# Patient Record
Sex: FEMALE | Race: WHITE
[De-identification: names, ages, dates, MRNs, and addresses within clinical notes are randomized per-mention and may not be internally consistent; named-entity substitution may affect disease eponyms.]

---

## 2017-03-28 ENCOUNTER — HOSPITAL ENCOUNTER (OUTPATIENT)
Dept: HOSPITAL 75 - RAD | Age: 47
End: 2017-03-28
Attending: PAIN MEDICINE
Payer: COMMERCIAL

## 2017-03-28 DIAGNOSIS — M54.5: Primary | ICD-10-CM

## 2017-03-28 PROCEDURE — 72148 MRI LUMBAR SPINE W/O DYE: CPT

## 2018-04-03 ENCOUNTER — HOSPITAL ENCOUNTER (OUTPATIENT)
Dept: HOSPITAL 75 - RAD | Age: 48
End: 2018-04-03
Attending: NURSE PRACTITIONER
Payer: COMMERCIAL

## 2018-04-03 DIAGNOSIS — M94.262: ICD-10-CM

## 2018-04-03 DIAGNOSIS — S89.92XA: Primary | ICD-10-CM

## 2018-04-03 DIAGNOSIS — Z98.890: ICD-10-CM

## 2018-04-03 DIAGNOSIS — M71.22: ICD-10-CM

## 2018-04-03 PROCEDURE — 73721 MRI JNT OF LWR EXTRE W/O DYE: CPT

## 2018-04-03 NOTE — DIAGNOSTIC IMAGING REPORT
PROCEDURE: MRI left joint lower extremity without contrast.



TECHNIQUE: Multiplanar, multisequence non contrast-enhanced MRI

of the left lower extremity was accomplished.



INDICATION: History of left knee surgery. Left knee injury one

week ago. 



COMPARISON: MRI of left lower extremity without contrast

10/3/2013.



FINDINGS: The anterior and posterior cruciate ligaments are

intact. The lateral and medial collateral ligamentous complexes

are intact. There is abnormal signal within the inferior aspect

of the posterior horn of the medial meniscus, with no focal

linear tear. The menisci are otherwise intact. The extensor

mechanism and patellar retinaculum are intact. Advanced

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

delamination and subchondral edema. Mild chondromalacia in the

patellofemoral and lateral compartments with no focal

full-thickness defects or subchondral edema. The posterolateral

corner is intact. Popliteal cyst measuring 2.6 x 1.1 x 4.2 cm.

Normal bone marrow signal. The visualized neurovascular

structures are unremarkable. Normal signal within the visualized

muscle bellies.



IMPRESSION: 

1. Abnormal T2 signal in the undersurface of the posterior horn

of the medial meniscus has mildly progressed since the prior

exam. This may represent postoperative change given the reported

history of a knee surgery. The menisci are otherwise normal.

2. The major ligamentous and tendinous structures of the left

knee are intact.

3. 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

is delamination and subchondral edema. There is more mild

chondromalacia in the patellofemoral and lateral compartments.

4. Popliteal cyst measuring up to 2.6 cm is new since the prior

exam.



Dictated by: 



  Dictated on workstation # [id number]

## 2018-07-02 ENCOUNTER — HOSPITAL ENCOUNTER (OUTPATIENT)
Dept: HOSPITAL 75 - ICU | Age: 48
LOS: 1 days | Discharge: HOME | End: 2018-07-03
Attending: INTERNAL MEDICINE
Payer: COMMERCIAL

## 2018-07-02 VITALS — DIASTOLIC BLOOD PRESSURE: 80 MMHG | SYSTOLIC BLOOD PRESSURE: 125 MMHG

## 2018-07-02 VITALS — DIASTOLIC BLOOD PRESSURE: 70 MMHG | SYSTOLIC BLOOD PRESSURE: 111 MMHG

## 2018-07-02 VITALS — DIASTOLIC BLOOD PRESSURE: 110 MMHG | SYSTOLIC BLOOD PRESSURE: 143 MMHG

## 2018-07-02 VITALS — DIASTOLIC BLOOD PRESSURE: 87 MMHG | SYSTOLIC BLOOD PRESSURE: 136 MMHG

## 2018-07-02 VITALS — DIASTOLIC BLOOD PRESSURE: 77 MMHG | SYSTOLIC BLOOD PRESSURE: 121 MMHG

## 2018-07-02 VITALS — DIASTOLIC BLOOD PRESSURE: 68 MMHG | SYSTOLIC BLOOD PRESSURE: 117 MMHG

## 2018-07-02 VITALS — DIASTOLIC BLOOD PRESSURE: 76 MMHG | SYSTOLIC BLOOD PRESSURE: 118 MMHG

## 2018-07-02 VITALS — DIASTOLIC BLOOD PRESSURE: 96 MMHG | SYSTOLIC BLOOD PRESSURE: 146 MMHG

## 2018-07-02 VITALS — SYSTOLIC BLOOD PRESSURE: 123 MMHG | DIASTOLIC BLOOD PRESSURE: 73 MMHG

## 2018-07-02 VITALS — SYSTOLIC BLOOD PRESSURE: 130 MMHG | DIASTOLIC BLOOD PRESSURE: 99 MMHG

## 2018-07-02 VITALS — DIASTOLIC BLOOD PRESSURE: 79 MMHG | SYSTOLIC BLOOD PRESSURE: 123 MMHG

## 2018-07-02 VITALS — SYSTOLIC BLOOD PRESSURE: 146 MMHG | DIASTOLIC BLOOD PRESSURE: 96 MMHG

## 2018-07-02 VITALS — SYSTOLIC BLOOD PRESSURE: 140 MMHG | DIASTOLIC BLOOD PRESSURE: 93 MMHG

## 2018-07-02 VITALS — SYSTOLIC BLOOD PRESSURE: 138 MMHG | DIASTOLIC BLOOD PRESSURE: 92 MMHG

## 2018-07-02 DIAGNOSIS — F41.9: ICD-10-CM

## 2018-07-02 DIAGNOSIS — M47.816: ICD-10-CM

## 2018-07-02 DIAGNOSIS — K21.9: ICD-10-CM

## 2018-07-02 DIAGNOSIS — E78.5: ICD-10-CM

## 2018-07-02 DIAGNOSIS — J30.2: ICD-10-CM

## 2018-07-02 DIAGNOSIS — I10: ICD-10-CM

## 2018-07-02 DIAGNOSIS — Z95.1: ICD-10-CM

## 2018-07-02 DIAGNOSIS — I25.10: ICD-10-CM

## 2018-07-02 DIAGNOSIS — F17.210: ICD-10-CM

## 2018-07-02 DIAGNOSIS — F32.9: ICD-10-CM

## 2018-07-02 DIAGNOSIS — Z79.899: ICD-10-CM

## 2018-07-02 DIAGNOSIS — R07.89: Primary | ICD-10-CM

## 2018-07-02 DIAGNOSIS — Z79.82: ICD-10-CM

## 2018-07-02 DIAGNOSIS — I49.3: ICD-10-CM

## 2018-07-02 DIAGNOSIS — M53.3: ICD-10-CM

## 2018-07-02 DIAGNOSIS — M54.16: ICD-10-CM

## 2018-07-02 LAB
ALBUMIN SERPL-MCNC: 4 GM/DL (ref 3.2–4.5)
ALP SERPL-CCNC: 73 U/L (ref 40–136)
ALT SERPL-CCNC: 7 U/L (ref 0–55)
APTT BLD: 57 SEC (ref 24–35)
BASOPHILS # BLD AUTO: 0 10^3/UL (ref 0–0.1)
BASOPHILS NFR BLD AUTO: 1 % (ref 0–10)
BILIRUB SERPL-MCNC: 0.4 MG/DL (ref 0.1–1)
BUN/CREAT SERPL: 10
CALCIUM SERPL-MCNC: 9.1 MG/DL (ref 8.5–10.1)
CHLORIDE SERPL-SCNC: 106 MMOL/L (ref 98–107)
CHOLEST SERPL-MCNC: 136 MG/DL (ref ?–200)
CO2 SERPL-SCNC: 24 MMOL/L (ref 21–32)
CREAT SERPL-MCNC: 0.71 MG/DL (ref 0.6–1.3)
EOSINOPHIL # BLD AUTO: 0.2 10^3/UL (ref 0–0.3)
EOSINOPHIL NFR BLD AUTO: 3 % (ref 0–10)
ERYTHROCYTE [DISTWIDTH] IN BLOOD BY AUTOMATED COUNT: 13.4 % (ref 10–14.5)
GFR SERPLBLD BASED ON 1.73 SQ M-ARVRAT: > 60 ML/MIN
GLUCOSE SERPL-MCNC: 82 MG/DL (ref 70–105)
HCT VFR BLD CALC: 38 % (ref 35–52)
HDLC SERPL-MCNC: 40 MG/DL (ref 40–60)
HGB BLD-MCNC: 13.2 G/DL (ref 11.5–16)
INR PPP: 1.1 (ref 0.8–1.4)
LYMPHOCYTES # BLD AUTO: 2.4 X 10^3 (ref 1–4)
LYMPHOCYTES NFR BLD AUTO: 29 % (ref 12–44)
MAGNESIUM SERPL-MCNC: 2.2 MG/DL (ref 1.8–2.4)
MANUAL DIFFERENTIAL PERFORMED BLD QL: NO
MCH RBC QN AUTO: 31 PG (ref 25–34)
MCHC RBC AUTO-ENTMCNC: 35 G/DL (ref 32–36)
MCV RBC AUTO: 90 FL (ref 80–99)
MONOCYTES # BLD AUTO: 0.6 X 10^3 (ref 0–1)
MONOCYTES NFR BLD AUTO: 8 % (ref 0–12)
NEUTROPHILS # BLD AUTO: 4.9 X 10^3 (ref 1.8–7.8)
NEUTROPHILS NFR BLD AUTO: 60 % (ref 42–75)
PLATELET # BLD: 251 10^3/UL (ref 130–400)
PMV BLD AUTO: 9.8 FL (ref 7.4–10.4)
POTASSIUM SERPL-SCNC: 3.6 MMOL/L (ref 3.6–5)
PROT SERPL-MCNC: 6.9 GM/DL (ref 6.4–8.2)
PROTHROMBIN TIME: 13.9 SEC (ref 12.2–14.7)
RBC # BLD AUTO: 4.23 10^6/UL (ref 4.35–5.85)
SODIUM SERPL-SCNC: 140 MMOL/L (ref 135–145)
TRIGL SERPL-MCNC: 67 MG/DL (ref ?–150)
VLDLC SERPL CALC-MCNC: 13 MG/DL (ref 5–40)
WBC # BLD AUTO: 8.2 10^3/UL (ref 4.3–11)

## 2018-07-02 PROCEDURE — 87081 CULTURE SCREEN ONLY: CPT

## 2018-07-02 PROCEDURE — 71046 X-RAY EXAM CHEST 2 VIEWS: CPT

## 2018-07-02 PROCEDURE — 80053 COMPREHEN METABOLIC PANEL: CPT

## 2018-07-02 PROCEDURE — 80048 BASIC METABOLIC PNL TOTAL CA: CPT

## 2018-07-02 PROCEDURE — 36415 COLL VENOUS BLD VENIPUNCTURE: CPT

## 2018-07-02 PROCEDURE — 85025 COMPLETE CBC W/AUTO DIFF WBC: CPT

## 2018-07-02 PROCEDURE — 85730 THROMBOPLASTIN TIME PARTIAL: CPT

## 2018-07-02 PROCEDURE — 84443 ASSAY THYROID STIM HORMONE: CPT

## 2018-07-02 PROCEDURE — 85610 PROTHROMBIN TIME: CPT

## 2018-07-02 PROCEDURE — 80061 LIPID PANEL: CPT

## 2018-07-02 PROCEDURE — 93459 L HRT ART/GRFT ANGIO: CPT

## 2018-07-02 PROCEDURE — 93005 ELECTROCARDIOGRAM TRACING: CPT

## 2018-07-02 PROCEDURE — 83735 ASSAY OF MAGNESIUM: CPT

## 2018-07-02 RX ADMIN — ONDANSETRON PRN MG: 2 INJECTION, SOLUTION INTRAMUSCULAR; INTRAVENOUS at 17:35

## 2018-07-02 RX ADMIN — SODIUM CHLORIDE SCH MLS/HR: 900 INJECTION, SOLUTION INTRAVENOUS at 16:26

## 2018-07-02 RX ADMIN — ONDANSETRON PRN MG: 2 INJECTION, SOLUTION INTRAMUSCULAR; INTRAVENOUS at 22:28

## 2018-07-02 NOTE — DIAGNOSTIC IMAGING REPORT
INDICATION: Chest tightness x2 weeks and pain.



PA and lateral chest obtained at 04:42 p.m. and compared with

08/16/2016.



Patient has had previous sternotomy. The heart is normal in size.

Port-A-Cath is seen over the left chest wall with catheter tip

overlying the SVC. The lungs appear clear. There is no

pneumothorax or pleural fluid.



IMPRESSION: No acute process in the chest and no overall change

from 08/16/2016.



Dictated by: 



  Dictated on workstation # [id number]

## 2018-07-03 VITALS — DIASTOLIC BLOOD PRESSURE: 87 MMHG | SYSTOLIC BLOOD PRESSURE: 130 MMHG

## 2018-07-03 VITALS — DIASTOLIC BLOOD PRESSURE: 61 MMHG | SYSTOLIC BLOOD PRESSURE: 104 MMHG

## 2018-07-03 VITALS — DIASTOLIC BLOOD PRESSURE: 57 MMHG | SYSTOLIC BLOOD PRESSURE: 94 MMHG

## 2018-07-03 VITALS — DIASTOLIC BLOOD PRESSURE: 78 MMHG | SYSTOLIC BLOOD PRESSURE: 115 MMHG

## 2018-07-03 VITALS — DIASTOLIC BLOOD PRESSURE: 74 MMHG | SYSTOLIC BLOOD PRESSURE: 112 MMHG

## 2018-07-03 VITALS — DIASTOLIC BLOOD PRESSURE: 54 MMHG | SYSTOLIC BLOOD PRESSURE: 95 MMHG

## 2018-07-03 VITALS — SYSTOLIC BLOOD PRESSURE: 122 MMHG | DIASTOLIC BLOOD PRESSURE: 80 MMHG

## 2018-07-03 VITALS — SYSTOLIC BLOOD PRESSURE: 122 MMHG | DIASTOLIC BLOOD PRESSURE: 77 MMHG

## 2018-07-03 VITALS — DIASTOLIC BLOOD PRESSURE: 82 MMHG | SYSTOLIC BLOOD PRESSURE: 128 MMHG

## 2018-07-03 VITALS — DIASTOLIC BLOOD PRESSURE: 97 MMHG | SYSTOLIC BLOOD PRESSURE: 152 MMHG

## 2018-07-03 VITALS — DIASTOLIC BLOOD PRESSURE: 74 MMHG | SYSTOLIC BLOOD PRESSURE: 97 MMHG

## 2018-07-03 LAB
BASOPHILS # BLD AUTO: 0 10^3/UL (ref 0–0.1)
BASOPHILS NFR BLD AUTO: 1 % (ref 0–10)
BUN/CREAT SERPL: 14
CALCIUM SERPL-MCNC: 8.4 MG/DL (ref 8.5–10.1)
CHLORIDE SERPL-SCNC: 108 MMOL/L (ref 98–107)
CO2 SERPL-SCNC: 23 MMOL/L (ref 21–32)
CREAT SERPL-MCNC: 0.76 MG/DL (ref 0.6–1.3)
EOSINOPHIL # BLD AUTO: 0.3 10^3/UL (ref 0–0.3)
EOSINOPHIL NFR BLD AUTO: 5 % (ref 0–10)
ERYTHROCYTE [DISTWIDTH] IN BLOOD BY AUTOMATED COUNT: 13.2 % (ref 10–14.5)
GFR SERPLBLD BASED ON 1.73 SQ M-ARVRAT: > 60 ML/MIN
GLUCOSE SERPL-MCNC: 86 MG/DL (ref 70–105)
HCT VFR BLD CALC: 35 % (ref 35–52)
HGB BLD-MCNC: 12 G/DL (ref 11.5–16)
LYMPHOCYTES # BLD AUTO: 2.5 X 10^3 (ref 1–4)
LYMPHOCYTES NFR BLD AUTO: 45 % (ref 12–44)
MANUAL DIFFERENTIAL PERFORMED BLD QL: NO
MCH RBC QN AUTO: 31 PG (ref 25–34)
MCHC RBC AUTO-ENTMCNC: 34 G/DL (ref 32–36)
MCV RBC AUTO: 91 FL (ref 80–99)
MONOCYTES # BLD AUTO: 0.4 X 10^3 (ref 0–1)
MONOCYTES NFR BLD AUTO: 7 % (ref 0–12)
NEUTROPHILS # BLD AUTO: 2.4 X 10^3 (ref 1.8–7.8)
NEUTROPHILS NFR BLD AUTO: 42 % (ref 42–75)
PLATELET # BLD: 234 10^3/UL (ref 130–400)
PMV BLD AUTO: 9.7 FL (ref 7.4–10.4)
POTASSIUM SERPL-SCNC: 3.9 MMOL/L (ref 3.6–5)
RBC # BLD AUTO: 3.87 10^6/UL (ref 4.35–5.85)
SODIUM SERPL-SCNC: 141 MMOL/L (ref 135–145)
WBC # BLD AUTO: 5.6 10^3/UL (ref 4.3–11)

## 2018-07-03 RX ADMIN — SODIUM CHLORIDE SCH MLS/HR: 900 INJECTION, SOLUTION INTRAVENOUS at 15:26

## 2018-07-03 RX ADMIN — SODIUM CHLORIDE SCH MLS/HR: 900 INJECTION, SOLUTION INTRAVENOUS at 04:16

## 2018-07-03 NOTE — DISCHARGE INST-POST CATH
Discharge Inst-CATH


Post Cardiac Cath D/C Inst


Follow Up/Plan


F/u with Dr Logan in 1-2 weeks


CARDIAC CATH DISCHARGE INSTRUCTIONS





*Hold Metformin for 48 hours post heart cath.





ACTIVITY





* Go Home directly and rest.


* Limit activity of the leg (or wrist if it was used) for 7 days including 

aerobics, swimming,


   jogging, bicycling, etc.


* Restrict stair-climbing for 7 days if possible, if not, climb up with your non

-cath leg, then


   bring together on the same step.


* Avoid lifting, pushing, pulling or excessive movement of the affected 

extremity for 7 days.


* Customary sexual activity may be resumed after 2 days-use caution not to use 

a position  


   that strains or causes pain to the affected extremity.


* No driving for 24 hours.


* NO SMOKING. 


* Avoid straining for bowel movements for 7 days.


* Gentle walking on level ground is allowed.


* Returning to work will depend on the type of procedure and the results. Your 

doctor will discuss


   this with you.





CALL YOUR DOCTOR FOR ANY OF THE FOLLOWING:





*If bleeding from the puncture site occurs- Apply gentle pressure to site with 

clean cloth and call


   your doctor or EMS.


* If a knot or lump forms under the skin, increases in size, or causes pain.


* If bruising appears to be worsening or moving further down your leg instead 

of disappearing.


* Temperature above 101 F.





CARE OF YOUR GROIN INCISION;





* Bruising or purple discoloration of the skin near the puncture site is common.


* You may shower only, no bathtub bathing for 5 days.  Be careful to avoid 

slipping as your


   leg may feel stiff.


* If a closure device was used on your femoral artery, please see the attached 

guide regarding


   care of the device and your leg.


* REMOVE the dressing from your groin the next day after your procedure in the 

shower.





CARE OF YOUR WRIST INCISION;





* Bruising or purple discoloration of the skin near the puncture site is common.


* You may shower.


* DO NOT submerge wrist.


* Remove dressing in 24 hours.











DARREL LOGAN MD [facility] CCDS Jul 3, 2018 16:47

## 2018-07-03 NOTE — DISCHARGE INST-CARDIOLOGY
Discharge Inst-Cardiac


Discharge Medications


New Medications:  


Clopidogrel Bisulfate (Clopidogrel) 75 Mg Tablet


75 MG PO DAILY, #30 TAB 3 Refills





Omeprazole (Omeprazole) 20 Mg Capsule.dr


20 MG PO DAILY, #30 CAP 5 Refills





Atorvastatin Calcium (Lipitor) 40 Mg Tablet


40 MG PO HS for 30 Days, #30 TAB 5 Refills





Metoprolol Succinate (Metoprolol Succinate) 25 Mg Tab.er.24h


25 MG PO DAILY for 30 Days, #30 TAB 5 Refills





 


Continued Medications:  


Alprazolam (Alprazolam) 0.5 Mg Tablet


0.5 MG PO BID PRN for ANXIETY





Amitriptyline HCl (Amitriptyline HCl) 25 Mg Tablet


25 MG PO DAILY





Aspirin (Aspirin EC) 81 Mg Tablet.dr


81 MG PO DAILY





Chlorzoxazone (Chlorzoxazone) 500 Mg Tablet


500 MG PO TID PRN for MUSCLE SPASMS





Gabapentin (Gabapentin) 300 Mg Capsule


300 MG PO DAILY





Ipratropium/Albuterol Sulfate (Iprat-Albut 0.5-3(2.5) mg/3 ml) 3 Ml Ampul.neb


3 ML IH Q4H PRN for SHORTNESS OF BREATH





Promethazine HCl (Promethazine Tablet) 25 Mg Tablet


25 MG PO Q4H PRN for NAUSEA/VOMITING





Quetiapine Fumarate (Quetiapine Fumarate) 200 Mg Tablet


400 MG PO HS


TAKES 2 (200MG) TABLETS


Sumatriptan Succinate (Imitrex) 6 Mg/0.5 Ml Cartridge


UD PRN for MIGRAINE





Tramadol HCl (Tramadol HCl) 50 Mg Tablet


50 MG PO TID PRN for PAIN

















DARREL CASTRO MD FACP FAC CCDS Jul 3, 2018 16:46

## 2018-07-03 NOTE — PROGRESS NOTE-CARDIOLOGY
Cardiology SOAP Progress Note


Subjective:


No recurrent cp or palp or syncope. Mild intermittent nausea. No shortness of 

breath at rest





Objective:


I&O/Vital Signs











 7/3/18 7/3/18 7/3/18 7/3/18





 04:07 04:10 07:00 08:00


 


Temp 96.8   96.9


 


Pulse 68  75 70


 


Resp 20   18


 


B/P (MAP) 112/74 (87)   95/54 (68)


 


Pulse Ox 95   96


 


O2 Delivery Room Air Room Air  Room Air


 


    





 7/3/18 7/3/18 7/3/18 7/3/18





 08:00 09:11 12:00 12:50


 


Temp    98.0


 


Pulse    65


 


Resp    16


 


B/P (MAP)    97/74 (82)


 


Pulse Ox    98


 


O2 Delivery Room Air Room Air Room Air Room Air


 


    





 7/3/18   





 13:00   


 


Pulse 67   














 7/3/18





 00:00


 


Intake Total 540 ml


 


Balance 540 ml








Weight (Pounds):  130


Weight (Ounces):  0.0


Weight (Calculated Kilograms):  58.714010


Constitutional:  AAO x 3


Respiratory:  No accessory muscle use, No respiratory distress; chest expansion 

is symmetric, chest is bilaterally symmetric, lungs clear to auscultation


Cardiovascular:  regular rate-rhythm; No JVD; S1 and S2


Gastrointestional:  No tender; soft, audible bowel sounds


Extremities:  no lower extremity edema bilateral


Neurologic/Psychiatric:  grossly intact


Skin:  No rash, No ulcerations





Results/Procedures:


Labs


Laboratory Tests


7/2/18 16:00: 


White Blood Count 8.2, Red Blood Count 4.23L, Hemoglobin 13.2, Hematocrit 38, 

Mean Corpuscular Volume 90, Mean Corpuscular Hemoglobin 31, Mean Corpuscular 

Hemoglobin Concent 35, Red Cell Distribution Width 13.4, Platelet Count 251, 

Mean Platelet Volume 9.8, Neutrophils (%) (Auto) 60, Lymphocytes (%) (Auto) 29, 

Monocytes (%) (Auto) 8, Eosinophils (%) (Auto) 3, Basophils (%) (Auto) 1, 

Neutrophils # (Auto) 4.9, Lymphocytes # (Auto) 2.4, Monocytes # (Auto) 0.6, 

Eosinophils # (Auto) 0.2, Basophils # (Auto) 0.0, Prothrombin Time 13.9, INR 

Comment 1.1, Activated Partial Thromboplast Time 57H, Sodium Level 140, 

Potassium Level 3.6, Chloride Level 106, Carbon Dioxide Level 24, Anion Gap 10, 

Blood Urea Nitrogen 7, Creatinine 0.71, Estimat Glomerular Filtration Rate > 60

, BUN/Creatinine Ratio 10, Glucose Level 82, Calcium Level 9.1, Magnesium Level 

2.2, Total Bilirubin 0.4, Aspartate Amino Transf (AST/SGOT) 11, Alanine 

Aminotransferase (ALT/SGPT) 7, Alkaline Phosphatase 73, Total Protein 6.9, 

Albumin 4.0, Triglycerides Level 67, Cholesterol Level 136, LDL Cholesterol 

Direct 81, VLDL Cholesterol 13, HDL Cholesterol 40


7/3/18 04:10: 


White Blood Count 5.6, Red Blood Count 3.87L, Hemoglobin 12.0, Hematocrit 35, 

Mean Corpuscular Volume 91, Mean Corpuscular Hemoglobin 31, Mean Corpuscular 

Hemoglobin Concent 34, Red Cell Distribution Width 13.2, Platelet Count 234, 

Mean Platelet Volume 9.7, Neutrophils (%) (Auto) 42, Lymphocytes (%) (Auto) 45H

, Monocytes (%) (Auto) 7, Eosinophils (%) (Auto) 5, Basophils (%) (Auto) 1, 

Neutrophils # (Auto) 2.4, Lymphocytes # (Auto) 2.5, Monocytes # (Auto) 0.4, 

Eosinophils # (Auto) 0.3, Basophils # (Auto) 0.0, Sodium Level 141, Potassium 

Level 3.9, Chloride Level 108H, Carbon Dioxide Level 23, Anion Gap 10, Blood 

Urea Nitrogen 11, Creatinine 0.76, Estimat Glomerular Filtration Rate > 60, BUN/

Creatinine Ratio 14, Glucose Level 86, Calcium Level 8.4L, Thyroid Stimulating 

Hormone (TSH) 3.35





Laboratory Tests


7/2/18 16:00








7/3/18 04:10











A/P:


Assessment:


Chest discomfort without evidence of ACS. Card cath today (7/3/18) is 

essentially stable compared to previous card cath (see below)





Coronary artery disease with a history of cardiac bypass surgery in November 2011 consisting of left internal mammary artery graft to left anterior 

descending artery.  Cardiac catheterization was at [facility] in January 2013: mild disease of the left anterior descending artery proximal to the 

graft.  A left internal mammary artery graft to the left anterior descending 

artery was patent but had considerable narrowing proximal to the anastomosis.  

No intervention was undertaken, apparently because the left anterior descending 

artery itself did not have significant disease.  The right coronary artery was 

reported to have mild proximal disease.  Left ventricular ejection fraction was 

60 percent.  Cardiac cath July 2014 per Dr. Crowder showed no significant 

epicardial coronary artery disease.  The LIMA has stenosis at the distal 

anastomotic site but there is good antegrade flow in the native LAD. Last card 

cath on 7/3/18: 50-60% ostial, prox and mid-vessel stenoses of LAD with FFR 

across the combination of all these vessels being 0.85 (hemodynamically non-

significant), LIMA intact with considerable stenosis just prior to insertion (

but not intervened on because LAD itself did not show any significant 

obstructive disease), LVEDP 14, LVEF 65-70%





Hypertension, controlled





Hyperlipidemia being treated with statin therapy and being followed by Dr. KHAN





History of anxiety, controlled





History of gastroesophageal reflux being treated with proton pump inhibitors





Chronic tobacco use





Abnormal ECG. ECG of 7/12/18 shows frequent PVCs


Plan:





Based on today's cath results, conservative therapy appears to be the best 

approach. We have added stating and bb and clopidogrel to the regimen


We have also empirically added a PPI to the regimen while she awaits w/u for GI 

causes of cp and nausea with her pcp on an outpatient basis


We have advised outpt cardiac f/u


Advised immediate and complete smoking cessation











DARREL CASTRO MD FACP FAC CCDS Jul 3, 2018 16:04

## 2018-07-03 NOTE — CARDIAC CATHETERIZATION
DATE OF SERVICE:  07/02/2018



CARDIAC CATHETERIZATION REPORT



The patient is a 47-year-old lady, who is known to have coronary artery disease

and has had coronary artery bypass surgery consisting of left internal mammary

artery graft to the left anterior descending artery several years ago.  On the

last cardiac catheterization, she is reported to have had stenosis of the left

internal mammary artery graft to left anterior descending artery close to the

site of the insertion, but there was no significant disease within the left

anterior descending artery itself and this was not intervened on.  This last

cardiac catheterization was in 2014.  She now presents with symptoms suggestive

of unstable angina.  She does have multiple coronary artery disease risk

factors, including continued tobacco use.  Cardiac catheterization was carried

out today after having obtained an informed consent.



PROCEDURE:

She was brought to the cardiac catheterization laboratory in a fasting state. 

The right groin was prepared and draped in usual sterile fashion.  A 1%

lidocaine for local anesthesia.  Modified Seldinger technique was used to

advance a 5-[place] sheath into the right femoral artery.  A 5-[place] JL3.5

catheter was used for left coronary angiography and 5-[place] Erick right

catheter was used for right coronary angiography.  We used a 5-[place] ISAIAH

catheter to engage the left internal mammary artery graft to left anterior

descending artery and to perform angiography.



FRACTIONAL FLOW RESERVE MEASUREMENT IN THE LEFT ANTERIOR DESCENDING ARTERY:

Following completion of the diagnostic coronary procedure, we carried out

fractional flow reserve measurement in the proximal and mid left anterior

descending artery where the patient had up to 50 to 60% stenosis with haziness. 

We exchanged the sheath over a wire for a 6-[place] sheath.  We gave 5000 units

of intravenous heparin.  We used 5-[place] JL3 guide catheter.  We advanced a

pressure wire across the lesions and the tip was placed in the distal part of

the left anterior descending artery.  We gave an infusion of adenosine at 140

mcg per kilogram per minute for 2-1/2 minutes.  Fractional flow reserve was

measured at 0.85, indicating hemodynamic nonsignificance of the lesions in the

proximal and mid left anterior descending artery.  We removed the wire. 

Angiography of the left anterior descending artery was repeated and we confirmed

that there had been no status change following the fractional flow reserve

measurement.  The catheter was then removed.  We carried out angiography of the

right femoral artery through the sheath.  We then used a 5-[place] pigtail

catheter to carry out left heart catheterization, left ventricular angiography. 

The catheter was then pulled back and removed.  Mynx was used to achieve

hemostasis.  She tolerated the procedure well.



HEMODYNAMICS:

Left ventricular end-diastolic pressure following coronary angiography was 14

mmHg.  There was no significant pressure gradient on pullback across the aortic

valve.  Ascending aortic pressure was 118/66 with a mean of 89 mmHg.



CORONARY ANGIOGRAPHY:

Left main coronary artery is free of significant disease.  Left anterior

descending artery has 50 to 60% ostial, proximal, and mid vessel stenoses. 

Fractional flow reserve across the combination of all these lesions is 0.85

indicating hemodynamic nonsignificance.  The distal left anterior descending

artery is supplied by a patent left internal mammary artery graft that has 70 to

80% narrowing just at the site of its insertion of the left anterior descending

artery.  This is chronic and unchanged from the past.  The left circumflex

artery has mild to moderate diffuse disease.  The right coronary artery is

dominant and has a mild to moderate diffuse disease.  Coronary calcification is

present in all vessels.



CONCLUSIONS:

1.  Coronary artery disease primarily consisting of 50 to 60% stenosis in the

ostial, proximal and mid left anterior descending artery with a fractional flow

reserve across all of these lesions at 0.85 (indicating hemodynamic

nonsignificance).

2.  Patent left internal mammary artery graft to mid to distal left anterior

descending artery.  This graft has 70 to 80% stenosis just prior to its

insertion into the left anterior descending artery, but this is a chronic lesion

and there does not appear to be significant obstructive disease in the left

anterior descending artery, therefore, this lesion was not intervened on.

3.  Normal global left ventricular systolic function with ejection fraction of

65 to 70%.

4.  Mild elevation of left ventricular end-diastolic pressure.

3.  No significant mitral regurgitation.



DISCUSSION AND RECOMMENDATIONS:

Based on results of the study, it appears appropriate to continue a conservative

approach.  Risk factor modification has again been reviewed with her in detail. 

Once again, she has been advised to quit smoking immediately and completely and

to follow up on an outpatient basis.





Job ID: [id number]

DocumentID: [id number]

Dictated Date:  07/03/2018 15:50:42

Transcription Date: 07/03/2018 17:37:04

Dictated By: DARREL CASTRO MD, MA, FACP, FACC,

## 2018-07-03 NOTE — PROGRESS NOTE-CARDIOLOGY
Cardiology SOAP Progress Note


Subjective:


Continues to c/o chest discomfort.  States "it feels like an elephant sitting 

on my chest".  No c/o dyspnea, palpitations.





Objective:


I&O/Vital Signs











 7/3/18 7/3/18 7/3/18 7/3/18





 07:00 08:00 08:00 09:11


 


Temp  96.9  


 


Pulse 75 70  


 


Resp  18  


 


B/P (MAP)  95/54 (68)  


 


Pulse Ox  96  


 


O2 Delivery  Room Air Room Air Room Air


 


    





 7/3/18 7/3/18 7/3/18 





 12:00 12:50 13:00 


 


Temp  98.0  


 


Pulse  65 67 


 


Resp  16  


 


B/P (MAP)  97/74 (82)  


 


Pulse Ox  98  


 


O2 Delivery Room Air Room Air  














 18





 23:59


 


Intake Total 540 ml


 


Balance 540 ml








Weight (Pounds):  130


Weight (Ounces):  0.0


Weight (Calculated Kilograms):  58.629554


Constitutional:  AAO x 3


Respiratory:  No accessory muscle use, No respiratory distress; chest expansion 

is symmetric, chest is bilaterally symmetric, lungs clear to auscultation


Cardiovascular:  regular rate-rhythm; No JVD; S1 and S2


Gastrointestional:  No tender; soft, audible bowel sounds


Extremities:  no lower extremity edema bilateral


Neurologic/Psychiatric:  grossly intact


Skin:  No rash, No ulcerations





Results/Procedures:


Labs


Laboratory Tests


7/3/18 04:10: 


White Blood Count 5.6, Red Blood Count 3.87L, Hemoglobin 12.0, Hematocrit 35, 

Mean Corpuscular Volume 91, Mean Corpuscular Hemoglobin 31, Mean Corpuscular 

Hemoglobin Concent 34, Red Cell Distribution Width 13.2, Platelet Count 234, 

Mean Platelet Volume 9.7, Neutrophils (%) (Auto) 42, Lymphocytes (%) (Auto) 45H

, Monocytes (%) (Auto) 7, Eosinophils (%) (Auto) 5, Basophils (%) (Auto) 1, 

Neutrophils # (Auto) 2.4, Lymphocytes # (Auto) 2.5, Monocytes # (Auto) 0.4, 

Eosinophils # (Auto) 0.3, Basophils # (Auto) 0.0, Sodium Level 141, Potassium 

Level 3.9, Chloride Level 108H, Carbon Dioxide Level 23, Anion Gap 10, Blood 

Urea Nitrogen 11, Creatinine 0.76, Estimat Glomerular Filtration Rate > 60, BUN/

Creatinine Ratio 14, Glucose Level 86, Calcium Level 8.4L, Thyroid Stimulating 

Hormone (TSH) 3.35





Procedures





NAME:      MARILIA JONES


MED REC#:   [id number]


ACCOUNT#:   [id number]


PHYSICIAN:    DARREL CASTRO MD, MA, FACP, FACC, FSCAI, CCDS


 








CC:   DARREL CASTRO MDP FACC CCDS; RAUL MARTINEZ MD


 Page 1 of 1


 RADIOLOGY REPORT





 [facility] [place], [place]





CC:   DARREL CASTRO MDP FACC CCDS; RAUL MARTINEZ MD


 Page 1 of 1


 RADIOLOGY REPORT





NAME:      MARILIA JONES


MED REC#:   [id number]


ACCOUNT#:   [id number]


PT STATUS:   ADM IN


:      1970


PHYSICIAN:    DARREL CASTRO MD, MA, FACP, FACC, FSCAI, CCDS


ADMIT DATE:   18/ICU


 ***Signed***


Date of Exam:   18





CHEST PA/LAT (2 VIEW)


 





INDICATION: Chest tightness x2 weeks and pain.





PA and lateral chest obtained at 04:42 p.m. and compared with


2016.





Patient has had previous sternotomy. The heart is normal in size.


Port-A-Cath is seen over the left chest wall with catheter tip


overlying the SVC. The lungs appear clear. There is no


pneumothorax or pleural fluid.





IMPRESSION: No acute process in the chest and no overall change


from 2016.





Dictated by: 





  Dictated on workstation # [id number]





[id number]





Dict:      [id number]


Trans:      18 1702





Interpreted by:         RAUL MARTINEZ MD


Electronically signed by:   RAUL MARTINEZ MD 18 1704





A/P:


Assessment:


Chest discomfort suggestive of unstable angina





Coronary artery disease with a history of cardiac bypass surgery in 2011 consisting of left internal mammary artery graft to left anterior 

descending artery.  Last cardiac catheterization was at [facility] in 

2013.  She was reported to have shown mild disease of the left anterior 

descending artery proximal to the graft.  A left internal mammary artery graft 

to the left anterio descending artery was patent but had considerable narrowing 

proximal to the anastomosis.  No intervention was undertaken, apparently 

because the left anterior descending artery itself did not have significant 

disease.  The right coronary artery was reported to have mild proximal disease.

  Left ventricular ejection fraction was 60 percent.  Cardiac cath 2014 

per Dr. Crowder showed no significant epicardial coronary artery diseas.  The 

LIMA has stenosis at the distal anastomotic site but there is good antegrade 

flow in the native LAD.





Hypertension, controlled





Hyperlipidemia being treated with statin therapy and being followed by Dr. KHAN





History of anxiety, controlled





History of gastroesophageal reflux being treated with proton pump inhibitors





Chronic tobacco use





Abnormal ECG. ECG of 18 shows frequent PVCs


Plan:


Symptoms of unstable angina


Continue current medication regimen


Cardiac cath planned for today


Advised immediate and complete smoking cessation


Further recs based on her hospital course











OVI LI Jul 3, 2018 09:10

## 2018-07-03 NOTE — CARDIOLOGY DISCHARGE SUMMARY
Diagnosis/Chief Complaint


Date of Admission


2018 at 15:15


Date of Discharge


7/3/18


Final/Discharge Diagnosis


Chest discomfort without evidence of ACS. Card cath today (7/3/18) is 

essentially stable compared to previous card cath (see below)





Coronary artery disease with a history of cardiac bypass surgery in 2011 consisting of left internal mammary artery graft to left anterior 

descending artery.  Cardiac catheterization was at [facility] in 2013: mild disease of the left anterior descending artery proximal to the 

graft.  A left internal mammary artery graft to the left anterior descending 

artery was patent but had considerable narrowing proximal to the anastomosis.  

No intervention was undertaken, apparently because the left anterior descending 

artery itself did not have significant disease.  The right coronary artery was 

reported to have mild proximal disease.  Left ventricular ejection fraction was 

60 percent.  Cardiac cath 2014 per Dr. Crowder showed no significant 

epicardial coronary artery disease.  The LIMA has stenosis at the distal 

anastomotic site but there is good antegrade flow in the native LAD. Last card 

cath on 7/3/18: 50-60% ostial, prox and mid-vessel stenoses of LAD with FFR 

across the combination of all these vessels being 0.85 (hemodynamically non-

significant), LIMA intact with considerable stenosis just prior to insertion (

but not intervened on because LAD itself did not show any significant 

obstructive disease), LVEDP 14, LVEF 65-70%





Hypertension, controlled





Hyperlipidemia being treated with statin therapy and being followed by Dr. KHAN





History of anxiety, controlled





History of gastroesophageal reflux being treated with proton pump inhibitors





Chronic tobacco use





Abnormal ECG. ECG of 18 shows frequent PVCs





Chief Complaint/HPI


Chief Complaint/HPI








For details of admission, please refer to H&P of 18





For condition at d/c, please refer to the progress note of today's date





Discharge Summary


Discussion & Recommendations


Home Medications


Reviewed patient Home Medication Reconciliation performed by pharmacy 

medication reconciliations technician and/or nursing.


Patients Allergies have been reviewed.





Discharge


Home Medications:


Reviewed and agree with Discharge Medication list on patient's Discharge 

Instruction sheet


Instructions to patient/family


F/u with Dr Logan in 1-2 weeks





Clinical Quality Measures


DVT/VTE Risk/Contraindication:


Risk Factor Score Per Nursin


RFS Level Per Nursing on Admit:  2=Moderate











DARREL LOGAN MD FACP FAC CCDS Jul 3, 2018 16:51

## 2018-07-03 NOTE — DISCHARGE INST-CARDIOLOGY
Discharge Inst-Cardiac


Discharge Medications


New Medications:  


Clopidogrel Bisulfate (Clopidogrel) 75 Mg Tablet


75 MG PO DAILY, #30 TAB 3 Refills





Omeprazole (Omeprazole) 20 Mg Capsule.dr


20 MG PO DAILY, #30 CAP 5 Refills





Atorvastatin Calcium (Lipitor) 40 Mg Tablet


40 MG PO HS for 30 Days, #30 TAB 5 Refills





Metoprolol Succinate (Metoprolol Succinate) 25 Mg Tab.er.24h


25 MG PO DAILY for 30 Days, #30 TAB 5 Refills





 


Continued Medications:  


Alprazolam (Alprazolam) 0.5 Mg Tablet


0.5 MG PO BID PRN for ANXIETY





Amitriptyline HCl (Amitriptyline HCl) 25 Mg Tablet


25 MG PO DAILY





Aspirin (Aspirin EC) 81 Mg Tablet.dr


81 MG PO DAILY





Chlorzoxazone (Chlorzoxazone) 500 Mg Tablet


500 MG PO TID PRN for MUSCLE SPASMS





Gabapentin (Gabapentin) 300 Mg Capsule


300 MG PO DAILY





Ipratropium/Albuterol Sulfate (Iprat-Albut 0.5-3(2.5) mg/3 ml) 3 Ml Ampul.neb


3 ML IH Q4H PRN for SHORTNESS OF BREATH





Promethazine HCl (Promethazine Tablet) 25 Mg Tablet


25 MG PO Q4H PRN for NAUSEA/VOMITING





Quetiapine Fumarate (Quetiapine Fumarate) 200 Mg Tablet


400 MG PO HS


TAKES 2 (200MG) TABLETS


Sumatriptan Succinate (Imitrex) 6 Mg/0.5 Ml Cartridge


UD PRN for MIGRAINE





Tramadol HCl (Tramadol HCl) 50 Mg Tablet


50 MG PO TID PRN for PAIN

















DARREL CASTRO MD FACP FAC CCDS Jul 3, 2018 16:45

## 2018-07-03 NOTE — CARDIAC PROCEDURE NOTE-CS/ASA
Pre-Procedure Note


Pre-Op Procedure Note


H&P Reviewed


The H&P was reviewed, patient examined and no changes noted.


Date H&P Reviewed:  Jul 3, 2018


Time H&P Reviewed:  14:13





Conscious Sedation Pre-Proced


Time Reviewed:  14:13


ASA Class:  3











Airway Mallampati Classification: ([place] appropriate class) I.  II.  III,  IV


 


Lungs 


 


Heart 


 


 ASA score


 


 ASA 1: a normal healthy patient


 


 ASA 2:  a patient with a mild systemic disease (mid diabetes, controlled 

hypertension, obesity 


 


 ASA 3:  a patient with a severe systemic disease that limits activity  (angina

, COPD, prior Myocardial infarction)


 


 ASA 4:  a patient with an incapacitating disease that is a constant threat to 

life (CHF, renal failure)


 


 ASA 5:  a moribund patient not expected to survive 24 hrs.  (ruptured aneurysm)


 


 ASA 6:  a declared brain dead patient whose organs are being harvested.


 


 For emergent operations, add the letter E after the classification








Grade 2


Sedation Plan:  Analgesia, Amnesia, Plan communicated to team members, 

Discussed options with patient/fam, Discussed risks with patient/fam


Note


The patient is an appropriate candidate to undergo the planned procedure, 

sedation, and anesthesia.





The patient immediately re-assessed prior to indication.











DARREL CASTRO MD FACP FAC CCDS Jul 3, 2018 14:13

## 2018-07-06 NOTE — XMS REPORT
[facility]

 Created on: 2018



Dayami Rae

External Reference #: [id number]

: 1970

Sex: Female



Demographics







 Address  [address] [place]  [address]

 

 Preferred Language  Unknown

 

 Marital Status  Unknown

 

 Mor[employer] Affiliation  Unknown

 

 Race  Unknown

 

 Ethnic Group  Unknown





Author







 Author  SURENDRA FRASER

 

 [facility]

 

 Address  [address] [place]  [address]



 

 Phone  [phone]







Care Team Providers







 Care Team Member Name  Role  Phone

 

 SURENDRA FRASER  Unavailable  [phone]







PROBLEMS







 Type  Condition  ICD9-CM Code  PVP53-DS Code  Onset Dates  Condition Status  
SNOMED Code

 

 Problem  Fibromyalgia     M79.7     Active  976701377

 

 Problem  Neuropathy     G62.9     Active  440861907

 

 Problem  Coronary artery disease involving native coronary artery of native 
heart without angina pectoris     I25.10     Active  9837569161601

 

 Problem  Chronic pain syndrome     G89.4     Active  898419769

 

 Problem  COPD suggested by initial evaluation     J44.9     Active  94819046

 

 Problem  Acute midline low back pain with left-sided sciatica     M54.42     
Active  417229449

 

 Problem  Lumbago with sciatica, left side     M54.42     Active  066462588

 

 Problem  Anxiety disorder, unspecified     F41.9     Active  971790517

 

 Problem  Other chronic pain     G89.29     Active  55199277

 

 Problem  Lumbago with sciatica, right side     M54.41     Active  
[id number]







ALLERGIES







 Substance  Reaction  Event Type  Date  Status

 

 Morphine Sulfate  Rash  Drug Allergy  26 Dec, 2017  Active







ENCOUNTERS







 Encounter  Location  Date  Diagnosis

 

 [facility]  [address] [address]     

 

 [facility]  [id number] [place] [address]-
[phone]    Acute bronchitis, unspecified organism J20.9

 

 [facility]  [id number] [place] [phone]-
[phone]    Chronic pain syndrome G89.4

 

 [facility]  [id number] [place] [address]-
[phone]  25 May, 2018   

 

 [facility]  [id number] [place] [phone]-
[phone]  24 May, 2018  Acute midline low back pain with left-sided sciatica M54.42

 

 [facility]  [id number] [place] [address]-
2908  22 May, 2018   

 

 [facility]  [id number] [place] [phone]
[phone]  21 May, 2018  Bronchitis J40

 

 [facility]  [address] [phone]-
[phone]  07 May, 2018  Chronic pain syndrome G89.4 and Neuropathy G62.9

 

 [facility]  [id number] [place] [address]487-
[phone]    Acute midline low back pain with left-sided sciatica M54.42

 

 [facility]  [address] [address]-
[phone]     

 

 [facility]  [address] [address]-
[phone]    Anxiety disorder, unspecified F41.9

 

 [facility]  [id number] [place] [address]370-
3610     

 

 [facility]  [address] [phone]-
0697    Chronic pain syndrome G89.4 and Acute midline low back pain 
with left-sided sciatica M54.42

 

 [facility]  [id number] [place] [address]73-
8229    Chronic pain syndrome G89.4

 

 [facility]  [id number] [place] [address]59-
2630     

 

 [facility]  [address] [address]-
4679     

 

 [facility]  [id number] [place] [address]-
8011  29 Mar, 2018  Injury of left knee, initial encounter S89.92XA and COPD 
suggested by initial evaluation J44.9

 

 [facility]  [id number] [place] [address]461-
[phone]  28 Mar, 2018  Acute bronchitis, unspecified organism J20.9

 

 [facility]  [id number] [place] [address] 8[address]14-
[phone]  27 Mar, 2018  Acute bronchitis, unspecified organism J20.9

 

 [facility]  [id number] [place] [address] [phone]-
[phone]  21 Mar, 2018  Anxiety disorder, unspecified F41.9 and Acute bronchitis, 
unspecified organism J20.9

 

 [facility]  [id number] [place] [address]-
[phone]  08 Mar, 2018  Chronic pain syndrome G89.4

 

 [facility]  [id number] [place] [address] 16715-
8378  05 Mar, 2018   

 

 [facility]  [id number] [place] [address] 78261-
8004  05 Mar, 2018  Acute midline low back pain with left-sided sciatica M54.42

 

 [facility]  [id number] [place] [address] 24189-
[phone]     

 

 [facility]  [id number] [place] [address]11-
4327    Chronic pain syndrome G89.4

 

 [facility]  [id number] [place] [address] 01015-
[phone]    Chronic pain syndrome G89.4

 

 [facility]  [id number] [place] [address] 90080-
5911     

 

 [facility]  [id number] [place] [address] 17251-
[phone]    Gastroenteritis K52.9

 

 [facility]  [id number] [place] [address] [phone]-
[phone]     

 

 [facility]  [id number] [place] [address]80-
5938  15 Jerardo, 2018  Acute bronchitis, unspecified organism J20.9

 

 [place] [id number] [place] [address]830-
4620    Anxiety disorder, unspecified F41.9 and Neuropathy G62.9

 

 [place] [id number] [place] [address] 19870-
8600    Chronic pain syndrome G89.4

 

 [address] [phone]-
[phone]    Bronchitis J40 and Laryngitis J04.0

 

 [address] [address]-
5206  29 Dec, 2017   

 

 [address] [phone]-
[phone]  26 Dec, 2017  Bronchitis J40

 

 [address] [address]-
9903  18 Dec, 2017   

 

 [address] [place] [address]-
3275  13 Dec, 2017  Fibromyalgia M79.7 and Chronic pain syndrome G89.4

 

 [address] [address]-
6239  04 Dec, 2017  Chronic pain syndrome G89.4 and Acute bronchitis, 
unspecified organism J20.9

 

 [place] [id number] [place] [address]-
6296    Neuropathy G62.9

 

 [address] [place] [address] [address]-
9965    Chronic pain syndrome G89.4 ; Lumbago with sciatica, left 
side M54.42 ; Lumbago with sciatica, right side M54.41 ; Screening cholesterol 
level Z13.220 ; Screening for diabetes mellitus Z13.1 ; Screening for thyroid 
disorder Z13.29 ; Fibromyalgia M79.7 ; Anxiety disorder, unspecified F41.9 and 
Neuropathy G62.9

 

 [address] [place] [address] [address]-
4029     

 

 [facility]  [address] [address]-
3528  25 Oct, 2017   

 

 [facility]  [address] [place] [address]344-
8827  10 Oct, 2017  Fibromyalgia M79.7 ; Chronic pain syndrome G89.4 ; Anxiety 
disorder, unspecified F41.9 ; Neuropathy G62.9 and Acute bronchitis, 
unspecified organism J20.9

 

 [facility]  [id number] [address]-
9725  09 Oct, 2017   

 

 [facility]  [address] [place] [address] 04339-
4056  25 Sep, 2017   

 

 [facility]  [address] [place] [address] 09589-
[phone]  19 Sep, 2017   

 

 [facility]  [id number] [place] [address]302-
4612  08 Sep, 2017   

 

 [facility]  [address] [address]-
6006  23 Aug, 2017   

 

 [facility]  [address] [place] [address]993-
0800  22 Aug, 2017  Acute seasonal allergic rhinitis due to pollen J30.1

 

 [facility]  [address] [address]-
3445  21 Aug, 2017   

 

 [facility]  [address] [address]-
3483  09 Aug, 2017   

 

 [facility]  [address] [address]-
4614     

 

 [facility]  [id number] [address]-
6097     

 

 [facility]  [address] [address]-
1509  10 Jul, 2017   

 

 [facility]  [address] [place] [address] 25577-
5700     

 

 [facility]  [address] [place] [address] 91150-
4037     

 

 [facility]  [address] [place] [address] 0[address]20-
7401     

 

 [facility]  [address] [place] [address] 89533-
9634    Chronic pain syndrome G89.4 ; Fibromyalgia M79.7 ; Anxiety 
disorder, unspecified F41.9 ; Neuropathy G62.9 and Low back pain M54.5

 

 [facility]  [address] [place] [address]332-
2690  25 May, 2017  Low back pain M54.5

 

 [facility]  [address] [place] [address] 7[address]75-
[phone]  24 May, 2017   

 

 [facility]  [address] [place] [address] 17934-
0276  22 May, 2017   

 

 [facility]  [address] [place] [address] 43714-
1561  16 May, 2017   

 

 [facility]  [address] [place] [address] 47038-
1920  16 May, 2017   

 

 [facility]  [address] [place] [address] 58745-
2287  12 May, 2017  Routine adult health maintenance Z00.00 ; Fibromyalgia 
M79.7 ; Chronic pain syndrome G89.4 ; Abnormal lung sounds R09.89 ; Coronary 
artery disease involving native coronary artery of native heart without angina 
pectoris I25.10 and S/P CABG (coronary artery bypass graft) Z95.1

 

 [facility]  [id number] [place] [address] [phone]-
[phone]  09 May, 2017   







IMMUNIZATIONS

No Known Immunizations



SOCIAL HISTORY

Never Assessed



REASON FOR VISIT

Cold symptoms, PT feels as if she has phnemonia and her symptoms began two 
weeks ago-Porter JESSICA



PLAN OF CARE







 Activity  Details









  









 Follow Up  prn Reason:







VITAL SIGNS







 Height  5'2" in  2017

 

 Weight  123.4 lbs  2017

 

 Temperature  98.4 degrees Fahrenheit  2017

 

 Heart Rate  86 bpm  2017

 

 Respiratory Rate  18   2017

 

 Oximetry  98 %  2017

 

 BMI  22.57 kg/m2  2017

 

 Blood pressure systolic  130 mmHg  2017

 

 Blood pressure diastolic  78 mmHg  2017







MEDICATIONS







 Medication  Instructions  Dosage  Frequency  Start Date  End Date  Duration  
Status

 

 ProAir  (90 Base) MCG/ACT  Inhalation every 4 hrs  2 puffs as needed  
4h  10 Oct, 2017     12 months  Active

 

 Doxycycline Hyclate 100 mg  Orally Twice a day  1 capsule  12h  26 Dec, 2017  
2 2018  07 days  Active

 

 Seroquel 200 mg  Orally Once a day  2.5 tablet at bedtime  24h        30 days  
Active

 

 Sumatriptan Succinate 6 MG/0.5ML  Subcutaneous Once a day prn migraine  0.5 ml 
as needed              Active

 

 Chlorzoxazone 500 mg  Orally Three times a day  1 tablet  8h     12 2018  
30 days  Active

 

 Aspir-81 81 MG  Orally Once a day  1 tablet  24h           Active

 

 Gabapentin 300 MG  Orally Once a day  1 capsule  24h  09 May, 2017     90 days
  Active

 

 Promethazine HCl 25 MG  Orally 4 times a day prn  1 tablet as needed           
30 days  Active

 

 Tramadol HCl 50 mg  Orally every 6 hrs  1 tablet as needed  6h    
   28 days  Active

 

 Xanax 0.25 MG  Orally Twice a day  1 tablet  12h        28 days  Active

 

 Amitriptyline HCl 25 MG  Orally Once a day  1 tablet  24h  10 Oct, 2017     30 
days  Active

 

 Promethazine-Codeine 6.25-10 MG/5ML  Orally every 4 hrs  1-2 tsp as needed  4h
  26 Dec, 2017        Active







RESULTS

No Results



PROCEDURES







 Procedure  Date Ordered  Result  Body Site

 

 MEASURE BLOOD OXYGEN LEVEL  Dec 26, 2017      







INSTRUCTIONS





MEDICATIONS ADMINISTERED

No Known Medications



MEDICAL (GENERAL) HISTORY







 Type  Description  Date

 

 Medical History  fibromyalgia   

 

 Medical History  MRI 2016- small central protrusion/herniation w/minimal 
impression on thecal anteriorly at C5-6, At C3-4 small bilat. uncinate spurs w/ 
mild right neural foraminal narrowing   

 

 Medical History  MRI 2016- mild degenerative changes. No spinal canal 
stenosis or foraminal narrowing of thoracic spine   

 

 Medical History  hypertension   

 

 Medical History  Anxiety disorder   

 

 Medical History  depression   

 

 Medical History  migraine headaches   

 

 Medical History  Hx of C-Diff   

 

 Medical History  Hx of Pneumonia   

 

 Surgical History  Open Heart Surgery - [facility] -Dr. Lima  (
Cardiologist- Dr Logan)  

 

 Surgical History  hysterectomy - Dr Luis   

 

 Surgical History  Left Breast Lumpectomy (Bx - Benign)- Dr Luis   

 

 Surgical History  Port - Dr Luis   

 

 Surgical History  Left Knee Surgeries x3- Dr Carolina did 2 and Dr Gan did 1   

 

 Hospitalization History  C-Diff - [facility]   

 

 Hospitalization History  Pneumonia - [facility]   

 

 Hospitalization History  Open Heart Surgery - [facility]

## 2018-07-06 NOTE — XMS REPORT
[facility]

 Created on: 11/10/2017



Dayami Rae

External Reference #: [id number]

: 1970

Sex: Female



Demographics







 Address  [address] [place]  [address]

 

 Preferred Language  Unknown

 

 Marital Status  Unknown

 

 Anabaptism Affiliation  Unknown

 

 Race  Unknown

 

 Ethnic Group  Unknown





Author







 Author  SURENDRA FRASER

 

 [facility]

 

 Address  [address] [place]  [address]



 

 Phone  [phone]







Care Team Providers







 Care Team Member Name  Role  Phone

 

 SURENDRA FRASER  Unavailable  [phone]







PROBLEMS







 Type  Condition  ICD9-CM Code  TNO44-JM Code  Onset Dates  Condition Status  
SNOMED Code

 

 Problem  Fibromyalgia     M79.7     Active  340892808

 

 Problem  Chronic pain syndrome     G89.4     Active  492181121

 

 Problem  Other chronic pain     G89.29     Active  94514061

 

 Problem  Lumbago with sciatica, right side     M54.41     Active  
[id number]

 

 Problem  Anxiety disorder, unspecified     F41.9     Active  313649812

 

 Problem  Coronary artery disease involving native coronary artery of native 
heart without angina pectoris     I25.10     Active  3449558469870

 

 Problem  Lumbago with sciatica, left side     M54.42     Active  614212826

 

 Problem  Neuropathy     G62.9     Active  610981430







ALLERGIES







 Substance  Reaction  Event Type  Date  Status

 

 Morphine Sulfate  Rash  Drug Allergy  25 May, 2017  Active







SOCIAL HISTORY

Never Assessed



PLAN OF CARE







 Activity  Details









  









 Follow Up  Regular appt Reason:







VITAL SIGNS







 Height  5'2" in  2017

 

 Weight  123.6 lbs  2017

 

 Temperature  98.4 degrees Fahrenheit  2017

 

 Heart Rate  88 bpm  2017

 

 Respiratory Rate  20   2017

 

 BMI  22.60 kg/m2  2017

 

 Blood pressure systolic  140 mmHg  2017

 

 Blood pressure diastolic  88 mmHg  2017







MEDICATIONS







 Medication  Instructions  Dosage  Frequency  Start Date  End Date  Duration  
Status

 

 Tramadol HCl 50 mg  Orally every 4 hrs prn  1 tablet as needed              
Active

 

 Seroquel 200 mg  Orally Once a day  2.5 tablet at bedtime  24h           Active

 

 Gabapentin 300 MG  Orally Once a day  1 capsule  24h  09 May, 2017        
Active

 

 Aspir-81 81 MG  Orally Once a day  1 tablet  24h           Active

 

 Hydrocodone-Acetaminophen 5-325 MG  Orally every 6 hrs  1 tablet as needed  6h
  25 May, 2017  25 May, 2017  0 days  Active

 

 Xanax 0.25 MG  Orally Twice a day  1 tablet  12h        28 days  Active

 

 Chlorzoxazone 500 mg  Orally Three times a day  1 tablet  8h        30 days  
Active

 

 Promethazine HCl 25 MG  Orally 4 times a day prn  1 tablet as needed           
30 days  Active

 

 Sumatriptan Succinate 6 MG/0.5ML  Subcutaneous Once a day prn migraine  0.5 ml 
as needed              Active







RESULTS

No Results



PROCEDURES

No Known procedures



IMMUNIZATIONS

No Known Immunizations



MEDICAL (GENERAL) HISTORY







 Type  Description  Date

 

 Medical History  fibromyalgia   

 

 Medical History  MRI 2016- small central protrusion/herniation w/minimal 
impression on thecal anteriorly at C5-6, At C3-4 small bilat. uncinate spurs w/ 
mild right neural foraminal narrowing   

 

 Medical History  MRI 2016- mild degenerative changes. No spinal canal 
stenosis or foraminal narrowing of thoracic spine   

 

 Medical History  hypertension   

 

 Medical History  Anxiety disorder   

 

 Medical History  depression   

 

 Medical History  migraine headaches   

 

 Medical History  Hx of C-Diff   

 

 Medical History  Hx of Pneumonia   

 

 Surgical History  Open Heart Surgery - [facility] -Dr. Lima  (
Cardiologist- Dr Logan)  

 

 Surgical History  hysterectomy - Dr Luis   

 

 Surgical History  Left Breast Lumpectomy (Bx - Benign)- Dr Luis   

 

 Surgical History  Port - Dr Luis   

 

 Surgical History  Left Knee Surgeries x3- Dr Carolina did 2 and Dr Gan did 1   

 

 Hospitalization History  C-Diff - [facility]   

 

 Hospitalization History  Pneumonia - [facility]   

 

 Hospitalization History  Open Heart Surgery - [facility]

## 2018-07-06 NOTE — XMS REPORT
[facility]

 Created on: 2018



Dayami Rae

External Reference #: [id number]

: 1970

Sex: Female



Demographics







 Address  [address] [place]  [address]

 

 Preferred Language  Unknown

 

 Marital Status  Unknown

 

 Nondenominational Affiliation  Unknown

 

 Race  Unknown

 

 Ethnic Group  Unknown





Author







 Author  KIM FUCHS

 

 Organization  [facility]

 

 Address  [address] [place]  [address]



 

 Phone  [phone]







Care Team Providers







 Care Team Member Name  Role  Phone

 

 FUCHSJAMA JACKSONELE  Unavailable  [phone]







PROBLEMS







 Type  Condition  ICD9-CM Code  MLI97-IC Code  Onset Dates  Condition Status  
SNOMED Code

 

 Problem  Fibromyalgia     M79.7     Active  862153283

 

 Problem  Neuropathy     G62.9     Active  818223556

 

 Problem  Coronary artery disease involving native coronary artery of native 
heart without angina pectoris     I25.10     Active  2139470178096

 

 Problem  Chronic pain syndrome     G89.4     Active  641538018

 

 Problem  COPD suggested by initial evaluation     J44.9     Active  04145243

 

 Problem  Acute midline low back pain with left-sided sciatica     M54.42     
Active  226430814

 

 Problem  Lumbago with sciatica, left side     M54.42     Active  568433165

 

 Problem  Anxiety disorder, unspecified     F41.9     Active  631409329

 

 Problem  Other chronic pain     G89.29     Active  06203939

 

 Problem  Lumbago with sciatica, right side     M54.41     Active  
[id number]







ALLERGIES

No Information



ENCOUNTERS







 Encounter  Location  Date  Diagnosis

 

 [facility]  [address] [place] [address] 83127-
6760     

 

 [facility]  [address] [place] [address] 09529-
7353  05 2018   

 

 [facility]  [address] [place] [address] 70981-
8911    Chronic pain syndrome G89.4

 

 [facility]  [id number] [place] [address] 56850-
8017  26 2018   

 

 [facility]  [id number] [place] [address] 52708-
4677  15 2018   

 

 [facility]  [id number] [place] [address] 56917-
1639    Acute bronchitis, unspecified organism J20.9

 

 [facility]  [id number] [place] [address] 97100-
1698    Chronic pain syndrome G89.4

 

 [facility]  [id number] [place] [address] 60029-
2894  25 May, 2018   

 

 [facility]  [id number] [place] [address] 05729-
4911  24 May, 2018  Acute midline low back pain with left-sided sciatica M54.42

 

 [facility]  [id number] [place] [address] 72049-
7028  22 May, 2018   

 

 [facility]  [id number] [place] [address] 05018
-[phone]  21 May, 2018  Bronchitis J40

 

 [facility]  [id number] [place] [address] 65410-
4350  07 May, 2018  Chronic pain syndrome G89.4 and Neuropathy G62.9

 

 [facility]  [id number] [place] [address] 34873-
8608    Acute midline low back pain with left-sided sciatica M54.42

 

 [facility]  [address] [place] [address] 33619-
6464     

 

 [facility]  [address] [place] [address] 95327-
7849    Anxiety disorder, unspecified F41.9

 

 [facility]  [address] [place] [address] 33848-
2073     

 

 [facility]  [address] [place] [address] 70186-
9279    Chronic pain syndrome G89.4 and Acute midline low back pain 
with left-sided sciatica M54.42

 

 [facility]  [address] [place] [address] 94220-
6869    Chronic pain syndrome G89.4

 

 [facility]  [address] [address]-
3961     

 

 [facility]  [address] [place] [address] 94033-
9718     

 

 [facility]  [address] [place] [address] 17247-
3538  29 Mar, 2018  Injury of left knee, initial encounter S89.92XA and COPD 
suggested by initial evaluation J44.9

 

 [facility]  [address] [place] [address] 25941-
5238  28 Mar, 2018  Acute bronchitis, unspecified organism J20.9

 

 [place] [address] [place] [address] 34881-
0988  27 Mar, 2018  Acute bronchitis, unspecified organism J20.9

 

 [place] [address] [place] [address] 81292-
6519  21 Mar, 2018  Anxiety disorder, unspecified F41.9 and Acute bronchitis, 
unspecified organism J20.9

 

 [facility]  [address] [place] [address] 82801-
0573  08 Mar, 2018  Chronic pain syndrome G89.4

 

 [place] [address] [place] [address] 73016-
5022  05 Mar, 2018   

 

 [facility]  [address] [place] [address] 03327-
5315  05 Mar, 2018  Acute midline low back pain with left-sided sciatica M54.42

 

 [facility]  [address] [place] [address] 79936-
3223     

 

 [facility]  [address] [place] [address] 70158-
5647    Chronic pain syndrome G89.4

 

 [facility]  [address] [place] [address] 54192-
5288    Chronic pain syndrome G89.4

 

 [place] [address] [place] [address] 48064-
7377     

 

 [facility]  [address] [place] [address] 78962-
8804    Gastroenteritis K52.9

 

 [facility]  [address] 31412-
8519     

 

 [facility]  [address] 84272-
5210  15 Jerardo, 2018  Acute bronchitis, unspecified organism J20.9

 

 [facility]  [address] 06308-
3855    Anxiety disorder, unspecified F41.9 and Neuropathy G62.9

 

 [facility]  [address] [place] [address] 77776-
0399    Chronic pain syndrome G89.4

 

 [facility]  [address] 64978-
3688    Bronchitis J40 and Laryngitis J04.0

 

 [facility]  [address] [place] [address] 08387-
1936  29 Dec, 2017   

 

 [facility]  [address] 38884-
4020  26 Dec, 2017  Bronchitis J40

 

 [facility]  [address] 15740-
6216  18 Dec, 2017   

 

 [facility]  [address] 52326-
6765  13 Dec, 2017  Fibromyalgia M79.7 and Chronic pain syndrome G89.4

 

 [facility]  [address] [place] [address] 58543-
1823  04 Dec, 2017  Chronic pain syndrome G89.4 and Acute bronchitis, 
unspecified organism J20.9

 

 [facility]  [address] [place] [address] 24712-
6383    Neuropathy G62.9

 

 [facility]  [address] 53678-
8359    Chronic pain syndrome G89.4 ; Lumbago with sciatica, left 
side M54.42 ; Lumbago with sciatica, right side M54.41 ; Screening cholesterol 
level Z13.220 ; Screening for diabetes mellitus Z13.1 ; Screening for thyroid 
disorder Z13.29 ; Fibromyalgia M79.7 ; Anxiety disorder, unspecified F41.9 and 
Neuropathy G62.9

 

 [facility]  [address] [place] [address] 65557-
1170     

 

 [facility]  [address] [place] [address] 41843-
7225  25 Oct, 2017   

 

 [place] [address] [place] [address] 75610-
0531  10 Oct, 2017  Fibromyalgia M79.7 ; Chronic pain syndrome G89.4 ; Anxiety 
disorder, unspecified F41.9 ; Neuropathy G62.9 and Acute bronchitis, 
unspecified organism J20.9

 

 [facility]  [address] [place] [address] 45559-
9534  09 Oct, 2017   

 

 [facility]  [address] [place] [address] 52810-
5299  25 Sep, 2017   

 

 [facility]  [address] [place] [address] 51268-
0963  19 Sep, 2017   

 

 [facility]  [address] [place] [address] 49209-
6715  08 Sep, 2017   

 

 [facility]  [address] [place] [address] 34898-
0401  23 Aug, 2017   

 

 [facility]  [address] [place] [address] 93310-
9298  22 Aug, 2017  Acute seasonal allergic rhinitis due to pollen J30.1

 

 [facility]  [address] [place] [address] 37157-
0494  21 Aug, 2017   

 

 [facility]  [address] [place] [address] 59455-
9620  09 Aug, 2017   

 

 [facility]  [address] [address]-
6419     

 

 [facility]  [address] [address]-
0320     

 

 [facility]  [address] [address]-
3818  10 Jul, 2017   

 

 [facility]  [address] [address]-
9767     

 

 [facility]  [address] [address]-
4175     

 

 [facility]  [address] [address]-
4887     

 

 [facility]  [address] [address]-
5828    Chronic pain syndrome G89.4 ; Fibromyalgia M79.7 ; Anxiety 
disorder, unspecified F41.9 ; Neuropathy G62.9 and Low back pain M54.5

 

 [facility]  [address] [address]-
3468  25 May, 2017  Low back pain M54.5

 

 [facility]  [address] [address]-
6913  24 May, 2017   

 

 [facility]  [address] [address]-
5976  22 May, 2017   

 

 [facility]  [address] [address]-
8434  16 May, 2017   

 

 [facility]  [address] [address]-
7973  16 May, 2017   

 

 [facility]  [address] [address]-
0158  12 May, 2017  Routine adult health maintenance Z00.00 ; Fibromyalgia 
M79.7 ; Chronic pain syndrome G89.4 ; Abnormal lung sounds R09.89 ; Coronary 
artery disease involving native coronary artery of native heart without angina 
pectoris I25.10 and S/P CABG (coronary artery bypass graft) Z95.1

 

 [facility]  [address] [facility] [id number] [address]-
7042  09 May, 2017   







IMMUNIZATIONS

No Known Immunizations



SOCIAL HISTORY

Never Assessed



REASON FOR VISIT

med refill 



PLAN OF CARE





VITAL SIGNS





MEDICATIONS







 Medication  Instructions  Dosage  Frequency  Start Date  End Date  Duration  
Status

 

 Chlorzoxazone 500 mg  Orally Three times a day  1 tablet  8h           Active







RESULTS

No Results



PROCEDURES

No Known procedures



INSTRUCTIONS





MEDICATIONS ADMINISTERED

No Known Medications



MEDICAL (GENERAL) HISTORY







 Type  Description  Date

 

 Medical History  fibromyalgia   

 

 Medical History  MRI 2016- small central protrusion/herniation w/minimal 
impression on thecal anteriorly at C5-6, At C3-4 small bilat. uncinate spurs w/ 
mild right neural foraminal narrowing   

 

 Medical History  MRI 2016- mild degenerative changes. No spinal canal 
stenosis or foraminal narrowing of thoracic spine   

 

 Medical History  hypertension   

 

 Medical History  Anxiety disorder   

 

 Medical History  depression   

 

 Medical History  migraine headaches   

 

 Medical History  Hx of C-Diff   

 

 Medical History  Hx of Pneumonia   

 

 Surgical History  Open Heart Surgery - [facility] -Dr. Lima  (
Cardiologist- Dr Logan)  

 

 Surgical History  hysterectomy - Dr Luis   

 

 Surgical History  Left Breast Lumpectomy (Bx - Benign)- Dr Luis   

 

 Surgical History  Port - Dr Luis   

 

 Surgical History  Left Knee Surgeries x3- Dr Carolina did 2 and Dr Gan did 1   

 

 Hospitalization History  C-Diff - [facility]   

 

 Hospitalization History  Pneumonia - [facility]   

 

 Hospitalization History  Open Heart Surgery - [facility]

## 2018-07-06 NOTE — XMS REPORT
[facility]

 Created on: 2018



Dayami Rae

External Reference #: [id number]

: 1970

Sex: Female



Demographics







 Address  [address] [place]  [address]

 

 Preferred Language  Unknown

 

 Marital Status  Unknown

 

 Rastafarian Affiliation  Unknown

 

 Race  Unknown

 

 Ethnic Group  Unknown





Author







 Author  KIM FUCHS

 

 Organization  [facility]

 

 Address  [address] [place]  [address]



 

 Phone  [phone]







Care Team Providers







 Care Team Member Name  Role  Phone

 

 FUCHSJAMA AJCKSONELE  Unavailable  [phone]







PROBLEMS







 Type  Condition  ICD9-CM Code  EHH15-ED Code  Onset Dates  Condition Status  
SNOMED Code

 

 Problem  Fibromyalgia     M79.7     Active  019075023

 

 Problem  Neuropathy     G62.9     Active  909530101

 

 Problem  Coronary artery disease involving native coronary artery of native 
heart without angina pectoris     I25.10     Active  3635700440269

 

 Problem  Chronic pain syndrome     G89.4     Active  354666838

 

 Problem  COPD suggested by initial evaluation     J44.9     Active  44299699

 

 Problem  Acute midline low back pain with left-sided sciatica     M54.42     
Active  709678309

 

 Problem  Lumbago with sciatica, left side     M54.42     Active  630517230

 

 Problem  Anxiety disorder, unspecified     F41.9     Active  784805124

 

 Problem  Other chronic pain     G89.29     Active  21804275

 

 Problem  Lumbago with sciatica, right side     M54.41     Active  
[id number]







ALLERGIES

No Information



ENCOUNTERS







 Encounter  Location  Date  Diagnosis

 

 [place] [address] [place] [address] 32192-
1402  01 May, 2018   

 

 [facility]  [address] [place] [address] 76390-
5061     

 

 [facility]  [address] [place] [address] 96174-
3213    Chronic pain syndrome G89.4 and Acute midline low back pain 
with left-sided sciatica M54.42

 

 [facility]  [address] [place] [address] 53685-
9671    Chronic pain syndrome G89.4

 

 [facility]  [address] [place] [address] 98161-
2240     

 

 [facility]  [address] [place] [address] 60536-
5375     

 

 [facility]  [address] [address]-
6420  29 Mar, 2018  Injury of left knee, initial encounter S89.92XA and COPD 
suggested by initial evaluation J44.9

 

 [facility]  [address] 25046-
1200  28 Mar, 2018  Acute bronchitis, unspecified organism J20.9

 

 [facility]  [address] 12408-
2621  27 Mar, 2018  Acute bronchitis, unspecified organism J20.9

 

 [place] [address] [place] [address] 07639-
6608  21 Mar, 2018  Anxiety disorder, unspecified F41.9 and Acute bronchitis, 
unspecified organism J20.9

 

 [place] [address] [place] [address] 03566-
6037  08 Mar, 2018  Chronic pain syndrome G89.4

 

 [facility]  [address] [place] [address] 31275-
8627  05 Mar, 2018   

 

 [place] [address] [place] [address] 48690-
4031  05 Mar, 2018  Acute midline low back pain with left-sided sciatica M54.42

 

 [place] [address] [place] [address] 26649-
1324     

 

 [facility]  [address] [place] [address] 99524-
0503    Chronic pain syndrome G89.4

 

 [facility]  [address] [place] [address] 60700-
2245    Chronic pain syndrome G89.4

 

 [facility]  [address] [place] [address] 71004-
1494     

 

 [facility]  [address] [place] [address]-
2791    Gastroenteritis K52.9

 

 [facility]  [address] [place] [address] 25546-
5455     

 

 [facility]  [address] [place] [address] 40095-
1948  15 Jerardo, 2018  Acute bronchitis, unspecified organism J20.9

 

 [facility]  [address] 50470-
4684    Anxiety disorder, unspecified F41.9 and Neuropathy G62.9

 

 [facility]  [address] 70119-
7387    Chronic pain syndrome G89.4

 

 [place] [address] [place] [address] 95872-
3930    Bronchitis J40 and Laryngitis J04.0

 

 [place] [address] 65957-
4712  29 Dec, 2017   

 

 [facility]  [address] [place] [address] 94923-
9032  26 Dec, 2017  Bronchitis J40

 

 [facility]  [address] [place] [address] 22728-
0480  18 Dec, 2017   

 

 [facility]  [address] [place] [address] 76240-
5042  13 Dec, 2017  Fibromyalgia M79.7 and Chronic pain syndrome G89.4

 

 [facility]  [address] [place] [address] 85518-
8277  04 Dec, 2017  Chronic pain syndrome G89.4 and Acute bronchitis, 
unspecified organism J20.9

 

 [facility]  [address] [place] [address] 38855-
8414    Neuropathy G62.9

 

 [facility]  [address] [place] [address] 66877-
5728    Chronic pain syndrome G89.4 ; Lumbago with sciatica, left 
side M54.42 ; Lumbago with sciatica, right side M54.41 ; Screening cholesterol 
level Z13.220 ; Screening for diabetes mellitus Z13.1 ; Screening for thyroid 
disorder Z13.29 ; Fibromyalgia M79.7 ; Anxiety disorder, unspecified F41.9 and 
Neuropathy G62.9

 

 [facility]  [address] [place] [address] 65144-
8486     

 

 [facility]  [address] 33285-
1586  25 Oct, 2017   

 

 [facility]  [address] [place] [address] 83572-
8546  10 Oct, 2017  Fibromyalgia M79.7 ; Chronic pain syndrome G89.4 ; Anxiety 
disorder, unspecified F41.9 ; Neuropathy G62.9 and Acute bronchitis, 
unspecified organism J20.9

 

 [facility]  [address] [place] [address] 88216-
5903  09 Oct, 2017   

 

 [facility]  [address] [place] [address] 17326-
3947  25 Sep, 2017   

 

 [facility]  [address] [place] [address] 65017-
1714  19 Sep, 2017   

 

 [facility]  [address] [place] [address] 54748-
9610  08 Sep, 2017   

 

 [facility]  [address] [place] [address] 13890-
6035  23 Aug, 2017   

 

 [facility]  [address] [place] [address] 35074-
7743  22 Aug, 2017  Acute seasonal allergic rhinitis due to pollen J30.1

 

 [facility]  [address] [place] [address] 48360-
3787  21 Aug, 2017   

 

 [facility]  [address] [place] [address] 26297-
2555  09 Aug, 2017   

 

 [facility]  [address] [place] [address] 36084-
0373     

 

 [facility]  [address] [address]-
1502     

 

 [facility]  [address] [address]-
9324  10 Jul, 2017   

 

 [facility]  [address] [address]-
7468     

 

 [facility]  [address] [place] [address] 20189-
0607     

 

 [facility]  [address] [place] [address] 15671-
1112     

 

 [facility]  [address] [place] [address] 25137-
2223    Chronic pain syndrome G89.4 ; Fibromyalgia M79.7 ; Anxiety 
disorder, unspecified F41.9 ; Neuropathy G62.9 and Low back pain M54.5

 

 [facility]  [address] [place] [address]-
2078  25 May, 2017  Low back pain M54.5

 

 [facility]  [address] [address]-
2150  24 May, 2017   

 

 [facility]  [address] [place] [address]-
6461  22 May, 2017   

 

 [facility]  [address] [place] [address]-
1348  16 May, 2017   

 

 [facility]  [address] [address]-
1425  16 May, 2017   

 

 [facility]  [address], [place] [address]-
5867  12 May, 2017  Routine adult health maintenance Z00.00 ; Fibromyalgia 
M79.7 ; Chronic pain syndrome G89.4 ; Abnormal lung sounds R09.89 ; Coronary 
artery disease involving native coronary artery of native heart without angina 
pectoris I25.10 and S/P CABG (coronary artery bypass graft) Z95.1

 

 [facility]  [address] [facility] [id number] [place] [address]-
1266  09 May, 2017   







IMMUNIZATIONS

No Known Immunizations



SOCIAL HISTORY

Never Assessed



REASON FOR VISIT

Controlled Med Refill



PLAN OF CARE





VITAL SIGNS





MEDICATIONS







 Medication  Instructions  Dosage  Frequency  Start Date  End Date  Duration  
Status

 

 Tramadol HCl 50 mg  Orally every 8 hrs prn  1 tablet as needed           28 
days  Active







RESULTS

No Results



PROCEDURES

No Known procedures



INSTRUCTIONS





MEDICATIONS ADMINISTERED

No Known Medications



MEDICAL (GENERAL) HISTORY







 Type  Description  Date

 

 Medical History  fibromyalgia   

 

 Medical History  MRI 2016- small central protrusion/herniation w/minimal 
impression on thecal anteriorly at C5-6, At C3-4 small bilat. uncinate spurs w/ 
mild right neural foraminal narrowing   

 

 Medical History  MRI 2016- mild degenerative changes. No spinal canal 
stenosis or foraminal narrowing of thoracic spine   

 

 Medical History  hypertension   

 

 Medical History  Anxiety disorder   

 

 Medical History  depression   

 

 Medical History  migraine headaches   

 

 Medical History  Hx of C-Diff   

 

 Medical History  Hx of Pneumonia   

 

 Surgical History  Open Heart Surgery - [facility] -Dr. Lima  (
Cardiologist- Dr Logan)  

 

 Surgical History  hysterectomy - Dr Luis   

 

 Surgical History  Left Breast Lumpectomy (Bx - Benign)- Dr Luis   

 

 Surgical History  Port - Dr Luis   

 

 Surgical History  Left Knee Surgeries x3- Dr Carolina did 2 and Dr Gan did 1   

 

 Hospitalization History  C-Diff - [facility]   

 

 Hospitalization History  Pneumonia - [facility]   

 

 Hospitalization History  Open Heart Surgery - [facility]

## 2018-07-06 NOTE — XMS REPORT
[facility]

 Created on: 2018



Dayami Rae

External Reference #: [id number]

: 1970

Sex: Female



Demographics







 Address  [address] [place]  [address]

 

 Preferred Language  Unknown

 

 Marital Status  Unknown

 

 Muslim Affiliation  Unknown

 

 Race  Unknown

 

 Ethnic Group  Unknown





Author







 Author  KIM FUCHS

 

 Organization  [facility]

 

 Address  [address] [place]  [address]



 

 Phone  [phone]







Care Team Providers







 Care Team Member Name  Role  Phone

 

 FUCHSKIM JACKSON  Unavailable  [phone]







PROBLEMS







 Type  Condition  ICD9-CM Code  HUP42-ZO Code  Onset Dates  Condition Status  
SNOMED Code

 

 Problem  Chronic pain syndrome     G89.4     Active  164588224

 

 Problem  Coronary artery disease involving native coronary artery of native 
heart without angina pectoris     I25.10     Active  8669801714332

 

 Problem  Fibromyalgia     M79.7     Active  738743833

 

 Problem  Acute midline low back pain with left-sided sciatica     M54.42     
Active  643859695

 

 Problem  Other chronic pain     G89.29     Active  89763217

 

 Problem  Anxiety disorder, unspecified     F41.9     Active  911855398

 

 Problem  Neuropathy     G62.9     Active  673276399

 

 Problem  Lumbago with sciatica, right side     M54.41     Active  
[id number]

 

 Problem  Lumbago with sciatica, left side     M54.42     Active  899776834







ALLERGIES

No Information



ENCOUNTERS







 Encounter  Location  Date  Diagnosis

 

 [place] [address] [address]-
[address]     

 

 [place] [address] [address]-
2762  28 Mar, 2018   

 

 [place] [address] [address]-
7597  27 Mar, 2018  Acute bronchitis, unspecified organism J20.9

 

 [place] [address] [address]-
8233  21 Mar, 2018  Anxiety disorder, unspecified F41.9 and Acute bronchitis, 
unspecified organism J20.9

 

 [place] [address] [place] [id number] [place] [address] 89850-
4788  08 Mar, 2018  Chronic pain syndrome G89.4

 

 [facility]  [address] [place] [id number] [place] [address] 02425-
2771  05 Mar, 2018   

 

 [facility]  [address] [place] [id number] [place] [address] 13904-
7990  05 Mar, 2018  Acute midline low back pain with left-sided sciatica M54.42

 

 [facility]  [address] [place] [id number] [place] [address] 38592-
1805     

 

 [facility]  [address] [place] [id number] [place] [address] 87950-
0796    Chronic pain syndrome G89.4

 

 [facility]  [address] [place] [id number] [place] [address] 62281-
0648    Chronic pain syndrome G89.4

 

 [facility]  [address] [place] [id number] [place] [address] 85947-
4824     

 

 [facility]  [address] [address]-
9632    Gastroenteritis K52.9

 

 [facility]  [address] [place] [id number] [place] [address] 27038-
7385     

 

 [facility]  [address] [place] [id number] [place] [address] 93523-
7924  15 Jerardo, 2018  Acute bronchitis, unspecified organism J20.9

 

 [facility]  [address] [place] [id number] [place] [address] 32849-
5860    Anxiety disorder, unspecified F41.9 and Neuropathy G62.9

 

 [facility]  [address] [place] [id number] [place] [address] 27593-
3466    Chronic pain syndrome G89.4

 

 [facility]  [address] [place] [id number] [place] [address] 10312-
4504    Bronchitis J40 and Laryngitis J04.0

 

 [facility]  [address] [place] [id number] [place] [address] 78961-
3798  29 Dec, 2017   

 

 [facility]  [address] [place] [address] [place] [address]-
0083  26 Dec, 2017  Bronchitis J40

 

 [place] [address] [address]-
9024  18 Dec, 2017   

 

 [facility]  [address] [place] [id number] [place] [address] 64592-
6943  13 Dec, 2017  Fibromyalgia M79.7 and Chronic pain syndrome G89.4

 

 [place] [address] [address]-
8032  04 Dec, 2017  Chronic pain syndrome G89.4 and Acute bronchitis, 
unspecified organism J20.9

 

 [place] [address] [address]-
3942    Neuropathy G62.9

 

 [place] [address] [place] [id number] [place] [address] 28689-
5935    Chronic pain syndrome G89.4 ; Lumbago with sciatica, left 
side M54.42 ; Lumbago with sciatica, right side M54.41 ; Screening cholesterol 
level Z13.220 ; Screening for diabetes mellitus Z13.1 ; Screening for thyroid 
disorder Z13.29 ; Fibromyalgia M79.7 ; Anxiety disorder, unspecified F41.9 and 
Neuropathy G62.9

 

 [place] [address] [place] [id number] [place] [address] 93450-
2811     

 

 [place] [address] [place] [id number] [place] [address] 51323-
9073  25 Oct, 2017   

 

 [place] [address] [place] [id number] [place] [address] 41124-
1575  10 Oct, 2017  Fibromyalgia M79.7 ; Chronic pain syndrome G89.4 ; Anxiety 
disorder, unspecified F41.9 ; Neuropathy G62.9 and Acute bronchitis, 
unspecified organism J20.9

 

 [place] [address] [place] [id number] [place] [address] 14259-
3012  09 Oct, 2017   

 

 [place] [address] [place] [id number] [place] [address] 43884-
9849  25 Sep, 2017   

 

 [facility]  [address], [place] [address]-
8269  19 Sep, 2017   

 

 [facility]  [address] [address]-
2854  08 Sep, 2017   

 

 [facility]  [address] [address]-
0141  23 Aug, 2017   

 

 [facility]  [address] [place] [id number] [place] [address] 20901-
9890  22 Aug, 2017  Acute seasonal allergic rhinitis due to pollen J30.1

 

 [facility]  [address] [place] [address] 57110-
4759  21 Aug, 2017   

 

 [facility]  [address], [place] [address]-
0631  09 Aug, 2017   

 

 [facility]  [address] [address]-
5276     

 

 [facility]  [address] [address]-
7029     

 

 [facility]  [address] [address]-
6621  10 Jul, 2017   

 

 [facility]  [address] [address]-
0026     

 

 [facility]  [address] [address]-
5891     

 

 [facility]  [address] [address]-
8176     

 

 [facility]  [address] [address]-
5621    Chronic pain syndrome G89.4 ; Fibromyalgia M79.7 ; Anxiety 
disorder, unspecified F41.9 ; Neuropathy G62.9 and Low back pain M54.5

 

 [facility]  [address], [place] [address]-
6311  25 May, 2017  Low back pain M54.5

 

 [facility]  [address] [place] [id number] [address]-
9480  24 May, 2017   

 

 [facility]  [address], [place] [address]-
0584  22 May, 2017   

 

 [facility]  [address], [place] [address]-
8068  16 May, 2017   

 

 [facility]  [address] [place] [address] 12418-
0347  16 May, 2017   

 

 [place] [address] [place] [address] 03447-
3039  12 May, 2017  Routine adult health maintenance Z00.00 ; Fibromyalgia 
M79.7 ; Chronic pain syndrome G89.4 ; Abnormal lung sounds R09.89 ; Coronary 
artery disease involving native coronary artery of native heart without angina 
pectoris I25.10 and S/P CABG (coronary artery bypass graft) Z95.1

 

 [place] [address], [place] [address]-
9995  09 May, 2017   







IMMUNIZATIONS

No Known Immunizations



SOCIAL HISTORY

Never Assessed



REASON FOR VISIT

Refill Request



PLAN OF CARE





VITAL SIGNS





MEDICATIONS







 Medication  Instructions  Dosage  Frequency  Start Date  End Date  Duration  
Status

 

 Promethazine HCl 25 MG  Orally 4 times a day prn  1 tablet as needed           
30 days  Active







RESULTS

No Results



PROCEDURES

No Known procedures



INSTRUCTIONS





MEDICATIONS ADMINISTERED

No Known Medications



MEDICAL (GENERAL) HISTORY







 Type  Description  Date

 

 Medical History  fibromyalgia   

 

 Medical History  MRI 2016- small central protrusion/herniation w/minimal 
impression on thecal anteriorly at C5-6, At C3-4 small bilat. uncinate spurs w/ 
mild right neural foraminal narrowing   

 

 Medical History  MRI 2016- mild degenerative changes. No spinal canal 
stenosis or foraminal narrowing of thoracic spine   

 

 Medical History  hypertension   

 

 Medical History  Anxiety disorder   

 

 Medical History  depression   

 

 Medical History  migraine headaches   

 

 Medical History  Hx of C-Diff   

 

 Medical History  Hx of Pneumonia   

 

 Surgical History  Open Heart Surgery - [facility] -Dr. Lima  (
Cardiologist- Dr Logan)  

 

 Surgical History  hysterectomy - Dr Luis   

 

 Surgical History  Left Breast Lumpectomy (Bx - Benign)- Dr Luis   

 

 Surgical History  Port - Dr Luis   

 

 Surgical History  Left Knee Surgeries x3- Dr Carolina did 2 and Dr Gan did 1   

 

 Hospitalization History  C-[facility] - [facility]   

 

 Hospitalization History  Pneumonia - [facility]   

 

 Hospitalization History  Open Heart Surgery - [facility]

## 2018-07-06 NOTE — XMS REPORT
[facility]

 Created on: 2018



Dayami Rae

External Reference #: [id number]

: 1970

Sex: Female



Demographics







 Address  [address] [place]  [address]

 

 Preferred Language  Unknown

 

 Marital Status  Unknown

 

 Adventist Affiliation  Unknown

 

 Race  Unknown

 

 Ethnic Group  Unknown





Author







 Author  KIM FUCHS

 

 Organization  [facility]

 

 Address  [address] [place]  [address]



 

 Phone  [phone]







Care Team Providers







 Care Team Member Name  Role  Phone

 

 JEF  KIM  Unavailable  [phone]







PROBLEMS







 Type  Condition  ICD9-CM Code  UAD99-AW Code  Onset Dates  Condition Status  
SNOMED Code

 

 Problem  Fibromyalgia     M79.7     Active  980357398

 

 Problem  Neuropathy     G62.9     Active  003348915

 

 Problem  Coronary artery disease involving native coronary artery of native 
heart without angina pectoris     I25.10     Active  4081515950306

 

 Problem  Chronic pain syndrome     G89.4     Active  577448144

 

 Problem  COPD suggested by initial evaluation     J44.9     Active  44872053

 

 Problem  Acute midline low back pain with left-sided sciatica     M54.42     
Active  254301748

 

 Problem  Lumbago with sciatica, left side     M54.42     Active  634253755

 

 Problem  Anxiety disorder, unspecified     F41.9     Active  633573888

 

 Problem  Other chronic pain     G89.29     Active  27872501

 

 Problem  Lumbago with sciatica, right side     M54.41     Active  
[id number]







ALLERGIES

No Information



ENCOUNTERS







 Encounter  Location  Date  Diagnosis

 

 [facility]  [address], [place] [address]-
2461     

 

 [facility]  [address] [place] [id number] [place] [address] 30554-
3404  25 May, 2018   

 

 [facility]  [address] [place] [id number] [place] [address] 78006-
9645  24 May, 2018  Acute midline low back pain with left-sided sciatica M54.42

 

 [facility]  [address] [place] [id number] [place] [address] 04435-
7954  22 May, 2018   

 

 [facility]  [address], [place] [address]  21 May, 2018  Bronchitis J40

 

 [facility]  [address] [place] [id number] [place] [address] 13705-
7377  07 May, 2018  Chronic pain syndrome G89.4 and Neuropathy G62.9

 

 [facility]  [address] [address]-
3337    Acute midline low back pain with left-sided sciatica M54.42

 

 [facility]  [address] [address]-
8446     

 

 [facility]  [address] [place] [id number] [place] [address] 49292-
4060    Anxiety disorder, unspecified F41.9

 

 [place] [address] [address]-
2366     

 

 [place] [address] [address]-
7019    Chronic pain syndrome G89.4 and Acute midline low back pain 
with left-sided sciatica M54.42

 

 [facility]  [address] [place] [id number] [place] [address] 81447-
2774    Chronic pain syndrome G89.4

 

 [facility]  [address] [address]-
3458     

 

 [facility]  [address] [place] [address] [place] [address] 03603-
2736     

 

 [facility]  [address] [address]-
7117  29 Mar, 2018  Injury of left knee, initial encounter S89.92XA and COPD 
suggested by initial evaluation J44.9

 

 [place] [address] [address]-
7132  28 Mar, 2018  Acute bronchitis, unspecified organism J20.9

 

 [facility]  [address] [place] [id number] [place] [address] 33588-
7976  27 Mar, 2018  Acute bronchitis, unspecified organism J20.9

 

 [facility]  [address] [address]-
7535  21 Mar, 2018  Anxiety disorder, unspecified F41.9 and Acute bronchitis, 
unspecified organism J20.9

 

 [facility]  [address] [place] [id number] [place] [address] 74763-
9677  08 Mar, 2018  Chronic pain syndrome G89.4

 

 [facility]  [address] [place] [id number] [place] [address] 25899-
6037  05 Mar, 2018   

 

 [facility]  [address] [place] [id number] [place] [address] 56825-
8085  05 Mar, 2018  Acute midline low back pain with left-sided sciatica M54.42

 

 [facility]  [address] [place] [id number] [place] [address] 70846-
9835     

 

 [facility]  [address] [place] [id number] [place] [address] 23768-
2656    Chronic pain syndrome G89.4

 

 [facility]  [address] [place] [id number] [place] [address] 42283-
1287    Chronic pain syndrome G89.4

 

 [facility]  [address] [place] [id number] [place] [address] 51908-
2351     

 

 [facility]  [address] [place] [id number] [place] [address] 11208-
1110    Gastroenteritis K52.9

 

 [facility]  [address] [place] [id number] [place] [address] 25944-
4755     

 

 [facility]  [address] [place] [id number] [place] [address] 31808-
8488  15 Jerardo, 2018  Acute bronchitis, unspecified organism J20.9

 

 [facility]  [address] [place] [id number] [place] [address] 64827-
4599    Anxiety disorder, unspecified F41.9 and Neuropathy G62.9

 

 [facility]  [address] [place] [id number] [place] [address] 93466-
4850    Chronic pain syndrome G89.4

 

 [place] [address] [place] [id number] [place] [address] 68395-
1543    Bronchitis J40 and Laryngitis J04.0

 

 [place] [address] [place] [id number] [place] [address] 90890-
9941  29 Dec, 2017   

 

 [place] [address] [place] [id number] [place] [address] 70739-
4014  26 Dec, 2017  Bronchitis J40

 

 [place] [address] [address]-
2125  18 Dec, 2017   

 

 [place] [address] [place] [id number] [place] [address] 24444-
7525  13 Dec, 2017  Fibromyalgia M79.7 and Chronic pain syndrome G89.4

 

 [place] [address] [place] [id number] [place] [address] 85199-
7077  04 Dec, 2017  Chronic pain syndrome G89.4 and Acute bronchitis, 
unspecified organism J20.9

 

 [place] [address] [place] [id number] [place] [address] 71189-
0146    Neuropathy G62.9

 

 [place] [address] [place] [id number] [place] [address] 03947-
7756    Chronic pain syndrome G89.4 ; Lumbago with sciatica, left 
side M54.42 ; Lumbago with sciatica, right side M54.41 ; Screening cholesterol 
level Z13.220 ; Screening for diabetes mellitus Z13.1 ; Screening for thyroid 
disorder Z13.29 ; Fibromyalgia M79.7 ; Anxiety disorder, unspecified F41.9 and 
Neuropathy G62.9

 

 [place] [address] [place] [id number] [place] [address] 90138-
1429     

 

 [place] [address] [place] [id number] [place] [address] 40614-
9988  25 Oct, 2017   

 

 [place] [address] [place] [id number] [place] [address] 98339-
2011  10 Oct, 2017  Fibromyalgia M79.7 ; Chronic pain syndrome G89.4 ; Anxiety 
disorder, unspecified F41.9 ; Neuropathy G62.9 and Acute bronchitis, 
unspecified organism J20.9

 

 [facility]  [address], [place] [address]-
6516  09 Oct, 2017   

 

 [facility]  [address] [place] [address] [address]-
2412  25 Sep, 2017   

 

 [facility]  [address] [place] [id number] [place] [address] 60537-
3176  19 Sep, 2017   

 

 [facility]  [address] [place] [id number] [place] [address] 78170-
8325  08 Sep, 2017   

 

 [facility]  [address] [place] [id number] [place] [address] 41410-
7406  23 Aug, 2017   

 

 [facility]  [address] [place] [id number] [place] [address] 03313-
8905  22 Aug, 2017  Acute seasonal allergic rhinitis due to pollen J30.1

 

 [facility]  [address] [place] [id number] [place] [address] 73223-
2515  21 Aug, 2017   

 

 [facility]  [address], [place] [address]-
6583  09 Aug, 2017   

 

 [facility]  [address] [place] [address] 88511-
4225     

 

 [facility]  [address], [place] [address]-
0293     

 

 [facility]  [address], [place] [address]-
9399  10 Jul, 2017   

 

 [facility]  [address], [place] [address]-
9213     

 

 [facility]  [address] [place] [address] [address]-
5935     

 

 [facility]  [address], [place] [address]-
4186     

 

 [facility]  [address] [place] [address] 95925-
9571    Chronic pain syndrome G89.4 ; Fibromyalgia M79.7 ; Anxiety 
disorder, unspecified F41.9 ; Neuropathy G62.9 and Low back pain M54.5

 

 [facility]  [address] [place] [address] 48004-
9894  25 May, 2017  Low back pain M54.5

 

 [facility]  [address] [place] [id number] [place] [address] 94548-
3926  24 May, 2017   

 

 [facility]  [address] [place] [id number] [place] [address] 92027-
3307  22 May, 2017   

 

 [place] [address] [place] [id number] [place] [address] 40752-
2196  16 May, 2017   

 

 [facility]  [address] [place] [id number] [place] [address] 25531-
2069  16 May, 2017   

 

 [place] [address] [place] [address] [place] [address] 46724-
8082  12 May, 2017  Routine adult health maintenance Z00.00 ; Fibromyalgia 
M79.7 ; Chronic pain syndrome G89.4 ; Abnormal lung sounds R09.89 ; Coronary 
artery disease involving native coronary artery of native heart without angina 
pectoris I25.10 and S/P CABG (coronary artery bypass graft) Z95.1

 

 [place] [address] [place] [address] 39743-
7969  09 May, 2017   







IMMUNIZATIONS

No Known Immunizations



SOCIAL HISTORY

Never Assessed



REASON FOR VISIT

Controlled Med Refill



PLAN OF CARE





VITAL SIGNS





MEDICATIONS







 Medication  Instructions  Dosage  Frequency  Start Date  End Date  Duration  
Status

 

 Xanax 0.25 MG  Orally Twice a day  1 tablet  12h        28 days  Active

 

 Gabapentin 300 MG  Orally Once a day  1 capsule  24h  09 May, 2017     90 days
  Active







RESULTS

No Results



PROCEDURES

No Known procedures



INSTRUCTIONS





MEDICATIONS ADMINISTERED

No Known Medications



MEDICAL (GENERAL) HISTORY







 Type  Description  Date

 

 Medical History  fibromyalgia   

 

 Medical History  MRI 2016- small central protrusion/herniation w/minimal 
impression on thecal anteriorly at C5-6, At C3-4 small bilat. uncinate spurs w/ 
mild right neural foraminal narrowing   

 

 Medical History  MRI 2016- mild degenerative changes. No spinal canal 
stenosis or foraminal narrowing of thoracic spine   

 

 Medical History  hypertension   

 

 Medical History  Anxiety disorder   

 

 Medical History  depression   

 

 Medical History  migraine headaches   

 

 Medical History  Hx of C-Diff   

 

 Medical History  Hx of Pneumonia   

 

 Surgical History  Open Heart Surgery - [facility] -Dr. Lima  (
Cardiologist- Dr Logan)  

 

 Surgical History  hysterectomy - Dr Luis   

 

 Surgical History  Left Breast Lumpectomy (Bx - Benign)- Dr Luis   

 

 Surgical History  Port - Dr Luis   

 

 Surgical History  Left Knee Surgeries x3- Dr Carolina did 2 and Dr Gan did 1   

 

 Hospitalization History  C-Diff - [facility]   

 

 Hospitalization History  Pneumonia - [facility]   

 

 Hospitalization History  Open Heart Surgery - [facility]

## 2018-07-06 NOTE — XMS REPORT
Clinical Summary

 Created on: 2018



Dayami Rae

External Reference #: [id number]

: 1970

Sex: Female



Demographics







 Address  [address]

[facility]  [address]

 

 Home Phone  [phone]

 

 Preferred Language  English

 

 Marital Status  Unknown

 

 Judaism Affiliation  BAP

 

 Race  White

 

 Ethnic Group  Not  or 





Author







 Author  [facility]

 

 Organization  [facility]

 

 Address  Unknown

 

 Phone  Unavailable







Support







 Name  Relationship  Address  Phone

 

 Savanah Colunga  ECON  Unknown  [phone]

 

 SAVANAH COLUNGA  ECON  Unknown  [phone]







Care Team Providers







 Care Team Member Name  Role  Phone

 

  PCP  Unavailable







Source Comments

Some departments are not documenting in the electronic medical record.  If you 
do not see the information that you expected, contact Release of Information in 
the Health Information Management department at [phone] for further 
assistance in locating additional records.[facility]



Allergies

Not on File



Current Medications

Not on file



Active Problems





Not on file



Social History







    



  Tobacco Use   Types   Packs/Day   Years Used   Date

 

    



  Never Assessed    









 



  Sex Assigned at Birth   Date Recorded

 

 



  Not on file 







Last Filed Vital Signs

Not on file



Plan of Treatment







   



  Health Maintenance   Due Date   Last Done   Comments

 

   



  PHYSICAL (COMPREHENSIVE)   1975  



  EXAM   

 

   



  PERTUSSIS VACCINE   1979  

 

   



  HIV SCREENING   1983  

 

   



  TETANUS VACCINE   1985  

 

   



  CERVICAL CANCER SCREENING   1998  

 

   



  BREAST CANCER SCREENING   2008  

 

   



  INFLUENZA VACCINE   10/01/2018  







Results

Not on filefrom Last 3 Months

## 2018-07-06 NOTE — XMS REPORT
[facility]

 Created on: 2018



Dayami Rae

External Reference #: [id number]

: 1970

Sex: Female



Demographics







 Address  [address] [place]  [address]

 

 Preferred Language  Unknown

 

 Marital Status  Unknown

 

 [employer]ar[employer] Affiliation  Unknown

 

 Race  Unknown

 

 Ethnic Group  Unknown





Author







 Author  KIM FUCHS

 

 Organization  [facility]

 

 Address  [address] [place]  [address]



 

 Phone  [phone]







Care Team Providers







 Care Team Member Name  Role  Phone

 

 FUCHSJAMA JACKSONELE  Unavailable  [phone]







PROBLEMS







 Type  Condition  ICD9-CM Code  YSV79-YU Code  Onset Dates  Condition Status  
SNOMED Code

 

 Problem  Fibromyalgia     M79.7     Active  906138684

 

 Problem  Neuropathy     G62.9     Active  326157334

 

 Problem  Coronary artery disease involving native coronary artery of native 
heart without angina pectoris     I25.10     Active  [id number]

 

 Problem  Chronic pain syndrome     G89.4     Active  256498120

 

 Problem  COPD suggested by initial evaluation     J44.9     Active  10692512

 

 Problem  Acute midline low back pain with left-sided sciatica     M54.42     
Active  841726050

 

 Problem  Lumbago with sciatica, left side     M54.42     Active  992305552

 

 Problem  Anxiety disorder, unspecified     F41.9     Active  666381679

 

 Problem  Other chronic pain     G89.29     Active  79960354

 

 Problem  Lumbago with sciatica, right side     M54.41     Active  
[id number]







ALLERGIES

No Information



ENCOUNTERS







 Encounter  Location  Date  Diagnosis

 

 [facility]  [address] [phone]-
[address]     

 

 [facility]  [address] [phone]
[phone]  21 May, 2018  Bronchitis J40

 

 [facility]  [id number] [place] [address]-
7622  07 May, 2018  Chronic pain syndrome G89.4 and Neuropathy G62.9

 

 [facility]  [address] [address]-
6790    Acute midline low back pain with left-sided sciatica M54.42

 

 [facility]  [address] [address]-
1448     

 

 [facility]  [id number] [place] [address]-
2007    Anxiety disorder, unspecified F41.9

 

 [place] [id number] [place] [address]-
2104     

 

 [facility]  [id number] [place] [address]-
8960    Chronic pain syndrome G89.4 and Acute midline low back pain 
with left-sided sciatica M54.42

 

 [place] [id number] [place] [address]-
2906    Chronic pain syndrome G89.4

 

 [place] [address] [address]-
1677     

 

 [place] [address] [address]-
9750     

 

 [place] [address] [address]-
9600  29 Mar, 2018  Injury of left knee, initial encounter S89.92XA and COPD 
suggested by initial evaluation J44.9

 

 [place] [id number] [place] [address]-
5169  28 Mar, 2018  Acute bronchitis, unspecified organism J20.9

 

 [place] [id number] [place] [address]-
1185  27 Mar, 2018  Acute bronchitis, unspecified organism J20.9

 

 [place] [id number] [place] [address]-
4016  21 Mar, 2018  Anxiety disorder, unspecified F41.9 and Acute bronchitis, 
unspecified organism J20.9

 

 [place] [id number] [place] [address]-
1254  08 Mar, 2018  Chronic pain syndrome G89.4

 

 [facility]  [id number] [place] [address]-
9597  05 Mar, 2018   

 

 [place] [id number] [place] [address]-
5839  05 Mar, 2018  Acute midline low back pain with left-sided sciatica M54.42

 

 [facility]  [id number] [place] [address]158-
3153     

 

 [facility]  [id number] [place] [address]85-
7668    Chronic pain syndrome G89.4

 

 [facility]  [id number] [place] [address] 15277-
3290    Chronic pain syndrome G89.4

 

 [facility]  [address] [address]-
3412     

 

 [facility]  [id number] [place] [address]-
7046    Gastroenteritis K52.9

 

 [facility]  [address] [address]-
0201     

 

 [facility]  [id number] [place] [address]-
7099  15 Jerardo, 2018  Acute bronchitis, unspecified organism J20.9

 

 [facility]  [id number] [place] [address]-
2436    Anxiety disorder, unspecified F41.9 and Neuropathy G62.9

 

 [facility]  [id number] [place] [address] 22625-
3453    Chronic pain syndrome G89.4

 

 [facility]  [id number] [place] [address] 96826-
6028    Bronchitis J40 and Laryngitis J04.0

 

 [facility]  [id number] [place] [address] 29152-
7113  29 Dec, 2017   

 

 [facility]  [id number] [place] [address] 77904-
4071  26 Dec, 2017  Bronchitis J40

 

 [facility]  [id number] [place] [address] 34759-
8379  18 Dec, 2017   

 

 [place] [id number] [place] [address]1628-
1922  13 Dec, 2017  Fibromyalgia M79.7 and Chronic pain syndrome G89.4

 

 [place] [id number] [place] [address]-
0025  04 Dec, 2017  Chronic pain syndrome G89.4 and Acute bronchitis, 
unspecified organism J20.9

 

 [place] [id number] [place] [address]66-
8707    Neuropathy G62.9

 

 [place] [address] [address]-
9422    Chronic pain syndrome G89.4 ; Lumbago with sciatica, left 
side M54.42 ; Lumbago with sciatica, right side M54.41 ; Screening cholesterol 
level Z13.220 ; Screening for diabetes mellitus Z13.1 ; Screening for thyroid 
disorder Z13.29 ; Fibromyalgia M79.7 ; Anxiety disorder, unspecified F41.9 and 
Neuropathy G62.9

 

 [place] [id number] [place] [address] 61571-
6399     

 

 [place] [id number] [place] [address] 56178-
7564  25 Oct, 2017   

 

 [place] [id number] [place] [address] 04498-
1375  10 Oct, 2017  Fibromyalgia M79.7 ; Chronic pain syndrome G89.4 ; Anxiety 
disorder, unspecified F41.9 ; Neuropathy G62.9 and Acute bronchitis, 
unspecified organism J20.9

 

 [place] [id number] [place] [address]-
0315  09 Oct, 2017   

 

 [place] [id number] [place] [address]01-
4410  25 Sep, 2017   

 

 [place] [id number] [place] [address]809-
1573  19 Sep, 2017   

 

 [facility]  [address] [address]-
8656  08 Sep, 2017   

 

 [facility]  [address] [address]-
7305  23 Aug, 2017   

 

 [facility]  [address] [address]-
8298  22 Aug, 2017  Acute seasonal allergic rhinitis due to pollen J30.1

 

 [facility]  [id number] [place] [address] 35189-
7273  21 Aug, 2017   

 

 [facility]  [id number] [place] [address] 88844-
8867  09 Aug, 2017   

 

 [facility]  [id number] [place] [address]79-
8841     

 

 [facility]  [id number] [place] [address] 62482-
1359     

 

 [facility]  [id number] [address]-
3139  10 Jul, 2017   

 

 [facility]  [address] [place] [address] 93526-
3935     

 

 [facility]  [address] [address]-
8[address]     

 

 [facility]  [address] [address]-
2850     

 

 [facility]  [address] [address]-
7638    Chronic pain syndrome G89.4 ; Fibromyalgia M79.7 ; Anxiety 
disorder, unspecified F41.9 ; Neuropathy G62.9 and Low back pain M54.5

 

 [facility]  [address] [address]-
1043  25 May, 2017  Low back pain M54.5

 

 [facility]  [address] [place] [address]31-
1405  24 May, 2017   

 

 [facility]  [address] [facility] [id number] [address]-
6728  22 May, 2017   

 

 [facility]  [id number] [address]-
2096  16 May, 2017   

 

 [facility]  [address] [facility] [id number] [address]-
2292  16 May, 2017   

 

 [facility]  [id number] [address]-
3785  12 May, 2017  Routine adult health maintenance Z00.00 ; Fibromyalgia 
M79.7 ; Chronic pain syndrome G89.4 ; Abnormal lung sounds R09.89 ; Coronary 
artery disease involving native coronary artery of native heart without angina 
pectoris I25.10 and S/P CABG (coronary artery bypass graft) Z95.1

 

 [facility]  [id number] [address]-
1551  09 May, 2017   







IMMUNIZATIONS

No Known Immunizations



SOCIAL HISTORY

Never Assessed



REASON FOR VISIT

Controlled Med Refill 



PLAN OF CARE





VITAL SIGNS





MEDICATIONS

Unknown Medications



RESULTS

No Results



PROCEDURES

No Known procedures



INSTRUCTIONS





MEDICATIONS ADMINISTERED

No Known Medications



MEDICAL (GENERAL) HISTORY







 Type  Description  Date

 

 Medical History  fibromyalgia   

 

 Medical History  MRI 2016- small central protrusion/herniation w/minimal 
impression on thecal anteriorly at C5-6, At C3-4 small bilat. uncinate spurs w/ 
mild right neural foraminal narrowing   

 

 Medical History  MRI 2016- mild degenerative changes. No spinal canal 
stenosis or foraminal narrowing of thoracic spine   

 

 Medical History  hypertension   

 

 Medical History  Anxiety disorder   

 

 Medical History  depression   

 

 Medical History  migraine headaches   

 

 Medical History  Hx of C-Diff   

 

 Medical History  Hx of Pneumonia   

 

 Surgical History  Open Heart Surgery - [facility] -Dr. Lima  (
Cardiologist- Dr Logan)  

 

 Surgical History  hysterectomy - Dr Luis   

 

 Surgical History  Left Breast Lumpectomy (Bx - Benign)- Dr Luis   

 

 Surgical History  Port - Dr Luis   

 

 Surgical History  Left Knee Surgeries x3- Dr Carolina did 2 and Dr Gan did 1   

 

 Hospitalization History  C-Diff - [facility]   

 

 Hospitalization History  Pneumonia - [facility]   

 

 Hospitalization History  Open Heart Surgery - [facility]

## 2019-01-07 ENCOUNTER — HOSPITAL ENCOUNTER (OUTPATIENT)
Dept: HOSPITAL 75 - ER | Age: 49
Setting detail: OBSERVATION
LOS: 1 days | Discharge: HOME | End: 2019-01-08
Attending: FAMILY MEDICINE | Admitting: FAMILY MEDICINE
Payer: SELF-PAY

## 2019-01-07 VITALS — HEIGHT: 62 IN | WEIGHT: 124.44 LBS | BODY MASS INDEX: 22.9 KG/M2

## 2019-01-07 VITALS — DIASTOLIC BLOOD PRESSURE: 80 MMHG | SYSTOLIC BLOOD PRESSURE: 133 MMHG

## 2019-01-07 VITALS — SYSTOLIC BLOOD PRESSURE: 120 MMHG | DIASTOLIC BLOOD PRESSURE: 66 MMHG

## 2019-01-07 VITALS — DIASTOLIC BLOOD PRESSURE: 64 MMHG | SYSTOLIC BLOOD PRESSURE: 101 MMHG

## 2019-01-07 VITALS — DIASTOLIC BLOOD PRESSURE: 72 MMHG | SYSTOLIC BLOOD PRESSURE: 128 MMHG

## 2019-01-07 VITALS — DIASTOLIC BLOOD PRESSURE: 81 MMHG | SYSTOLIC BLOOD PRESSURE: 135 MMHG

## 2019-01-07 VITALS — SYSTOLIC BLOOD PRESSURE: 142 MMHG | DIASTOLIC BLOOD PRESSURE: 80 MMHG

## 2019-01-07 VITALS — DIASTOLIC BLOOD PRESSURE: 80 MMHG | SYSTOLIC BLOOD PRESSURE: 138 MMHG

## 2019-01-07 DIAGNOSIS — F41.9: ICD-10-CM

## 2019-01-07 DIAGNOSIS — E78.5: ICD-10-CM

## 2019-01-07 DIAGNOSIS — K52.9: ICD-10-CM

## 2019-01-07 DIAGNOSIS — Z95.1: ICD-10-CM

## 2019-01-07 DIAGNOSIS — J44.9: ICD-10-CM

## 2019-01-07 DIAGNOSIS — Z96.652: ICD-10-CM

## 2019-01-07 DIAGNOSIS — I25.10: ICD-10-CM

## 2019-01-07 DIAGNOSIS — F17.210: ICD-10-CM

## 2019-01-07 DIAGNOSIS — Z95.5: ICD-10-CM

## 2019-01-07 DIAGNOSIS — Z79.899: ICD-10-CM

## 2019-01-07 DIAGNOSIS — E78.00: ICD-10-CM

## 2019-01-07 DIAGNOSIS — K21.0: Primary | ICD-10-CM

## 2019-01-07 DIAGNOSIS — E87.6: ICD-10-CM

## 2019-01-07 DIAGNOSIS — F32.9: ICD-10-CM

## 2019-01-07 DIAGNOSIS — I10: ICD-10-CM

## 2019-01-07 LAB
ALBUMIN SERPL-MCNC: 4.1 GM/DL (ref 3.2–4.5)
ALP SERPL-CCNC: 89 U/L (ref 40–136)
ALT SERPL-CCNC: < 6 U/L (ref 0–55)
APTT BLD: 36 SEC (ref 24–35)
BASOPHILS # BLD AUTO: 0 10^3/UL (ref 0–0.1)
BASOPHILS NFR BLD AUTO: 1 % (ref 0–10)
BILIRUB SERPL-MCNC: 0.3 MG/DL (ref 0.1–1)
BUN/CREAT SERPL: 8
CALCIUM SERPL-MCNC: 9 MG/DL (ref 8.5–10.1)
CHLORIDE SERPL-SCNC: 104 MMOL/L (ref 98–107)
CO2 SERPL-SCNC: 25 MMOL/L (ref 21–32)
CREAT SERPL-MCNC: 0.78 MG/DL (ref 0.6–1.3)
EOSINOPHIL # BLD AUTO: 0.2 10^3/UL (ref 0–0.3)
EOSINOPHIL NFR BLD AUTO: 4 % (ref 0–10)
ERYTHROCYTE [DISTWIDTH] IN BLOOD BY AUTOMATED COUNT: 13 % (ref 10–14.5)
GFR SERPLBLD BASED ON 1.73 SQ M-ARVRAT: > 60 ML/MIN
GLUCOSE SERPL-MCNC: 91 MG/DL (ref 70–105)
HCT VFR BLD CALC: 36 % (ref 35–52)
HGB BLD-MCNC: 12.4 G/DL (ref 11.5–16)
INR PPP: 1.1 (ref 0.8–1.4)
LYMPHOCYTES # BLD AUTO: 2.3 X 10^3 (ref 1–4)
LYMPHOCYTES NFR BLD AUTO: 40 % (ref 12–44)
MAGNESIUM SERPL-MCNC: 1.9 MG/DL (ref 1.8–2.4)
MANUAL DIFFERENTIAL PERFORMED BLD QL: NO
MCH RBC QN AUTO: 31 PG (ref 25–34)
MCHC RBC AUTO-ENTMCNC: 34 G/DL (ref 32–36)
MCV RBC AUTO: 89 FL (ref 80–99)
MONOCYTES # BLD AUTO: 0.5 X 10^3 (ref 0–1)
MONOCYTES NFR BLD AUTO: 9 % (ref 0–12)
MYOGLOBIN SERPL-MCNC: 22.7 NG/ML (ref 10–92)
NEUTROPHILS # BLD AUTO: 2.6 X 10^3 (ref 1.8–7.8)
NEUTROPHILS NFR BLD AUTO: 46 % (ref 42–75)
PLATELET # BLD: 206 10^3/UL (ref 130–400)
PMV BLD AUTO: 9.7 FL (ref 7.4–10.4)
POTASSIUM SERPL-SCNC: 2.9 MMOL/L (ref 3.6–5)
PROT SERPL-MCNC: 7.5 GM/DL (ref 6.4–8.2)
PROTHROMBIN TIME: 14 SEC (ref 12.2–14.7)
RBC # BLD AUTO: 4.07 10^6/UL (ref 4.35–5.85)
SODIUM SERPL-SCNC: 139 MMOL/L (ref 135–145)
WBC # BLD AUTO: 5.6 10^3/UL (ref 4.3–11)

## 2019-01-07 PROCEDURE — 93005 ELECTROCARDIOGRAM TRACING: CPT

## 2019-01-07 PROCEDURE — 83735 ASSAY OF MAGNESIUM: CPT

## 2019-01-07 PROCEDURE — 80048 BASIC METABOLIC PNL TOTAL CA: CPT

## 2019-01-07 PROCEDURE — 85730 THROMBOPLASTIN TIME PARTIAL: CPT

## 2019-01-07 PROCEDURE — 80053 COMPREHEN METABOLIC PANEL: CPT

## 2019-01-07 PROCEDURE — 85610 PROTHROMBIN TIME: CPT

## 2019-01-07 PROCEDURE — 80061 LIPID PANEL: CPT

## 2019-01-07 PROCEDURE — 84484 ASSAY OF TROPONIN QUANT: CPT

## 2019-01-07 PROCEDURE — 96365 THER/PROPH/DIAG IV INF INIT: CPT

## 2019-01-07 PROCEDURE — 85025 COMPLETE CBC W/AUTO DIFF WBC: CPT

## 2019-01-07 PROCEDURE — 96375 TX/PRO/DX INJ NEW DRUG ADDON: CPT

## 2019-01-07 PROCEDURE — 93041 RHYTHM ECG TRACING: CPT

## 2019-01-07 PROCEDURE — 71045 X-RAY EXAM CHEST 1 VIEW: CPT

## 2019-01-07 PROCEDURE — 36415 COLL VENOUS BLD VENIPUNCTURE: CPT

## 2019-01-07 PROCEDURE — 83874 ASSAY OF MYOGLOBIN: CPT

## 2019-01-07 RX ADMIN — ONDANSETRON PRN MG: 2 INJECTION, SOLUTION INTRAMUSCULAR; INTRAVENOUS at 21:29

## 2019-01-07 RX ADMIN — Medication SCH ML: at 21:30

## 2019-01-07 NOTE — XMS REPORT
[facility]

 Created on: 2018



Dayami Rae

External Reference #: [id number]

: 1970

Sex: Female



Demographics







 Address  [address] [place]  [address]

 

 Preferred Language  Unknown

 

 Marital Status  Unknown

 

 Scientologist Affiliation  Unknown

 

 Race  Unknown

 

 Ethnic Group  Unknown





Author







 Author  KIM FUCHS

 

 Organization  [facility]

 

 Address  [address] [place]  [address]



 

 Phone  [phone]







Care Team Providers







 Care Team Member Name  Role  Phone

 

 FUCHSKIM JACKSON  Unavailable  [phone]







PROBLEMS







 Type  Condition  ICD9-CM Code  NJA03-MQ Code  Onset Dates  Condition Status  
SNOMED Code

 

 Problem  Fibromyalgia     M79.7     Active  051944170

 

 Problem  Neuropathy     G62.9     Active  264334567

 

 Problem  Coronary artery disease involving native coronary artery of native 
heart without angina pectoris     I25.10     Active  2211612335745

 

 Problem  Chronic pain syndrome     G89.4     Active  466992996

 

 Problem  COPD suggested by initial evaluation     J44.9     Active  90133947

 

 Problem  Acute midline low back pain with left-sided sciatica     M54.42     
Active  676570824

 

 Problem  Lumbago with sciatica, left side     M54.42     Active  853480140

 

 Problem  Anxiety disorder, unspecified     F41.9     Active  085038666

 

 Problem  Other chronic pain     G89.29     Active  39696388

 

 Problem  Lumbago with sciatica, right side     M54.41     Active  
[id number]







ALLERGIES

No Information



ENCOUNTERS







 Encounter  Location  Date  Diagnosis

 

 [place] [id number] [place] [address]757-
7338  01 May, 2018   

 

 [place] [id number] [place] [address]73-
3561     

 

 [facility]  [id number] [place] [address]75-
6116    Anxiety disorder, unspecified F41.9

 

 [facility]  [address] [address]-
6046     

 

 [place] [id number] [place] [address] 98186-
9835    Chronic pain syndrome G89.4 and Acute midline low back pain 
with left-sided sciatica M54.42

 

 [facility]  [id number] [place] [address] 56838-
7880    Chronic pain syndrome G89.4

 

 [facility]  [address] [address]-
3194     

 

 [facility]  [address] [address]-
1132     

 

 [facility]  [address] [address]-
6859  29 Mar, 2018  Injury of left knee, initial encounter S89.92XA and COPD 
suggested by initial evaluation J44.9

 

 [place] [address] [address]-
4992  28 Mar, 2018  Acute bronchitis, unspecified organism J20.9

 

 [place] [address] [address]-
1183  27 Mar, 2018  Acute bronchitis, unspecified organism J20.9

 

 [facility]  [id number] [place] [address]605-
4330  21 Mar, 2018  Anxiety disorder, unspecified F41.9 and Acute bronchitis, 
unspecified organism J20.9

 

 [place] [id number] [place] [address] 98567-
0975  08 Mar, 2018  Chronic pain syndrome G89.4

 

 [place] [id number] [place] [address] 32370-
6160  05 Mar, 2018   

 

 [facility]  [id number] [place] [address] 36195-
2525  05 Mar, 2018  Acute midline low back pain with left-sided sciatica M54.42

 

 [facility]  [address] [address]-
6581     

 

 [facility]  [id number] [place] [address] 62477-
2920    Chronic pain syndrome G89.4

 

 [facility]  [address] [place] [address]-
[address]    Chronic pain syndrome G89.4

 

 [facility]  [id number] [place] [address]93-
0653     

 

 [facility]  [id number] [place] [address] 30962-
4312    Gastroenteritis K52.9

 

 [facility]  [address] [address]-
3210     

 

 [facility]  [address] [address]-
8639  15 Jerardo, 2018  Acute bronchitis, unspecified organism J20.9

 

 [facility]  [id number] [place] [address] 76244-
4880    Anxiety disorder, unspecified F41.9 and Neuropathy G62.9

 

 [facility]  [address] [address]-
2916    Chronic pain syndrome G89.4

 

 [facility]  [id number] [place] [address]1010-
1245    Bronchitis J40 and Laryngitis J04.0

 

 [facility]  [id number] [place] [address] 33565-
7620  29 Dec, 2017   

 

 [facility]  [id number] [place] [address] 97110-
8347  26 Dec, 2017  Bronchitis J40

 

 [facility]  [id number] [place] [address] 00233-
5297  18 Dec, 2017   

 

 [facility]  [id number] [place] [address] 62464-
1396  13 Dec, 2017  Fibromyalgia M79.7 and Chronic pain syndrome G89.4

 

 [facility]  [id number] [place] [address] 97218-
7773  04 Dec, 2017  Chronic pain syndrome G89.4 and Acute bronchitis, 
unspecified organism J20.9

 

 [facility]  [id number] [place] [address] 16048-
5124    Neuropathy G62.9

 

 [facility]  [address] [address]-
7572    Chronic pain syndrome G89.4 ; Lumbago with sciatica, left 
side M54.42 ; Lumbago with sciatica, right side M54.41 ; Screening cholesterol 
level Z13.220 ; Screening for diabetes mellitus Z13.1 ; Screening for thyroid 
disorder Z13.29 ; Fibromyalgia M79.7 ; Anxiety disorder, unspecified F41.9 and 
Neuropathy G62.9

 

 [place] [address] [address]-
5243     

 

 [place] [address] [address]-
1264  25 Oct, 2017   

 

 [place] [address] [address]-
3700  10 Oct, 2017  Fibromyalgia M79.7 ; Chronic pain syndrome G89.4 ; Anxiety 
disorder, unspecified F41.9 ; Neuropathy G62.9 and Acute bronchitis, 
unspecified organism J20.9

 

 [place] [address] [address]-
8319  09 Oct, 2017   

 

 [place] [id number] [place] [address] 23044-
0582  25 Sep, 2017   

 

 [place] [id number] [place] [address] 15736-
8898  19 Sep, 2017   

 

 [facility]  [id number] [place] [address] 90681-
8063  08 Sep, 2017   

 

 [place] [address] [address]-
0220  23 Aug, 2017   

 

 [place] [id number] [place] [address] 25612-
2359  22 Aug, 2017  Acute seasonal allergic rhinitis due to pollen J30.1

 

 [place] [id number] [place] [address]60-
1046  21 Aug, 2017   

 

 [facility]  [id number] [place] [address]-
2946  09 Aug, 2017   

 

 [facility]  [id number] [place] [address]-
2926     

 

 [facility]  [id number] [place] [address]-
4426     

 

 [facility]  [id number] [place] [address]-
7288  10 Jul, 2017   

 

 [facility]  [id number] [place] [address]-
0746     

 

 [facility]  [id number] [place] [address]-
4146     

 

 [facility]  [id number] [place] [address]-
8486     

 

 [facility]  [id number] [place] [address]-
4746    Chronic pain syndrome G89.4 ; Fibromyalgia M79.7 ; Anxiety 
disorder, unspecified F41.9 ; Neuropathy G62.9 and Low back pain M54.5

 

 [facility]  [id number] [address]-
8346  25 May, 2017  Low back pain M54.5

 

 [facility]  [id number] [place] [address]-
1406  24 May, 2017   

 

 [facility]  [id number] [address]-
2209  22 May, 2017   

 

 [facility]  [id number] [place] [address]-
4946  16 May, 2017   

 

 [facility]  [id number] [place] [address]-
2436  16 May, 2017   

 

 [facility]  [id number] [address]-
6106  12 May, 2017  Routine adult health maintenance Z00.00 ; Fibromyalgia 
M79.7 ; Chronic pain syndrome G89.4 ; Abnormal lung sounds R09.89 ; Coronary 
artery disease involving native coronary artery of native heart without angina 
pectoris I25.10 and S/P CABG (coronary artery bypass graft) Z95.1

 

 [facility]  [address] [facility] [id number] [place] [address]-
6888  09 May, 2017   







IMMUNIZATIONS

No Known Immunizations



SOCIAL HISTORY

Never Assessed



REASON FOR VISIT

Xanax refill



PLAN OF CARE





VITAL SIGNS





MEDICATIONS







 Medication  Instructions  Dosage  Frequency  Start Date  End Date  Duration  
Status

 

 Xanax 0.25 MG  Orally Twice a day  1 tablet  12h        28 days  Active







RESULTS

No Results



PROCEDURES

No Known procedures



INSTRUCTIONS





MEDICATIONS ADMINISTERED

No Known Medications



MEDICAL (GENERAL) HISTORY







 Type  Description  Date

 

 Medical History  fibromyalgia   

 

 Medical History  MRI 2016- small central protrusion/herniation w/minimal 
impression on thecal anteriorly at C5-6, At C3-4 small bilat. uncinate spurs w/ 
mild right neural foraminal narrowing   

 

 Medical History  MRI 2016- mild degenerative changes. No spinal canal 
stenosis or foraminal narrowing of thoracic spine   

 

 Medical History  hypertension   

 

 Medical History  Anxiety disorder   

 

 Medical History  depression   

 

 Medical History  migraine headaches   

 

 Medical History  Hx of C-Diff   

 

 Medical History  Hx of Pneumonia   

 

 Surgical History  Open Heart Surgery - [facility] -Dr. Lima  (
Cardiologist- Dr Logan)  

 

 Surgical History  hysterectomy - Dr Luis   

 

 Surgical History  Left Breast Lumpectomy (Bx - Benign)- Dr Luis   

 

 Surgical History  Port - Dr Luis   

 

 Surgical History  Left Knee Surgeries x3- Dr Carolina did 2 and Dr Gan did 1   

 

 Hospitalization History  C-Diff - [facility]   

 

 Hospitalization History  Pneumonia - [facility]   

 

 Hospitalization History  Open Heart Surgery - [facility]

## 2019-01-07 NOTE — XMS REPORT
[facility]

 Created on: 2018



Dayami Rae

External Reference #: [id number]

: 1970

Sex: Female



Demographics







 Address  [address] [place]  [address]

 

 Preferred Language  Unknown

 

 Marital Status  Unknown

 

 Taoism Affiliation  Unknown

 

 Race  Unknown

 

 Ethnic Group  Unknown





Author







 Author  KIM FUCHS

 

 Organization  [facility]

 

 Address  [address] [place]  [address]



 

 Phone  [phone]







Care Team Providers







 Care Team Member Name  Role  Phone

 

 FUCHSKIM JACKSON  Unavailable  [phone]







PROBLEMS







 Type  Condition  ICD9-CM Code  QXH21-EY Code  Onset Dates  Condition Status  
SNOMED Code

 

 Problem  Chronic pain syndrome     G89.4     Active  864197989

 

 Problem  Neuropathy     G62.9     Active  958324075

 

 Problem  Coronary artery disease involving native coronary artery of native 
heart without angina pectoris     I25.10     Active  9208307099709

 

 Problem  Fibromyalgia     M79.7     Active  509140026

 

 Problem  Chronic obstructive pulmonary disease, unspecified COPD type     
J44.9     Active  21383313

 

 Problem  Essential hypertension     I10     Active  81015450

 

 Problem  Lumbago with sciatica, left side     M54.42     Active  172788023

 

 Problem  Anxiety disorder, unspecified     F41.9     Active  234363207

 

 Problem  GERD with esophagitis     K21.0     Active  602546355

 

 Problem  Lumbago with sciatica, right side     M54.41     Active  
[id number]







ALLERGIES

No Information



ENCOUNTERS







 Encounter  Location  Date  Diagnosis

 

 [place] [id number] [place] [address] 20012-
7206  20 Sep, 2018   

 

 [place] [address] [place] [id number] [place] [address] 96649-
4185  19 Sep, 2018   

 

 [facility]  [id number] [place] [address] 93527-
8520  17 Sep, 2018  Pneumonia of right lower lobe due to infectious organism 
J18.1 and Chronic obstructive pulmonary disease, unspecified COPD type J44.9

 

 [facility]  [id number] [place] [address] 48804-
3138  14 Sep, 2018  Pneumonia of right lower lobe due to infectious organism 
J18.1 ; Chronic obstructive pulmonary disease, unspecified COPD type J44.9 ; 
Chronic nausea R11.0 and Cough R05

 

 [place] [address] [place] [id number] [place] [address] 80560-
4878  07 Sep, 2018  Acute bronchitis, unspecified organism J20.9

 

 [facility]  [id number] [place] [address] 08025-
3629  28 Aug, 2018  Fibromyalgia M79.7

 

 [facility]  [id number] [place] [address] 79776-
3340  20 Aug, 2018   

 

 [facility]  [address] [address]-
1105  16 Aug, 2018  Neuropathy G62.9

 

 [place] [address] [address]-
4273    Essential hypertension I10 ; Coronary artery disease 
involving native coronary artery of native heart without angina pectoris I25.10 
; Fibromyalgia M79.7 ; GERD with esophagitis K21.0 and Tobacco abuse counseling 
Z71.6

 

 [place] [address] [place] [id number] [place] [address] 84055-
8082    Long term (current) use of opiate analgesic Z79.891

 

 [place] [address] [place] [id number] [place] [address] 96383-
1491     

 

 [facility]  [id number] [place] [address] 71628-
2438    Acute bronchitis, unspecified organism J20.9

 

 [facility]  [id number] [place] [address] 87857-
4353     

 

 [facility]  [id number] [place] [address] 40151-
3534     

 

 [facility]  [address] [address]71-
1061    Chronic pain syndrome G89.4

 

 [facility]  [id number] [place] [address] 63478-
8176     

 

 [facility]  [address] [address]-
5725  15 2018   

 

 [facility]  [id number] [place] [address] 74909-
1780    Acute bronchitis, unspecified organism J20.9

 

 [facility]  [id number] [place] [address] 46578-
3026    Chronic pain syndrome G89.4

 

 [facility]  [id number] [place] [address] 59536-
5486  25 May, 2018   

 

 [facility]  [id number] [place] [address] 17851-
7997  24 May, 2018  Acute midline low back pain with left-sided sciatica M54.42

 

 [facility]  [id number] [place] [address] 79788-
2732  22 May, 2018   

 

 [facility]  [id number] [place] [address] [phone]
-[phone]  21 May, 2018  Bronchitis J40

 

 [facility]  [id number] [place] [address] 51704-
0470  07 May, 2018  Chronic pain syndrome G89.4 and Neuropathy G62.9

 

 [facility]  [id number] [place] [address] 93200-
6078    Acute midline low back pain with left-sided sciatica M54.42

 

 [facility]  [id number] [place] [address] 32250-
9346     

 

 [facility]  [id number] [place] [address] 78631-
0018    Anxiety disorder, unspecified F41.9

 

 [facility]  [id number] [place] [address] 41798-
4495     

 

 [facility]  [id number] [place] [address] 59552-
9472    Chronic pain syndrome G89.4 and Acute midline low back pain 
with left-sided sciatica M54.42

 

 [facility]  [address] [place] [id number] [place] [address] 25006-
6961    Chronic pain syndrome G89.4

 

 [facility]  [id number] [place] [address] 31874-
6446     

 

 [facility]  [id number] [place] [address] 40542-
7870     

 

 [facility]  [address] 86843-
7701  29 Mar, 2018  Injury of left knee, initial encounter S89.92XA and COPD 
suggested by initial evaluation J44.9

 

 [place] [address] [address]-
8682  28 Mar, 2018  Acute bronchitis, unspecified organism J20.9

 

 [place] [address] [place] [id number] [place] [address] 05663-
4721  27 Mar, 2018  Acute bronchitis, unspecified organism J20.9

 

 [facility]  [id number] [place] [address] 69909-
5534  21 Mar, 2018  Anxiety disorder, unspecified F41.9 and Acute bronchitis, 
unspecified organism J20.9

 

 [facility]  [id number] [place] [address] 31158-
6628  08 Mar, 2018  Chronic pain syndrome G89.4

 

 [facility]  [id number] [place] [address] 20040-
0391  05 Mar, 2018   

 

 [facility]  [id number] [place] [address] 79547-
2955  05 Mar, 2018  Acute midline low back pain with left-sided sciatica M54.42

 

 [facility]  [id number] [place] [address] 48207-
0337     

 

 [facility]  [address] [id number] [place] [address] 96683-
6974    Chronic pain syndrome G89.4

 

 [facility]  [id number] [place] [address] 79695-
8073    Chronic pain syndrome G89.4

 

 [facility]  [id number] [place] [address] 12490-
1004     

 

 [facility]  [address] [address]156-
8924    Gastroenteritis K52.9

 

 [facility]  [address] 18520-
2107     

 

 [facility]  [address] 18050-
0453  15 Jerardo, 2018  Acute bronchitis, unspecified organism J20.9

 

 [facility]  [address] 47017-
1382    Anxiety disorder, unspecified F41.9 and Neuropathy G62.9

 

 [place] [address] 7[address]31-
7514    Chronic pain syndrome G89.4

 

 [facility]  [address] [id number] [place] [address] 87590-
5990    Bronchitis J40 and Laryngitis J04.0

 

 [place] [address] [place] [id number] [place] [address] 11631-
2869  29 Dec, 2017   

 

 [facility]  [id number] [place] [address] 87698-
3159  26 Dec, 2017  Bronchitis J40

 

 [facility]  [id number] [place] [address] 36087-
2171  18 Dec, 2017   

 

 [facility]  [id number] [place] [address] 89089-
4228  13 Dec, 2017  Fibromyalgia M79.7 and Chronic pain syndrome G89.4

 

 [facility]  [id number] [place] [address] 76416-
0742  04 Dec, 2017  Chronic pain syndrome G89.4 and Acute bronchitis, 
unspecified organism J20.9

 

 [facility]  [address] 59454-
9481    Neuropathy G62.9

 

 [facility]  [address] [place] [address] 16786-
5686    Chronic pain syndrome G89.4 ; Lumbago with sciatica, left 
side M54.42 ; Lumbago with sciatica, right side M54.41 ; Screening cholesterol 
level Z13.220 ; Screening for diabetes mellitus Z13.1 ; Screening for thyroid 
disorder Z13.29 ; Fibromyalgia M79.7 ; Anxiety disorder, unspecified F41.9 and 
Neuropathy G62.9

 

 [facility]  [id number] [place] [address] 73299-
1135     

 

 [facility]  [id number] [place] [address] 48017-
9298  25 Oct, 2017   

 

 [facility]  [id number] [place] [address] 72590-
4488  10 Oct, 2017  Fibromyalgia M79.7 ; Chronic pain syndrome G89.4 ; Anxiety 
disorder, unspecified F41.9 ; Neuropathy G62.9 and Acute bronchitis, 
unspecified organism J20.9

 

 [facility]  [id number] [place] [address] 91051-
3716  09 Oct, 2017   

 

 [facility]  [id number] [place] [address] 27707-
6836  25 Sep, 2017   

 

 [facility]  [id number] [place] [address] 26782-
9673  19 Sep, 2017   

 

 [facility]  [id number] [place] [address] 49877-
0746  08 Sep, 2017   

 

 [facility]  [id number] [place] [address] 33803-
8632  23 Aug, 2017   

 

 [facility]  [id number] [place] [address] 05551-
1421  22 Aug, 2017  Acute seasonal allergic rhinitis due to pollen J30.1

 

 [facility]  [id number] [place] [address] 37566-
8686  21 Aug, 2017   

 

 [facility]  [address] [place] [address]-
7148  09 Aug, 2017   

 

 [facility]  [address] [place] [address]-
7969     

 

 [facility]  [address] [place] [address]-
0986     

 

 [facility]  [id number] [place] [address]-
5747  10 Jul, 2017   

 

 [facility]  [address] [address]-
2283     

 

 [facility]  [address] [address]-
9150     

 

 [facility]  [address] [address]-
7854     

 

 [facility]  [address] [address]-
5643    Chronic pain syndrome G89.4 ; Fibromyalgia M79.7 ; Anxiety 
disorder, unspecified F41.9 ; Neuropathy G62.9 and Low back pain M54.5

 

 [facility]  [address] [place] [address]-
7012  25 May, 2017  Low back pain M54.5

 

 [facility]  [address] [place] [address]-
4841  24 May, 2017   

 

 [facility]  [address] [place] [address]-
1539  22 May, 2017   

 

 [facility]  [address] [address]-
5135  16 May, 2017   

 

 [facility]  [address] [place] [address]-
0213  16 May, 2017   

 

 [facility]  [id number] [place] [address]-
7541  12 May, 2017  Routine adult health maintenance Z00.00 ; Fibromyalgia 
M79.7 ; Chronic pain syndrome G89.4 ; Abnormal lung sounds R09.89 ; Coronary 
artery disease involving native coronary artery of native heart without angina 
pectoris I25.10 and S/P CABG (coronary artery bypass graft) Z95.1

 

 [facility] [place]  [address] [facility] [id number] [place] [address]-
8758  09 May, 2017   







IMMUNIZATIONS

No Known Immunizations



SOCIAL HISTORY

Never Assessed



REASON FOR VISIT

Controlled Med Refill 



PLAN OF CARE





VITAL SIGNS





MEDICATIONS







 Medication  Instructions  Dosage  Frequency  Start Date  End Date  Duration  
Status

 

 Tramadol HCl 50 mg  Orally every 6 hrs  1 tablet as needed  6h    
   28 days  Active

 

 Xanax 0.25 MG  Orally Twice a day  1 tablet  12h        28 days  Active







RESULTS

No Results



PROCEDURES

No Known procedures



INSTRUCTIONS





MEDICATIONS ADMINISTERED

No Known Medications



MEDICAL (GENERAL) HISTORY







 Type  Description  Date

 

 Medical History  fibromyalgia   

 

 Medical History  MRI 2016- small central protrusion/herniation w/minimal 
impression on thecal anteriorly at C5-6, At C3-4 small bilat. uncinate spurs w/ 
mild right neural foraminal narrowing   

 

 Medical History  MRI 2016- mild degenerative changes. No spinal canal 
stenosis or foraminal narrowing of thoracic spine   

 

 Medical History  hypertension   

 

 Medical History  Anxiety disorder   

 

 Medical History  depression   

 

 Medical History  migraine headaches   

 

 Medical History  Hx of C-Diff   

 

 Medical History  Hx of Pneumonia   

 

 Medical History  heart cath w/ stents placed 7/3/18   

 

 Surgical History  Open Heart Surgery - [facility] -Dr. Lima  (
Cardiologist- Dr Logan)  

 

 Surgical History  hysterectomy - Dr Luis   

 

 Surgical History  Left Breast Lumpectomy (Bx - Benign)- Dr Luis   

 

 Surgical History  Port - Dr Luis   

 

 Surgical History  Left Knee Surgeries x3- Dr Carolina did 2 and Dr Gan did 1   

 

 Surgical History  cath/stent  

 

 Hospitalization History  C-Diff - [facility]   

 

 Hospitalization History  Pneumonia - [facility]   

 

 Hospitalization History  Open Heart Surgery - [facility]

## 2019-01-07 NOTE — XMS REPORT
[facility]

 Created on: 2018



Dayami Rae

External Reference #: [id number]

: 1970

Sex: Female



Demographics







 Address  [address] [place]  [address]

 

 Preferred Language  Unknown

 

 Marital Status  Unknown

 

 Caodaism Affiliation  Unknown

 

 Race  Unknown

 

 Ethnic Group  Unknown





Author







 Author  SURENDRA FRASER

 

 [facility]

 

 Address  [address] [place]  [address]



 

 Phone  [phone]







Care Team Providers







 Care Team Member Name  Role  Phone

 

 SURENDRA FRASER  Unavailable  [phone]







PROBLEMS







 Type  Condition  ICD9-CM Code  ZOY56-XX Code  Onset Dates  Condition Status  
SNOMED Code

 

 Problem  Coronary artery disease involving native coronary artery of native 
heart without angina pectoris     I25.10     Active  6870105304277

 

 Problem  Anxiety disorder, unspecified     F41.9     Active  104159081

 

 Problem  Neuropathy     G62.9     Active  577184974

 

 Problem  Fibromyalgia     M79.7     Active  800405645

 

 Problem  Chronic pain syndrome     G89.4     Active  778670739

 

 Problem  Cervical radiculopathy     M54.12     Active  28674508

 

 Problem  Chronic obstructive pulmonary disease, unspecified COPD type     
J44.9     Active  12965446

 

 Problem  Lumbago with sciatica, right side     M54.41     Active  
627554606815478

 

 Problem  Lumbago with sciatica, left side     M54.42     Active  341859229

 

 Problem  Essential hypertension     I10     Active  88325892

 

 Problem  GERD with esophagitis     K21.0     Active  156705702







ALLERGIES

No Information



ENCOUNTERS







 Encounter  Location  Date  Diagnosis

 

 [facility]  [address] [place] [address] 84568-
6131  20 Dec, 2018  Anxiety disorder, unspecified F41.9 and Chronic pain 
syndrome G89.4

 

 [facility]  [address] [place] [address] 44065-
8114  19 Dec, 2018   

 

 [facility]  [id number] [place] [address] 19682-
2605  06 Dec, 2018  Neuropathy G62.9

 

 [facility]  [address] [place] [address] 60859-
4929    Anxiety disorder, unspecified F41.9

 

 [facility]  [id number] [place] [address] 62631-
1852    Chronic pain syndrome G89.4 and Anxiety disorder, 
unspecified F41.9

 

 [facility]  [id number] [place] [address] 59658-
3903  15 Nov, 2018   

 

 [facility]  [id number] [place] [address] 48579-
8835     

 

 [facility]  [id number] [place] [address] 83870-
1098  29 Oct, 2018  Cervical radiculopathy M54.12 ; Chronic pain syndrome G89.4 
and Anxiety disorder, unspecified F41.9

 

 [place] [address] 9[address]60-
2263  24 Oct, 2018   

 

 [facility]  [id number] [place] [address] 47605-
7771  27 Sep, 2018  Fibromyalgia M79.7

 

 [facility]  [address] [address]376-
0938  20 Sep, 2018   

 

 [facility]  [id number] [place] [address] 20775-
6465  19 Sep, 2018   

 

 [facility]  [id number] [place] [address] 42843-
9163  17 Sep, 2018  Pneumonia of right lower lobe due to infectious organism 
J18.1 and Chronic obstructive pulmonary disease, unspecified COPD type J44.9

 

 [facility]  [id number] [place] [address] 89797-
3226  14 Sep, 2018  Pneumonia of right lower lobe due to infectious organism 
J18.1 ; Chronic obstructive pulmonary disease, unspecified COPD type J44.9 ; 
Chronic nausea R11.0 and Cough R05

 

 [place] [id number] [place] [address] 85226-
2734  07 Sep, 2018  Acute bronchitis, unspecified organism J20.9

 

 [facility]  [id number] [place] [address] 63477-
9220  28 Aug, 2018  Fibromyalgia M79.7

 

 [facility]  [address] [address]-
6068  20 Aug, 2018   

 

 [facility]  [id number] [place] [address] 55491-
2685  16 Aug, 2018  Neuropathy G62.9

 

 [facility]  [id number] [place] [address] 20288-
3030    Essential hypertension I10 ; Coronary artery disease 
involving native coronary artery of native heart without angina pectoris I25.10 
; Fibromyalgia M79.7 ; GERD with esophagitis K21.0 and Tobacco abuse counseling 
Z71.6

 

 [facility]  [id number] [place] [address] 72028-
7910    Long term (current) use of opiate analgesic Z79.891

 

 [facility]  [id number] [place] [address] 50488-
8438     

 

 [facility]  [id number] [place] [address] 16006-
8934    Acute bronchitis, unspecified organism J20.9

 

 [facility]  [address] [place] [address] 67801-
0026     

 

 [facility]  [id number] [place] [address] 53581-
0160     

 

 [facility]  [address] [place] [address] 41433-
8991    Chronic pain syndrome G89.4

 

 [facility]  [id number] [place] [address] 94535-
4209     

 

 [facility]  [address] [place] [address] 45532-
0058  15 Marko, 2018   

 

 [facility]  [id number] [place] [address] 20179-
5193    Acute bronchitis, unspecified organism J20.9

 

 [facility]  [address] [place] [address] 05678-
6826    Chronic pain syndrome G89.4

 

 [facility]  [id number] [place] [address] 78006-
3062  25 May, 2018   

 

 [facility]  [id number] [place] [address] 33133-
8466  24 May, 2018  Acute midline low back pain with left-sided sciatica M54.42

 

 [facility]  [id number] [place] [address] 41322-
1984  22 May, 2018   

 

 [facility]  [id number] [place] [address] 16421
-[phone]  21 May, 2018  Bronchitis J40

 

 [facility]  [address] [address]18-
4053  07 May, 2018  Chronic pain syndrome G89.4 and Neuropathy G62.9

 

 [facility]  [id number] [place] [address] 41841-
8278    Acute midline low back pain with left-sided sciatica M54.42

 

 [facility]  [id number] [place] [address] 24234-
1705     

 

 [facility]  [id number] [place] [address] 41485-
0478    Anxiety disorder, unspecified F41.9

 

 [facility]  [id number] [place] [address] 57464-
1029     

 

 [facility]  [id number] [place] [address] 52661-
0776    Chronic pain syndrome G89.4 and Acute midline low back pain 
with left-sided sciatica M54.42

 

 [facility]  [id number] [place] [address] 65600-
6943    Chronic pain syndrome G89.4

 

 [facility]  [id number] [place] [address] 16698-
6521     

 

 [facility]  [id number] [place] [address] 86635-
6280     

 

 [facility]  [id number] [place] [address] 32833-
2054  29 Mar, 2018  Injury of left knee, initial encounter S89.92XA and COPD 
suggested by initial evaluation J44.9

 

 [facility]  [id number] [place] [address] 33232-
8901  28 Mar, 2018  Acute bronchitis, unspecified organism J20.9

 

 [facility]  [address] 10268-
4108  27 Mar, 2018  Acute bronchitis, unspecified organism J20.9

 

 [facility]  [address] 99014-
9250  21 Mar, 2018  Anxiety disorder, unspecified F41.9 and Acute bronchitis, 
unspecified organism J20.9

 

 [facility]  [id number] [place] [address] 13003-
4081  08 Mar, 2018  Chronic pain syndrome G89.4

 

 [facility]  [address] 25897-
3518  05 Mar, 2018   

 

 [facility]  [address] 72701-
4665  05 Mar, 2018  Acute midline low back pain with left-sided sciatica M54.42

 

 [facility]  [id number] [place] [address] 04942-
2344     

 

 [facility]  [id number] [place] [address] 83709-
5590    Chronic pain syndrome G89.4

 

 [facility]  [id number] [place] [address] 60994-
1078    Chronic pain syndrome G89.4

 

 [facility]  [id number] [place] [address] 87707-
5780     

 

 [facility]  [id number] [place] [address] 26923-
7660    Gastroenteritis K52.9

 

 [place] [id number] [place] [address] 80524-
7326     

 

 [place] [address] 28027-
5515  15 Jerardo, 2018  Acute bronchitis, unspecified organism J20.9

 

 [place] [address] 06434-
1458    Anxiety disorder, unspecified F41.9 and Neuropathy G62.9

 

 [place] [id number] [place] [address] 21616-
5620    Chronic pain syndrome G89.4

 

 [place] [address] 53114-
3063    Bronchitis J40 and Laryngitis J04.0

 

 [place] [id number] [place] [address] 76056-
9936  29 Dec, 2017   

 

 [place] [id number] [place] [address] 12516-
1460  26 Dec, 2017  Bronchitis J40

 

 [place] [id number] [place] [address] 61311-
1996  18 Dec, 2017   

 

 [place] [id number] [place] [address] 80268-
0993  13 Dec, 2017  Fibromyalgia M79.7 and Chronic pain syndrome G89.4

 

 [place] [id number] [place] [address] 05156-
9201  04 Dec, 2017  Chronic pain syndrome G89.4 and Acute bronchitis, 
unspecified organism J20.9

 

 [place] [id number] [place] [address] 19866-
6987    Neuropathy G62.9

 

 [place] [id number] [place] [address] 52676-
0486    Chronic pain syndrome G89.4 ; Lumbago with sciatica, left 
side M54.42 ; Lumbago with sciatica, right side M54.41 ; Screening cholesterol 
level Z13.220 ; Screening for diabetes mellitus Z13.1 ; Screening for thyroid 
disorder Z13.29 ; Fibromyalgia M79.7 ; Anxiety disorder, unspecified F41.9 and 
Neuropathy G62.9

 

 [facility]  [id number] [place] [address] 39328-
4386     

 

 [facility]  [id number] [place] [address] 71482-
6019  25 Oct, 2017   

 

 [facility]  [address] 92501-
7593  10 Oct, 2017  Fibromyalgia M79.7 ; Chronic pain syndrome G89.4 ; Anxiety 
disorder, unspecified F41.9 ; Neuropathy G62.9 and Acute bronchitis, 
unspecified organism J20.9

 

 [facility]  [id number] [place] [address] 44828-
7576  09 Oct, 2017   

 

 [facility]  [id number] [place] [address] 32866-
2647  25 Sep, 2017   

 

 [facility]  [id number] [place] [address] 19202-
5927  19 Sep, 2017   

 

 [facility]  [id number] [place] [address] 06585-
7693  08 Sep, 2017   

 

 [facility]  [id number] [place] [address] 86260-
3270  23 Aug, 2017   

 

 [facility]  [id number] [place] [address] 23729-
7166  22 Aug, 2017  Acute seasonal allergic rhinitis due to pollen J30.1

 

 [facility]  [id number] [place] [address] 75723-
2486  21 Aug, 2017   

 

 [facility]  [id number] [place] [address] 09546-
5347  09 Aug, 2017   

 

 [facility]  [id number] [place] [address] 09588-
5154     

 

 [facility]  [id number] [place] [address] 17096-
6424     

 

 [facility]  [address] [address]-
2826  10 Jul, 2017   

 

 [facility]  [address] [address]-
9183     

 

 [facility]  [address] [address]-
4927     

 

 [facility]  [id number] [place] [address] 73312-
7268     

 

 [facility]  [address] [place] [address] 78549-
4005    Chronic pain syndrome G89.4 ; Fibromyalgia M79.7 ; Anxiety 
disorder, unspecified F41.9 ; Neuropathy G62.9 and Low back pain M54.5

 

 [facility]  [address] [address]-
7634  25 May, 2017  Low back pain M54.5

 

 [facility]  [address] [address]-
5976  24 May, 2017   

 

 [facility]  [id number] [address]-
0743  22 May, 2017   

 

 [facility]  [address] [address]-
0434  16 May, 2017   

 

 [facility]  [address] [address]-
2143  16 May, 2017   

 

 [facility]  [id number] [address]-
7895  12 May, 2017  Routine adult health maintenance Z00.00 ; Fibromyalgia 
M79.7 ; Chronic pain syndrome G89.4 ; Abnormal lung sounds R09.89 ; Coronary 
artery disease involving native coronary artery of native heart without angina 
pectoris I25.10 and S/P CABG (coronary artery bypass graft) Z95.1

 

 [facility]  [address] [address]-
2191  09 May, 2017   







IMMUNIZATIONS

No Known Immunizations



SOCIAL HISTORY

Never Assessed



REASON FOR VISIT

Controlled Med Refill 



PLAN OF CARE





VITAL SIGNS





MEDICATIONS







 Medication  Instructions  Dosage  Frequency  Start Date  End Date  Duration  
Status

 

 Xanax 0.25 MG  Orally Twice a day  1 tablet AM, 1/2 PM  12h        28 days  
Active

 

 Tramadol HCl 50 mg  Orally every 6 hrs  1 tablet as needed  6h    
   28 days  Active







RESULTS

No Results



PROCEDURES

No Known procedures



INSTRUCTIONS





MEDICATIONS ADMINISTERED

No Known Medications



MEDICAL (GENERAL) HISTORY







 Type  Description  Date

 

 Medical History  fibromyalgia   

 

 Medical History  MRI 2016- small central protrusion/herniation w/minimal 
impression on thecal anteriorly at C5-6, At C3-4 small bilat. uncinate spurs w/ 
mild right neural foraminal narrowing   

 

 Medical History  MRI 2016- mild degenerative changes. No spinal canal 
stenosis or foraminal narrowing of thoracic spine   

 

 Medical History  hypertension   

 

 Medical History  Anxiety disorder   

 

 Medical History  depression   

 

 Medical History  migraine headaches   

 

 Medical History  Hx of C-Diff   

 

 Medical History  Hx of Pneumonia   

 

 Medical History  heart cath w/ stents placed 7/3/18   

 

 Surgical History  Open Heart Surgery - [facility] -Dr. Lima  (
Cardiologist- Dr Logan)  

 

 Surgical History  hysterectomy - Dr Luis   

 

 Surgical History  Left Breast Lumpectomy (Bx - Benign)- Dr Luis   

 

 Surgical History  Port - Dr Luis   

 

 Surgical History  Left Knee Surgeries x3- Dr Carolina did 2 and Dr Gan did 1   

 

 Surgical History  cath/stent  

 

 Hospitalization History  C-Diff - [facility]   

 

 Hospitalization History  Pneumonia - [facility]   

 

 Hospitalization History  Open Heart Surgery - [facility]

## 2019-01-07 NOTE — XMS REPORT
[facility]

 Created on: 2018



Dayami Rae

External Reference #: [id number]

: 1970

Sex: Female



Demographics







 Address  [address] [place]  [address]

 

 Preferred Language  Unknown

 

 Marital Status  Unknown

 

 Hindu Affiliation  Unknown

 

 Race  Unknown

 

 Ethnic Group  Unknown





Author







 Author  KIM FUCHS

 

 Organization  [facility] [place]

 

 Address  [address] [place]  [address]



 

 Phone  [phone]







Care Team Providers







 Care Team Member Name  Role  Phone

 

 JEF  KIM  Unavailable  [phone]







PROBLEMS







 Type  Condition  ICD9-CM Code  [id number] Code  Onset Dates  Condition Status  
SNOMED Code

 

 Problem  Fibromyalgia     M79.7     Active  355532743

 

 Problem  Neuropathy     G62.9     Active  386784993

 

 Problem  Coronary artery disease involving native coronary artery of native 
heart without angina pectoris     I25.10     Active  [id number]

 

 Problem  Chronic pain syndrome     G89.4     Active  706371774

 

 Problem  COPD suggested by initial evaluation     J44.9     Active  79791440

 

 Problem  Acute midline low back pain with left-sided sciatica     M54.42     
Active  965203501

 

 Problem  Lumbago with sciatica, left side     M54.42     Active  953465058

 

 Problem  Anxiety disorder, unspecified     F41.9     Active  577884340

 

 Problem  Other chronic pain     G89.29     Active  13399932

 

 Problem  Lumbago with sciatica, right side     M54.41     Active  
[id number]







ALLERGIES

No Information



ENCOUNTERS







 Encounter  Location  Date  Diagnosis

 

 [facility] [place]  [id number], [place] [address]-
[phone]     

 

 [facility] [place]  [id number] [place] [phone]-
[phone]  25 May, 2018   

 

 [facility] [place]  [id number] [place] [phone]
[phone]  24 May, 2018  Acute midline low back pain with left-sided sciatica M54.42

 

 [facility] [place]  [id number] [place] [address]32-
7546  22 May, 2018   

 

 [facility]  [id number], [place] [phone]  21 May, 2018  Bronchitis J40

 

 [facility] [place]  [id number] [place] [address] [phone]-
[phone]  07 May, 2018  Chronic pain syndrome G89.4 and Neuropathy G62.9

 

 [facility] [place]  [address] [phone]-
8124    Acute midline low back pain with left-sided sciatica M54.42

 

 [facility] [place]  [address] [phone]-
[phone]     

 

 [facility] [place]  [id number] [place] [address] 44432-
5642    Anxiety disorder, unspecified F41.9

 

 [place] [address] [phone]-
[phone]     

 

 [place] [address] [phone]-
5405    Chronic pain syndrome G89.4 and Acute midline low back pain 
with left-sided sciatica M54.42

 

 [facility] [place]  [id number] [place] [address] 30299-
1320    Chronic pain syndrome G89.4

 

 [facility] [place]  [address] [phone]
[phone]     

 

 [facility] [place]  [id number] [place] [address] 22867-
5172     

 

 [facility] [place]  [address] [phone]-
[phone]  29 Mar, 2018  Injury of left knee, initial encounter S89.92XA and COPD 
suggested by initial evaluation J44.9

 

 [place] [address] [phone]-
[phone]  28 Mar, 2018  Acute bronchitis, unspecified organism J20.9

 

 [facility] [place]  [id number] [place] [address] [phone]-
6323  27 Mar, 2018  Acute bronchitis, unspecified organism J20.9

 

 [facility] [place]  [address] [phone]-
4210  21 Mar, 2018  Anxiety disorder, unspecified F41.9 and Acute bronchitis, 
unspecified organism J20.9

 

 [facility] [place]  [id number] [place] [address] 05125-
7517  08 Mar, 2018  Chronic pain syndrome G89.4

 

 [facility] [place]  [id number] [place] [address] [phone]-
4674  05 Mar, 2018   

 

 [facility] [place]  [id number] [place] [address] 81445-
1109  05 Mar, 2018  Acute midline low back pain with left-sided sciatica M54.42

 

 [facility] [place]  [id number] [place] [address] 60855-
3803     

 

 [facility] [place]  [id number] [place] [address] 25582-
3930    Chronic pain syndrome G89.4

 

 [facility] [place]  [id number] [place] [address] 75223-
3193    Chronic pain syndrome G89.4

 

 [facility] [place]  [id number] [place] [address] [phone]
[phone]     

 

 [facility] [place]  [id number] [place] [address] 52838-
7391    Gastroenteritis K52.9

 

 [facility] [place]  [id number] [place] [address] 22731-
1386     

 

 [facility] [place]  [id number] [place] [address] [phone]-
[phone]  15 Jerardo, 2018  Acute bronchitis, unspecified organism J20.9

 

 [facility] [place]  [id number] [place] [address] 20086-
7135    Anxiety disorder, unspecified F41.9 and Neuropathy G62.9

 

 [facility] [place]  [id number] [place] [address] 45812-
5942    Chronic pain syndrome G89.4

 

 [facility] [id number] [place] [address]7085-
6393    Bronchitis J40 and Laryngitis J04.0

 

 [place] [id number] [place] [address] 19231-
0429  29 Dec, 2017   

 

 [place] [id number] [place] [address] [phone]-
1791  26 Dec, 2017  Bronchitis J40

 

 [place] [address] [phone]-
8109  18 Dec, 2017   

 

 [place] [id number] [place] [address] 17715-
8318  13 Dec, 2017  Fibromyalgia M79.7 and Chronic pain syndrome G89.4

 

 [place] [id number] [place] [address] 13649-
5227  04 Dec, 2017  Chronic pain syndrome G89.4 and Acute bronchitis, 
unspecified organism J20.9

 

 [place] [id number] [place] [address] 38547-
4725    Neuropathy G62.9

 

 [place] [id number] [place] [address] 90886-
4747    Chronic pain syndrome G89.4 ; Lumbago with sciatica, left 
side M54.42 ; Lumbago with sciatica, right side M54.41 ; Screening cholesterol 
level Z13.220 ; Screening for diabetes mellitus Z13.1 ; Screening for thyroid 
disorder Z13.29 ; Fibromyalgia M79.7 ; Anxiety disorder, unspecified F41.9 and 
Neuropathy G62.9

 

 [place] [id number] [place] [address] 74954-
1377     

 

 [place] [address] [place] [address] 53270-
6586  25 Oct, 2017   

 

 [place] [address] [place] [address] 98991-
2256  10 Oct, 2017  Fibromyalgia M79.7 ; Chronic pain syndrome G89.4 ; Anxiety 
disorder, unspecified F41.9 ; Neuropathy G62.9 and Acute bronchitis, 
unspecified organism J20.9

 

 [facility] [place]  [address], [place] [address]-
[address]  09 Oct, 2017   

 

 [facility] [place]  [id number] [address]-
1291  25 Sep, 2017   

 

 [facility] [place]  [id number] [place] [address] [phone]-
7083  19 Sep, 2017   

 

 [facility] [place]  [id number] [place] [address] 59094-
5344  08 Sep, 2017   

 

 [facility] [place]  [id number] [place] [address] 14858-
7522  23 Aug, 2017   

 

 [facility] [place]  [address] [place] [address] 26498-
5705  22 Aug, 2017  Acute seasonal allergic rhinitis due to pollen J30.1

 

 [facility] [place]  [id number] [place] [address] 03946-
0303  21 Aug, 2017   

 

 [facility] [place]  [address], [place] [address]-
6663  09 Aug, 2017   

 

 [facility] [place]  [address] [place] [address] 43866-
[phone]     

 

 [facility] [place]  [address], [place] [address]-
3588     

 

 [facility] [place]  [address], [place] [address]-
9112  10 Jul, 2017   

 

 [facility] [place]  [address], [place] [address]-
6930     

 

 [facility] [place]  [id number] [address]-
3334     

 

 [facility] [place]  [address], [place] [address]-
0849     

 

 [facility] [place]  [address] [place] [address] 21462-
6543    Chronic pain syndrome G89.4 ; Fibromyalgia M79.7 ; Anxiety 
disorder, unspecified F41.9 ; Neuropathy G62.9 and Low back pain M54.5

 

 [facility] [place]  [address] [place] [address] 89600-
3576  25 May, 2017  Low back pain M54.5

 

 [facility] [place]  [address] [place] [address] 69863-
5217  24 May, 2017   

 

 [facility] [place]  [address] [place] [address] 11036-
5572  22 May, 2017   

 

 [place] [address] [place] [address] 70277-
7922  16 May, 2017   

 

 [facility] [place]  [address] [place] [address] 87672-
7326  16 May, 2017   

 

 [place] [address] [place] [address] 14882-
0693  12 May, 2017  Routine adult health maintenance Z00.00 ; Fibromyalgia 
M79.7 ; Chronic pain syndrome G89.4 ; Abnormal lung sounds R09.89 ; Coronary 
artery disease involving native coronary artery of native heart without angina 
pectoris I25.10 and S/P CABG (coronary artery bypass graft) Z95.1

 

 [place] [address] [place] [address] 54121-
2438  09 May, 2017   







IMMUNIZATIONS

No Known Immunizations



SOCIAL HISTORY

Never Assessed



REASON FOR VISIT

Medication refill request 



PLAN OF CARE





VITAL SIGNS





MEDICATIONS







 Medication  Instructions  Dosage  Frequency  Start Date  End Date  Duration  
Status

 

 Tramadol HCl 50 mg  Orally every 6 hrs  1 tablet as needed  6h    
   28 days  Active

 

 Promethazine HCl 25 MG  Orally 4 times a day prn  1 tablet as needed           
30 days  Active







RESULTS

No Results



PROCEDURES

No Known procedures



INSTRUCTIONS





MEDICATIONS ADMINISTERED

No Known Medications



MEDICAL (GENERAL) HISTORY







 Type  Description  Date

 

 Medical History  fibromyalgia   

 

 Medical History  MRI 2016- small central protrusion/herniation w/minimal 
impression on thecal anteriorly at C5-6, At C3-4 small bilat. uncinate spurs w/ 
mild right neural foraminal narrowing   

 

 Medical History  MRI 2016- mild degenerative changes. No spinal canal 
stenosis or foraminal narrowing of thoracic spine   

 

 Medical History  hypertension   

 

 Medical History  Anxiety disorder   

 

 Medical History  depression   

 

 Medical History  migraine headaches   

 

 Medical History  Hx of C-Diff   

 

 Medical History  Hx of Pneumonia   

 

 Surgical History  Open Heart Surgery - [facility] -Dr. Lima  (
Cardiologist- Dr Logan)  

 

 Surgical History  hysterectomy - Dr Luis   

 

 Surgical History  Left Breast Lumpectomy (Bx - Benign)- Dr Luis   

 

 Surgical History  Port - Dr Luis   

 

 Surgical History  Left Knee Surgeries x3- Dr Carolina did 2 and Dr Gan did 1   

 

 Hospitalization History  C-Diff - [facility]   

 

 Hospitalization History  Pneumonia - [facility]   

 

 Hospitalization History  Open Heart Surgery - [facility]

## 2019-01-07 NOTE — XMS REPORT
[facility]

 Created on: 2018



Dayami Rae

External Reference #: [id number]

: 1970

Sex: Female



Demographics







 Address  [address] [place]  [address]

 

 Preferred Language  Unknown

 

 Marital Status  Unknown

 

 Evangelical Affiliation  Unknown

 

 Race  Unknown

 

 Ethnic Group  Unknown





Author







 Author  JOSE LOPEZ

 

 Organization  [facility]

 

 Address  [address] [place]  [address]



 

 Phone  [phone]







Care Team Providers







 Care Team Member Name  Role  Phone

 

 JOSE LOPEZ  Unavailable  [phone]







PROBLEMS







 Type  Condition  ICD9-CM Code  JDE25-EW Code  Onset Dates  Condition Status  
SNOMED Code

 

 Problem  Coronary artery disease involving native coronary artery of native 
heart without angina pectoris     I25.10     Active  8478589160237

 

 Problem  Anxiety disorder, unspecified     F41.9     Active  639848281

 

 Problem  Neuropathy     G62.9     Active  334062482

 

 Problem  Fibromyalgia     M79.7     Active  561995153

 

 Problem  Chronic pain syndrome     G89.4     Active  852453757

 

 Problem  Cervical radiculopathy     M54.12     Active  08889792

 

 Problem  Chronic obstructive pulmonary disease, unspecified COPD type     
J44.9     Active  75646841

 

 Problem  Lumbago with sciatica, right side     M54.41     Active  
[id number]

 

 Problem  Lumbago with sciatica, left side     M54.42     Active  716855870

 

 Problem  Essential hypertension     I10     Active  21837898

 

 Problem  GERD with esophagitis     K21.0     Active  536064429







ALLERGIES

No Information



ENCOUNTERS







 Encounter  Location  Date  Diagnosis

 

 [place] [address] [place] [address] 72663-
8792  26 2018  Chronic pain syndrome G89.4 and Anxiety disorder, 
unspecified F41.9

 

 [facility]  [id number] [place] [address] [phone]-
[phone]  15 Nov, 2018   

 

 [facility]  [id number] [place] [address] 51111-
7080  05 2018   

 

 [facility]  [id number] [place] [address] 44757-
4689  29 Oct, 2018  Cervical radiculopathy M54.12 ; Chronic pain syndrome G89.4 
and Anxiety disorder, unspecified F41.9

 

 [facility]  [id number] [place] [address] 21050-
4678  24 Oct, 2018   

 

 [facility]  [id number] [place] [address] 17478-
3963  27 Sep, 2018  Fibromyalgia M79.7

 

 [facility]  [id number] [place] [address] 33995-
8330  20 Sep, 2018   

 

 [place] [id number] [place] [address] 59423-
8097  19 Sep, 2018   

 

 [place] [id number] [place] [address] 57501-
9796  17 Sep, 2018  Pneumonia of right lower lobe due to infectious organism 
J18.1 and Chronic obstructive pulmonary disease, unspecified COPD type J44.9

 

 [place] [id number] [place] [address] 24166-
6685  14 Sep, 2018  Pneumonia of right lower lobe due to infectious organism 
J18.1 ; Chronic obstructive pulmonary disease, unspecified COPD type J44.9 ; 
Chronic nausea R11.0 and Cough R05

 

 [place] [id number] [place] [address] 92974-
2221  07 Sep, 2018  Acute bronchitis, unspecified organism J20.9

 

 [place] [id number] [place] [address] 73737-
7152  28 Aug, 2018  Fibromyalgia M79.7

 

 [place] [id number] [place] [address] 88755-
5391  20 Aug, 2018   

 

 [place] [id number] [place] [address] 80361-
3181  16 Aug, 2018  Neuropathy G62.9

 

 [place] [id number] [place] [address] 02906-
6827    Essential hypertension I10 ; Coronary artery disease 
involving native coronary artery of native heart without angina pectoris I25.10 
; Fibromyalgia M79.7 ; GERD with esophagitis K21.0 and Tobacco abuse counseling 
Z71.6

 

 [place] [id number] [place] [address] 62107-
1687    Long term (current) use of opiate analgesic Z79.891

 

 [facility]  [id number] [place] [address] 73113-
7874     

 

 [facility]  [id number] [place] [address] 68242-
2718    Acute bronchitis, unspecified organism J20.9

 

 [facility]  [id number] [place] [address] 07844-
1490     

 

 [facility]  [id number] [place] [address] 58990-
7045     

 

 [facility]  [id number] [place] [address] 31233-
2299    Chronic pain syndrome G89.4

 

 [facility]  [id number] [place] [address] 20920-
5087     

 

 [facility]  [id number] [place] [address] 64984-
6215  15 Marko, 2018   

 

 [facility]  [id number] [place] [address] 08804-
9032    Acute bronchitis, unspecified organism J20.9

 

 [facility]  [address] [place] [address] 41548-
6023    Chronic pain syndrome G89.4

 

 [facility]  [id number] [place] [address] 78348-
8781  25 May, 2018   

 

 [facility]  [id number] [place] [address] 48213-
4843  24 May, 2018  Acute midline low back pain with left-sided sciatica M54.42

 

 [facility]  [id number] [place] [address] 98842-
8020  22 May, 2018   

 

 [facility]  [address] [place] [address] [phone]
-[phone]  21 May, 2018  Bronchitis J40

 

 [facility]  [id number] [place] [address] 84523-
0137  07 May, 2018  Chronic pain syndrome G89.4 and Neuropathy G62.9

 

 [facility]  [id number] [place] [address] 04114-
4906    Acute midline low back pain with left-sided sciatica M54.42

 

 [facility]  [address] 26726-
0197     

 

 [facility]  [id number] [place] [address] 41938-
6168    Anxiety disorder, unspecified F41.9

 

 [place] [address] 11607-
1842     

 

 [place] [address] 90946-
8338    Chronic pain syndrome G89.4 and Acute midline low back pain 
with left-sided sciatica M54.42

 

 [facility]  [id number] [place] [address] 00824-
6783    Chronic pain syndrome G89.4

 

 [facility]  [address] 50851-
3816     

 

 [facility]  [id number] [place] [address] 12389-
7042     

 

 [facility]  [id number] [place] [address] 95902-
9813  29 Mar, 2018  Injury of left knee, initial encounter S89.92XA and COPD 
suggested by initial evaluation J44.9

 

 [place] [id number] [place] [address] 64770-
2076  28 Mar, 2018  Acute bronchitis, unspecified organism J20.9

 

 [facility]  [id number] [place] [address] 53098-
1542  27 Mar, 2018  Acute bronchitis, unspecified organism J20.9

 

 [facility]  [address] [place] [address]-
6596  21 Mar, 2018  Anxiety disorder, unspecified F41.9 and Acute bronchitis, 
unspecified organism J20.9

 

 [facility]  [id number] [place], [place] [address]-
9539  08 Mar, 2018  Chronic pain syndrome G89.4

 

 [facility]  [id number] [place] [address] 98020-
4896  05 Mar, 2018   

 

 [facility]  [address] 02398-
4517  05 Mar, 2018  Acute midline low back pain with left-sided sciatica M54.42

 

 [facility]  [address] 76529-
0334     

 

 [facility]  [id number] [place] [address] 96764-
9082    Chronic pain syndrome G89.4

 

 [facility]  [address] 09993-
3371    Chronic pain syndrome G89.4

 

 [facility]  [address] 78089-
0580     

 

 [facility]  [id number] [place] [address] 36917-
7731    Gastroenteritis K52.9

 

 [facility]  [address] 18007-
1322     

 

 [facility]  [id number] [place] [address] 62941-
0554  15 Jerardo, 2018  Acute bronchitis, unspecified organism J20.9

 

 [facility]  [address] 69884-
1408    Anxiety disorder, unspecified F41.9 and Neuropathy G62.9

 

 [facility]  [id number] [place] [address] 63520-
2612    Chronic pain syndrome G89.4

 

 [place] [address] [place] [address] 70946-
6962    Bronchitis J40 and Laryngitis J04.0

 

 [place] [id number] [place] [address] 98699-
8899  29 Dec, 2017   

 

 [place] [id number] [place] [address] 66865-
3451  26 Dec, 2017  Bronchitis J40

 

 [place] [id number] [place] [address] 50965-
4888  18 Dec, 2017   

 

 [place] [address] [place] [address] 97926-
4778  13 Dec, 2017  Fibromyalgia M79.7 and Chronic pain syndrome G89.4

 

 [place] [address] [place] [address] 63760-
8042  04 Dec, 2017  Chronic pain syndrome G89.4 and Acute bronchitis, 
unspecified organism J20.9

 

 [place] [id number] [place] [address] 73652-
6931    Neuropathy G62.9

 

 [place] [address] [place] [address] 66092-
1260    Chronic pain syndrome G89.4 ; Lumbago with sciatica, left 
side M54.42 ; Lumbago with sciatica, right side M54.41 ; Screening cholesterol 
level Z13.220 ; Screening for diabetes mellitus Z13.1 ; Screening for thyroid 
disorder Z13.29 ; Fibromyalgia M79.7 ; Anxiety disorder, unspecified F41.9 and 
Neuropathy G62.9

 

 [place] [address] [place] [address] 88070-
8780     

 

 [place] [id number] [place] [address] 38828-
2933  25 Oct, 2017   

 

 [place] [address] [place] [address] 60256-
8670  10 Oct, 2017  Fibromyalgia M79.7 ; Chronic pain syndrome G89.4 ; Anxiety 
disorder, unspecified F41.9 ; Neuropathy G62.9 and Acute bronchitis, 
unspecified organism J20.9

 

 [facility]  [id number] [place] [address] 74267-
8740  09 Oct, 2017   

 

 [facility]  [id number] [address]-
8828  25 Sep, 2017   

 

 [facility]  [address] [place] [address] 61485-
3570  19 Sep, 2017   

 

 [facility]  [address] [place] [address] 36460-
5620  08 Sep, 2017   

 

 [facility]  [address] [place] [address] 88024-
4702  23 Aug, 2017   

 

 [facility]  [address] [place] [address] 30095-
6517  22 Aug, 2017  Acute seasonal allergic rhinitis due to pollen J30.1

 

 [facility]  [address] [place] [address] 38912-
5967  21 Aug, 2017   

 

 [facility]  [address] [place] [address] 30351-
9432  09 Aug, 2017   

 

 [facility]  [address] [place] [address] 42831-
5008     

 

 [facility]  [address] [address]-
5828     

 

 [facility]  [id number] [address]-
3337  10 Jul, 2017   

 

 [facility]  [address] [address]-
4104     

 

 [facility]  [address] [place] [address] 95985-
4206     

 

 [facility]  [address] [place] [address]-
1279     

 

 [facility]  [address] [place] [address] 41202-
6962    Chronic pain syndrome G89.4 ; Fibromyalgia M79.7 ; Anxiety 
disorder, unspecified F41.9 ; Neuropathy G62.9 and Low back pain M54.5

 

 [facility]  [address] [address]-
4101  25 May, 2017  Low back pain M54.5

 

 [facility]  [address] [place] [address]-
2600  24 May, 2017   

 

 [facility]  [address] [place] [address] 07254-
6429  22 May, 2017   

 

 [facility]  [address] [place] [address] 30131-
0681  16 May, 2017   

 

 [facility]  [address] [place] [address] 07484-
6331  16 May, 2017   

 

 [facility]  [address] [place] [address] 85279-
9756  12 May, 2017  Routine adult health maintenance Z00.00 ; Fibromyalgia 
M79.7 ; Chronic pain syndrome G89.4 ; Abnormal lung sounds R09.89 ; Coronary 
artery disease involving native coronary artery of native heart without angina 
pectoris I25.10 and S/P CABG (coronary artery bypass graft) Z95.1

 

 [facility]  [address] [address]-
5859  09 May, 2017   







IMMUNIZATIONS

No Known Immunizations



SOCIAL HISTORY

Never Assessed



REASON FOR VISIT

refill request



PLAN OF CARE





VITAL SIGNS





MEDICATIONS







 Medication  Instructions  Dosage  Frequency  Start Date  End Date  Duration  
Status

 

 Tramadol HCl 50 mg  Orally every 6 hrs  1 tablet as needed  6h    
   28 days  Active







RESULTS

No Results



PROCEDURES

No Known procedures



INSTRUCTIONS





MEDICATIONS ADMINISTERED

No Known Medications



MEDICAL (GENERAL) HISTORY







 Type  Description  Date

 

 Medical History  fibromyalgia   

 

 Medical History  MRI 2016- small central protrusion/herniation w/minimal 
impression on thecal anteriorly at C5-6, At C3-4 small bilat. uncinate spurs w/ 
mild right neural foraminal narrowing   

 

 Medical History  MRI 2016- mild degenerative changes. No spinal canal 
stenosis or foraminal narrowing of thoracic spine   

 

 Medical History  hypertension   

 

 Medical History  Anxiety disorder   

 

 Medical History  depression   

 

 Medical History  migraine headaches   

 

 Medical History  Hx of C-Diff   

 

 Medical History  Hx of Pneumonia   

 

 Medical History  heart cath w/ stents placed 7/3/18   

 

 Surgical History  Open Heart Surgery - [facility] -Dr. Lima  (
Cardiologist- Dr Logan)  

 

 Surgical History  hysterectomy - Dr Luis   

 

 Surgical History  Left Breast Lumpectomy (Bx - Benign)- Dr Luis   

 

 Surgical History  Port - Dr Luis   

 

 Surgical History  Left Knee Surgeries x3- Dr Carolina did 2 and Dr Gan did 1   

 

 Surgical History  cath/stent  

 

 Hospitalization History  C-Diff - [facility]   

 

 Hospitalization History  Pneumonia - [facility]   

 

 Hospitalization History  Open Heart Surgery - [facility]

## 2019-01-07 NOTE — XMS REPORT
[facility]

 Created on: 2018



Dayami Rae

External Reference #: [id number]

: 1970

Sex: Female



Demographics







 Address  [address] [place]  [address]

 

 Preferred Language  Unknown

 

 Marital Status  Unknown

 

 Christian Affiliation  Unknown

 

 Race  Unknown

 

 Ethnic Group  Unknown





Author







 Author  KIM FUCHS

 

 Organization  [facility]

 

 Address  [address] [place]  [address]



 

 Phone  [phone]







Care Team Providers







 Care Team Member Name  Role  Phone

 

 FUCHSJAMA JACKSONELE  Unavailable  [phone]







PROBLEMS







 Type  Condition  ICD9-CM Code  FMM29-UC Code  Onset Dates  Condition Status  
SNOMED Code

 

 Problem  Fibromyalgia     M79.7     Active  275896734

 

 Problem  Neuropathy     G62.9     Active  752718740

 

 Problem  Coronary artery disease involving native coronary artery of native 
heart without angina pectoris     I25.10     Active  0335929659406

 

 Problem  Chronic pain syndrome     G89.4     Active  585913000

 

 Problem  COPD suggested by initial evaluation     J44.9     Active  59064549

 

 Problem  Acute midline low back pain with left-sided sciatica     M54.42     
Active  059510545

 

 Problem  Lumbago with sciatica, left side     M54.42     Active  112491202

 

 Problem  Anxiety disorder, unspecified     F41.9     Active  859091432

 

 Problem  Other chronic pain     G89.29     Active  30777785

 

 Problem  Lumbago with sciatica, right side     M54.41     Active  
[id number]







ALLERGIES

No Information



ENCOUNTERS







 Encounter  Location  Date  Diagnosis

 

 [place] [id number] [place] [address] [phone]
[phone]     

 

 [facility]  [id number] [place] [address] [phone]-
7241    Acute bronchitis, unspecified organism J20.9

 

 [facility]  [address] [place] [address] 43126-
7872    Chronic pain syndrome G89.4

 

 [facility]  [id number] [place] [address] 64327-
7743  25 May, 2018   

 

 [facility]  [id number] [place] [address] 40313-
8741  24 May, 2018  Acute midline low back pain with left-sided sciatica M54.42

 

 [facility]  [id number] [place] [address] 93207-
6822  22 May, 2018   

 

 [facility]  [address] [phone]
[phone]  21 May, 2018  Bronchitis J40

 

 [facility]  [id number] [place] [address] 49715-
5258  07 May, 2018  Chronic pain syndrome G89.4 and Neuropathy G62.9

 

 [facility]  [id number] [place] [address] 03458-
3792    Acute midline low back pain with left-sided sciatica M54.42

 

 [facility]  [address] [address]-
[phone]     

 

 [facility]  [address] [phone]-
[phone]    Anxiety disorder, unspecified F41.9

 

 [facility]  [id number] [place] [address] 20527-
2921     

 

 [facility]  [address] [phone]-
8792    Chronic pain syndrome G89.4 and Acute midline low back pain 
with left-sided sciatica M54.42

 

 [facility]  [id number] [place] [address] [phone]-
2155    Chronic pain syndrome G89.4

 

 [facility]  [id number] [place] [address] 84317-
7414     

 

 [facility]  [id number] [place] [address] 35743-
4877     

 

 [facility]  [id number] [place] [address] 10816-
8216  29 Mar, 2018  Injury of left knee, initial encounter S89.92XA and COPD 
suggested by initial evaluation J44.9

 

 [facility]  [address] [phone]-
[phone]  28 Mar, 2018  Acute bronchitis, unspecified organism J20.9

 

 [facility]  [id number] [place] [address] [phone]-
[phone]  27 Mar, 2018  Acute bronchitis, unspecified organism J20.9

 

 [facility]  [id number] [place] [address] 15596-
[phone]  21 Mar, 2018  Anxiety disorder, unspecified F41.9 and Acute bronchitis, 
unspecified organism J20.9

 

 [facility]  [id number] [place] [address] [phone]-
7662  08 Mar, 2018  Chronic pain syndrome G89.4

 

 [facility]  [id number] [place] [address] 73719-
3821  05 Mar, 2018   

 

 [facility]  [id number] [place] [address] 66718-
9972  05 Mar, 2018  Acute midline low back pain with left-sided sciatica M54.42

 

 [facility]  [id number] [place] [address] 03162-
8492     

 

 [facility]  [id number] [place] [address] 41462-
9548    Chronic pain syndrome G89.4

 

 [facility]  [id number] [place] [address] 91805-
5278    Chronic pain syndrome G89.4

 

 [facility]  [id number] [place] [address] 91189-
5410     

 

 [facility]  [id number] [place] [address] 16592-
1465    Gastroenteritis K52.9

 

 [facility]  [id number] [place] [address] 26035-
0484     

 

 [facility]  [id number] [place] [address] 67406-
1039  15 Jerardo, 2018  Acute bronchitis, unspecified organism J20.9

 

 [facility]  [id number] [place] [address] 78620-
8489    Anxiety disorder, unspecified F41.9 and Neuropathy G62.9

 

 [place] [address] 19653-
3597    Chronic pain syndrome G89.4

 

 [place] [address] [place] [id number] [place] [address] 34826-
6864    Bronchitis J40 and Laryngitis J04.0

 

 [place] [address] [phone]-
9625  29 Dec, 2017   

 

 [place] [address] [address]79-
6928  26 Dec, 2017  Bronchitis J40

 

 [place] [address] 98188-
6797  18 Dec, 2017   

 

 [place] [address] [address]-
0601  13 Dec, 2017  Fibromyalgia M79.7 and Chronic pain syndrome G89.4

 

 [place] [address] [phone]-
1130  04 Dec, 2017  Chronic pain syndrome G89.4 and Acute bronchitis, 
unspecified organism J20.9

 

 [place] [address] [place] [id number] [place] [address] 82480-
3017    Neuropathy G62.9

 

 [place] [address] [place] [id number] [place] [address] 15035-
1691    Chronic pain syndrome G89.4 ; Lumbago with sciatica, left 
side M54.42 ; Lumbago with sciatica, right side M54.41 ; Screening cholesterol 
level Z13.220 ; Screening for diabetes mellitus Z13.1 ; Screening for thyroid 
disorder Z13.29 ; Fibromyalgia M79.7 ; Anxiety disorder, unspecified F41.9 and 
Neuropathy G62.9

 

 [place] [address] [place] [id number] [place] [address] 88550-
9498     

 

 [place] [address] [address]-
8916  25 Oct, 2017   

 

 [facility]  [id number] [place] [address] 43046-
1110  10 Oct, 2017  Fibromyalgia M79.7 ; Chronic pain syndrome G89.4 ; Anxiety 
disorder, unspecified F41.9 ; Neuropathy G62.9 and Acute bronchitis, 
unspecified organism J20.9

 

 [facility]  [id number] [place] [address] 48987-
7465  09 Oct, 2017   

 

 [facility]  [id number] [place] [address] 19569-
3835  25 Sep, 2017   

 

 [facility]  [id number] [place] [address] 21219-
7636  19 Sep, 2017   

 

 [facility]  [id number] [place] [address] 73740-
7311  08 Sep, 2017   

 

 [facility]  [id number] [place] [address] 17669-
7614  23 Aug, 2017   

 

 [facility]  [id number] [place] [address] 65382-
7918  22 Aug, 2017  Acute seasonal allergic rhinitis due to pollen J30.1

 

 [facility]  [address] [address]-
4488  21 Aug, 2017   

 

 [facility]  [id number] [address]-
1967  09 Aug, 2017   

 

 [facility]  [address] [address]-
9341     

 

 [facility]  [id number] [address]-
8893     

 

 [facility]  [address] [address]-
0705  10 Jul, 2017   

 

 [facility]  [address] [address]-
1184     

 

 [facility]  [address] [address]-
4723     

 

 [facility]  [id number] [place] [address] 11929-
9626     

 

 [facility]  [id number] [place] [address] 15437-
7453    Chronic pain syndrome G89.4 ; Fibromyalgia M79.7 ; Anxiety 
disorder, unspecified F41.9 ; Neuropathy G62.9 and Low back pain M54.5

 

 [facility]  [id number] [place] [address] 74925-
8222  25 May, 2017  Low back pain M54.5

 

 [facility]  [id number] [place] [address] 44489-
9587  24 May, 2017   

 

 [facility]  [id number] [place] [address] 34683-
9078  22 May, 2017   

 

 [facility]  [id number] [place] [address] 47356-
3338  16 May, 2017   

 

 [facility]  [id number] [place] [address] 04400-
0135  16 May, 2017   

 

 [facility]  [id number] [place] [address] 83820-
2292  12 May, 2017  Routine adult health maintenance Z00.00 ; Fibromyalgia 
M79.7 ; Chronic pain syndrome G89.4 ; Abnormal lung sounds R09.89 ; Coronary 
artery disease involving native coronary artery of native heart without angina 
pectoris I25.10 and S/P CABG (coronary artery bypass graft) Z95.1

 

 [facility]  [address] [address]-
5678  09 May, 2017   







IMMUNIZATIONS

No Known Immunizations



SOCIAL HISTORY

Never Assessed



REASON FOR VISIT

Controlled Med Refill



PLAN OF CARE





VITAL SIGNS





MEDICATIONS







 Medication  Instructions  Dosage  Frequency  Start Date  End Date  Duration  
Status

 

 Xanax 0.25 MG  Orally Twice a day  1 tablet  12h        28 days  Active







RESULTS

No Results



PROCEDURES

No Known procedures



INSTRUCTIONS





MEDICATIONS ADMINISTERED

No Known Medications



MEDICAL (GENERAL) HISTORY







 Type  Description  Date

 

 Medical History  fibromyalgia   

 

 Medical History  MRI 2016- small central protrusion/herniation w/minimal 
impression on thecal anteriorly at C5-6, At C3-4 small bilat. uncinate spurs w/ 
mild right neural foraminal narrowing   

 

 Medical History  MRI 2016- mild degenerative changes. No spinal canal 
stenosis or foraminal narrowing of thoracic spine   

 

 Medical History  hypertension   

 

 Medical History  Anxiety disorder   

 

 Medical History  depression   

 

 Medical History  migraine headaches   

 

 Medical History  Hx of C-Diff   

 

 Medical History  Hx of Pneumonia   

 

 Surgical History  Open Heart Surgery - [facility] -Dr. Lima  (
Cardiologist- Dr Logan)  

 

 Surgical History  hysterectomy - Dr Luis   

 

 Surgical History  Left Breast Lumpectomy (Bx - Benign)- Dr Luis   

 

 Surgical History  Port - Dr Luis   

 

 Surgical History  Left Knee Surgeries x3- Dr Carolina did 2 and Dr Gan did 1   

 

 Hospitalization History  C-Diff - [facility]   

 

 Hospitalization History  Pneumonia - [facility]   

 

 Hospitalization History  Open Heart Surgery - [facility]

## 2019-01-07 NOTE — XMS REPORT
Continuity of Care Document

 Created on: 2019



MARILIA JONES

External Reference #: [id number]

: 1970

Sex: Female



Demographics







 Address  [address] [place]  [address]

 

 Home Phone  [phone] x

 

 Preferred Language  Unknown

 

 Marital Status  Unknown

 

 Caodaism Affiliation  Unknown

 

 Race  Unknown

 

 Ethnic Group  Unknown





Author







 Author  [facility]

 

 Organization  [facility]

 

 Address  Unknown

 

 Phone  Unavailable



              



Allergies

      





 Active            Description            Code            Type            
Severity            Reaction            Onset            Reported/Identified   
         Relationship to Patient            Clinical Status        

 

 Yes            morphine            [id number]            Drug Allergy         
   Unknown            N/A                         2010                   
               

 

 Yes            morphine            [id number]            Drug Allergy         
   Unknown            RASH - TAKES LO                         2016       
                           



                    



Medications

      



There is no data.                  



Problems

      





 Date Dx Coded            Attending            Type            Code            
Diagnosis            Diagnosed By        

 

 2013            CAMI CANALES DO S            Ot            008.45
            INTESTINAL INFECTION DUE TO CLOSTRIDIUM                      

 

 2013            GRAY CANALES DOLINE S            Ot            272.4 
           HYPERLIPIDEMIA NEC/NOS                     

 

 2013            GRAY CANALES DOLINE S            Ot            276.8 
           HYPOPOTASSEMIA                     

 

 2013            GRAY CANALES DOLINE S            Ot            300.00
            ANXIETY STATE NOS                     

 

 2013            ELIAS CANALES DOQUELINE S            Ot            305.1 
           TOBACCO USE DISORDER                     

 

 2013            GRAY CANALES DOLINE S            Ot            311   
         DEPRESSIVE DISORDER NEC                     

 

 2013            GRAY CANALES DOLINE S            Ot            338.29
            OTHER CHRONIC PAIN                     

 

 2013            GRAY CANALES DOLINE S            Ot            401.9 
           HYPERTENSION NOS                     

 

 2013            GRAY CANALES DOLINE S            Ot            414.01
            CORONARY ATHEROSCLEROSIS OF NATIVE CORON                     

 

 2013            ELIAS CANALES DOQUELINE S            Ot            466.0 
           ACUTE BRONCHITIS                     

 

 2013            ELIAS CANALES DOQUELINE S            Ot            491.9 
           CHRONIC BRONCHITIS NOS                     

 

 2013            GRAY CANALES DOLINE S            Ot            493.90
            ASTHMA, UNSPECIFIED                     

 

 2013            GRAY CANALES DOLINE S            Ot            530.81
            ESOPHAGEAL REFLUX                     

 

 2013            ELIAS CANALES DOQUELINE S            Ot            536.2 
           PERSISTENT VOMITING                     

 

 2013            ELIAS CANALES DOQUELINE S            Ot            564.1 
           IRRITABLE BOWEL SYNDROME                     

 

 2013            GRAY CANALES DOLINE S            Ot            722.52
            LUMB/LUMBOSAC DISC DEGEN                     

 

 2013            CAMI CANALES DO S            Ot            780.79
            OTH MALAISE FATIGUE                     

 

 2013            CAMI CANALES DO S            Ot            784.0 
           HEADACHE                     

 

 2013            CAMI CANALES DO S            Ot            V45.81
            AORTOCORONARY BYPASS                     

 

 10/23/2013            JOSE SOTO MD            Ot            272.0     
       PURE HYPERCHOLESTEROLEM                     

 

 10/23/2013            JOSE SOTO MD            Ot            305.1     
       TOBACCO USE DISORDER                     

 

 10/23/2013            JOSE SOTO MD            Ot            401.9     
       HYPERTENSION NOS                     

 

 10/23/2013            JOSE SOTO MD            Ot            717.3     
       DERANG MED MENISCUS NEC                     

 

 10/23/2013            JOSE SOTO MD            Ot            717.7     
       CHONDROMALACIA PATELLAE                     

 

 10/23/2013            JOSE SOTO MD            Ot            V57.1     
       PHYSICAL THERAPY NEC                     

 

 2014            JOSE MANCINI MD            Ot            300.4      
      DYSTHYMIC DISORDER                     

 

 2014            JOSE MANCINI MD            Ot            305.1      
      TOBACCO USE DISORDER                     

 

 2014            JOSE MANCINI MD            Ot            414.01     
       CORONARY ATHEROSCLEROSIS OF NATIVE CORON                     

 

 2014            JOSE MANCINI MD            Ot            530.81     
       ESOPHAGEAL REFLUX                     

 

 2014            JOSE MANCINI MD            Ot            786.50     
       CHEST PAIN NOS                     

 

 2014            JOSE MANCINI MD            Ot            V17.3      
      FAM HX-ISCHEM HEART DIS                     

 

 2014            JOSE MANCINI MD, Ot            V45.81     
       AORTOCORONARY BYPASS                     

 

 2014            JOSE MANCINI MD, Ot            V58.69     
       OT MED,LT,CURRENT USE                     

 

 2014            DEMARCUS KRAUSE MD            Ot            346.90
            MIGRAINE UNSPECIFIED W/O INTRACT MGRN W/                     

 

 2014            DEMARCUS KRAUSE MD            Ot            784.0 
           HEADACHE                     

 

 2014            CAMI CANALES DO S            Ot            272.4 
           HYPERLIPIDEMIA NEC/NOS                     

 

 2014            CAMI CANALES DO S            Ot            300.00
            ANXIETY STATE NOS                     

 

 2014            CAMI CANALES DO S            Ot            305.1 
           TOBACCO USE DISORDER                     

 

 2014            CAMI CANALES DO S            Ot            401.9 
           HYPERTENSION NOS                     

 

 2014            ORENDER DO, CAMI S            Ot            412   
         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                     

 

 2014            SEMAJNDER DO, CAMI S            Ot            414.01
            CORONARY ATHEROSCLEROSIS OF NATIVE CORON                     

 

 2014            SEMAJNDER DO, CAMI S            Ot            530.81
            ESOPHAGEAL REFLUX                     

 

 2014            SEMAJNDER DO, CAMI S            Ot            786.51
            PRECORDIAL PAIN                     

 

 2014            SEMAJNDER DO, CAMI S            Ot            V45.81
            AORTOCORONARY BYPASS                     

 

 [facility]            [facility]            272.4 
                                 

 

 2014            ORENDER DO, CAMI S            Ot            300.00
                                  

 

 [facility]            [facility]            Ot            305.1 
                                 

 

 [facility]            [facility]            Ot            401.9 
                                 

 

 2014            [facility]            Ot            412   
                               

 

 2014            [facility]            Ot            414.01
                                  

 

 [facility]            [facility]            Ot            530.81
                                  

 

 [facility]            [facility]            Ot            786.51
                                  

 

 2014            [facility]            Ot            V45.81
                                  

 

 2014            SEMAJNDER DO, CAMI S            Ot            717.2 
                                 

 

 [facility]            [facility]            Ot            719.06
                                  

 

 2014            CHARLES PALACIOS, JOSE BRIDGES            Ot            717.3     
                             

 

 2014            CHARLES PALACIOS, JOSE BRIDGES            Ot            V72.83    
                              

 

 2014            CHARLES PALACIOS, JOSE BRIDGES            Ot            V74.8     
                             

 

 2014            [facility] DO, CAMI S            Ot            536.8 
                                 

 

 2014            [facility]            Ot            780.79
                                  

 

 2014            [facility]            Ot            789.00
                                  

 

 2015                         Ot            413.9                        
          

 

 2015                         Ot            V45.81                       
           

 

 2015                         Ot            V57.89                       
           

 

 2015            CHARLES PALACIOS, JOSE BRIDGES            Ot            272.0     
       PURE HYPERCHOLESTEROLEM                     

 

 2015            CHARLES PALACIOS, JOSE BRIDGES            Ot            305.1     
       TOBACCO USE DISORDER                     

 

 2015            CHARLES PALACIOS, JOSE BRIDGES            Ot            414.01    
        CORONARY ATHEROSCLEROSIS OF NATIVE CORON                     

 

 2015            CHARLES PALACIOS, JOSE BRIDGES            Ot            717.3     
       DERANG MED MENISCUS NEC                     

 

 2015            CHARLES PALACIOS, JOSE BRIDGES            Ot            717.7     
       CHONDROMALACIA PATELLAE                     

 

 2015            CHARLES PALACIOS, JOSE BRIDGES            Ot            V57.1     
       PHYSICAL THERAPY NEC                     

 

 2015            CHARLES PALACIOS, JOSE BRIDGES            Ot            V58.69    
        OT MED,LT,CURRENT USE                     

 

 2015            CHARLES PALACIOS, JOSE BRIDGES            Ot            V74.8     
       SCREEN-BACTERIAL DIS NEC                     

 

 2015            ERIN CORONA, CAMI S            Ot            717.2 
                                 

 

 2015            GRAY CANALES DOLINE S            Ot            719.06
                                  

 

 2015            CHARLES PALACIOS, JOSE BRIDGES            Ot            717.3     
                             

 

 2015            CHARLES PALACIOS, JOSE BRIDGES            Ot            V72.83    
                              

 

 2015            CHARLES PALACIOS, JOSE BRIDGES            Ot            V74.8     
                             

 

 2015            ERIN CORONA, CAMI S            Ot            536.8 
                                 

 

 2015            ERIN CORONA, CAMI S            Ot            780.79
                                  

 

 2015            HAZEL , CAMI S            Ot            789.00
                                  

 

 2015                         Ot            717.7                        
          

 

 2015                         Ot            V72.84                       
           

 

 2015            ERIN CORONA, CAMI S            Ot            717.2 
                                 

 

 2015            ERIN CORONA, CAMI S            Ot            719.06
                                  

 

 2015            CHARLES PALACIOS, JOSE BRIDGES            Ot            717.3     
                             

 

 2015            CHARLES PALACIOS, JOSE BRIDGES            Ot            V72.83    
                              

 

 2015            CHARLES PALACIOS, JOSE BRIDGES            Ot            V74.8     
                             

 

 2015            HAZEL , CAMI S            Ot            536.8 
                                 

 

 [facility]            [facility]            [facility]            780.79
                                  

 

 2015            HAZEL , CAMI S            Ot            789.00
                                  

 

 2015                         Ot            717.7                        
          

 

 2015                         Ot            V72.84                       
           

 

 2015            STARR LARA ARNP            Ot            
786.07                                  

 

 2015            STARR LARA ARNP            Ot            
786.2                                  

 

 2015            STARR LARA ARNP            Ot            
793.19                                  

 

 2015            STARR LARA M ARNP            Ot            
486                                  

 

 2015            [facility]            Ot            789.00
                                  

 

 05/15/2015            [facility]            Ot            276.51
            DEHYDRATION                     

 

 05/15/2015            ORENDER DO, CAMI S            Ot            787.91
            DIARRHEA                     

 

 05/15/2015            ORENDER DO, CAMI S            Ot            789.00
            ABDOMINAL PAIN, UNSPECIFIED SITE                     

 

 05/15/2015            SEMAJNDER DO, CAMI S            Ot            V12.61
            PERSONAL HISTORY, PNEUMONIA (RECURRENT)                     

 

 2015            [facility]            Ot            717.2 
                                 

 

 2015            [facility] DO, CAMI S            Ot            719.06
                                  

 

 2015            CHARLES PALACIOS, JOSE BRIDGES            Ot            717.3     
                             

 

 2015            CHARLES PALACIOS, JOSE BRIDGES            Ot            V72.83    
                              

 

 2015            CHARLES PALACIOS, JOSE BRIDGES            Ot            V74.8     
                             

 

 2015            [facility] DO, CAMI S            Ot            536.8 
                                 

 

 2015            [facility] DO, CAMI S            Ot            780.79
                                  

 

 2015            [facility]            Ot            789.00
                                  

 

 2015                         Ot            717.7                        
          

 

 2015                         Ot            V72.84                       
           

 

 2015            JANE, STARR M ARNP            Ot            
786.07                                  

 

 2015            TEOELAERE, STARR M ARNP            Ot            
786.2                                  

 

 2015            JANE, STARR M ARNP            Ot            
793.19                                  

 

 2015            JANE STARR M ARNP            Ot            
486                                  

 

 2015            [facility] DO, CAMI S            Ot            789.00
                                  

 

 2015            [facility]            Ot            717.2 
                                 

 

 2015            [facility]            Ot            719.06
                                  

 

 2015            CHARLES PALACIOS, JOSE BRIDGES            Ot            717.3     
                             

 

 2015            CHARLES PALACIOS, JOSE BRIDGES            Ot            V72.83    
                              

 

 2015            CHARLES PALACIOS, JOSE BRIDGES            Ot            V74.8     
                             

 

 2015            [facility] DO, CAMI S            Ot            536.8 
                                 

 

 2015            SEMAJNDER DO, CAMI S            Ot            780.79
                                  

 

 2015            CAMI CANALES DO S            Ot            789.00
                                  

 

 2015                         Ot            717.7                        
          

 

 2015                         Ot            V72.84                       
           

 

 2015            STARR LARA ARNP            Ot            
786.07                                  

 

 2015            STARR LARA ARNP            Ot            
786.2                                  

 

 2015            STARR LARA ARNP            Ot            
793.19                                  

 

 2015            STARR LARA ARNP            Ot            
486                                  

 

 2015            CAIM CANALES DO S            Ot            789.00
                                  

 

 2015            GERRY SALINAS MD            Ot            272.0         
   PURE HYPERCHOLESTEROLEM                     

 

 2015            GERRY SALINAS MD            Ot            276.51        
    DEHYDRATION                     

 

 2015            GERRY SALINAS MD            Ot            305.1         
   TOBACCO USE DISORDER                     

 

 2015            GERRY SALINAS MD            Ot            401.9         
   HYPERTENSION NOS                     

 

 2015            GERRY SALINAS MD            Ot            414.00        
    CORON ATHEROSCLER NOS TYPE VESSEL, NATIV                     

 

 2015            GERRY SALINAS MD            Ot            530.11        
    REFLUX ESOPHAGITIS                     

 

 2015            GERRY SALINAS MD            Ot            535.40        
    OTH SPECIFIED GASTRITIS,W/O MENTION OF H                     

 

 2015            GERRY SALINAS MD            Ot            553.3         
   DIAPHRAGMATIC HERNIA                     

 

 2015            GERRY SALINAS MD            Ot            784.0         
   HEADACHE                     

 

 2015            GERRY SALINAS MD            Ot            V45.81        
    AORTOCORONARY BYPASS                     

 

 2015            GERRY SALINAS MD            Ot            V58.69        
    OTH MED,LT,CURRENT USE                     

 

 2015            VERITO MARTINEZ            Ot            346.90   
         MIGRAINE UNSPECIFIED W/O INTRACT MGRN W/                     

 

 2015            VERITO MARTINEZ            Ot            473.3    
        CHR SPHENOIDAL SINUSITIS                     

 

 2015            VERITO MARTINEZ            Ot            780.4    
        DIZZINESS AND GIDDINESS                     

 

 2016            CAMI CANALES DO S            Ot            717.2 
                                 

 

 2016            GRAY CANALES DOLINE S            Ot            719.06
                                  

 

 2016            JOSE SOTO MD            Ot            717.3     
                             

 

 2016            ZAFUTA MD, JOSE P            Ot            V72.83    
                              

 

 2016            CHARLES PALACIOS, JOSE P            Ot            V74.8     
                             

 

 2016            ORENDER DO, CAMI S            Ot            536.8 
                                 

 

 2016            [facility]            Ot            780.79
                                  

 

 2016            ORENDER DO, CAMI S            Ot            789.00
                                  

 

 2016                         Ot            717.7                        
          

 

 2016                         Ot            V72.84                       
           

 

 2016            VANBECELAERE, STARR M ARNP            Ot            
786.07                                  

 

 2016            VANBECELAERE, STARR M ARNP            Ot            
786.2                                  

 

 2016            VANBECELAERE, STARR M ARNP            Ot            
793.19                                  

 

 2016            VANBECELAERE, STARR M ARNP            Ot            
486                                  

 

 2016            [facility]            Ot            789.00
                                  

 

 2016            OREND DO, CAMI S            Ot            717.2 
                                 

 

 2016            [facility], CAMI S            Ot            719.06
                                  

 

 2016            CHARLES PALACIOS, JOSE P            Ot            717.3     
                             

 

 2016            CHARLES PALACIOS, JOSE P            Ot            V72.83    
                              

 

 2016            CHARLES PALACIOS, JOSE P            Ot            V74.8     
                             

 

 2016            [facility]            Ot            536.8 
                                 

 

 2016            OREND DO, CAMI S            Ot            780.79
                                  

 

 2016            OREND DO, CAMI S            Ot            789.00
                                  

 

 2016                         Ot            717.7                        
          

 

 2016                         Ot            V72.84                       
           

 

 2016            VANBECELAERE, STARR M ARNP            Ot            
786.07                                  

 

 2016            VANBECELAERE, STARR M ARNP            Ot            
786.2                                  

 

 2016            VANBECELAERE, STARR M ARNP            Ot            
793.19                                  

 

 2016            VANBECELAERE, STARR M ARNP            Ot            
486                                  

 

 2016            [facility]            Ot            789.00
                                  

 

 2016            ARIANNA BRADFORD            Ot            M54.5   
                               

 

 2016            ARIANNA BRADFORD            Ot            M54.6   
                               

 

 2016            JIMI PALACIOS, DEMARCUS KRISHNAN            Ot            
F17.210            NICOTINE DEPENDENCE, CIGARETTES, UNCOMPL                     

 

 2016            JIMI PALACIOS, DEMARCUS KRISHNAN            Ot            I10   
         ESSENTIAL (PRIMARY) HYPERTENSION                     

 

 2016            JIMI PALACIOS, DEMARCUS KRISHNAN            Ot            
J45.909            UNSPECIFIED ASTHMA, UNCOMPLICATED                     

 

 2016            DEMARCUS KRAUSE MD            Ot            M54.2 
           CERVICALGIA                     

 

 2016            DEMARCUS KRAUSE MD            Ot            M54.9 
           DORSALGIA, UNSPECIFIED                     

 

 2016            JIMI PALACIOS, DEMARCUS KRISHNAN            Ot            R20.2 
           PARESTHESIA OF SKIN                     

 

 2016            DEMARCUS KRAUSE MD            Ot            R93.8 
           ABNORMAL FINDINGS ON DIAGNOSTIC IMAGING                      

 

 2016            JIMI PALACIOS, DEMARCUS KRISHNAN            Ot            Z95.1 
           PRESENCE OF AORTOCORONARY BYPASS GRAFT                     

 

 2016            CAMI CANALES DO            Ot            717.2 
                                 

 

 2016            CAMI CANALES DO            Ot            719.06
                                  

 

 2016            CHARLES PALACIOS, JOSE BRIDGES            Ot            717.3     
                             

 

 2016            CHARLES PALACIOS, JOSE BRIDGES            Ot            V72.83    
                              

 

 2016            CHARLES PALACIOS, JOSE BRIDGES            Ot            V74.8     
                             

 

 2016            CAMI CANALES DO            Ot            536.8 
                                 

 

 2016            CAMI CANALES DO S            Ot            780.79
                                  

 

 2016            CAMI CANALES DO S            Ot            789.00
                                  

 

 2016                         Ot            717.7                        
          

 

 2016                         Ot            V72.84                       
           

 

 2016            STARR LARA ARNP            Ot            
786.07                                  

 

 2016            STARR LARA ARNP            Ot            
786.2                                  

 

 2016            STARR LARA ARNP            Ot            
793.19                                  

 

 2016            STARR LARA ARNP            Ot            
486                                  

 

 2016            CAMI CANALES DO S            Ot            789.00
                                  

 

 2016            ARIANNA BRADFORD APRN            Ot            M54.5   
                               

 

 2016            ARIANNA BRADFORD APRLANNY            Ot            M54.6   
                               

 

 2016            SANCHEZ, ARIANNA N APRN            Ot            M54.2   
                               

 

 2016            ARIANNA BRADFORD APRN            Ot            M54.6   
                               

 

 2016            ARIANNA BRADFORD APRN            Ot            R20.9   
                               

 

 2016            CAMI CANALES DO            Ot            A04.7 
           ENTEROCOLITIS DUE TO CLOSTRIDIUM DIFFICI                     

 

 2016            CAMI CANALES DO S            Ot            E86.0 
           DEHYDRATION                     

 

 2016            CAMI CANALES DO S            Ot            
F17.210            NICOTINE DEPENDENCE, CIGARETTES, UNCOMPL                     

 

 2016            ELIAS CANALES DOQUELINE S            Ot            I10   
         ESSENTIAL (PRIMARY) HYPERTENSION                     

 

 2016            GRAY CANALES DOLINE S            Ot            I25.10
            ATHSCL HEART DISEASE OF NATIVE CORONARY                      

 

 2016            CAMI CANALES DO            Ot            J44.9 
           CHRONIC OBSTRUCTIVE PULMONARY DISEASE, U                     

 

 2016            CAMI CANALES DO S            Ot            
J45.909            UNSPECIFIED ASTHMA, UNCOMPLICATED                     

 

 2016            CAMI CANALES DO S            Ot            K21.9 
           GASTRO-ESOPHAGEAL REFLUX DISEASE WITHOUT                     

 

 2016            ELIAS CANALES DOQUELINE S            Ot            Z95.1 
           PRESENCE OF AORTOCORONARY BYPASS GRAFT                     

 

 2016                         Ot            413.9            [place] 
PECTORIS NEC/NOS                     

 

 2016                         Ot            V45.81            
AORTOCORONARY BYPASS                     

 

 2016                         Ot            V57.89            
REHABILITATION PROC NEC                     

 

 2016            CAMI CANALES DO            Ot            A04.7 
           ENTEROCOLITIS DUE TO CLOSTRIDIUM DIFFICI                     

 

 2016            CAMI CANALES DO S            Ot            E86.0 
           DEHYDRATION                     

 

 2016            CAMI CANALES DO S            Ot            
F17.210            NICOTINE DEPENDENCE, CIGARETTES, UNCOMPL                     

 

 2016            GRAY CANALES DOLINE S            Ot            F31.9 
           BIPOLAR DISORDER, UNSPECIFIED                     

 

 2016            GRAY CANALES DOLINE S            Ot            
G43.909            MIGRAINE, UNSP, NOT INTRACTABLE, WITHOUT                     

 

 2016            GRAY CANALES DOLINE S            Ot            I10   
         ESSENTIAL (PRIMARY) HYPERTENSION                     

 

 2016            GRAY CANALES DOLINE S            Ot            I25.10
            ATHSCL HEART DISEASE OF NATIVE CORONARY                      

 

 2016            GRAY CANALES DOLINE S            Ot            J18.9 
           PNEUMONIA, UNSPECIFIED ORGANISM                     

 

 2016            ERIN CORONA CAMI S            Ot            J44.1 
           CHRONIC OBSTRUCTIVE PULMONARY DISEASE W                      

 

 2016            CAMI CANALES DO S            Ot            K21.9 
           GASTRO-ESOPHAGEAL REFLUX DISEASE WITHOUT                     

 

 2016            ELIAS CANALES DOQUELINE S            Ot            R10.13
            EPIGASTRIC PAIN                     

 

 2016            CAMI CANALES DO S            Ot            Z23   
         ENCOUNTER FOR IMMUNIZATION                     

 

 2016            GRAY CANALES DOLINE S            Ot            Z95.1 
           PRESENCE OF AORTOCORONARY BYPASS GRAFT                     

 

 2016            ARIANNA BRADFORD APRN            Ot            M54.2   
         CERVICALGIA                     

 

 2016            ARIANNA BRADFORD APRN            Ot            M54.6   
         PAIN IN THORACIC SPINE                     

 

 2016            ARIANNA BRADFORD APRN            Ot            R20.9   
         UNSPECIFIED DISTURBANCES OF SKIN SENSATI                     

 

 2016            CAMI CANALES DO S            Ot            A04.7 
           ENTEROCOLITIS DUE TO CLOSTRIDIUM DIFFICI                     

 

 2016            GRAY CANALES DOLINE S            Ot            E78.5 
           HYPERLIPIDEMIA, UNSPECIFIED                     

 

 2016            ELIAS CANALES DOQUELINE S            Ot            E86.1 
           HYPOVOLEMIA                     

 

 2016            ELIAS CANALES DOQUELINE S            Ot            E87.6 
           HYPOKALEMIA                     

 

 2016            ELIAS CANALES DOQUELINE S            Ot            
F17.210            NICOTINE DEPENDENCE, CIGARETTES, UNCOMPL                     

 

 2016            ERIN CORONA CAMI S            Ot            
G43.909            MIGRAINE, UNSP, NOT INTRACTABLE, WITHOUT                     

 

 2016            GRAY CANALES DOLINE S            Ot            I10   
         ESSENTIAL (PRIMARY) HYPERTENSION                     

 

 2016            ELIAS CANALES DOQUELINE S            Ot            I25.10
            ATHSCL HEART DISEASE OF NATIVE CORONARY                      

 

 2016            GRAY CANALES DOLINE S            Ot            J18.9 
           PNEUMONIA, UNSPECIFIED ORGANISM                     

 

 2016            CAMI CANALES DO S            Ot            K21.9 
           GASTRO-ESOPHAGEAL REFLUX DISEASE WITHOUT                     

 

 2016            GRAY CANALES DOLINE S            Ot            R07.89
            OTHER CHEST PAIN                     

 

 2016            GRAY CANALES DOLINE S            Ot            Z95.1 
           PRESENCE OF AORTOCORONARY BYPASS GRAFT                     

 

 2016            SANCHEZ, ARIANNA N APRN            Ot            R05     
       COUGH                     

 

 2016            ARIANNA BRADFORD APRN            Ot            R10.12  
          LEFT UPPER QUADRANT PAIN                     

 

 2016            ARIANNA BRADFORD APRN            Ot            R10.32  
          LEFT LOWER QUADRANT PAIN                     

 

 2016            ARIANNA BRADFORD APRN            Ot            R19.7   
         DIARRHEA, UNSPECIFIED                     

 

 2016            ARIANNA BRADFORD APRN            Ot            R50.9   
         FEVER, UNSPECIFIED                     

 

 2016            ARIANNA BRADFORD LANNY APRN            Ot            R05     
       COUGH                     

 

 2016            ARIANNA BRADFORD LANNY APRN            Ot            R10.12  
          LEFT UPPER QUADRANT PAIN                     

 

 2016            ARIANNA BRADFORD LANNY APRN            Ot            R10.32  
          LEFT LOWER QUADRANT PAIN                     

 

 2016            ARIANNA BRADFORD LANNY APRN            Ot            R19.7   
         DIARRHEA, UNSPECIFIED                     

 

 2016            ARIANNA BRADFORD LANNY APRN            Ot            R50.9   
         FEVER, UNSPECIFIED                     

 

 2016            ARIANNA BRADFORD LANNY APRN            Ot            R05     
       COUGH                     

 

 2016            ARIANNA BRADFORD LANNY APRN            Ot            R09.89  
          OTH SYMPTOMS AND SIGNS INVOLVING THE CIR                     

 

 2016            ARIANNA BRADFORD LANNY APRN            Ot            R10.12  
          LEFT UPPER QUADRANT PAIN                     

 

 2016            ARIANNA BRADFORD LANNY APRN            Ot            R10.32  
          LEFT LOWER QUADRANT PAIN                     

 

 2016            ARIANNA BRADFORD LANNY APRN            Ot            R19.7   
         DIARRHEA, UNSPECIFIED                     

 

 2016            ARIANNA BRADFORD LANNY APRN            Ot            R50.9   
         FEVER, UNSPECIFIED                     

 

 2016            CAMI CANALES DO            Ot            717.2 
           DERANG POST MED MENISCUS                     

 

 2016            CAMI CANALES DO            Ot            719.06
            JOINT EFFUSION-L/LEG                     

 

 2016            CHARLES PALACIOS, JOSE BRIDGES            Ot            717.3     
       DERANG MED MENISCUS NEC                     

 

 2016            CHARLES PALACIOS, JOSE BRIDGES            Ot            V72.83    
        EXAM PRE-OPERATIVE NEC                     

 

 2016            CHARLES PALACIOS, JOSE BRIDGES            Ot            V74.8     
       SCREEN-BACTERIAL DIS NEC                     

 

 2016            CAMI CANALES DO            Ot            536.8 
           STOMACH FUNCTION DIS NEC                     

 

 2016            CAMI CANALES DO            Ot            780.79
            OTH MALAISE FATIGUE                     

 

 2016            ORENDER DO, CAMI S            Ot            789.00
            ABDOMINAL PAIN, UNSPECIFIED SITE                     

 

 2016                         Ot            717.7            
CHONDROMALACIA PATELLAE                     

 

 2016                         Ot            V72.84            EXAM PRE-
OPERATIVE NOS                     

 

 2016            STARR LARA ARNP            Ot            
786.07            WHEEZING                     

 

 2016            STARR LARA ARNP            Ot            
786.2            COUGH                     

 

 2016            STARR LARA ARNP            Ot            
793.19            OTHER NONSPECIFIC ABNORMAL FINDING OF ETHAN                     

 

 2016            STARR LARA ARNP            Ot            
486            PNEUMONIA, ORGANISM NOS                     

 

 2016            GRAY CANALES DOLINE S            Ot            789.00
            ABDOMINAL PAIN, UNSPECIFIED SITE                     

 

 2016            ARIANNA BRADFORD APRN            Ot            M54.5   
         LOW BACK PAIN                     

 

 2016            ARIANNA BRADFORD APRN            Ot            M54.6   
         PAIN IN THORACIC SPINE                     

 

 2016            ARIANNA BRADFORD APRN            Ot            M54.2   
         CERVICALGIA                     

 

 2016            ARIANNA BRADFORD APRN            Ot            M54.6   
         PAIN IN THORACIC SPINE                     

 

 2016            ARIANNA BRADFORD APRN            Ot            R20.9   
         UNSPECIFIED DISTURBANCES OF SKIN SENSATI                     

 

 2016            ARIANNA BRADFORD APRN            Ot            R05     
       COUGH                     

 

 2016            ARIANNA BRADFORD APRN            Ot            R09.89  
          OTH SYMPTOMS AND SIGNS INVOLVING THE CIR                     

 

 2016            ARIANNA BRADFORD APRN            Ot            R10.12  
          LEFT UPPER QUADRANT PAIN                     

 

 2016            ARIANNA BRADFORD APRN            Ot            R10.32  
          LEFT LOWER QUADRANT PAIN                     

 

 2016            ARIANNA BRADFORD APRN            Ot            R19.7   
         DIARRHEA, UNSPECIFIED                     

 

 2016            ARIANNA BRADFORD APRN            Ot            R50.9   
         FEVER, UNSPECIFIED                     

 

 09/15/2016            CAMI CANALES DO S            Ot            717.2 
           DERANG POST MED MENISCUS                     

 

 09/15/2016            CAMI CANALES DO S            Ot            719.06
            JOINT EFFUSION-L/LEG                     

 

 09/15/2016            CHARLES PALACIOS, JOSE BRIDGES            Ot            717.3     
       DERANG MED MENISCUS NEC                     

 

 09/15/2016            CHARLES PALACIOS, JOSE BRIDGES            Ot            V72.83    
        EXAM PRE-OPERATIVE NEC                     

 

 09/15/2016            CHARLES PALACIOS, JOSE BRIDGES            Ot            V74.8     
       SCREEN-BACTERIAL DIS NEC                     

 

 09/15/2016            CAMI CANALES DO            Ot            536.8 
           STOMACH FUNCTION DIS NEC                     

 

 09/15/2016            CAMI CANALES DO            Ot            780.79
            OTH MALAISE FATIGUE                     

 

 09/15/2016            CAMI CANALES DO            Ot            789.00
            ABDOMINAL PAIN, UNSPECIFIED SITE                     

 

 09/15/2016                         Ot            717.7            
CHONDROMALACIA PATELLAE                     

 

 09/15/2016                         Ot            V72.84            EXAM PRE-
OPERATIVE NOS                     

 

 09/15/2016            VANEDWARD HUTSONSA M ARNP            Ot            
786.07            WHEEZING                     

 

 09/15/2016            VANBECELAEGEN STARR M ARNP            Ot            
786.2            COUGH                     

 

 09/15/2016            VANCARYL, STARR M ARNP            Ot            
793.19            OTHER NONSPECIFIC ABNORMAL FINDING OF ETHAN                     

 

 09/15/2016            STARR LARA M ARNP            Ot            
486            PNEUMONIA, ORGANISM NOS                     

 

 09/15/2016            CAMI CANALES DO            Ot            789.00
            ABDOMINAL PAIN, UNSPECIFIED SITE                     

 

 09/15/2016            ARIANNA BRADFORD APRN            Ot            M54.5   
         LOW BACK PAIN                     

 

 09/15/2016            ARIANNA BRADFORD APRN            Ot            M54.6   
         PAIN IN THORACIC SPINE                     

 

 09/15/2016            ARIANNA BRADFORD APRN            Ot            M54.2   
         CERVICALGIA                     

 

 09/15/2016            ARIANNA BRADFORD APRN            Ot            M54.6   
         PAIN IN THORACIC SPINE                     

 

 09/15/2016            ARIANNA BRADFORD APRN            Ot            R20.9   
         UNSPECIFIED DISTURBANCES OF SKIN SENSATI                     

 

 09/15/2016            ARIANNA BRADFORD APRN            Ot            R05     
       COUGH                     

 

 09/15/2016            ARIANNA BRADFORD APRN            Ot            R09.89  
          OT SYMPTOMS AND SIGNS INVOLVING THE CIR                     

 

 09/15/2016            ARIANNA BRADFORD APRN            Ot            R10.12  
          LEFT UPPER QUADRANT PAIN                     

 

 09/15/2016            ARIANNA BRADFORD APRN            Ot            R10.32  
          LEFT LOWER QUADRANT PAIN                     

 

 09/15/2016            ARIANNA BRADFORD APRN            Ot            R19.7   
         DIARRHEA, UNSPECIFIED                     

 

 09/15/2016            ARIANNA BRADFORD APRN            Ot            R50.9   
         FEVER, UNSPECIFIED                     

 

 2016            CAMI CANALES DO            Ot            717.2 
           DERANG POST MED MENISCUS                     

 

 2016            CAMI CANALES DO S            Ot            719.06
            JOINT EFFUSION-L/LEG                     

 

 2016            CHARLES PALACIOS, JOSE BRIDGES            Ot            717.3     
       DERANG MED MENISCUS NEC                     

 

 2016            JOSE SOTO MD            Ot            V72.83    
        EXAM PRE-OPERATIVE NEC                     

 

 2016            JOSE SOTO MD            Ot            V74.8     
       SCREEN-BACTERIAL DIS NEC                     

 

 2016            CAMI CANALES DO S            Ot            536.8 
           STOMACH FUNCTION DIS NEC                     

 

 2016            CAMI CANALES DO S            Ot            780.79
            OTH MALAISE FATIGUE                     

 

 2016            CAMI CANALES DO S            Ot            789.00
            ABDOMINAL PAIN, UNSPECIFIED SITE                     

 

 2016                         Ot            717.7            
CHONDROMALACIA PATELLAE                     

 

 2016                         Ot            V72.84            EXAM PRE-
OPERATIVE NOS                     

 

 2016            VANCARYL STARR M ARNP            Ot            
786.07            WHEEZING                     

 

 2016            VANGISSELELAEGEN STARR M ARNP            Ot            
786.2            COUGH                     

 

 2016            VANNOHELIARE STARR M ARNP            Ot            
793.19            OTHER NONSPECIFIC ABNORMAL FINDING OF ETHAN                     

 

 2016            VANGISSELELAERE STARR M ARNP            Ot            
486            PNEUMONIA, ORGANISM NOS                     

 

 2016            CAMI CANALES DO S            Ot            789.00
            ABDOMINAL PAIN, UNSPECIFIED SITE                     

 

 2016            ARIANNA BRADFORD APRN            Ot            M54.5   
         LOW BACK PAIN                     

 

 2016            ARIANNA BRADFORD APRN            Ot            M54.6   
         PAIN IN THORACIC SPINE                     

 

 2016            ARIANNA BRADFORD APRN            Ot            M54.2   
         CERVICALGIA                     

 

 2016            ARIANNA BRADFORD APRN            Ot            M54.6   
         PAIN IN THORACIC SPINE                     

 

 2016            ARIANNA BRADFORD APRN            Ot            R20.9   
         UNSPECIFIED DISTURBANCES OF SKIN SENSATI                     

 

 2016            ARIANNA BRADFORD APRN            Ot            R05     
       COUGH                     

 

 2016            ARIANNA BRADFORD APRN            Ot            R09.89  
          OTH SYMPTOMS AND SIGNS INVOLVING THE CIR                     

 

 2016            ARIANNA BRADFORD APRN            Ot            R10.12  
          LEFT UPPER QUADRANT PAIN                     

 

 2016            ARIANNA BRADFORD APRN            Ot            R10.32  
          LEFT LOWER QUADRANT PAIN                     

 

 2016            ARIANNA BRADFORD APRN            Ot            R19.7   
         DIARRHEA, UNSPECIFIED                     

 

 2016            ARIANNA BRADFORD APRN            Ot            R50.9   
         FEVER, UNSPECIFIED                     

 

 2017            SEMAJNDCAMI JAMES DO            Ot            717.2 
           DERANG POST MED MENISCUS                     

 

 2017            CAMI CANALES DO            Ot            719.06
            JOINT EFFUSION-L/LEG                     

 

 2017            CHARLES PALACIOS, JOSE BRIDGES            Ot            717.3     
       DERANG MED MENISCUS NEC                     

 

 2017            CHARLES PALACIOS, JOSE BRIDGES            Ot            V72.83    
        EXAM PRE-OPERATIVE NEC                     

 

 2017            CHARLES PALACIOS, JOSE BRIDGES            Ot            V74.8     
       SCREEN-BACTERIAL DIS NEC                     

 

 2017            CAMI CANALES DO S            Ot            536.8 
           STOMACH FUNCTION DIS NEC                     

 

 2017            CAMI CANALES DO S            Ot            780.79
            OTH MALAISE FATIGUE                     

 

 2017            CAMI CANALES DO S            Ot            789.00
            ABDOMINAL PAIN, UNSPECIFIED SITE                     

 

 2017                         Ot            717.7            
CHONDROMALACIA PATELLAE                     

 

 2017                         Ot            V72.84            EXAM PRE-
OPERATIVE NOS                     

 

 2017            VANBECELAERE, STARR M ARNP            Ot            
786.07            WHEEZING                     

 

 2017            VANBECELAERE, STARR M ARNP            Ot            
786.2            COUGH                     

 

 2017            VANBECELAERE, STARR M ARNP            Ot            
793.19            OTHER NONSPECIFIC ABNORMAL FINDING OF ETHAN                     

 

 2017            VANGISSELELAERE STARR M ARNP            Ot            
486            PNEUMONIA, ORGANISM NOS                     

 

 2017            CAMI CANALES DO S            Ot            789.00
            ABDOMINAL PAIN, UNSPECIFIED SITE                     

 

 2017            ARIANNA BRADFORD APRN            Ot            M54.5   
         LOW BACK PAIN                     

 

 2017            ARIANNA BRADFORD APRN            Ot            M54.6   
         PAIN IN THORACIC SPINE                     

 

 2017            ARIANNA BRADFORD APRN            Ot            M54.2   
         CERVICALGIA                     

 

 2017            ARIANNA BRADFORD APRN            Ot            M54.6   
         PAIN IN THORACIC SPINE                     

 

 2017            ARIANNA BRADFORD APRN            Ot            R20.9   
         UNSPECIFIED DISTURBANCES OF SKIN SENSATI                     

 

 2017            ARIANNA BRADFORD APRN            Ot            R05     
       COUGH                     

 

 2017            ARIANNA BRADFORD APRN            Ot            R09.89  
          OTH SYMPTOMS AND SIGNS INVOLVING THE CIR                     

 

 2017            ARIANNA BRADFORD APRN            Ot            R10.12  
          LEFT UPPER QUADRANT PAIN                     

 

 2017            ARIANNA BRADFORD APRN            Ot            R10.32  
          LEFT LOWER QUADRANT PAIN                     

 

 2017            ARIANNA BRADFORD APRN            Ot            R19.7   
         DIARRHEA, UNSPECIFIED                     

 

 2017            ARIANNA BRADFORD APRN            Ot            R50.9   
         FEVER, UNSPECIFIED                     

 

 2017            CHARISSA PALACIOS, SUBHA REDDY            Ot            M54.5       
     LOW BACK PAIN                     

 

 2017            CHARISSA PALACIOS, SUBHA REDDY            Ot            M54.5       
     LOW BACK PAIN                     

 

 05/15/2017            CAMI CANALES DO            Ot            717.2 
           DERANG POST MED MENISCUS                     

 

 05/15/2017            CAMI CANALES DO            Ot            719.06
            JOINT EFFUSION-L/LEG                     

 

 05/15/2017            JOSE SOTO MD            Ot            717.3     
       DERANG MED MENISCUS NEC                     

 

 05/15/2017            JOSE SOTO MD            Ot            V72.83    
        EXAM PRE-OPERATIVE NEC                     

 

 05/15/2017            CHARLES PALACIOS, JOSE BRIDGES            Ot            V74.8     
       SCREEN-BACTERIAL DIS NEC                     

 

 05/15/2017            CAMI CANALES DO            Ot            536.8 
           STOMACH FUNCTION DIS NEC                     

 

 05/15/2017            CAMI CANALES DO S            Ot            780.79
            OTH MALAISE FATIGUE                     

 

 05/15/2017            CAMI CANALES DO S            Ot            789.00
            ABDOMINAL PAIN, UNSPECIFIED SITE                     

 

 05/15/2017                         Ot            717.7            
CHONDROMALACIA PATELLAE                     

 

 05/15/2017                         Ot            V72.84            EXAM PRE-
OPERATIVE NOS                     

 

 05/15/2017            VANGISSELELAERESTARR M ARNP            Ot            
786.07            WHEEZING                     

 

 05/15/2017            VANGISSELELAESTARR BLEVINS ARNP            Ot            
786.2            COUGH                     

 

 05/15/2017            VANGISSELELAERE, STARR M ARNP            Ot            
793.19            OTHER NONSPECIFIC ABNORMAL FINDING OF ETHAN                     

 

 05/15/2017            VANSTARR HUTSON M ARNP            Ot            
486            PNEUMONIA, ORGANISM NOS                     

 

 05/15/2017            CAMI CANALES DO S            Ot            789.00
            ABDOMINAL PAIN, UNSPECIFIED SITE                     

 

 05/15/2017            ARIANNA BRADFORD APRN            Ot            M54.5   
         LOW BACK PAIN                     

 

 05/15/2017            ARIANNA BRADFORD LANNY APRN            Ot            M54.6   
         PAIN IN THORACIC SPINE                     

 

 05/15/2017            ARIANNA BRADFORD LANNY APRN            Ot            M54.2   
         CERVICALGIA                     

 

 05/15/2017            ARIANNA BRADFORD LANNY APRN            Ot            M54.6   
         PAIN IN THORACIC SPINE                     

 

 05/15/2017            ARIANNA BRADFORD LANNY APRN            Ot            R20.9   
         UNSPECIFIED DISTURBANCES OF SKIN SENSATI                     

 

 05/15/2017            ARIANNA BRADFORD LANNY APRN            Ot            R05     
       COUGH                     

 

 05/15/2017            ARIANNA BRADFORD LANNY APRN            Ot            R09.89  
          OTH SYMPTOMS AND SIGNS INVOLVING THE CIR                     

 

 05/15/2017            ARIANNA BRADFORD LANNY APRN            Ot            R10.12  
          LEFT UPPER QUADRANT PAIN                     

 

 05/15/2017            ARIANNA BRADFORD LANNY APRN            Ot            R10.32  
          LEFT LOWER QUADRANT PAIN                     

 

 05/15/2017            ARIANNA BRADFORD LANNY APRN            Ot            R19.7   
         DIARRHEA, UNSPECIFIED                     

 

 05/15/2017            ARIANNA BRADFORD LANNY APRN            Ot            R50.9   
         FEVER, UNSPECIFIED                     

 

 05/15/2017            CHARISSA PALACIOS, SUBHA REDDY            Ot            M54.5       
     LOW BACK PAIN                     

 

 2017            SUBHA CARRINGTON MD            Ot            M54.5       
     LOW BACK PAIN                     

 

 2017            SUBHA CARRINGTON MD            Ot            M54.5       
     LOW BACK PAIN                     

 

 2017            CAMI CANALES DO            Ot            717.2 
           DERANG POST MED MENISCUS                     

 

 2017            CAMI CANALES DO            Ot            719.06
            JOINT EFFUSION-L/LEG                     

 

 2017            JOSE SOTO MD            Ot            717.3     
       DERANG MED MENISCUS NEC                     

 

 2017            JOSE SOTO MD            Ot            V72.83    
        EXAM PRE-OPERATIVE NEC                     

 

 2017            JOSE SOTO MD            Ot            V74.8     
       SCREEN-BACTERIAL DIS NEC                     

 

 2017            CAMI CANALES DO            Ot            536.8 
           STOMACH FUNCTION DIS NEC                     

 

 2017            CAMI CANALES DO            Ot            780.79
            OTH MALAISE FATIGUE                     

 

 2017            CAMI CANALES DO            Ot            789.00
            ABDOMINAL PAIN, UNSPECIFIED SITE                     

 

 2017                         Ot            717.7            
CHONDROMALACIA PATELLAE                     

 

 2017                         Ot            V72.84            EXAM PRE-
OPERATIVE NOS                     

 

 2017            VANBECELAERE, STARR M ARNP            Ot            
786.07            WHEEZING                     

 

 2017            STARR LARA ARNP            Ot            
786.2            COUGH                     

 

 2017            STARR LARA ARNP            Ot            
793.19            OTHER NONSPECIFIC ABNORMAL FINDING OF ETHAN                     

 

 2017            STARR LARA ARNP            Ot            
486            PNEUMONIA, ORGANISM NOS                     

 

 2017            CAMI CANALES DO S            Ot            789.00
            ABDOMINAL PAIN, UNSPECIFIED SITE                     

 

 2017            ARIANNA BRADFORD APRN            Ot            M54.5   
         LOW BACK PAIN                     

 

 2017            ARIANNA BRADFORD APRN            Ot            M54.6   
         PAIN IN THORACIC SPINE                     

 

 2017            ARIANNA BRADFORD APRN            Ot            M54.2   
         CERVICALGIA                     

 

 2017            ARIANNA BRADFORD APRN            Ot            M54.6   
         PAIN IN THORACIC SPINE                     

 

 2017            ARIANNA BRADFORD APRN            Ot            R20.9   
         UNSPECIFIED DISTURBANCES OF SKIN SENSATI                     

 

 2017            ARIANNA BRADFORD APRN            Ot            R05     
       COUGH                     

 

 2017            ARIANNA BRADFORD APRN            Ot            R09.89  
          OTH SYMPTOMS AND SIGNS INVOLVING THE CIR                     

 

 2017            ARIANNA BRADFORD APRN            Ot            R10.12  
          LEFT UPPER QUADRANT PAIN                     

 

 2017            ARIANNA BRADFORD APRN            Ot            R10.32  
          LEFT LOWER QUADRANT PAIN                     

 

 2017            ARIANNA BRADFORD APRN            Ot            R19.7   
         DIARRHEA, UNSPECIFIED                     

 

 2017            ARIANNA BRADFORD APRN            Ot            R50.9   
         FEVER, UNSPECIFIED                     

 

 2017            CHARISSA PALACIOS, SUBHA REDDY            Ot            M54.5       
     LOW BACK PAIN                     

 

 2017            SUBHA CARRINGTON MD            Ot            M54.5       
     LOW BACK PAIN                     

 

 2017            SUBHA CARRINGTON MD            Ot            M54.5       
     LOW BACK PAIN                     

 

 2017            CAMI CANALES DO S            Ot            717.2 
           DERANG POST MED MENISCUS                     

 

 2017            CAMI CANALES DO S            Ot            719.06
            JOINT EFFUSION-L/LEG                     

 

 2017            CHARLES PALACIOS, JOSE BRIDGES            Ot            717.3     
       DERANG MED MENISCUS NEC                     

 

 2017            CHARLES PALACIOS, JOSE BRIDGES            Ot            V72.83    
        EXAM PRE-OPERATIVE NEC                     

 

 2017            CHARLES PALACIOS, JOSE BRIDGES            Ot            V74.8     
       SCREEN-BACTERIAL DIS NEC                     

 

 2017            CAMI CANALES DO            Ot            536.8 
           STOMACH FUNCTION DIS NEC                     

 

 2017            CAMI CANALES DO            Ot            780.79
            OTH MALAISE FATIGUE                     

 

 2017            CAMI CANALES DO            Ot            789.00
            ABDOMINAL PAIN, UNSPECIFIED SITE                     

 

 2017                         Ot            717.7            
CHONDROMALACIA PATELLAE                     

 

 2017                         Ot            V72.84            EXAM PRE-
OPERATIVE NOS                     

 

 2017            VANCARYL STARR M ARNP            Ot            
786.07            WHEEZING                     

 

 2017            VANGISSELELAEGEN STARR M ARNP            Ot            
786.2            COUGH                     

 

 2017            VANCARYL STARR M ARNP            Ot            
793.19            OTHER NONSPECIFIC ABNORMAL FINDING OF ETHAN                     

 

 2017            STARR LARA M ARNP            Ot            
486            PNEUMONIA, ORGANISM NOS                     

 

 2017            CAMI CANALES DO            Ot            789.00
            ABDOMINAL PAIN, UNSPECIFIED SITE                     

 

 2017            ARIANNA BRADFORD APRN            Ot            M54.5   
         LOW BACK PAIN                     

 

 2017            ARIANNA BRADFORD APRN            Ot            M54.6   
         PAIN IN THORACIC SPINE                     

 

 2017            ARIANNA BRADFORD APRN            Ot            M54.2   
         CERVICALGIA                     

 

 2017            ARIANNA BRADFORD APRN            Ot            M54.6   
         PAIN IN THORACIC SPINE                     

 

 2017            ARIANNA BRADFORD APRN            Ot            R20.9   
         UNSPECIFIED DISTURBANCES OF SKIN SENSATI                     

 

 2017            ARIANNA BRADFORD APRN            Ot            R05     
       COUGH                     

 

 2017            ARIANNA BRADFORD APRN            Ot            R09.89  
          OT SYMPTOMS AND SIGNS INVOLVING THE CIR                     

 

 2017            ARIANNA BRADFORD APRN            Ot            R10.12  
          LEFT UPPER QUADRANT PAIN                     

 

 2017            ARIANNA BRADFORD APRN            Ot            R10.32  
          LEFT LOWER QUADRANT PAIN                     

 

 2017            ARIANNA BRADFORD APRN            Ot            R19.7   
         DIARRHEA, UNSPECIFIED                     

 

 2017            ARIANNA BRADFORD APRN            Ot            R50.9   
         FEVER, UNSPECIFIED                     

 

 10/11/2017            CAMI CANALES DO            Ot            717.2 
           DERANG POST MED MENISCUS                     

 

 10/11/2017            CAMI CANALES DO S            Ot            719.06
            JOINT EFFUSION-L/LEG                     

 

 10/11/2017            CHARLES PALACIOS, JOSE BRIDGES            Ot            717.3     
       DERANG MED MENISCUS NEC                     

 

 10/11/2017            JOSE SOTO MD            Ot            V72.83    
        EXAM PRE-OPERATIVE NEC                     

 

 10/11/2017            JOSE SOTO MD            Ot            V74.8     
       SCREEN-BACTERIAL DIS NEC                     

 

 10/11/2017            GRAY CANALES DOLINE S            Ot            536.8 
           STOMACH FUNCTION DIS NEC                     

 

 10/11/2017            GRAY CANALES DOLINE S            Ot            780.79
            OTH MALAISE FATIGUE                     

 

 10/11/2017            GRAY CANALES DOLINE S            Ot            789.00
            ABDOMINAL PAIN, UNSPECIFIED SITE                     

 

 10/11/2017                         Ot            717.7            
CHONDROMALACIA PATELLAE                     

 

 10/11/2017                         Ot            V72.84            EXAM PRE-
OPERATIVE NOS                     

 

 10/11/2017            VANGISSELELAERE, STARR M ARNP            Ot            
786.07            WHEEZING                     

 

 10/11/2017            VANBECELAERE, STARR M ARNP            Ot            
786.2            COUGH                     

 

 10/11/2017            VANBECELAERE, STARR M ARNP            Ot            
793.19            OTHER NONSPECIFIC ABNORMAL FINDING OF ETHAN                     

 

 10/11/2017            VANBECELAERE, STARR M ARNP            Ot            
486            PNEUMONIA, ORGANISM NOS                     

 

 10/11/2017            CAMI CANALES DO S            Ot            789.00
            ABDOMINAL PAIN, UNSPECIFIED SITE                     

 

 10/11/2017            ARIANNA BRADFORD APRN            Ot            M54.5   
         LOW BACK PAIN                     

 

 10/11/2017            ARIANNA BRADFORD APRN            Ot            M54.6   
         PAIN IN THORACIC SPINE                     

 

 10/11/2017            ARIANNA BRADFORD APRN            Ot            M54.2   
         CERVICALGIA                     

 

 10/11/2017            ARIANNA BRADFORD APRN            Ot            M54.6   
         PAIN IN THORACIC SPINE                     

 

 10/11/2017            ARIANNA BRADFORD APRN            Ot            R20.9   
         UNSPECIFIED DISTURBANCES OF SKIN SENSATI                     

 

 10/11/2017            ARIANNA BRADFORD APRN            Ot            R05     
       COUGH                     

 

 10/11/2017            ARIANNA BRADFORD APRN            Ot            R09.89  
          OTH SYMPTOMS AND SIGNS INVOLVING THE CIR                     

 

 10/11/2017            ARIANNA BRADFORD APRN            Ot            R10.12  
          LEFT UPPER QUADRANT PAIN                     

 

 10/11/2017            ARIANNA BRADFORD APRN            Ot            R10.32  
          LEFT LOWER QUADRANT PAIN                     

 

 10/11/2017            ARIANNA BRADFORD APRN            Ot            R19.7   
         DIARRHEA, UNSPECIFIED                     

 

 10/11/2017            ARIANNA BRADFORD APRN            Ot            R50.9   
         FEVER, UNSPECIFIED                     

 

 2017            ERIN DO CAMI S            Ot            717.2 
           DERANG POST MED MENISCUS                     

 

 2017            ERIN CAMI CORONA            Ot            719.06
            JOINT EFFUSION-L/LEG                     

 

 2017            CHARLES PALACIOS, JOSE BRIDGES            Ot            717.3     
       DERANG MED MENISCUS NEC                     

 

 2017            CHARLES PALACIOS, JOSE BRIDGES            Ot            V72.83    
        EXAM PRE-OPERATIVE NEC                     

 

 2017            CHARLES PALACIOS, JOSE BRIDGES            Ot            V74.8     
       SCREEN-BACTERIAL DIS NEC                     

 

 2017            ERIN CAMI CORONA S            Ot            536.8 
           STOMACH FUNCTION DIS NEC                     

 

 2017            ERIN CORONACAMI S            Ot            780.79
            OTH MALAISE FATIGUE                     

 

 2017            HAZELERIKA CAMI CORONA S            Ot            789.00
            ABDOMINAL PAIN, UNSPECIFIED SITE                     

 

 2017                         Ot            717.7            
CHONDROMALACIA PATELLAE                     

 

 2017                         Ot            V72.84            EXAM PRE-
OPERATIVE NOS                     

 

 2017            VANBECELAERE, STARR M ARNP            Ot            
786.07            WHEEZING                     

 

 2017            VANBECELAERE, STARR M ARNP            Ot            
786.2            COUGH                     

 

 2017            VANBECELAERE, STARR M ARNP            Ot            
793.19            OTHER NONSPECIFIC ABNORMAL FINDING OF ETHAN                     

 

 2017            VANGISSELELAERE, STARR M ARNP            Ot            
486            PNEUMONIA, ORGANISM NOS                     

 

 2017            ERIN CORONA CAMI S            Ot            789.00
            ABDOMINAL PAIN, UNSPECIFIED SITE                     

 

 2017            ARIANNA BRADFORD APRN            Ot            M54.5   
         LOW BACK PAIN                     

 

 2017            ARIANNA BRADFORD APRN            Ot            M54.6   
         PAIN IN THORACIC SPINE                     

 

 2017            ARIANNA BRADFORD APRN            Ot            M54.2   
         CERVICALGIA                     

 

 2017            ARIANNA BRADFORD APRN            Ot            M54.6   
         PAIN IN THORACIC SPINE                     

 

 2017            ARIANNA BRADFORD APRN            Ot            R20.9   
         UNSPECIFIED DISTURBANCES OF SKIN SENSATI                     

 

 2017            ARIANNA BRADFORD APRN            Ot            R05     
       COUGH                     

 

 2017            ARIANNA BRADFORD APRN            Ot            R09.89  
          OTH SYMPTOMS AND SIGNS INVOLVING THE CIR                     

 

 2017            ARIANNA BRADFORD APRN            Ot            R10.12  
          LEFT UPPER QUADRANT PAIN                     

 

 2017            ARIANNA BRADFORD APRN            Ot            R10.32  
          LEFT LOWER QUADRANT PAIN                     

 

 2017            ARIANNA BRADFORD APRN            Ot            R19.7   
         DIARRHEA, UNSPECIFIED                     

 

 2017            ARIANNA BRADFORD APRN            Ot            R50.9   
         FEVER, UNSPECIFIED                     

 

 2017            CAMI CANALES DO S            Ot            717.2 
           DERANG POST MED MENISCUS                     

 

 2017            CAMI CANALES DO S            Ot            719.06
            JOINT EFFUSION-L/LEG                     

 

 2017            CHARLES PALACIOS, JOSE BRIDGES            Ot            717.3     
       DERANG MED MENISCUS NEC                     

 

 2017            CHARLES PALACIOS, JOSE BRIDGES            Ot            V72.83    
        EXAM PRE-OPERATIVE NEC                     

 

 2017            CHARLES PALACIOS, JOSE BRIDGES            Ot            V74.8     
       SCREEN-BACTERIAL DIS NEC                     

 

 2017            CAMI CANALES DO S            Ot            536.8 
           STOMACH FUNCTION DIS NEC                     

 

 2017            GRAY CANALES DOLINE S            Ot            780.79
            OTH MALAISE FATIGUE                     

 

 2017            GRAY CANALES DOLINE S            Ot            789.00
            ABDOMINAL PAIN, UNSPECIFIED SITE                     

 

 2017                         Ot            717.7            
CHONDROMALACIA PATELLAE                     

 

 2017                         Ot            V72.84            EXAM PRE-
OPERATIVE NOS                     

 

 2017            VANGISSELELAEREEDWARDSA M ARNP            Ot            
786.07            WHEEZING                     

 

 2017            VANGISSELELAERE STARR M ARNP            Ot            
786.2            COUGH                     

 

 2017            VANGISSELELAERE, TSARR M ARNP            Ot            
793.19            OTHER NONSPECIFIC ABNORMAL FINDING OF ETHAN                     

 

 2017            VANSTARR HUTSON M ARNP            Ot            
486            PNEUMONIA, ORGANISM NOS                     

 

 2017            CAMI CANALES DO S            Ot            789.00
            ABDOMINAL PAIN, UNSPECIFIED SITE                     

 

 2017            ARIANNA BRADFORD APRN            Ot            M54.5   
         LOW BACK PAIN                     

 

 2017            ARIANNA BRADFORD APRN            Ot            M54.6   
         PAIN IN THORACIC SPINE                     

 

 2017            ARIANNA BRADFORD APRN            Ot            M54.2   
         CERVICALGIA                     

 

 2017            ARIANNA BRADFORD APRN            Ot            M54.6   
         PAIN IN THORACIC SPINE                     

 

 2017            ARIANNA BRADFORD APRN            Ot            R20.9   
         UNSPECIFIED DISTURBANCES OF SKIN SENSATI                     

 

 2017            ARIANNA BRADFORD LANNY APRN            Ot            R05     
       COUGH                     

 

 2017            ARIANNA BRADFORD LANNY APRN            Ot            R09.89  
          OTH SYMPTOMS AND SIGNS INVOLVING THE CIR                     

 

 2017            ARIANNA BRADFORD LANNY APRN            Ot            R10.12  
          LEFT UPPER QUADRANT PAIN                     

 

 2017            ARIANNA BRADFORD LANNY APRN            Ot            R10.32  
          LEFT LOWER QUADRANT PAIN                     

 

 2017            ARIANNA BRADFORD LANNY APRN            Ot            R19.7   
         DIARRHEA, UNSPECIFIED                     

 

 2017            ARIANNA BRADFORD LANNY APRN            Ot            R50.9   
         FEVER, UNSPECIFIED                     

 

 2018            CAMI CANALES DO            Ot            717.2 
           DERANG POST MED MENISCUS                     

 

 2018            CAMI CANALES DO            Ot            719.06
            JOINT EFFUSION-L/LEG                     

 

 2018            CHARLES PALACIOS, JOSE BRIDGES            Ot            717.3     
       DERANG MED MENISCUS NEC                     

 

 2018            CHARLES PALACIOS, JOSE BRIDGES            Ot            V72.83    
        EXAM PRE-OPERATIVE NEC                     

 

 2018            CHARLES PALACIOS, JOSE BRIDGES            Ot            V74.8     
       SCREEN-BACTERIAL DIS NEC                     

 

 2018            CAMI CANALES DO            Ot            536.8 
           STOMACH FUNCTION DIS NEC                     

 

 2018            CAMI CANALES DO            Ot            780.79
            OTH MALAISE FATIGUE                     

 

 2018            CAMI CANALES DO            Ot            789.00
            ABDOMINAL PAIN, UNSPECIFIED SITE                     

 

 2018                         Ot            717.7            
CHONDROMALACIA PATELLAE                     

 

 2018                         Ot            V72.84            EXAM PRE-
OPERATIVE NOS                     

 

 2018            VANBECELAERE, STARR M ARNP            Ot            
786.07            WHEEZING                     

 

 2018            VANBECELAERE, STARR M ARNP            Ot            
786.2            COUGH                     

 

 2018            VANBECELAERE, STARR M ARNP            Ot            
793.19            OTHER NONSPECIFIC ABNORMAL FINDING OF ETHAN                     

 

 2018            VANGISSELELAEREEDWARDSA M ARNP            Ot            
486            PNEUMONIA, ORGANISM NOS                     

 

 2018            ORENDER DO, CAMI S            Ot            789.00
            ABDOMINAL PAIN, UNSPECIFIED SITE                     

 

 2018            ARIANNA BRADFORD LANNY APRN            Ot            M54.5   
         LOW BACK PAIN                     

 

 2018            ARIANNA BRADFORD LANNY APRN            Ot            M54.6   
         PAIN IN THORACIC SPINE                     

 

 2018            ARIANNA BRADFORD LANNY APRN            Ot            M54.2   
         CERVICALGIA                     

 

 2018            ARIANNA BRADFORD LANNY APRN            Ot            M54.6   
         PAIN IN THORACIC SPINE                     

 

 2018            ARIANNA BRADFORD LANNY APRN            Ot            R20.9   
         UNSPECIFIED DISTURBANCES OF SKIN SENSATI                     

 

 2018            ARIANNA BRADFORD LANNY APRN            Ot            R05     
       COUGH                     

 

 2018            ARIANNA BRADFORD LANNY APRN            Ot            R09.89  
          OTH SYMPTOMS AND SIGNS INVOLVING THE CIR                     

 

 2018            ARIANNA BRADFORD LANNY APRN            Ot            R10.12  
          LEFT UPPER QUADRANT PAIN                     

 

 2018            SANCHEZ ARIANNA LANNY APRN            Ot            R10.32  
          LEFT LOWER QUADRANT PAIN                     

 

 2018            ARIANNA BRADFORD LANNY APRN            Ot            R19.7   
         DIARRHEA, UNSPECIFIED                     

 

 2018            ARIANNA BRADFORD LANNY APRN            Ot            R50.9   
         FEVER, UNSPECIFIED                     

 

 2018            CAMI CANALES DO S            Ot            717.2 
           DERANG POST MED MENISCUS                     

 

 2018            GRAY CANALES DOLINE S            Ot            719.06
            JOINT EFFUSION-L/LEG                     

 

 2018            CHARLES PALACIOS, JOSE BRIDGES            Ot            717.3     
       DERANG MED MENISCUS NEC                     

 

 2018            CHARLES PALACIOS, JOSE BRIDGES            Ot            V72.83    
        EXAM PRE-OPERATIVE NEC                     

 

 2018            CHARLES PALACIOS, JOSE BRIDGES            Ot            V74.8     
       SCREEN-BACTERIAL DIS NEC                     

 

 2018            GRAY CANALES DOLINE S            Ot            536.8 
           STOMACH FUNCTION DIS NEC                     

 

 2018            ELIAS CANALES DOQUELINE S            Ot            780.79
            OTH MALAISE FATIGUE                     

 

 2018            ELIAS CANALES DOQUELINE S            Ot            789.00
            ABDOMINAL PAIN, UNSPECIFIED SITE                     

 

 2018                         Ot            717.7            
CHONDROMALACIA PATELLAE                     

 

 2018                         Ot            V72.84            EXAM PRE-
OPERATIVE NOS                     

 

 2018            STARR LARA            Ot            
786.07            WHEEZING                     

 

 2018            VANBECELAERE, STARR M ARNP            Ot            
786.2            COUGH                     

 

 2018            STARR LARA ARNP            Ot            
793.19            OTHER NONSPECIFIC ABNORMAL FINDING OF TEHAN                     

 

 2018            STARR LARA ARNP            Ot            
486            PNEUMONIA, ORGANISM NOS                     

 

 2018            CAMI CANALES DO            Ot            789.00
            ABDOMINAL PAIN, UNSPECIFIED SITE                     

 

 2018            ARIANNA BRADFORD APRN            Ot            M54.5   
         LOW BACK PAIN                     

 

 2018            ARIANNA BRADFORD APRN            Ot            M54.6   
         PAIN IN THORACIC SPINE                     

 

 2018            ARIANNA BRADFORD APRN            Ot            M54.2   
         CERVICALGIA                     

 

 2018            ARIANNA BRADFORD APRN            Ot            M54.6   
         PAIN IN THORACIC SPINE                     

 

 2018            ARIANNA BRADFORD APRN            Ot            R20.9   
         UNSPECIFIED DISTURBANCES OF SKIN SENSATI                     

 

 2018            ARIANNA BRADFORD APRN            Ot            R05     
       COUGH                     

 

 2018            ARIANNA BRADFORD APRN            Ot            R09.89  
          OTH SYMPTOMS AND SIGNS INVOLVING THE CIR                     

 

 2018            ARIANNA BRADFORD APRN            Ot            R10.12  
          LEFT UPPER QUADRANT PAIN                     

 

 2018            ARIANNA BRADFORD APRN            Ot            R10.32  
          LEFT LOWER QUADRANT PAIN                     

 

 2018            ARIANNA BRADFORD APRN            Ot            R19.7   
         DIARRHEA, UNSPECIFIED                     

 

 2018            ARIANNA BRADFORD APRN            Ot            R50.9   
         FEVER, UNSPECIFIED                     

 

 2018            CAMI CANALES DO            Ot            717.2 
           DERANG POST MED MENISCUS                     

 

 2018            CAMI CANALES DO            Ot            719.06
            JOINT EFFUSION-L/LEG                     

 

 2018            JOSE SOTO MD            Ot            717.3     
       DERANG MED MENISCUS NEC                     

 

 2018            CHARLES PALACIOS, JOSE BRIDGES            Ot            V72.83    
        EXAM PRE-OPERATIVE NEC                     

 

 2018            CHARLES PALACIOS, JOSE BRIDGES            Ot            V74.8     
       SCREEN-BACTERIAL DIS NEC                     

 

 2018            CAMI CANALES DO            Ot            536.8 
           STOMACH FUNCTION DIS NEC                     

 

 2018            CAMI CANALES DO S            Ot            780.79
            OTH MALAISE FATIGUE                     

 

 2018            CAMI CANALES DO S            Ot            789.00
            ABDOMINAL PAIN, UNSPECIFIED SITE                     

 

 2018                         Ot            717.7            
CHONDROMALACIA PATELLAE                     

 

 2018                         Ot            V72.84            EXAM PRE-
OPERATIVE NOS                     

 

 2018            STARR LARA ARNP            Ot            
786.07            WHEEZING                     

 

 2018            STARR LARA ARNP            Ot            
786.2            COUGH                     

 

 2018            STARR LARA ARNP            Ot            
793.19            OTHER NONSPECIFIC ABNORMAL FINDING OF ETHAN                     

 

 2018            STARR LARA ARNP            Ot            
486            PNEUMONIA, ORGANISM NOS                     

 

 2018            CAMI CANALES DO S            Ot            789.00
            ABDOMINAL PAIN, UNSPECIFIED SITE                     

 

 2018            ARIANNA BRADFORD APRN            Ot            M54.5   
         LOW BACK PAIN                     

 

 2018            ARIANNA BRADFORD APRN            Ot            M54.6   
         PAIN IN THORACIC SPINE                     

 

 2018            ARIANNA BRADFORD APRN            Ot            M54.2   
         CERVICALGIA                     

 

 2018            ARIANNA BRADFORD APRN            Ot            M54.6   
         PAIN IN THORACIC SPINE                     

 

 2018            ARIANNA BRADFORD APRN            Ot            R20.9   
         UNSPECIFIED DISTURBANCES OF SKIN SENSATI                     

 

 2018            ARIANNA BRADFORD APRN            Ot            R05     
       COUGH                     

 

 2018            ARIANNA BRADFORD APRN            Ot            R09.89  
          OTH SYMPTOMS AND SIGNS INVOLVING THE CIR                     

 

 2018            ARIANNA BRADFORD APRN            Ot            R10.12  
          LEFT UPPER QUADRANT PAIN                     

 

 2018            ARIANNA BRADFORD APRN            Ot            R10.32  
          LEFT LOWER QUADRANT PAIN                     

 

 2018            ARIANNA BRADFORD APRN            Ot            R19.7   
         DIARRHEA, UNSPECIFIED                     

 

 2018            ARIANNA BRADFORD APRN            Ot            R50.9   
         FEVER, UNSPECIFIED                     

 

 2018            CAMI CANALES DO S            Ot            717.2 
           DERANG POST MED MENISCUS                     

 

 2018            CAMI CANALES DO S            Ot            719.06
            JOINT EFFUSION-L/LEG                     

 

 2018            CHARLES PALACIOS, JOSE BRIDGES            Ot            717.3     
       DERANG MED MENISCUS NEC                     

 

 2018            CHARLES PALACOIS, JOSE BRIDGES            Ot            V72.83    
        EXAM PRE-OPERATIVE NEC                     

 

 2018            CHARLES PALACIOS, JOSE BRIDGES            Ot            V74.8     
       SCREEN-BACTERIAL DIS NEC                     

 

 2018            ELIAS CANALES DOQUELINE S            Ot            536.8 
           STOMACH FUNCTION DIS NEC                     

 

 2018            ELIAS CANALES DOQUELINE S            Ot            780.79
            OTH MALAISE FATIGUE                     

 

 2018            ERIN DO CAMI S            Ot            789.00
            ABDOMINAL PAIN, UNSPECIFIED SITE                     

 

 2018                         Ot            717.7            
CHONDROMALACIA PATELLAE                     

 

 2018                         Ot            V72.84            EXAM PRE-
OPERATIVE NOS                     

 

 2018            VANGISSELELAERESTARR M ARNP            Ot            
786.07            WHEEZING                     

 

 2018            VANBECELAEREEDWARDSA M ARNP            Ot            
786.2            COUGH                     

 

 2018            VANBECELAERE, STARR M ARNP            Ot            
793.19            OTHER NONSPECIFIC ABNORMAL FINDING OF ETHAN                     

 

 2018            VANGISSELELAERE STARR M ARNP            Ot            
486            PNEUMONIA, ORGANISM NOS                     

 

 2018            SEMAJVITALIY CORONA CAMI S            Ot            789.00
            ABDOMINAL PAIN, UNSPECIFIED SITE                     

 

 2018            ARIANNA BRADFORD APRN            Ot            M54.5   
         LOW BACK PAIN                     

 

 2018            ARIANNA BRADFORD APRN            Ot            M54.6   
         PAIN IN THORACIC SPINE                     

 

 2018            ARIANNA BRADFORD APRN            Ot            M54.2   
         CERVICALGIA                     

 

 2018            ARIANNA BRADFORD APRN            Ot            M54.6   
         PAIN IN THORACIC SPINE                     

 

 2018            ARIANNA BRADFORD APRN            Ot            R20.9   
         UNSPECIFIED DISTURBANCES OF SKIN SENSATI                     

 

 2018            ARIANNA BRADFORD APRN            Ot            R05     
       COUGH                     

 

 2018            ARIANNA BRADFORD APRN            Ot            R09.89  
          OT SYMPTOMS AND SIGNS INVOLVING THE CIR                     

 

 2018            ARIANNA BRADFORD APRN            Ot            R10.12  
          LEFT UPPER QUADRANT PAIN                     

 

 2018            ARIANNA BRADFORD APRN            Ot            R10.32  
          LEFT LOWER QUADRANT PAIN                     

 

 2018            ARIANNA BRADFORD APRN            Ot            R19.7   
         DIARRHEA, UNSPECIFIED                     

 

 2018            ARIANNA BRADFORD APRN            Ot            R50.9   
         FEVER, UNSPECIFIED                     

 

 2018            CAMI CANALES DO            Ot            717.2 
           DERANG POST MED MENISCUS                     

 

 2018            ORENDER DO, CAMI S            Ot            719.06
            JOINT EFFUSION-L/LEG                     

 

 2018            CHARLES PALACIOS, JOSE BRIDGES            Ot            717.3     
       DERANG MED MENISCUS NEC                     

 

 2018            CHARLES PALACIOS, JOSE BRIDGES            Ot            V72.83    
        EXAM PRE-OPERATIVE NEC                     

 

 2018            CHARLES PALACIOS, JOSE BRIDGES            Ot            V74.8     
       SCREEN-BACTERIAL DIS NEC                     

 

 2018            CAMI CANALES DO S            Ot            536.8 
           STOMACH FUNCTION DIS NEC                     

 

 2018            ERIN CORONA CAMI S            Ot            780.79
            OTH MALAISE FATIGUE                     

 

 2018            CAMI CANALES DO S            Ot            789.00
            ABDOMINAL PAIN, UNSPECIFIED SITE                     

 

 2018                         Ot            717.7            
CHONDROMALACIA PATELLAE                     

 

 2018                         Ot            V72.84            EXAM PRE-
OPERATIVE NOS                     

 

 2018            STARR LARA M ARNP            Ot            
786.07            WHEEZING                     

 

 2018            JANE STARR M ARNP            Ot            
786.2            COUGH                     

 

 2018            JANE STARR M ARNP            Ot            
793.19            OTHER NONSPECIFIC ABNORMAL FINDING OF ETHAN                     

 

 2018            STARR LARA M ARNP            Ot            
486            PNEUMONIA, ORGANISM NOS                     

 

 2018            CAMI CANALES DO S            Ot            789.00
            ABDOMINAL PAIN, UNSPECIFIED SITE                     

 

 2018            ARIANNA BRADFORD APRN            Ot            M54.5   
         LOW BACK PAIN                     

 

 2018            ARIANNA BRADFORD APRN            Ot            M54.6   
         PAIN IN THORACIC SPINE                     

 

 2018            ARIANNA BRADFORD APRN            Ot            M54.2   
         CERVICALGIA                     

 

 2018            ARIANNA BRADFORD APRN            Ot            M54.6   
         PAIN IN THORACIC SPINE                     

 

 2018            ARIANNA BRADFORD APRN            Ot            R20.9   
         UNSPECIFIED DISTURBANCES OF SKIN SENSATI                     

 

 2018            ARIANNA BRADFORD APRN            Ot            R05     
       COUGH                     

 

 2018            ARIANNA BRADFORD APRN            Ot            R09.89  
          OTH SYMPTOMS AND SIGNS INVOLVING THE CIR                     

 

 2018            ARIANNA BRADFORD APRN            Ot            R10.12  
          LEFT UPPER QUADRANT PAIN                     

 

 2018            ARIANNA BRADFORD APRN            Ot            R10.32  
          LEFT LOWER QUADRANT PAIN                     

 

 2018            ARIANNA BRADFORD APRN            Ot            R19.7   
         DIARRHEA, UNSPECIFIED                     

 

 2018            ARIANNA BRADFORD APRN            Ot            R50.9   
         FEVER, UNSPECIFIED                     

 

 2018            ERIN CORONA CAMI S            Ot            717.2 
           DERANG POST MED MENISCUS                     

 

 2018            ERNI CORONA CAMI S            Ot            719.06
            JOINT EFFUSION-L/LEG                     

 

 2018            CHARLES PALACIOS, JOSE BRIDGES            Ot            717.3     
       DERANG MED MENISCUS NEC                     

 

 2018            CHARLES PALACIOS, JOSE BRIDGES            Ot            V72.83    
        EXAM PRE-OPERATIVE NEC                     

 

 2018            CHARLES PALACIOS, JOSE BRIDGES            Ot            V74.8     
       SCREEN-BACTERIAL DIS NEC                     

 

 2018            ERIN CORONA CAMI S            Ot            536.8 
           STOMACH FUNCTION DIS NEC                     

 

 2018            ERIN CORONA CAMI S            Ot            780.79
            OTH MALAISE FATIGUE                     

 

 2018            ERIN CORONA CAMI S            Ot            789.00
            ABDOMINAL PAIN, UNSPECIFIED SITE                     

 

 2018                         Ot            717.7            
CHONDROMALACIA PATELLAE                     

 

 2018                         Ot            V72.84            EXAM PRE-
OPERATIVE NOS                     

 

 2018            VANBECELAERE, STARR M ARNP            Ot            
786.07            WHEEZING                     

 

 2018            VANBECELAERE, STARR M ARNP            Ot            
786.2            COUGH                     

 

 2018            VANBECELAERE, STARR M ARNP            Ot            
793.19            OTHER NONSPECIFIC ABNORMAL FINDING OF ETHAN                     

 

 2018            VANBECELAERE, STARR M ARNP            Ot            
486            PNEUMONIA, ORGANISM NOS                     

 

 2018            CAMI CANALES DO S            Ot            789.00
            ABDOMINAL PAIN, UNSPECIFIED SITE                     

 

 2018            ARIANNA BRADFORD APRN            Ot            M54.5   
         LOW BACK PAIN                     

 

 2018            ARIANNA BRADFORD APRN            Ot            M54.6   
         PAIN IN THORACIC SPINE                     

 

 2018            ARIANNA BRADFORD APRN            Ot            M54.2   
         CERVICALGIA                     

 

 2018            ARIANNA BRADFORD APRN            Ot            M54.6   
         PAIN IN THORACIC SPINE                     

 

 2018            ARIANNA BRADFORD APRN            Ot            R20.9   
         UNSPECIFIED DISTURBANCES OF SKIN SENSATI                     

 

 2018            ARIANNA BRADFORD APRN            Ot            R05     
       COUGH                     

 

 2018            ARIANNA BRADFORD APRN            Ot            R09.89  
          OT SYMPTOMS AND SIGNS INVOLVING THE CIR                     

 

 2018            ARIANNA BRADFORD APRN            Ot            R10.12  
          LEFT UPPER QUADRANT PAIN                     

 

 2018            ARIANNA BRADFORD APRN            Ot            R10.32  
          LEFT LOWER QUADRANT PAIN                     

 

 2018            ARIANNA BRADFORD APRN            Ot            R19.7   
         DIARRHEA, UNSPECIFIED                     

 

 2018            ARIANNA BRADFORD APRN            Ot            R50.9   
         FEVER, UNSPECIFIED                     

 

 2018            CAMI CANALES DO S            Ot            717.2 
           DERANG POST MED MENISCUS                     

 

 2018            GRAY CANALES DOLINE S            Ot            719.06
            JOINT EFFUSION-L/LEG                     

 

 2018            CHARLES PALACIOS, JOSE BRIDGES            Ot            717.3     
       DERANG MED MENISCUS NEC                     

 

 2018            CHARLES PALACIOS, JOSE BRIDGES            Ot            V72.83    
        EXAM PRE-OPERATIVE NEC                     

 

 2018            CHARLES PALACIOS, JOSE BRIDGES            Ot            V74.8     
       SCREEN-BACTERIAL DIS NEC                     

 

 2018            ELIAS CANALES DOQUELINE S            Ot            536.8 
           STOMACH FUNCTION DIS NEC                     

 

 2018            ELIAS CANALES DOQUELINE S            Ot            780.79
            OTH MALAISE FATIGUE                     

 

 2018            ELIAS CANALES DOQUELINE S            Ot            789.00
            ABDOMINAL PAIN, UNSPECIFIED SITE                     

 

 2018                         Ot            717.7            
CHONDROMALACIA PATELLAE                     

 

 2018                         Ot            V72.84            EXAM PRE-
OPERATIVE NOS                     

 

 2018            VANBECELAERE, STARR M ARNP            Ot            
786.07            WHEEZING                     

 

 2018            VANBECELAERE, STARR M ARNP            Ot            
786.2            COUGH                     

 

 2018            VANBECELAERE, STARR M ARNP            Ot            
793.19            OTHER NONSPECIFIC ABNORMAL FINDING OF ETHAN                     

 

 2018            VANBECELAERE, STARR M ARNP            Ot            
486            PNEUMONIA, ORGANISM NOS                     

 

 2018            GRAY CANALES DOLINE S            Ot            789.00
            ABDOMINAL PAIN, UNSPECIFIED SITE                     

 

 2018            ARIANNA BRADFORD APRN            Ot            M54.5   
         LOW BACK PAIN                     

 

 2018            ARIANNA BRADFORD APRN            Ot            M54.6   
         PAIN IN THORACIC SPINE                     

 

 2018            ARIANNA BRADFORD APRN            Ot            M54.2   
         CERVICALGIA                     

 

 2018            ARIANNA BRADFORD APRN            Ot            M54.6   
         PAIN IN THORACIC SPINE                     

 

 2018            ARIANNA BRADFORD LANNY APRN            Ot            R20.9   
         UNSPECIFIED DISTURBANCES OF SKIN SENSATI                     

 

 2018            ARIANNA BRADFORD LANNY APRN            Ot            R05     
       COUGH                     

 

 2018            ARIANNA BRADFORD LANNY APRN            Ot            R09.89  
          OTH SYMPTOMS AND SIGNS INVOLVING THE CIR                     

 

 2018            ARIANNA BRADFORD LANNY APRN            Ot            R10.12  
          LEFT UPPER QUADRANT PAIN                     

 

 2018            ARIANNA BRADFORD LANNY APRN            Ot            R10.32  
          LEFT LOWER QUADRANT PAIN                     

 

 2018            ARIANNA BRADFORD LANNY APRN            Ot            R19.7   
         DIARRHEA, UNSPECIFIED                     

 

 2018            ARIANNA BRADFORD LANNY APRN            Ot            R50.9   
         FEVER, UNSPECIFIED                     

 

 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                     

 

 2018            KIM FUCHS APRN            Ot            M94.262  
          CHONDROMALACIA, LEFT KNEE                     

 

 2018            KIM FUCHS APRN            Ot            S89.92XA 
           UNSPECIFIED INJURY OF LEFT LOWER LEG, IN                     

 

 2018            KIM FUCHS APRN            Ot            Z98.890  
          OTHER SPECIFIED POSTPROCEDURAL STATES                     

 

 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                     

 

 2018            KIM FUCHS APRN            Ot            M94.262  
          CHONDROMALACIA, LEFT KNEE                     

 

 2018            KIM FUCHS APRN            Ot            S89.92XA 
           UNSPECIFIED INJURY OF LEFT LOWER LEG, IN                     

 

 2018            KIM FUCHS APRN            Ot            Z98.890  
          OTHER SPECIFIED POSTPROCEDURAL STATES                     

 

 2018            CHARISSA PALACIOS, SUBHA REDDY            Ot            M54.5       
     LOW BACK PAIN                     

 

 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                     

 

 2018            KIM FUCHS APRN            Ot            M94.262  
          CHONDROMALACIA, LEFT KNEE                     

 

 2018            KIM FUCHS R APRN            Ot            S89.92XA 
           UNSPECIFIED INJURY OF LEFT LOWER LEG, IN                     

 

 2018            KIM FUCHS APRN            Ot            Z98.890  
          OTHER SPECIFIED POSTPROCEDURAL STATES                     

 

 2018            MATTHEW PALACIOS FACC, DARREL FACP CCDS            Ot            
E78.5            HYPERLIPIDEMIA, UNSPECIFIED                     

 

 2018            MATTHEW PALACIOS FACC, ALI FACP CCDS            Ot            
F17.210            NICOTINE DEPENDENCE, CIGARETTES, UNCOMPL                     

 

 2018            MATTHEW PALACIOS FACC, ALI FACP CCDS            Ot            
F32.9            MAJOR DEPRESSIVE DISORDER, SINGLE EPISOD                     

 

 2018            MATTHEW PALACIOS FACC, ALI FACP CCDS            Ot            
F41.9            ANXIETY DISORDER, UNSPECIFIED                     

 

 2018            MATTHEW PALACIOS FACC, ALI FACP CCDS            Ot            
I10            ESSENTIAL (PRIMARY) HYPERTENSION                     

 

 2018            MATTHEW PALACIOS FACC, ALI FACP CCDS            Ot            
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                      

 

 2018            MATTHEW PALACIOS FACC, DARREL FACP CCDS            Ot            
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                     

 

 2018            MATTHEW PALACIOS FACC, ALI FACP CCDS            Ot            
J30.2            OTHER SEASONAL ALLERGIC RHINITIS                     

 

 2018            MATTHEW PALACIOS FACC, ALI FACP CCDS            Ot            
K21.9            GASTRO-ESOPHAGEAL REFLUX DISEASE WITHOUT                     

 

 2018            MATTHEW PALACIOS FACC, ALI FACP CCDS            Ot            
M47.816            SPONDYLOSIS W/O MYELOPATHY OR RADICULOPA                     

 

 2018            MATTHEW PALACIOS FACC, ALI FACP CCDS            Ot            
M53.3            SACROCOCCYGEAL DISORDERS, NOT ELSEWHERE                      

 

 2018            MATTHEW PALACIOS FACC, ALI FACP CCDS            Ot            
M54.16            RADICULOPATHY, LUMBAR REGION                     

 

 2018            MATTHEW PALACIOS FACC, ALI FACP CCDS            Ot            
R07.89            OTHER CHEST PAIN                     

 

 2018            MATTHEW PALACIOS FACC, ALI FACP CCDS            Ot            
Z79.82            LONG TERM (CURRENT) USE OF ASPIRIN                     

 

 2018            MATTHEW PALACIOS FACC, ALI FACP CCDS            Ot            
Z79.899            OTHER LONG TERM (CURRENT) DRUG THERAPY                     

 

 2018            MATTHEW PALACIOS FACC, ALI FACP CCDS            Ot            
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                     

 

 2018            MATTHEW PALACIOS FACC, ALI FACP CCDS            Ot            
E78.5            HYPERLIPIDEMIA, UNSPECIFIED                     

 

 2018            MATTHEW PALACIOS FACC, ALI FACP CCDS            Ot            
F17.210            NICOTINE DEPENDENCE, CIGARETTES, UNCOMPL                     

 

 2018            MATTHEW PALACIOS FACC, ALI FACP CCDS            Ot            
F32.9            MAJOR DEPRESSIVE DISORDER, SINGLE EPISOD                     

 

 2018            MATTHEW PALACIOS FACC, ALI FACP CCDS            Ot            
F41.9            ANXIETY DISORDER, UNSPECIFIED                     

 

 2018            MATTHEW PALACIOS FACC, ALI FACP CCDS            Ot            
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                     

 

 2018            MATTHEW FULTONC, DARREL FACP CCDS            Ot            
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                     

 

 2018            MATTHEW PALACIOS FACC, ALI FACP CCDS            Ot            
J30.2            OTHER SEASONAL ALLERGIC RHINITIS                     

 

 2018            MATTHEW PALACIOS FACC, ALI FACP CCDS            Ot            
K21.9            GASTRO-ESOPHAGEAL REFLUX DISEASE WITHOUT                     

 

 2018            MATTHEW PALACIOS FACC, ALI FACP CCDS            Ot            
M47.816            SPONDYLOSIS W/O MYELOPATHY OR RADICULOPA                     

 

 2018            MATTHEW PALACIOS FACC, ALI FACP CCDS            Ot            
M53.3            SACROCOCCYGEAL DISORDERS, NOT ELSEWHERE                      

 

 2018            MATTHEW PALACIOS FACC, ALI FACP CCDS            Ot            
M54.16            RADICULOPATHY, LUMBAR REGION                     

 

 2018            MATTHEW PALACIOS FACC, ALI FACP CCDS            Ot            
R94.31            ABNORMAL ELECTROCARDIOGRAM [ECG] [EKG]                     

 

 2018            MATTHEW PALACIOS FACC, ALI FACP CCDS            Ot            
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                     

 

 2018            MATTHEW PALACIOS FACC, ALI FACP CCDS            Ot            
E78.5            HYPERLIPIDEMIA, UNSPECIFIED                     

 

 2018            MATTHEW PALACIOS FACC, ALI FACP CCDS            Ot            
F17.210            NICOTINE DEPENDENCE, CIGARETTES, UNCOMPL                     

 

 2018            MATTHEW PALACIOS FACC, ALI FACP CCDS            Ot            
F32.9            MAJOR DEPRESSIVE DISORDER, SINGLE EPISOD                     

 

 2018            MATTHEW FULTONC, ALI FACP CCDS            Ot            
F41.9            ANXIETY DISORDER, UNSPECIFIED                     

 

 2018            MATTHEW PALACIOS FACC, ALI FACP CCDS            Ot            
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                     

 

 2018            MATTHEW PALACIOS FACC, ALI FACP CCDS            Ot            
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                     

 

 2018            MATTHEW PALACIOS FACC, ALI FACP CCDS            Ot            
J30.2            OTHER SEASONAL ALLERGIC RHINITIS                     

 

 2018            MATTHEW PALACIOS FACC, DARREL FACP CCDS            Ot            
K21.9            GASTRO-ESOPHAGEAL REFLUX DISEASE WITHOUT                     

 

 2018            MATTHEW PALACIOS FACC, ALI FACP CCDS            Ot            
M47.816            SPONDYLOSIS W/O MYELOPATHY OR RADICULOPA                     

 

 2018            MATTHEW PALACIOS FACC, ALI FACP CCDS            Ot            
M53.3            SACROCOCCYGEAL DISORDERS, NOT ELSEWHERE                      

 

 2018            MATTHEW PALACIOS FACC, ALI FACP CCDS            Ot            
M54.16            RADICULOPATHY, LUMBAR REGION                     

 

 2018            MATTHEW PALACIOS FACC, ALI FACP CCDS            Ot            
R94.31            ABNORMAL ELECTROCARDIOGRAM [ECG] [EKG]                     

 

 2018            DARREL CASTRO MD, FACC FACP CCDS            Ot            
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                     

 

 07/10/2018            MATTHEW PALACIOS FACC, ALI FACP CCDS            Ot            
E78.5            HYPERLIPIDEMIA, UNSPECIFIED                     

 

 07/10/2018            MATTHEW PALACIOS FACC, ALI FACP CCDS            Ot            
F17.210            NICOTINE DEPENDENCE, CIGARETTES, UNCOMPL                     

 

 07/10/2018            MATTHEW PALACIOS FACC, ALI FACP CCDS            Ot            
F32.9            MAJOR DEPRESSIVE DISORDER, SINGLE EPISOD                     

 

 07/10/2018            MATTHEW PALACIOS FACC, ALI FACP CCDS            Ot            
F41.9            ANXIETY DISORDER, UNSPECIFIED                     

 

 07/10/2018            MATTHEW PALACIOS FACC, ALI FACP CCDS            Ot            
I10            ESSENTIAL (PRIMARY) HYPERTENSION                     

 

 07/10/2018            MATTHEW PALACIOS FACC, ALI FACP CCDS            Ot            
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                      

 

 07/10/2018            MATTHEW PALACIOS FACC, DARREL FACP CCDS            Ot            
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                     

 

 07/10/2018            MATTHEW PALACIOS FACC, ALI FACP CCDS            Ot            
J30.2            OTHER SEASONAL ALLERGIC RHINITIS                     

 

 07/10/2018            MATTHEW PALACIOS FACC, ALI FACP CCDS            Ot            
K21.9            GASTRO-ESOPHAGEAL REFLUX DISEASE WITHOUT                     

 

 07/10/2018            MATTHEW PALACIOS FACC, ALI FACP CCDS            Ot            
M47.816            SPONDYLOSIS W/O MYELOPATHY OR RADICULOPA                     

 

 07/10/2018            MATTHEW PALACIOS FACC, DARREL FACP CCDS            Ot            
M53.3            SACROCOCCYGEAL DISORDERS, NOT ELSEWHERE                      

 

 07/10/2018            MATTHEW PALACIOS FACC, ALI FACP CCDS            Ot            
M54.16            RADICULOPATHY, LUMBAR REGION                     

 

 07/10/2018            MATTHEW PALACIOS FACC, DARREL FACP CCDS            Ot            
R07.89            OTHER CHEST PAIN                     

 

 07/10/2018            MATTHEW PALACIOS FACC, ALI FACP CCDS            Ot            
Z79.82            LONG TERM (CURRENT) USE OF ASPIRIN                     

 

 07/10/2018            MATTHEW PALAICOS FACC, ALI FACP CCDS            Ot            
Z79.899            OTHER LONG TERM (CURRENT) DRUG THERAPY                     

 

 07/10/2018            MATTHEW PALACIOS FACC, ALI FACP CCDS            Ot            
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                     

 

 2018            MATTHEW PALACIOS FACC, ALI FACP CCDS            Ot            
E78.5            HYPERLIPIDEMIA, UNSPECIFIED                     

 

 2018            MATTHEW PALACIOS FACC, ALI FACP CCDS            Ot            
F17.210            NICOTINE DEPENDENCE, CIGARETTES, UNCOMPL                     

 

 2018            MATTHEW PALACIOS FACC, ALI FACP CCDS            Ot            
F32.9            MAJOR DEPRESSIVE DISORDER, SINGLE EPISOD                     

 

 2018            MATTHEW PALACIOS FACC, ALI FACP CCDS            Ot            
F41.9            ANXIETY DISORDER, UNSPECIFIED                     

 

 2018            MATTHEW PALACIOS FACC, ALI FACP CCDS            Ot            
I10            ESSENTIAL (PRIMARY) HYPERTENSION                     

 

 2018            MATTHEW PALACIOS FACC, ALI FACP CCDS            Ot            
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                      

 

 2018            MATTHEW PALACIOS FACC, DARREL FACP CCDS            Ot            
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                     

 

 2018            MATTHEW PALACIOS FACC, ALI FACP CCDS            Ot            
J30.2            OTHER SEASONAL ALLERGIC RHINITIS                     

 

 2018            MATTHEW PALACIOS FACC, ALI FACP CCDS            Ot            
K21.9            GASTRO-ESOPHAGEAL REFLUX DISEASE WITHOUT                     

 

 2018            MATTHEW PALACIOS FACC, ALI FACP CCDS            Ot            
M47.816            SPONDYLOSIS W/O MYELOPATHY OR RADICULOPA                     

 

 2018            MATTHEW PALACIOS FACC, ALI FACP CCDS            Ot            
M53.3            SACROCOCCYGEAL DISORDERS, NOT ELSEWHERE                      

 

 2018            MATTHEW PALACIOS FACC, ALI FACP CCDS            Ot            
M54.16            RADICULOPATHY, LUMBAR REGION                     

 

 2018            MATTHEW PALACIOS FACC, ALI FACP CCDS            Ot            
R07.89            OTHER CHEST PAIN                     

 

 2018            MATTHEW PALACIOS FACC, DARREL FACP CCDS            Ot            
Z79.82            LONG TERM (CURRENT) USE OF ASPIRIN                     

 

 2018            MATTHEW PALACIOS FACC, DARREL FACP CCDS            Ot            
Z79.899            OTHER LONG TERM (CURRENT) DRUG THERAPY                     

 

 2018            MATTHEW PALACIOS FACC, ALI FACP CCDS            Ot            
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                     

 

 2018            MATTHEW PALACIOS FACC, ALI FACP CCDS            Ot            
E78.5            HYPERLIPIDEMIA, UNSPECIFIED                     

 

 2018            MATTHEW PALACIOS FACC, ALI FACP CCDS            Ot            
F17.210            NICOTINE DEPENDENCE, CIGARETTES, UNCOMPL                     

 

 2018            MATTHEW PALACIOS FACC, ALI FACP CCDS            Ot            
F32.9            MAJOR DEPRESSIVE DISORDER, SINGLE EPISOD                     

 

 2018            MATTHEW PALACIOS FACC, ALI FACP CCDS            Ot            
F41.9            ANXIETY DISORDER, UNSPECIFIED                     

 

 2018            MATTHEW PALACIOS FACC, ALI FACP CCDS            Ot            
I10            ESSENTIAL (PRIMARY) HYPERTENSION                     

 

 2018            MATTHEW PALACIOS FACC, ALI FACP CCDS            Ot            
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                      

 

 2018            MATTHEW PALACIOS FACC, DARREL FACP CCDS            Ot            
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                     

 

 2018            MATTHEW PALACIOS FACC, ALI FACP CCDS            Ot            
J30.2            OTHER SEASONAL ALLERGIC RHINITIS                     

 

 2018            MATTHEW PALACIOS FACC, ALI FACP CCDS            Ot            
K21.9            GASTRO-ESOPHAGEAL REFLUX DISEASE WITHOUT                     

 

 2018            MATTHEW PALACIOS FACC, ALI FACP CCDS            Ot            
M47.816            SPONDYLOSIS W/O MYELOPATHY OR RADICULOPA                     

 

 2018            MATTHEW PALACIOS FACC, ALI FACP CCDS            Ot            
M53.3            SACROCOCCYGEAL DISORDERS, NOT ELSEWHERE                      

 

 2018            MATTHEW PALACIOS FACC, ALI FACP CCDS            Ot            
M54.16            RADICULOPATHY, LUMBAR REGION                     

 

 2018            MATTHEW PALACIOS FACC, ALI FACP CCDS            Ot            
R07.89            OTHER CHEST PAIN                     

 

 2018            MATTHEW PALACIOS FACC, ALI FACP CCDS            Ot            
Z79.82            LONG TERM (CURRENT) USE OF ASPIRIN                     

 

 2018            MATTHEW PALACIOS FACC, ALI FACP CCDS            Ot            
Z79.899            OTHER LONG TERM (CURRENT) DRUG THERAPY                     

 

 2018            MATTHEW PALACIOS FACC, ALI FACP CCDS            Ot            
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                     

 

 2018            MATTHEW PALACIOS FACC, DARREL FACP CCDS            Ot            
E78.5            HYPERLIPIDEMIA, UNSPECIFIED                     

 

 2018            MATTHEW PALACIOS FACC, ALI FACP CCDS            Ot            
F17.210            NICOTINE DEPENDENCE, CIGARETTES, UNCOMPL                     

 

 2018            MATTHEW PALACIOS FACC, ALI FACP CCDS            Ot            
F32.9            MAJOR DEPRESSIVE DISORDER, SINGLE EPISOD                     

 

 2018            MATTHWE FULTONC, ALI FACP CCDS            Ot            
F41.9            ANXIETY DISORDER, UNSPECIFIED                     

 

 2018            MATTHEW PALACIOS FACC, ALI FACP CCDS            Ot            
I10            ESSENTIAL (PRIMARY) HYPERTENSION                     

 

 2018            MATTHEW PALACIOS FACC, ALI FACP CCDS            Ot            
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                      

 

 2018            MATTHEW PALACIOS FACC, DARREL FACP CCDS            Ot            
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                     

 

 2018            MATTHEW PALACIOS FACC, ALI FACP CCDS            Ot            
J30.2            OTHER SEASONAL ALLERGIC RHINITIS                     

 

 2018            MATTHEW PALACIOS FACC, ALI FACP CCDS            Ot            
K21.9            GASTRO-ESOPHAGEAL REFLUX DISEASE WITHOUT                     

 

 2018            AMTTHEW PALACIOS FACC, ALI FACP CCDS            Ot            
M47.816            SPONDYLOSIS W/O MYELOPATHY OR RADICULOPA                     

 

 2018            MATTHEW PALACIOS FACC, ALI FACP CCDS            Ot            
M53.3            SACROCOCCYGEAL DISORDERS, NOT ELSEWHERE                      

 

 2018            MATTHEW PALACIOS FACC, ALI FACP CCDS            Ot            
M54.16            RADICULOPATHY, LUMBAR REGION                     

 

 2018            MATTHEW PALACIOS FACC, ALI FACP CCDS            Ot            
R07.89            OTHER CHEST PAIN                     

 

 2018            MATTHEW PALACIOS FACC, ALI FACP CCDS            Ot            
Z79.82            LONG TERM (CURRENT) USE OF ASPIRIN                     

 

 2018            MATTHEW PALACIOS FACC, ALI FACP CCDS            Ot            
Z79.899            OTHER LONG TERM (CURRENT) DRUG THERAPY                     

 

 2018            MATTHEW PALACIOS FACC, ALI FACP CCDS            Ot            
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                     

 

 2018            MATTHEW PALACIOS FACC, ALI FACP CCDS            Ot            
E78.5            HYPERLIPIDEMIA, UNSPECIFIED                     

 

 2018            MATTHEW PALACIOS FACC, ALI FACP CCDS            Ot            
F17.210            NICOTINE DEPENDENCE, CIGARETTES, UNCOMPL                     

 

 2018            MATTHEW MD FACC, ALI FACP CCDS            Ot            
F32.9            MAJOR DEPRESSIVE DISORDER, SINGLE EPISOD                     

 

 2018            MATTHEW PALACIOS FACC, ALI FACP CCDS            Ot            
F41.9            ANXIETY DISORDER, UNSPECIFIED                     

 

 2018            MATTHEW PALACIOS FACC, ALI FACP CCDS            Ot            
I10            ESSENTIAL (PRIMARY) HYPERTENSION                     

 

 2018            MATTHEW PALACIOS FACC, ALI FACP CCDS            Ot            
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                      

 

 2018            MATTHEW PALACIOS FACC, ALI FACP CCDS            Ot            
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                     

 

 2018            MATTHEW PALACIOS FACC, ALI FACP CCDS            Ot            
J30.2            OTHER SEASONAL ALLERGIC RHINITIS                     

 

 2018            MATTHEW PALACIOS FACC, ALI FACP CCDS            Ot            
K21.9            GASTRO-ESOPHAGEAL REFLUX DISEASE WITHOUT                     

 

 2018            MATTHEW PALACIOS FACC, ALI FACP CCDS            Ot            
M47.816            SPONDYLOSIS W/O MYELOPATHY OR RADICULOPA                     

 

 2018            MATTHEW PALACIOS FACC, ALI FACP CCDS            Ot            
M53.3            SACROCOCCYGEAL DISORDERS, NOT ELSEWHERE                      

 

 2018            MATTHEW PALACIOS FACC, ALI FACP CCDS            Ot            
M54.16            RADICULOPATHY, LUMBAR REGION                     

 

 2018            MATTHEW PALACIOS FACC, ALI FACP CCDS            Ot            
R07.89            OTHER CHEST PAIN                     

 

 2018            MATHTEW PALACIOS FACC, DARREL FACP CCDS            Ot            
Z79.82            LONG TERM (CURRENT) USE OF ASPIRIN                     

 

 2018            MATTHEW PALACIOS FACC, ALI FACP CCDS            Ot            
Z79.899            OTHER LONG TERM (CURRENT) DRUG THERAPY                     

 

 2018            MATTHEW PALACIOS FACC, ALI FACP CCDS            Ot            
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                     

 

 2018            MATTHEW PALACIOS FACC, ALI FACP CCDS            Ot            
E78.5            HYPERLIPIDEMIA, UNSPECIFIED                     

 

 2018            MATTHEW PALACIOS FACC, ALI FACP CCDS            Ot            
F17.210            NICOTINE DEPENDENCE, CIGARETTES, UNCOMPL                     

 

 2018            MATTHEW PALACIOS FACC, ALI FACP CCDS            Ot            
F32.9            MAJOR DEPRESSIVE DISORDER, SINGLE EPISOD                     

 

 2018            MATTHEW PALACIOS FACC, ALI FACP CCDS            Ot            
F41.9            ANXIETY DISORDER, UNSPECIFIED                     

 

 2018            MATTHEW PALACIOS FACC, ALI FACP CCDS            Ot            
I10            ESSENTIAL (PRIMARY) HYPERTENSION                     

 

 2018            MATTHEW PALACIOS FACC, ALI FACP CCDS            Ot            
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                      

 

 2018            MATTHEW PALACIOS FACC, ALI FACP CCDS            Ot            
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                     

 

 2018            MATTHEW PALACIOS FACC, ALI FACP CCDS            Ot            
J30.2            OTHER SEASONAL ALLERGIC RHINITIS                     

 

 2018            MATTHEW PALACIOS FACC, ALI FACP CCDS            Ot            
K21.9            GASTRO-ESOPHAGEAL REFLUX DISEASE WITHOUT                     

 

 2018            MATTHEW PALACIOS FACC, ALI FACP CCDS            Ot            
M47.816            SPONDYLOSIS W/O MYELOPATHY OR RADICULOPA                     

 

 2018            MATTHEW PALACIOS FACC, DARREL FACP CCDS            Ot            
M53.3            SACROCOCCYGEAL DISORDERS, NOT ELSEWHERE                      

 

 2018            MATTHEW PALACIOS FACC, ALI FACP CCDS            Ot            
M54.16            RADICULOPATHY, LUMBAR REGION                     

 

 2018            MATTHEW PALACIOS FACC, DARREL FACP CCDS            Ot            
R07.89            OTHER CHEST PAIN                     

 

 2018            MATTHEW PALACIOS FACC, DARREL FACP CCDS            Ot            
Z79.82            LONG TERM (CURRENT) USE OF ASPIRIN                     

 

 2018            MATTHEW PALACIOS FACC, ALI FACP CCDS            Ot            
Z79.899            OTHER LONG TERM (CURRENT) DRUG THERAPY                     

 

 2018            MATTHEW PALACIOS FACC, ALI FACP CCDS            Ot            
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                     



                                                                               
                                                                               
                                                                               
                                                                               
                                                                               
                                                                               
                                                                               
                                                                               
                                                                               
                                                                               
                                                                               
                                                                               
                                                                               
                                                                               
                                                                               
                                                                               
                                                                               
                                                                               
                                                                               
                                                                               
                                                                               
                                                                               
  



Procedures

      



There is no data.                  



Results

      





 Test            Result            Range        









 Complete blood count (CBC) with automated white blood cell (WBC) differential 
- 16 17:30         









 Blood leukocytes automated count (number/volume)            11.4 10*3/uL      
      4.3-11.0        

 

 Blood erythrocytes automated count (number/volume)            4.65 10*6/uL    
        4.35-5.85        

 

 Venous blood hemoglobin measurement (mass/volume)            14.5 g/dL        
    11.5-16.0        

 

 Blood hematocrit (volume fraction)            41 %            35-52        

 

 Automated erythrocyte mean corpuscular volume            88 [foz_us]          
  80-99        

 

 Automated erythrocyte mean corpuscular hemoglobin (mass per erythrocyte)      
      31 pg            25-34        

 

 Automated erythrocyte mean corpuscular hemoglobin concentration measurement (
mass/volume)            35 g/dL            32-36        

 

 Automated erythrocyte distribution width ratio            12.8 %            
10.0-14.5        

 

 Automated blood platelet count (count/volume)            244 10*3/uL          
  130-400        

 

 Automated blood platelet mean volume measurement            9.7 [foz_us]      
      7.4-10.4        

 

 Automated blood neutrophils/100 leukocytes            69 %            42-75   
     

 

 Automated blood lymphocytes/100 leukocytes            21 %            12-44   
     

 

 Blood monocytes/100 leukocytes            10 %            0-12        

 

 Automated blood eosinophils/100 leukocytes            0 %            0-10     
   

 

 Automated blood basophils/100 leukocytes            0 %            0-10        

 

 Blood neutrophils automated count (number/volume)            7.9 10*3         
   1.8-7.8        

 

 Blood lymphocytes automated count (number/volume)            2.4 10*3         
   1.0-4.0        

 

 Blood monocytes automated count (number/volume)            1.1 10*3            
0.0-1.0        

 

 Automated eosinophil count            0.0 10*3/uL            0.0-0.3        

 

 Automated blood basophil count (count/volume)            0.0 10*3/uL          
  0.0-0.1        

 

 Blood blood smear finding identification by light microscopy            YES   
          [facility] [facility]        









 Comprehensive metabolic panel - 16 17:30         









 Serum or plasma sodium measurement (moles/volume)            137 mmol/L       
     135-145        

 

 Serum or plasma potassium measurement (moles/volume)            3.2 mmol/L    
        3.6-5.0        

 

 Serum or plasma chloride measurement (moles/volume)            101 mmol/L     
               

 

 Carbon dioxide            21 mmol/L            21-32        

 

 Serum or plasma anion gap determination (moles/volume)            15 mmol/L   
         5-14        

 

 Serum or plasma urea nitrogen measurement (mass/volume)            6 mg/dL    
        7-18        

 

 Serum or plasma creatinine measurement (mass/volume)            0.71 mg/dL    
        0.60-1.30        

 

 Serum or plasma urea nitrogen/creatinine mass ratio            8             
NRG        

 

 Serum or plasma creatinine measurement with calculation of estimated 
glomerular filtration rate            >             NRG        

 

 Serum or plasma glucose measurement (mass/volume)            101 mg/dL        
            

 

 Serum or plasma calcium measurement (mass/volume)            9.7 mg/dL        
    8.5-10.1        

 

 Serum or plasma total bilirubin measurement (mass/volume)            0.3 mg/dL
            0.1-1.0        

 

 Serum or plasma alkaline phosphatase measurement (enzymatic activity/volume)  
          83 U/L                    

 

 Serum or plasma aspartate aminotransferase measurement (enzymatic activity/
volume)            9 U/L            5-34        

 

 Serum or plasma alanine aminotransferase measurement (enzymatic activity/volume
)            6 U/L            0-55        

 

 Serum or plasma protein measurement (mass/volume)            7.7 g/dL         
   6.4-8.2        

 

 Serum or plasma albumin measurement (mass/volume)            4.3 g/dL         
   3.2-4.5        









 A1C - 17 10:41         









 HEMOGLOBIN A1c            5.0 % of total Hgb            <5.7        









 Complete blood count (CBC) with automated white blood cell (WBC) differential 
- 18 16:00         









 Blood leukocytes automated count (number/volume)            8.2 10*3/uL       
     4.3-11.0        

 

 Blood erythrocytes automated count (number/volume)            4.23 10*6/uL    
        4.35-5.85        

 

 Venous blood hemoglobin measurement (mass/volume)            13.2 g/dL        
    11.5-16.0        

 

 Blood hematocrit (volume fraction)            38 %            35-52        

 

 Automated erythrocyte mean corpuscular volume            90 [foz_us]          
  80-99        

 

 Automated erythrocyte mean corpuscular hemoglobin (mass per erythrocyte)      
      31 pg            25-34        

 

 Automated erythrocyte mean corpuscular hemoglobin concentration measurement (
mass/volume)            35 g/dL            32-36        

 

 Automated erythrocyte distribution width ratio            13.4 %            
10.0-14.5        

 

 Automated blood platelet count (count/volume)            251 10*3/uL          
  130-400        

 

 Automated blood platelet mean volume measurement            9.8 [foz_us]      
      7.4-10.4        

 

 Automated blood neutrophils/100 leukocytes            60 %            42-75   
     

 

 Automated blood lymphocytes/100 leukocytes            29 %            12-44   
     

 

 Blood monocytes/100 leukocytes            8 %            0-12        

 

 Automated blood eosinophils/100 leukocytes            3 %            0-10     
   

 

 Automated blood basophils/100 leukocytes            1 %            0-10        

 

 Blood neutrophils automated count (number/volume)            4.9 10*3         
   1.8-7.8        

 

 Blood lymphocytes automated count (number/volume)            2.4 10*3         
   1.0-4.0        

 

 Blood monocytes automated count (number/volume)            0.6 10*3            
0.0-1.0        

 

 Automated eosinophil count            0.2 10*3/uL            0.0-0.3        

 

 Automated blood basophil count (count/volume)            0.0 10*3/uL          
  0.0-0.1        









 PT panel in platelet poor plasma by coagulation assay - 18 16:00         









 Prothrombin time (PT) in platelet poor plasma by coagulation assay            
13.9 s            12.2-14.7        

 

 INR in platelet poor plasma or blood by coagulation assay            1.1      
       0.8-1.4        









 Activated partial thromboplastin time (aPTT) in platelet poor plasma 
bycoagulation assay - 18 16:00         









 Activated partial thromboplastin time (aPTT) in platelet poor plasma 
bycoagulation assay            57 s            24-35        









 Comprehensive metabolic panel - 18 16:00         









 Serum or plasma sodium measurement (moles/volume)            140 mmol/L       
     135-145        

 

 Serum or plasma potassium measurement (moles/volume)            3.6 mmol/L    
        3.6-5.0        

 

 Serum or plasma chloride measurement (moles/volume)            106 mmol/L     
               

 

 Carbon dioxide            24 mmol/L            21-32        

 

 Serum or plasma anion gap determination (moles/volume)            10 mmol/L   
         5-14        

 

 Serum or plasma urea nitrogen measurement (mass/volume)            7 mg/dL    
        7-18        

 

 Serum or plasma creatinine measurement (mass/volume)            0.71 mg/dL    
        0.60-1.30        

 

 Serum or plasma urea nitrogen/creatinine mass ratio            10             
NRG        

 

 Serum or plasma creatinine measurement with calculation of estimated 
glomerular filtration rate            >             NRG        

 

 Serum or plasma glucose measurement (mass/volume)            82 mg/dL         
           

 

 Serum or plasma calcium measurement (mass/volume)            9.1 mg/dL        
    8.5-10.1        

 

 Serum or plasma total bilirubin measurement (mass/volume)            0.4 mg/dL
            0.1-1.0        

 

 Serum or plasma alkaline phosphatase measurement (enzymatic activity/volume)  
          73 U/L                    

 

 Serum or plasma aspartate aminotransferase measurement (enzymatic activity/
volume)            11 U/L            5-34        

 

 Serum or plasma alanine aminotransferase measurement (enzymatic activity/volume
)            7 U/L            0-55        

 

 Serum or plasma protein measurement (mass/volume)            6.9 g/dL         
   6.4-8.2        

 

 Serum or plasma albumin measurement (mass/volume)            4.0 g/dL         
   3.2-4.5        









 Magnesium - 18 16:00         









 Magnesium            2.2 mg/dL            1.8-2.4        









 Lipid 1996 panel - 18 16:00         









 Serum or plasma triglyceride measurement (mass/volume)            67 mg/dL    
        <150        

 

 Serum or plasma cholesterol measurement (mass/volume)            136 mg/dL    
        < 200        

 

 Serum or plasma cholesterol in HDL measurement (mass/volume)            40 mg/
dL            40-60        

 

 Cholesterol in LDL [mass/volume] in serum or plasma by direct assay            
81 mg/dL            1-129        

 

 Serum or plasma cholesterol in VLDL measurement (mass/volume)            13 mg/
dL            5-40        









 Complete blood count (CBC) with automated white blood cell (WBC) differential 
- 18 04:10         









 Blood leukocytes automated count (number/volume)            5.6 10*3/uL       
     4.3-11.0        

 

 Blood erythrocytes automated count (number/volume)            3.87 10*6/uL    
        4.35-5.85        

 

 Venous blood hemoglobin measurement (mass/volume)            12.0 g/dL        
    11.5-16.0        

 

 Blood hematocrit (volume fraction)            35 %            35-52        

 

 Automated erythrocyte mean corpuscular volume            91 [foz_us]          
  80-99        

 

 Automated erythrocyte mean corpuscular hemoglobin (mass per erythrocyte)      
      31 pg            25-34        

 

 Automated erythrocyte mean corpuscular hemoglobin concentration measurement (
mass/volume)            34 g/dL            32-36        

 

 Automated erythrocyte distribution width ratio            13.2 %            
10.0-14.5        

 

 Automated blood platelet count (count/volume)            234 10*3/uL          
  130-400        

 

 Automated blood platelet mean volume measurement            9.7 [foz_us]      
      7.4-10.4        

 

 Automated blood neutrophils/100 leukocytes            42 %            42-75   
     

 

 Automated blood lymphocytes/100 leukocytes            45 %            12-44   
     

 

 Blood monocytes/100 leukocytes            7 %            0-12        

 

 Automated blood eosinophils/100 leukocytes            5 %            0-10     
   

 

 Automated blood basophils/100 leukocytes            1 %            0-10        

 

 Blood neutrophils automated count (number/volume)            2.4 10*3         
   1.8-7.8        

 

 Blood lymphocytes automated count (number/volume)            2.5 10*3         
   1.0-4.0        

 

 Blood monocytes automated count (number/volume)            0.4 10*3            
0.0-1.0        

 

 Automated eosinophil count            0.3 10*3/uL            0.0-0.3        

 

 Automated blood basophil count (count/volume)            0.0 10*3/uL          
  0.0-0.1        









 Whole blood basic metabolic panel - 18 04:10         









 Serum or plasma sodium measurement (moles/volume)            141 mmol/L       
     135-145        

 

 Serum or plasma potassium measurement (moles/volume)            3.9 mmol/L    
        3.6-5.0        

 

 Serum or plasma chloride measurement (moles/volume)            108 mmol/L     
               

 

 Carbon dioxide            23 mmol/L            21-32        

 

 Serum or plasma anion gap determination (moles/volume)            10 mmol/L   
         5-14        

 

 Serum or plasma urea nitrogen measurement (mass/volume)            11 mg/dL   
         7-18        

 

 Serum or plasma creatinine measurement (mass/volume)            0.76 mg/dL    
        0.60-1.30        

 

 Serum or plasma urea nitrogen/creatinine mass ratio            14             
NRG        

 

 Serum or plasma creatinine measurement with calculation of estimated 
glomerular filtration rate            >             NRG        

 

 Serum or plasma glucose measurement (mass/volume)            86 mg/dL         
           

 

 Serum or plasma calcium measurement (mass/volume)            8.4 mg/dL        
    8.5-10.1        









 THYROID STIMULATING HORMONE - 18 04:10         









 THYROID STIMULATING HORMONE            3.35 u[iU]/mL            0.35-4.94     
   









 Methicillin resistant Staphylococcus aureus (MRSA) screening culture -  04:10         









 Methicillin resistant Staphylococcus aureus (MRSA) screening culture          
  NEG             NRG        









 PDM - 09 PANEL (PROFILE 1) - 18 15:24         









 Prescribed Drug 1            Tramadol             NRG        

 

 Creatinine            21.1 mg/dL            > or=20.0        

 

 pH            6.44             4.5 - 9.0        

 

 Oxidant            NEGATIVE mcg/mL            <200        

 

 Amphetamines            NEGATIVE ng/mL            <500        

 

 medMATCH Amphetamines            CONSISTENT             NRG        

 

 Benzodiazepines            POSITIVE ng/mL            <100        

 

 Marijuana Metabolite            NEGATIVE ng/mL            <20        

 

 medMATCH Marijuana Metab            CONSISTENT             NRG        

 

 Cocaine Metabolite            NEGATIVE ng/mL            <150        

 

 medMATCH Cocaine Metab            CONSISTENT             NRG        

 

 Opiates            NEGATIVE ng/mL            <100        

 

 medMATCH Opiates            CONSISTENT             NRG        

 

 Oxycodone            NEGATIVE ng/mL            <100        

 

 medMATCH Oxycodone            CONSISTENT             NRG        

 

 COMMENT                         NRG        

 

   Alphahydroxyalprazolam            195 ng/mL            <25        

 

 medMATCH aOH alprazolam            CONSISTENT             NRG        

 

   Alphahydroxymidazolam            NEGATIVE ng/mL            <50        

 

 medMATCH aOH midazolam            CONSISTENT             NRG        

 

   Alphahydroxytriazolam            NEGATIVE ng/mL            <50        

 

 medMATCH aOH triazolam            CONSISTENT             NRG        

 

   Aminoclonazepam            NEGATIVE ng/mL            <25        

 

 medMATCH Aminoclonazepam            CONSISTENT             NRG        

 

   Hydroxyethylflurazepam            NEGATIVE ng/mL            <50        

 

 medMATCH OH,Et flurazepam            CONSISTENT             NRG        

 

   Lorazepam            NEGATIVE ng/mL            <50        

 

 medMATCH Lorazepam            CONSISTENT             NRG        

 

   Nordiazepam            NEGATIVE ng/mL            <50        

 

 medMATCH Nordiazepam            CONSISTENT             NRG        

 

   Oxazepam            NEGATIVE ng/mL            <50        

 

 medMATCH Oxazepam            CONSISTENT             NRG        

 

   Temazepam            NEGATIVE ng/mL            <50        

 

 medMATCH Temazepam            CONSISTENT             NRG        

 

 Prescribed Drug 2            Xanax(TM)             NRG        

 

 Barbiturates            NEGATIVE ng/mL            <300        

 

 medMATCH Barbiturates            CONSISTENT             NRG        

 

 Methadone Metabolite            NEGATIVE ng/mL            <100        

 

 medMATCH Methadone Metab            CONSISTENT             NRG        

 

 Phencyclidine            NEGATIVE ng/mL            <25        

 

 medMATCH Phencyclidine            CONSISTENT             NRG        



                                          



Encounters

      





 ACCT No.            Visit Date/Time            Discharge            Status    
        Pt. Type            Provider            Facility            Loc./Unit  
          Complaint        

 

 [id number]            2018 14:21:00            2018 16:07:00    
        DIS            Outpatient            MATTHEW PALACIOS FACC, DARREL RODNEY CCDS     
       [facility]            CATH            UNSTABLE 
ANGINA        

 

 [id number]            2018 15:26:00            2018 23:59:59    
        CLS            Outpatient            KIM FUCHS APRN            
[facility]            RAD            S89.92XA INJURY OF 
LT KNEE        

 

 [id number]            2017 13:08:00            2017 23:59:59    
        CLS            Outpatient            CHARISSA PALACIOS, SUBHA REDDY            [facility] 
[facility]            RAD            LOW BACK PAIN        

 

 [id number]            2016 16:31:00            2016 23:59:59    
        CLS            Outpatient            ARIANNA BRADFORD            
[facility]            COUGH,CHEST 
CONGESITON,DIARRHEA,LLQ/LUQ ABD PAIN        

 

 [id number]            2016 20:45:00            2016 17:50:00    
        DIS            Inpatient            GRAY CANALES DOLINE S            
[facility]            ICU            CP,HYPOKALEMIA,
EPIGASTRIC PAIN,PNEUMONIA        

 

 [id number]            2016 17:39:00            2016 14:56:00    
        DIS            Inpatient            SEMAJNDERIKA CORONA CAMI S            
[facility]            4TH            ABD PAIN,VOMITTING  
      

 

 [id number]            2016 11:54:00            2016 16:58:00    
        DIS            Inpatient            SEMAJNDERIKA CORONA CAMI S            
[facility]            4TH             COLLITIS,
DEHYDRATION        

 

 [id number]            2016 12:29:00            2016 23:59:59    
        CLS            Outpatient            ARIANNA BRADFORD APRN            
[facility]            RAD            CERVICAL AND THORAIC 
PAIN        

 

 [id number]            2016 06:54:00            2016 10:15:00    
        DIS            Emergency            DEMARCUS KRAUSE MD            
[facility]            ER            SEVERE BACK PAIN     
   

 

 [id number]            2016 07:38:00            2016 23:59:59    
        CLS            Outpatient            ARIANNA BRADFORD APRN            
[facility]            RAD            THORACIC   LUMBAR 
PAIN        

 

 [id number]            2015 13:43:00            2015 17:39:00    
        DIS            Emergency            VERITO MARTINEZ            
[facility]            ER            DIZZINESS ELEV BP    
    

 

 [id number]            2015 11:40:00            2015 15:30:00    
        DIS            Outpatient            GERRY SALINAS MD            [facility]ia 
[facility]            DEHYDRATION,CHOLITIS    
    

 

 [id number]            2015 10:06:00            05/15/2015 11:00:00    
        DIS            Inpatient            CAMI CANALES DO            
[facility]            SURGICAL            DIARRHEA/
SUSPECT RECURRENT C.DIFF        

 

 [id number]            2015 10:00:00            2015 23:59:59    
        CLS            Preadmit            CAMI CANALES DO            
[facility]            CARD            ABD PAIN        

 

 [id number]            2015 06:32:00            2015 23:59:59    
        CLS            Outpatient            CAMI CANALES DO          
  [facility]            RAD            ABDOMINAL PAIN    
    

 

 [id number]            2015 16:07:00            2015 23:59:59    
        CLS            Outpatient            STARR LARA ARNP      
      [facility]            RAD            PNUEMONIA     
   

 

 [id number]            2015 11:20:00            2015 23:59:59    
        CLS            Outpatient            STARR LARA ARNP      
      [facility]            RAD            COUGH,WHEEZING
        

 

 [id number]            2015 07:04:00            2015 12:20:00    
        DIS            Outpatient            CHARLES PALACIOS, JOSE BRIDGES            
[facility]            LEFT KNEE 
CHONDROMALACIA        

 

 [id number]            12/15/2014 19:30:00            2014 16:50:00    
        DIS            Inpatient            CAMI CANALES DO            
[facility]            CSD            CHEST PAIN        

 

 [id number]            2014 20:22:00            2014 23:00:00    
        DIS            Emergency            DEMARCUS KRAUSE MD            
[facility]            ER            HEADACHE        

 

 [id number]            2014 22:10:00            2014 10:10:00    
        DIS            Outpatient            JSOE MANCINI MD            [facility]ia 
[facility]            CATH            CHEST PAIN        

 

 [id number]            2014 15:53:00            2014 23:59:59    
        CLS            Outpatient            CAMI CANALES DO          
  [facility]            LAB            MALISE   FATIGUE,
ABD PAIN,DYSPEPSIA        

 

 [id number]            10/23/2013 07:50:00            10/23/2013 12:44:00    
        DIS            Outpatient            JOSE SOTO MD            
[facility]            SDC            LEFT KNEE TORN 
MENISCUS        

 

 [id number]            10/22/2013 14:49:00            10/22/2013 23:59:59    
        CLS            Outpatient            JOSE SOTO MD            
[facility]            PREOP            LEFT KNEE TORN 
MEDIAL MENSICUS        

 

 [id number]            10/03/2013 08:32:00            10/03/2013 23:59:59    
        CLS            Outpatient            CAMI CANALES DO S          
  [facility]            RAD            LT KNEE PAIN      
  

 

 [id number]            2013 09:31:00            2013 14:40:00    
        DIS            Inpatient            CAMI CANALES DO            
[facility]            4TH            ABD PAIN NAUSEA/
VOMITING/DIARRHEA HEADACHE        

 

 [id number]            2015 10:52:00                                   
   Document Registration                                                       
     

 

 [id number]            2012 11:00:00                                   
   Document Registration                                                       
     

 

 18 20:09:38            2018 23:59:59        
    CLS            Outpatient            Cami Canales.                
                               

 

 [id number]            10/29/2018 15:40:00            10/29/2018 23:59:59          
  CLS            Outpatient            [facility] FUCHS                         
[facility]                     

 

 [id number]            2018 14:20:00                                      
Document Registration                                                          
  

 

 [id number]            2017 10:40:00                                      
Document Registration

## 2019-01-07 NOTE — XMS REPORT
[facility]

 Created on: 2018



Dayami Rae

External Reference #: [id number]

: 1970

Sex: Female



Demographics







 Address  [address] [place]  [address]

 

 Preferred Language  Unknown

 

 Marital Status  Unknown

 

 Christianity Affiliation  Unknown

 

 Race  Unknown

 

 Ethnic Group  Unknown





Author







 Author  KIM FUCHS

 

 Organization  [facility]

 

 Address  [address] [place]  [address]



 

 Phone  [phone]







Care Team Providers







 Care Team Member Name  Role  Phone

 

 FUCHSKIM JACKSON  Unavailable  [phone]







PROBLEMS







 Type  Condition  ICD9-CM Code  JGF72-DO Code  Onset Dates  Condition Status  
SNOMED Code

 

 Problem  Fibromyalgia     M79.7     Active  973611061

 

 Problem  Neuropathy     G62.9     Active  389478629

 

 Problem  Coronary artery disease involving native coronary artery of native 
heart without angina pectoris     I25.10     Active  [id number]

 

 Problem  Chronic pain syndrome     G89.4     Active  274654178

 

 Problem  COPD suggested by initial evaluation     J44.9     Active  92709976

 

 Problem  Acute midline low back pain with left-sided sciatica     M54.42     
Active  800767547

 

 Problem  Lumbago with sciatica, left side     M54.42     Active  848683036

 

 Problem  Anxiety disorder, unspecified     F41.9     Active  828641941

 

 Problem  Other chronic pain     G89.29     Active  19453031

 

 Problem  Lumbago with sciatica, right side     M54.41     Active  
[id number]







ALLERGIES







 Substance  Reaction  Event Type  Date  Status

 

 Morphine Sulfate  Rash  Drug Allergy  22 Aug, 2017  Active







ENCOUNTERS







 Encounter  Location  Date  Diagnosis

 

 [facility]  [address] [address]
1214  01 May, 2018   

 

 [facility]  [address] [place] [address]-
8676    Chronic pain syndrome G89.4 and Acute midline low back pain 
with left-sided sciatica M54.42

 

 [facility]  [address] [place] [address]857-
2571    Chronic pain syndrome G89.4

 

 [facility]  [address] [address]-
8489     

 

 [facility]  [id number] [place] [address]362-
7442     

 

 [facility]  [id number] [place] [address] 38873-
1358  29 Mar, 2018  Injury of left knee, initial encounter S89.92XA and COPD 
suggested by initial evaluation J44.9

 

 [place] [id number] [place] [address] 89131-
6212  28 Mar, 2018  Acute bronchitis, unspecified organism J20.9

 

 [facility]  [address] [address]-
[phone]  27 Mar, 2018  Acute bronchitis, unspecified organism J20.9

 

 [place] [address] [phone]-
5139  21 Mar, 2018  Anxiety disorder, unspecified F41.9 and Acute bronchitis, 
unspecified organism J20.9

 

 [place] [id number] [place] [address] 7[address]46-
3581  08 Mar, 2018  Chronic pain syndrome G89.4

 

 [place] [id number] [place] [address] 12891-
9706  05 Mar, 2018   

 

 [place] [id number] [place] [address] 72021-
4663  05 Mar, 2018  Acute midline low back pain with left-sided sciatica M54.42

 

 [place] [id number] [place] [address] 61848-
3886     

 

 [facility]  [id number] [place] [address] 87887-
4748    Chronic pain syndrome G89.4

 

 [facility]  [id number] [place] [address] 99299-
4002    Chronic pain syndrome G89.4

 

 [facility]  [id number] [place] [address] 09947-
4021     

 

 [facility]  [id number] [place] [address] 20134-
1825    Gastroenteritis K52.9

 

 [facility]  [address] [place] [address] 88392-
9826     

 

 [facility]  [id number] [place] [address] 60265-
1752  15 Jerardo, 2018  Acute bronchitis, unspecified organism J20.9

 

 [facility]  [id number] [place] [address] 87416-
9842    Anxiety disorder, unspecified F41.9 and Neuropathy G62.9

 

 [facility]  [id number] [place] [address] 19395-
7967    Chronic pain syndrome G89.4

 

 [place] [id number] [place] [address] 06253-
9976    Bronchitis J40 and Laryngitis J04.0

 

 [place] [id number] [place] [address] 68812-
0262  29 Dec, 2017   

 

 [facility]  [id number] [place] [address] 96355-
0893  26 Dec, 2017  Bronchitis J40

 

 [facility]  [id number] [place] [address] 11095-
9054  18 Dec, 2017   

 

 [facility]  [id number] [place] [address] 54397-
8720  13 Dec, 2017  Fibromyalgia M79.7 and Chronic pain syndrome G89.4

 

 [place] [id number] [place] [address] 65821-
3983  04 Dec, 2017  Chronic pain syndrome G89.4 and Acute bronchitis, 
unspecified organism J20.9

 

 [facility]  [id number] [place] [address] 48030-
2627    Neuropathy G62.9

 

 [facility]  [id number] [place] [address] 12014-
4973    Chronic pain syndrome G89.4 ; Lumbago with sciatica, left 
side M54.42 ; Lumbago with sciatica, right side M54.41 ; Screening cholesterol 
level Z13.220 ; Screening for diabetes mellitus Z13.1 ; Screening for thyroid 
disorder Z13.29 ; Fibromyalgia M79.7 ; Anxiety disorder, unspecified F41.9 and 
Neuropathy G62.9

 

 [facility]  [id number] [place] [address] 18992-
8855     

 

 [facility]  [id number] [place] [address] [phone]-
2682  25 Oct, 2017   

 

 [facility]  [id number] [place] [address] 00045-
4297  10 Oct, 2017  Fibromyalgia M79.7 ; Chronic pain syndrome G89.4 ; Anxiety 
disorder, unspecified F41.9 ; Neuropathy G62.9 and Acute bronchitis, 
unspecified organism J20.9

 

 [facility]  [id number] [place] [address] 19769-
4066  09 Oct, 2017   

 

 [facility]  [id number] [place] [address] 60720-
6838  25 Sep, 2017   

 

 [facility]  [id number] [place] [address] [phone]-
6472  19 Sep, 2017   

 

 [facility]  [id number] [place] [address] 91672-
8854  08 Sep, 2017   

 

 [facility]  [id number] [place] [address] 81227-
3741  23 Aug, 2017   

 

 [facility]  [id number] [place] [address] 68059-
2623  22 Aug, 2017  Acute seasonal allergic rhinitis due to pollen J30.1

 

 [facility]  [id number] [place] [address] 70544-
3782  21 Aug, 2017   

 

 [facility]  [id number] [place] [address] 56146-
6816  09 Aug, 2017   

 

 [facility]  [id number] [place] [address] 60703-
3811     

 

 [facility]  [address] [place] [address]-
1003  17 2017   

 

 [facility]  [address] [address]-
6221  10 Jul, 2017   

 

 [facility]  [address] [address]-
2216     

 

 [facility]  [address] [address]-
8969     

 

 [facility]  [id number] [address]-
6223     

 

 [facility]  [address] [place] [address] 16680-
0887    Chronic pain syndrome G89.4 ; Fibromyalgia M79.7 ; Anxiety 
disorder, unspecified F41.9 ; Neuropathy G62.9 and Low back pain M54.5

 

 [facility]  [address] [address]-
6105  25 May, 2017  Low back pain M54.5

 

 [facility]  [address] [address]-
2617  24 May, 2017   

 

 [facility]  [address] [address]-
2516  22 May, 2017   

 

 [facility]  [address] [address]-
4693  16 May, 2017   

 

 [facility]  [address] [address]-
[address]  16 May, 2017   

 

 [facility]  [id number] [address]-
9932  12 May, 2017  Routine adult health maintenance Z00.00 ; Fibromyalgia 
M79.7 ; Chronic pain syndrome G89.4 ; Abnormal lung sounds R09.89 ; Coronary 
artery disease involving native coronary artery of native heart without angina 
pectoris I25.10 and S/P CABG (coronary artery bypass graft) Z95.1

 

 [facility]  [address] [address]-
2546  09 May, 2017   







IMMUNIZATIONS

No Known Immunizations



SOCIAL HISTORY

Never Assessed



REASON FOR VISIT

Earache, started in left ears, pain bilaterally now, c/o sore throat, cough, 
fever up to 102---[id number]



PLAN OF CARE







 Activity  Details









  









 Follow Up  prn Reason:







VITAL SIGNS







 Height  5'2" in  2017

 

 Weight  121 lbs  2017

 

 Temperature  98.3 degrees Fahrenheit  2017

 

 Heart Rate  100 bpm  2017

 

 Respiratory Rate  20   2017

 

 BMI  22.13 kg/m2  2017

 

 Blood pressure systolic  120 mmHg  2017

 

 Blood pressure diastolic  82 mmHg  2017







MEDICATIONS







 Medication  Instructions  Dosage  Frequency  Start Date  End Date  Duration  
Status

 

 Gabapentin 300 MG  Orally Once a day  1 capsule  24h  09 May, 2017     90 days
  Active

 

 Sumatriptan Succinate 6 MG/0.5ML  Subcutaneous Once a day prn migraine  0.5 ml 
as needed              Active

 

 Aspir-81 81 MG  Orally Once a day  1 tablet  24h           Active

 

 Tramadol HCl 50 mg  Orally every 8 hrs prn  1 tablet as needed           28 
days  Active

 

 Xanax 0.25 MG  Orally Twice a day  1 tablet  12h        28 days  Active

 

 Cetirizine HCl 10 mg  Orally Once a day  1 tablet  24h  22 Aug, 2017  20 Nov, 
2017  90 days  Active

 

 Seroquel 200 mg  Orally Once a day  2.5 tablet at bedtime  24h        90 days  
Active

 

 Mucinex 600 MG  Orally every 12 hrs  1 tablet as needed  12h  22 Aug, 2017  21 
Sep, 2017  30 days  Active

 

 Promethazine HCl 25 MG  Orally 4 times a day prn  1 tablet as needed           
30 days  Active

 

 Chlorzoxazone 500 mg  Orally Three times a day  1 tablet  8h        30 days  
Active







RESULTS

No Results



PROCEDURES

No Known procedures



INSTRUCTIONS





MEDICATIONS ADMINISTERED

No Known Medications



MEDICAL (GENERAL) HISTORY







 Type  Description  Date

 

 Medical History  fibromyalgia   

 

 Medical History  MRI 2016- small central protrusion/herniation w/minimal 
impression on thecal anteriorly at C5-6, At C3-4 small bilat. uncinate spurs w/ 
mild right neural foraminal narrowing   

 

 Medical History  MRI 2016- mild degenerative changes. No spinal canal 
stenosis or foraminal narrowing of thoracic spine   

 

 Medical History  hypertension   

 

 Medical History  Anxiety disorder   

 

 Medical History  depression   

 

 Medical History  migraine headaches   

 

 Medical History  Hx of C-Diff   

 

 Medical History  Hx of Pneumonia   

 

 Surgical History  Open Heart Surgery - [facility] -Dr. Lima  (
Cardiologist- Dr Logan)  

 

 Surgical History  hysterectomy - Dr Luis   

 

 Surgical History  Left Breast Lumpectomy (Bx - Benign)- Dr Luis   

 

 Surgical History  Port - Dr Luis   

 

 Surgical History  Left Knee Surgeries x3- Dr Carolina did 2 and Dr Gan did 1   

 

 Hospitalization History  C-Diff - [facility]   

 

 Hospitalization History  Pneumonia - [facility]   

 

 Hospitalization History  Open Heart Surgery - [facility]

## 2019-01-07 NOTE — XMS REPORT
[facility]

 Created on: 2018



Dayami Rae

External Reference #: [id number]

: 1970

Sex: Female



Demographics







 Address  [address] [place]  [address]

 

 Preferred Language  Unknown

 

 Marital Status  Unknown

 

 Adventism Affiliation  Unknown

 

 Race  Unknown

 

 Ethnic Group  Unknown





Author







 Author  KIM FUCHS

 

 Organization  [facility]

 

 Address  [address] [place]  [address]



 

 Phone  [phone]







Care Team Providers







 Care Team Member Name  Role  Phone

 

 FUCHSKIM JACKSON  Unavailable  [phone]







PROBLEMS







 Type  Condition  ICD9-CM Code  GLV05-RU Code  Onset Dates  Condition Status  
SNOMED Code

 

 Problem  Neuropathy     G62.9     Active  289389295

 

 Problem  Lumbago with sciatica, left side     M54.42     Active  656534863

 

 Problem  Anxiety disorder, unspecified     F41.9     Active  066221716

 

 Problem  Chronic pain syndrome     G89.4     Active  933945748

 

 Problem  Fibromyalgia     M79.7     Active  906722477

 

 Problem  Coronary artery disease involving native coronary artery of native 
heart without angina pectoris     I25.10     Active  [id number]

 

 Problem  Essential hypertension     I10     Active  28501011

 

 Problem  GERD with esophagitis     K21.0     Active  526680637

 

 Problem  Other chronic pain     G89.29     Active  62914339

 

 Problem  Lumbago with sciatica, right side     M54.41     Active  
[id number]

 

 Problem  COPD suggested by initial evaluation     J44.9     Active  98974156

 

 Problem  Acute midline low back pain with left-sided sciatica     M54.42     
Active  [id number]







ALLERGIES

No Information



ENCOUNTERS







 Encounter  Location  Date  Diagnosis

 

 [facility]  [address], [place] [address]397  07 Sep, 2018  Acute bronchitis, unspecified organism J20.9

 

 [facility]  [id number] [address]
9040  28 Aug, 2018  Fibromyalgia M79.7

 

 [facility]  [id number] [place] [address]-
2044  20 Aug, 2018   

 

 [facility]  [id number] [place] [address]-
7327  16 Aug, 2018  Neuropathy G62.9

 

 [facility]  [address] [place] [address]-
2395    Essential hypertension I10 ; Coronary artery disease 
involving native coronary artery of native heart without angina pectoris I25.10 
; Fibromyalgia M79.7 ; GERD with esophagitis K21.0 and Tobacco abuse counseling 
Z71.6

 

 [facility]  [id number] [place] [address]80-
3206    Long term (current) use of opiate analgesic Z79.891

 

 [facility]  [id number] [place] [address] 59848-
4302     

 

 [facility]  [id number] [place] [address] 78760-
8441    Acute bronchitis, unspecified organism J20.9

 

 [facility]  [id number] [place] [address]21-
3873     

 

 [facility]  [id number] [place] [address] 59571-
7739     

 

 [facility]  [id number] [place] [address] 78319-
3569    Chronic pain syndrome G89.4

 

 [facility]  [id number] [place] [address] 55105-
3414     

 

 [facility]  [id number] [place] [address]20-
6925  15 Marko, 2018   

 

 [facility]  [id number] [place] [address] 96037-
0614    Acute bronchitis, unspecified organism J20.9

 

 [facility]  [id number] [place] [address] 68773-
6055    Chronic pain syndrome G89.4

 

 [facility]  [id number] [place] [address] 03537-
3276  25 May, 2018   

 

 [facility]  [id number] [place] [address] 44021-
2351  24 May, 2018  Acute midline low back pain with left-sided sciatica M54.42

 

 [facility]  [id number] [place] [address] 78867-
9994  22 May, 2018   

 

 [facility]  [address] [address]
-[phone]  21 May, 2018  Bronchitis J40

 

 [facility]  [id number] [place] [address]-
5541  07 May, 2018  Chronic pain syndrome G89.4 and Neuropathy G62.9

 

 [facility]  [address] [address]-
9529    Acute midline low back pain with left-sided sciatica M54.42

 

 [facility]  [address] [address]-
3997     

 

 [facility]  [address] [address]-
5212    Anxiety disorder, unspecified F41.9

 

 [facility]  [id number] [place] [address] 50632-
1434     

 

 [facility]  [address] [address]-
2011    Chronic pain syndrome G89.4 and Acute midline low back pain 
with left-sided sciatica M54.42

 

 [facility]  [id number] [place] [address] 02281-
0588    Chronic pain syndrome G89.4

 

 [facility]  [id number] [place] [address] 85602-
7250     

 

 [facility]  [id number] [place] [address] 15988-
9235     

 

 [facility]  [id number] [place] [address] 69291-
3061  29 Mar, 2018  Injury of left knee, initial encounter S89.92XA and COPD 
suggested by initial evaluation J44.9

 

 [facility]  [id number] [place] [address] 02655-
1989  28 Mar, 2018  Acute bronchitis, unspecified organism J20.9

 

 [facility]  [id number] [place] [address] 28446-
1401  27 Mar, 2018  Acute bronchitis, unspecified organism J20.9

 

 [facility]  [id number] [place] [address] 07404-
5828  21 Mar, 2018  Anxiety disorder, unspecified F41.9 and Acute bronchitis, 
unspecified organism J20.9

 

 [facility]  [id number] [place] [address] 37043-
3733  08 Mar, 2018  Chronic pain syndrome G89.4

 

 [facility]  [id number] [place] [address] 01231-
4732  05 Mar, 2018   

 

 [facility]  [id number] [place] [address] 14088-
4757  05 Mar, 2018  Acute midline low back pain with left-sided sciatica M54.42

 

 [facility]  [id number] [place] [address] 57975-
6464     

 

 [facility]  [id number] [place] [address] 41126-
9223    Chronic pain syndrome G89.4

 

 [facility]  [id number] [place] [address] 06302-
8767    Chronic pain syndrome G89.4

 

 [facility]  [id number] [place] [address] 10747-
0420     

 

 [facility]  [id number] [place] [address] 97360-
1433    Gastroenteritis K52.9

 

 [facility]  [id number] [place] [address] 69175-
0658     

 

 [facility]  [id number] [place] [address] 90707-
1660  15 Jerardo, 2018  Acute bronchitis, unspecified organism J20.9

 

 [facility] [id number] [place] [address]-
8644    Anxiety disorder, unspecified F41.9 and Neuropathy G62.9

 

 [address] [address]-
0342    Chronic pain syndrome G89.4

 

 [address] [address]-
8492    Bronchitis J40 and Laryngitis J04.0

 

 [address] [address]-
7740  29 Dec, 2017   

 

 [address] [address]-
7681  26 Dec, 2017  Bronchitis J40

 

 [address] [address]-
5929  18 Dec, 2017   

 

 [address] [address]-
1560  13 Dec, 2017  Fibromyalgia M79.7 and Chronic pain syndrome G89.4

 

 [address] [address]-
9711  04 Dec, 2017  Chronic pain syndrome G89.4 and Acute bronchitis, 
unspecified organism J20.9

 

 [id number] [place] [address] 32091-
7430    Neuropathy G62.9

 

 [address] [address]-
0380    Chronic pain syndrome G89.4 ; Lumbago with sciatica, left 
side M54.42 ; Lumbago with sciatica, right side M54.41 ; Screening cholesterol 
level Z13.220 ; Screening for diabetes mellitus Z13.1 ; Screening for thyroid 
disorder Z13.29 ; Fibromyalgia M79.7 ; Anxiety disorder, unspecified F41.9 and 
Neuropathy G62.9

 

 [address] [id number] [place] [address] 21051-
4072     

 

 [facility]  [address], [place] [address]-
2285  25 Oct, 2017   

 

 [facility]  [id number] [place] [address] 09824-
8015  10 Oct, 2017  Fibromyalgia M79.7 ; Chronic pain syndrome G89.4 ; Anxiety 
disorder, unspecified F41.9 ; Neuropathy G62.9 and Acute bronchitis, 
unspecified organism J20.9

 

 [facility]  [id number] [place] [address] 84927-
9387  09 Oct, 2017   

 

 [facility]  [id number] [place] [address] 92408-
9376  25 Sep, 2017   

 

 [facility]  [id number] [place] [address] 89723-
1265  19 Sep, 2017   

 

 [facility]  [id number] [place] [address] 08103-
2580  08 Sep, 2017   

 

 [facility]  [id number] [place] [address] 84491-
1844  23 Aug, 2017   

 

 [facility]  [id number] [address]-
3614  22 Aug, 2017  Acute seasonal allergic rhinitis due to pollen J30.1

 

 [facility]  [address], [place] [address]-
8856  21 Aug, 2017   

 

 [facility]  [address] [address]-
2934  09 Aug, 2017   

 

 [facility]  [address] [address]-
8132     

 

 [facility]  [address], [place] [address]-
4388     

 

 [facility]  [address] [address]-
4195  10 Jul, 2017   

 

 [facility]  [address], [place] [address]-
6498     

 

 [facility]  [address] [address]-
2339     

 

 [facility]  [id number] [place] [address] 56930-
6850     

 

 [facility]  [id number] [place] [address] 70313-
4555    Chronic pain syndrome G89.4 ; Fibromyalgia M79.7 ; Anxiety 
disorder, unspecified F41.9 ; Neuropathy G62.9 and Low back pain M54.5

 

 [facility]  [address], [place] [address]-
4495  25 May, 2017  Low back pain M54.5

 

 [facility]  [id number] [place] [address] 70982-
5786  24 May, 2017   

 

 [facility]  [id number] [place] [address] 45680-
3660  22 May, 2017   

 

 [facility]  [id number] [place] [address] 43553-
3110  16 May, 2017   

 

 [facility]  [id number] [place] [address] 15628-
7487  16 May, 2017   

 

 [facility]  [id number] [place] [address] 13348-
1358  12 May, 2017  Routine adult health maintenance Z00.00 ; Fibromyalgia 
M79.7 ; Chronic pain syndrome G89.4 ; Abnormal lung sounds R09.89 ; Coronary 
artery disease involving native coronary artery of native heart without angina 
pectoris I25.10 and S/P CABG (coronary artery bypass graft) Z95.1

 

 [facility]  [address], [place] [address]-
9164  09 May, 2017   







IMMUNIZATIONS

No Known Immunizations



SOCIAL HISTORY

Never Assessed



REASON FOR VISIT

Controlled Med Refill



PLAN OF CARE





VITAL SIGNS





MEDICATIONS







 Medication  Instructions  Dosage  Frequency  Start Date  End Date  Duration  
Status

 

 Promethazine HCl 25 MG  Orally 4 times a day  1 tablet as needed  6h     6 Sep
, 2018  14 days  Active

 

 Xanax 0.25 MG  Orally Twice a day  1 tablet  12h        28 days  Active







RESULTS

No Results



PROCEDURES

No Known procedures



INSTRUCTIONS





MEDICATIONS ADMINISTERED

No Known Medications



MEDICAL (GENERAL) HISTORY







 Type  Description  Date

 

 Medical History  fibromyalgia   

 

 Medical History  MRI 2016- small central protrusion/herniation w/minimal 
impression on thecal anteriorly at C5-6, At C3-4 small bilat. uncinate spurs w/ 
mild right neural foraminal narrowing   

 

 Medical History  MRI 2016- mild degenerative changes. No spinal canal 
stenosis or foraminal narrowing of thoracic spine   

 

 Medical History  hypertension   

 

 Medical History  Anxiety disorder   

 

 Medical History  depression   

 

 Medical History  migraine headaches   

 

 Medical History  Hx of C-Diff   

 

 Medical History  Hx of Pneumonia   

 

 Medical History  heart cath w/ stents placed 7/3/18   

 

 Surgical History  Open Heart Surgery - [facility] -Dr. Lima  (
Cardiologist- Dr Logan)  

 

 Surgical History  hysterectomy - Dr Luis   

 

 Surgical History  Left Breast Lumpectomy (Bx - Benign)- Dr Luis   

 

 Surgical History  Port - Dr Luis   

 

 Surgical History  Left Knee Surgeries x3- Dr Carolina did 2 and Dr Gan did 1   

 

 Surgical History  cath/stent  

 

 Hospitalization History  C-Diff - [facility]   

 

 Hospitalization History  Pneumonia - [facility]   

 

 Hospitalization History  Open Heart Surgery - [facility]

## 2019-01-07 NOTE — XMS REPORT
[facility]

 Created on: 2018



Dayami Rae

External Reference #: [id number]

: 1970

Sex: Female



Demographics







 Address  [address] [place]  [address]

 

 Preferred Language  Unknown

 

 Marital Status  Unknown

 

 Faith Affiliation  Unknown

 

 Race  Unknown

 

 Ethnic Group  Unknown





Author







 Author  KIM FUCHS

 

 Organization  [facility]

 

 Address  [address] [place]  [address]



 

 Phone  [phone]







Care Team Providers







 Care Team Member Name  Role  Phone

 

 FUCHSKIM JACKSON  Unavailable  [phone]







PROBLEMS







 Type  Condition  ICD9-CM Code  ONB81-VP Code  Onset Dates  Condition Status  
SNOMED Code

 

 Problem  Fibromyalgia     M79.7     Active  904462700

 

 Problem  Neuropathy     G62.9     Active  339503043

 

 Problem  Coronary artery disease involving native coronary artery of native 
heart without angina pectoris     I25.10     Active  [id number]

 

 Problem  Chronic pain syndrome     G89.4     Active  009816732

 

 Problem  COPD suggested by initial evaluation     J44.9     Active  18185389

 

 Problem  Acute midline low back pain with left-sided sciatica     M54.42     
Active  715322969

 

 Problem  Lumbago with sciatica, left side     M54.42     Active  855528038

 

 Problem  Anxiety disorder, unspecified     F41.9     Active  974824237

 

 Problem  Other chronic pain     G89.29     Active  47248668

 

 Problem  Lumbago with sciatica, right side     M54.41     Active  
[id number]







ALLERGIES







 Substance  Reaction  Event Type  Date  Status

 

 Morphine Sulfate  Rash  Drug Allergy    Active







ENCOUNTERS







 Encounter  Location  Date  Diagnosis

 

 [facility]  [address], [place] [address]-
7473     

 

 [facility]  [id number] [place] [address] 78687-
1666  15 Marko, 2018   

 

 [facility]  [id number] [place] [address] 30166-
8017    Acute bronchitis, unspecified organism J20.9

 

 [facility]  [id number] [place] [address] 51708-
1895    Chronic pain syndrome G89.4

 

 [facility]  [id number] [place] [address] 10538-
7389  25 May, 2018   

 

 [facility]  [id number] [place] [address] 82204-
5790  24 May, 2018  Acute midline low back pain with left-sided sciatica M54.42

 

 [facility]  [id number] [place] [address] 33267-
5165  22 May, 2018   

 

 [facility]  [id number] [place] [address] [phone]
-[phone]  21 May, 2018  Bronchitis J40

 

 [facility]  [id number] [place] [address] 43240-
4940  07 May, 2018  Chronic pain syndrome G89.4 and Neuropathy G62.9

 

 [facility]  [id number] [place] [address] 20499-
2541    Acute midline low back pain with left-sided sciatica M54.42

 

 [facility]  [id number] [place] [address] 40305-
2743     

 

 [facility]  [id number] [place] [address] 28288-
5119    Anxiety disorder, unspecified F41.9

 

 [facility]  [id number] [place] [address] 45260-
7730     

 

 [facility]  [id number] [place] [address] 64685-
0363    Chronic pain syndrome G89.4 and Acute midline low back pain 
with left-sided sciatica M54.42

 

 [facility]  [id number] [place] [address] 24278-
4355    Chronic pain syndrome G89.4

 

 [facility]  [id number] [place] [address] 46553-
9149     

 

 [facility]  [id number] [place] [address] 95289-
3173     

 

 [facility]  [id number] [place] [address] 40642-
6174  29 Mar, 2018  Injury of left knee, initial encounter S89.92XA and COPD 
suggested by initial evaluation J44.9

 

 [facility]  [address] [address]-
3612  28 Mar, 2018  Acute bronchitis, unspecified organism J20.9

 

 [facility]  [address] [address]-
8621  27 Mar, 2018  Acute bronchitis, unspecified organism J20.9

 

 [facility]  [address] [address]-
0564  21 Mar, 2018  Anxiety disorder, unspecified F41.9 and Acute bronchitis, 
unspecified organism J20.9

 

 [place] [address] [address]-
6620  08 Mar, 2018  Chronic pain syndrome G89.4

 

 [place] [address] [address]-
3374  05 Mar, 2018   

 

 [facility]  [address] [address]-
4387  05 Mar, 2018  Acute midline low back pain with left-sided sciatica M54.42

 

 [place] [address] [address]-
7563     

 

 [facility]  [address] [address]-
8087    Chronic pain syndrome G89.4

 

 [facility]  [address] 89175-
6842    Chronic pain syndrome G89.4

 

 [facility]  [id number] [place] [address] 84329-
2275     

 

 [facility]  [address] [address]-
6493    Gastroenteritis K52.9

 

 [facility]  [address] [address]-
1490     

 

 [facility] [id number] [place] [address] 20816-
0839  15 Jerardo, 2018  Acute bronchitis, unspecified organism J20.9

 

 [place] [id number] [place] [address] 50605-
2190    Anxiety disorder, unspecified F41.9 and Neuropathy G62.9

 

 [place] [id number] [place] [address] 04003-
3608    Chronic pain syndrome G89.4

 

 [place] [id number] [place] [address] 33629-
4351    Bronchitis J40 and Laryngitis J04.0

 

 [place] [id number] [place] [address] 33893-
7041  29 Dec, 2017   

 

 [place] [id number] [place] [address] 18877-
3115  26 Dec, 2017  Bronchitis J40

 

 [place] [id number] [place] [address] 38424-
1071  18 Dec, 2017   

 

 [place] [id number] [place] [address] 52531-
0633  13 Dec, 2017  Fibromyalgia M79.7 and Chronic pain syndrome G89.4

 

 [place] [id number] [place] [address] 46011-
9757  04 Dec, 2017  Chronic pain syndrome G89.4 and Acute bronchitis, 
unspecified organism J20.9

 

 [place] [id number] [place] [address] 53048-
9974    Neuropathy G62.9

 

 [place] [address] [place] [address] 50132-
9817    Chronic pain syndrome G89.4 ; Lumbago with sciatica, left 
side M54.42 ; Lumbago with sciatica, right side M54.41 ; Screening cholesterol 
level Z13.220 ; Screening for diabetes mellitus Z13.1 ; Screening for thyroid 
disorder Z13.29 ; Fibromyalgia M79.7 ; Anxiety disorder, unspecified F41.9 and 
Neuropathy G62.9

 

 [facility]  [address] [address]-
3326     

 

 [facility]  [id number] [place] [address] 36936-
5306  25 Oct, 2017   

 

 [facility]  [id number] [place] [address] 18504-
5349  10 Oct, 2017  Fibromyalgia M79.7 ; Chronic pain syndrome G89.4 ; Anxiety 
disorder, unspecified F41.9 ; Neuropathy G62.9 and Acute bronchitis, 
unspecified organism J20.9

 

 [facility]  [id number] [place] [address] 42481-
4746  09 Oct, 2017   

 

 [facility]  [id number] [place] [address] 14151-
2343  25 Sep, 2017   

 

 [facility]  [id number] [place] [address] 44440-
1776  19 Sep, 2017   

 

 [facility]  [id number] [place] [address] 13924-
9513  08 Sep, 2017   

 

 [facility]  [id number] [place] [address] 11157-
0745  23 Aug, 2017   

 

 [facility]  [id number] [place] [address] 28048-
1046  22 Aug, 2017  Acute seasonal allergic rhinitis due to pollen J30.1

 

 [facility]  [id number] [address]-
6293  21 Aug, 2017   

 

 [facility]  [id number] [place] [address] 31945-
3250  09 Aug, 2017   

 

 [facility]  [id number] [place] [address] 75183-
0357     

 

 [facility]  [address] [place] [address] 02907-
4107     

 

 [facility]  [id number] [place] [address] 26516-
0026  10 Jul, 2017   

 

 [facility]  [address] [address]-
2660     

 

 [facility]  [id number] [place] [address] 22187-
7020     

 

 [facility]  [id number] [place] [address] 31522-
0778     

 

 [facility]  [id number] [place] [address] 09378-
9717    Chronic pain syndrome G89.4 ; Fibromyalgia M79.7 ; Anxiety 
disorder, unspecified F41.9 ; Neuropathy G62.9 and Low back pain M54.5

 

 [facility]  [id number] [place] [address] 28954-
8397  25 May, 2017  Low back pain M54.5

 

 [facility]  [id number] [place] [address] 11740-
4680  24 May, 2017   

 

 [facility]  [id number] [place] [address] 00583-
4930  22 May, 2017   

 

 [facility]  [id number] [place] [address] 78069-
3417  16 May, 2017   

 

 [facility]  [address] [place] [address] 20455-
9998  16 May, 2017   

 

 [facility]  [id number] [place] [address] 99725-
2792  12 May, 2017  Routine adult health maintenance Z00.00 ; Fibromyalgia 
M79.7 ; Chronic pain syndrome G89.4 ; Abnormal lung sounds R09.89 ; Coronary 
artery disease involving native coronary artery of native heart without angina 
pectoris I25.10 and S/P CABG (coronary artery bypass graft) Z95.1

 

 [facility]  [address], [place] [address]-
5705  09 May, 2017   







IMMUNIZATIONS

No Known Immunizations



SOCIAL HISTORY

Never Assessed



REASON FOR VISIT

Worsening Cough, congestion and loss of voice: saw Dr. Huerter last week, 
symptoms continue. Lost voice two days ago. States has been running fever on 
and off, as high as 102, taking tylenol for fever.  desiree alfaro



PLAN OF CARE







 Activity  Details









  









 Follow Up  prn Reason:







VITAL SIGNS







 Height  5'2" in  2018

 

 Weight  127.6 lbs  2018

 

 Temperature  97.8 degrees Fahrenheit  2018

 

 Heart Rate  100 bpm  2018

 

 Respiratory Rate  22   2018

 

 Oximetry  98 %  2018

 

 BMI  23.34 kg/m2  2018

 

 Blood pressure systolic  124 mmHg  2018

 

 Blood pressure diastolic  74 mmHg  2018







MEDICATIONS







 Medication  Instructions  Dosage  Frequency  Start Date  End Date  Duration  
Status

 

 PredniSONE 20 mg  Orally Once a day  1 tablet  24h    
07 days  Active

 

 Amitriptyline HCl 25 MG  Orally Once a day  1 tablet  24h  10 Oct, 2017     30 
days  Active

 

 Aspir-81 81 MG  Orally Once a day  1 tablet  24h           Active

 

 ProAir  (90 Base) MCG/ACT  Inhalation every 4 hrs  2 puffs as needed  
4h  10 Oct, 2017     12 months  Active

 

 Gabapentin 300 MG  Orally Once a day  1 capsule  24h  09 May, 2017     90 days
  Active

 

 Chlorzoxazone 500 mg  Orally Three times a day  1 tablet  8h       
30 days  Active

 

 Xanax 0.25 MG  Orally Twice a day  1 tablet  12h        28 days  Active

 

 Seroquel 200 mg  Orally Once a day  2.5 tablet at bedtime  24h        30 days  
Active

 

 Promethazine HCl 25 MG  Orally 4 times a day prn  1 tablet as needed           
30 days  Active

 

 Tramadol HCl 50 mg  Orally every 6 hrs  1 tablet as needed  6h    
   28 days  Active

 

 Promethazine-Codeine 6.25-10 MG/5ML  Orally every 4 hrs  1-2 tsp as needed  4h
  26 Dec, 2017        Active

 

 Sumatriptan Succinate 6 MG/0.5ML  Subcutaneous Once a day prn migraine  0.5 ml 
as needed              Active

 

 Tylenol     1 tab              Active

 

 Doxycycline Hyclate 100 mg  Orally Twice a day  1 capsule  12h  26 Dec, 2017  
2 2018  07 days  Active







RESULTS

No Results



PROCEDURES







 Procedure  Date Ordered  Result  Body Site

 

 MEASURE BLOOD OXYGEN LEVEL  2018      







INSTRUCTIONS





MEDICATIONS ADMINISTERED

No Known Medications



MEDICAL (GENERAL) HISTORY







 Type  Description  Date

 

 Medical History  fibromyalgia   

 

 Medical History  MRI 2016- small central protrusion/herniation w/minimal 
impression on thecal anteriorly at C5-6, At C3-4 small bilat. uncinate spurs w/ 
mild right neural foraminal narrowing   

 

 Medical History  MRI 2016- mild degenerative changes. No spinal canal 
stenosis or foraminal narrowing of thoracic spine   

 

 Medical History  hypertension   

 

 Medical History  Anxiety disorder   

 

 Medical History  depression   

 

 Medical History  migraine headaches   

 

 Medical History  Hx of C-Diff   

 

 Medical History  Hx of Pneumonia   

 

 Surgical History  Open Heart Surgery - [facility] -Dr. Lima  (
Cardiologist- Dr Logan)  

 

 Surgical History  hysterectomy - Dr Luis   

 

 Surgical History  Left Breast Lumpectomy (Bx - Benign)- Dr Luis   

 

 Surgical History  Port - Dr Luis   

 

 Surgical History  Left Knee Surgeries x3- Dr Carolina did 2 and Dr Gan did 1   

 

 Hospitalization History  C-Diff - [facility]   

 

 Hospitalization History  Pneumonia - [facility]   

 

 Hospitalization History  Open Heart Surgery - [facility]

## 2019-01-07 NOTE — XMS REPORT
[facility]

 Created on: 2018



Dayami Rae

External Reference #: [id number]

: 1970

Sex: Female



Demographics







 Address  [address] [place]  [address]

 

 Preferred Language  Unknown

 

 Marital Status  Unknown

 

 Jehovah's witness Affiliation  Unknown

 

 Race  Unknown

 

 Ethnic Group  Unknown





Author







 Author  KIM FUCHS

 

 Organization  [facility]

 

 Address  [address] [place]  [address]



 

 Phone  [phone]







Care Team Providers







 Care Team Member Name  Role  Phone

 

 FUCHSJAMA JACKSONELE  Unavailable  [phone]







PROBLEMS







 Type  Condition  ICD9-CM Code  JVL32-ZQ Code  Onset Dates  Condition Status  
SNOMED Code

 

 Problem  Fibromyalgia     M79.7     Active  378871320

 

 Problem  Neuropathy     G62.9     Active  903048104

 

 Problem  Coronary artery disease involving native coronary artery of native 
heart without angina pectoris     I25.10     Active  [id number]

 

 Problem  Chronic pain syndrome     G89.4     Active  170413944

 

 Problem  COPD suggested by initial evaluation     J44.9     Active  58651046

 

 Problem  Acute midline low back pain with left-sided sciatica     M54.42     
Active  589981696

 

 Problem  Lumbago with sciatica, left side     M54.42     Active  605199813

 

 Problem  Anxiety disorder, unspecified     F41.9     Active  797964638

 

 Problem  Other chronic pain     G89.29     Active  64888041

 

 Problem  Lumbago with sciatica, right side     M54.41     Active  
[id number]







ALLERGIES

No Information



ENCOUNTERS







 Encounter  Location  Date  Diagnosis

 

 [place] [id number] [place] [address] 11787-
[phone]     

 

 [facility]  [id number] [place] [address] 22523-
4269  15 Marko, 2018   

 

 [facility]  [id number] [place] [address] 94714-
7912    Acute bronchitis, unspecified organism J20.9

 

 [facility]  [id number] [place] [address] 44428-
4192    Chronic pain syndrome G89.4

 

 [facility]  [id number] [place] [address] 95768-
7213  25 May, 2018   

 

 [facility]  [address] [place] [address]-
6173  24 May, 2018  Acute midline low back pain with left-sided sciatica M54.42

 

 [facility]  [id number] [place] [address] 62862-
3574  22 May, 2018   

 

 [facility]  [id number] [place] [address] [phone]
-[phone]  21 May, 2018  Bronchitis J40

 

 [facility]  [address] [address]-
7416  07 May, 2018  Chronic pain syndrome G89.4 and Neuropathy G62.9

 

 [facility]  [address] [address]-
3401    Acute midline low back pain with left-sided sciatica M54.42

 

 [facility]  [address] [address]-
4692     

 

 [facility]  [address] [address]-
5832    Anxiety disorder, unspecified F41.9

 

 [facility]  [id number] [place] [address] 58668-
0927     

 

 [facility]  [id number] [place] [address] 05349-
8961    Chronic pain syndrome G89.4 and Acute midline low back pain 
with left-sided sciatica M54.42

 

 [facility]  [id number] [place] [address] 16476-
7625    Chronic pain syndrome G89.4

 

 [facility]  [id number] [place] [address] 75222-
5897     

 

 [facility]  [id number] [place] [address] 73543-
4880     

 

 [facility]  [id number] [place] [address] 53752-
8481  29 Mar, 2018  Injury of left knee, initial encounter S89.92XA and COPD 
suggested by initial evaluation J44.9

 

 [facility]  [id number] [place] [address] 42857-
4938  28 Mar, 2018  Acute bronchitis, unspecified organism J20.9

 

 [facility]  [id number] [place] [address] 46043-
0730  27 Mar, 2018  Acute bronchitis, unspecified organism J20.9

 

 [facility]  [address] [address]-
3010  21 Mar, 2018  Anxiety disorder, unspecified F41.9 and Acute bronchitis, 
unspecified organism J20.9

 

 [place] [address] [address]-
4061  08 Mar, 2018  Chronic pain syndrome G89.4

 

 [place] [address] [address]-
2312  05 Mar, 2018   

 

 [facility]  [address] [address]-
8051  05 Mar, 2018  Acute midline low back pain with left-sided sciatica M54.42

 

 [place] [address] [address]-
1462     

 

 [facility]  [id number] [place] [address] 96093-
8116    Chronic pain syndrome G89.4

 

 [facility]  [id number] [place] [address] 65769-
4221    Chronic pain syndrome G89.4

 

 [facility]  [id number] [place] [address] 99842-
9487     

 

 [facility]  [address] [address]-
8523    Gastroenteritis K52.9

 

 [facility]  [id number] [place] [address] 17595-
7764     

 

 [facility]  [address] [address]-
9817  15 Jerardo, 2018  Acute bronchitis, unspecified organism J20.9

 

 [place] [id number] [place] [address] 56757-
6454    Anxiety disorder, unspecified F41.9 and Neuropathy G62.9

 

 [place] [id number] [place] [address] 49435-
9446    Chronic pain syndrome G89.4

 

 [place] [address] 70863-
1422    Bronchitis J40 and Laryngitis J04.0

 

 [place] [address] [address]449-
4389  29 Dec, 2017   

 

 [place] [address] [address]-
2236  26 Dec, 2017  Bronchitis J40

 

 [place] [address] 9[address]00-
7206  18 Dec, 2017   

 

 [place] [address] [address]-
5997  13 Dec, 2017  Fibromyalgia M79.7 and Chronic pain syndrome G89.4

 

 [place] [id number] [place] [address] 29476-
5642  04 Dec, 2017  Chronic pain syndrome G89.4 and Acute bronchitis, 
unspecified organism J20.9

 

 [place] [id number] [place] [address] 17629-
2936    Neuropathy G62.9

 

 [place] [id number] [place] [address] 51730-
8463    Chronic pain syndrome G89.4 ; Lumbago with sciatica, left 
side M54.42 ; Lumbago with sciatica, right side M54.41 ; Screening cholesterol 
level Z13.220 ; Screening for diabetes mellitus Z13.1 ; Screening for thyroid 
disorder Z13.29 ; Fibromyalgia M79.7 ; Anxiety disorder, unspecified F41.9 and 
Neuropathy G62.9

 

 [place] [address], [place] [address]-
3012     

 

 [facility]  [id number] [place] [address] 55311-
9916  25 Oct, 2017   

 

 [facility]  [id number] [place] [address]-
7625  10 Oct, 2017  Fibromyalgia M79.7 ; Chronic pain syndrome G89.4 ; Anxiety 
disorder, unspecified F41.9 ; Neuropathy G62.9 and Acute bronchitis, 
unspecified organism J20.9

 

 [facility]  [address] [address]-
5925  09 Oct, 2017   

 

 [facility]  [id number] [place] [address] 15920-
7808  25 Sep, 2017   

 

 [facility]  [id number] [place] [address] 23573-
6831  19 Sep, 2017   

 

 [facility]  [id number] [place] [address] 44368-
7630  08 Sep, 2017   

 

 [facility]  [address] [address]-
3768  23 Aug, 2017   

 

 [facility]  [address] [place] [address] 32080-
4814  22 Aug, 2017  Acute seasonal allergic rhinitis due to pollen J30.1

 

 [facility]  [address] [address]-
5870  21 Aug, 2017   

 

 [facility]  [address] [address]-
3406  09 Aug, 2017   

 

 [facility]  [address] [address]-
5292     

 

 [facility]  [address] [address]-
1066     

 

 [facility]  [address] [address]-
9724  10 Jul, 2017   

 

 [facility]  [address] [place] [address]-
1183     

 

 [facility]  [id number] [place] [address] 36049-
4045     

 

 [facility]  [id number] [place] [address] 21687-
0129     

 

 [facility]  [id number] [place] [address] 38076-
1091    Chronic pain syndrome G89.4 ; Fibromyalgia M79.7 ; Anxiety 
disorder, unspecified F41.9 ; Neuropathy G62.9 and Low back pain M54.5

 

 [facility]  [id number] [place] [address] 29149-
7032  25 May, 2017  Low back pain M54.5

 

 [facility]  [id number] [place] [address] 25914-
3917  24 May, 2017   

 

 [facility]  [id number] [place] [address] 49482-
5976  22 May, 2017   

 

 [facility]  [id number] [place] [address] 78788-
0597  16 May, 2017   

 

 [facility]  [id number] [place] [address] 38448-
1520  16 May, 2017   

 

 [facility]  [id number] [place] [address] 54492-
8281  12 May, 2017  Routine adult health maintenance Z00.00 ; Fibromyalgia 
M79.7 ; Chronic pain syndrome G89.4 ; Abnormal lung sounds R09.89 ; Coronary 
artery disease involving native coronary artery of native heart without angina 
pectoris I25.10 and S/P CABG (coronary artery bypass graft) Z95.1

 

 [facility]  [address] [place] [address] 97050-
8611  09 May, 2017   







IMMUNIZATIONS

No Known Immunizations



SOCIAL HISTORY

Never Assessed



REASON FOR VISIT

Controlled Refill Request 



PLAN OF CARE





VITAL SIGNS





MEDICATIONS

Unknown Medications



RESULTS

No Results



PROCEDURES

No Known procedures



INSTRUCTIONS





MEDICATIONS ADMINISTERED

No Known Medications



MEDICAL (GENERAL) HISTORY







 Type  Description  Date

 

 Medical History  fibromyalgia   

 

 Medical History  MRI 2016- small central protrusion/herniation w/minimal 
impression on thecal anteriorly at C5-6, At C3-4 small bilat. uncinate spurs w/ 
mild right neural foraminal narrowing   

 

 Medical History  MRI 2016- mild degenerative changes. No spinal canal 
stenosis or foraminal narrowing of thoracic spine   

 

 Medical History  hypertension   

 

 Medical History  Anxiety disorder   

 

 Medical History  depression   

 

 Medical History  migraine headaches   

 

 Medical History  Hx of C-Diff   

 

 Medical History  Hx of Pneumonia   

 

 Surgical History  Open Heart Surgery - [facility] -Dr. Lima  (
Cardiologist- Dr Logan)  

 

 Surgical History  hysterectomy - Dr Luis   

 

 Surgical History  Left Breast Lumpectomy (Bx - Benign)- Dr Luis   

 

 Surgical History  Port - Dr Luis   

 

 Surgical History  Left Knee Surgeries x3- Dr Carolina did 2 and Dr Gan did 1   

 

 Hospitalization History  C-Diff - [facility]   

 

 Hospitalization History  Pneumonia - [facility]   

 

 Hospitalization History  Open Heart Surgery - [facility]

## 2019-01-07 NOTE — XMS REPORT
[facility]

 Created on: 10/04/2018



Dayami Rae

External Reference #: [id number]

: 1970

Sex: Female



Demographics







 Address  [address] [place]  [address]

 

 Preferred Language  Unknown

 

 Marital Status  Unknown

 

 Christian Affiliation  Unknown

 

 Race  Unknown

 

 Ethnic Group  Unknown





Author







 Author  JOSE LOPEZ

 

 Organization  [facility]

 

 Address  [address] [place]  [address]



 

 Phone  [phone]







Care Team Providers







 Care Team Member Name  Role  Phone

 

 JOSE LOPEZ  Unavailable  [phone]







PROBLEMS







 Type  Condition  ICD9-CM Code  ATT59-TZ Code  Onset Dates  Condition Status  
SNOMED Code

 

 Problem  Chronic pain syndrome     G89.4     Active  478840708

 

 Problem  Neuropathy     G62.9     Active  970981629

 

 Problem  Coronary artery disease involving native coronary artery of native 
heart without angina pectoris     I25.10     Active  [id number]

 

 Problem  Fibromyalgia     M79.7     Active  323254044

 

 Problem  Chronic obstructive pulmonary disease, unspecified COPD type     
J44.9     Active  00987568

 

 Problem  Essential hypertension     I10     Active  05987285

 

 Problem  Lumbago with sciatica, left side     M54.42     Active  706828425

 

 Problem  Anxiety disorder, unspecified     F41.9     Active  784387892

 

 Problem  GERD with esophagitis     K21.0     Active  649082227

 

 Problem  Lumbago with sciatica, right side     M54.41     Active  
[id number]







ALLERGIES







 Substance  Reaction  Event Type  Date  Status

 

 Morphine Sulfate  Rash  Drug Allergy  07 Sep, 2018  Active







ENCOUNTERS







 Encounter  Location  Date  Diagnosis

 

 [facility]  [address] [place] [phone]-
[phone]  27 Sep, 2018  Fibromyalgia M79.7

 

 [facility]  [id number] [place] [address] 59457-
1035  20 Sep, 2018   

 

 [facility]  [address] [address]-
5673  19 Sep, 2018   

 

 [facility]  [id number] [place] [address] 82072-
5949  17 Sep, 2018  Pneumonia of right lower lobe due to infectious organism 
J18.1 and Chronic obstructive pulmonary disease, unspecified COPD type J44.9

 

 [facility]  [id number] [place] [address] 27556-
8278  14 Sep, 2018  Pneumonia of right lower lobe due to infectious organism 
J18.1 ; Chronic obstructive pulmonary disease, unspecified COPD type J44.9 ; 
Chronic nausea R11.0 and Cough R05

 

 [place] [address] [place] [address] 79541-
8979  07 Sep, 2018  Acute bronchitis, unspecified organism J20.9

 

 [place] [address] [address]-
7085  28 Aug, 2018  Fibromyalgia M79.7

 

 [place] [address] [address]-
0586  20 Aug, 2018   

 

 [place] [address] [address]-
9440  16 Aug, 2018  Neuropathy G62.9

 

 [place] [address] [address]-
1945    Essential hypertension I10 ; Coronary artery disease 
involving native coronary artery of native heart without angina pectoris I25.10 
; Fibromyalgia M79.7 ; GERD with esophagitis K21.0 and Tobacco abuse counseling 
Z71.6

 

 [place] [address] [address]-
5814    Long term (current) use of opiate analgesic Z79.891

 

 [place] [address] [address]-
2473     

 

 [place] [address] [address]-
4184    Acute bronchitis, unspecified organism J20.9

 

 [place] [address] [place] [address] 33036-
2968     

 

 [place] [address] [address]-
6001     

 

 [place] [address] [address]-
3196    Chronic pain syndrome G89.4

 

 [place] [address] [place] [address] 55050-
9017     

 

 [facility]  [address] [place] [address] 36002-
6569  15 Marko, 2018   

 

 [facility]  [address] [place] [address] 56824-
6300    Acute bronchitis, unspecified organism J20.9

 

 [facility]  [address] 69651-
6178    Chronic pain syndrome G89.4

 

 [facility]  [address] [place] [address] 60534-
7295  25 May, 2018   

 

 [facility]  [address] [place] [address] 11445-
3645  24 May, 2018  Acute midline low back pain with left-sided sciatica M54.42

 

 [facility]  [address] [place] [address] 49085-
6160  22 May, 2018   

 

 [facility]  [address] [place] [address] [phone]
-[phone]  21 May, 2018  Bronchitis J40

 

 [facility]  [address] [place] [address] 35230-
7526  07 May, 2018  Chronic pain syndrome G89.4 and Neuropathy G62.9

 

 [facility]  [address] [place] [address] 62859-
9082    Acute midline low back pain with left-sided sciatica M54.42

 

 [facility]  [address] [place] [address] 03406-
3088     

 

 [facility]  [address] [place] [address] 99246-
0233    Anxiety disorder, unspecified F41.9

 

 [facility]  [address] [place] [address] 91310-
9372     

 

 [facility]  [address] [place] [address] 49489-
3442    Chronic pain syndrome G89.4 and Acute midline low back pain 
with left-sided sciatica M54.42

 

 [place] [address] 15258-
1267    Chronic pain syndrome G89.4

 

 [facility]  [address] [address]687-
3302     

 

 [facility]  [address] 81796-
1604     

 

 [place] [address] [address]66-
1711  29 Mar, 2018  Injury of left knee, initial encounter S89.92XA and COPD 
suggested by initial evaluation J44.9

 

 [place] [address] 71664-
7375  28 Mar, 2018  Acute bronchitis, unspecified organism J20.9

 

 [place] [address] 17319-
2260  27 Mar, 2018  Acute bronchitis, unspecified organism J20.9

 

 [place] [address] 91049-
8531  21 Mar, 2018  Anxiety disorder, unspecified F41.9 and Acute bronchitis, 
unspecified organism J20.9

 

 [place] [address] [place] [address] 91526-
3266  08 Mar, 2018  Chronic pain syndrome G89.4

 

 [facility]  [address] [place] [address] 67258-
0944  05 Mar, 2018   

 

 [place] [address] 56869-
3294  05 Mar, 2018  Acute midline low back pain with left-sided sciatica M54.42

 

 [place] [address] [place] [address] 65950-
1342     

 

 [place] [address] 78195-
1054    Chronic pain syndrome G89.4

 

 [facility]  [address] [place] [address] 43285-
5856    Chronic pain syndrome G89.4

 

 [facility]  [address] [place] [address] 96865-
8028     

 

 [facility]  [address] 98913-
5217    Gastroenteritis K52.9

 

 [facility]  [address] 74059-
4708     

 

 [facility]  [address] 89641-
5078  15 Jerardo, 2018  Acute bronchitis, unspecified organism J20.9

 

 [place] [address] 65838-
2153    Anxiety disorder, unspecified F41.9 and Neuropathy G62.9

 

 [facility]  [address] [place] [address] 16045-
4507    Chronic pain syndrome G89.4

 

 [facility]  [address] [place] [address] 58319-
1194    Bronchitis J40 and Laryngitis J04.0

 

 [facility]  [address] [place] [address] 78402-
7540  29 Dec, 2017   

 

 [facility]  [address] [place] [address] 02602-
0473  26 Dec, 2017  Bronchitis J40

 

 [facility]  [address] [place] [address] 33153-
1453  18 Dec, 2017   

 

 [facility]  [address] [place] [address] 30844-
8099  13 Dec, 2017  Fibromyalgia M79.7 and Chronic pain syndrome G89.4

 

 [facility]  [address] 82525-
4908  04 Dec, 2017  Chronic pain syndrome G89.4 and Acute bronchitis, 
unspecified organism J20.9

 

 [facility]  [address] [place] [address] 16848-
2404    Neuropathy G62.9

 

 [facility]  [address] [place] [address] 19627-
9178    Chronic pain syndrome G89.4 ; Lumbago with sciatica, left 
side M54.42 ; Lumbago with sciatica, right side M54.41 ; Screening cholesterol 
level Z13.220 ; Screening for diabetes mellitus Z13.1 ; Screening for thyroid 
disorder Z13.29 ; Fibromyalgia M79.7 ; Anxiety disorder, unspecified F41.9 and 
Neuropathy G62.9

 

 [facility]  [address] [place] [address] 79663-
0509     

 

 [facility]  [address] [place] [address] 68748-
8975  25 Oct, 2017   

 

 [facility]  [address] [place] [address] 71374-
7292  10 Oct, 2017  Fibromyalgia M79.7 ; Chronic pain syndrome G89.4 ; Anxiety 
disorder, unspecified F41.9 ; Neuropathy G62.9 and Acute bronchitis, 
unspecified organism J20.9

 

 [facility]  [address] [place] [address] 44981-
7106  09 Oct, 2017   

 

 [facility]  [address] [place] [address] 76118-
9360  25 Sep, 2017   

 

 [facility]  [address] [place] [address] 01698-
7630  19 Sep, 2017   

 

 [facility]  [address] [place] [address] 36568-
3294  08 Sep, 2017   

 

 [facility]  [address] [place] [address] 45119-
7091  23 Aug, 2017   

 

 [facility]  [address] [place] [address] 15111-
3502  22 Aug, 2017  Acute seasonal allergic rhinitis due to pollen J30.1

 

 [facility]  [address] [address]-
2558  21 Aug, 2017   

 

 [facility]  [address] [address]-
7299  09 Aug, 2017   

 

 [facility]  [address] [place] [address] 72153-
5536     

 

 [facility]  [address] [place] [address] 91622-
0997     

 

 [facility]  [address] [place] [address] 34096-
9593  10 Jul, 2017   

 

 [facility]  [address] [place] [address] 37020-
3568     

 

 [facility]  [address] [place] [address] 67503-
7374     

 

 [facility]  [address] [place] [address] 95699-
9599     

 

 [facility]  [address] [place] [address] 58379-
6377    Chronic pain syndrome G89.4 ; Fibromyalgia M79.7 ; Anxiety 
disorder, unspecified F41.9 ; Neuropathy G62.9 and Low back pain M54.5

 

 [facility]  [address] [address]-
2204  25 May, 2017  Low back pain M54.5

 

 [facility]  [address] [address]-
7744  24 May, 2017   

 

 [facility]  [address] [address]-
8614  22 May, 2017   

 

 [facility]  [address] [address]-
1741  16 May, 2017   

 

 [facility]  [address] [address]-
2546  16 May, 2017   

 

 [facility]  [address] [facility] [id number] [address]-
2546  12 May, 2017  Routine adult health maintenance Z00.00 ; Fibromyalgia 
M79.7 ; Chronic pain syndrome G89.4 ; Abnormal lung sounds R09.89 ; Coronary 
artery disease involving native coronary artery of native heart without angina 
pectoris I25.10 and S/P CABG (coronary artery bypass graft) Z95.1

 

 [facility]  [address] [facility] [id number] [address]-
2546  09 May, 2017   







IMMUNIZATIONS

No Known Immunizations



SOCIAL HISTORY

Never Assessed



REASON FOR VISIT

Possible Pneumonia-HEATH delong, patient stated she'e been having a cough and 
chest congestion for about a week. coughing up yellow phlegm. she also states 
she a fever about 102. she took tylenol and it went away. she having chills



PLAN OF CARE







 Activity  Details









  









 Follow Up  prn Reason:







VITAL SIGNS







 Height  5'2" in  2018

 

 Weight  124.8 lbs  2018

 

 Temperature  96.8 degrees Fahrenheit  2018

 

 Heart Rate  83 bpm  2018

 

 Respiratory Rate  18   2018

 

 Oximetry  on room air:95 %  2018

 

 BMI  22.82 kg/m2  2018

 

 Blood pressure systolic  120 mmHg  2018

 

 Blood pressure diastolic  84 mmHg  2018







MEDICATIONS







 Medication  Instructions  Dosage  Frequency  Start Date  End Date  Duration  
Status

 

 Metoprolol Succinate ER 25 MG  Orally Once a day  1 tablet  24h        90 days
  Active

 

 Gabapentin 300 MG  Orally Once a day  1 capsule  24h  09 May, 2017     30 days
  Active

 

 Tylenol     1 tab              Active

 

 Sumatriptan Succinate 6 MG/0.5ML  Subcutaneous Once a day prn migraine  0.5 ml 
as needed           14 days  Active

 

 Chlorzoxazone 500 mg  Orally Three times a day if needed  1 tablet           
30  Active

 

 Aspir-81 81 MG  Orally Once a day  1 tablet  24h           Active

 

 Symbicort 80-4.5 MCG/ACT  Inhalation Twice a day  2 puffs  12h  29 Mar, 2018  
29 Mar, 2023  12 months  Not-Taking

 

 Seroquel 200 mg  Orally Once a day  2.5 tablet at bedtime  24h        30  
Active

 

 Clopidogrel Bisulfate 75 MG  Orally Once a day  1 tablet  24h        90 days  
Active

 

 Omeprazole 20 mg  Orally Once a day  1 capsule  24h        90 days  Active

 

 Chantix 0.5 MG  Orally Once a day  1/2 tab daily x 3 days, 1/2 tab bid x 3 days
, then 1 tab bid  24h  31 Jul, 2018  29 Oct, 2018  30 day(s)  Active

 

 Doxycycline Hyclate 100 mg  Orally Once a day  1 tablet  24h  07 Sep, 2018  17 
Sep, 2018  10 day(s)  Active

 

 Chantix 1 MG     1/2 tablet daily x3 days, 1/2 tablet twice a day x3 days, 
then 1 tablet twice daily thereafter             Not-Taking

 

 Lipitor 40 MG  Orally HS  1 tablet              Active

 

 Enalapril Maleate 5 mg  Orally Once a day  1 tablet  24h       90 
days  Active

 

 Xanax 0.25 MG  Orally Twice a day  1 tablet  12h        28 days  Active

 

 ProAir  (90 Base) MCG/ACT  Inhalation every 4 hrs  2 puffs as needed  
4h  10 Oct, 2017     12 months  Active

 

 Tramadol HCl 50 mg  Orally every 6 hrs  1 tablet as needed  6h    
   28 days  Active







RESULTS

No Results



PROCEDURES

No Known procedures



INSTRUCTIONS





MEDICATIONS ADMINISTERED

No Known Medications



MEDICAL (GENERAL) HISTORY







 Type  Description  Date

 

 Medical History  fibromyalgia   

 

 Medical History  MRI 2016- small central protrusion/herniation w/minimal 
impression on thecal anteriorly at C5-6, At C3-4 small bilat. uncinate spurs w/ 
mild right neural foraminal narrowing   

 

 Medical History  MRI 2016- mild degenerative changes. No spinal canal 
stenosis or foraminal narrowing of thoracic spine   

 

 Medical History  hypertension   

 

 Medical History  Anxiety disorder   

 

 Medical History  depression   

 

 Medical History  migraine headaches   

 

 Medical History  Hx of C-Diff   

 

 Medical History  Hx of Pneumonia   

 

 Medical History  heart cath w/ stents placed 7/3/18   

 

 Surgical History  Open Heart Surgery - [facility] -Dr. Lima  (
Cardiologist- Dr Logan)  

 

 Surgical History  hysterectomy - Dr Luis   

 

 Surgical History  Left Breast Lumpectomy (Bx - Benign)- Dr Luis   

 

 Surgical History  Port - Dr Luis   

 

 Surgical History  Left Knee Surgeries x3- Dr Carolina did 2 and Dr Gan did 1   

 

 Surgical History  cath/stent  

 

 Hospitalization History  C-Diff - [facility]   

 

 Hospitalization History  Pneumonia - [facility]   

 

 Hospitalization History  Open Heart Surgery - [facility]

## 2019-01-07 NOTE — XMS REPORT
[facility]

 Created on: 2018



Dayami Rae

External Reference #: [id number]

: 1970

Sex: Female



Demographics







 Address  [address] [place]  [address]

 

 Preferred Language  Unknown

 

 Marital Status  Unknown

 

 Sabianist Affiliation  Unknown

 

 Race  Unknown

 

 Ethnic Group  Unknown





Author







 Author  KIM FUCHS

 

 Organization  [facility]

 

 Address  [address] [place]  [address]



 

 Phone  [phone]







Care Team Providers







 Care Team Member Name  Role  Phone

 

 FUCHSJAMA JACKSONELE  Unavailable  [phone]







PROBLEMS







 Type  Condition  ICD9-CM Code  XHA13-NI Code  Onset Dates  Condition Status  
SNOMED Code

 

 Problem  Fibromyalgia     M79.7     Active  060341495

 

 Problem  Neuropathy     G62.9     Active  159852998

 

 Problem  Coronary artery disease involving native coronary artery of native 
heart without angina pectoris     I25.10     Active  1150216977416

 

 Problem  Chronic pain syndrome     G89.4     Active  104094644

 

 Problem  COPD suggested by initial evaluation     J44.9     Active  78217852

 

 Problem  Acute midline low back pain with left-sided sciatica     M54.42     
Active  210519118

 

 Problem  Lumbago with sciatica, left side     M54.42     Active  510387418

 

 Problem  Anxiety disorder, unspecified     F41.9     Active  708370534

 

 Problem  Other chronic pain     G89.29     Active  99166973

 

 Problem  Lumbago with sciatica, right side     M54.41     Active  
[id number]







ALLERGIES

No Information



ENCOUNTERS







 Encounter  Location  Date  Diagnosis

 

 [place] [id number] [place] [address] [phone]-
[phone]     

 

 [facility]  [id number] [place] [address] 42215-
8979  15 Marko, 2018   

 

 [facility]  [id number] [place] [address] 96998-
0628    Acute bronchitis, unspecified organism J20.9

 

 [facility]  [id number] [place] [address] 41287-
7548    Chronic pain syndrome G89.4

 

 [facility]  [id number] [place] [address] 00140-
5682  25 May, 2018   

 

 [facility]  [address] [place] [address]-
0792  24 May, 2018  Acute midline low back pain with left-sided sciatica M54.42

 

 [facility]  [id number] [place] [address] 41868-
9432  22 May, 2018   

 

 [facility]  [id number] [place] [address] [phone]
[phone]  21 May, 2018  Bronchitis J40

 

 [facility]  [address] [address]-
5785  07 May, 2018  Chronic pain syndrome G89.4 and Neuropathy G62.9

 

 [facility]  [address] [address]-
4525    Acute midline low back pain with left-sided sciatica M54.42

 

 [facility]  [address] [address]-
1415     

 

 [facility]  [address] [address]-
8057    Anxiety disorder, unspecified F41.9

 

 [facility]  [id number] [place] [address] 33252-
7975     

 

 [facility]  [id number] [place] [address] 35538-
7109    Chronic pain syndrome G89.4 and Acute midline low back pain 
with left-sided sciatica M54.42

 

 [facility]  [id number] [place] [address] 51866-
9010    Chronic pain syndrome G89.4

 

 [facility]  [id number] [place] [address] 60146-
9613     

 

 [facility]  [id number] [place] [address] 60177-
9122     

 

 [facility]  [id number] [place] [address] 46644-
6825  29 Mar, 2018  Injury of left knee, initial encounter S89.92XA and COPD 
suggested by initial evaluation J44.9

 

 [facility]  [id number] [place] [address] 28733-
7022  28 Mar, 2018  Acute bronchitis, unspecified organism J20.9

 

 [facility]  [id number] [place] [address] 84372-
4326  27 Mar, 2018  Acute bronchitis, unspecified organism J20.9

 

 [facility]  [address] [address]-
7026  21 Mar, 2018  Anxiety disorder, unspecified F41.9 and Acute bronchitis, 
unspecified organism J20.9

 

 [place] [address] [address]-
3196  08 Mar, 2018  Chronic pain syndrome G89.4

 

 [place] [address] [address]757-
4156  05 Mar, 2018   

 

 [facility]  [address] [address]-
8216  05 Mar, 2018  Acute midline low back pain with left-sided sciatica M54.42

 

 [place] [address] [address]-
1423     

 

 [facility]  [id number] [place] [address] 57194-
0334    Chronic pain syndrome G89.4

 

 [facility]  [id number] [place] [address] 74372-
0447    Chronic pain syndrome G89.4

 

 [facility]  [id number] [place] [address] 83698-
3036     

 

 [facility]  [address] [address]-
3760    Gastroenteritis K52.9

 

 [facility]  [id number] [place] [address] 13688-
1279     

 

 [facility]  [address] [address]-
0671  15 Jerardo, 2018  Acute bronchitis, unspecified organism J20.9

 

 [place] [id number] [place] [address] 23255-
4274    Anxiety disorder, unspecified F41.9 and Neuropathy G62.9

 

 [place] [id number] [place] [address] 64471-
4324    Chronic pain syndrome G89.4

 

 [place] [address] [address]-
8064    Bronchitis J40 and Laryngitis J04.0

 

 [place] [address] 8[address]34-
9885  29 Dec, 2017   

 

 [place] [address] 8[address]84-
1147  26 Dec, 2017  Bronchitis J40

 

 [place] [address] 98464-
3475  18 Dec, 2017   

 

 [place] [address] [address]-
7742  13 Dec, 2017  Fibromyalgia M79.7 and Chronic pain syndrome G89.4

 

 [place] [id number] [place] [address] 98332-
3554  04 Dec, 2017  Chronic pain syndrome G89.4 and Acute bronchitis, 
unspecified organism J20.9

 

 [place] [id number] [place] [address] 51582-
2951    Neuropathy G62.9

 

 [place] [id number] [place] [address] 27743-
3755    Chronic pain syndrome G89.4 ; Lumbago with sciatica, left 
side M54.42 ; Lumbago with sciatica, right side M54.41 ; Screening cholesterol 
level Z13.220 ; Screening for diabetes mellitus Z13.1 ; Screening for thyroid 
disorder Z13.29 ; Fibromyalgia M79.7 ; Anxiety disorder, unspecified F41.9 and 
Neuropathy G62.9

 

 [place] [address], [place] [address]-
2131     

 

 [facility]  [id number] [place] [address] 13745-
3114  25 Oct, 2017   

 

 [facility]  [id number] [address]-
7363  10 Oct, 2017  Fibromyalgia M79.7 ; Chronic pain syndrome G89.4 ; Anxiety 
disorder, unspecified F41.9 ; Neuropathy G62.9 and Acute bronchitis, 
unspecified organism J20.9

 

 [facility]  [id number] [address]-
1507  09 Oct, 2017   

 

 [facility]  [id number] [place] [address] 12455-
5098  25 Sep, 2017   

 

 [facility]  [id number] [place] [address] 10589-
5641  19 Sep, 2017   

 

 [facility]  [id number] [place] [address] 37272-
0755  08 Sep, 2017   

 

 [facility]  [id number] [address]-
8065  23 Aug, 2017   

 

 [facility]  [address] [place] [address] 44739-
4759  22 Aug, 2017  Acute seasonal allergic rhinitis due to pollen J30.1

 

 [facility]  [id number] [address]-
1419  21 Aug, 2017   

 

 [facility]  [id number] [address]-
2292  09 Aug, 2017   

 

 [facility]  [id number] [address]-
8953     

 

 [facility]  [id number] [address]-
1913     

 

 [facility]  [id number] [address]-
4576  10 Jul, 2017   

 

 [facility]  [id number] [address]-
7649     

 

 [facility]  [id number] [place] [address] 35921-
8809     

 

 [facility]  [id number] [place] [address] 93528-
3335     

 

 [facility]  [id number] [place] [address] 34556-
6653    Chronic pain syndrome G89.4 ; Fibromyalgia M79.7 ; Anxiety 
disorder, unspecified F41.9 ; Neuropathy G62.9 and Low back pain M54.5

 

 [facility]  [id number] [place] [address] 47631-
2197  25 May, 2017  Low back pain M54.5

 

 [facility]  [id number] [place] [address] 92746-
9298  24 May, 2017   

 

 [facility]  [id number] [place] [address] 36702-
1261  22 May, 2017   

 

 [facility]  [id number] [place] [address] 94453-
9871  16 May, 2017   

 

 [facility]  [id number] [place] [address] 39342-
8592  16 May, 2017   

 

 [facility]  [id number] [place] [address] 85229-
6319  12 May, 2017  Routine adult health maintenance Z00.00 ; Fibromyalgia 
M79.7 ; Chronic pain syndrome G89.4 ; Abnormal lung sounds R09.89 ; Coronary 
artery disease involving native coronary artery of native heart without angina 
pectoris I25.10 and S/P CABG (coronary artery bypass graft) Z95.1

 

 [facility]  [id number] [place] [address] 16566-
1863  09 May, 2017   







IMMUNIZATIONS

No Known Immunizations



SOCIAL HISTORY

Never Assessed



REASON FOR VISIT

Medication refill request



PLAN OF CARE





VITAL SIGNS





MEDICATIONS







 Medication  Instructions  Dosage  Frequency  Start Date  End Date  Duration  
Status

 

 Promethazine HCl 25 MG  Orally 4 times a day prn  1 tablet as needed           
30 days  Active







RESULTS

No Results



PROCEDURES

No Known procedures



INSTRUCTIONS





MEDICATIONS ADMINISTERED

No Known Medications



MEDICAL (GENERAL) HISTORY







 Type  Description  Date

 

 Medical History  fibromyalgia   

 

 Medical History  MRI 2016- small central protrusion/herniation w/minimal 
impression on thecal anteriorly at C5-6, At C3-4 small bilat. uncinate spurs w/ 
mild right neural foraminal narrowing   

 

 Medical History  MRI 2016- mild degenerative changes. No spinal canal 
stenosis or foraminal narrowing of thoracic spine   

 

 Medical History  hypertension   

 

 Medical History  Anxiety disorder   

 

 Medical History  depression   

 

 Medical History  migraine headaches   

 

 Medical History  Hx of C-Diff   

 

 Medical History  Hx of Pneumonia   

 

 Surgical History  Open Heart Surgery - [facility] -Dr. Lima  (
Cardiologist- Dr Logan)  

 

 Surgical History  hysterectomy - Dr Luis   

 

 Surgical History  Left Breast Lumpectomy (Bx - Benign)- Dr Luis   

 

 Surgical History  Port - Dr Luis   

 

 Surgical History  Left Knee Surgeries x3- Dr Carolina did 2 and Dr Gan did 1   

 

 Hospitalization History  C-Diff - [facility]   

 

 Hospitalization History  Pneumonia - [facility]   

 

 Hospitalization History  Open Heart Surgery - [facility]

## 2019-01-07 NOTE — XMS REPORT
[facility]

 Created on: 2018



Dayami Rae

External Reference #: [id number]

: 1970

Sex: Female



Demographics







 Address  [address] [place]  [address]

 

 Preferred Language  Unknown

 

 Marital Status  Unknown

 

 Zoroastrianism Affiliation  Unknown

 

 Race  Unknown

 

 Ethnic Group  Unknown





Author







 Author  KIM FUCHS

 

 Organization  [facility]

 

 Address  [address] [place]  [address]



 

 Phone  [phone]







Care Team Providers







 Care Team Member Name  Role  Phone

 

 FUCHSJAMA JACKSONELE  Unavailable  [phone]







PROBLEMS







 Type  Condition  ICD9-CM Code  TVG58-QM Code  Onset Dates  Condition Status  
SNOMED Code

 

 Problem  Neuropathy     G62.9     Active  835148435

 

 Problem  Lumbago with sciatica, left side     M54.42     Active  280179480

 

 Problem  Anxiety disorder, unspecified     F41.9     Active  584954159

 

 Problem  Chronic pain syndrome     G89.4     Active  253153807

 

 Problem  Fibromyalgia     M79.7     Active  441466285

 

 Problem  Coronary artery disease involving native coronary artery of native 
heart without angina pectoris     I25.10     Active  9676898932281

 

 Problem  Essential hypertension     I10     Active  03939344

 

 Problem  GERD with esophagitis     K21.0     Active  415330316

 

 Problem  Other chronic pain     G89.29     Active  09914574

 

 Problem  Lumbago with sciatica, right side     M54.41     Active  
[id number]

 

 Problem  COPD suggested by initial evaluation     J44.9     Active  60982118

 

 Problem  Acute midline low back pain with left-sided sciatica     M54.42     
Active  414050748







ALLERGIES

No Information



ENCOUNTERS







 Encounter  Location  Date  Diagnosis

 

 [facility]  [id number] [place] [address] 70148-
5005  20 Aug, 2018   

 

 [facility]  [id number] [place] [address] 96247-
4574  16 Aug, 2018  Neuropathy G62.9

 

 [facility]  [address] [place] [address] 81154-
4992    Essential hypertension I10 ; Coronary artery disease 
involving native coronary artery of native heart without angina pectoris I25.10 
; Fibromyalgia M79.7 ; GERD with esophagitis K21.0 and Tobacco abuse counseling 
Z71.6

 

 [facility]  [id number] [place] [address] 46226-
4432    Long term (current) use of opiate analgesic Z79.891

 

 [facility]  [id number] [place] [address] 15874-
5429     

 

 [facility]  [id number] [place] [address] 65616-
2899    Acute bronchitis, unspecified organism J20.9

 

 [facility]  [id number] [place] [address] 43819-
2045     

 

 [facility]  [id number] [place] [address] 00073-
5974     

 

 [facility]  [id number] [place] [address] 65459-
7601    Chronic pain syndrome G89.4

 

 [facility]  [id number] [place] [address] 88741-
7470     

 

 [facility]  [id number] [place] [address] 85165-
6215  15 Marko, 2018   

 

 [facility]  [id number] [place] [address] 02897-
6065    Acute bronchitis, unspecified organism J20.9

 

 [facility]  [id number] [place] [address] 57063-
9853    Chronic pain syndrome G89.4

 

 [facility]  [id number] [place] [address] 57593-
0572  25 May, 2018   

 

 [facility]  [id number] [place] [address] 72520-
1194  24 May, 2018  Acute midline low back pain with left-sided sciatica M54.42

 

 [facility]  [id number] [place] [address] 51816-
6121  22 May, 2018   

 

 [facility]  [address] [place] [address] 29907
[phone]  21 May, 2018  Bronchitis J40

 

 [facility]  [id number] [place] [address] 62340-
8861  07 May, 2018  Chronic pain syndrome G89.4 and Neuropathy G62.9

 

 [facility]  [id number] [place] [address] 62903-
6550    Acute midline low back pain with left-sided sciatica M54.42

 

 [facility]  [address] [address]75-
2012     

 

 [facility]  [address] 42987-
1731    Anxiety disorder, unspecified F41.9

 

 [facility]  [address] 66232-
2913     

 

 [place] [address] 26191-
2293    Chronic pain syndrome G89.4 and Acute midline low back pain 
with left-sided sciatica M54.42

 

 [facility]  [id number] [place] [address] 45847-
6176    Chronic pain syndrome G89.4

 

 [facility]  [id number] [place] [address] 54470-
9585     

 

 [facility]  [id number] [place] [address] 99367-
3117     

 

 [facility]  [id number] [place] [address] 17384-
6029  29 Mar, 2018  Injury of left knee, initial encounter S89.92XA and COPD 
suggested by initial evaluation J44.9

 

 [place] [address] 17098-
6365  28 Mar, 2018  Acute bronchitis, unspecified organism J20.9

 

 [facility]  [id number] [place] [address] 59806-
1390  27 Mar, 2018  Acute bronchitis, unspecified organism J20.9

 

 [place] [address] [place] [address]-
3447  21 Mar, 2018  Anxiety disorder, unspecified F41.9 and Acute bronchitis, 
unspecified organism J20.9

 

 [facility]  [id number] [place] [address] 95726-
1322  08 Mar, 2018  Chronic pain syndrome G89.4

 

 [facility]  [address] 62377-
6792  05 Mar, 2018   

 

 [facility]  [address] 35887-
0705  05 Mar, 2018  Acute midline low back pain with left-sided sciatica M54.42

 

 [facility]  [address] 07871-
0597     

 

 [facility]  [address] 19198-
5081    Chronic pain syndrome G89.4

 

 [facility]  [address] 74042-
9387    Chronic pain syndrome G89.4

 

 [facility]  [address] 04603-
2956     

 

 [facility]  [id number] [place] [address] 01911-
5750    Gastroenteritis K52.9

 

 [facility]  [address] 97900-
0308     

 

 [facility]  [id number] [place] [address] 09523-
7769  15 Jerardo, 2018  Acute bronchitis, unspecified organism J20.9

 

 [facility]  [address] 14766-
7310    Anxiety disorder, unspecified F41.9 and Neuropathy G62.9

 

 [facility]  [id number] [place] [address] 22514-
0609    Chronic pain syndrome G89.4

 

 [place] [address] [place] [address] 68782-
4403    Bronchitis J40 and Laryngitis J04.0

 

 [place] [id number] [place] [address] 83327-
9746  29 Dec, 2017   

 

 [place] [id number] [place] [address] 18725-
6844  26 Dec, 2017  Bronchitis J40

 

 [place] [id number] [place] [address] 59643-
1507  18 Dec, 2017   

 

 [place] [id number] [place] [address] 72201-
1964  13 Dec, 2017  Fibromyalgia M79.7 and Chronic pain syndrome G89.4

 

 [place] [id number] [place] [address] 53098-
9497  04 Dec, 2017  Chronic pain syndrome G89.4 and Acute bronchitis, 
unspecified organism J20.9

 

 [place] [id number] [place] [address] 08308-
6779    Neuropathy G62.9

 

 [place] [id number] [place] [address] 91534-
3633    Chronic pain syndrome G89.4 ; Lumbago with sciatica, left 
side M54.42 ; Lumbago with sciatica, right side M54.41 ; Screening cholesterol 
level Z13.220 ; Screening for diabetes mellitus Z13.1 ; Screening for thyroid 
disorder Z13.29 ; Fibromyalgia M79.7 ; Anxiety disorder, unspecified F41.9 and 
Neuropathy G62.9

 

 [place] [address] [address]-
8869     

 

 [place] [id number] [place] [address] 75404-
0408  25 Oct, 2017   

 

 [place] [id number] [place] [address] 00891-
3482  10 Oct, 2017  Fibromyalgia M79.7 ; Chronic pain syndrome G89.4 ; Anxiety 
disorder, unspecified F41.9 ; Neuropathy G62.9 and Acute bronchitis, 
unspecified organism J20.9

 

 [facility]  [id number] [place] [address] 61366-
8016  09 Oct, 2017   

 

 [facility]  [id number] [place] [address] 31958-
6624  25 Sep, 2017   

 

 [facility]  [id number] [place] [address] 98936-
7785  19 Sep, 2017   

 

 [facility]  [id number] [place] [address] 15871-
4643  08 Sep, 2017   

 

 [facility]  [id number] [place] [address] 80654-
8488  23 Aug, 2017   

 

 [facility]  [id number] [place] [address] 38660-
1798  22 Aug, 2017  Acute seasonal allergic rhinitis due to pollen J30.1

 

 [facility]  [id number] [place] [address] 21625-
6522  21 Aug, 2017   

 

 [facility]  [id number] [place] [address] 88521-
3525  09 Aug, 2017   

 

 [facility]  [id number] [place] [address] 26019-
9220     

 

 [facility]  [address] [address]-
3049     

 

 [facility]  [id number] [place] [address] 16942-
9269  10 Jul, 2017   

 

 [facility]  [address] [place] [address] 18156-
2369     

 

 [facility]  [id number] [place] [address] 08534-
3964     

 

 [facility]  [address] [address]-
4365     

 

 [facility]  [id number] [place] [address] 78697-
0468    Chronic pain syndrome G89.4 ; Fibromyalgia M79.7 ; Anxiety 
disorder, unspecified F41.9 ; Neuropathy G62.9 and Low back pain M54.5

 

 [facility]  [address] [address]-
4016  25 May, 2017  Low back pain M54.5

 

 [facility]  [address] [address]-
5338  24 May, 2017   

 

 [facility]  [id number] [place] [address] 74556-
1148  22 May, 2017   

 

 [facility]  [id number] [place] [address] 55931-
0666  16 May, 2017   

 

 [facility]  [id number] [place] [address] 94528-
4256  16 May, 2017   

 

 [facility]  [id number] [place] [address] 48986-
1471  12 May, 2017  Routine adult health maintenance Z00.00 ; Fibromyalgia 
M79.7 ; Chronic pain syndrome G89.4 ; Abnormal lung sounds R09.89 ; Coronary 
artery disease involving native coronary artery of native heart without angina 
pectoris I25.10 and S/P CABG (coronary artery bypass graft) Z95.1

 

 [facility]  [address] [address]-
7667  09 May, 2017   







IMMUNIZATIONS

No Known Immunizations



SOCIAL HISTORY

Never Assessed



REASON FOR VISIT

refill request



PLAN OF CARE





VITAL SIGNS





MEDICATIONS







 Medication  Instructions  Dosage  Frequency  Start Date  End Date  Duration  
Status

 

 Promethazine HCl 25 MG  Orally 4 times a day  1 tablet as needed  6h           
Active







RESULTS

No Results



PROCEDURES

No Known procedures



INSTRUCTIONS





MEDICATIONS ADMINISTERED

No Known Medications



MEDICAL (GENERAL) HISTORY







 Type  Description  Date

 

 Medical History  fibromyalgia   

 

 Medical History  MRI 2016- small central protrusion/herniation w/minimal 
impression on thecal anteriorly at C5-6, At C3-4 small bilat. uncinate spurs w/ 
mild right neural foraminal narrowing   

 

 Medical History  MRI 2016- mild degenerative changes. No spinal canal 
stenosis or foraminal narrowing of thoracic spine   

 

 Medical History  hypertension   

 

 Medical History  Anxiety disorder   

 

 Medical History  depression   

 

 Medical History  migraine headaches   

 

 Medical History  Hx of C-Diff   

 

 Medical History  Hx of Pneumonia   

 

 Medical History  heart cath w/ stents placed 7/3/18   

 

 Surgical History  Open Heart Surgery - [facility] -Dr. Lima  (
Cardiologist- Dr Logan)  

 

 Surgical History  hysterectomy - Dr Luis   

 

 Surgical History  Left Breast Lumpectomy (Bx - Benign)- Dr Luis   

 

 Surgical History  Port - Dr Luis   

 

 Surgical History  Left Knee Surgeries x3- Dr Carolina did 2 and Dr Gan did 1   

 

 Surgical History  cath/stent  

 

 Hospitalization History  C-Diff - [facility] [place]   

 

 Hospitalization History  Pneumonia - [facility] [place]   

 

 Hospitalization History  Open Heart Surgery - [facility]

## 2019-01-07 NOTE — XMS REPORT
MU2 Ambulatory Summary

 Created on: 2018



Dayami Rae

External Reference #: [id number]

: 1970

Sex: Female



Demographics







 Address  [address]

[place]  [address]

 

 Home Phone  [phone]

 

 Preferred Language  English

 

 Marital Status  

 

 Mormon Affiliation  Unknown

 

 Race  White

 

 Ethnic Group  Not  or 





Author







 JOSE Jay

 

 [facility]

 

 Address  [address]

[place], [place]  [address]



 

 Phone  [phone]







Care Team Providers







 Care Team Member Name  Role  Phone

 

 JOSE ZARCO  PCP  [phone]

 

 JOSE ZARCO  PreferredProvider  [phone]







Allergies and Adverse Reactions







 Name  Reaction  Notes

 

 morphine      







Plan of Treatment

Not available.



Medications







 Active 

 

 Name  Start Date  Estimated Completion Date  SIG  Comments

 

 clopidogrel oral            

 

 metoprolol succinate oral            

 

 atorvastatin oral            

 

 quetiapine oral            

 

 tramadol oral            







Problem List

Not available.



Vital Signs







 Date  Time  BP-Sys(mm[Hg]  BP-Мария(mm[Hg])  HR(bpm)  RR(rpm)  Temp  WT  HT  HC  
BMI  BSA  BMI Percentile  O2 Sat(%)

 

 2018  9:09:00 AM  128 mmHg  80 mmHg  98 bpm  18 rpm  98 F  130 lbs  62 in
     23.7771 kg/m  1.6061 m     98 %







Social History







 Name  Description  Comments

 

 Uses seatbelts      

 

 Tobacco  Current every day smoker   

 

 Alcohol  Never   







History of Procedures

Not available.



Results Summary

Not available.



History Of Immunizations

Not available.



History of Past Illness







 Name  Date of Onset  Comments

 

 Encounter for physical examination related to employment  Aug 17 2018  9:09AM 
  







Payers







 Insurance Name  Company Name  Plan Name  Plan Number  Policy Number  Policy 
Group Number  Start Date

 

    [employer]     [id number]     N/A







History of Encounters







 Visit Date  Visit Type  Provider

 

 2018  Office visit  JOSE LAU

## 2019-01-07 NOTE — XMS REPORT
[facility]

 Created on: 07/10/2018



Dayami Rae

External Reference #: [id number]

: 1970

Sex: Female



Demographics







 Address  [address] [place]  [address]

 

 Preferred Language  Unknown

 

 Marital Status  Unknown

 

 Anabaptist Affiliation  Unknown

 

 Race  Unknown

 

 Ethnic Group  Unknown





Author







 Author  KIM FUCHS

 

 Organization  [facility]

 

 Address  [address] [place]  [address]



 

 Phone  [phone]







Care Team Providers







 Care Team Member Name  Role  Phone

 

 JEFJAMAELE  Unavailable  [phone]







PROBLEMS







 Type  Condition  ICD9-CM Code  FSP01-EZ Code  Onset Dates  Condition Status  
SNOMED Code

 

 Problem  Fibromyalgia     M79.7     Active  355857867

 

 Problem  Neuropathy     G62.9     Active  638585098

 

 Problem  Coronary artery disease involving native coronary artery of native 
heart without angina pectoris     I25.10     Active  4020684292801

 

 Problem  Chronic pain syndrome     G89.4     Active  424729606

 

 Problem  COPD suggested by initial evaluation     J44.9     Active  35537813

 

 Problem  Acute midline low back pain with left-sided sciatica     M54.42     
Active  346986618

 

 Problem  Lumbago with sciatica, left side     M54.42     Active  708767519

 

 Problem  Anxiety disorder, unspecified     F41.9     Active  566232116

 

 Problem  Other chronic pain     G89.29     Active  59654765

 

 Problem  Lumbago with sciatica, right side     M54.41     Active  
[id number]







ALLERGIES

No Information



ENCOUNTERS







 Encounter  Location  Date  Diagnosis

 

 [facility]  [id number] [place] [address] 76403-
[phone]     

 

 [facility]  [id number] [place] [address] 95522-
2044     

 

 [facility]  [id number] [place] [address] 78407-
0799     

 

 [facility]  [id number] [place] [address] 41372-
1506    Chronic pain syndrome G89.4

 

 [facility]  [id number] [place] [address] 98938-
5889     

 

 [facility]  [id number] [place] [address] 18691-
4424  15 2018   

 

 [facility]  [id number] [place] [address] 96986-
2275    Acute bronchitis, unspecified organism J20.9

 

 [facility]  [id number] [place] [address] 82065-
6946    Chronic pain syndrome G89.4

 

 [facility]  [id number] [place] [address] 51123-
6007  25 May, 2018   

 

 [facility]  [id number] [place] [address] 85851-
2568  24 May, 2018  Acute midline low back pain with left-sided sciatica M54.42

 

 [facility]  [id number] [place] [address] 46077-
6704  22 May, 2018   

 

 [facility]  [id number] [place] [address] [phone]
[phone]  21 May, 2018  Bronchitis J40

 

 [facility]  [id number] [place] [address] 18205-
3312  07 May, 2018  Chronic pain syndrome G89.4 and Neuropathy G62.9

 

 [facility]  [id number] [place] [address] 26321-
2877    Acute midline low back pain with left-sided sciatica M54.42

 

 [facility]  [id number] [place] [address] 13401-
2388     

 

 [facility]  [id number] [place] [address] 96015-
7390    Anxiety disorder, unspecified F41.9

 

 [facility]  [id number] [place] [address] 67021-
1079     

 

 [facility]  [id number] [place] [address] 78181-
8693    Chronic pain syndrome G89.4 and Acute midline low back pain 
with left-sided sciatica M54.42

 

 [facility]  [id number] [place] [address] 38861-
4486    Chronic pain syndrome G89.4

 

 [facility]  [id number] [place] [address] 27131-
0180     

 

 [facility]  [id number] [place] [address] 24216-
9177     

 

 [facility]  [address] [address]-
8950  29 Mar, 2018  Injury of left knee, initial encounter S89.92XA and COPD 
suggested by initial evaluation J44.9

 

 [place] [address] [address]-
1089  28 Mar, 2018  Acute bronchitis, unspecified organism J20.9

 

 [place] [id number] [place] [address] 66664-
0415  27 Mar, 2018  Acute bronchitis, unspecified organism J20.9

 

 [facility]  [id number] [place] [address] 28902-
6260  21 Mar, 2018  Anxiety disorder, unspecified F41.9 and Acute bronchitis, 
unspecified organism J20.9

 

 [facility]  [id number] [place] [address] 02540-
3818  08 Mar, 2018  Chronic pain syndrome G89.4

 

 [facility]  [id number] [place] [address] 99614-
5009  05 Mar, 2018   

 

 [facility]  [id number] [place] [address] 34542-
9330  05 Mar, 2018  Acute midline low back pain with left-sided sciatica M54.42

 

 [facility]  [id number] [place] [address] 75140-
2038     

 

 [facility]  [id number] [place] [address] 22870-
4701    Chronic pain syndrome G89.4

 

 [facility]  [id number] [place] [address] 44156-
7036    Chronic pain syndrome G89.4

 

 [facility]  [id number] [place] [address] 30746-
7108     

 

 [facility]  [address] 44796-
2033    Gastroenteritis K52.9

 

 [facility]  [address] 52093-
6516     

 

 [facility]  [address] [address]-
0726  15 Jerardo, 2018  Acute bronchitis, unspecified organism J20.9

 

 [facility]  [address] 31904-
5883    Anxiety disorder, unspecified F41.9 and Neuropathy G62.9

 

 [place] [address] 31086-
2438    Chronic pain syndrome G89.4

 

 [facility]  [id number] [place] [address] 34809-
1347    Bronchitis J40 and Laryngitis J04.0

 

 [place] [id number] [place] [address] 96770-
4990  29 Dec, 2017   

 

 [facility]  [id number] [place] [address] 75704-
1735  26 Dec, 2017  Bronchitis J40

 

 [facility]  [id number] [place] [address] 12116-
0895  18 Dec, 2017   

 

 [facility]  [id number] [place] [address] 01093-
5444  13 Dec, 2017  Fibromyalgia M79.7 and Chronic pain syndrome G89.4

 

 [facility]  [id number] [place] [address] 97594-
3811  04 Dec, 2017  Chronic pain syndrome G89.4 and Acute bronchitis, 
unspecified organism J20.9

 

 [facility]  [address] [address]994-
0240    Neuropathy G62.9

 

 [facility]  [address] [place] [address] 09200-
2743    Chronic pain syndrome G89.4 ; Lumbago with sciatica, left 
side M54.42 ; Lumbago with sciatica, right side M54.41 ; Screening cholesterol 
level Z13.220 ; Screening for diabetes mellitus Z13.1 ; Screening for thyroid 
disorder Z13.29 ; Fibromyalgia M79.7 ; Anxiety disorder, unspecified F41.9 and 
Neuropathy G62.9

 

 [facility]  [id number] [place] [address] 74331-
2732     

 

 [facility]  [address] [place] [address] 88985-
4779  25 Oct, 2017   

 

 [facility]  [address] [place] [address] 58573-
2489  10 Oct, 2017  Fibromyalgia M79.7 ; Chronic pain syndrome G89.4 ; Anxiety 
disorder, unspecified F41.9 ; Neuropathy G62.9 and Acute bronchitis, 
unspecified organism J20.9

 

 [facility]  [id number] [place] [address] 06246-
5338  09 Oct, 2017   

 

 [facility]  [id number] [place] [address] 53423-
0911  25 Sep, 2017   

 

 [facility]  [id number] [place] [address] 63034-
4243  19 Sep, 2017   

 

 [facility]  [id number] [place] [address] 22818-
7470  08 Sep, 2017   

 

 [facility]  [id number] [place] [address] 55922-
3764  23 Aug, 2017   

 

 [facility]  [id number] [place] [address] 70024-
4910  22 Aug, 2017  Acute seasonal allergic rhinitis due to pollen J30.1

 

 [facility]  [address] [place] [address] 08572-
9064  21 Aug, 2017   

 

 [facility]  [address] [address]-
4338  09 Aug, 2017   

 

 [facility]  [address] [address]-
0557     

 

 [facility]  [address] [address]-
4910     

 

 [facility]  [id number] [address]-
0264  10 Jul, 2017   

 

 [facility]  [address] [address]-
6618     

 

 [facility]  [address] [address]-
1451     

 

 [facility]  [address] [address]-
3245     

 

 [facility]  [address] [address]-
6577    Chronic pain syndrome G89.4 ; Fibromyalgia M79.7 ; Anxiety 
disorder, unspecified F41.9 ; Neuropathy G62.9 and Low back pain M54.5

 

 [facility]  [address] [address]-
7035  25 May, 2017  Low back pain M54.5

 

 [facility]  [address] [address]-
3220  24 May, 2017   

 

 [facility]  [address] [address]-
5739  22 May, 2017   

 

 [facility]  [address] [address]-
5316  16 May, 2017   

 

 [facility]  [address] [address]-
3022  16 May, 2017   

 

 [facility]  [id number] [address]-
5548  12 May, 2017  Routine adult health maintenance Z00.00 ; Fibromyalgia 
M79.7 ; Chronic pain syndrome G89.4 ; Abnormal lung sounds R09.89 ; Coronary 
artery disease involving native coronary artery of native heart without angina 
pectoris I25.10 and S/P CABG (coronary artery bypass graft) Z95.1

 

 [facility]  [address] [facility] [id number] [place] [address]-
2859  09 May, 2017   







IMMUNIZATIONS

No Known Immunizations



SOCIAL HISTORY

Never Assessed



REASON FOR VISIT

Controlled Med Refill



PLAN OF CARE





VITAL SIGNS





MEDICATIONS







 Medication  Instructions  Dosage  Frequency  Start Date  End Date  Duration  
Status

 

 Xanax 0.25 MG  Orally Twice a day  1 tablet  12h        28 days  Active







RESULTS

No Results



PROCEDURES

No Known procedures



INSTRUCTIONS





MEDICATIONS ADMINISTERED

No Known Medications



MEDICAL (GENERAL) HISTORY







 Type  Description  Date

 

 Medical History  fibromyalgia   

 

 Medical History  MRI 2016- small central protrusion/herniation w/minimal 
impression on thecal anteriorly at C5-6, At C3-4 small bilat. uncinate spurs w/ 
mild right neural foraminal narrowing   

 

 Medical History  MRI 2016- mild degenerative changes. No spinal canal 
stenosis or foraminal narrowing of thoracic spine   

 

 Medical History  hypertension   

 

 Medical History  Anxiety disorder   

 

 Medical History  depression   

 

 Medical History  migraine headaches   

 

 Medical History  Hx of C-Diff   

 

 Medical History  Hx of Pneumonia   

 

 Surgical History  Open Heart Surgery - [facility] -Dr. Lima  (
Cardiologist- Dr Logan)  

 

 Surgical History  hysterectomy - Dr Luis   

 

 Surgical History  Left Breast Lumpectomy (Bx - Benign)- Dr Luis   

 

 Surgical History  Port - Dr Luis   

 

 Surgical History  Left Knee Surgeries x3- Dr Carolina did 2 and Dr Gan did 1   

 

 Hospitalization History  C-Diff - [facility]   

 

 Hospitalization History  Pneumonia - [facility]   

 

 Hospitalization History  Open Heart Surgery - [facility]

## 2019-01-07 NOTE — NUR
MARILIA JONES admitted to room 415-1, with an admitting diagnosis of chest pain, GERD,  on 
01/07/19 from ED via , accompanied by staff.MARILIA JONES introduced to surroundings, call 
light, bed controls, phone, TV, temperature control, lights, meal times, smoking policy, 
visitor policy, side rail policy, bathrooms and showers.  Patient Rights given to patient in 
the handbook. MARILIA JONES verbalizes understanding that [facility] is not responsible for 
the loss or damage to any personal effects or valuables that are kept in the patients 
posession during their hospitalization.

## 2019-01-07 NOTE — XMS REPORT
[facility]

 Created on: 2017



[place] Dayami

External Reference #: [id number]

: 1970

Sex: Female



Demographics







 Address  [address] [place]  [address]

 

 Preferred Language  Unknown

 

 Marital Status  Unknown

 

 Oriental orthodox Affiliation  Unknown

 

 Race  Unknown

 

 Ethnic Group  Unknown





Author







 Author  KIM FUCHS

 

 Organization  [facility]

 

 Address  [address] [place]  [address]



 

 Phone  [phone]







Care Team Providers







 Care Team Member Name  Role  Phone

 

 JEF  KIM  Unavailable  [phone]







PROBLEMS







 Type  Condition  ICD9-CM Code  VSO04-VG Code  Onset Dates  Condition Status  
SNOMED Code

 

 Problem  Anxiety disorder, unspecified     F41.9     Active  162656087

 

 Problem  Neuropathy     G62.9     Active  976903824

 

 Problem  Fibromyalgia     M79.7     Active  516606291

 

 Problem  Coronary artery disease involving native coronary artery of native 
heart without angina pectoris     I25.10     Active  [id number]

 

 Problem  Chronic pain syndrome     G89.4     Active  471913576







ALLERGIES







 Substance  Reaction  Event Type  Date  Status

 

 Morphine Sulfate  Rash  Drug Allergy  12 May, 2017  Active







SOCIAL HISTORY

Never Assessed



PLAN OF CARE







 Activity  Details









  









 Follow Up  4 Weeks Reason:f/u labs







VITAL SIGNS







 Height  5'2" in  2017

 

 Weight  123.5 lbs  2017

 

 Temperature  97.3 degrees Fahrenheit  2017

 

 Heart Rate  76 bpm  2017

 

 Respiratory Rate  20   2017

 

 BMI  22.59 kg/m2  2017

 

 Blood pressure systolic  122 mmHg  2017

 

 Blood pressure diastolic  68 mmHg  2017







MEDICATIONS







 Medication  Instructions  Dosage  Frequency  Start Date  End Date  Duration  
Status

 

 Xanax 0.25 MG  Orally Twice a day  1 tablet  12h           Active

 

 Sumatriptan Succinate 6 MG/0.5ML  Subcutaneous Once a day prn migraine  0.5 ml 
as needed              Active

 

 Aspir-81 81 MG  Orally Once a day  1 tablet  24h           Active

 

 Promethazine HCl 25 MG  Orally 4 times a day prn  1 tablet as needed          
    Active

 

 Seroquel 200 mg  Orally Once a day  2.5 tablet at bedtime  24h           Active

 

 Tramadol HCl 50 mg  Orally every 4 hrs prn  1 tablet as needed              
Active

 

 Gabapentin 300 MG  Orally Once a day  1 capsule  24h  09 May, 2017        
Active

 

 Chlorzoxazone 500 MG  Orally Three times a day  1 tablet  8h           Active







RESULTS







 Name  Result  Date  Reference Range

 

 UA LONG DIP (IN HOUSE)     2017   

 

 Lot #  [id number]      

 

 Exp date  18      

 

 Clarity  clear      

 

 Color  yellow      

 

 Odor  none      

 

 GLU  negative      

 

 KELLE  negative      

 

 KET  negative      

 

 SG  1.020      

 

 BLO  negative      

 

 pH  6.5      

 

 Protein  negative      

 

 URO  0.2      

 

 NIT  negative      

 

 ALLIE  negative      

 

 Lot #         

 

 Exp date         

 

 [employer]     2017   

 

 Xray : Chest (IN HOUSE)     2017   







PROCEDURES







 Procedure  Date Ordered  Result  Body Site

 

 No Charge  May 12, 2017      

 

 URINALYSIS, AUTO, W/O SCOPE  May 12, 2017      

 

 CHEST X-RAY  May 12, 2017      







IMMUNIZATIONS

No Known Immunizations



MEDICAL (GENERAL) HISTORY







 Type  Description  Date

 

 Medical History  fibromyalgia   

 

 Medical History  MRI 2016- small central protrusion/herniation w/minimal 
impression on thecal anteriorly at C5-6, At C3-4 small bilat. uncinate spurs w/ 
mild right neural foraminal narrowing   

 

 Medical History  MRI 2016- mild degenerative changes. No spinal canal 
stenosis or foraminal narrowing of thoracic spine   

 

 Medical History  hypertension   

 

 Medical History  Anxiety disorder   

 

 Medical History  depression   

 

 Medical History  migraine headaches   

 

 Medical History  Hx of C-Diff   

 

 Medical History  Hx of Pneumonia   

 

 Surgical History  Open Heart Surgery - [facility] -Dr. Lima  (
Cardiologist- Dr Logan)  

 

 Surgical History  hysterectomy - Dr Luis   

 

 Surgical History  Left Breast Lumpectomy (Bx - Benign)- Dr Luis   

 

 Surgical History  Port - Dr Luis   

 

 Surgical History  Left Knee Surgeries x3- Dr Carolina did 2 and Dr Gan did 1   

 

 Hospitalization History  C-Diff - [facility]   

 

 Hospitalization History  Pneumonia - [facility]   

 

 Hospitalization History  Open Heart Surgery - [facility]

## 2019-01-07 NOTE — XMS REPORT
[facility]

 Created on: 2018



Dayami Rae

External Reference #: [id number]

: 1970

Sex: Female



Demographics







 Address  [address] [place]  [address]

 

 Preferred Language  Unknown

 

 Marital Status  Unknown

 

 Caodaism Affiliation  Unknown

 

 Race  Unknown

 

 Ethnic Group  Unknown





Author







 Author  KIM FUCHS

 

 Organization  [facility]

 

 Address  [address] [place]  [address]



 

 Phone  [phone]







Care Team Providers







 Care Team Member Name  Role  Phone

 

 JEFJAMAELE  Unavailable  [phone]







PROBLEMS







 Type  Condition  ICD9-CM Code  OWQ60-CR Code  Onset Dates  Condition Status  
SNOMED Code

 

 Problem  Fibromyalgia     M79.7     Active  715827635

 

 Problem  Neuropathy     G62.9     Active  645913572

 

 Problem  Coronary artery disease involving native coronary artery of native 
heart without angina pectoris     I25.10     Active  [id number]

 

 Problem  Chronic pain syndrome     G89.4     Active  869434920

 

 Problem  COPD suggested by initial evaluation     J44.9     Active  02237473

 

 Problem  Acute midline low back pain with left-sided sciatica     M54.42     
Active  490166606

 

 Problem  Lumbago with sciatica, left side     M54.42     Active  096515405

 

 Problem  Anxiety disorder, unspecified     F41.9     Active  082460502

 

 Problem  Other chronic pain     G89.29     Active  74372990

 

 Problem  Lumbago with sciatica, right side     M54.41     Active  
[id number]







ALLERGIES

No Information



ENCOUNTERS







 Encounter  Location  Date  Diagnosis

 

 [facility]  [address] [place] [address] 05529-
[phone]     

 

 [facility]  [id number] [place] [address] 30147-
6713     

 

 [facility]  [id number] [place] [address] 02835-
8112     

 

 [facility]  [id number] [place] [address] 87584-
2275    Chronic pain syndrome G89.4

 

 [facility]  [id number] [place] [address] 28323-
9698     

 

 [facility]  [id number] [place] [address] 94292-
1774  15 2018   

 

 [facility]  [id number] [place] [address] 45472-
6821    Acute bronchitis, unspecified organism J20.9

 

 [facility]  [id number] [place] [address] 21732-
4048    Chronic pain syndrome G89.4

 

 [facility]  [id number] [place] [address] 68482-
0982  25 May, 2018   

 

 [facility]  [id number] [place] [address] 21397-
4780  24 May, 2018  Acute midline low back pain with left-sided sciatica M54.42

 

 [facility]  [id number] [place] [address] 04471-
2310  22 May, 2018   

 

 [facility]  [id number] [place] [address] [phone]
-[phone]  21 May, 2018  Bronchitis J40

 

 [facility]  [id number] [place] [address] 74315-
9629  07 May, 2018  Chronic pain syndrome G89.4 and Neuropathy G62.9

 

 [facility]  [id number] [place] [address] 60461-
3318    Acute midline low back pain with left-sided sciatica M54.42

 

 [facility]  [id number] [place] [address] 88691-
3392     

 

 [facility]  [id number] [place] [address] 38261-
0116    Anxiety disorder, unspecified F41.9

 

 [facility]  [id number] [place] [address] 23332-
5096     

 

 [facility]  [id number] [place] [address] 49286-
6540    Chronic pain syndrome G89.4 and Acute midline low back pain 
with left-sided sciatica M54.42

 

 [facility]  [id number] [place] [address] 76548-
8093    Chronic pain syndrome G89.4

 

 [facility]  [id number] [place] [address] 54515-
9409     

 

 [facility]  [id number] [place] [address] 36130-
4447     

 

 [facility]  [address] [address]-
7878  29 Mar, 2018  Injury of left knee, initial encounter S89.92XA and COPD 
suggested by initial evaluation J44.9

 

 [place] [address] 16807-
3201  28 Mar, 2018  Acute bronchitis, unspecified organism J20.9

 

 [place] [id number] [place] [address] 78368-
2603  27 Mar, 2018  Acute bronchitis, unspecified organism J20.9

 

 [facility]  [id number] [place] [address] 44627-
4747  21 Mar, 2018  Anxiety disorder, unspecified F41.9 and Acute bronchitis, 
unspecified organism J20.9

 

 [facility]  [id number] [place] [address] 75320-
5358  08 Mar, 2018  Chronic pain syndrome G89.4

 

 [facility]  [id number] [place] [address] 45219-
3126  05 Mar, 2018   

 

 [facility]  [id number] [place] [address] 92578-
3042  05 Mar, 2018  Acute midline low back pain with left-sided sciatica M54.42

 

 [facility]  [id number] [place] [address] 74869-
1231     

 

 [facility]  [id number] [place] [address] 32624-
8278    Chronic pain syndrome G89.4

 

 [facility]  [id number] [place] [address] 66663-
2936    Chronic pain syndrome G89.4

 

 [facility]  [id number] [place] [address] 00556-
5562     

 

 [facility]  [address] 18371-
5900    Gastroenteritis K52.9

 

 [facility]  [address] [address]26-
3999     

 

 [facility]  [address] [address]-
0501  15 Jerardo, 2018  Acute bronchitis, unspecified organism J20.9

 

 [facility]  [address] 53646-
3367    Anxiety disorder, unspecified F41.9 and Neuropathy G62.9

 

 [place] [address] 99333-
8270    Chronic pain syndrome G89.4

 

 [facility]  [id number] [place] [address] 10626-
1504    Bronchitis J40 and Laryngitis J04.0

 

 [place] [id number] [place] [address] 43414-
0778  29 Dec, 2017   

 

 [facility]  [id number] [place] [address] 23111-
9752  26 Dec, 2017  Bronchitis J40

 

 [facility]  [id number] [place] [address] 70824-
2190  18 Dec, 2017   

 

 [facility]  [id number] [place] [address] 25669-
7485  13 Dec, 2017  Fibromyalgia M79.7 and Chronic pain syndrome G89.4

 

 [facility]  [id number] [place] [address] 33724-
0437  04 Dec, 2017  Chronic pain syndrome G89.4 and Acute bronchitis, 
unspecified organism J20.9

 

 [facility]  [address] 71334-
9994    Neuropathy G62.9

 

 [facility]  [address] [place] [address] 68119-
4044    Chronic pain syndrome G89.4 ; Lumbago with sciatica, left 
side M54.42 ; Lumbago with sciatica, right side M54.41 ; Screening cholesterol 
level Z13.220 ; Screening for diabetes mellitus Z13.1 ; Screening for thyroid 
disorder Z13.29 ; Fibromyalgia M79.7 ; Anxiety disorder, unspecified F41.9 and 
Neuropathy G62.9

 

 [facility]  [id number] [place] [address] 97294-
4442     

 

 [facility]  [id number] [place] [address] 98585-
0906  25 Oct, 2017   

 

 [facility]  [id number] [place] [address] 29149-
9656  10 Oct, 2017  Fibromyalgia M79.7 ; Chronic pain syndrome G89.4 ; Anxiety 
disorder, unspecified F41.9 ; Neuropathy G62.9 and Acute bronchitis, 
unspecified organism J20.9

 

 [facility]  [id number] [place] [address] 13362-
0363  09 Oct, 2017   

 

 [facility]  [id number] [place] [address] 30831-
6882  25 Sep, 2017   

 

 [facility]  [id number] [place] [address] 48767-
7888  19 Sep, 2017   

 

 [facility]  [id number] [place] [address] 01941-
8453  08 Sep, 2017   

 

 [facility]  [id number] [place] [address] 26363-
6868  23 Aug, 2017   

 

 [facility]  [id number] [place] [address] 27524-
0996  22 Aug, 2017  Acute seasonal allergic rhinitis due to pollen J30.1

 

 [facility]  [id number] [place] [address] 30704-
3928  21 Aug, 2017   

 

 [facility]  [address] [address]-
9138  09 Aug, 2017   

 

 [facility]  [address] [address]-
9597     

 

 [facility]  [address] [address]-
6447     

 

 [facility]  [id number] [address]-
7682  10 Jul, 2017   

 

 [facility]  [address] [address]-
8587     

 

 [facility]  [address] [address]-
5388     

 

 [facility]  [address] [address]-
4385     

 

 [facility]  [address] [address]-
6615    Chronic pain syndrome G89.4 ; Fibromyalgia M79.7 ; Anxiety 
disorder, unspecified F41.9 ; Neuropathy G62.9 and Low back pain M54.5

 

 [facility]  [address] [address]-
5994  25 May, 2017  Low back pain M54.5

 

 [facility]  [address] [address]-
4980  24 May, 2017   

 

 [facility]  [address] [address]-
0260  22 May, 2017   

 

 [facility]  [address] [address]-
9386  16 May, 2017   

 

 [facility]  [address] [address]-
7979  16 May, 2017   

 

 [facility]  [id number] [address]-
3962  12 May, 2017  Routine adult health maintenance Z00.00 ; Fibromyalgia 
M79.7 ; Chronic pain syndrome G89.4 ; Abnormal lung sounds R09.89 ; Coronary 
artery disease involving native coronary artery of native heart without angina 
pectoris I25.10 and S/P CABG (coronary artery bypass graft) Z95.1

 

 [facility]  [address] [facility] [id number] [place] [address]-
1237  09 May, 2017   







IMMUNIZATIONS

No Known Immunizations



SOCIAL HISTORY

Never Assessed



REASON FOR VISIT

Requests return call



PLAN OF CARE





VITAL SIGNS





MEDICATIONS

Unknown Medications



RESULTS

No Results



PROCEDURES

No Known procedures



INSTRUCTIONS





MEDICATIONS ADMINISTERED

No Known Medications



MEDICAL (GENERAL) HISTORY







 Type  Description  Date

 

 Medical History  fibromyalgia   

 

 Medical History  MRI 2016- small central protrusion/herniation w/minimal 
impression on thecal anteriorly at C5-6, At C3-4 small bilat. uncinate spurs w/ 
mild right neural foraminal narrowing   

 

 Medical History  MRI 2016- mild degenerative changes. No spinal canal 
stenosis or foraminal narrowing of thoracic spine   

 

 Medical History  hypertension   

 

 Medical History  Anxiety disorder   

 

 Medical History  depression   

 

 Medical History  migraine headaches   

 

 Medical History  Hx of C-Diff   

 

 Medical History  Hx of Pneumonia   

 

 Surgical History  Open Heart Surgery - [facility] -Dr. Lima  (
Cardiologist- Dr Logan)  

 

 Surgical History  hysterectomy - Dr Luis   

 

 Surgical History  Left Breast Lumpectomy (Bx - Benign)- Dr Luis   

 

 Surgical History  Port - Dr Luis   

 

 Surgical History  Left Knee Surgeries x3- Dr Carolina did 2 and Dr Gan did 1   

 

 Hospitalization History  C-Diff - [facility]   

 

 Hospitalization History  Pneumonia - [facility]   

 

 Hospitalization History  Open Heart Surgery - [facility]

## 2019-01-07 NOTE — XMS REPORT
[facility]

 Created on: 2018



Dayami Rae

External Reference #: [id number]

: 1970

Sex: Female



Demographics







 Address  [address] [place]  [address]

 

 Preferred Language  Unknown

 

 Marital Status  Unknown

 

 Confucianist Affiliation  Unknown

 

 Race  Unknown

 

 Ethnic Group  Unknown





Author







 Author  KIM FUCHS

 

 Organization  [facility]

 

 Address  [address] [place]  [address]



 

 Phone  [phone]







Care Team Providers







 Care Team Member Name  Role  Phone

 

 FUCHSKIM JACKSON  Unavailable  [phone]







PROBLEMS







 Type  Condition  ICD9-CM Code  NQY59-AE Code  Onset Dates  Condition Status  
SNOMED Code

 

 Problem  Neuropathy     G62.9     Active  988155831

 

 Problem  Lumbago with sciatica, left side     M54.42     Active  827078017

 

 Problem  Anxiety disorder, unspecified     F41.9     Active  002552427

 

 Problem  Chronic pain syndrome     G89.4     Active  372059400

 

 Problem  Fibromyalgia     M79.7     Active  573956971

 

 Problem  Coronary artery disease involving native coronary artery of native 
heart without angina pectoris     I25.10     Active  [id number]

 

 Problem  Essential hypertension     I10     Active  13810199

 

 Problem  GERD with esophagitis     K21.0     Active  600490709

 

 Problem  Other chronic pain     G89.29     Active  35762316

 

 Problem  Lumbago with sciatica, right side     M54.41     Active  
[id number]

 

 Problem  COPD suggested by initial evaluation     J44.9     Active  36012904

 

 Problem  Acute midline low back pain with left-sided sciatica     M54.42     
Active  261072767







ALLERGIES

No Information



ENCOUNTERS







 Encounter  Location  Date  Diagnosis

 

 [facility]  [address] [place] [address] [phone]-
[phone]  28 Aug, 2018  Fibromyalgia M79.7

 

 [facility]  [address] [place] [address] 06383-
5245  20 Aug, 2018   

 

 [facility]  [id number] [place] [address] 72741-
1983  16 Aug, 2018  Neuropathy G62.9

 

 [facility]  [address] [place] [address] 13302-
9864    Essential hypertension I10 ; Coronary artery disease 
involving native coronary artery of native heart without angina pectoris I25.10 
; Fibromyalgia M79.7 ; GERD with esophagitis K21.0 and Tobacco abuse counseling 
Z71.6

 

 [facility]  [id number] [place] [address] 71210-
3992    Long term (current) use of opiate analgesic Z79.891

 

 [facility]  [id number] [place] [address] 31220-
2355     

 

 [facility]  [id number] [place] [address] 43248-
8367    Acute bronchitis, unspecified organism J20.9

 

 [facility]  [id number] [place] [address] 30348-
5089     

 

 [facility]  [id number] [place] [address] 33293-
1182     

 

 [facility]  [id number] [place] [address] 31485-
0874    Chronic pain syndrome G89.4

 

 [facility]  [id number] [place] [address] 31034-
3809     

 

 [facility]  [id number] [place] [address] 02715-
7314  15 Marko, 2018   

 

 [facility]  [id number] [place] [address] 51486-
7955    Acute bronchitis, unspecified organism J20.9

 

 [facility]  [id number] [place] [address] 80815-
9851    Chronic pain syndrome G89.4

 

 [facility]  [address] [place] [address] 54096-
4618  25 May, 2018   

 

 [facility]  [id number] [place] [address] 00873-
3464  24 May, 2018  Acute midline low back pain with left-sided sciatica M54.42

 

 [facility]  [id number] [place] [address] 58336-
4306  22 May, 2018   

 

 [facility]  [id number] [place] [address] [phone]
[phone]  21 May, 2018  Bronchitis J40

 

 [facility]  [address] 24099-
2551  07 May, 2018  Chronic pain syndrome G89.4 and Neuropathy G62.9

 

 [facility]  [id number] [place] [address] 19820-
1194    Acute midline low back pain with left-sided sciatica M54.42

 

 [facility]  [id number] [place] [address] 17675-
0930     

 

 [facility]  [address] 23276-
4483    Anxiety disorder, unspecified F41.9

 

 [facility]  [address] 08104-
9907     

 

 [place] [address] 05327-
6221    Chronic pain syndrome G89.4 and Acute midline low back pain 
with left-sided sciatica M54.42

 

 [facility]  [address] 41198-
4779    Chronic pain syndrome G89.4

 

 [facility]  [id number] [place] [address] 39405-
7233     

 

 [facility]  [id number] [place] [address] 30532-
6006     

 

 [facility]  [id number] [place] [address] 77658-
8673  29 Mar, 2018  Injury of left knee, initial encounter S89.92XA and COPD 
suggested by initial evaluation J44.9

 

 [facility]  [id number] [place] [address] 92375-
8161  28 Mar, 2018  Acute bronchitis, unspecified organism J20.9

 

 [facility]  [address] 72093-
0564  27 Mar, 2018  Acute bronchitis, unspecified organism J20.9

 

 [facility]  [id number] [place] [address] 52927-
6435  21 Mar, 2018  Anxiety disorder, unspecified F41.9 and Acute bronchitis, 
unspecified organism J20.9

 

 [facility]  [id number] [place] [address] 39968-
5980  08 Mar, 2018  Chronic pain syndrome G89.4

 

 [facility]  [id number] [place] [address] 33944-
3139  05 Mar, 2018   

 

 [facility]  [id number] [place] [address] 07860-
4692  05 Mar, 2018  Acute midline low back pain with left-sided sciatica M54.42

 

 [facility]  [id number] [place] [address] 78303-
6425     

 

 [facility]  [id number] [place] [address] 67206-
6643    Chronic pain syndrome G89.4

 

 [facility]  [id number] [place] [address] 46508-
0940    Chronic pain syndrome G89.4

 

 [facility]  [id number] [place] [address] 27400-
0153     

 

 [facility]  [id number] [place] [address] 81742-
5775    Gastroenteritis K52.9

 

 [facility]  [id number] [place] [address] 18437-
0412     

 

 [facility]  [id number] [place] [address] 08093-
6720  15 Jerardo, 2018  Acute bronchitis, unspecified organism J20.9

 

 [facility]  [id number] [place] [address] 82902-
1576    Anxiety disorder, unspecified F41.9 and Neuropathy G62.9

 

 [place] [address] [place] [address] 33971-
7747    Chronic pain syndrome G89.4

 

 [place] [id number] [place] [address] 96304-
3295    Bronchitis J40 and Laryngitis J04.0

 

 [place] [id number] [place] [address] 49560-
9511  29 Dec, 2017   

 

 [place] [address] 42780-
0208  26 Dec, 2017  Bronchitis J40

 

 [place] [address] 92245-
7038  18 Dec, 2017   

 

 [place] [id number] [place] [address] 62095-
4887  13 Dec, 2017  Fibromyalgia M79.7 and Chronic pain syndrome G89.4

 

 [place] [id number] [place] [address] 00481-
9590  04 Dec, 2017  Chronic pain syndrome G89.4 and Acute bronchitis, 
unspecified organism J20.9

 

 [place] [id number] [place] [address] 56128-
9458    Neuropathy G62.9

 

 [place] [id number] [place] [address] 37217-
3041    Chronic pain syndrome G89.4 ; Lumbago with sciatica, left 
side M54.42 ; Lumbago with sciatica, right side M54.41 ; Screening cholesterol 
level Z13.220 ; Screening for diabetes mellitus Z13.1 ; Screening for thyroid 
disorder Z13.29 ; Fibromyalgia M79.7 ; Anxiety disorder, unspecified F41.9 and 
Neuropathy G62.9

 

 [place] [id number] [place] [address] 58866-
9714     

 

 [place] [id number] [place] [address] 58578-
6179  25 Oct, 2017   

 

 [place] [id number] [address]-
5879  10 Oct, 2017  Fibromyalgia M79.7 ; Chronic pain syndrome G89.4 ; Anxiety 
disorder, unspecified F41.9 ; Neuropathy G62.9 and Acute bronchitis, 
unspecified organism J20.9

 

 [facility]  [id number] [address]-
1295  09 Oct, 2017   

 

 [facility]  [id number] [place] [address] 01289-
5238  25 Sep, 2017   

 

 [facility]  [id number] [place] [address] 70574-
2754  19 Sep, 2017   

 

 [facility]  [id number] [place] [address] 98056-
9366  08 Sep, 2017   

 

 [facility]  [id number] [place] [address] 26997-
2385  23 Aug, 2017   

 

 [facility]  [id number] [place] [address] 41380-
0921  22 Aug, 2017  Acute seasonal allergic rhinitis due to pollen J30.1

 

 [facility]  [id number] [place] [address] 38929-
9265  21 Aug, 2017   

 

 [facility]  [id number] [address]-
9642  09 Aug, 2017   

 

 [facility]  [address] [address]-
6327     

 

 [facility]  [address] [place] [address] 19503-
9793     

 

 [facility]  [address] [address]-
2548  10 Jul, 2017   

 

 [facility]  [id number] [place] [address] 666404-
0195     

 

 [facility]  [address] [address]-
7660     

 

 [facility]  [address] [address] [address]-
3709     

 

 [facility]  [id number] [place] [address] 53539-
8725    Chronic pain syndrome G89.4 ; Fibromyalgia M79.7 ; Anxiety 
disorder, unspecified F41.9 ; Neuropathy G62.9 and Low back pain M54.5

 

 [facility]  [id number] [place] [address] 95877-
1043  25 May, 2017  Low back pain M54.5

 

 [facility]  [id number] [place] [address] 01951-
1771  24 May, 2017   

 

 [facility]  [id number] [place] [address] 02137-
8123  22 May, 2017   

 

 [facility]  [id number] [place] [address] 32125-
0852  16 May, 2017   

 

 [facility]  [id number] [place] [address] 71971-
2770  16 May, 2017   

 

 [facility]  [id number] [place] [address] 30696-
3245  12 May, 2017  Routine adult health maintenance Z00.00 ; Fibromyalgia 
M79.7 ; Chronic pain syndrome G89.4 ; Abnormal lung sounds R09.89 ; Coronary 
artery disease involving native coronary artery of native heart without angina 
pectoris I25.10 and S/P CABG (coronary artery bypass graft) Z95.1

 

 [facility]  [id number] [place] [address] 68154-
8274  09 May, 2017   







IMMUNIZATIONS

No Known Immunizations



SOCIAL HISTORY

Never Assessed



REASON FOR VISIT

Controlled Med Refill 



PLAN OF CARE





VITAL SIGNS





MEDICATIONS







 Medication  Instructions  Dosage  Frequency  Start Date  End Date  Duration  
Status

 

 Tramadol HCl 50 mg  Orally every 6 hrs  1 tablet as needed  6h    
   28 days  Active







RESULTS

No Results



PROCEDURES

No Known procedures



INSTRUCTIONS





MEDICATIONS ADMINISTERED

No Known Medications



MEDICAL (GENERAL) HISTORY







 Type  Description  Date

 

 Medical History  fibromyalgia   

 

 Medical History  MRI 2016- small central protrusion/herniation w/minimal 
impression on thecal anteriorly at C5-6, At C3-4 small bilat. uncinate spurs w/ 
mild right neural foraminal narrowing   

 

 Medical History  MRI 2016- mild degenerative changes. No spinal canal 
stenosis or foraminal narrowing of thoracic spine   

 

 Medical History  hypertension   

 

 Medical History  Anxiety disorder   

 

 Medical History  depression   

 

 Medical History  migraine headaches   

 

 Medical History  Hx of C-Diff   

 

 Medical History  Hx of Pneumonia   

 

 Medical History  heart cath w/ stents placed 7/3/18   

 

 Surgical History  Open Heart Surgery - [facility] -Dr. Lima  (
Cardiologist- Dr Logan)  

 

 Surgical History  hysterectomy - Dr Luis   

 

 Surgical History  Left Breast Lumpectomy (Bx - Benign)- Dr Luis   

 

 Surgical History  Port - Dr Luis   

 

 Surgical History  Left Knee Surgeries x3- Dr Carolina did 2 and Dr Gan did 1   

 

 Surgical History  cath/stent  

 

 Hospitalization History  C-Diff - [facility]   

 

 Hospitalization History  Pneumonia - [facility]   

 

 Hospitalization History  Open Heart Surgery - [facility]

## 2019-01-07 NOTE — XMS REPORT
[facility]

 Created on: 2018



Dayami Rae

External Reference #: [id number]

: 1970

Sex: Female



Demographics







 Address  [address] [place]  [address]

 

 Preferred Language  Unknown

 

 Marital Status  Unknown

 

 Confucianist Affiliation  Unknown

 

 Race  Unknown

 

 Ethnic Group  Unknown





Author







 Author  KIM FUCHS

 

 Organization  [facility]

 

 Address  [address] [place]  [address]



 

 Phone  [phone]







Care Team Providers







 Care Team Member Name  Role  Phone

 

 FUCHSJAMA JACKSONELE  Unavailable  [phone]







PROBLEMS







 Type  Condition  ICD9-CM Code  RFR88-NR Code  Onset Dates  Condition Status  
SNOMED Code

 

 Problem  Neuropathy     G62.9     Active  363260860

 

 Problem  Lumbago with sciatica, left side     M54.42     Active  106970131

 

 Problem  Anxiety disorder, unspecified     F41.9     Active  186941663

 

 Problem  Chronic pain syndrome     G89.4     Active  582929822

 

 Problem  Fibromyalgia     M79.7     Active  631344036

 

 Problem  Coronary artery disease involving native coronary artery of native 
heart without angina pectoris     I25.10     Active  [id number]

 

 Problem  Essential hypertension     I10     Active  39325733

 

 Problem  GERD with esophagitis     K21.0     Active  535379578

 

 Problem  Other chronic pain     G89.29     Active  04067770

 

 Problem  Lumbago with sciatica, right side     M54.41     Active  
[id number]

 

 Problem  COPD suggested by initial evaluation     J44.9     Active  73555480

 

 Problem  Acute midline low back pain with left-sided sciatica     M54.42     
Active  613571317







ALLERGIES

No Information



ENCOUNTERS







 Encounter  Location  Date  Diagnosis

 

 [facility]  [address] [place] [address] 93082-
[phone]  20 Aug, 2018   

 

 [facility]  [address] [place] [address] 56305-
6054  16 Aug, 2018  Neuropathy G62.9

 

 [facility]  [address] [place] [address] 48289-
0274    Essential hypertension I10 ; Coronary artery disease 
involving native coronary artery of native heart without angina pectoris I25.10 
; Fibromyalgia M79.7 ; GERD with esophagitis K21.0 and Tobacco abuse counseling 
Z71.6

 

 [facility]  [id number] [place] [address] 97088-
4210    Long term (current) use of opiate analgesic Z79.891

 

 [facility]  [id number] [place] [address] 95733-
9267     

 

 [facility]  [id number] [place] [address] 64308-
7783    Acute bronchitis, unspecified organism J20.9

 

 [facility]  [id number] [place] [address] 17649-
3118     

 

 [facility]  [id number] [place] [address] 25689-
9571     

 

 [facility]  [id number] [place] [address] 16885-
9418    Chronic pain syndrome G89.4

 

 [facility]  [id number] [place] [address] 09395-
8210     

 

 [facility]  [id number] [place] [address] 28818-
4715  15 Marko, 2018   

 

 [facility]  [id number] [place] [address] 10263-
1369    Acute bronchitis, unspecified organism J20.9

 

 [facility]  [id number] [place] [address] 20245-
1758    Chronic pain syndrome G89.4

 

 [facility]  [id number] [place] [address] 83759-
7891  25 May, 2018   

 

 [facility]  [id number] [place] [address] 00427-
0143  24 May, 2018  Acute midline low back pain with left-sided sciatica M54.42

 

 [facility]  [id number] [place] [address] 98691-
5958  22 May, 2018   

 

 [facility]  [address] [place] [address] 45016
-[phone]  21 May, 2018  Bronchitis J40

 

 [facility]  [id number] [place] [address] 97867-
6752  07 May, 2018  Chronic pain syndrome G89.4 and Neuropathy G62.9

 

 [facility]  [id number] [place] [address] 91137-
9054    Acute midline low back pain with left-sided sciatica M54.42

 

 [facility]  [address] 54789-
8157     

 

 [facility]  [address] 69618-
4468    Anxiety disorder, unspecified F41.9

 

 [facility]  [address] 55760-
2741     

 

 [place] [address] [address]-
5501    Chronic pain syndrome G89.4 and Acute midline low back pain 
with left-sided sciatica M54.42

 

 [facility]  [id number] [place] [address] 50918-
2927    Chronic pain syndrome G89.4

 

 [facility]  [id number] [place] [address] 84680-
5544     

 

 [facility]  [id number] [place] [address] 80594-
4139     

 

 [facility]  [id number] [place] [address] 75681-
5280  29 Mar, 2018  Injury of left knee, initial encounter S89.92XA and COPD 
suggested by initial evaluation J44.9

 

 [place] [address] 94399-
3244  28 Mar, 2018  Acute bronchitis, unspecified organism J20.9

 

 [facility]  [id number] [place] [address] 58253-
3341  27 Mar, 2018  Acute bronchitis, unspecified organism J20.9

 

 [place] [address] [place] [address]-
5505  21 Mar, 2018  Anxiety disorder, unspecified F41.9 and Acute bronchitis, 
unspecified organism J20.9

 

 [facility]  [id number] [place] [address] 13195-
7892  08 Mar, 2018  Chronic pain syndrome G89.4

 

 [facility]  [address] 82522-
7107  05 Mar, 2018   

 

 [facility]  [address] 86762-
6881  05 Mar, 2018  Acute midline low back pain with left-sided sciatica M54.42

 

 [facility]  [address] 73528-
5130     

 

 [facility]  [address] 58999-
3541    Chronic pain syndrome G89.4

 

 [facility]  [address] 75082-
1353    Chronic pain syndrome G89.4

 

 [facility]  [address] 11437-
8637     

 

 [facility]  [id number] [place] [address] 25109-
1613    Gastroenteritis K52.9

 

 [facility]  [address] 98744-
8908     

 

 [facility]  [id number] [place] [address] 08580-
9344  15 Jerardo, 2018  Acute bronchitis, unspecified organism J20.9

 

 [facility]  [address] 49658-
7262    Anxiety disorder, unspecified F41.9 and Neuropathy G62.9

 

 [facility]  [id number] [place] [address] 63958-
4492    Chronic pain syndrome G89.4

 

 [address] [place] [address] [place] [address] 98398-
4871    Bronchitis J40 and Laryngitis J04.0

 

 [place] [id number] [place] [address] 75285-
3903  29 Dec, 2017   

 

 [place] [id number] [place] [address] 75866-
0325  26 Dec, 2017  Bronchitis J40

 

 [address] [place] [id number] [place] [address] 33653-
5077  18 Dec, 2017   

 

 [address] [place] [id number] [place] [address] 87468-
0679  13 Dec, 2017  Fibromyalgia M79.7 and Chronic pain syndrome G89.4

 

 [place] [id number] [place] [address] 29714-
1095  04 Dec, 2017  Chronic pain syndrome G89.4 and Acute bronchitis, 
unspecified organism J20.9

 

 [address] [place] [id number] [place] [address] 53254-
6251    Neuropathy G62.9

 

 [address] [place] [id number] [place] [address] 71712-
3405    Chronic pain syndrome G89.4 ; Lumbago with sciatica, left 
side M54.42 ; Lumbago with sciatica, right side M54.41 ; Screening cholesterol 
level Z13.220 ; Screening for diabetes mellitus Z13.1 ; Screening for thyroid 
disorder Z13.29 ; Fibromyalgia M79.7 ; Anxiety disorder, unspecified F41.9 and 
Neuropathy G62.9

 

 [address] [address], [place] [address]-
4796     

 

 [address] [place] [id number] [place] [address] 61611-
1043  25 Oct, 2017   

 

 [id number] [place] [address] 51015-
2534  10 Oct, 2017  Fibromyalgia M79.7 ; Chronic pain syndrome G89.4 ; Anxiety 
disorder, unspecified F41.9 ; Neuropathy G62.9 and Acute bronchitis, 
unspecified organism J20.9

 

 [facility]  [id number] [place] [address] 11255-
3851  09 Oct, 2017   

 

 [facility]  [id number] [place] [address] 12984-
6502  25 Sep, 2017   

 

 [facility]  [id number] [place] [address] 53770-
1950  19 Sep, 2017   

 

 [facility]  [id number] [place] [address] 76351-
0171  08 Sep, 2017   

 

 [facility]  [id number] [place] [address] 39161-
8565  23 Aug, 2017   

 

 [facility]  [id number] [place] [address] 88598-
2006  22 Aug, 2017  Acute seasonal allergic rhinitis due to pollen J30.1

 

 [facility]  [id number] [place] [address] 27431-
9429  21 Aug, 2017   

 

 [facility]  [id number] [place] [address] 38936-
2301  09 Aug, 2017   

 

 [facility]  [id number] [place] [address] 92805-
5305     

 

 [facility]  [address] [address]-
1024     

 

 [facility]  [id number] [place] [address] 16981-
2115  10 Jul, 2017   

 

 [facility]  [address] [place] [address] 45295-
0177     

 

 [facility]  [id number] [place] [address] 43583-
6971     

 

 [facility]  [address] [address]-
2889     

 

 [facility]  [id number] [place] [address] 31495-
3860    Chronic pain syndrome G89.4 ; Fibromyalgia M79.7 ; Anxiety 
disorder, unspecified F41.9 ; Neuropathy G62.9 and Low back pain M54.5

 

 [facility]  [address] [address]-
8887  25 May, 2017  Low back pain M54.5

 

 [facility]  [address] [place] [address] 93538-
0998  24 May, 2017   

 

 [facility]  [id number] [place] [address] 15686-
1401  22 May, 2017   

 

 [facility]  [id number] [place] [address] 90425-
4356  16 May, 2017   

 

 [facility]  [id number] [place] [address] 78255-
7828  16 May, 2017   

 

 [facility]  [id number] [place] [address] 63131-
8266  12 May, 2017  Routine adult health maintenance Z00.00 ; Fibromyalgia 
M79.7 ; Chronic pain syndrome G89.4 ; Abnormal lung sounds R09.89 ; Coronary 
artery disease involving native coronary artery of native heart without angina 
pectoris I25.10 and S/P CABG (coronary artery bypass graft) Z95.1

 

 [facility]  [address] [address]-
6820  09 May, 2017   







IMMUNIZATIONS

No Known Immunizations



SOCIAL HISTORY

Never Assessed



REASON FOR VISIT

Refill request



PLAN OF CARE





VITAL SIGNS





MEDICATIONS

Unknown Medications



RESULTS

No Results



PROCEDURES

No Known procedures



INSTRUCTIONS





MEDICATIONS ADMINISTERED

No Known Medications



MEDICAL (GENERAL) HISTORY







 Type  Description  Date

 

 Medical History  fibromyalgia   

 

 Medical History  MRI 2016- small central protrusion/herniation w/minimal 
impression on thecal anteriorly at C5-6, At C3-4 small bilat. uncinate spurs w/ 
mild right neural foraminal narrowing   

 

 Medical History  MRI 2016- mild degenerative changes. No spinal canal 
stenosis or foraminal narrowing of thoracic spine   

 

 Medical History  hypertension   

 

 Medical History  Anxiety disorder   

 

 Medical History  depression   

 

 Medical History  migraine headaches   

 

 Medical History  Hx of C-Diff   

 

 Medical History  Hx of Pneumonia   

 

 Medical History  heart cath w/ stents placed 7/3/18   

 

 Surgical History  Open Heart Surgery - [facility] -Dr. Lima  (
Cardiologist- Dr Logan)  

 

 Surgical History  hysterectomy - Dr Luis   

 

 Surgical History  Left Breast Lumpectomy (Bx - Benign)- Dr Luis   

 

 Surgical History  Port - Dr Luis   

 

 Surgical History  Left Knee Surgeries x3- Dr Carolina did 2 and Dr Gan did 1   

 

 Surgical History  cath/stent  

 

 Hospitalization History  C-Diff - [facility]   

 

 Hospitalization History  Pneumonia - [facility]   

 

 Hospitalization History  Open Heart Surgery - [facility]

## 2019-01-07 NOTE — XMS REPORT
[facility]

 Created on: 2018



Dayami Rae

External Reference #: [id number]

: 1970

Sex: Female



Demographics







 Address  [address] [place]  [address]

 

 Preferred Language  Unknown

 

 Marital Status  Unknown

 

 Protestant Affiliation  Unknown

 

 Race  Unknown

 

 Ethnic Group  Unknown





Author







 Author  KIM FUCHS

 

 Organization  [facility]

 

 Address  [address] [place]  [address]



 

 Phone  [phone]







Care Team Providers







 Care Team Member Name  Role  Phone

 

 FUCHSKIM JACKSON  Unavailable  [phone]







PROBLEMS







 Type  Condition  ICD9-CM Code  ONU40-IU Code  Onset Dates  Condition Status  
SNOMED Code

 

 Problem  Neuropathy     G62.9     Active  168057524

 

 Problem  Lumbago with sciatica, left side     M54.42     Active  291245175

 

 Problem  Anxiety disorder, unspecified     F41.9     Active  292731249

 

 Problem  Chronic pain syndrome     G89.4     Active  317356009

 

 Problem  Fibromyalgia     M79.7     Active  933424977

 

 Problem  Coronary artery disease involving native coronary artery of native 
heart without angina pectoris     I25.10     Active  9168944631396

 

 Problem  Essential hypertension     I10     Active  93827686

 

 Problem  GERD with esophagitis     K21.0     Active  833281834

 

 Problem  Other chronic pain     G89.29     Active  89243123

 

 Problem  Lumbago with sciatica, right side     M54.41     Active  
271693073204637

 

 Problem  COPD suggested by initial evaluation     J44.9     Active  28591935

 

 Problem  Acute midline low back pain with left-sided sciatica     M54.42     
Active  498751151







ALLERGIES

No Information



ENCOUNTERS







 Encounter  Location  Date  Diagnosis

 

 [place] [address] [place] [address] 31696-
9509  16 Aug, 2018  Neuropathy G62.9

 

 [facility]  [address] [place] [address] 06357-
1283    Essential hypertension I10 ; Coronary artery disease 
involving native coronary artery of native heart without angina pectoris I25.10 
; Fibromyalgia M79.7 ; GERD with esophagitis K21.0 and Tobacco abuse counseling 
Z71.6

 

 [place] [address] [place] [address] 21014-
8798    Long term (current) use of opiate analgesic Z79.891

 

 [place] [address] [address]-
2963     

 

 [facility]  [address] [id number] [place] [address] 54739-
2016    Acute bronchitis, unspecified organism J20.9

 

 [facility]  [address] [place] [address] 43258-
3143     

 

 [facility]  [address] [id number] [place] [address] 05700-
6250     

 

 [facility]  [address] [place] [address] 79387-
7497    Chronic pain syndrome G89.4

 

 [facility]  [id number] [place] [address] 39416-
8959     

 

 [facility]  [id number] [place] [address] 24285-
0173  15 Marko, 2018   

 

 [facility]  [id number] [place] [address] 41265-
2896    Acute bronchitis, unspecified organism J20.9

 

 [facility]  [id number] [place] [address] 57327-
3650    Chronic pain syndrome G89.4

 

 [facility]  [address] [place] [address] 68766-
0758  25 May, 2018   

 

 [facility]  [address] [place] [address] 31190-
4673  24 May, 2018  Acute midline low back pain with left-sided sciatica M54.42

 

 [facility]  [address] [place] [address] 94568-
1073  22 May, 2018   

 

 [facility]  [address] [place] [address] [phone]
[phone]  21 May, 2018  Bronchitis J40

 

 [facility]  [address] [place] [address] 66083-
3454  07 May, 2018  Chronic pain syndrome G89.4 and Neuropathy G62.9

 

 [facility]  [id number] [place] [address] 17894-
8169    Acute midline low back pain with left-sided sciatica M54.42

 

 [facility]  [id number] [place] [address] 40557-
9316     

 

 [facility]  [address] [address]-
9292    Anxiety disorder, unspecified F41.9

 

 [facility]  [id number] [place] [address] 56546-
9892     

 

 [place] [address] [address]-
3161    Chronic pain syndrome G89.4 and Acute midline low back pain 
with left-sided sciatica M54.42

 

 [place] [address] [address]30-
3077    Chronic pain syndrome G89.4

 

 [facility]  [id number] [place] [address] 63025-
8425     

 

 [place] [address] [address] [id number] [place] [address] 90671-
3925     

 

 [place] [address] [place] [id number] [place] [address] 60808-
4284  29 Mar, 2018  Injury of left knee, initial encounter S89.92XA and COPD 
suggested by initial evaluation J44.9

 

 [place] [address] [place] [id number] [place] [address] 65883-
8909  28 Mar, 2018  Acute bronchitis, unspecified organism J20.9

 

 [place] [address] [place] [id number] [place] [address] 29689-
8129  27 Mar, 2018  Acute bronchitis, unspecified organism J20.9

 

 [facility]  [address] [id number] [place] [address] 95054-
3626  21 Mar, 2018  Anxiety disorder, unspecified F41.9 and Acute bronchitis, 
unspecified organism J20.9

 

 [facility]  [id number] [place] [address] 78237-
8998  08 Mar, 2018  Chronic pain syndrome G89.4

 

 [facility]  [id number] [place] [address] 66055-
3883  05 Mar, 2018   

 

 [facility]  [address] 6[address]-
7494  05 Mar, 2018  Acute midline low back pain with left-sided sciatica M54.42

 

 [facility]  [address] [address]-
2629     

 

 [facility]  [address] [address]35-
0134    Chronic pain syndrome G89.4

 

 [facility]  [address] [address]959-
1945    Chronic pain syndrome G89.4

 

 [facility]  [id number] [place] [address] 59582-
7200     

 

 [facility]  [address] 8[address]27-
2200    Gastroenteritis K52.9

 

 [facility]  [id number] [place] [address] 42298-
0742     

 

 [facility]  [id number] [place] [address] 65483-
4292  15 Jerardo, 2018  Acute bronchitis, unspecified organism J20.9

 

 [facility]  [address] [id number] [place] [address] 45360-
5405    Anxiety disorder, unspecified F41.9 and Neuropathy G62.9

 

 [facility]  [address] [id number] [place] [address] 84586-
0233    Chronic pain syndrome G89.4

 

 [facility]  [address] [address]-
7547    Bronchitis J40 and Laryngitis J04.0

 

 [place] [address] [place] [id number] [place] [address] 40650-
8676  29 Dec, 2017   

 

 [place] [address] [place] [id number] [place] [address] 16456-
9777  26 Dec, 2017  Bronchitis J40

 

 [place] [address] [place] [id number] [place] [address] 73426-
0120  18 Dec, 2017   

 

 [place] [address] 6[address]-
9389  13 Dec, 2017  Fibromyalgia M79.7 and Chronic pain syndrome G89.4

 

 [place] [address] [address]-
5190  04 Dec, 2017  Chronic pain syndrome G89.4 and Acute bronchitis, 
unspecified organism J20.9

 

 [place] [id number] [place] [address] 16047-
1808    Neuropathy G62.9

 

 [place] [address] [place] [id number] [place] [address] 40926-
3617    Chronic pain syndrome G89.4 ; Lumbago with sciatica, left 
side M54.42 ; Lumbago with sciatica, right side M54.41 ; Screening cholesterol 
level Z13.220 ; Screening for diabetes mellitus Z13.1 ; Screening for thyroid 
disorder Z13.29 ; Fibromyalgia M79.7 ; Anxiety disorder, unspecified F41.9 and 
Neuropathy G62.9

 

 [place] [address] [place] [id number] [place] [address] 36517-
0472     

 

 [place] [address] [place] [id number] [place] [address] 42504-
9273  25 Oct, 2017   

 

 [place] [id number] [place] [address] 01646-
6616  10 Oct, 2017  Fibromyalgia M79.7 ; Chronic pain syndrome G89.4 ; Anxiety 
disorder, unspecified F41.9 ; Neuropathy G62.9 and Acute bronchitis, 
unspecified organism J20.9

 

 [place] [id number] [address]-
0857  09 Oct, 2017   

 

 [facility]  [address] [address]-
9009  25 Sep, 2017   

 

 [facility]  [address] [address]-
0743  19 Sep, 2017   

 

 [facility]  [address] [address]-
1195  08 Sep, 2017   

 

 [facility]  [address] [address]-
4056  23 Aug, 2017   

 

 [facility]  [address] [id number] [place] [address] 54042-
1671  22 Aug, 2017  Acute seasonal allergic rhinitis due to pollen J30.1

 

 [facility]  [address] [address]-
9822  21 Aug, 2017   

 

 [facility]  [address] [address]-
2369  09 Aug, 2017   

 

 [facility]  [address] [address]-
0389     

 

 [facility]  [address] [address]-
3419     

 

 [facility]  [address] [address]-
6255  10 Jul, 2017   

 

 [facility]  [address] [address]-
9683     

 

 [facility]  [address] [address]-
6329     

 

 [facility]  [address] [address]-
0553     

 

 [facility]  [address] [address]-
2596    Chronic pain syndrome G89.4 ; Fibromyalgia M79.7 ; Anxiety 
disorder, unspecified F41.9 ; Neuropathy G62.9 and Low back pain M54.5

 

 [facility]  [id number] [address]-
3296  25 May, 2017  Low back pain M54.5

 

 [facility]  [address] [address]-
2217  24 May, 2017   

 

 [facility]  [address] [id number] [place] [address] 52445-
3276  22 May, 2017   

 

 [facility]  [address] [address]-
3189  16 May, 2017   

 

 [facility]  [address] [id number] [place] [address] 32181-
3239  16 May, 2017   

 

 [facility]  [id number] [place] [address] 77109-
1456  12 May, 2017  Routine adult health maintenance Z00.00 ; Fibromyalgia 
M79.7 ; Chronic pain syndrome G89.4 ; Abnormal lung sounds R09.89 ; Coronary 
artery disease involving native coronary artery of native heart without angina 
pectoris I25.10 and S/P CABG (coronary artery bypass graft) Z95.1

 

 [facility]  [address] [address]-
8899  09 May, 2017   







IMMUNIZATIONS

No Known Immunizations



SOCIAL HISTORY

Never Assessed



REASON FOR VISIT

Refill request



PLAN OF CARE





VITAL SIGNS





MEDICATIONS







 Medication  Instructions  Dosage  Frequency  Start Date  End Date  Duration  
Status

 

 Chlorzoxazone 500 mg  Orally Three times a day if needed  1 tablet           
30 days  Active







RESULTS

No Results



PROCEDURES

No Known procedures



INSTRUCTIONS





MEDICATIONS ADMINISTERED

No Known Medications



MEDICAL (GENERAL) HISTORY







 Type  Description  Date

 

 Medical History  fibromyalgia   

 

 Medical History  MRI 2016- small central protrusion/herniation w/minimal 
impression on thecal anteriorly at C5-6, At C3-4 small bilat. uncinate spurs w/ 
mild right neural foraminal narrowing   

 

 Medical History  MRI 2016- mild degenerative changes. No spinal canal 
stenosis or foraminal narrowing of thoracic spine   

 

 Medical History  hypertension   

 

 Medical History  Anxiety disorder   

 

 Medical History  depression   

 

 Medical History  migraine headaches   

 

 Medical History  Hx of C-Diff   

 

 Medical History  Hx of Pneumonia   

 

 Medical History  heart cath w/ stents placed 7/3/18   

 

 Surgical History  Open Heart Surgery - [facility] -Dr. Lima  (
Cardiologist- Dr Logan)  

 

 Surgical History  hysterectomy - Dr Luis   

 

 Surgical History  Left Breast Lumpectomy (Bx - Benign)- Dr Luis   

 

 Surgical History  Port - Dr Luis   

 

 Surgical History  Left Knee Surgeries x3- Dr Carolina did 2 and Dr Gan did 1   

 

 Surgical History  cath/stent  

 

 Hospitalization History  C-Diff - [facility]   

 

 Hospitalization History  Pneumonia - [facility]   

 

 Hospitalization History  Open Heart Surgery - [facility]

## 2019-01-07 NOTE — XMS REPORT
[facility]

 Created on: 2018



Dayami Rae

External Reference #: [id number]

: 1970

Sex: Female



Demographics







 Address  [address] [place]  [address]

 

 Preferred Language  Unknown

 

 Marital Status  Unknown

 

 Pentec[employer]alism Affiliation  Unknown

 

 Race  Unknown

 

 Ethnic Group  Unknown





Author







 Author  KIM FUCHS

 

 Organization  [facility]

 

 Address  [address] [place]  [address]



 

 Phone  [phone]







Care Team Providers







 Care Team Member Name  Role  Phone

 

 JEFJAMAELE  Unavailable  [phone]







PROBLEMS







 Type  Condition  ICD9-CM Code  BVR56-CY Code  Onset Dates  Condition Status  
SNOMED Code

 

 Problem  Fibromyalgia     M79.7     Active  542147726

 

 Problem  Neuropathy     G62.9     Active  315815975

 

 Problem  Coronary artery disease involving native coronary artery of native 
heart without angina pectoris     I25.10     Active  0133570634680

 

 Problem  Chronic pain syndrome     G89.4     Active  839618793

 

 Problem  COPD suggested by initial evaluation     J44.9     Active  27495541

 

 Problem  Acute midline low back pain with left-sided sciatica     M54.42     
Active  741223275

 

 Problem  Lumbago with sciatica, left side     M54.42     Active  087093433

 

 Problem  Anxiety disorder, unspecified     F41.9     Active  722818407

 

 Problem  Other chronic pain     G89.29     Active  49897765

 

 Problem  Lumbago with sciatica, right side     M54.41     Active  
[id number]







ALLERGIES

No Information



ENCOUNTERS







 Encounter  Location  Date  Diagnosis

 

 [facility]  [address] [place] [address] 04954-
2995     

 

 [facility]  [id number] [place] [address] 56333-
2998     

 

 [facility]  [id number] [place] [address] 55882-
7294     

 

 [facility]  [id number] [place] [address] 30743-
2996    Chronic pain syndrome G89.4

 

 [facility]  [id number] [place] [address] 76827-
6676     

 

 [facility]  [id number] [place] [address] 36381-
5152  15 2018   

 

 [facility]  [id number] [place] [address] 17523-
0212    Acute bronchitis, unspecified organism J20.9

 

 [facility]  [id number] [place] [address] 44335-
1381    Chronic pain syndrome G89.4

 

 [facility]  [id number] [place] [address] 47143-
1056  25 May, 2018   

 

 [facility]  [id number] [place] [address] 53455-
3489  24 May, 2018  Acute midline low back pain with left-sided sciatica M54.42

 

 [facility]  [id number] [place] [address] 36501-
6091  22 May, 2018   

 

 [facility]  [id number] [place] [address] [phone]
[phone]  21 May, 2018  Bronchitis J40

 

 [facility]  [id number] [place] [address] 47880-
9993  07 May, 2018  Chronic pain syndrome G89.4 and Neuropathy G62.9

 

 [facility]  [id number] [place] [address] 81846-
2188    Acute midline low back pain with left-sided sciatica M54.42

 

 [facility]  [id number] [place] [address] 21612-
5363     

 

 [facility]  [id number] [place] [address] 98377-
8056    Anxiety disorder, unspecified F41.9

 

 [facility]  [id number] [place] [address] 59363-
8676     

 

 [facility]  [id number] [place] [address] 51191-
1253    Chronic pain syndrome G89.4 and Acute midline low back pain 
with left-sided sciatica M54.42

 

 [facility]  [id number] [place] [address] 95591-
1870    Chronic pain syndrome G89.4

 

 [facility]  [id number] [place] [address] 69469-
0375     

 

 [facility]  [id number] [place] [address] 24174-
1929     

 

 [facility]  [address] [address]-
7401  29 Mar, 2018  Injury of left knee, initial encounter S89.92XA and COPD 
suggested by initial evaluation J44.9

 

 [place] [address] [phone]-
0200  28 Mar, 2018  Acute bronchitis, unspecified organism J20.9

 

 [place] [id number] [place] [address] 54771-
7706  27 Mar, 2018  Acute bronchitis, unspecified organism J20.9

 

 [facility]  [id number] [place] [address] 11631-
5839  21 Mar, 2018  Anxiety disorder, unspecified F41.9 and Acute bronchitis, 
unspecified organism J20.9

 

 [facility]  [id number] [place] [address] 72587-
9567  08 Mar, 2018  Chronic pain syndrome G89.4

 

 [facility]  [id number] [place] [address] 14486-
1243  05 Mar, 2018   

 

 [facility]  [id number] [place] [address] 95684-
6414  05 Mar, 2018  Acute midline low back pain with left-sided sciatica M54.42

 

 [facility]  [id number] [place] [address] 91599-
6685     

 

 [facility]  [id number] [place] [address] 61080-
6073    Chronic pain syndrome G89.4

 

 [facility]  [id number] [place] [address] 27769-
2551    Chronic pain syndrome G89.4

 

 [facility]  [id number] [place] [address] 03901-
1133     

 

 [facility]  [address] [address]-
8364    Gastroenteritis K52.9

 

 [facility]  [address] 97708-
2404     

 

 [facility]  [address] 8[address]-
9903  15 Jerardo, 2018  Acute bronchitis, unspecified organism J20.9

 

 [facility]  [address] [address]-
0404    Anxiety disorder, unspecified F41.9 and Neuropathy G62.9

 

 [place] [address] 18604-
4779    Chronic pain syndrome G89.4

 

 [facility]  [id number] [place] [address] 90530-
8306    Bronchitis J40 and Laryngitis J04.0

 

 [place] [id number] [place] [address] [phone]-
0100  29 Dec, 2017   

 

 [facility]  [id number] [place] [address] 33507-
3056  26 Dec, 2017  Bronchitis J40

 

 [facility]  [id number] [place] [address] 46837-
8771  18 Dec, 2017   

 

 [facility]  [id number] [place] [address] 42587-
9588  13 Dec, 2017  Fibromyalgia M79.7 and Chronic pain syndrome G89.4

 

 [facility]  [id number] [place] [address] 00983-
7877  04 Dec, 2017  Chronic pain syndrome G89.4 and Acute bronchitis, 
unspecified organism J20.9

 

 [facility]  [address] [address]-
6593    Neuropathy G62.9

 

 [facility]  [address] [place] [address] 76451-
9676    Chronic pain syndrome G89.4 ; Lumbago with sciatica, left 
side M54.42 ; Lumbago with sciatica, right side M54.41 ; Screening cholesterol 
level Z13.220 ; Screening for diabetes mellitus Z13.1 ; Screening for thyroid 
disorder Z13.29 ; Fibromyalgia M79.7 ; Anxiety disorder, unspecified F41.9 and 
Neuropathy G62.9

 

 [facility]  [id number] [place] [address] 98889-
6564     

 

 [facility]  [id number] [place] [address] 81886-
9198  25 Oct, 2017   

 

 [facility]  [address] [place] [address] 64097-
2920  10 Oct, 2017  Fibromyalgia M79.7 ; Chronic pain syndrome G89.4 ; Anxiety 
disorder, unspecified F41.9 ; Neuropathy G62.9 and Acute bronchitis, 
unspecified organism J20.9

 

 [facility]  [id number] [place] [address] 05458-
8565  09 Oct, 2017   

 

 [facility]  [id number] [place] [address] 16174-
5620  25 Sep, 2017   

 

 [facility]  [id number] [place] [address] 35343-
1252  19 Sep, 2017   

 

 [facility]  [id number] [place] [address] 15064-
9496  08 Sep, 2017   

 

 [facility]  [id number] [place] [address] 56118-
6468  23 Aug, 2017   

 

 [facility]  [id number] [place] [address] 35685-
8078  22 Aug, 2017  Acute seasonal allergic rhinitis due to pollen J30.1

 

 [facility]  [id number] [place] [address] 61706-
5156  21 Aug, 2017   

 

 [facility]  [address] [address]-
0503  09 Aug, 2017   

 

 [facility]  [address] [address]-
9659     

 

 [facility]  [address] [place] [address]-
6064     

 

 [facility]  [id number] [address]-
0538  10 Jul, 2017   

 

 [facility]  [address] [address]-
0974     

 

 [facility]  [address] [address]-
3494     

 

 [facility]  [address] [address]-
2981     

 

 [facility]  [address] [address]-
3432    Chronic pain syndrome G89.4 ; Fibromyalgia M79.7 ; Anxiety 
disorder, unspecified F41.9 ; Neuropathy G62.9 and Low back pain M54.5

 

 [facility]  [address] [address]-
3577  25 May, 2017  Low back pain M54.5

 

 [facility]  [address] [address]-
7223  24 May, 2017   

 

 [facility]  [address] [place] [address]-
8299  22 May, 2017   

 

 [facility]  [address] [address]-
1898  16 May, 2017   

 

 [facility]  [address] [place] [address]-
4044  16 May, 2017   

 

 [facility]  [id number] [address]-
1215  12 May, 2017  Routine adult health maintenance Z00.00 ; Fibromyalgia 
M79.7 ; Chronic pain syndrome G89.4 ; Abnormal lung sounds R09.89 ; Coronary 
artery disease involving native coronary artery of native heart without angina 
pectoris I25.10 and S/P CABG (coronary artery bypass graft) Z95.1

 

 [facility]  [address] [facility] [id number] [place] [address]-
7515  09 May, 2017   







IMMUNIZATIONS

No Known Immunizations



SOCIAL HISTORY

Never Assessed



REASON FOR VISIT

Requesting results



PLAN OF CARE





VITAL SIGNS





MEDICATIONS

Unknown Medications



RESULTS

No Results



PROCEDURES

No Known procedures



INSTRUCTIONS





MEDICATIONS ADMINISTERED

No Known Medications



MEDICAL (GENERAL) HISTORY







 Type  Description  Date

 

 Medical History  fibromyalgia   

 

 Medical History  MRI 2016- small central protrusion/herniation w/minimal 
impression on thecal anteriorly at C5-6, At C3-4 small bilat. uncinate spurs w/ 
mild right neural foraminal narrowing   

 

 Medical History  MRI 2016- mild degenerative changes. No spinal canal 
stenosis or foraminal narrowing of thoracic spine   

 

 Medical History  hypertension   

 

 Medical History  Anxiety disorder   

 

 Medical History  depression   

 

 Medical History  migraine headaches   

 

 Medical History  Hx of C-Diff   

 

 Medical History  Hx of Pneumonia   

 

 Surgical History  Open Heart Surgery - [facility] -Dr. Lima  (
Cardiologist- Dr Logan)  

 

 Surgical History  hysterectomy - Dr Luis   

 

 Surgical History  Left Breast Lumpectomy (Bx - Benign)- Dr Luis   

 

 Surgical History  Port - Dr Luis   

 

 Surgical History  Left Knee Surgeries x3- Dr Carolina did 2 and Dr Gan did 1   

 

 Hospitalization History  C-Diff - [facility]   

 

 Hospitalization History  Pneumonia - [facility]   

 

 Hospitalization History  Open Heart Surgery - [facility]

## 2019-01-07 NOTE — XMS REPORT
[facility]

 Created on: 2018



Dayami Rae

External Reference #: [id number]

: 1970

Sex: Female



Demographics







 Address  [address] [place]  [address]

 

 Preferred Language  Unknown

 

 Marital Status  Unknown

 

 [employer] Affiliation  Unknown

 

 Race  Unknown

 

 Ethnic Group  Unknown





Author







 Author  SURENDRA FRASER

 

 [facility]

 

 Address  [address] [place]  [address]



 

 Phone  [phone]







Care Team Providers







 Care Team Member Name  Role  Phone

 

 SURENDRA FRASER  Unavailable  [phone]







PROBLEMS







 Type  Condition  ICD9-CM Code  OMO60-JE Code  Onset Dates  Condition Status  
SNOMED Code

 

 Problem  Coronary artery disease involving native coronary artery of native 
heart without angina pectoris     I25.10     Active  8174372715204

 

 Problem  Anxiety disorder, unspecified     F41.9     Active  534084760

 

 Problem  Neuropathy     G62.9     Active  728424062

 

 Problem  Fibromyalgia     M79.7     Active  530992135

 

 Problem  Chronic pain syndrome     G89.4     Active  378949270

 

 Problem  Cervical radiculopathy     M54.12     Active  41124704

 

 Problem  Chronic obstructive pulmonary disease, unspecified COPD type     
J44.9     Active  48657566

 

 Problem  Lumbago with sciatica, right side     M54.41     Active  
[id number]

 

 Problem  Lumbago with sciatica, left side     M54.42     Active  645272642

 

 Problem  Essential hypertension     I10     Active  20985541

 

 Problem  GERD with esophagitis     K21.0     Active  282562505







ALLERGIES

No Information



ENCOUNTERS







 Encounter  Location  Date  Diagnosis

 

 [facility]  [id number] [place] [address] 34032-
9741  06 Dec, 2018  Neuropathy G62.9

 

 [facility]  [id number] [place] [address] 34328-
4517    Anxiety disorder, unspecified F41.9

 

 [facility]  [id number] [place] [address] 32206-
3104    Chronic pain syndrome G89.4 and Anxiety disorder, 
unspecified F41.9

 

 [facility]  [id number] [place] [address] 76537-
9790  15 Nov, 2018   

 

 [facility]  [address] [address]-
[phone]     

 

 [facility]  [id number] [place] [address] 92098-
1282  29 Oct, 2018  Cervical radiculopathy M54.12 ; Chronic pain syndrome G89.4 
and Anxiety disorder, unspecified F41.9

 

 [place] [id number] [place] [address] 04965-
4153  24 Oct, 2018   

 

 [facility]  [id number] [place] [address] 60809-
2740  27 Sep, 2018  Fibromyalgia M79.7

 

 [place] [id number] [place] [address] 57029-
7776  20 Sep, 2018   

 

 [place] [id number] [place] [address] 47844-
2016  19 Sep, 2018   

 

 [place] [id number] [place] [address] 23010-
0754  17 Sep, 2018  Pneumonia of right lower lobe due to infectious organism 
J18.1 and Chronic obstructive pulmonary disease, unspecified COPD type J44.9

 

 [place] [id number] [place] [address] 10051-
8294  14 Sep, 2018  Pneumonia of right lower lobe due to infectious organism 
J18.1 ; Chronic obstructive pulmonary disease, unspecified COPD type J44.9 ; 
Chronic nausea R11.0 and Cough R05

 

 [place] [id number] [place] [address] 98475-
0442  07 Sep, 2018  Acute bronchitis, unspecified organism J20.9

 

 [facility]  [id number] [place] [address] 57807-
2652  28 Aug, 2018  Fibromyalgia M79.7

 

 [facility]  [id number] [place] [address] 69608-
1066  20 Aug, 2018   

 

 [facility]  [id number] [place] [address] 39045-
7515  16 Aug, 2018  Neuropathy G62.9

 

 [facility]  [id number] [place] [address] 24518-
2953    Essential hypertension I10 ; Coronary artery disease 
involving native coronary artery of native heart without angina pectoris I25.10 
; Fibromyalgia M79.7 ; GERD with esophagitis K21.0 and Tobacco abuse counseling 
Z71.6

 

 [facility]  [id number] [place] [address] 94603-
0706    Long term (current) use of opiate analgesic Z79.891

 

 [facility]  [id number] [place] [address] 39621-
2410     

 

 [facility]  [id number] [place] [address] 89057-
4813    Acute bronchitis, unspecified organism J20.9

 

 [facility]  [id number] [place] [address] 28338-
3934     

 

 [facility]  [id number] [place] [address] 73817-
4291     

 

 [facility]  [id number] [place] [address] 41997-
4624    Chronic pain syndrome G89.4

 

 [facility]  [id number] [place] [address] 42959-
4261     

 

 [facility]  [id number] [place] [address] 37098-
3668  15 Marko, 2018   

 

 [facility]  [id number] [place] [address] 10122-
3416    Acute bronchitis, unspecified organism J20.9

 

 [facility]  [id number] [place] [address] 34991-
7406    Chronic pain syndrome G89.4

 

 [facility]  [id number] [place] [address] 83644-
1405  25 May, 2018   

 

 [facility]  [id number] [place] [address] 01402-
7608  24 May, 2018  Acute midline low back pain with left-sided sciatica M54.42

 

 [facility]  [id number] [place] [address] 98474-
5723  22 May, 2018   

 

 [facility]  [id number] [place] [address] 45669
[phone]  21 May, 2018  Bronchitis J40

 

 [facility]  [address] 75669-
6117  07 May, 2018  Chronic pain syndrome G89.4 and Neuropathy G62.9

 

 [facility]  [id number] [place] [address] 29364-
0835    Acute midline low back pain with left-sided sciatica M54.42

 

 [facility]  [address] 71254-
4384     

 

 [facility]  [address] 46562-
1003    Anxiety disorder, unspecified F41.9

 

 [facility]  [address] 58607-
3802     

 

 [facility]  [address] 71365-
1342    Chronic pain syndrome G89.4 and Acute midline low back pain 
with left-sided sciatica M54.42

 

 [facility]  [id number] [place] [address] 88823-
2186    Chronic pain syndrome G89.4

 

 [facility]  [id number] [place] [address] 70499-
1206     

 

 [facility]  [id number] [place] [address] 59480-
2507     

 

 [facility]  [id number] [place] [address] 91869-
4012  29 Mar, 2018  Injury of left knee, initial encounter S89.92XA and COPD 
suggested by initial evaluation J44.9

 

 [facility]  [address] 36902-
2247  28 Mar, 2018  Acute bronchitis, unspecified organism J20.9

 

 [facility]  [id number] [place] [address] 07495-
1798  27 Mar, 2018  Acute bronchitis, unspecified organism J20.9

 

 [facility]  [id number] [place] [address] 53030-
0519  21 Mar, 2018  Anxiety disorder, unspecified F41.9 and Acute bronchitis, 
unspecified organism J20.9

 

 [facility]  [id number] [place] [address] 14008-
6441  08 Mar, 2018  Chronic pain syndrome G89.4

 

 [facility]  [id number] [place] [address] 26666-
8495  05 Mar, 2018   

 

 [facility]  [id number] [place] [address] 71152-
0676  05 Mar, 2018  Acute midline low back pain with left-sided sciatica M54.42

 

 [facility]  [id number] [place] [address] 47521-
1629     

 

 [facility]  [id number] [place] [address] 21069-
0227    Chronic pain syndrome G89.4

 

 [facility]  [id number] [place] [address] 08219-
4720    Chronic pain syndrome G89.4

 

 [facility]  [id number] [place] [address] 57887-
5468     

 

 [facility]  [id number] [place] [address] 48793-
1859    Gastroenteritis K52.9

 

 [facility]  [id number] [place] [address] 55472-
3215     

 

 [facility]  [address] [place] [address] 83798-
9517  15 Jerardo, 2018  Acute bronchitis, unspecified organism J20.9

 

 [facility]  [id number] [place] [address] 81824-
6265    Anxiety disorder, unspecified F41.9 and Neuropathy G62.9

 

 [place] [id number] [place] [address] 09578-
3168    Chronic pain syndrome G89.4

 

 [place] [id number] [place] [address] 77460-
0704    Bronchitis J40 and Laryngitis J04.0

 

 [address] [place] [address] 61672-
1024  29 Dec, 2017   

 

 [place] [address] 66400-
9190  26 Dec, 2017  Bronchitis J40

 

 [address] [place] [address] 42383-
6351  18 Dec, 2017   

 

 [address] [place] [address] 69318-
4275  13 Dec, 2017  Fibromyalgia M79.7 and Chronic pain syndrome G89.4

 

 [place] [id number] [place] [address] 82888-
0963  04 Dec, 2017  Chronic pain syndrome G89.4 and Acute bronchitis, 
unspecified organism J20.9

 

 [place] [id number] [place] [address] 86684-
7548    Neuropathy G62.9

 

 [place] [id number] [place] [address] 19566-
7892    Chronic pain syndrome G89.4 ; Lumbago with sciatica, left 
side M54.42 ; Lumbago with sciatica, right side M54.41 ; Screening cholesterol 
level Z13.220 ; Screening for diabetes mellitus Z13.1 ; Screening for thyroid 
disorder Z13.29 ; Fibromyalgia M79.7 ; Anxiety disorder, unspecified F41.9 and 
Neuropathy G62.9

 

 [address] [place] [id number] [place] [address] 06950-
0386     

 

 [place] [id number] [address]-
8896  25 Oct, 2017   

 

 [facility]  [id number] [place] [address] 47217-
7338  10 Oct, 2017  Fibromyalgia M79.7 ; Chronic pain syndrome G89.4 ; Anxiety 
disorder, unspecified F41.9 ; Neuropathy G62.9 and Acute bronchitis, 
unspecified organism J20.9

 

 [facility]  [id number] [place] [address] 89978-
9586  09 Oct, 2017   

 

 [facility]  [id number] [place] [address] 13176-
3406  25 Sep, 2017   

 

 [facility]  [id number] [place] [address] 02271-
3805  19 Sep, 2017   

 

 [facility]  [id number] [place] [address] 61015-
8026  08 Sep, 2017   

 

 [facility]  [id number] [place] [address] 87462-
7945  23 Aug, 2017   

 

 [facility]  [id number] [place] [address] 92862-
6136  22 Aug, 2017  Acute seasonal allergic rhinitis due to pollen J30.1

 

 [facility]  [id number] [address]-
4234  21 Aug, 2017   

 

 [facility]  [address] [address]-
4467  09 Aug, 2017   

 

 [facility]  [id number] [address]-
1646     

 

 [facility]  [id number] [address]-
7111     

 

 [facility]  [id number] [address]-
1570  10 Jul, 2017   

 

 [facility]  [id number] [address]-
3168     

 

 [facility]  [id number] [address]-
6431     

 

 [facility]  [address] [address]-
5796     

 

 [facility]  [id number] [address]-
5480    Chronic pain syndrome G89.4 ; Fibromyalgia M79.7 ; Anxiety 
disorder, unspecified F41.9 ; Neuropathy G62.9 and Low back pain M54.5

 

 [facility]  [id number] [address]-
4390  25 May, 2017  Low back pain M54.5

 

 [facility]  [id number] [place] [address] 07560-
1636  24 May, 2017   

 

 [facility]  [id number] [place] [address] 71517-
1425  22 May, 2017   

 

 [facility]  [id number] [place] [address] 21526-
9493  16 May, 2017   

 

 [facility]  [id number] [place] [address] 15168-
8250  16 May, 2017   

 

 [facility]  [id number] [place] [address] 03525-
8425  12 May, 2017  Routine adult health maintenance Z00.00 ; Fibromyalgia 
M79.7 ; Chronic pain syndrome G89.4 ; Abnormal lung sounds R09.89 ; Coronary 
artery disease involving native coronary artery of native heart without angina 
pectoris I25.10 and S/P CABG (coronary artery bypass graft) Z95.1

 

 [facility]  [address] [address]-
0677  09 May, 2017   







IMMUNIZATIONS

No Known Immunizations



SOCIAL HISTORY

Never Assessed



REASON FOR VISIT

Refill request



PLAN OF CARE





VITAL SIGNS





MEDICATIONS







 Medication  Instructions  Dosage  Frequency  Start Date  End Date  Duration  
Status

 

 Gabapentin 300 MG  Orally Once a day  1 capsule  24h  09 May, 2017     30 days
  Active







RESULTS

No Results



PROCEDURES

No Known procedures



INSTRUCTIONS





MEDICATIONS ADMINISTERED

No Known Medications



MEDICAL (GENERAL) HISTORY







 Type  Description  Date

 

 Medical History  fibromyalgia   

 

 Medical History  MRI 2016- small central protrusion/herniation w/minimal 
impression on thecal anteriorly at C5-6, At C3-4 small bilat. uncinate spurs w/ 
mild right neural foraminal narrowing   

 

 Medical History  MRI 2016- mild degenerative changes. No spinal canal 
stenosis or foraminal narrowing of thoracic spine   

 

 Medical History  hypertension   

 

 Medical History  Anxiety disorder   

 

 Medical History  depression   

 

 Medical History  migraine headaches   

 

 Medical History  Hx of C-Diff   

 

 Medical History  Hx of Pneumonia   

 

 Medical History  heart cath w/ stents placed 7/3/18   

 

 Surgical History  Open Heart Surgery - [facility] -Dr. Lima  (
Cardiologist- Dr Logan)  2013

 

 Surgical History  hysterectomy - Dr Luis   

 

 Surgical History  Left Breast Lumpectomy (Bx - Benign)- Dr Luis   

 

 Surgical History  Port - Dr Luis   

 

 Surgical History  Left Knee Surgeries x3- Dr Carolina did 2 and Dr Gan did 1   

 

 Surgical History  cath/stent  

 

 Hospitalization History  C-Diff - [facility]   

 

 Hospitalization History  Pneumonia - [facility]   

 

 Hospitalization History  Open Heart Surgery - [facility]

## 2019-01-07 NOTE — XMS REPORT
[facility]

 Created on: 2018



Dayami Rae

External Reference #: [id number]

: 1970

Sex: Female



Demographics







 Address  [address] [place]  [address]

 

 Preferred Language  Unknown

 

 Marital Status  Unknown

 

 Buddhism Affiliation  Unknown

 

 Race  Unknown

 

 Ethnic Group  Unknown





Author







 Author  KIM FUCHS

 

 Organization  [facility]

 

 Address  [address] [place]  [address]



 

 Phone  [phone]







Care Team Providers







 Care Team Member Name  Role  Phone

 

 FUCHSKIM JACKSON  Unavailable  [phone]







PROBLEMS







 Type  Condition  ICD9-CM Code  ZCW59-TB Code  Onset Dates  Condition Status  
SNOMED Code

 

 Problem  Neuropathy     G62.9     Active  825431232

 

 Problem  Lumbago with sciatica, left side     M54.42     Active  745137644

 

 Problem  Anxiety disorder, unspecified     F41.9     Active  597403904

 

 Problem  Chronic pain syndrome     G89.4     Active  056167455

 

 Problem  Fibromyalgia     M79.7     Active  350502435

 

 Problem  Coronary artery disease involving native coronary artery of native 
heart without angina pectoris     I25.10     Active  6259094454329

 

 Problem  Essential hypertension     I10     Active  81596137

 

 Problem  GERD with esophagitis     K21.0     Active  122546190

 

 Problem  Other chronic pain     G89.29     Active  89046366

 

 Problem  Lumbago with sciatica, right side     M54.41     Active  
[id number]

 

 Problem  COPD suggested by initial evaluation     J44.9     Active  78120333

 

 Problem  Acute midline low back pain with left-sided sciatica     M54.42     
Active  849115225







ALLERGIES

No Information



ENCOUNTERS







 Encounter  Location  Date  Diagnosis

 

 [facility]  [address] [place] [address] 03970-
0832  28 Aug, 2018  Fibromyalgia M79.7

 

 [facility]  [address] [place] [address] 53318-
9381  20 Aug, 2018   

 

 [facility]  [id number] [place] [address] 09022-
6147  16 Aug, 2018  Neuropathy G62.9

 

 [facility]  [address] [place] [address] 03546-
9167    Essential hypertension I10 ; Coronary artery disease 
involving native coronary artery of native heart without angina pectoris I25.10 
; Fibromyalgia M79.7 ; GERD with esophagitis K21.0 and Tobacco abuse counseling 
Z71.6

 

 [facility]  [id number] [place] [address] 89554-
5658    Long term (current) use of opiate analgesic Z79.891

 

 [facility]  [id number] [place] [address] 52703-
3962     

 

 [facility]  [id number] [place] [address] 73271-
9740    Acute bronchitis, unspecified organism J20.9

 

 [facility]  [id number] [place] [address] 81000-
9671     

 

 [facility]  [id number] [place] [address] 15434-
1800     

 

 [facility]  [id number] [place] [address] 68127-
3605    Chronic pain syndrome G89.4

 

 [facility]  [id number] [place] [address] 85486-
1260     

 

 [facility]  [id number] [place] [address] 75368-
3219  15 Marko, 2018   

 

 [facility]  [id number] [place] [address] 19690-
6932    Acute bronchitis, unspecified organism J20.9

 

 [facility]  [id number] [place] [address] 03432-
2971    Chronic pain syndrome G89.4

 

 [facility]  [address] [place] [address] 45747-
1106  25 May, 2018   

 

 [facility]  [id number] [place] [address] 67588-
2945  24 May, 2018  Acute midline low back pain with left-sided sciatica M54.42

 

 [facility]  [id number] [place] [address] 14070-
8554  22 May, 2018   

 

 [facility] [place] [facility]  [id number] [place] [address] 86233
-[phone]  21 May, 2018  Bronchitis J40

 

 [facility]  [address] [address]012-
0130  07 May, 2018  Chronic pain syndrome G89.4 and Neuropathy G62.9

 

 [facility]  [id number] [place] [address] 92887-
8650    Acute midline low back pain with left-sided sciatica M54.42

 

 [facility]  [id number] [place] [address] 98368-
6821     

 

 [facility]  [address] 75525-
6664    Anxiety disorder, unspecified F41.9

 

 [facility]  [address] 09116-
1533     

 

 [place] [address] 49049-
5835    Chronic pain syndrome G89.4 and Acute midline low back pain 
with left-sided sciatica M54.42

 

 [facility]  [address] 17358-
2371    Chronic pain syndrome G89.4

 

 [facility]  [id number] [place] [address] 18468-
5689     

 

 [facility]  [id number] [place] [address] 22836-
6216     

 

 [facility]  [id number] [place] [address] 47505-
9963  29 Mar, 2018  Injury of left knee, initial encounter S89.92XA and COPD 
suggested by initial evaluation J44.9

 

 [facility]  [id number] [place] [address] 15694-
2806  28 Mar, 2018  Acute bronchitis, unspecified organism J20.9

 

 [facility]  [address] 83522-
4435  27 Mar, 2018  Acute bronchitis, unspecified organism J20.9

 

 [facility]  [id number] [place] [address] 43002-
1314  21 Mar, 2018  Anxiety disorder, unspecified F41.9 and Acute bronchitis, 
unspecified organism J20.9

 

 [facility]  [id number] [place] [address] 87650-
9979  08 Mar, 2018  Chronic pain syndrome G89.4

 

 [facility]  [address] [place] [address] 31973-
3421  05 Mar, 2018   

 

 [facility]  [id number] [place] [address] 99016-
8629  05 Mar, 2018  Acute midline low back pain with left-sided sciatica M54.42

 

 [facility]  [address] [place] [address] 02890-
2036     

 

 [facility]  [address] [place] [address] 90058-
3281    Chronic pain syndrome G89.4

 

 [facility]  [address] [place] [address] 74945-
0614    Chronic pain syndrome G89.4

 

 [facility]  [address] [place] [address] 03199-
4702     

 

 [facility]  [address] [place] [address] 46383-
6049    Gastroenteritis K52.9

 

 [facility]  [address] [place] [address] 94734-
7363     

 

 [facility]  [address] [place] [address] 05677-
8676  15 Jerardo, 2018  Acute bronchitis, unspecified organism J20.9

 

 [facility]  [address] [place] [address] 74170-
1210    Anxiety disorder, unspecified F41.9 and Neuropathy G62.9

 

 [place] [address] [place] [address] 65173-
0204    Chronic pain syndrome G89.4

 

 [place] [address] [place] [address] 64983-
1166    Bronchitis J40 and Laryngitis J04.0

 

 [place] [address] [place] [address] 89966-
3513  29 Dec, 2017   

 

 [place] [address] 77044-
8463  26 Dec, 2017  Bronchitis J40

 

 [place] [address] 60688-
1735  18 Dec, 2017   

 

 [place] [address] [place] [address] 49954-
9591  13 Dec, 2017  Fibromyalgia M79.7 and Chronic pain syndrome G89.4

 

 [place] [address] [place] [address] 98557-
1476  04 Dec, 2017  Chronic pain syndrome G89.4 and Acute bronchitis, 
unspecified organism J20.9

 

 [place] [address] [place] [address] 93926-
1147    Neuropathy G62.9

 

 [place] [address] [place] [address] 58754-
2559    Chronic pain syndrome G89.4 ; Lumbago with sciatica, left 
side M54.42 ; Lumbago with sciatica, right side M54.41 ; Screening cholesterol 
level Z13.220 ; Screening for diabetes mellitus Z13.1 ; Screening for thyroid 
disorder Z13.29 ; Fibromyalgia M79.7 ; Anxiety disorder, unspecified F41.9 and 
Neuropathy G62.9

 

 [place] [id number] [place] [address] 53573-
3951     

 

 [place] [address] [place] [address] 91252-
5298  25 Oct, 2017   

 

 [place] [address] [address]-
3070  10 Oct, 2017  Fibromyalgia M79.7 ; Chronic pain syndrome G89.4 ; Anxiety 
disorder, unspecified F41.9 ; Neuropathy G62.9 and Acute bronchitis, 
unspecified organism J20.9

 

 [facility]  [id number] [address]-
1812  09 Oct, 2017   

 

 [facility]  [address] [place] [address] 93343-
6614  25 Sep, 2017   

 

 [facility]  [id number] [place] [address] 71037-
0529  19 Sep, 2017   

 

 [facility]  [address] [place] [address] 57967-
5833  08 Sep, 2017   

 

 [facility]  [address] [place] [address] 41248-
3404  23 Aug, 2017   

 

 [facility]  [address] [place] [address] 21585-
5639  22 Aug, 2017  Acute seasonal allergic rhinitis due to pollen J30.1

 

 [facility]  [address] [place] [address] 96316-
7115  21 Aug, 2017   

 

 [facility]  [address] [address]-
1911  09 Aug, 2017   

 

 [facility]  [address] [place] [address]-
6169     

 

 [facility]  [address] [place] [address] 17269-
1029     

 

 [facility]  [address] [address]-
9304  10 Jul, 2017   

 

 [facility]  [address] [place] [address] 639790-
2767     

 

 [facility]  [address] [place] [address]-
6427     

 

 [facility]  [address] [address]-
5100     

 

 [facility]  [address] [place] [address] 93575-
7998    Chronic pain syndrome G89.4 ; Fibromyalgia M79.7 ; Anxiety 
disorder, unspecified F41.9 ; Neuropathy G62.9 and Low back pain M54.5

 

 [facility]  [address] [place] [address] 20686-
7133  25 May, 2017  Low back pain M54.5

 

 [facility]  [address] [place] [address] 06361-
5220  24 May, 2017   

 

 [facility]  [address] [place] [address] 72284-
3260  22 May, 2017   

 

 [facility]  [address] [place] [address] 40969-
5213  16 May, 2017   

 

 [facility]  [address] [place] [address] 14255-
2891  16 May, 2017   

 

 [facility]  [address] [place] [address] 14023-
1677  12 May, 2017  Routine adult health maintenance Z00.00 ; Fibromyalgia 
M79.7 ; Chronic pain syndrome G89.4 ; Abnormal lung sounds R09.89 ; Coronary 
artery disease involving native coronary artery of native heart without angina 
pectoris I25.10 and S/P CABG (coronary artery bypass graft) Z95.1

 

 [facility]  [id number] [place] [address] 75495-
0253  09 May, 2017   







IMMUNIZATIONS

No Known Immunizations



SOCIAL HISTORY

Never Assessed



REASON FOR VISIT

Medication refill request



PLAN OF CARE





VITAL SIGNS





MEDICATIONS







 Medication  Instructions  Dosage  Frequency  Start Date  End Date  Duration  
Status

 

 Xanax 0.25 MG  Orally Twice a day  1 tablet  12h        28 days  Active







RESULTS

No Results



PROCEDURES

No Known procedures



INSTRUCTIONS





MEDICATIONS ADMINISTERED

No Known Medications



MEDICAL (GENERAL) HISTORY







 Type  Description  Date

 

 Medical History  fibromyalgia   

 

 Medical History  MRI 2016- small central protrusion/herniation w/minimal 
impression on thecal anteriorly at C5-6, At C3-4 small bilat. uncinate spurs w/ 
mild right neural foraminal narrowing   

 

 Medical History  MRI 2016- mild degenerative changes. No spinal canal 
stenosis or foraminal narrowing of thoracic spine   

 

 Medical History  hypertension   

 

 Medical History  Anxiety disorder   

 

 Medical History  depression   

 

 Medical History  migraine headaches   

 

 Medical History  Hx of C-Diff   

 

 Medical History  Hx of Pneumonia   

 

 Medical History  heart cath w/ stents placed 7/3/18   

 

 Surgical History  Open Heart Surgery - [facility] -Dr. Lima  (
Cardiologist- Dr Logan)  

 

 Surgical History  hysterectomy - Dr Luis   

 

 Surgical History  Left Breast Lumpectomy (Bx - Benign)- Dr Luis   

 

 Surgical History  Port - Dr Luis   

 

 Surgical History  Left Knee Surgeries x3- Dr Carloina did 2 and Dr Gan did 1   

 

 Surgical History  cath/stent  

 

 Hospitalization History  C-Diff - [facility]   

 

 Hospitalization History  Pneumonia - [facility]   

 

 Hospitalization History  [facility] Surgery - [facility]

## 2019-01-07 NOTE — XMS REPORT
[facility]

 Created on: 2018



Dayami Rae

External Reference #: [id number]

: 1970

Sex: Female



Demographics







 Address  [address] [place]  [address]

 

 Preferred Language  Unknown

 

 Marital Status  Unknown

 

 Restorationism Affiliation  Unknown

 

 Race  Unknown

 

 Ethnic Group  Unknown





Author







 Author  KIM FUCHS

 

 Organization  [facility]

 

 Address  [address] [place]  [address]



 

 Phone  [phone]







Care Team Providers







 Care Team Member Name  Role  Phone

 

 FUCHSKIM JACKSON  Unavailable  [phone]







PROBLEMS







 Type  Condition  ICD9-CM Code  ALQ81-QG Code  Onset Dates  Condition Status  
SNOMED Code

 

 Problem  Neuropathy     G62.9     Active  097244641

 

 Problem  Lumbago with sciatica, left side     M54.42     Active  648669292

 

 Problem  Anxiety disorder, unspecified     F41.9     Active  302095437

 

 Problem  Chronic pain syndrome     G89.4     Active  742578833

 

 Problem  Fibromyalgia     M79.7     Active  046046731

 

 Problem  Coronary artery disease involving native coronary artery of native 
heart without angina pectoris     I25.10     Active  8182372914227

 

 Problem  Essential hypertension     I10     Active  36222476

 

 Problem  GERD with esophagitis     K21.0     Active  577593341

 

 Problem  Other chronic pain     G89.29     Active  14730016

 

 Problem  Lumbago with sciatica, right side     M54.41     Active  
[id number]

 

 Problem  COPD suggested by initial evaluation     J44.9     Active  43612675

 

 Problem  Acute midline low back pain with left-sided sciatica     M54.42     
Active  562150240







ALLERGIES

No Information



ENCOUNTERS







 Encounter  Location  Date  Diagnosis

 

 [facility]  [address], [place] [address]-
2007  07 Sep, 2018  Acute bronchitis, unspecified organism J20.9

 

 [facility]  [id number], [place] [address]  28 Aug, 2018  Fibromyalgia M79.7

 

 [facility]  [address] [place] [address]-
7607  20 Aug, 2018   

 

 [facility]  [address] [place] [address] 10283-
4126  16 Aug, 2018  Neuropathy G62.9

 

 [facility]  [address] [place] [address] 8[address]18-
3556    Essential hypertension I10 ; Coronary artery disease 
involving native coronary artery of native heart without angina pectoris I25.10 
; Fibromyalgia M79.7 ; GERD with esophagitis K21.0 and Tobacco abuse counseling 
Z71.6

 

 [facility]  [id number] [place] [address] 52159-
5746    Long term (current) use of opiate analgesic Z79.891

 

 [facility]  [id number] [place] [address] 85073-
7273     

 

 [facility]  [id number] [place] [address] 30021-
9881    Acute bronchitis, unspecified organism J20.9

 

 [facility]  [id number] [place] [address] 65608-
1641     

 

 [facility]  [id number] [place] [address] 08778-
3933     

 

 [facility]  [id number] [place] [address] 77411-
1209    Chronic pain syndrome G89.4

 

 [facility]  [id number] [place] [address] 42696-
5758     

 

 [facility]  [id number] [place] [address] 13647-
9963  15 Marko, 2018   

 

 [facility]  [id number] [place] [address] 76060-
1166    Acute bronchitis, unspecified organism J20.9

 

 [facility]  [id number] [place] [address] 75101-
6380    Chronic pain syndrome G89.4

 

 [facility]  [id number] [place] [address] 67366-
6703  25 May, 2018   

 

 [facility]  [id number] [place] [address] 73541-
4146  24 May, 2018  Acute midline low back pain with left-sided sciatica M54.42

 

 [facility]  [id number] [place] [address] 15182-
6904  22 May, 2018   

 

 [facility]  [address] [phone]
-[phone]  21 May, 2018  Bronchitis J40

 

 [facility]  [id number] [place] [address] 80160-
4972  07 May, 2018  Chronic pain syndrome G89.4 and Neuropathy G62.9

 

 [facility]  [address] [address]-
5561    Acute midline low back pain with left-sided sciatica M54.42

 

 [facility]  [address] [address]-
6312     

 

 [facility]  [address] [address]-
2667    Anxiety disorder, unspecified F41.9

 

 [facility]  [id number] [place] [address] 46830-
9924     

 

 [facility]  [address] [address]-
4973    Chronic pain syndrome G89.4 and Acute midline low back pain 
with left-sided sciatica M54.42

 

 [facility]  [id number] [place] [address] 24527-
4682    Chronic pain syndrome G89.4

 

 [facility]  [id number] [place] [address] 89999-
4156     

 

 [facility]  [id number] [place] [address] 98350-
9431     

 

 [facility]  [id number] [place] [address] 24016-
1936  29 Mar, 2018  Injury of left knee, initial encounter S89.92XA and COPD 
suggested by initial evaluation J44.9

 

 [facility]  [id number] [place] [address] 80783-
8456  28 Mar, 2018  Acute bronchitis, unspecified organism J20.9

 

 [facility]  [id number] [place] [address] 58458-
2966  27 Mar, 2018  Acute bronchitis, unspecified organism J20.9

 

 [facility]  [id number] [place] [address] 59224-
5413  21 Mar, 2018  Anxiety disorder, unspecified F41.9 and Acute bronchitis, 
unspecified organism J20.9

 

 [facility]  [id number] [place] [address] 39095-
8428  08 Mar, 2018  Chronic pain syndrome G89.4

 

 [facility]  [id number] [place] [address] 93386-
6880  05 Mar, 2018   

 

 [facility]  [id number] [place] [address] 03493-
2372  05 Mar, 2018  Acute midline low back pain with left-sided sciatica M54.42

 

 [facility]  [id number] [place] [address] 18072-
4384     

 

 [facility]  [id number] [place] [address] 21616-
2458    Chronic pain syndrome G89.4

 

 [facility]  [id number] [place] [address] 42727-
7074    Chronic pain syndrome G89.4

 

 [facility]  [id number] [place] [address] 92914-
7557     

 

 [facility]  [id number] [place] [address] 04518-
6939    Gastroenteritis K52.9

 

 [facility]  [id number] [place] [address] 51908-
2707     

 

 [facility]  [id number] [place] [address] 46799-
6012  15 Jerardo, 2018  Acute bronchitis, unspecified organism J20.9

 

 [facility] [id number] [place] [address] 44496-
2305    Anxiety disorder, unspecified F41.9 and Neuropathy G62.9

 

 [address] [address]-
6775    Chronic pain syndrome G89.4

 

 [address] [address]-
4332    Bronchitis J40 and Laryngitis J04.0

 

 [address] [address]-
0606  29 Dec, 2017   

 

 [address] [address]-
6966  26 Dec, 2017  Bronchitis J40

 

 [address] [address]-
7594  18 Dec, 2017   

 

 [address] [address]-
7779  13 Dec, 2017  Fibromyalgia M79.7 and Chronic pain syndrome G89.4

 

 [address] [address]-
7509  04 Dec, 2017  Chronic pain syndrome G89.4 and Acute bronchitis, 
unspecified organism J20.9

 

 [id number] [place] [address] 46600-
2486    Neuropathy G62.9

 

 [address] [address]-
2856    Chronic pain syndrome G89.4 ; Lumbago with sciatica, left 
side M54.42 ; Lumbago with sciatica, right side M54.41 ; Screening cholesterol 
level Z13.220 ; Screening for diabetes mellitus Z13.1 ; Screening for thyroid 
disorder Z13.29 ; Fibromyalgia M79.7 ; Anxiety disorder, unspecified F41.9 and 
Neuropathy G62.9

 

 [id number] [place] [address] 26632-
8362     

 

 [facility]  [address] [address]-
2673  25 Oct, 2017   

 

 [facility]  [id number] [place] [address] 37395-
9191  10 Oct, 2017  Fibromyalgia M79.7 ; Chronic pain syndrome G89.4 ; Anxiety 
disorder, unspecified F41.9 ; Neuropathy G62.9 and Acute bronchitis, 
unspecified organism J20.9

 

 [facility]  [id number] [place] [address] 36775-
9791  09 Oct, 2017   

 

 [facility]  [id number] [place] [address] 72421-
1645  25 Sep, 2017   

 

 [facility]  [id number] [place] [address] 88582-
5614  19 Sep, 2017   

 

 [facility]  [id number] [place] [address] 70526-
2579  08 Sep, 2017   

 

 [facility]  [id number] [place] [address] 56432-
9050  23 Aug, 2017   

 

 [facility]  [id number] [address]-
2121  22 Aug, 2017  Acute seasonal allergic rhinitis due to pollen J30.1

 

 [facility]  [address], [place] [address]-
9640  21 Aug, 2017   

 

 [facility]  [address], [place] [address]-
9372  09 Aug, 2017   

 

 [facility]  [address], [place] [address]-
1368     

 

 [facility]  [address] [address]-
9626     

 

 [facility]  [address], [place] [address]-
9180  10 Jul, 2017   

 

 [facility]  [address], [place] [address]-
1715     

 

 [facility]  [address] [address]-
5449     

 

 [facility]  [id number] [place] [address] 98308-
3131     

 

 [facility]  [id number] [place] [address] 84902-
7824    Chronic pain syndrome G89.4 ; Fibromyalgia M79.7 ; Anxiety 
disorder, unspecified F41.9 ; Neuropathy G62.9 and Low back pain M54.5

 

 [facility]  [address], [place] [address]-
8050  25 May, 2017  Low back pain M54.5

 

 [facility]  [id number] [place] [address] 05950-
2510  24 May, 2017   

 

 [facility]  [id number] [place] [address] 81281-
7098  22 May, 2017   

 

 [facility]  [id number] [place] [address] 39157-
9659  16 May, 2017   

 

 [facility]  [id number] [place] [address] 97920-
0910  16 May, 2017   

 

 [facility]  [id number] [place] [address] 80420-
6507  12 May, 2017  Routine adult health maintenance Z00.00 ; Fibromyalgia 
M79.7 ; Chronic pain syndrome G89.4 ; Abnormal lung sounds R09.89 ; Coronary 
artery disease involving native coronary artery of native heart without angina 
pectoris I25.10 and S/P CABG (coronary artery bypass graft) Z95.1

 

 [facility]  [address], [place] [address]-
4253  09 May, 2017   







IMMUNIZATIONS

No Known Immunizations



SOCIAL HISTORY

Never Assessed



REASON FOR VISIT

Controlled Med Refill



PLAN OF CARE





VITAL SIGNS





MEDICATIONS

No Known Medications



RESULTS

No Results



PROCEDURES

No Known procedures



INSTRUCTIONS





MEDICATIONS ADMINISTERED

No Known Medications



MEDICAL (GENERAL) HISTORY







 Type  Description  Date

 

 Medical History  fibromyalgia   

 

 Medical History  MRI 2016- small central protrusion/herniation w/minimal 
impression on thecal anteriorly at C5-6, At C3-4 small bilat. uncinate spurs w/ 
mild right neural foraminal narrowing   

 

 Medical History  MRI 2016- mild degenerative changes. No spinal canal 
stenosis or foraminal narrowing of thoracic spine   

 

 Medical History  hypertension   

 

 Medical History  Anxiety disorder   

 

 Medical History  depression   

 

 Medical History  migraine headaches   

 

 Medical History  Hx of C-Diff   

 

 Medical History  Hx of Pneumonia   

 

 Medical History  heart cath w/ stents placed 7/3/18   

 

 Surgical History  Open Heart Surgery - [facility] -Dr. Lima  (
Cardiologist- Dr Logan)  

 

 Surgical History  hysterectomy - Dr Luis   

 

 Surgical History  Left Breast Lumpectomy (Bx - Benign)- Dr Luis   

 

 Surgical History  Port - Dr Luis   

 

 Surgical History  Left Knee Surgeries x3- Dr Carolina did 2 and Dr Gan did 1   

 

 Surgical History  cath/stent  

 

 Hospitalization History  C-Diff - [facility]   

 

 Hospitalization History  Pneumonia - [facility]   

 

 Hospitalization History  Open Heart Surgery - [facility]

## 2019-01-07 NOTE — XMS REPORT
[facility]

 Created on: 2018



Dayami Rae

External Reference #: [id number]

: 1970

Sex: Female



Demographics







 Address  [address] [place]  [address]

 

 Preferred Language  Unknown

 

 Marital Status  Unknown

 

 Sabianist Affiliation  Unknown

 

 Race  Unknown

 

 Ethnic Group  Unknown





Author







 Author  KIM FUCHS

 

 Organization  [facility]

 

 Address  [address] [place]  [address]



 

 Phone  [phone]







Care Team Providers







 Care Team Member Name  Role  Phone

 

 FUCHSKIM JACKSON  Unavailable  [phone]







PROBLEMS







 Type  Condition  ICD9-CM Code  WHG84-DL Code  Onset Dates  Condition Status  
SNOMED Code

 

 Problem  Neuropathy     G62.9     Active  805976818

 

 Problem  Lumbago with sciatica, left side     M54.42     Active  937301702

 

 Problem  Anxiety disorder, unspecified     F41.9     Active  810035994

 

 Problem  Chronic pain syndrome     G89.4     Active  227653404

 

 Problem  Fibromyalgia     M79.7     Active  974314767

 

 Problem  Coronary artery disease involving native coronary artery of native 
heart without angina pectoris     I25.10     Active  [id number]

 

 Problem  Essential hypertension     I10     Active  88680132

 

 Problem  GERD with esophagitis     K21.0     Active  496787730

 

 Problem  Other chronic pain     G89.29     Active  53676796

 

 Problem  Lumbago with sciatica, right side     M54.41     Active  
[id number]

 

 Problem  COPD suggested by initial evaluation     J44.9     Active  80323866

 

 Problem  Acute midline low back pain with left-sided sciatica     M54.42     
Active  500929053







ALLERGIES

No Information



ENCOUNTERS







 Encounter  Location  Date  Diagnosis

 

 [facility]  [address] [place] [address] 43065-
4142    Essential hypertension I10 ; Coronary artery disease 
involving native coronary artery of native heart without angina pectoris I25.10 
; Fibromyalgia M79.7 ; GERD with esophagitis K21.0 and Tobacco abuse counseling 
Z71.6

 

 [facility]  [address] [place] [address] 88098-
8957    Long term (current) use of opiate analgesic Z79.891

 

 [facility]  [id number], [place] [address]-
5704     

 

 [facility]  [id number] [place] [address] 59995-
1583    Acute bronchitis, unspecified organism J20.9

 

 [facility]  [id number] [place] [address] 05684-
1891     

 

 [facility]  [id number] [place] [address] 11641-
4714     

 

 [facility]  [id number] [place] [address] 86770-
4558    Chronic pain syndrome G89.4

 

 [facility]  [id number] [place] [address] 07608-
2262     

 

 [facility]  [id number] [place] [address] 91430-
7496  15 Marko, 2018   

 

 [facility]  [id number] [place] [address] 37016-
0448    Acute bronchitis, unspecified organism J20.9

 

 [facility]  [id number] [place] [address] 74558-
1582    Chronic pain syndrome G89.4

 

 [facility]  [id number] [place] [address] 32510-
3983  25 May, 2018   

 

 [facility]  [id number] [place] [address] 06196-
4141  24 May, 2018  Acute midline low back pain with left-sided sciatica M54.42

 

 [facility]  [id number] [place] [address] 03132-
1275  22 May, 2018   

 

 [facility]  [id number] [place] [address] [phone]
[phone]  21 May, 2018  Bronchitis J40

 

 [facility]  [id number] [place] [address] 03707-
2891  07 May, 2018  Chronic pain syndrome G89.4 and Neuropathy G62.9

 

 [facility]  [id number] [place] [address] 34379-
5152    Acute midline low back pain with left-sided sciatica M54.42

 

 [facility]  [id number] [place] [address] 10573-
4710     

 

 [facility]  [id number] [place] [address] 38447-
1857    Anxiety disorder, unspecified F41.9

 

 [facility]  [id number] [place] [address] 50760-
7389     

 

 [facility]  [id number] [place] [address] 75656-
1452    Chronic pain syndrome G89.4 and Acute midline low back pain 
with left-sided sciatica M54.42

 

 [facility]  [id number] [place] [address] 40516-
4608    Chronic pain syndrome G89.4

 

 [facility]  [id number] [place] [address] 93146-
1640     

 

 [facility]  [id number] [place] [address] 42078-
0998     

 

 [facility]  [id number] [place] [address] 78010-
0746  29 Mar, 2018  Injury of left knee, initial encounter S89.92XA and COPD 
suggested by initial evaluation J44.9

 

 [place] [id number] [place] [address] 07702-
6328  28 Mar, 2018  Acute bronchitis, unspecified organism J20.9

 

 [place] [id number] [place] [address] 65663-
1569  27 Mar, 2018  Acute bronchitis, unspecified organism J20.9

 

 [id number] [place] [address] 07597-
3148  21 Mar, 2018  Anxiety disorder, unspecified F41.9 and Acute bronchitis, 
unspecified organism J20.9

 

 [id number] [place] [address] 44166-
5801  08 Mar, 2018  Chronic pain syndrome G89.4

 

 [facility]  [id number] [place] [address] 40585-
4154  05 Mar, 2018   

 

 [facility]  [id number] [place] [address] 96648-
1775  05 Mar, 2018  Acute midline low back pain with left-sided sciatica M54.42

 

 [facility]  [id number] [place] [address] 63001-
0762     

 

 [facility]  [id number] [place] [address] 14083-
2596    Chronic pain syndrome G89.4

 

 [facility]  [id number] [place] [address] 36454-
1051    Chronic pain syndrome G89.4

 

 [facility]  [id number] [place] [address] 94480-
2870     

 

 [facility]  [id number] [place] [address] 28738-
1426    Gastroenteritis K52.9

 

 [facility]  [id number] [place] [address] 68462-
0793     

 

 [facility]  [id number] [place] [address] 61004-
7067  15 Jerardo, 2018  Acute bronchitis, unspecified organism J20.9

 

 [facility]  [id number] [place] [address] 46093-
2345    Anxiety disorder, unspecified F41.9 and Neuropathy G62.9

 

 [facility]  [id number] [place] [address] 09892-
6189    Chronic pain syndrome G89.4

 

 [facility]  [id number] [place] [address] 10385-
6461    Bronchitis J40 and Laryngitis J04.0

 

 [facility]  [id number] [place] [address] 69202-
7187  29 Dec, 2017   

 

 [address] [place] [address] 35662-
3797  26 Dec, 2017  Bronchitis J40

 

 [id number] [place] [address] 89041-
0793  18 Dec, 2017   

 

 [id number] [place] [address] 47619-
6130  13 Dec, 2017  Fibromyalgia M79.7 and Chronic pain syndrome G89.4

 

 [id number] [place] [address] 42586-
4557  04 Dec, 2017  Chronic pain syndrome G89.4 and Acute bronchitis, 
unspecified organism J20.9

 

 [id number] [place] [address] 48589-
2550    Neuropathy G62.9

 

 [id number] [place] [address] 12448-
8780    Chronic pain syndrome G89.4 ; Lumbago with sciatica, left 
side M54.42 ; Lumbago with sciatica, right side M54.41 ; Screening cholesterol 
level Z13.220 ; Screening for diabetes mellitus Z13.1 ; Screening for thyroid 
disorder Z13.29 ; Fibromyalgia M79.7 ; Anxiety disorder, unspecified F41.9 and 
Neuropathy G62.9

 

 [address] [address]-
6959     

 

 [id number] [place] [address] 57961-
2064  25 Oct, 2017   

 

 [id number] [place] [address] 72160-
0912  10 Oct, 2017  Fibromyalgia M79.7 ; Chronic pain syndrome G89.4 ; Anxiety 
disorder, unspecified F41.9 ; Neuropathy G62.9 and Acute bronchitis, 
unspecified organism J20.9

 

 [address] [place] [address] 92194-
8334  09 Oct, 2017   

 

 [place] [address] [place] [address]-
4297  25 Sep, 2017   

 

 [facility]  [address] [address]-
0774  19 Sep, 2017   

 

 [facility]  [address] [address]-
3801  08 Sep, 2017   

 

 [facility]  [address] [place] [address] 06812-
4893  23 Aug, 2017   

 

 [facility]  [id number] [place] [address] 87050-
4998  22 Aug, 2017  Acute seasonal allergic rhinitis due to pollen J30.1

 

 [facility]  [id number] [place] [address] 34759-
3496  21 Aug, 2017   

 

 [facility]  [id number] [place] [address] 98277-
2355  09 Aug, 2017   

 

 [facility]  [id number] [place] [address] 37891-
2976     

 

 [facility]  [address] [place] [address] 23192-
5542     

 

 [facility]  [id number] [place] [address] 76270-
9470  10 Jul, 2017   

 

 [facility]  [address] [place] [address] 61786-
0748     

 

 [facility]  [address] [address]-
5184     

 

 [facility]  [address] [address]-
9669     

 

 [facility]  [id number] [place] [address] 17559-
8139    Chronic pain syndrome G89.4 ; Fibromyalgia M79.7 ; Anxiety 
disorder, unspecified F41.9 ; Neuropathy G62.9 and Low back pain M54.5

 

 [facility]  [address] [place] [address] 20390-
3240  25 May, 2017  Low back pain M54.5

 

 [facility]  [address] [address]-
9727  24 May, 2017   

 

 [facility]  [address] [place] [address] 90767-
2600  22 May, 2017   

 

 [address] [place] [address] 48359-
6567  16 May, 2017   

 

 [facility]  [address] [place] [address] 84107-
9155  16 May, 2017   

 

 [place] [id number] [place] [address] 22902-
5482  12 May, 2017  Routine adult health maintenance Z00.00 ; Fibromyalgia 
M79.7 ; Chronic pain syndrome G89.4 ; Abnormal lung sounds R09.89 ; Coronary 
artery disease involving native coronary artery of native heart without angina 
pectoris I25.10 and S/P CABG (coronary artery bypass graft) Z95.1

 

 [address] [address]-
2201  09 May, 2017   







IMMUNIZATIONS

No Known Immunizations



SOCIAL HISTORY

Never Assessed



REASON FOR VISIT

Lab results



PLAN OF CARE





VITAL SIGNS





MEDICATIONS

No Known Medications



RESULTS

No Results



PROCEDURES

No Known procedures



INSTRUCTIONS





MEDICATIONS ADMINISTERED

No Known Medications



MEDICAL (GENERAL) HISTORY







 Type  Description  Date

 

 Medical History  fibromyalgia   

 

 Medical History  MRI 2016- small central protrusion/herniation w/minimal 
impression on thecal anteriorly at C5-6, At C3-4 small bilat. uncinate spurs w/ 
mild right neural foraminal narrowing   

 

 Medical History  MRI 2016- mild degenerative changes. No spinal canal 
stenosis or foraminal narrowing of thoracic spine   

 

 Medical History  hypertension   

 

 Medical History  Anxiety disorder   

 

 Medical History  depression   

 

 Medical History  migraine headaches   

 

 Medical History  Hx of C-Diff   

 

 Medical History  Hx of Pneumonia   

 

 Medical History  heart cath w/ stents placed 7/3/18   

 

 Surgical History  Open Heart Surgery - [facility] -Dr. Lima  (
Cardiologist- Dr Logan)  

 

 Surgical History  hysterectomy - Dr Luis   

 

 Surgical History  Left Breast Lumpectomy (Bx - Benign)- Dr Luis   

 

 Surgical History  Port - Dr Luis   

 

 Surgical History  Left Knee Surgeries x3- Dr Carolina did 2 and Dr Gan did 1   

 

 Surgical History  cath/stent  

 

 Hospitalization History  C-Diff - [facility]   

 

 Hospitalization History  Pneumonia - [facility]   

 

 Hospitalization History  Open Heart Surgery - [facility]

## 2019-01-07 NOTE — XMS REPORT
[facility]

 Created on: 2018



Dayami Rae

External Reference #: [id number]

: 1970

Sex: Female



Demographics







 Address  [address] [place]  [address]

 

 Preferred Language  Unknown

 

 Marital Status  Unknown

 

 Zoroastrian Affiliation  Unknown

 

 Race  Unknown

 

 Ethnic Group  Unknown





Author







 Author  KIM FUCHS

 

 Organization  [facility]

 

 Address  [address] [place]  [address]



 

 Phone  [phone]







Care Team Providers







 Care Team Member Name  Role  Phone

 

 FUCHSJAMA JACKSONELE  Unavailable  [phone]







PROBLEMS







 Type  Condition  ICD9-CM Code  OPB68-SA Code  Onset Dates  Condition Status  
SNOMED Code

 

 Problem  Fibromyalgia     M79.7     Active  472523195

 

 Problem  Neuropathy     G62.9     Active  583415431

 

 Problem  Coronary artery disease involving native coronary artery of native 
heart without angina pectoris     I25.10     Active  [id number]

 

 Problem  Chronic pain syndrome     G89.4     Active  413121072

 

 Problem  COPD suggested by initial evaluation     J44.9     Active  25708054

 

 Problem  Acute midline low back pain with left-sided sciatica     M54.42     
Active  124613870

 

 Problem  Lumbago with sciatica, left side     M54.42     Active  295970370

 

 Problem  Anxiety disorder, unspecified     F41.9     Active  333543709

 

 Problem  Other chronic pain     G89.29     Active  68349465

 

 Problem  Lumbago with sciatica, right side     M54.41     Active  
[id number]







ALLERGIES

No Information



ENCOUNTERS







 Encounter  Location  Date  Diagnosis

 

 [facility]  [address] [place] [address] [phone]
[phone]     

 

 [facility]  [address] [place] [address] [phone]-
[phone]  05 2018   

 

 [facility]  [address] [place] [address] [phone]-
[phone]    Chronic pain syndrome G89.4

 

 [facility]  [id number] [place] [address] [phone]-
[phone]  26 2018   

 

 [facility]  [id number] [place] [address] [phone]-
[phone]  15 2018   

 

 [facility]  [id number] [place] [address] [phone]-
[phone]    Acute bronchitis, unspecified organism J20.9

 

 [facility]  [id number] [place] [address] [phone]-
2534    Chronic pain syndrome G89.4

 

 [facility]  [id number] [place] [address] 63039-
6887  25 May, 2018   

 

 [facility]  [id number] [place] [address] [phone]
[phone]  24 May, 2018  Acute midline low back pain with left-sided sciatica M54.42

 

 [facility]  [id number] [place] [address] [phone]-
[phone]  22 May, 2018   

 

 [facility]  [id number] [place] [address] [phone]
[phone]  21 May, 2018  Bronchitis J40

 

 [facility]  [id number] [place] [address] [phone]-
[phone]  07 May, 2018  Chronic pain syndrome G89.4 and Neuropathy G62.9

 

 [facility]  [id number] [place] [address] 50827-
6757    Acute midline low back pain with left-sided sciatica M54.42

 

 [facility]  [id number] [place] [address] 25430-
2609     

 

 [facility]  [id number] [place] [address] 49151-
3543    Anxiety disorder, unspecified F41.9

 

 [facility]  [id number] [place] [address] 49321-
[phone]     

 

 [facility]  [id number] [place] [address] 68442-
7086    Chronic pain syndrome G89.4 and Acute midline low back pain 
with left-sided sciatica M54.42

 

 [facility]  [address] [place] [address] 64462-
8411    Chronic pain syndrome G89.4

 

 [facility]  [address] [address]-
5368     

 

 [facility]  [id number] [place] [address] 26391-
0862     

 

 [facility]  [id number] [place] [address] 69326-
7501  29 Mar, 2018  Injury of left knee, initial encounter S89.92XA and COPD 
suggested by initial evaluation J44.9

 

 [facility]  [id number] [place] [address] [phone]-
[phone]  28 Mar, 2018  Acute bronchitis, unspecified organism J20.9

 

 [place] [id number] [place] [address] [phone]-
[phone]  27 Mar, 2018  Acute bronchitis, unspecified organism J20.9

 

 [place] [id number] [place] [address] [phone]-
[phone]  21 Mar, 2018  Anxiety disorder, unspecified F41.9 and Acute bronchitis, 
unspecified organism J20.9

 

 [facility]  [id number] [place] [address] 07135-
1516  08 Mar, 2018  Chronic pain syndrome G89.4

 

 [place] [id number] [place] [address] 19846-
[phone]  05 Mar, 2018   

 

 [facility]  [id number] [place] [address] 20557-
5892  05 Mar, 2018  Acute midline low back pain with left-sided sciatica M54.42

 

 [facility]  [address] [place] [address] 07836-
[phone]     

 

 [facility]  [id number] [place] [address] 64250-
7598    Chronic pain syndrome G89.4

 

 [facility]  [id number] [place] [address] 17585-
4448    Chronic pain syndrome G89.4

 

 [place] [id number] [place] [address] 02280-
[phone]     

 

 [facility]  [id number] [place] [address] 40761-
3487    Gastroenteritis K52.9

 

 [facility]  [address] [phone]-
[phone]     

 

 [facility]  [address] [phone]-
[phone]  15 Jerardo, 2018  Acute bronchitis, unspecified organism J20.9

 

 [facility]  [address] 6[address]01-
8698    Anxiety disorder, unspecified F41.9 and Neuropathy G62.9

 

 [facility]  [id number] [place] [address] 94042-
3260    Chronic pain syndrome G89.4

 

 [facility]  [address] [phone]-
5620    Bronchitis J40 and Laryngitis J04.0

 

 [facility]  [id number] [place] [address] [phone]-
[phone]  29 Dec, 2017   

 

 [facility]  [address] [phone]-
[phone]  26 Dec, 2017  Bronchitis J40

 

 [facility]  [address] 39164-
[phone]  18 Dec, 2017   

 

 [facility]  [address] [phone]-
5756  13 Dec, 2017  Fibromyalgia M79.7 and Chronic pain syndrome G89.4

 

 [facility]  [id number] [place] [address] 91176-
1804  04 Dec, 2017  Chronic pain syndrome G89.4 and Acute bronchitis, 
unspecified organism J20.9

 

 [facility]  [id number] [place] [address] 10850-
2315    Neuropathy G62.9

 

 [facility]  [address] [address]-
3442    Chronic pain syndrome G89.4 ; Lumbago with sciatica, left 
side M54.42 ; Lumbago with sciatica, right side M54.41 ; Screening cholesterol 
level Z13.220 ; Screening for diabetes mellitus Z13.1 ; Screening for thyroid 
disorder Z13.29 ; Fibromyalgia M79.7 ; Anxiety disorder, unspecified F41.9 and 
Neuropathy G62.9

 

 [facility]  [id number] [place] [address] 73863-
6815     

 

 [facility]  [id number] [place] [address] 38487-
2418  25 Oct, 2017   

 

 [place] [id number] [place] [address] 72253-
2561  10 Oct, 2017  Fibromyalgia M79.7 ; Chronic pain syndrome G89.4 ; Anxiety 
disorder, unspecified F41.9 ; Neuropathy G62.9 and Acute bronchitis, 
unspecified organism J20.9

 

 [facility]  [id number] [place] [address] 05462-
5885  09 Oct, 2017   

 

 [facility]  [id number] [place] [address] 67132-
2905  25 Sep, 2017   

 

 [facility]  [id number] [place] [address] [phone]-
8113  19 Sep, 2017   

 

 [facility]  [id number] [place] [address] 06023-
4210  08 Sep, 2017   

 

 [facility]  [id number] [place] [address] 39998-
8494  23 Aug, 2017   

 

 [facility]  [id number] [place] [address] [phone]-
[phone]  22 Aug, 2017  Acute seasonal allergic rhinitis due to pollen J30.1

 

 [facility]  [id number] [place] [address] 98560-
[phone]  21 Aug, 2017   

 

 [facility]  [id number] [place] [address] [phone]-
[phone]  09 Aug, 2017   

 

 [facility]  [address] [address]-
8439     

 

 [facility]  [address] [address]-
2764     

 

 [facility]  [address] [address]-
9773  10 Jul, 2017   

 

 [facility]  [address] [address]-
3996     

 

 [facility]  [address] [address]-
4228     

 

 [facility]  [address] [address]-
9661     

 

 [facility]  [address] [address]-
4377    Chronic pain syndrome G89.4 ; Fibromyalgia M79.7 ; Anxiety 
disorder, unspecified F41.9 ; Neuropathy G62.9 and Low back pain M54.5

 

 [facility]  [address] [address]-
0978  25 May, 2017  Low back pain M54.5

 

 [facility]  [address] [address]-
1195  24 May, 2017   

 

 [facility]  [id number] [address]-
2184  22 May, 2017   

 

 [facility]  [id number] [address]-
3161  16 May, 2017   

 

 [facility]  [id number] [address]-
8565  16 May, 2017   

 

 [facility]  [id number] [address]-
9170  12 May, 2017  Routine adult health maintenance Z00.00 ; Fibromyalgia 
M79.7 ; Chronic pain syndrome G89.4 ; Abnormal lung sounds R09.89 ; Coronary 
artery disease involving native coronary artery of native heart without angina 
pectoris I25.10 and S/P CABG (coronary artery bypass graft) Z95.1

 

 [facility]  [address] [facility] [id number] [place] [address]-
2853  09 May, 2017   







IMMUNIZATIONS

No Known Immunizations



SOCIAL HISTORY

Never Assessed



REASON FOR VISIT

Refill request



PLAN OF CARE





VITAL SIGNS





MEDICATIONS







 Medication  Instructions  Dosage  Frequency  Start Date  End Date  Duration  
Status

 

 Seroquel 200 mg  Orally Once a day  2.5 tablet at bedtime  24h        30 days  
Active

 

 Promethazine HCl 25 MG  Orally 4 times a day prn  1 tablet as needed          30 days  Active







RESULTS

No Results



PROCEDURES

No Known procedures



INSTRUCTIONS





MEDICATIONS ADMINISTERED

No Known Medications



MEDICAL (GENERAL) HISTORY







 Type  Description  Date

 

 Medical History  fibromyalgia   

 

 Medical History  MRI 2016- small central protrusion/herniation w/minimal 
impression on thecal anteriorly at C5-6, At C3-4 small bilat. uncinate spurs w/ 
mild right neural foraminal narrowing   

 

 Medical History  MRI 2016- mild degenerative changes. No spinal canal 
stenosis or foraminal narrowing of thoracic spine   

 

 Medical History  hypertension   

 

 Medical History  Anxiety disorder   

 

 Medical History  depression   

 

 Medical History  migraine headaches   

 

 Medical History  Hx of C-Diff   

 

 Medical History  Hx of Pneumonia   

 

 Surgical History  Open Heart Surgery - [facility] -Dr. Lima  (
Cardiologist- Dr Logan)  

 

 Surgical History  hysterectomy - Dr Luis   

 

 Surgical History  Left Breast Lumpectomy (Bx - Benign)- Dr Luis   

 

 Surgical History  Port - Dr Luis   

 

 Surgical History  Left Knee Surgeries x3- Dr Carolina did 2 and Dr Gan did 1   

 

 Hospitalization History  C-Diff - [facility]   

 

 Hospitalization History  Pneumonia - [facility]   

 

 Hospitalization History  Open Heart Surgery - [facility]

## 2019-01-07 NOTE — XMS REPORT
[facility]

 Created on: 2018



Dayami Rae

External Reference #: [id number]

: 1970

Sex: Female



Demographics







 Address  [address] [place]  [address]

 

 Preferred Language  Unknown

 

 Marital Status  Unknown

 

 Ep[employer] Affiliation  Unknown

 

 Race  Unknown

 

 Ethnic Group  Unknown





Author







 Author  KIM FUCHS

 

 Organization  [facility]

 

 Address  [address] [place]  [address]



 

 Phone  [phone]







Care Team Providers







 Care Team Member Name  Role  Phone

 

 JEFJAMAELE  Unavailable  [phone]







PROBLEMS







 Type  Condition  ICD9-CM Code  ELW58-UE Code  Onset Dates  Condition Status  
SNOMED Code

 

 Problem  Fibromyalgia     M79.7     Active  670588567

 

 Problem  Neuropathy     G62.9     Active  528392460

 

 Problem  Coronary artery disease involving native coronary artery of native 
heart without angina pectoris     I25.10     Active  [id number]

 

 Problem  Chronic pain syndrome     G89.4     Active  184943740

 

 Problem  COPD suggested by initial evaluation     J44.9     Active  17842498

 

 Problem  Acute midline low back pain with left-sided sciatica     M54.42     
Active  272821389

 

 Problem  Lumbago with sciatica, left side     M54.42     Active  234782278

 

 Problem  Anxiety disorder, unspecified     F41.9     Active  035537637

 

 Problem  Other chronic pain     G89.29     Active  32356179

 

 Problem  Lumbago with sciatica, right side     M54.41     Active  
[id number]







ALLERGIES

No Information



ENCOUNTERS







 Encounter  Location  Date  Diagnosis

 

 [facility]  [id number] [place] [address] 55629-
9454     

 

 [facility]  [id number] [place] [address] 09281-
9551     

 

 [facility]  [id number] [place] [address] 09106-
3174     

 

 [facility]  [id number] [place] [address] 03418-
2466    Chronic pain syndrome G89.4

 

 [facility]  [id number] [place] [address] 89233-
6898     

 

 [facility]  [id number] [place] [address] 35242-
4804  15 2018   

 

 [facility]  [id number] [place] [address] 66843-
6621    Acute bronchitis, unspecified organism J20.9

 

 [facility]  [id number] [place] [address] 60516-
1837    Chronic pain syndrome G89.4

 

 [facility]  [id number] [place] [address] 65127-
7291  25 May, 2018   

 

 [facility]  [id number] [place] [address] 18688-
4838  24 May, 2018  Acute midline low back pain with left-sided sciatica M54.42

 

 [facility]  [id number] [place] [address] 55127-
4980  22 May, 2018   

 

 [facility]  [id number] [place] [address] [phone]
[phone]  21 May, 2018  Bronchitis J40

 

 [facility]  [id number] [place] [address] 20057-
1674  07 May, 2018  Chronic pain syndrome G89.4 and Neuropathy G62.9

 

 [facility]  [id number] [place] [address] 83205-
7025    Acute midline low back pain with left-sided sciatica M54.42

 

 [facility]  [id number] [place] [address] 08362-
3282     

 

 [facility]  [id number] [place] [address] 57224-
8885    Anxiety disorder, unspecified F41.9

 

 [facility]  [id number] [place] [address] 02728-
2105     

 

 [facility]  [id number] [place] [address] 21113-
0412    Chronic pain syndrome G89.4 and Acute midline low back pain 
with left-sided sciatica M54.42

 

 [facility]  [id number] [place] [address] 04700-
9446    Chronic pain syndrome G89.4

 

 [facility]  [id number] [place] [address] 86788-
1833     

 

 [facility]  [id number] [place] [address] 20358-
5459     

 

 [facility]  [address] [address]-
1962  29 Mar, 2018  Injury of left knee, initial encounter S89.92XA and COPD 
suggested by initial evaluation J44.9

 

 [place] [address] [address]-
0863  28 Mar, 2018  Acute bronchitis, unspecified organism J20.9

 

 [place] [id number] [place] [address] 92091-
6360  27 Mar, 2018  Acute bronchitis, unspecified organism J20.9

 

 [facility]  [id number] [place] [address] 27263-
0067  21 Mar, 2018  Anxiety disorder, unspecified F41.9 and Acute bronchitis, 
unspecified organism J20.9

 

 [facility]  [id number] [place] [address] 30506-
6316  08 Mar, 2018  Chronic pain syndrome G89.4

 

 [facility]  [id number] [place] [address] 70930-
0556  05 Mar, 2018   

 

 [facility]  [id number] [place] [address] 80572-
3770  05 Mar, 2018  Acute midline low back pain with left-sided sciatica M54.42

 

 [facility]  [id number] [place] [address] 16715-
9406     

 

 [facility]  [id number] [place] [address] 23180-
8610    Chronic pain syndrome G89.4

 

 [facility]  [id number] [place] [address] 52300-
7439    Chronic pain syndrome G89.4

 

 [facility]  [id number] [place] [address] 83240-
6712     

 

 [facility]  [address] [address]-
3284    Gastroenteritis K52.9

 

 [facility]  [address] 95292-
9354     

 

 [facility]  [address] [address]-
9542  15 Jerardo, 2018  Acute bronchitis, unspecified organism J20.9

 

 [facility]  [address] [address]-
7351    Anxiety disorder, unspecified F41.9 and Neuropathy G62.9

 

 [place] [address] 98921-
9023    Chronic pain syndrome G89.4

 

 [facility]  [id number] [place] [address] 49158-
6357    Bronchitis J40 and Laryngitis J04.0

 

 [place] [id number] [place] [address] 80452-
9095  29 Dec, 2017   

 

 [facility]  [id number] [place] [address] 71957-
5944  26 Dec, 2017  Bronchitis J40

 

 [facility]  [id number] [place] [address] 32001-
3892  18 Dec, 2017   

 

 [facility]  [id number] [place] [address] 89760-
3642  13 Dec, 2017  Fibromyalgia M79.7 and Chronic pain syndrome G89.4

 

 [facility]  [id number] [place] [address] 47759-
8945  04 Dec, 2017  Chronic pain syndrome G89.4 and Acute bronchitis, 
unspecified organism J20.9

 

 [facility]  [address] [address]-
9116    Neuropathy G62.9

 

 [facility]  [address] [place] [address] 45032-
4372    Chronic pain syndrome G89.4 ; Lumbago with sciatica, left 
side M54.42 ; Lumbago with sciatica, right side M54.41 ; Screening cholesterol 
level Z13.220 ; Screening for diabetes mellitus Z13.1 ; Screening for thyroid 
disorder Z13.29 ; Fibromyalgia M79.7 ; Anxiety disorder, unspecified F41.9 and 
Neuropathy G62.9

 

 [facility]  [id number] [place] [address] 47584-
0774     

 

 [facility]  [id number] [place] [address] 51439-
3982  25 Oct, 2017   

 

 [facility]  [id number] [place] [address] 53072-
7482  10 Oct, 2017  Fibromyalgia M79.7 ; Chronic pain syndrome G89.4 ; Anxiety 
disorder, unspecified F41.9 ; Neuropathy G62.9 and Acute bronchitis, 
unspecified organism J20.9

 

 [facility]  [id number] [place] [address] 00960-
9552  09 Oct, 2017   

 

 [facility]  [id number] [place] [address] 38119-
5680  25 Sep, 2017   

 

 [facility]  [id number] [place] [address] 66162-
8178  19 Sep, 2017   

 

 [facility]  [id number] [place] [address] 10242-
5542  08 Sep, 2017   

 

 [facility]  [id number] [place] [address] 67166-
6761  23 Aug, 2017   

 

 [facility]  [id number] [place] [address] 13862-
8400  22 Aug, 2017  Acute seasonal allergic rhinitis due to pollen J30.1

 

 [facility]  [id number] [place] [address] 79234-
3182  21 Aug, 2017   

 

 [facility]  [address] [address]-
0671  09 Aug, 2017   

 

 [facility]  [address] [address]-
1690     

 

 [facility]  [id number] [address]-
3463     

 

 [facility]  [id number] [address]-
9111  10 Jul, 2017   

 

 [facility]  [address] [address]-
9703     

 

 [facility]  [address] [address]-
9139     

 

 [facility]  [address] [address]-
0653     

 

 [facility]  [address] [address]-
5884    Chronic pain syndrome G89.4 ; Fibromyalgia M79.7 ; Anxiety 
disorder, unspecified F41.9 ; Neuropathy G62.9 and Low back pain M54.5

 

 [facility]  [address] [address]-
9340  25 May, 2017  Low back pain M54.5

 

 [facility]  [address] [address]-
8536  24 May, 2017   

 

 [facility]  [address] [address]-
4123  22 May, 2017   

 

 [facility]  [address] [address]-
0615  16 May, 2017   

 

 [facility]  [address] [address]-
5445  16 May, 2017   

 

 [facility]  [id number] [address]-
2011  12 May, 2017  Routine adult health maintenance Z00.00 ; Fibromyalgia 
M79.7 ; Chronic pain syndrome G89.4 ; Abnormal lung sounds R09.89 ; Coronary 
artery disease involving native coronary artery of native heart without angina 
pectoris I25.10 and S/P CABG (coronary artery bypass graft) Z95.1

 

 [facility]  [address] [facility] [id number] [place] [address]-
2310  09 May, 2017   







IMMUNIZATIONS

No Known Immunizations



SOCIAL HISTORY

Never Assessed



REASON FOR VISIT

work note



PLAN OF CARE





VITAL SIGNS





MEDICATIONS

Unknown Medications



RESULTS

No Results



PROCEDURES

No Known procedures



INSTRUCTIONS





MEDICATIONS ADMINISTERED

No Known Medications



MEDICAL (GENERAL) HISTORY







 Type  Description  Date

 

 Medical History  fibromyalgia   

 

 Medical History  MRI 2016- small central protrusion/herniation w/minimal 
impression on thecal anteriorly at C5-6, At C3-4 small bilat. uncinate spurs w/ 
mild right neural foraminal narrowing   

 

 Medical History  MRI 2016- mild degenerative changes. No spinal canal 
stenosis or foraminal narrowing of thoracic spine   

 

 Medical History  hypertension   

 

 Medical History  Anxiety disorder   

 

 Medical History  depression   

 

 Medical History  migraine headaches   

 

 Medical History  Hx of C-Diff   

 

 Medical History  Hx of Pneumonia   

 

 Surgical History  Open Heart Surgery - [facility] -Dr. Lima  (
Cardiologist- Dr Logan)  

 

 Surgical History  hysterectomy - Dr Luis   

 

 Surgical History  Left Breast Lumpectomy (Bx - Benign)- Dr Luis   

 

 Surgical History  Port - Dr Luis   

 

 Surgical History  Left Knee Surgeries x3- Dr Carolina did 2 and Dr Gan did 1   

 

 Hospitalization History  C-Diff - [facility]   

 

 Hospitalization History  Pneumonia - [facility]   

 

 Hospitalization History  Open Heart Surgery - [facility]

## 2019-01-07 NOTE — XMS REPORT
[facility]

 Created on: 2018



Dayami Rae

External Reference #: [id number]

: 1970

Sex: Female



Demographics







 Address  [address] [place]  [address]

 

 Preferred Language  Unknown

 

 Marital Status  Unknown

 

 Rastafarian Affiliation  Unknown

 

 Race  Unknown

 

 Ethnic Group  Unknown





Author







 Author  SURENDRA FRASER

 

 [facility]

 

 Address  [address] [place]  [address]



 

 Phone  [phone]







Care Team Providers







 Care Team Member Name  Role  Phone

 

 SURENDRA FRASER  Unavailable  [phone]







PROBLEMS







 Type  Condition  ICD9-CM Code  BPG09-VA Code  Onset Dates  Condition Status  
SNOMED Code

 

 Problem  Coronary artery disease involving native coronary artery of native 
heart without angina pectoris     I25.10     Active  9396834090235

 

 Problem  Anxiety disorder, unspecified     F41.9     Active  051168935

 

 Problem  Neuropathy     G62.9     Active  832215104

 

 Problem  Fibromyalgia     M79.7     Active  131776777

 

 Problem  Chronic pain syndrome     G89.4     Active  223267200

 

 Problem  Cervical radiculopathy     M54.12     Active  91411133

 

 Problem  Chronic obstructive pulmonary disease, unspecified COPD type     
J44.9     Active  81613700

 

 Problem  Lumbago with sciatica, right side     M54.41     Active  
[id number]

 

 Problem  Lumbago with sciatica, left side     M54.42     Active  928715687

 

 Problem  Essential hypertension     I10     Active  84267338

 

 Problem  GERD with esophagitis     K21.0     Active  327599970







ALLERGIES

No Information



ENCOUNTERS







 Encounter  Location  Date  Diagnosis

 

 [place] [id number] [place] [address] 51165-
2197     

 

 [place] [id number] [place] [address] 39912-
9023  29 Oct, 2018  Cervical radiculopathy M54.12 ; Chronic pain syndrome G89.4 
and Anxiety disorder, unspecified F41.9

 

 [facility]  [id number] [place] [address] 11303-
5315  24 Oct, 2018   

 

 [place] [id number] [place] [address] 37971-
1031  27 Sep, 2018  Fibromyalgia M79.7

 

 [place] [address] [address]-
1897  20 Sep, 2018   

 

 [place] [id number] [place] [address] 22591-
4627  19 Sep, 2018   

 

 [place] [id number] [place] [address] 25777-
6289  17 Sep, 2018  Pneumonia of right lower lobe due to infectious organism 
J18.1 and Chronic obstructive pulmonary disease, unspecified COPD type J44.9

 

 [place] [address] [address]-
4882  14 Sep, 2018  Pneumonia of right lower lobe due to infectious organism 
J18.1 ; Chronic obstructive pulmonary disease, unspecified COPD type J44.9 ; 
Chronic nausea R11.0 and Cough R05

 

 [place] [address] [address]-
2157  07 Sep, 2018  Acute bronchitis, unspecified organism J20.9

 

 [place] [address] [address]-
4067  28 Aug, 2018  Fibromyalgia M79.7

 

 [place] [id number] [place] [address] 69451-
1638  20 Aug, 2018   

 

 [place] [id number] [place] [address] 33511-
0836  16 Aug, 2018  Neuropathy G62.9

 

 [place] [id number] [place] [address] 16434-
1590    Essential hypertension I10 ; Coronary artery disease 
involving native coronary artery of native heart without angina pectoris I25.10 
; Fibromyalgia M79.7 ; GERD with esophagitis K21.0 and Tobacco abuse counseling 
Z71.6

 

 [place] [id number] [place] [address] 80736-
6976    Long term (current) use of opiate analgesic Z79.891

 

 [place] [id number] [place] [address] 02252-
4730     

 

 [place] [address] [address]-
1589    Acute bronchitis, unspecified organism J20.9

 

 [facility]  [id number] [place] [address] 82097-
5010     

 

 [facility]  [id number] [place] [address] 67607-
4307     

 

 [facility]  [id number] [place] [address] 53273-
1660    Chronic pain syndrome G89.4

 

 [facility]  [id number] [place] [address] 76214-
9491     

 

 [facility]  [id number] [place] [address] 65790-
8771  15 Marko, 2018   

 

 [facility]  [id number] [place] [address] 47279-
8095    Acute bronchitis, unspecified organism J20.9

 

 [facility]  [id number] [place] [address] 41584-
4900    Chronic pain syndrome G89.4

 

 [facility]  [id number] [place] [address] 03422-
9768  25 May, 2018   

 

 [facility]  [id number] [place] [address] 01405-
0353  24 May, 2018  Acute midline low back pain with left-sided sciatica M54.42

 

 [facility]  [id number] [place] [address] 22456-
4950  22 May, 2018   

 

 [facility]  [address] [place] [address] 99053
-[phone]  21 May, 2018  Bronchitis J40

 

 [facility]  [id number] [place] [address] 15786-
0134  07 May, 2018  Chronic pain syndrome G89.4 and Neuropathy G62.9

 

 [facility]  [id number] [place] [address] 74718-
4414    Acute midline low back pain with left-sided sciatica M54.42

 

 [facility]  [id number] [place] [address] 30227-
7178     

 

 [facility]  [id number] [place] [address] 01582-
8202    Anxiety disorder, unspecified F41.9

 

 [facility]  [id number] [place] [address] 50610-
4965     

 

 [facility]  [address] 88044-
4324    Chronic pain syndrome G89.4 and Acute midline low back pain 
with left-sided sciatica M54.42

 

 [place] [address] 29568-
8356    Chronic pain syndrome G89.4

 

 [place] [id number] [place] [address] 19965-
9701     

 

 [facility]  [id number] [place] [address] 20849-
7749     

 

 [facility]  [id number] [place] [address] 30313-
8673  29 Mar, 2018  Injury of left knee, initial encounter S89.92XA and COPD 
suggested by initial evaluation J44.9

 

 [place] [id number] [place] [address] 17406-
4288  28 Mar, 2018  Acute bronchitis, unspecified organism J20.9

 

 [facility]  [id number] [place] [address] 15911-
2413  27 Mar, 2018  Acute bronchitis, unspecified organism J20.9

 

 [place] [id number] [place] [address] 64246-
7615  21 Mar, 2018  Anxiety disorder, unspecified F41.9 and Acute bronchitis, 
unspecified organism J20.9

 

 [facility]  [id number] [place] [address] 44716-
1321  08 Mar, 2018  Chronic pain syndrome G89.4

 

 [place] [id number] [place] [address] 08398-
6401  05 Mar, 2018   

 

 [facility]  [id number] [place] [address] 83659-
7933  05 Mar, 2018  Acute midline low back pain with left-sided sciatica M54.42

 

 [facility]  [id number] [place] [address] 72551-
9724     

 

 [facility]  [id number] [place] [address] 33189-
1038    Chronic pain syndrome G89.4

 

 [facility]  [id number] [place] [address] 77710-
6589    Chronic pain syndrome G89.4

 

 [facility]  [id number] [place] [address] 69387-
3595     

 

 [facility]  [address] 91279-
4426    Gastroenteritis K52.9

 

 [facility]  [id number] [place] [address] 45181-
4880     

 

 [facility]  [id number] [place] [address] 31011-
9491  15 Jerardo, 2018  Acute bronchitis, unspecified organism J20.9

 

 [facility]  [id number] [place] [address] 65788-
3815    Anxiety disorder, unspecified F41.9 and Neuropathy G62.9

 

 [facility]  [id number] [place] [address] 75225-
7427    Chronic pain syndrome G89.4

 

 [facility]  [id number] [place] [address] 76845-
7642    Bronchitis J40 and Laryngitis J04.0

 

 [facility]  [id number] [place] [address] 98558-
0718  29 Dec, 2017   

 

 [facility]  [id number] [place] [address] 45429-
0890  26 Dec, 2017  Bronchitis J40

 

 [place] [id number] [place] [address] 21856-
4560  18 Dec, 2017   

 

 [place] [id number] [place] [address] 95038-
2722  13 Dec, 2017  Fibromyalgia M79.7 and Chronic pain syndrome G89.4

 

 [place] [id number] [place] [address] 07159-
5263  04 Dec, 2017  Chronic pain syndrome G89.4 and Acute bronchitis, 
unspecified organism J20.9

 

 [place] [id number] [place] [address] 92748-
9124    Neuropathy G62.9

 

 [place] [id number] [place] [address] 41994-
3459    Chronic pain syndrome G89.4 ; Lumbago with sciatica, left 
side M54.42 ; Lumbago with sciatica, right side M54.41 ; Screening cholesterol 
level Z13.220 ; Screening for diabetes mellitus Z13.1 ; Screening for thyroid 
disorder Z13.29 ; Fibromyalgia M79.7 ; Anxiety disorder, unspecified F41.9 and 
Neuropathy G62.9

 

 [place] [address] [place] [address] 21572-
5689     

 

 [place] [id number] [place] [address] 35427-
6495  25 Oct, 2017   

 

 [place] [id number] [place] [address] 25361-
4301  10 Oct, 2017  Fibromyalgia M79.7 ; Chronic pain syndrome G89.4 ; Anxiety 
disorder, unspecified F41.9 ; Neuropathy G62.9 and Acute bronchitis, 
unspecified organism J20.9

 

 [place] [address] [place] [address] 76934-
1424  09 Oct, 2017   

 

 [place] [id number] [place] [address] 07475-
6871  25 Sep, 2017   

 

 [facility]  [address] [address]-
3451  19 Sep, 2017   

 

 [facility]  [address] [address]-
8388  08 Sep, 2017   

 

 [facility]  [address] [address]-
0778  23 Aug, 2017   

 

 [facility]  [id number] [place] [address] 48287-
2117  22 Aug, 2017  Acute seasonal allergic rhinitis due to pollen J30.1

 

 [facility]  [id number] [place] [address] 91000-
7436  21 Aug, 2017   

 

 [facility]  [address] [address]-
5404  09 Aug, 2017   

 

 [facility]  [address] [place] [address] 94238-
0269     

 

 [facility]  [address] [address]-
6294     

 

 [facility]  [address] [address]-
7552  10 Jul, 2017   

 

 [facility]  [address] [address]-
9991     

 

 [facility]  [address] [address]-
1638     

 

 [facility]  [address] [address]-
7714     

 

 [facility]  [address] [address]-
7926    Chronic pain syndrome G89.4 ; Fibromyalgia M79.7 ; Anxiety 
disorder, unspecified F41.9 ; Neuropathy G62.9 and Low back pain M54.5

 

 [facility]  [address] [address]-
6519  25 May, 2017  Low back pain M54.5

 

 [facility]  [id number] [address]-
8649  24 May, 2017   

 

 [facility]  [address] [address]-
0780  22 May, 2017   

 

 [facility]  [address] [address]-
2543  16 May, 2017   

 

 [place] [address] [address]-
1045  16 May, 2017   

 

 [place] [address] [address]-
2285  12 May, 2017  Routine adult health maintenance Z00.00 ; Fibromyalgia 
M79.7 ; Chronic pain syndrome G89.4 ; Abnormal lung sounds R09.89 ; Coronary 
artery disease involving native coronary artery of native heart without angina 
pectoris I25.10 and S/P CABG (coronary artery bypass graft) Z95.1

 

 [place] [id number] [address]-
3292  09 May, 2017   







IMMUNIZATIONS

No Known Immunizations



SOCIAL HISTORY

Never Assessed



REASON FOR VISIT

Refill request



PLAN OF CARE





VITAL SIGNS





MEDICATIONS







 Medication  Instructions  Dosage  Frequency  Start Date  End Date  Duration  
Status

 

 Metoprolol Succinate ER 25 MG  Orally Once a day  1 tablet  24h        90  
Active

 

 Clopidogrel Bisulfate 75 MG  Orally Once a day  1 tablet  24h        90  Active

 

 Chlorzoxazone 500 mg  Orally Three times a day if needed  1 tablet           
20  Active







RESULTS

No Results



PROCEDURES

No Known procedures



INSTRUCTIONS





MEDICATIONS ADMINISTERED

No Known Medications



MEDICAL (GENERAL) HISTORY







 Type  Description  Date

 

 Medical History  fibromyalgia   

 

 Medical History  MRI 2016- small central protrusion/herniation w/minimal 
impression on thecal anteriorly at C5-6, At C3-4 small bilat. uncinate spurs w/ 
mild right neural foraminal narrowing   

 

 Medical History  MRI 2016- mild degenerative changes. No spinal canal 
stenosis or foraminal narrowing of thoracic spine   

 

 Medical History  hypertension   

 

 Medical History  Anxiety disorder   

 

 Medical History  depression   

 

 Medical History  migraine headaches   

 

 Medical History  Hx of C-Diff   

 

 Medical History  Hx of Pneumonia   

 

 Medical History  heart cath w/ stents placed 7/3/18   

 

 Surgical History  Open Heart Surgery - [facility] -Dr. Lima  (
Cardiologist- Dr Logan)  

 

 Surgical History  hysterectomy - Dr Luis   

 

 Surgical History  Left Breast Lumpectomy (Bx - Benign)- Dr Luis   

 

 Surgical History  Port - Dr Luis   

 

 Surgical History  Left Knee Surgeries x3- Dr Carolina did 2 and Dr Gan did 1   

 

 Surgical History  cath/stent  

 

 Hospitalization History  C-Diff - [facility]   

 

 Hospitalization History  Pneumonia - [facility]   

 

 Hospitalization History  Open Heart Surgery - [facility]

## 2019-01-07 NOTE — XMS REPORT
[facility]

 Created on: 2017



Dayami Rae

External Reference #: [id number]

: 1970

Sex: Female



Demographics







 Address  [address] [place]  [address]

 

 Preferred Language  Unknown

 

 Marital Status  Unknown

 

 Mandaeism Affiliation  Unknown

 

 Race  Unknown

 

 Ethnic Group  Unknown





Author







 Author  KIM FUCHS

 

 Organization  [facility]

 

 Address  [address] [place]  [address]



 

 Phone  [phone]







Care Team Providers







 Care Team Member Name  Role  Phone

 

 FUCHSJAMA JACKSONELE  Unavailable  [phone]







PROBLEMS







 Type  Condition  ICD9-CM Code  JMO58-OG Code  Onset Dates  Condition Status  
SNOMED Code

 

 Problem  Anxiety disorder, unspecified     F41.9     Active  191328772

 

 Problem  Neuropathy     G62.9     Active  413048838

 

 Problem  Fibromyalgia     M79.7     Active  134803339

 

 Problem  Coronary artery disease involving native coronary artery of native 
heart without angina pectoris     I25.10     Active  5097099338614

 

 Problem  Chronic pain syndrome     G89.4     Active  775088502







ALLERGIES

No Information



SOCIAL HISTORY

Never Assessed



PLAN OF CARE





VITAL SIGNS





MEDICATIONS

Unknown Medications



RESULTS

No Results



PROCEDURES

No Known procedures



IMMUNIZATIONS

No Known Immunizations



MEDICAL (GENERAL) HISTORY







 Type  Description  Date

 

 Medical History  fibromyalgia   

 

 Medical History  MRI 2016- small central protrusion/herniation w/minimal 
impression on thecal anteriorly at C5-6, At C3-4 small bilat. uncinate spurs w/ 
mild right neural foraminal narrowing   

 

 Medical History  MRI 2016- mild degenerative changes. No spinal canal 
stenosis or foraminal narrowing of thoracic spine   

 

 Medical History  hypertension   

 

 Medical History  Anxiety disorder   

 

 Medical History  depression   

 

 Medical History  migraine headaches   

 

 Medical History  Hx of C-Diff   

 

 Medical History  Hx of Pneumonia   

 

 Surgical History  Open Heart Surgery - [facility] -Dr. Lima  (
Cardiologist- Dr Logan)  

 

 Surgical History  hysterectomy - Dr Luis   

 

 Surgical History  Left Breast Lumpectomy (Bx - Benign)- Dr Luis   

 

 Surgical History  Port - Dr Luis   

 

 Surgical History  Left Knee Surgeries x3- Dr Carolina did 2 and Dr Gan did 1   

 

 Hospitalization History  C[facility] - [facility]   

 

 Hospitalization History  Pneumonia - [facility]   

 

 Hospitalization History  Open Heart Surgery - [facility]

## 2019-01-07 NOTE — XMS REPORT
[facility]

 Created on: 2017



Kyra Dayami

External Reference #: [id number]

: 1970

Sex: Female



Demographics







 Address  [address] [place]  [address]

 

 Preferred Language  Unknown

 

 Marital Status  Unknown

 

 Restorationism Affiliation  Unknown

 

 Race  Unknown

 

 Ethnic Group  Unknown





Author







 Author  KIM FUCHS

 

 Organization  [facility]

 

 Address  [address] [place]  [address]



 

 Phone  [phone]







Care Team Providers







 Care Team Member Name  Role  Phone

 

 FUCHSKIM JACKSON  Unavailable  [phone]







PROBLEMS







 Type  Condition  ICD9-CM Code  EYP67-WW Code  Onset Dates  Condition Status  
SNOMED Code

 

 Problem  Anxiety disorder, unspecified     F41.9     Active  492843924

 

 Problem  Neuropathy     G62.9     Active  024223537

 

 Problem  Fibromyalgia     M79.7     Active  892390859

 

 Problem  Coronary artery disease involving native coronary artery of native 
heart without angina pectoris     I25.10     Active  1025937458479

 

 Problem  Chronic pain syndrome     G89.4     Active  145666089







ALLERGIES

No Information



SOCIAL HISTORY

Never Assessed



PLAN OF CARE





VITAL SIGNS





MEDICATIONS







 Medication  Instructions  Dosage  Frequency  Start Date  End Date  Duration  
Status

 

 Xanax 0.25 MG  Orally Twice a day  1 tablet  12h        28 days  Active







RESULTS

No Results



PROCEDURES

No Known procedures



IMMUNIZATIONS

No Known Immunizations



MEDICAL (GENERAL) HISTORY







 Type  Description  Date

 

 Medical History  fibromyalgia   

 

 Medical History  MRI 2016- small central protrusion/herniation w/minimal 
impression on thecal anteriorly at C5-6, At C3-4 small bilat. uncinate spurs w/ 
mild right neural foraminal narrowing   

 

 Medical History  MRI 2016- mild degenerative changes. No spinal canal 
stenosis or foraminal narrowing of thoracic spine   

 

 Medical History  hypertension   

 

 Medical History  Anxiety disorder   

 

 Medical History  depression   

 

 Medical History  migraine headaches   

 

 Medical History  Hx of C-Diff   

 

 Medical History  Hx of Pneumonia   

 

 Surgical History  Open Heart Surgery - [facility] -Dr. Lima  (
Cardiologist- Dr Logan)  

 

 Surgical History  hysterectomy - Dr Luis   

 

 Surgical History  Left Breast Lumpectomy (Bx - Benign)- Dr Luis   

 

 Surgical History  Port - Dr Luis   

 

 Surgical History  Left Knee Surgeries x3- Dr Carolina did 2 and Dr Gan did 1   

 

 Hospitalization History  C-[facility] - [facility]   

 

 Hospitalization History  Pneumonia - [facility]   

 

 Hospitalization History  Open Heart Surgery - [facility]

## 2019-01-07 NOTE — XMS REPORT
[facility]

 Created on: 2018



Dayami Rae

External Reference #: [id number]

: 1970

Sex: Female



Demographics







 Address  [address] [place]  [address]

 

 Preferred Language  Unknown

 

 Marital Status  Unknown

 

 Religion Affiliation  Unknown

 

 Race  Unknown

 

 Ethnic Group  Unknown





Author







 Author  KIM FUCHS

 

 Organization  [facility]

 

 Address  [address] [place]  [address]



 

 Phone  [phone]







Care Team Providers







 Care Team Member Name  Role  Phone

 

 FUCHSKIM JACKSON  Unavailable  [phone]







PROBLEMS







 Type  Condition  ICD9-CM Code  JBY79-WS Code  Onset Dates  Condition Status  
SNOMED Code

 

 Problem  Neuropathy     G62.9     Active  888482847

 

 Problem  Lumbago with sciatica, left side     M54.42     Active  633866469

 

 Problem  Anxiety disorder, unspecified     F41.9     Active  793741958

 

 Problem  Chronic pain syndrome     G89.4     Active  233412209

 

 Problem  Fibromyalgia     M79.7     Active  598732779

 

 Problem  Coronary artery disease involving native coronary artery of native 
heart without angina pectoris     I25.10     Active  [id number]

 

 Problem  Essential hypertension     I10     Active  97351146

 

 Problem  GERD with esophagitis     K21.0     Active  191284572

 

 Problem  Other chronic pain     G89.29     Active  86338972

 

 Problem  Lumbago with sciatica, right side     M54.41     Active  
[id number]

 

 Problem  COPD suggested by initial evaluation     J44.9     Active  70989588

 

 Problem  Acute midline low back pain with left-sided sciatica     M54.42     
Active  225707716







ALLERGIES

No Information



ENCOUNTERS







 Encounter  Location  Date  Diagnosis

 

 [facility]  [address] [place] [address] [phone]-
5176  28 Aug, 2018  Fibromyalgia M79.7

 

 [facility]  [address] [place] [address] 87437-
3682  20 Aug, 2018   

 

 [facility]  [id number] [place] [address] 51697-
1587  16 Aug, 2018  Neuropathy G62.9

 

 [facility]  [address] [place] [address] 07979-
1697    Essential hypertension I10 ; Coronary artery disease 
involving native coronary artery of native heart without angina pectoris I25.10 
; Fibromyalgia M79.7 ; GERD with esophagitis K21.0 and Tobacco abuse counseling 
Z71.6

 

 [facility]  [id number] [place] [address] 60010-
4064    Long term (current) use of opiate analgesic Z79.891

 

 [facility]  [id number] [place] [address] 68702-
5172     

 

 [facility]  [id number] [place] [address] 19102-
1927    Acute bronchitis, unspecified organism J20.9

 

 [facility]  [id number] [place] [address] 72460-
0648     

 

 [facility]  [id number] [place] [address] 62443-
1650     

 

 [facility]  [id number] [place] [address] 22058-
2968    Chronic pain syndrome G89.4

 

 [facility]  [id number] [place] [address] 71594-
0172     

 

 [facility]  [id number] [place] [address] 79619-
1453  15 Marko, 2018   

 

 [facility]  [id number] [place] [address] 76676-
9719    Acute bronchitis, unspecified organism J20.9

 

 [facility]  [id number] [place] [address] 61948-
5832    Chronic pain syndrome G89.4

 

 [facility]  [address] [place] [address] 27141-
0133  25 May, 2018   

 

 [facility]  [id number] [place] [address] 81845-
5489  24 May, 2018  Acute midline low back pain with left-sided sciatica M54.42

 

 [facility]  [id number] [place] [address] 97289-
3885  22 May, 2018   

 

 [facility]  [id number] [place] [address] [phone]
[phone]  21 May, 2018  Bronchitis J40

 

 [facility]  [address] [address]25-
9986  07 May, 2018  Chronic pain syndrome G89.4 and Neuropathy G62.9

 

 [facility]  [id number] [place] [address] 15590-
2836    Acute midline low back pain with left-sided sciatica M54.42

 

 [facility]  [id number] [place] [address] 38146-
4751     

 

 [facility]  [address] 60861-
8154    Anxiety disorder, unspecified F41.9

 

 [facility]  [address] 52950-
6537     

 

 [place] [address] 22516-
7969    Chronic pain syndrome G89.4 and Acute midline low back pain 
with left-sided sciatica M54.42

 

 [facility]  [address] 94972-
7044    Chronic pain syndrome G89.4

 

 [facility]  [id number] [place] [address] 77798-
2515     

 

 [facility]  [id number] [place] [address] 26939-
5893     

 

 [facility]  [id number] [place] [address] 28143-
4436  29 Mar, 2018  Injury of left knee, initial encounter S89.92XA and COPD 
suggested by initial evaluation J44.9

 

 [facility]  [id number] [place] [address] 47996-
8135  28 Mar, 2018  Acute bronchitis, unspecified organism J20.9

 

 [facility]  [address] [phone]-
0038  27 Mar, 2018  Acute bronchitis, unspecified organism J20.9

 

 [facility]  [id number] [place] [address] 56929-
5565  21 Mar, 2018  Anxiety disorder, unspecified F41.9 and Acute bronchitis, 
unspecified organism J20.9

 

 [facility]  [id number] [place] [address] 18904-
2505  08 Mar, 2018  Chronic pain syndrome G89.4

 

 [facility]  [id number] [place] [address] 09372-
5016  05 Mar, 2018   

 

 [facility]  [id number] [place] [address] 07903-
5556  05 Mar, 2018  Acute midline low back pain with left-sided sciatica M54.42

 

 [facility]  [id number] [place] [address] 41901-
7184     

 

 [facility]  [id number] [place] [address] 45353-
3446    Chronic pain syndrome G89.4

 

 [facility]  [id number] [place] [address] 52043-
9100    Chronic pain syndrome G89.4

 

 [facility]  [id number] [place] [address] 11066-
8321     

 

 [facility]  [id number] [place] [address] 52693-
7665    Gastroenteritis K52.9

 

 [facility]  [id number] [place] [address] 35118-
8948     

 

 [facility]  [id number] [place] [address] 33889-
2152  15 Jerardo, 2018  Acute bronchitis, unspecified organism J20.9

 

 [facility]  [id number] [place] [address] 81671-
9906    Anxiety disorder, unspecified F41.9 and Neuropathy G62.9

 

 [place] [address] [place] [address] 39266-
3911    Chronic pain syndrome G89.4

 

 [place] [id number] [place] [address] 63540-
0602    Bronchitis J40 and Laryngitis J04.0

 

 [place] [id number] [place] [address] 48741-
5995  29 Dec, 2017   

 

 [place] [address] 23727-
3479  26 Dec, 2017  Bronchitis J40

 

 [place] [address] 99726-
5866  18 Dec, 2017   

 

 [place] [id number] [place] [address] 29165-
6296  13 Dec, 2017  Fibromyalgia M79.7 and Chronic pain syndrome G89.4

 

 [place] [id number] [place] [address] 48030-
8128  04 Dec, 2017  Chronic pain syndrome G89.4 and Acute bronchitis, 
unspecified organism J20.9

 

 [place] [id number] [place] [address] 43167-
3772    Neuropathy G62.9

 

 [place] [id number] [place] [address] 47643-
6008    Chronic pain syndrome G89.4 ; Lumbago with sciatica, left 
side M54.42 ; Lumbago with sciatica, right side M54.41 ; Screening cholesterol 
level Z13.220 ; Screening for diabetes mellitus Z13.1 ; Screening for thyroid 
disorder Z13.29 ; Fibromyalgia M79.7 ; Anxiety disorder, unspecified F41.9 and 
Neuropathy G62.9

 

 [place] [id number] [place] [address] 92705-
0811     

 

 [place] [id number] [place] [address] 94609-
2489  25 Oct, 2017   

 

 [place] [address] [id number] [address]-
7097  10 Oct, 2017  Fibromyalgia M79.7 ; Chronic pain syndrome G89.4 ; Anxiety 
disorder, unspecified F41.9 ; Neuropathy G62.9 and Acute bronchitis, 
unspecified organism J20.9

 

 [facility]  [id number] [address]-
5117  09 Oct, 2017   

 

 [facility]  [id number] [place] [address] 24700-
7186  25 Sep, 2017   

 

 [facility]  [id number] [place] [address] 25169-
2618  19 Sep, 2017   

 

 [facility]  [id number] [place] [address] 37005-
9677  08 Sep, 2017   

 

 [facility]  [id number] [place] [address] 25631-
6232  23 Aug, 2017   

 

 [facility]  [id number] [place] [address] 39074-
0681  22 Aug, 2017  Acute seasonal allergic rhinitis due to pollen J30.1

 

 [facility]  [id number] [place] [address] 56575-
3108  21 Aug, 2017   

 

 [facility]  [id number] [address]-
8432  09 Aug, 2017   

 

 [facility]  [address] [address]-
6177     

 

 [facility]  [address] [place] [address] 83706-
0697     

 

 [facility]  [address] [address]-
8327  10 Jul, 2017   

 

 [facility]  [id number] [place] [address] 113593-
7012     

 

 [facility]  [address] [address]-
5873     

 

 [facility]  [address] [address] [address]-
4753     

 

 [facility]  [id number] [place] [address] 61201-
3745    Chronic pain syndrome G89.4 ; Fibromyalgia M79.7 ; Anxiety 
disorder, unspecified F41.9 ; Neuropathy G62.9 and Low back pain M54.5

 

 [facility]  [id number] [place] [address] 58275-
8902  25 May, 2017  Low back pain M54.5

 

 [facility]  [id number] [place] [address] 31520-
8404  24 May, 2017   

 

 [facility]  [id number] [place] [address] 47888-
0990  22 May, 2017   

 

 [facility]  [id number] [place] [address] 18095-
3038  16 May, 2017   

 

 [facility]  [id number] [place] [address] 29816-
5412  16 May, 2017   

 

 [facility]  [id number] [place] [address] 38828-
8552  12 May, 2017  Routine adult health maintenance Z00.00 ; Fibromyalgia 
M79.7 ; Chronic pain syndrome G89.4 ; Abnormal lung sounds R09.89 ; Coronary 
artery disease involving native coronary artery of native heart without angina 
pectoris I25.10 and S/P CABG (coronary artery bypass graft) Z95.1

 

 [facility]  [address] [place] [address] 87765-
9293  09 May, 2017   







IMMUNIZATIONS

No Known Immunizations



SOCIAL HISTORY

Never Assessed



REASON FOR VISIT

LVM



PLAN OF CARE





VITAL SIGNS





MEDICATIONS

Unknown Medications



RESULTS

No Results



PROCEDURES

No Known procedures



INSTRUCTIONS





MEDICATIONS ADMINISTERED

No Known Medications



MEDICAL (GENERAL) HISTORY







 Type  Description  Date

 

 Medical History  fibromyalgia   

 

 Medical History  MRI 2016- small central protrusion/herniation w/minimal 
impression on thecal anteriorly at C5-6, At C3-4 small bilat. uncinate spurs w/ 
mild right neural foraminal narrowing   

 

 Medical History  MRI 2016- mild degenerative changes. No spinal canal 
stenosis or foraminal narrowing of thoracic spine   

 

 Medical History  hypertension   

 

 Medical History  Anxiety disorder   

 

 Medical History  depression   

 

 Medical History  migraine headaches   

 

 Medical History  Hx of C-Diff   

 

 Medical History  Hx of Pneumonia   

 

 Medical History  heart cath w/ stents placed 7/3/18   

 

 Surgical History  Open Heart Surgery - [facility] -Dr. Lima  (
Cardiologist- Dr Logan)  

 

 Surgical History  hysterectomy - Dr Luis   

 

 Surgical History  Left Breast Lumpectomy (Bx - Benign)- Dr Luis   

 

 Surgical History  Port - Dr Luis   

 

 Surgical History  Left Knee Surgeries x3- Dr Carolina did 2 and Dr Gan did 1   

 

 Surgical History  cath/stent  

 

 Hospitalization History  C-Diff - [facility]   

 

 Hospitalization History  Pneumonia - [facility]   

 

 Hospitalization History  Open Heart Surgery - [facility]

## 2019-01-07 NOTE — XMS REPORT
[facility]

 Created on: 2018



Dayami Rae

External Reference #: [id number]

: 1970

Sex: Female



Demographics







 Address  [address] [place]  [address]

 

 Preferred Language  Unknown

 

 Marital Status  Unknown

 

 Mu-ism Affiliation  Unknown

 

 Race  Unknown

 

 Ethnic Group  Unknown





Author







 Author  KIM FUCHS

 

 Organization  [facility]

 

 Address  [address] [place]  [address]



 

 Phone  [phone]







Care Team Providers







 Care Team Member Name  Role  Phone

 

 FUCHSKIM JACKSON  Unavailable  [phone]







PROBLEMS







 Type  Condition  ICD9-CM Code  MKT41-OA Code  Onset Dates  Condition Status  
SNOMED Code

 

 Problem  Fibromyalgia     M79.7     Active  450911728

 

 Problem  Neuropathy     G62.9     Active  390898160

 

 Problem  Coronary artery disease involving native coronary artery of native 
heart without angina pectoris     I25.10     Active  8699695292484

 

 Problem  Chronic pain syndrome     G89.4     Active  648494618

 

 Problem  COPD suggested by initial evaluation     J44.9     Active  82016144

 

 Problem  Acute midline low back pain with left-sided sciatica     M54.42     
Active  418673722

 

 Problem  Lumbago with sciatica, left side     M54.42     Active  327540076

 

 Problem  Anxiety disorder, unspecified     F41.9     Active  736908805

 

 Problem  Other chronic pain     G89.29     Active  77423476

 

 Problem  Lumbago with sciatica, right side     M54.41     Active  
[id number]







ALLERGIES

No Information



ENCOUNTERS







 Encounter  Location  Date  Diagnosis

 

 [place] [id number] [address]     

 

 [facility]  [address] [address]-
9787  29 Mar, 2018  Injury of left knee, initial encounter S89.92XA and COPD 
suggested by initial evaluation J44.9

 

 [facility]  [id number] [place] [address]181-
7770  28 Mar, 2018  Acute bronchitis, unspecified organism J20.9

 

 [facility]  [id number] [place] [address] 47166-
2666  27 Mar, 2018  Acute bronchitis, unspecified organism J20.9

 

 [facility]  [address] [address]-
1047  21 Mar, 2018  Anxiety disorder, unspecified F41.9 and Acute bronchitis, 
unspecified organism J20.9

 

 [facility]  [id number] [place] [address] 27945-
8611  08 Mar, 2018  Chronic pain syndrome G89.4

 

 [facility]  [id number] [place] [address] 69356-
2568  05 Mar, 2018   

 

 [facility]  [id number] [place] [address] 95949-
4426  05 Mar, 2018  Acute midline low back pain with left-sided sciatica M54.42

 

 [facility]  [id number] [place] [address] 18689-
8074     

 

 [facility]  [id number] [place] [address] 36327-
4377    Chronic pain syndrome G89.4

 

 [facility]  [id number] [place] [address] 86086-
1630    Chronic pain syndrome G89.4

 

 [facility]  [id number] [place] [address] 36427-
1767     

 

 [facility]  [id number] [place] [address] 32054-
7832    Gastroenteritis K52.9

 

 [facility]  [id number] [place] [address] 35439-
2086     

 

 [facility]  [id number] [place] [address]20-
2962  15 Jerardo, 2018  Acute bronchitis, unspecified organism J20.9

 

 [facility]  [id number] [place] [address] 30969-
1050    Anxiety disorder, unspecified F41.9 and Neuropathy G62.9

 

 [facility]  [id number] [place] [address] 52035-
1033    Chronic pain syndrome G89.4

 

 [facility]  [id number] [place] [address] 09347-
4186    Bronchitis J40 and Laryngitis J04.0

 

 [place] [id number] [place] [address] 47766-
9793  29 Dec, 2017   

 

 [place] [id number] [place] [address] 27477-
3328  26 Dec, 2017  Bronchitis J40

 

 [place] [address] [address]-
5304  18 Dec, 2017   

 

 [place] [id number] [place] [address] 56431-
5717  13 Dec, 2017  Fibromyalgia M79.7 and Chronic pain syndrome G89.4

 

 [place] [id number] [place] [address] 18405-
4862  04 Dec, 2017  Chronic pain syndrome G89.4 and Acute bronchitis, 
unspecified organism J20.9

 

 [place] [id number] [place] [address] 46368-
2350    Neuropathy G62.9

 

 [place] [id number] [place] [address] 74503-
7762    Chronic pain syndrome G89.4 ; Lumbago with sciatica, left 
side M54.42 ; Lumbago with sciatica, right side M54.41 ; Screening cholesterol 
level Z13.220 ; Screening for diabetes mellitus Z13.1 ; Screening for thyroid 
disorder Z13.29 ; Fibromyalgia M79.7 ; Anxiety disorder, unspecified F41.9 and 
Neuropathy G62.9

 

 [place] [id number] [place] [address] 66342-
4410     

 

 [place] [address] [id number] [place] [address] 62746-
2556  25 Oct, 2017   

 

 [place] [id number] [place] [address] 45635-
0239  10 Oct, 2017  Fibromyalgia M79.7 ; Chronic pain syndrome G89.4 ; Anxiety 
disorder, unspecified F41.9 ; Neuropathy G62.9 and Acute bronchitis, 
unspecified organism J20.9

 

 [facility]  [address] [address]-
0177  09 Oct, 2017   

 

 [facility]  [id number] [place] [address] 55665-
4587  25 Sep, 2017   

 

 [facility]  [id number] [place] [address] 86949-
4107  19 Sep, 2017   

 

 [facility]  [id number] [place] [address] 59538-
2467  08 Sep, 2017   

 

 [facility]  [id number] [place] [address] 41228-
9960  23 Aug, 2017   

 

 [facility]  [id number] [place] [address] 26781-
1368  22 Aug, 2017  Acute seasonal allergic rhinitis due to pollen J30.1

 

 [facility]  [id number] [place] [address] 15714-
2482  21 Aug, 2017   

 

 [facility]  [address] [place] [address] 85258-
4529  09 Aug, 2017   

 

 [facility]  [id number] [place] [address] 18080-
4068     

 

 [facility]  [address] [address]-
95[address]     

 

 [facility]  [address] [address]-
5959  10 Jul, 2017   

 

 [facility]  [address] [address]-
1483     

 

 [facility]  [id number] [place] [address] 14625-
6928     

 

 [facility]  [address] [address]-
[address]     

 

 [facility]  [id number] [place] [address] 42094-
6821    Chronic pain syndrome G89.4 ; Fibromyalgia M79.7 ; Anxiety 
disorder, unspecified F41.9 ; Neuropathy G62.9 and Low back pain M54.5

 

 [facility]  [address] [address]-
6721  25 May, 2017  Low back pain M54.5

 

 [facility]  [address] [address]-
3788  24 May, 2017   

 

 [facility]  [id number] [place] [address] 75185-
3414  22 May, 2017   

 

 [place] [id number] [place] [address] 82892-
0879  16 May, 2017   

 

 [facility]  [id number] [place] [address] 78312-
2438  16 May, 2017   

 

 [place] [id number] [place] [address] 04066-
8583  12 May, 2017  Routine adult health maintenance Z00.00 ; Fibromyalgia 
M79.7 ; Chronic pain syndrome G89.4 ; Abnormal lung sounds R09.89 ; Coronary 
artery disease involving native coronary artery of native heart without angina 
pectoris I25.10 and S/P CABG (coronary artery bypass graft) Z95.1

 

 [place] [address] [address]  09 May, 2017   







IMMUNIZATIONS

No Known Immunizations



SOCIAL HISTORY

Never Assessed



REASON FOR VISIT

Refill request



PLAN OF CARE





VITAL SIGNS





MEDICATIONS







 Medication  Instructions  Dosage  Frequency  Start Date  End Date  Duration  
Status

 

 Chlorzoxazone 500 mg  Orally Three times a day  1 tablet  8h        30 days  
Active







RESULTS

No Results



PROCEDURES

No Known procedures



INSTRUCTIONS





MEDICATIONS ADMINISTERED

No Known Medications



MEDICAL (GENERAL) HISTORY







 Type  Description  Date

 

 Medical History  fibromyalgia   

 

 Medical History  MRI 2016- small central protrusion/herniation w/minimal 
impression on thecal anteriorly at C5-6, At C3-4 small bilat. uncinate spurs w/ 
mild right neural foraminal narrowing   

 

 Medical History  MRI 2016- mild degenerative changes. No spinal canal 
stenosis or foraminal narrowing of thoracic spine   

 

 Medical History  hypertension   

 

 Medical History  Anxiety disorder   

 

 Medical History  depression   

 

 Medical History  migraine headaches   

 

 Medical History  Hx of C-Diff   

 

 Medical History  Hx of Pneumonia   

 

 Surgical History  Open Heart Surgery - [facility] -Dr. Lima  (
Cardiologist- Dr Logan)  

 

 Surgical History  hysterectomy - Dr Luis   

 

 Surgical History  Left Breast Lumpectomy (Bx - Benign)- Dr Luis   

 

 Surgical History  Port - Dr Luis   

 

 Surgical History  Left Knee Surgeries x3- Dr Carolina did 2 and Dr Gan did 1   

 

 Hospitalization History  C-Diff - [facility] [place]   

 

 Hospitalization History  Pneumonia - [facility] [place]   

 

 Hospitalization History  Open Heart Surgery - [facility]

## 2019-01-07 NOTE — XMS REPORT
[facility]

 Created on: 2018



Dayami Rae

External Reference #: [id number]

: 1970

Sex: Female



Demographics







 Address  [address] [place]  [address]

 

 Preferred Language  Unknown

 

 Marital Status  Unknown

 

 Caodaism Affiliation  Unknown

 

 Race  Unknown

 

 Ethnic Group  Unknown





Author







 Author  KIM FUCHS

 

 Organization  [facility]

 

 Address  [address] [place]  [address]



 

 Phone  [phone]







Care Team Providers







 Care Team Member Name  Role  Phone

 

 FUCHSKIM JACKSON  Unavailable  [phone]







PROBLEMS







 Type  Condition  ICD9-CM Code  CTF91-JX Code  Onset Dates  Condition Status  
SNOMED Code

 

 Problem  Neuropathy     G62.9     Active  707036388

 

 Problem  Lumbago with sciatica, left side     M54.42     Active  819389618

 

 Problem  Anxiety disorder, unspecified     F41.9     Active  622405398

 

 Problem  Chronic pain syndrome     G89.4     Active  509677605

 

 Problem  Fibromyalgia     M79.7     Active  959395748

 

 Problem  Coronary artery disease involving native coronary artery of native 
heart without angina pectoris     I25.10     Active  [id number]

 

 Problem  Essential hypertension     I10     Active  78205798

 

 Problem  GERD with esophagitis     K21.0     Active  846286868

 

 Problem  Other chronic pain     G89.29     Active  95604864

 

 Problem  Lumbago with sciatica, right side     M54.41     Active  
[id number]

 

 Problem  COPD suggested by initial evaluation     J44.9     Active  82974464

 

 Problem  Acute midline low back pain with left-sided sciatica     M54.42     
Active  456310532







ALLERGIES

No Information



ENCOUNTERS







 Encounter  Location  Date  Diagnosis

 

 [place] [address] [place] [address] [phone]-
5006  16 Aug, 2018  Neuropathy G62.9

 

 [facility]  [address] [place] [address] 13209-
4420    Essential hypertension I10 ; Coronary artery disease 
involving native coronary artery of native heart without angina pectoris I25.10 
; Fibromyalgia M79.7 ; GERD with esophagitis K21.0 and Tobacco abuse counseling 
Z71.6

 

 [place] [address] [place] [address] 96702-
6101    Long term (current) use of opiate analgesic Z79.891

 

 [place] [address] [address]-
3783     

 

 [facility]  [address] [place] [id number] [place] [address] 97689-
9245    Acute bronchitis, unspecified organism J20.9

 

 [facility]  [address] [place] [address] 82119-
6533     

 

 [facility]  [address] [place] [address] [id number] [place] [address] 26381-
5553     

 

 [facility]  [address] [place] [address] 48596-
8771    Chronic pain syndrome G89.4

 

 [facility]  [id number] [place] [address] 14986-
2372     

 

 [facility]  [id number] [place] [address] 09671-
8424  15 Marko, 2018   

 

 [facility]  [id number] [place] [address] 32349-
6149    Acute bronchitis, unspecified organism J20.9

 

 [facility]  [id number] [place] [address] 00922-
2314    Chronic pain syndrome G89.4

 

 [facility]  [address] [place] [address] 95752-
4738  25 May, 2018   

 

 [facility]  [address] [place] [address] 64209-
0217  24 May, 2018  Acute midline low back pain with left-sided sciatica M54.42

 

 [facility]  [address] [place] [address] 96083-
7970  22 May, 2018   

 

 [facility]  [address] [place] [address] [phone]
[phone]  21 May, 2018  Bronchitis J40

 

 [facility]  [address] [place] [address] 11091-
4232  07 May, 2018  Chronic pain syndrome G89.4 and Neuropathy G62.9

 

 [facility]  [id number] [place] [address] 60749-
1462    Acute midline low back pain with left-sided sciatica M54.42

 

 [facility]  [id number] [place] [address] 92883-
1406     

 

 [facility]  [address] [address]362-
1736    Anxiety disorder, unspecified F41.9

 

 [facility]  [address] [id number] [place] [address] 98814-
7693     

 

 [place] [address] [address]257-
7525    Chronic pain syndrome G89.4 and Acute midline low back pain 
with left-sided sciatica M54.42

 

 [place] [address] 82613-
7173    Chronic pain syndrome G89.4

 

 [facility]  [id number] [place] [address] 34536-
5022     

 

 [place] [address] [place] [address] [id number] [place] [address] 35735-
7596     

 

 [place] [address] [place] [id number] [place] [address] 39727-
0207  29 Mar, 2018  Injury of left knee, initial encounter S89.92XA and COPD 
suggested by initial evaluation J44.9

 

 [place] [address] [place] [id number] [place] [address] 97884-
1122  28 Mar, 2018  Acute bronchitis, unspecified organism J20.9

 

 [place] [address] [place] [id number] [place] [address] 29867-
4869  27 Mar, 2018  Acute bronchitis, unspecified organism J20.9

 

 [facility]  [id number] [place] [address] 83311-
2631  21 Mar, 2018  Anxiety disorder, unspecified F41.9 and Acute bronchitis, 
unspecified organism J20.9

 

 [facility]  [id number] [place] [address] 64132-
9058  08 Mar, 2018  Chronic pain syndrome G89.4

 

 [facility]  [id number] [place] [address] 51134-
8303  05 Mar, 2018   

 

 [facility]  [address] [phone]-
7804  05 Mar, 2018  Acute midline low back pain with left-sided sciatica M54.42

 

 [facility]  [address] 33169-
4221     

 

 [facility]  [address] 26507-
9697    Chronic pain syndrome G89.4

 

 [facility]  [address] 92754-
4218    Chronic pain syndrome G89.4

 

 [facility]  [id number] [place] [address] 50082-
8504     

 

 [facility]  [address] 09983-
3496    Gastroenteritis K52.9

 

 [facility]  [id number] [place] [address] 10654-
4040     

 

 [facility]  [id number] [place] [address] 63384-
9230  15 Jerardo, 2018  Acute bronchitis, unspecified organism J20.9

 

 [facility]  [address] [place] [id number] [place] [address] 70486-
3284    Anxiety disorder, unspecified F41.9 and Neuropathy G62.9

 

 [facility]  [address] [place] [id number] [place] [address] 87117-
4685    Chronic pain syndrome G89.4

 

 [facility]  [address] 68627-
5673    Bronchitis J40 and Laryngitis J04.0

 

 [place] [address] [place] [id number] [place] [address] 03154-
7639  29 Dec, 2017   

 

 [place] [address] [place] [id number] [place] [address] 58130-
9998  26 Dec, 2017  Bronchitis J40

 

 [place] [address] [place] [id number] [place] [address] 22134-
6558  18 Dec, 2017   

 

 [place] [address] 72845-
8556  13 Dec, 2017  Fibromyalgia M79.7 and Chronic pain syndrome G89.4

 

 [place] [address] [address]157-
7923  04 Dec, 2017  Chronic pain syndrome G89.4 and Acute bronchitis, 
unspecified organism J20.9

 

 [place] [id number] [place] [address] 87053-
0686    Neuropathy G62.9

 

 [place] [address] [place] [id number] [place] [address] 08920-
9753    Chronic pain syndrome G89.4 ; Lumbago with sciatica, left 
side M54.42 ; Lumbago with sciatica, right side M54.41 ; Screening cholesterol 
level Z13.220 ; Screening for diabetes mellitus Z13.1 ; Screening for thyroid 
disorder Z13.29 ; Fibromyalgia M79.7 ; Anxiety disorder, unspecified F41.9 and 
Neuropathy G62.9

 

 [place] [address] [place] [id number] [place] [address] 19362-
1263     

 

 [place] [address] [place] [id number] [place] [address] 14927-
1910  25 Oct, 2017   

 

 [place] [id number] [place] [address] 55739-
3874  10 Oct, 2017  Fibromyalgia M79.7 ; Chronic pain syndrome G89.4 ; Anxiety 
disorder, unspecified F41.9 ; Neuropathy G62.9 and Acute bronchitis, 
unspecified organism J20.9

 

 [place] [address] [address]-
8394  09 Oct, 2017   

 

 [facility]  [address] [address]-
4994  25 Sep, 2017   

 

 [facility]  [address] [address]-
2560  19 Sep, 2017   

 

 [facility]  [address] [address]-
1993  08 Sep, 2017   

 

 [facility]  [address] [address]-
0347  23 Aug, 2017   

 

 [facility]  [id number] [place] [address] 61595-
1996  22 Aug, 2017  Acute seasonal allergic rhinitis due to pollen J30.1

 

 [facility]  [address] [address]-
1806  21 Aug, 2017   

 

 [facility]  [address] [address]-
7571  09 Aug, 2017   

 

 [facility]  [address] [address]-
4630     

 

 [facility]  [address] [address]-
4872     

 

 [facility]  [address] [address]-
9464  10 Jul, 2017   

 

 [facility]  [address] [address]-
4242     

 

 [facility]  [address] [address]-
7603     

 

 [facility]  [address] [address]-
0292     

 

 [facility]  [address] [address]-
3189    Chronic pain syndrome G89.4 ; Fibromyalgia M79.7 ; Anxiety 
disorder, unspecified F41.9 ; Neuropathy G62.9 and Low back pain M54.5

 

 [facility]  [address] [address]-
9031  25 May, 2017  Low back pain M54.5

 

 [facility]  [address] [address]-
7859  24 May, 2017   

 

 [facility]  [id number] [place] [address] 84534-
4757  22 May, 2017   

 

 [facility]  [address] [address]-
1150  16 May, 2017   

 

 [facility]  [id number] [place] [address] 90434-
0914  16 May, 2017   

 

 [facility]  [id number] [place] [address] 20359-
4486  12 May, 2017  Routine adult health maintenance Z00.00 ; Fibromyalgia 
M79.7 ; Chronic pain syndrome G89.4 ; Abnormal lung sounds R09.89 ; Coronary 
artery disease involving native coronary artery of native heart without angina 
pectoris I25.10 and S/P CABG (coronary artery bypass graft) Z95.1

 

 [facility]  [address] [address]-
8889  09 May, 2017   







IMMUNIZATIONS

No Known Immunizations



SOCIAL HISTORY

Never Assessed



REASON FOR VISIT

Refill request



PLAN OF CARE





VITAL SIGNS





MEDICATIONS







 Medication  Instructions  Dosage  Frequency  Start Date  End Date  Duration  
Status

 

 Xanax 0.25 MG  Orally Twice a day  1 tablet  12h        28 days  Active







RESULTS

No Results



PROCEDURES

No Known procedures



INSTRUCTIONS





MEDICATIONS ADMINISTERED

No Known Medications



MEDICAL (GENERAL) HISTORY







 Type  Description  Date

 

 Medical History  fibromyalgia   

 

 Medical History  MRI 2016- small central protrusion/herniation w/minimal 
impression on thecal anteriorly at C5-6, At C3-4 small bilat. uncinate spurs w/ 
mild right neural foraminal narrowing   

 

 Medical History  MRI 2016- mild degenerative changes. No spinal canal 
stenosis or foraminal narrowing of thoracic spine   

 

 Medical History  hypertension   

 

 Medical History  Anxiety disorder   

 

 Medical History  depression   

 

 Medical History  migraine headaches   

 

 Medical History  Hx of C-Diff   

 

 Medical History  Hx of Pneumonia   

 

 Medical History  heart cath w/ stents placed 7/3/18   

 

 Surgical History  Open Heart Surgery - [facility] -Dr. Lima  (
Cardiologist- Dr Logan)  

 

 Surgical History  hysterectomy - Dr Luis   

 

 Surgical History  Left Breast Lumpectomy (Bx - Benign)- Dr Luis   

 

 Surgical History  Port - Dr Luis   

 

 Surgical History  Left Knee Surgeries x3- Dr Carolina did 2 and Dr Gan did 1   

 

 Surgical History  cath/stent  

 

 Hospitalization History  C-Diff - [facility]   

 

 Hospitalization History  Pneumonia - [facility]   

 

 Hospitalization History  Open Heart Surgery - [facility]

## 2019-01-07 NOTE — NUR
This RN called Dr. Ohara in regards to the pt requesting home HS medications Seroquel 400 mg 
PO HS, gabapentin 300 mg PO HS, and metoprolol 25 mg PO daily. Orders received to start 
Seroquel 400 mg PO HS, gabapentin 300 mg PO HS, and metoprolol 25 mg PO daily. Orders read 
back and verified.

## 2019-01-07 NOTE — XMS REPORT
[facility]

 Created on: 2018



Dayami Rae

External Reference #: [id number]

: 1970

Sex: Female



Demographics







 Address  [address] [place]  [address]

 

 Preferred Language  Unknown

 

 Marital Status  Unknown

 

 Mormonism Affiliation  Unknown

 

 Race  Unknown

 

 Ethnic Group  Unknown





Author







 Author  KIM FUCHS

 

 Organization  [facility]

 

 Address  [address] [place]  [address]



 

 Phone  [phone]







Care Team Providers







 Care Team Member Name  Role  Phone

 

 FUCHSJAMA JACKSONELE  Unavailable  [phone]







PROBLEMS







 Type  Condition  ICD9-CM Code  XHH69-TH Code  Onset Dates  Condition Status  
SNOMED Code

 

 Problem  Fibromyalgia     M79.7     Active  289284451

 

 Problem  Neuropathy     G62.9     Active  495113355

 

 Problem  Coronary artery disease involving native coronary artery of native 
heart without angina pectoris     I25.10     Active  [id number]

 

 Problem  Chronic pain syndrome     G89.4     Active  430969890

 

 Problem  COPD suggested by initial evaluation     J44.9     Active  21179262

 

 Problem  Acute midline low back pain with left-sided sciatica     M54.42     
Active  645573376

 

 Problem  Lumbago with sciatica, left side     M54.42     Active  732981515

 

 Problem  Anxiety disorder, unspecified     F41.9     Active  022414550

 

 Problem  Other chronic pain     G89.29     Active  22606604

 

 Problem  Lumbago with sciatica, right side     M54.41     Active  
[id number]







ALLERGIES

No Information



ENCOUNTERS







 Encounter  Location  Date  Diagnosis

 

 [facility]  [address] [place] [address] [phone]-
6815     

 

 [facility]  [id number] [place] [address] [phone]-
3713     

 

 [facility]  [id number] [place] [address] 52759-
7236     

 

 [facility]  [id number] [place] [address] 62801-
4771    Chronic pain syndrome G89.4

 

 [facility]  [id number] [place] [address] 04428-
9105     

 

 [facility]  [id number] [place] [address] 10422-
4520  15 2018   

 

 [facility]  [id number] [place] [address] 39389-
5817    Acute bronchitis, unspecified organism J20.9

 

 [facility]  [id number] [place] [address] 11888-
5933    Chronic pain syndrome G89.4

 

 [facility]  [id number] [place] [address] 39274-
6406  25 May, 2018   

 

 [facility]  [id number] [place] [address] 73995-
3083  24 May, 2018  Acute midline low back pain with left-sided sciatica M54.42

 

 [facility]  [id number] [place] [address] 03780-
6092  22 May, 2018   

 

 [facility]  [id number] [place] [address] [phone]
-[phone]  21 May, 2018  Bronchitis J40

 

 [facility]  [id number] [place] [address] 42935-
9421  07 May, 2018  Chronic pain syndrome G89.4 and Neuropathy G62.9

 

 [facility]  [id number] [place] [address] 23605-
8535    Acute midline low back pain with left-sided sciatica M54.42

 

 [facility]  [id number] [place] [address] 23382-
5241     

 

 [facility]  [id number] [place] [address] 72754-
5233    Anxiety disorder, unspecified F41.9

 

 [facility]  [id number] [place] [address] 33586-
3457     

 

 [facility]  [id number] [place] [address] 76672-
1289    Chronic pain syndrome G89.4 and Acute midline low back pain 
with left-sided sciatica M54.42

 

 [facility]  [address] [place] [address] 57424-
5517    Chronic pain syndrome G89.4

 

 [facility]  [id number] [place] [address] 06247-
8403     

 

 [facility]  [id number] [place] [address] 80211-
6583     

 

 [facility]  [address] [address]-
8854  29 Mar, 2018  Injury of left knee, initial encounter S89.92XA and COPD 
suggested by initial evaluation J44.9

 

 [place] [address] 74692-
8594  28 Mar, 2018  Acute bronchitis, unspecified organism J20.9

 

 [place] [id number] [place] [address] 51198-
7044  27 Mar, 2018  Acute bronchitis, unspecified organism J20.9

 

 [facility]  [id number] [place] [address] 05389-
6477  21 Mar, 2018  Anxiety disorder, unspecified F41.9 and Acute bronchitis, 
unspecified organism J20.9

 

 [facility]  [id number] [place] [address] 67885-
6589  08 Mar, 2018  Chronic pain syndrome G89.4

 

 [facility]  [id number] [place] [address] 97056-
9772  05 Mar, 2018   

 

 [facility]  [id number] [place] [address] 84236-
9601  05 Mar, 2018  Acute midline low back pain with left-sided sciatica M54.42

 

 [facility]  [id number] [place] [address] 64444-
3471     

 

 [facility]  [id number] [place] [address] 12067-
7267    Chronic pain syndrome G89.4

 

 [facility]  [id number] [place] [address] 53464-
1515    Chronic pain syndrome G89.4

 

 [facility]  [id number] [place] [address] 37721-
3805     

 

 [facility]  [address] [address]683-
8950    Gastroenteritis K52.9

 

 [facility]  [address] 99717-
9967     

 

 [facility]  [address] [address]408-
1108  15 Jerardo, 2018  Acute bronchitis, unspecified organism J20.9

 

 [facility]  [address] 38244-
0201    Anxiety disorder, unspecified F41.9 and Neuropathy G62.9

 

 [place] [address] 16517-
7099    Chronic pain syndrome G89.4

 

 [facility]  [id number] [place] [address] 97827-
3075    Bronchitis J40 and Laryngitis J04.0

 

 [place] [id number] [place] [address] 73734-
3905  29 Dec, 2017   

 

 [facility]  [id number] [place] [address] 13136-
8176  26 Dec, 2017  Bronchitis J40

 

 [facility]  [id number] [place] [address] 35304-
3502  18 Dec, 2017   

 

 [facility]  [id number] [place] [address] 95416-
1440  13 Dec, 2017  Fibromyalgia M79.7 and Chronic pain syndrome G89.4

 

 [facility]  [id number] [place] [address] 35286-
5039  04 Dec, 2017  Chronic pain syndrome G89.4 and Acute bronchitis, 
unspecified organism J20.9

 

 [facility]  [address] [address]016-
6364    Neuropathy G62.9

 

 [facility]  [address] [place] [address] 45418-
7378    Chronic pain syndrome G89.4 ; Lumbago with sciatica, left 
side M54.42 ; Lumbago with sciatica, right side M54.41 ; Screening cholesterol 
level Z13.220 ; Screening for diabetes mellitus Z13.1 ; Screening for thyroid 
disorder Z13.29 ; Fibromyalgia M79.7 ; Anxiety disorder, unspecified F41.9 and 
Neuropathy G62.9

 

 [facility]  [id number] [place] [address] 45383-
4448     

 

 [facility]  [id number] [place] [address] 51937-
8348  25 Oct, 2017   

 

 [facility]  [id number] [place] [address] 45937-
6092  10 Oct, 2017  Fibromyalgia M79.7 ; Chronic pain syndrome G89.4 ; Anxiety 
disorder, unspecified F41.9 ; Neuropathy G62.9 and Acute bronchitis, 
unspecified organism J20.9

 

 [facility]  [id number] [place] [address] 32510-
1397  09 Oct, 2017   

 

 [facility]  [id number] [place] [address] 02086-
1693  25 Sep, 2017   

 

 [facility]  [id number] [place] [address] 29657-
6536  19 Sep, 2017   

 

 [facility]  [id number] [place] [address] 68182-
1180  08 Sep, 2017   

 

 [facility]  [id number] [place] [address] 47971-
8963  23 Aug, 2017   

 

 [facility]  [id number] [place] [address] 29138-
8037  22 Aug, 2017  Acute seasonal allergic rhinitis due to pollen J30.1

 

 [facility]  [id number] [place] [address] 62515-
4918  21 Aug, 2017   

 

 [facility]  [address] [address]-
4199  09 Aug, 2017   

 

 [facility]  [address] [address]-
5617     

 

 [facility]  [address] [address]-
4655     

 

 [facility]  [id number] [place] [address]-
8495  10 Jul, 2017   

 

 [facility]  [address] [address]-
6377     

 

 [facility]  [address] [address]-
4642     

 

 [facility]  [address] [address]-
3511     

 

 [facility]  [address] [address]-
5796    Chronic pain syndrome G89.4 ; Fibromyalgia M79.7 ; Anxiety 
disorder, unspecified F41.9 ; Neuropathy G62.9 and Low back pain M54.5

 

 [facility]  [address] [address]-
2349  25 May, 2017  Low back pain M54.5

 

 [facility]  [address] [address]-
7414  24 May, 2017   

 

 [facility]  [address] [place] [address]-
2825  22 May, 2017   

 

 [facility]  [address] [place] [address]-
9706  16 May, 2017   

 

 [facility]  [address] [place] [address]-
2223  16 May, 2017   

 

 [facility]  [id number] [address]-
8988  12 May, 2017  Routine adult health maintenance Z00.00 ; Fibromyalgia 
M79.7 ; Chronic pain syndrome G89.4 ; Abnormal lung sounds R09.89 ; Coronary 
artery disease involving native coronary artery of native heart without angina 
pectoris I25.10 and S/P CABG (coronary artery bypass graft) Z95.1

 

 [facility]  [address] [facility] [id number] [place] [address]-
6006  09 May, 2017   







IMMUNIZATIONS

No Known Immunizations



SOCIAL HISTORY

Never Assessed



REASON FOR VISIT

Controlled Med Refill



PLAN OF CARE





VITAL SIGNS





MEDICATIONS







 Medication  Instructions  Dosage  Frequency  Start Date  End Date  Duration  
Status

 

 Tramadol HCl 50 mg  Orally every 6 hrs  1 tablet as needed  6h    
   28 days  Active







RESULTS

No Results



PROCEDURES

No Known procedures



INSTRUCTIONS





MEDICATIONS ADMINISTERED

No Known Medications



MEDICAL (GENERAL) HISTORY







 Type  Description  Date

 

 Medical History  fibromyalgia   

 

 Medical History  MRI 2016- small central protrusion/herniation w/minimal 
impression on thecal anteriorly at C5-6, At C3-4 small bilat. uncinate spurs w/ 
mild right neural foraminal narrowing   

 

 Medical History  MRI 2016- mild degenerative changes. No spinal canal 
stenosis or foraminal narrowing of thoracic spine   

 

 Medical History  hypertension   

 

 Medical History  Anxiety disorder   

 

 Medical History  depression   

 

 Medical History  migraine headaches   

 

 Medical History  Hx of C-Diff   

 

 Medical History  Hx of Pneumonia   

 

 Surgical History  Open Heart Surgery - [facility] -Dr. Lima  (
Cardiologist- Dr Logan)  

 

 Surgical History  hysterectomy - Dr Luis   

 

 Surgical History  Left Breast Lumpectomy (Bx - Benign)- Dr Luis   

 

 Surgical History  Port - Dr Luis   

 

 Surgical History  Left Knee Surgeries x3- Dr Carolina did 2 and Dr Gan did 1   

 

 Hospitalization History  C-Diff - [facility]   

 

 Hospitalization History  Pneumonia - [facility]   

 

 Hospitalization History  Open Heart Surgery - [facility]

## 2019-01-07 NOTE — XMS REPORT
[facility]

 Created on: 10/02/2018



Dayami Rae

External Reference #: [id number]

: 1970

Sex: Female



Demographics







 Address  [address] [place]  [address]

 

 Preferred Language  Unknown

 

 Marital Status  Unknown

 

 Faith Affiliation  Unknown

 

 Race  Unknown

 

 Ethnic Group  Unknown





Author







 Author  JEF  KIM

 

 [facility]

 

 Address  [address] [place]  [address]



 

 Phone  [phone]







Care Team Providers







 Care Team Member Name  Role  Phone

 

 FUCHSKIM JACKSON  Unavailable  [phone]







PROBLEMS







 Type  Condition  ICD9-CM Code  GDI54-TM Code  Onset Dates  Condition Status  
SNOMED Code

 

 Problem  Chronic pain syndrome     G89.4     Active  527550781

 

 Problem  Neuropathy     G62.9     Active  798148056

 

 Problem  Coronary artery disease involving native coronary artery of native 
heart without angina pectoris     I25.10     Active  [id number]

 

 Problem  Fibromyalgia     M79.7     Active  695451616

 

 Problem  Chronic obstructive pulmonary disease, unspecified COPD type     
J44.9     Active  10619116

 

 Problem  Essential hypertension     I10     Active  87601499

 

 Problem  Lumbago with sciatica, left side     M54.42     Active  813645026

 

 Problem  Anxiety disorder, unspecified     F41.9     Active  291758736

 

 Problem  GERD with esophagitis     K21.0     Active  480371865

 

 Problem  Lumbago with sciatica, right side     M54.41     Active  
[id number]







ALLERGIES







 Substance  Reaction  Event Type  Date  Status

 

 Morphine Sulfate  Rash  Drug Allergy  14 Sep, 2018  Active







ENCOUNTERS







 Encounter  Location  Date  Diagnosis

 

 [facility]  [address] [place] [phone]-
8740  27 Sep, 2018  Fibromyalgia M79.7

 

 [facility]  [id number] [place] [address] [phone]-
5265  20 Sep, 2018   

 

 [facility]  [address] [place] [address] 04505-
3311  19 Sep, 2018   

 

 [facility]  [id number] [place] [address] 32319-
5288  17 Sep, 2018  Pneumonia of right lower lobe due to infectious organism 
J18.1 and Chronic obstructive pulmonary disease, unspecified COPD type J44.9

 

 [facility]  [id number] [place] [address] 29215-
3341  14 Sep, 2018  Pneumonia of right lower lobe due to infectious organism 
J18.1 ; Chronic obstructive pulmonary disease, unspecified COPD type J44.9 ; 
Chronic nausea R11.0 and Cough R05

 

 [place] [id number] [place] [address] 53441-
1524  07 Sep, 2018  Acute bronchitis, unspecified organism J20.9

 

 [place] [address] [address]-
9063  28 Aug, 2018  Fibromyalgia M79.7

 

 [place] [address] [address]-
8400  20 Aug, 2018   

 

 [place] [address] [address]-
3094  16 Aug, 2018  Neuropathy G62.9

 

 [place] [address] [address]-
8297    Essential hypertension I10 ; Coronary artery disease 
involving native coronary artery of native heart without angina pectoris I25.10 
; Fibromyalgia M79.7 ; GERD with esophagitis K21.0 and Tobacco abuse counseling 
Z71.6

 

 [place] [address] [address]-
2999    Long term (current) use of opiate analgesic Z79.891

 

 [place] [address] [address]-
5241     

 

 [place] [address] [address]-
9415    Acute bronchitis, unspecified organism J20.9

 

 [place] [id number] [place] [address] 83978-
1519     

 

 [place] [address] [address]-
4457     

 

 [place] [address] [address]-
6317    Chronic pain syndrome G89.4

 

 [place] [id number] [place] [address] 10995-
1888     

 

 [facility]  [id number] [place] [address] 54554-
7087  15 Marko, 2018   

 

 [facility]  [id number] [place] [address] 63526-
9592  08 2018  Acute bronchitis, unspecified organism J20.9

 

 [facility]  [id number] [place] [address] 87575-
6310    Chronic pain syndrome G89.4

 

 [facility]  [id number] [place] [address] 94585-
8982  25 May, 2018   

 

 [facility]  [id number] [place] [address] 84675-
8873  24 May, 2018  Acute midline low back pain with left-sided sciatica M54.42

 

 [facility]  [id number] [place] [address] 35879-
3423  22 May, 2018   

 

 [facility]  [id number] [place] [address] [phone]
-[phone]  21 May, 2018  Bronchitis J40

 

 [facility]  [id number] [place] [address] 07564-
3837  07 May, 2018  Chronic pain syndrome G89.4 and Neuropathy G62.9

 

 [facility]  [id number] [place] [address] 83438-
3133    Acute midline low back pain with left-sided sciatica M54.42

 

 [facility]  [id number] [place] [address] 85009-
2671     

 

 [facility]  [id number] [place] [address] 97011-
9875    Anxiety disorder, unspecified F41.9

 

 [facility]  [id number] [place] [address] 44136-
4953     

 

 [facility]  [id number] [place] [address] 59382-
3876    Chronic pain syndrome G89.4 and Acute midline low back pain 
with left-sided sciatica M54.42

 

 [place] [address] 79043-
6945    Chronic pain syndrome G89.4

 

 [facility]  [address] 7[address]46-
8614     

 

 [facility]  [address] [address]77-
6761     

 

 [facility]  [address] 0[address]28-
4981  29 Mar, 2018  Injury of left knee, initial encounter S89.92XA and COPD 
suggested by initial evaluation J44.9

 

 [place] [address] 5[address]24-
8926  28 Mar, 2018  Acute bronchitis, unspecified organism J20.9

 

 [place] [address] 6[address]34-
1991  27 Mar, 2018  Acute bronchitis, unspecified organism J20.9

 

 [place] [address] 14301-
9115  21 Mar, 2018  Anxiety disorder, unspecified F41.9 and Acute bronchitis, 
unspecified organism J20.9

 

 [place] [id number] [place] [address] 54163-
2784  08 Mar, 2018  Chronic pain syndrome G89.4

 

 [facility]  [id number] [place] [address] 78091-
5577  05 Mar, 2018   

 

 [facility]  [address] 35186-
1025  05 Mar, 2018  Acute midline low back pain with left-sided sciatica M54.42

 

 [facility]  [id number] [place] [address] 72980-
6893     

 

 [facility]  [address] [place] [address]-
8649    Chronic pain syndrome G89.4

 

 [facility]  [address] 74907-
5891    Chronic pain syndrome G89.4

 

 [facility]  [address] [address]-
1990     

 

 [facility]  [address] 57241-
5430    Gastroenteritis K52.9

 

 [facility]  [address] 80171-
6864     

 

 [facility]  [address] 79506-
1433  15 Jerardo, 2018  Acute bronchitis, unspecified organism J20.9

 

 [place] [address] [address]05-
4534    Anxiety disorder, unspecified F41.9 and Neuropathy G62.9

 

 [facility]  [address] [address]458-
1522    Chronic pain syndrome G89.4

 

 [facility]  [address] 10423-
0195    Bronchitis J40 and Laryngitis J04.0

 

 [facility]  [id number] [place] [address] 54593-
6246  29 Dec, 2017   

 

 [facility]  [id number] [place] [address] 75868-
4043  26 Dec, 2017  Bronchitis J40

 

 [facility]  [id number] [place] [address] 82301-
0556  18 Dec, 2017   

 

 [facility]  [address] 0[address]19-
1031  13 Dec, 2017  Fibromyalgia M79.7 and Chronic pain syndrome G89.4

 

 [facility]  [address] 6[address]67-
1435  04 Dec, 2017  Chronic pain syndrome G89.4 and Acute bronchitis, 
unspecified organism J20.9

 

 [facility]  [id number] [place] [address] 21539-
2288    Neuropathy G62.9

 

 [facility]  [id number] [place] [address] 53158-
0721    Chronic pain syndrome G89.4 ; Lumbago with sciatica, left 
side M54.42 ; Lumbago with sciatica, right side M54.41 ; Screening cholesterol 
level Z13.220 ; Screening for diabetes mellitus Z13.1 ; Screening for thyroid 
disorder Z13.29 ; Fibromyalgia M79.7 ; Anxiety disorder, unspecified F41.9 and 
Neuropathy G62.9

 

 [facility]  [id number] [place] [address] 62168-
2126     

 

 [facility]  [id number] [place] [address] 12755-
2618  25 Oct, 2017   

 

 [facility]  [id number] [place] [address] 89924-
1818  10 Oct, 2017  Fibromyalgia M79.7 ; Chronic pain syndrome G89.4 ; Anxiety 
disorder, unspecified F41.9 ; Neuropathy G62.9 and Acute bronchitis, 
unspecified organism J20.9

 

 [facility]  [id number] [place] [address] 46992-
2130  09 Oct, 2017   

 

 [facility]  [id number] [place] [address] 80248-
2727  25 Sep, 2017   

 

 [facility]  [id number] [place] [address] 01334-
1093  19 Sep, 2017   

 

 [facility]  [id number] [place] [address] 00444-
2678  08 Sep, 2017   

 

 [facility]  [id number] [place] [address] 10175-
8286  23 Aug, 2017   

 

 [facility]  [id number] [place] [address] 09413-
6200  22 Aug, 2017  Acute seasonal allergic rhinitis due to pollen J30.1

 

 [facility]  [address] [place] [address]-
2356  21 Aug, 2017   

 

 [facility]  [address] [place] [address]-
8038  09 Aug, 2017   

 

 [facility]  [address] [place] [address] 91201-
1369     

 

 [facility]  [id number] [place] [address] 11369-
5828     

 

 [facility]  [id number] [place] [address] 86244-
9395  10 Jul, 2017   

 

 [facility]  [id number] [place] [address] 18176-
4310     

 

 [facility]  [id number] [place] [address] 23894-
0504     

 

 [facility]  [address] [place] [address] 18289-
0010     

 

 [facility]  [id number] [place] [address] 04821-
2957    Chronic pain syndrome G89.4 ; Fibromyalgia M79.7 ; Anxiety 
disorder, unspecified F41.9 ; Neuropathy G62.9 and Low back pain M54.5

 

 [facility]  [address] [place] [address]-
3228  25 May, 2017  Low back pain M54.5

 

 [facility]  [address] [address]-
9973  24 May, 2017   

 

 [facility]  [id number] [place] [address]-
2039  22 May, 2017   

 

 [facility]  [address] 073933-
8553  16 May, 2017   

 

 [facility]  [id number] [place] [address]-
2546  16 May, 2017   

 

 [facility]  [address] [facility] [id number] [address]-
2546  12 May, 2017  Routine adult health maintenance Z00.00 ; Fibromyalgia 
M79.7 ; Chronic pain syndrome G89.4 ; Abnormal lung sounds R09.89 ; Coronary 
artery disease involving native coronary artery of native heart without angina 
pectoris I25.10 and S/P CABG (coronary artery bypass graft) Z95.1

 

 [facility]  [address] [facility] [id number] [address]-
4536  09 May, 2017   







IMMUNIZATIONS

No Known Immunizations



SOCIAL HISTORY

Never Assessed



REASON FOR VISIT

Congestion/Fever/Cough x 2 weeks. Productive cough, yellow in color. Wednesday 
evening, pt states she felt like she had a fever. Will get hot and then cold. 
Pt was seen last Friday and was rx antibiotic and it is not working.-awoods



PLAN OF CARE







 Activity  Details









  









 Follow Up  2 - 3 Days, 3 Months Reason:CHM/Pain w/ Yvonne







VITAL SIGNS







 Height  5'2" in  2018

 

 Weight  125.7 lbs  2018

 

 Temperature  98.6 degrees Fahrenheit  2018

 

 Heart Rate  90 bpm  2018

 

 Respiratory Rate  18   2018

 

 BMI  22.99 kg/m2  2018

 

 Blood pressure systolic  130 mmHg  2018

 

 Blood pressure diastolic  74 mmHg  2018







MEDICATIONS







 Medication  Instructions  Dosage  Frequency  Start Date  End Date  Duration  
Status

 

 Cheratussin -10 MG/5ML  Orally every 4 hrs  5 ml  4h  14 Sep, 2018      
  Active

 

 Aspir-81 81 MG  Orally Once a day  1 tablet  24h           Active

 

 Xanax 0.25 MG  Orally Twice a day  1 tablet  12h        28 days  Active

 

 Metoprolol Succinate ER 25 MG  Orally Once a day  1 tablet  24h        90 days
  Active

 

 Gabapentin 300 MG  Orally Once a day  1 capsule  24h  09 May, 2017     30 days
  Active

 

 Omeprazole 20 mg  Orally Once a day  1 capsule  24h        90 days  Active

 

 Sumatriptan Succinate 6 MG/0.5ML  Subcutaneous Once a day prn migraine  0.5 ml 
as needed           14 days  Active

 

 Promethazine HCl 25 MG  Orally every 12 hrs  1 tablet as needed  12h        30 
day(s)  Active

 

 Levaquin 750 MG  Orally Once a day  1 tablet  24h  14 Sep, 2018  24 Sep, 2018  
10 day(s)  Active

 

 Clopidogrel Bisulfate 75 MG  Orally Once a day  1 tablet  24h        90 days  
Active

 

 Seroquel 200 mg  Orally Once a day  2.5 tablet at bedtime  24h        30  
Active

 

 Tramadol HCl 50 mg  Orally every 6 hrs  1 tablet as needed  6h  07 2017  
   28 days  Active

 

 ProAir  (90 Base) MCG/ACT  Inhalation every 4 hrs  2 puffs as needed  
4h  10 Oct, 2017     12 months  Active

 

 Chantix 1 MG     1/2 tablet daily x3 days, 1/2 tablet twice a day x3 days, 
then 1 tablet twice daily thereafter             Active

 

 Enalapril Maleate 5 mg  Orally Once a day  1 tablet  24h       90 
days  Active

 

 Lipitor 40 MG  Orally HS  1 tablet              Active

 

 Chlorzoxazone 500 mg  Orally Three times a day if needed  1 tablet           
30  Active

 

 Tylenol     1 tab              Active

 

 Symbicort 80-4.5 MCG/ACT  Inhalation Twice a day  2 puffs  12h  29 Mar, 2018  
   12 months  Active







RESULTS







 Name  Result  Date  Reference Range

 

 Xray : Chest 2 View (IN HOUSE)     2018   







PROCEDURES







 Procedure  Date Ordered  Result  Body Site

 

 X-RAY EXAM CHEST 2 VIEWS  2018      







INSTRUCTIONS





MEDICATIONS ADMINISTERED

No Known Medications



MEDICAL (GENERAL) HISTORY







 Type  Description  Date

 

 Medical History  fibromyalgia   

 

 Medical History  MRI 2016- small central protrusion/herniation w/minimal 
impression on thecal anteriorly at C5-6, At C3-4 small bilat. uncinate spurs w/ 
mild right neural foraminal narrowing   

 

 Medical History  MRI 2016- mild degenerative changes. No spinal canal 
stenosis or foraminal narrowing of thoracic spine   

 

 Medical History  hypertension   

 

 Medical History  Anxiety disorder   

 

 Medical History  depression   

 

 Medical History  migraine headaches   

 

 Medical History  Hx of C-Diff   

 

 Medical History  Hx of Pneumonia   

 

 Medical History  heart cath w/ stents placed 7/3/18   

 

 Surgical History  Open Heart Surgery - [facility] -Dr. Lima  (
Cardiologist- Dr Logan)  

 

 Surgical History  hysterectomy - Dr Luis   

 

 Surgical History  Left Breast Lumpectomy (Bx - Benign)- Dr Luis   

 

 Surgical History  Port - Dr Luis   

 

 Surgical History  Left Knee Surgeries x3- Dr Carolina did 2 and Dr Gan did 1   

 

 Surgical History  cath/stent  

 

 Hospitalization History  C-Diff - [facility]   

 

 Hospitalization History  Pneumonia - [facility]   

 

 Hospitalization History  Open Heart Surgery - [facility]

## 2019-01-07 NOTE — XMS REPORT
[facility]

 Created on: 2018



Dayami Rae

External Reference #: [id number]

: 1970

Sex: Female



Demographics







 Address  [address] [place]  [address]

 

 Preferred Language  Unknown

 

 Marital Status  Unknown

 

 Moravian Affiliation  Unknown

 

 Race  Unknown

 

 Ethnic Group  Unknown





Author







 Author  SURENDRA FRASER

 

 [facility]

 

 Address  [address] [place]  [address]



 

 Phone  [phone]







Care Team Providers







 Care Team Member Name  Role  Phone

 

 SURENDRA FRASER  Unavailable  [phone]







PROBLEMS







 Type  Condition  ICD9-CM Code  WGE27-BZ Code  Onset Dates  Condition Status  
SNOMED Code

 

 Problem  Fibromyalgia     M79.7     Active  586829751

 

 Problem  Neuropathy     G62.9     Active  119949975

 

 Problem  Coronary artery disease involving native coronary artery of native 
heart without angina pectoris     I25.10     Active  1497580328822

 

 Problem  Chronic pain syndrome     G89.4     Active  979053487

 

 Problem  COPD suggested by initial evaluation     J44.9     Active  65291412

 

 Problem  Acute midline low back pain with left-sided sciatica     M54.42     
Active  009856214

 

 Problem  Lumbago with sciatica, left side     M54.42     Active  245041931

 

 Problem  Anxiety disorder, unspecified     F41.9     Active  751479437

 

 Problem  Other chronic pain     G89.29     Active  08683062

 

 Problem  Lumbago with sciatica, right side     M54.41     Active  
[id number]







ALLERGIES







 Substance  Reaction  Event Type  Date  Status

 

 Morphine Sulfate  Rash  Drug Allergy    Active







ENCOUNTERS







 Encounter  Location  Date  Diagnosis

 

 [facility]  [address], [place] [address]-
0991     

 

 [facility]  [id number] [place] [phone]-
[phone]     

 

 [facility]  [id number] [place] [address] [phone]-
4484  15 Marko, 2018   

 

 [facility]  [id number] [place] [address] [phone]-
[phone]    Acute bronchitis, unspecified organism J20.9

 

 [facility]  [address], [place] [address]-
9374    Chronic pain syndrome G89.4

 

 [facility]  [id number] [place] [address] 57066-
5932  25 May, 2018   

 

 [facility]  [id number] [place] [address] 26892-
3513  24 May, 2018  Acute midline low back pain with left-sided sciatica M54.42

 

 [facility]  [id number] [place] [address] 10616-
0744  22 May, 2018   

 

 [facility]  [id number] [place] [address] [phone]
-[phone]  21 May, 2018  Bronchitis J40

 

 [facility]  [address] [address]-
[phone]  07 May, 2018  Chronic pain syndrome G89.4 and Neuropathy G62.9

 

 [facility]  [id number] [place] [address] 98908-
7148    Acute midline low back pain with left-sided sciatica M54.42

 

 [facility]  [id number] [place] [address] 37281-
5046     

 

 [facility]  [id number] [place] [address] 30375-
5040    Anxiety disorder, unspecified F41.9

 

 [facility]  [id number] [place] [address] 69868-
7454     

 

 [facility]  [id number] [place] [address] 66177-
4667    Chronic pain syndrome G89.4 and Acute midline low back pain 
with left-sided sciatica M54.42

 

 [facility]  [id number] [place] [address] 09861-
9200    Chronic pain syndrome G89.4

 

 [facility]  [id number] [place] [address] 90490-
7209     

 

 [facility]  [id number] [place] [address] 83844-
2258     

 

 [facility]  [id number] [place] [address] 15544-
3413  29 Mar, 2018  Injury of left knee, initial encounter S89.92XA and COPD 
suggested by initial evaluation J44.9

 

 [facility]  [id number] [place] [address] 25406-
4458  28 Mar, 2018  Acute bronchitis, unspecified organism J20.9

 

 [facility]  [address] [address]-
6843  27 Mar, 2018  Acute bronchitis, unspecified organism J20.9

 

 [facility]  [id number] [place] [address] 35453-
5180  21 Mar, 2018  Anxiety disorder, unspecified F41.9 and Acute bronchitis, 
unspecified organism J20.9

 

 [place] [id number] [place] [address] 69586-
7135  08 Mar, 2018  Chronic pain syndrome G89.4

 

 [facility]  [id number] [place] [address] 42712-
8410  05 Mar, 2018   

 

 [facility]  [id number] [place] [address] 53167-
8413  05 Mar, 2018  Acute midline low back pain with left-sided sciatica M54.42

 

 [place] [id number] [place] [address] 21407-
8065     

 

 [facility]  [id number] [place] [address] 64747-
8720    Chronic pain syndrome G89.4

 

 [facility]  [id number] [place] [address] 67705-
3528    Chronic pain syndrome G89.4

 

 [facility]  [id number] [place] [address] 98066-
4961     

 

 [facility]  [id number] [place] [address] 66314-
8365    Gastroenteritis K52.9

 

 [facility]  [id number] [place] [address] 69549-
5965     

 

 [place] [address] [address]-
4514  15 Jerardo, 2018  Acute bronchitis, unspecified organism J20.9

 

 [place] [id number] [place] [address] 54580-
8529    Anxiety disorder, unspecified F41.9 and Neuropathy G62.9

 

 [place] [address] [address]-
0436    Chronic pain syndrome G89.4

 

 [place] [address] [address]-
4979    Bronchitis J40 and Laryngitis J04.0

 

 [place] [address] [address]-
8974  29 Dec, 2017   

 

 [place] [address] [address]-
1336  26 Dec, 2017  Bronchitis J40

 

 [place] [id number] [place] [address] 98348-
8330  18 Dec, 2017   

 

 [place] [address] [address]-
4332  13 Dec, 2017  Fibromyalgia M79.7 and Chronic pain syndrome G89.4

 

 [place] [id number] [place] [address] 83369-
0979  04 Dec, 2017  Chronic pain syndrome G89.4 and Acute bronchitis, 
unspecified organism J20.9

 

 [place] [id number] [place] [address] 52857-
3202    Neuropathy G62.9

 

 [place] [id number] [place] [address] 98208-
5441    Chronic pain syndrome G89.4 ; Lumbago with sciatica, left 
side M54.42 ; Lumbago with sciatica, right side M54.41 ; Screening cholesterol 
level Z13.220 ; Screening for diabetes mellitus Z13.1 ; Screening for thyroid 
disorder Z13.29 ; Fibromyalgia M79.7 ; Anxiety disorder, unspecified F41.9 and 
Neuropathy G62.9

 

 [facility]  [id number] [place] [address] 42046-
9406     

 

 [facility]  [id number] [place] [address] 42219-
7087  25 Oct, 2017   

 

 [facility]  [id number] [place] [address] 27159-
0862  10 Oct, 2017  Fibromyalgia M79.7 ; Chronic pain syndrome G89.4 ; Anxiety 
disorder, unspecified F41.9 ; Neuropathy G62.9 and Acute bronchitis, 
unspecified organism J20.9

 

 [facility]  [id number] [place] [address] 33087-
7703  09 Oct, 2017   

 

 [facility]  [id number] [place] [address] 59628-
0983  25 Sep, 2017   

 

 [facility]  [id number] [place] [address] 42231-
1587  19 Sep, 2017   

 

 [facility]  [id number] [place] [address] 88143-
7341  08 Sep, 2017   

 

 [facility]  [id number] [place] [address] 62765-
1819  23 Aug, 2017   

 

 [facility]  [id number] [place] [address] 55780-
5472  22 Aug, 2017  Acute seasonal allergic rhinitis due to pollen J30.1

 

 [facility]  [id number] [place] [address] 66478-
5120  21 Aug, 2017   

 

 [facility]  [id number] [place] [address] 64327-
2082  09 Aug, 2017   

 

 [facility]  [id number] [place] [address] 35536-
6236     

 

 [facility]  [id number] [place] [address] 78390-
2690     

 

 [facility]  [address], [place] [address]-
4974  10 Jul, 2017   

 

 [facility]  [id number] [address]-
9122     

 

 [facility]  [address], [place] [address]-
8200     

 

 [facility]  [id number] [place] [address] 03877-
8440     

 

 [facility]  [id number] [place] [address] 21247-
2164    Chronic pain syndrome G89.4 ; Fibromyalgia M79.7 ; Anxiety 
disorder, unspecified F41.9 ; Neuropathy G62.9 and Low back pain M54.5

 

 [facility]  [address], [place] [address]-
7511  25 May, 2017  Low back pain M54.5

 

 [facility]  [id number] [place] [address] 76916-
2512  24 May, 2017   

 

 [facility]  [id number] [place] [address] 56147-
1071  22 May, 2017   

 

 [facility]  [address], [place] [address]-
7301  16 May, 2017   

 

 [facility]  [address], [place] [address]-
6490  16 May, 2017   

 

 [facility]  [address], [place] [address]-
0668  12 May, 2017  Routine adult health maintenance Z00.00 ; Fibromyalgia 
M79.7 ; Chronic pain syndrome G89.4 ; Abnormal lung sounds R09.89 ; Coronary 
artery disease involving native coronary artery of native heart without angina 
pectoris I25.10 and S/P CABG (coronary artery bypass graft) Z95.1

 

 [facility]  [address], [place] [address]-
8740  09 May, 2017   







IMMUNIZATIONS

No Known Immunizations



SOCIAL HISTORY

Never Assessed



REASON FOR VISIT

Diarrhea/vomiting since , Fever of 103, lower back pain, --ADaviedRN



PLAN OF CARE







 Activity  Details









  









 Follow Up  Reg appt Reason:







VITAL SIGNS







 Height  5'2" in  2018

 

 Weight  121.4 lbs  2018

 

 Temperature  98.2 degrees Fahrenheit  2018

 

 Heart Rate  96 bpm  2018

 

 Respiratory Rate  18   2018

 

 BMI  22.20 kg/m2  2018

 

 Blood pressure systolic  112 mmHg  2018

 

 Blood pressure diastolic  72 mmHg  2018







MEDICATIONS







 Medication  Instructions  Dosage  Frequency  Start Date  End Date  Duration  
Status

 

 Sumatriptan Succinate 6 MG/0.5ML  Subcutaneous Once a day prn migraine  0.5 ml 
as needed              Active

 

 Aspir-81 81 MG  Orally Once a day  1 tablet  24h           Active

 

 Promethazine HCl 25 MG  Orally 4 times a day prn  1 tablet as needed           
30 days  Active

 

 Tramadol HCl 50 mg  Orally every 6 hrs  1 tablet as needed  6h    
   28 days  Active

 

 Amitriptyline HCl 25 MG  Orally Once a day  1 tablet  24h  10 Oct, 2017     30 
days  Active

 

 Seroquel 200 mg  Orally Once a day  2.5 tablet at bedtime  24h        30 days  
Active

 

 ProAir  (90 Base) MCG/ACT  Inhalation every 4 hrs  2 puffs as needed  
4h  10 Oct, 2017     12 months  Active

 

 Tylenol     1 tab              Active

 

 Promethazine-Codeine 6.25-10 MG/5ML  Orally every 4 hrs  1-2 tsp as needed  4h
  26 Dec, 2017        Active

 

 Gabapentin 300 MG  Orally Once a day  1 capsule  24h  09 May, 2017     90 days
  Active

 

 Xanax 0.25 MG  Orally Twice a day  1 tablet  12h        28 days  Active

 

 Chlorzoxazone 500 mg  Orally Three times a day  1 tablet  8h       
30 days  Active







RESULTS

No Results



PROCEDURES

No Known procedures



INSTRUCTIONS





MEDICATIONS ADMINISTERED

No Known Medications



MEDICAL (GENERAL) HISTORY







 Type  Description  Date

 

 Medical History  fibromyalgia   

 

 Medical History  MRI 2016- small central protrusion/herniation w/minimal 
impression on thecal anteriorly at C5-6, At C3-4 small bilat. uncinate spurs w/ 
mild right neural foraminal narrowing   

 

 Medical History  MRI 2016- mild degenerative changes. No spinal canal 
stenosis or foraminal narrowing of thoracic spine   

 

 Medical History  hypertension   

 

 Medical History  Anxiety disorder   

 

 Medical History  depression   

 

 Medical History  migraine headaches   

 

 Medical History  Hx of C-Diff   

 

 Medical History  Hx of Pneumonia   

 

 Surgical History  Open Heart Surgery - [facility] -Dr. Lima  (
Cardiologist- Dr Logan)  

 

 Surgical History  hysterectomy - Dr Luis   

 

 Surgical History  Left Breast Lumpectomy (Bx - Benign)- Dr Luis   

 

 Surgical History  Port - Dr Luis   

 

 Surgical History  Left Knee Surgeries x3- Dr Carolina did 2 and Dr Gan did 1   

 

 Hospitalization History  C-Diff - [facility]   

 

 Hospitalization History  Pneumonia - [facility]   

 

 Hospitalization History  Open Heart Surgery - [facility]

## 2019-01-07 NOTE — XMS REPORT
[facility]

 Created on: 10/04/2018



Dayami Rae

External Reference #: [id number]

: 1970

Sex: Female



Demographics







 Address  [address] [place]  [address]

 

 Preferred Language  Unknown

 

 Marital Status  Unknown

 

 Lutheran Affiliation  Unknown

 

 Race  Unknown

 

 Ethnic Group  Unknown





Author







 Author  KIM Whiting

 

 [facility]

 

 Address  [address] [place]  [address]



 

 Phone  [phone]







Care Team Providers







 Care Team Member Name  Role  Phone

 

 KIM Whiting  Unavailable  [phone]







PROBLEMS





ALLERGIES

No Information



ENCOUNTERS





IMMUNIZATIONS

No Known Immunizations



SOCIAL HISTORY

No smoking Hx information available



REASON FOR VISIT





PLAN OF CARE





VITAL SIGNS





MEDICATIONS

Unknown Medications



RESULTS

No Results



PROCEDURES

No Known procedures



INSTRUCTIONS





MEDICATIONS ADMINISTERED

No Known Medications



MEDICAL (GENERAL) HISTORY

## 2019-01-07 NOTE — XMS REPORT
[facility]

 Created on: 2018



Dayami Rae

External Reference #: [id number]

: 1970

Sex: Female



Demographics







 Address  [address] [place]  [address]

 

 Preferred Language  Unknown

 

 Marital Status  Unknown

 

 Church Affiliation  Unknown

 

 Race  Unknown

 

 Ethnic Group  Unknown





Author







 Author  KIM FUCHS

 

 Organization  [facility]

 

 Address  [address] [place]  [address]



 

 Phone  [phone]







Care Team Providers







 Care Team Member Name  Role  Phone

 

 FUCHSJAMA JACKSONELE  Unavailable  [phone]







PROBLEMS







 Type  Condition  ICD9-CM Code  FOH41-OC Code  Onset Dates  Condition Status  
SNOMED Code

 

 Problem  Neuropathy     G62.9     Active  521788468

 

 Problem  Lumbago with sciatica, left side     M54.42     Active  145375488

 

 Problem  Anxiety disorder, unspecified     F41.9     Active  394305704

 

 Problem  Chronic pain syndrome     G89.4     Active  114006393

 

 Problem  Fibromyalgia     M79.7     Active  510725758

 

 Problem  Coronary artery disease involving native coronary artery of native 
heart without angina pectoris     I25.10     Active  [id number]

 

 Problem  Essential hypertension     I10     Active  50937575

 

 Problem  GERD with esophagitis     K21.0     Active  941643813

 

 Problem  Other chronic pain     G89.29     Active  14951113

 

 Problem  Lumbago with sciatica, right side     M54.41     Active  
[id number]

 

 Problem  COPD suggested by initial evaluation     J44.9     Active  99762001

 

 Problem  Acute midline low back pain with left-sided sciatica     M54.42     
Active  495299296







ALLERGIES

No Information



ENCOUNTERS







 Encounter  Location  Date  Diagnosis

 

 [facility]  [address] [place] [address] [phone]-
[phone]  20 Aug, 2018   

 

 [facility]  [address] [place] [address] 96064-
2885  16 Aug, 2018  Neuropathy G62.9

 

 [facility]  [address] [place] [address] 01734-
7215    Essential hypertension I10 ; Coronary artery disease 
involving native coronary artery of native heart without angina pectoris I25.10 
; Fibromyalgia M79.7 ; GERD with esophagitis K21.0 and Tobacco abuse counseling 
Z71.6

 

 [facility]  [id number] [place] [address] 86704-
3379    Long term (current) use of opiate analgesic Z79.891

 

 [facility]  [id number] [place] [address] 02682-
5169     

 

 [facility]  [id number] [place] [address] 17902-
4066    Acute bronchitis, unspecified organism J20.9

 

 [facility]  [id number] [place] [address] 10514-
1786     

 

 [facility]  [id number] [place] [address] 04263-
4986     

 

 [facility]  [id number] [place] [address] 01892-
2784    Chronic pain syndrome G89.4

 

 [facility]  [id number] [place] [address] 84903-
1648     

 

 [facility]  [id number] [place] [address] 45568-
4199  15 Marko, 2018   

 

 [facility]  [id number] [place] [address] 70714-
2162    Acute bronchitis, unspecified organism J20.9

 

 [facility]  [id number] [place] [address] 78757-
5822    Chronic pain syndrome G89.4

 

 [facility]  [id number] [place] [address] 73936-
9347  25 May, 2018   

 

 [facility]  [id number] [place] [address] 61431-
6261  24 May, 2018  Acute midline low back pain with left-sided sciatica M54.42

 

 [facility]  [id number] [place] [address] 32598-
9279  22 May, 2018   

 

 [facility]  [address] [place] [address] [phone]
[phone]  21 May, 2018  Bronchitis J40

 

 [facility]  [id number] [place] [address] 93306-
5180  07 May, 2018  Chronic pain syndrome G89.4 and Neuropathy G62.9

 

 [facility]  [address] [place] [address] 78276-
0506    Acute midline low back pain with left-sided sciatica M54.42

 

 [facility]  [address] 96280-
9605     

 

 [facility]  [address] 64211-
3443    Anxiety disorder, unspecified F41.9

 

 [facility]  [address] 79237-
9445     

 

 [place] [address] [address]32-
5254    Chronic pain syndrome G89.4 and Acute midline low back pain 
with left-sided sciatica M54.42

 

 [facility]  [id number] [place] [address] 56225-
5711    Chronic pain syndrome G89.4

 

 [facility]  [id number] [place] [address] 17200-
2775     

 

 [facility]  [address] [place] [address] 99918-
4367     

 

 [facility]  [id number] [place] [address] 05354-
4312  29 Mar, 2018  Injury of left knee, initial encounter S89.92XA and COPD 
suggested by initial evaluation J44.9

 

 [place] [address] 83424-
3660  28 Mar, 2018  Acute bronchitis, unspecified organism J20.9

 

 [facility]  [id number] [place] [address] 39461-
5278  27 Mar, 2018  Acute bronchitis, unspecified organism J20.9

 

 [place] [address] [place] [address]-
0241  21 Mar, 2018  Anxiety disorder, unspecified F41.9 and Acute bronchitis, 
unspecified organism J20.9

 

 [facility]  [id number] [place] [address] 42687-
4729  08 Mar, 2018  Chronic pain syndrome G89.4

 

 [facility]  [address] 04632-
5059  05 Mar, 2018   

 

 [facility]  [address] 32987-
4369  05 Mar, 2018  Acute midline low back pain with left-sided sciatica M54.42

 

 [facility]  [address] [address]103-
4094     

 

 [facility]  [address] 34270-
2809    Chronic pain syndrome G89.4

 

 [facility]  [address] 55286-
3848    Chronic pain syndrome G89.4

 

 [facility]  [address] 94770-
3930     

 

 [facility]  [id number] [place] [address] 14638-
6875    Gastroenteritis K52.9

 

 [facility]  [address] 43336-
3751     

 

 [facility]  [id number] [place] [address] 02088-
7778  15 Jerardo, 2018  Acute bronchitis, unspecified organism J20.9

 

 [facility]  [address] [address]-
3239    Anxiety disorder, unspecified F41.9 and Neuropathy G62.9

 

 [facility]  [id number] [place] [address] 33631-
3949    Chronic pain syndrome G89.4

 

 [place] [address] [place] [address] 44280-
1330    Bronchitis J40 and Laryngitis J04.0

 

 [place] [id number] [place] [address] 24847-
2740  29 Dec, 2017   

 

 [place] [address] [place] [address] 61540-
6825  26 Dec, 2017  Bronchitis J40

 

 [place] [address] [place] [address] 37797-
1320  18 Dec, 2017   

 

 [place] [address] [place] [address] 22423-
0118  13 Dec, 2017  Fibromyalgia M79.7 and Chronic pain syndrome G89.4

 

 [place] [address] [place] [address] 10557-
6273  04 Dec, 2017  Chronic pain syndrome G89.4 and Acute bronchitis, 
unspecified organism J20.9

 

 [place] [id number] [place] [address] 07693-
2551    Neuropathy G62.9

 

 [place] [address] [place] [address] 61046-
5895    Chronic pain syndrome G89.4 ; Lumbago with sciatica, left 
side M54.42 ; Lumbago with sciatica, right side M54.41 ; Screening cholesterol 
level Z13.220 ; Screening for diabetes mellitus Z13.1 ; Screening for thyroid 
disorder Z13.29 ; Fibromyalgia M79.7 ; Anxiety disorder, unspecified F41.9 and 
Neuropathy G62.9

 

 [place] [address] [address]-
9465     

 

 [place] [address] [place] [address] 44991-
6250  25 Oct, 2017   

 

 [place] [address] [place] [address] 76666-
9919  10 Oct, 2017  Fibromyalgia M79.7 ; Chronic pain syndrome G89.4 ; Anxiety 
disorder, unspecified F41.9 ; Neuropathy G62.9 and Acute bronchitis, 
unspecified organism J20.9

 

 [facility]  [id number] [place] [address] 36173-
6810  09 Oct, 2017   

 

 [facility]  [address] [place] [address] 85176-
7371  25 Sep, 2017   

 

 [facility]  [address] [place] [address] 21380-
0545  19 Sep, 2017   

 

 [facility]  [address] [place] [address] 93890-
0046  08 Sep, 2017   

 

 [facility]  [address] [place] [address] 15681-
9673  23 Aug, 2017   

 

 [facility]  [address] [place] [address] 41946-
0009  22 Aug, 2017  Acute seasonal allergic rhinitis due to pollen J30.1

 

 [facility]  [address] [place] [address] 81760-
1527  21 Aug, 2017   

 

 [facility]  [address] [place] [address] 95979-
1724  09 Aug, 2017   

 

 [facility]  [address] [place] [address] 27658-
5306     

 

 [facility]  [address] [address]-
7158     

 

 [facility]  [address] [place] [address] 49682-
5484  10 Jul, 2017   

 

 [facility]  [address] [place] [address] 41299-
5124     

 

 [facility]  [address] [place] [address] 37222-
7815     

 

 [facility]  [address] [address]-
7079     

 

 [facility]  [address] [place] [address] 77497-
8526    Chronic pain syndrome G89.4 ; Fibromyalgia M79.7 ; Anxiety 
disorder, unspecified F41.9 ; Neuropathy G62.9 and Low back pain M54.5

 

 [facility]  [address] [address]-
0057  25 May, 2017  Low back pain M54.5

 

 [facility]  [address] [place] [address] 81418-
4262  24 May, 2017   

 

 [facility]  [address] [place] [address] 45804-
6364  22 May, 2017   

 

 [facility]  [address] [place] [address] 04875-
2948  16 May, 2017   

 

 [facility]  [address] [place] [address] 88653-
2169  16 May, 2017   

 

 [facility]  [address] [place] [address] 15666-
9509  12 May, 2017  Routine adult health maintenance Z00.00 ; Fibromyalgia 
M79.7 ; Chronic pain syndrome G89.4 ; Abnormal lung sounds R09.89 ; Coronary 
artery disease involving native coronary artery of native heart without angina 
pectoris I25.10 and S/P CABG (coronary artery bypass graft) Z95.1

 

 [facility]  [address] [address]-
4120  09 May, 2017   







IMMUNIZATIONS

No Known Immunizations



SOCIAL HISTORY

Never Assessed



REASON FOR VISIT

Controlled Med Refill



PLAN OF CARE





VITAL SIGNS





MEDICATIONS







 Medication  Instructions  Dosage  Frequency  Start Date  End Date  Duration  
Status

 

 Tramadol HCl 50 mg  Orally every 6 hrs  1 tablet as needed  6h    
   28 days  Active

 

 Sumatriptan Succinate 6 MG/0.5ML  Subcutaneous Once a day prn migraine  0.5 ml 
as needed           14 days  Active







RESULTS

No Results



PROCEDURES

No Known procedures



INSTRUCTIONS





MEDICATIONS ADMINISTERED

No Known Medications



MEDICAL (GENERAL) HISTORY







 Type  Description  Date

 

 Medical History  fibromyalgia   

 

 Medical History  MRI 2016- small central protrusion/herniation w/minimal 
impression on thecal anteriorly at C5-6, At C3-4 small bilat. uncinate spurs w/ 
mild right neural foraminal narrowing   

 

 Medical History  MRI 2016- mild degenerative changes. No spinal canal 
stenosis or foraminal narrowing of thoracic spine   

 

 Medical History  hypertension   

 

 Medical History  Anxiety disorder   

 

 Medical History  depression   

 

 Medical History  migraine headaches   

 

 Medical History  Hx of C-Diff   

 

 Medical History  Hx of Pneumonia   

 

 Medical History  heart cath w/ stents placed 7/3/18   

 

 Surgical History  Open Heart Surgery - [facility] -Dr. Lima  (
Cardiologist- Dr Logan)  

 

 Surgical History  hysterectomy - Dr Luis   

 

 Surgical History  Left Breast Lumpectomy (Bx - Benign)- Dr Luis   

 

 Surgical History  Port - Dr Luis   

 

 Surgical History  Left Knee Surgeries x3- Dr Carolina did 2 and Dr Gan did 1   

 

 Surgical History  cath/stent  

 

 Hospitalization History  C-Diff - [facility]   

 

 Hospitalization History  Pneumonia - [facility]   

 

 Hospitalization History  Open Heart Surgery - [facility]

## 2019-01-07 NOTE — XMS REPORT
[facility]

 Created on: 2018



Dayami Rae

External Reference #: [id number]

: 1970

Sex: Female



Demographics







 Address  [address] [place]  [address]

 

 Preferred Language  Unknown

 

 Marital Status  Unknown

 

 [employer] Affiliation  Unknown

 

 Race  Unknown

 

 Ethnic Group  Unknown





Author







 Author  KIM FUCHS

 

 Organization  [facility]

 

 Address  [address] [place]  [address]



 

 Phone  [phone]







Care Team Providers







 Care Team Member Name  Role  Phone

 

 FUCHSJAMA JACKSONELE  Unavailable  [phone]







PROBLEMS







 Type  Condition  ICD9-CM Code  ABY16-GR Code  Onset Dates  Condition Status  
SNOMED Code

 

 Problem  Fibromyalgia     M79.7     Active  521828495

 

 Problem  Neuropathy     G62.9     Active  537655560

 

 Problem  Coronary artery disease involving native coronary artery of native 
heart without angina pectoris     I25.10     Active  [id number]

 

 Problem  Chronic pain syndrome     G89.4     Active  267221036

 

 Problem  COPD suggested by initial evaluation     J44.9     Active  63218387

 

 Problem  Acute midline low back pain with left-sided sciatica     M54.42     
Active  472692731

 

 Problem  Lumbago with sciatica, left side     M54.42     Active  646411236

 

 Problem  Anxiety disorder, unspecified     F41.9     Active  805665285

 

 Problem  Other chronic pain     G89.29     Active  37388186

 

 Problem  Lumbago with sciatica, right side     M54.41     Active  
[id number]







ALLERGIES

No Information



ENCOUNTERS







 Encounter  Location  Date  Diagnosis

 

 [place] [id number] [place] [address] [phone]-
[phone]     

 

 [facility]  [id number] [place] [address] 65073-
[phone]  15 Marko, 2018   

 

 [facility]  [id number] [place] [address] [phone]-
[phone]    Acute bronchitis, unspecified organism J20.9

 

 [facility]  [id number] [place] [address] 52774-
6180    Chronic pain syndrome G89.4

 

 [facility]  [id number] [place] [address] 36842-
6949  25 May, 2018   

 

 [facility]  [address] [place] [address]-
6021  24 May, 2018  Acute midline low back pain with left-sided sciatica M54.42

 

 [facility]  [id number] [place] [address] 83891-
8899  22 May, 2018   

 

 [facility]  [id number] [place] [address] [phone]
[phone]  21 May, 2018  Bronchitis J40

 

 [facility]  [address] [address]-
1642  07 May, 2018  Chronic pain syndrome G89.4 and Neuropathy G62.9

 

 [facility]  [address] [address]-
5275    Acute midline low back pain with left-sided sciatica M54.42

 

 [facility]  [address] [address]-
1897     

 

 [facility]  [address] [address]-
0471    Anxiety disorder, unspecified F41.9

 

 [facility]  [id number] [place] [address] 42031-
9294     

 

 [facility]  [id number] [place] [address] 45232-
6388    Chronic pain syndrome G89.4 and Acute midline low back pain 
with left-sided sciatica M54.42

 

 [facility]  [id number] [place] [address] 38764-
1754    Chronic pain syndrome G89.4

 

 [facility]  [id number] [place] [address] 07224-
5016     

 

 [facility]  [id number] [place] [address] 88170-
4055     

 

 [facility]  [id number] [place] [address] 12495-
6222  29 Mar, 2018  Injury of left knee, initial encounter S89.92XA and COPD 
suggested by initial evaluation J44.9

 

 [facility]  [id number] [place] [address] 56349-
7079  28 Mar, 2018  Acute bronchitis, unspecified organism J20.9

 

 [facility]  [id number] [place] [address] 05181-
8269  27 Mar, 2018  Acute bronchitis, unspecified organism J20.9

 

 [facility]  [address] [address]-
9402  21 Mar, 2018  Anxiety disorder, unspecified F41.9 and Acute bronchitis, 
unspecified organism J20.9

 

 [place] [address] [address]-
5835  08 Mar, 2018  Chronic pain syndrome G89.4

 

 [place] [address] [address]-
2714  05 Mar, 2018   

 

 [facility]  [address] [address]-
4784  05 Mar, 2018  Acute midline low back pain with left-sided sciatica M54.42

 

 [place] [address] [address]-
3466     

 

 [facility]  [id number] [place] [address] 18672-
4831    Chronic pain syndrome G89.4

 

 [facility]  [id number] [place] [address] 75511-
0949    Chronic pain syndrome G89.4

 

 [facility]  [id number] [place] [address] 42935-
7587     

 

 [facility]  [address] [address]-
3884    Gastroenteritis K52.9

 

 [facility]  [id number] [place] [address] 32653-
1509     

 

 [facility]  [address] [address]-
5840  15 Jerardo, 2018  Acute bronchitis, unspecified organism J20.9

 

 [place] [id number] [place] [address] 16725-
4771    Anxiety disorder, unspecified F41.9 and Neuropathy G62.9

 

 [place] [id number] [place] [address] 59586-
3243    Chronic pain syndrome G89.4

 

 [place] [address] 7[address]45-
2282    Bronchitis J40 and Laryngitis J04.0

 

 [place] [address] [address]-
9522  29 Dec, 2017   

 

 [place] [address] [address]-
5854  26 Dec, 2017  Bronchitis J40

 

 [place] [address] 71503-
4548  18 Dec, 2017   

 

 [place] [address] 91781-
7046  13 Dec, 2017  Fibromyalgia M79.7 and Chronic pain syndrome G89.4

 

 [place] [id number] [place] [address] 13572-
0301  04 Dec, 2017  Chronic pain syndrome G89.4 and Acute bronchitis, 
unspecified organism J20.9

 

 [place] [id number] [place] [address] 70633-
1842    Neuropathy G62.9

 

 [place] [id number] [place] [address] 23327-
3335    Chronic pain syndrome G89.4 ; Lumbago with sciatica, left 
side M54.42 ; Lumbago with sciatica, right side M54.41 ; Screening cholesterol 
level Z13.220 ; Screening for diabetes mellitus Z13.1 ; Screening for thyroid 
disorder Z13.29 ; Fibromyalgia M79.7 ; Anxiety disorder, unspecified F41.9 and 
Neuropathy G62.9

 

 [place] [address], [place] [address]-
8643     

 

 [facility]  [id number] [place] [address] 74665-
9358  25 Oct, 2017   

 

 [facility]  [address] [address]-
8327  10 Oct, 2017  Fibromyalgia M79.7 ; Chronic pain syndrome G89.4 ; Anxiety 
disorder, unspecified F41.9 ; Neuropathy G62.9 and Acute bronchitis, 
unspecified organism J20.9

 

 [facility]  [address] [address]-
0035  09 Oct, 2017   

 

 [facility]  [id number] [place] [address] 34861-
4271  25 Sep, 2017   

 

 [facility]  [id number] [place] [address] 46231-
4099  19 Sep, 2017   

 

 [facility]  [id number] [place] [address] 75909-
4009  08 Sep, 2017   

 

 [facility]  [address], [place] [address]-
2465  23 Aug, 2017   

 

 [facility]  [address] [place] [address] 70330-
4255  22 Aug, 2017  Acute seasonal allergic rhinitis due to pollen J30.1

 

 [facility]  [address], [place] [address]-
2593  21 Aug, 2017   

 

 [facility]  [address] [address]-
0991  09 Aug, 2017   

 

 [facility]  [address] [address]-
3640     

 

 [facility]  [address] [address]-
8240     

 

 [facility]  [address] [address]-
2927  10 Jul, 2017   

 

 [facility]  [address] [address]-
2640     

 

 [facility]  [id number] [place] [address] 85217-
0351     

 

 [facility]  [id number] [place] [address] 27785-
4427     

 

 [facility]  [id number] [place] [address] 67451-
4406    Chronic pain syndrome G89.4 ; Fibromyalgia M79.7 ; Anxiety 
disorder, unspecified F41.9 ; Neuropathy G62.9 and Low back pain M54.5

 

 [facility]  [id number] [place] [address] 81005-
0070  25 May, 2017  Low back pain M54.5

 

 [facility]  [id number] [place] [address] 94801-
3443  24 May, 2017   

 

 [facility]  [id number] [place] [address] 57919-
9857  22 May, 2017   

 

 [facility]  [id number] [place] [address] 38856-
0293  16 May, 2017   

 

 [facility]  [id number] [place] [address] 78485-
2470  16 May, 2017   

 

 [facility]  [id number] [place] [address] 49957-
2783  12 May, 2017  Routine adult health maintenance Z00.00 ; Fibromyalgia 
M79.7 ; Chronic pain syndrome G89.4 ; Abnormal lung sounds R09.89 ; Coronary 
artery disease involving native coronary artery of native heart without angina 
pectoris I25.10 and S/P CABG (coronary artery bypass graft) Z95.1

 

 [facility]  [address] [place] [address] 15509-
2221  09 May, 2017   







IMMUNIZATIONS

No Known Immunizations



SOCIAL HISTORY

Never Assessed



REASON FOR VISIT

Medication refill request



PLAN OF CARE





VITAL SIGNS





MEDICATIONS







 Medication  Instructions  Dosage  Frequency  Start Date  End Date  Duration  
Status

 

 Seroquel 200 mg  Orally Once a day  2.5 tablet at bedtime  24h        30 days  
Active

 

 Gabapentin 300 MG  Orally Once a day  1 capsule  24h  09 May, 2017     90 days
  Active







RESULTS

No Results



PROCEDURES

No Known procedures



INSTRUCTIONS





MEDICATIONS ADMINISTERED

No Known Medications



MEDICAL (GENERAL) HISTORY







 Type  Description  Date

 

 Medical History  fibromyalgia   

 

 Medical History  MRI 2016- small central protrusion/herniation w/minimal 
impression on thecal anteriorly at C5-6, At C3-4 small bilat. uncinate spurs w/ 
mild right neural foraminal narrowing   

 

 Medical History  MRI 2016- mild degenerative changes. No spinal canal 
stenosis or foraminal narrowing of thoracic spine   

 

 Medical History  hypertension   

 

 Medical History  Anxiety disorder   

 

 Medical History  depression   

 

 Medical History  migraine headaches   

 

 Medical History  Hx of C-Diff   

 

 Medical History  Hx of Pneumonia   

 

 Surgical History  Open Heart Surgery - [facility] -Dr. Lima  (
Cardiologist- Dr Logan)  

 

 Surgical History  hysterectomy - Dr Luis   

 

 Surgical History  Left Breast Lumpectomy (Bx - Benign)- Dr Luis   

 

 Surgical History  Port - Dr Luis   

 

 Surgical History  Left Knee Surgeries x3- Dr Carolina did 2 and Dr Gan did 1   

 

 Hospitalization History  C-Diff - [facility]   

 

 Hospitalization History  Pneumonia - [facility]   

 

 Hospitalization History  Open Heart Surgery - [facility]

## 2019-01-07 NOTE — XMS REPORT
Clinical Summary

 Created on: 2019



Dayami Rae

External Reference #: [id number]

: 1970

Sex: Female



Demographics







 Address  [address]

[place]  [address]

 

 Home Phone  [phone]

 

 Preferred Language  English

 

 Marital Status  Unknown

 

 Hindu Affiliation  BAP

 

 Race  White

 

 Ethnic Group  Not  or 





Author







 Author  [facility]

 

 Organization  [facility]

 

 Address  Unknown

 

 Phone  Unavailable







Support







 Name  Relationship  Address  Phone

 

 Savanah Rodríguez  [employer]  Unknown  [phone]

 

 SAVANAH RODRÍGUEZ  [employer]  Unknown  [phone]







Care Team Providers







 Care Team Member Name  Role  Phone

 

  PCP  Unavailable







Source Comments

Some departments are not documenting in the electronic medical record.  If you 
do not see the information that you expected, contact Release of Information in 
the Health Information Management department at [phone] for further 
assistance in locating additional records.[facility]



Allergies

Not on File



Medications

Not on file



Active Problems





Not on file



Social History







     Date



  Tobacco Use   Types   Packs/Day   Years Used 

 

      



  Never Assessed    









 



  Sex Assigned at Birth   Date Recorded

 

 



  Not on file 









   Industry



  Job Start Date   Occupation 

 

   Not on file



  Not on file   Not on file 









   Travel End



  Travel History   Travel Start 













  No recent travel history available.







Last Filed Vital Signs

Not on file



Plan of Treatment







   



  Health Maintenance   Due Date   Last Done   Comments

 

   



  PHYSICAL (COMPREHENSIVE)   1975  



  EXAM   

 

   



  HIV SCREENING   1983  

 

   



  DTAP/TDAP VACCINES (1 -   1986  



  Tdap)   

 

   



  CERVICAL CANCER SCREENING   1998  

 

   



  BREAST CANCER SCREENING   2008  

 

   



  COLORECTAL CANCER   2018  



  SCREENING   

 

   



  SHINGLES RECOMBINANT   2018  



  VACCINE (1 of 2)   

 

   



  INFLUENZA VACCINE   2018  







Results

Not on filefrom Last 3 Months

## 2019-01-07 NOTE — XMS REPORT
[facility]

 Created on: 2018



Dayami Rae

External Reference #: [id number]

: 1970

Sex: Female



Demographics







 Address  [address] [place]  [address]

 

 Preferred Language  Unknown

 

 Marital Status  Unknown

 

 Restoration Affiliation  Unknown

 

 Race  Unknown

 

 Ethnic Group  Unknown





Author







 Author  KIM FUCHS

 

 Organization  [facility]

 

 Address  [address] [place]  [address]



 

 Phone  [phone]







Care Team Providers







 Care Team Member Name  Role  Phone

 

 KIM FUCHS  Unavailable  [phone]







PROBLEMS







 Type  Condition  ICD9-CM Code  PRJ57-PO Code  Onset Dates  Condition Status  
SNOMED Code

 

 Problem  Fibromyalgia     M79.7     Active  049655769

 

 Problem  Neuropathy     G62.9     Active  199447259

 

 Problem  Coronary artery disease involving native coronary artery of native 
heart without angina pectoris     I25.10     Active  4420817027057

 

 Problem  Chronic pain syndrome     G89.4     Active  876226152

 

 Problem  COPD suggested by initial evaluation     J44.9     Active  34623247

 

 Problem  Acute midline low back pain with left-sided sciatica     M54.42     
Active  380342666

 

 Problem  Lumbago with sciatica, left side     M54.42     Active  375751573

 

 Problem  Anxiety disorder, unspecified     F41.9     Active  288447607

 

 Problem  Other chronic pain     G89.29     Active  96223779

 

 Problem  Lumbago with sciatica, right side     M54.41     Active  
[id number]







ALLERGIES







 Substance  Reaction  Event Type  Date  Status

 

 Morphine Sulfate  Rash  Drug Allergy  10 Oct, 2017  Active







ENCOUNTERS







 Encounter  Location  Date  Diagnosis

 

 [place] [id number] [place] [phone]-
[phone]  07 May, 2018  Chronic pain syndrome G89.4 and Neuropathy G62.9

 

 [facility]  [address] [place] [address]228-
2725    Acute midline low back pain with left-sided sciatica M54.42

 

 [facility]  [address] [place] [id number] [place] [address] 24968-
1660     

 

 [facility]  [id number] [place] [address] 89083-
3809    Anxiety disorder, unspecified F41.9

 

 [facility]  [id number] [place] [address] 24837-
2246     

 

 [facility]  [id number] [place] [address] 39472-
6961    Chronic pain syndrome G89.4 and Acute midline low back pain 
with left-sided sciatica M54.42

 

 [place] [address] [place] [id number] [place] [address] 77800-
6912    Chronic pain syndrome G89.4

 

 [facility]  [id number] [place] [address] 13785-
6328     

 

 [place] [address] [address]-
7008     

 

 [place] [address] [place] [id number] [place] [address] 80343-
8472  29 Mar, 2018  Injury of left knee, initial encounter S89.92XA and COPD 
suggested by initial evaluation J44.9

 

 [place] [address] [place] [id number] [place] [address] 74905-
7326  28 Mar, 2018  Acute bronchitis, unspecified organism J20.9

 

 [place] [address] [place] [id number] [place] [address] 43388-
5126  27 Mar, 2018  Acute bronchitis, unspecified organism J20.9

 

 [place] [address] [place] [id number] [place] [address] 94812-
7883  21 Mar, 2018  Anxiety disorder, unspecified F41.9 and Acute bronchitis, 
unspecified organism J20.9

 

 [place] [address] [place] [id number] [place] [address] 27121-
0318  08 Mar, 2018  Chronic pain syndrome G89.4

 

 [place] [address] [place] [id number] [place] [address] 62611-
8387  05 Mar, 2018   

 

 [place] [address] [place] [id number] [place] [address] 24137-
2413  05 Mar, 2018  Acute midline low back pain with left-sided sciatica M54.42

 

 [place] [address] [place] [address]-
9035     

 

 [facility]  [id number] [place] [address] 75721-
2372    Chronic pain syndrome G89.4

 

 [facility]  [id number] [place] [address] 16375-
8570    Chronic pain syndrome G89.4

 

 [facility]  [address] [address]-
8276     

 

 [facility]  [address] [place] [id number] [place] [address] 63061-
5451    Gastroenteritis K52.9

 

 [facility]  [id number] [place] [address] 77029-
2773     

 

 [facility]  [id number] [place] [address] 42353-
4490  15 Jerardo, 2018  Acute bronchitis, unspecified organism J20.9

 

 [facility]  [id number] [place] [address] 93062-
0480    Anxiety disorder, unspecified F41.9 and Neuropathy G62.9

 

 [facility]  [id number] [place] [address] 39037-
7391    Chronic pain syndrome G89.4

 

 [facility]  [id number] [place] [address] 46015-
8905    Bronchitis J40 and Laryngitis J04.0

 

 [facility]  [address] [id number] [place] [address] 48768-
3680  29 Dec, 2017   

 

 [facility]  [address] [id number] [place] [address] 65159-
0221  26 Dec, 2017  Bronchitis J40

 

 [facility]  [id number] [place] [address] 69460-
7551  18 Dec, 2017   

 

 [facility]  [id number] [place] [address] 15444-
4421  13 Dec, 2017  Fibromyalgia M79.7 and Chronic pain syndrome G89.4

 

 [facility]  [id number] [place] [address] 68056-
8019  04 Dec, 2017  Chronic pain syndrome G89.4 and Acute bronchitis, 
unspecified organism J20.9

 

 [place] [address] [place] [id number] [place] [address] 92665-
9679    Neuropathy G62.9

 

 [place] [address] [address]-
5621    Chronic pain syndrome G89.4 ; Lumbago with sciatica, left 
side M54.42 ; Lumbago with sciatica, right side M54.41 ; Screening cholesterol 
level Z13.220 ; Screening for diabetes mellitus Z13.1 ; Screening for thyroid 
disorder Z13.29 ; Fibromyalgia M79.7 ; Anxiety disorder, unspecified F41.9 and 
Neuropathy G62.9

 

 [place] [address] 17698-
4636     

 

 [place] [address] [place] [id number] [place] [address] 98306-
5085  25 Oct, 2017   

 

 [place] [address] [address]-
2261  10 Oct, 2017  Fibromyalgia M79.7 ; Chronic pain syndrome G89.4 ; Anxiety 
disorder, unspecified F41.9 ; Neuropathy G62.9 and Acute bronchitis, 
unspecified organism J20.9

 

 [place] [address] [place] [id number] [place] [address] 64107-
3061  09 Oct, 2017   

 

 [place] [address] [place] [id number] [place] [address] 40718-
5011  25 Sep, 2017   

 

 [place] [address] [address]-
7175  19 Sep, 2017   

 

 [place] [address] [place] [id number] [place] [address] 47374-
2913  08 Sep, 2017   

 

 [place] [address] [address]-
9238  23 Aug, 2017   

 

 [facility]  [address] [address]-
8477  22 Aug, 2017  Acute seasonal allergic rhinitis due to pollen J30.1

 

 [facility]  [address] [address]-
9001  21 Aug, 2017   

 

 [facility]  [address] [address]-
5930  09 Aug, 2017   

 

 [facility]  [address] [address]-
3449     

 

 [facility]  [address] [place] [address] 17018-
7234     

 

 [facility]  [address] [address]-
5409  10 Jul, 2017   

 

 [facility]  [address] [place] [address] 46583-
1635     

 

 [facility]  [address] [address]-
6247     

 

 [facility]  [address] [address]-
5390     

 

 [facility]  [address] [address]-
2261    Chronic pain syndrome G89.4 ; Fibromyalgia M79.7 ; Anxiety 
disorder, unspecified F41.9 ; Neuropathy G62.9 and Low back pain M54.5

 

 [facility]  [address] [address]-
8543  25 May, 2017  Low back pain M54.5

 

 [facility]  [address] [address]-
2995  24 May, 2017   

 

 [facility]  [address] [address]-
2543  22 May, 2017   

 

 [facility]  [address] [address]-
4071  16 May, 2017   

 

 [facility]  [address] [facility] [id number] [address]-
2646  16 May, 2017   

 

 [place] [address] [place] [id number] [address]-
0273  12 May, 2017  Routine adult health maintenance Z00.00 ; Fibromyalgia 
M79.7 ; Chronic pain syndrome G89.4 ; Abnormal lung sounds R09.89 ; Coronary 
artery disease involving native coronary artery of native heart without angina 
pectoris I25.10 and S/P CABG (coronary artery bypass graft) Z95.1

 

 [place] [address] [facility] [id number] [address]-
6668  09 May, 2017   







IMMUNIZATIONS

No Known Immunizations



SOCIAL HISTORY

Never Assessed



REASON FOR VISIT

Pain management (chronic)--tcuppettRN, -Cough, congestion, fever x 1 week



PLAN OF CARE







 Activity  Details









  









 Follow Up  3 Months Reason:CHM/pain







VITAL SIGNS







 Height  5'2" in  2017-10-10

 

 Weight  125.3 lbs  2017-10-10

 

 Temperature  97.7 degrees Fahrenheit  2017-10-10

 

 Heart Rate  90 bpm  2017-10-10

 

 Respiratory Rate  18   2017-10-10

 

 BMI  22.92 kg/m2  2017-10-10

 

 Blood pressure systolic  128 mmHg  2017-10-10

 

 Blood pressure diastolic  80 mmHg  2017-10-10







MEDICATIONS







 Medication  Instructions  Dosage  Frequency  Start Date  End Date  Duration  
Status

 

 Aspir-81 81 MG  Orally Once a day  1 tablet  24h           Active

 

 Sumatriptan Succinate 6 MG/0.5ML  Subcutaneous Once a day prn migraine  0.5 ml 
as needed              Active

 

 Tramadol HCl 50 mg  Orally every 8 hrs prn  1 tablet as needed           28 
days  Active

 

 Seroquel 200 mg  Orally Once a day  2.5 tablet at bedtime  24h        90 days  
Active

 

 Doxycycline Monohydrate 100 mg  Orally every 12 hrs  1 capsule  12h  10 Oct, 
2017  17 Oct, 2017  07 days  Active

 

 ProAir  (90 Base) MCG/ACT  Inhalation every 4 hrs  2 puffs as needed  
4h  10 Oct, 2017     12 months  Active

 

 Gabapentin 300 MG  Orally Once a day  1 capsule  24h  09 May, 2017     90 days
  Active

 

 Amitriptyline HCl 25 MG  Orally Once a day  1 tablet  24h  10 Oct, 2017     30 
day(s)  Active

 

 Promethazine HCl 25 MG  Orally 4 times a day prn  1 tablet as needed           
30 days  Active

 

 Chlorzoxazone 500 mg  Orally Three times a day  1 tablet  8h        30 days  
Active

 

 Xanax 0.25 MG  Orally Twice a day  1 tablet  12h        28 days  Active







RESULTS

No Results



PROCEDURES

No Known procedures



INSTRUCTIONS





MEDICATIONS ADMINISTERED

No Known Medications



MEDICAL (GENERAL) HISTORY







 Type  Description  Date

 

 Medical History  fibromyalgia   

 

 Medical History  MRI 2016- small central protrusion/herniation w/minimal 
impression on thecal anteriorly at C5-6, At C3-4 small bilat. uncinate spurs w/ 
mild right neural foraminal narrowing   

 

 Medical History  MRI 2016- mild degenerative changes. No spinal canal 
stenosis or foraminal narrowing of thoracic spine   

 

 Medical History  hypertension   

 

 Medical History  Anxiety disorder   

 

 Medical History  depression   

 

 Medical History  migraine headaches   

 

 Medical History  Hx of C-Diff   

 

 Medical History  Hx of Pneumonia   

 

 Surgical History  Open Heart Surgery - [facility] -Dr. Lima  (
Cardiologist- Dr Logan)  

 

 Surgical History  hysterectomy - Dr Luis   

 

 Surgical History  Left Breast Lumpectomy (Bx - Benign)- Dr Luis   

 

 Surgical History  Port - Dr Luis   

 

 Surgical History  Left Knee Surgeries x3- Dr Carolina did 2 and Dr Gan did 1   

 

 Hospitalization History  C-Diff - [facility]   

 

 Hospitalization History  Pneumonia - [facility]   

 

 Hospitalization History  Open Heart Surgery - [facility]

## 2019-01-07 NOTE — XMS REPORT
[facility]

 Created on: 2018



Dayami Rae

External Reference #: [id number]

: 1970

Sex: Female



Demographics







 Address  [address] [place]  [address]

 

 Preferred Language  Unknown

 

 Marital Status  Unknown

 

 Gnosticist Affiliation  Unknown

 

 Race  Unknown

 

 Ethnic Group  Unknown





Author







 Author  KIM FUCHS

 

 Organization  [facility]

 

 Address  [address] [place]  [address]



 

 Phone  [phone]







Care Team Providers







 Care Team Member Name  Role  Phone

 

 FUCHSJAMA JACKSONELE  Unavailable  [phone]







PROBLEMS







 Type  Condition  ICD9-CM Code  [id number] Code  Onset Dates  Condition Status  
SNOMED Code

 

 Problem  Fibromyalgia     M79.7     Active  672672961

 

 Problem  Neuropathy     G62.9     Active  483537382

 

 Problem  Coronary artery disease involving native coronary artery of native 
heart without angina pectoris     I25.10     Active  [id number]

 

 Problem  Chronic pain syndrome     G89.4     Active  665232941

 

 Problem  COPD suggested by initial evaluation     J44.9     Active  65738147

 

 Problem  Acute midline low back pain with left-sided sciatica     M54.42     
Active  567764077

 

 Problem  Lumbago with sciatica, left side     M54.42     Active  370457727

 

 Problem  Anxiety disorder, unspecified     F41.9     Active  490718859

 

 Problem  Other chronic pain     G89.29     Active  70679944

 

 Problem  Lumbago with sciatica, right side     M54.41     Active  
[id number]







ALLERGIES

No Information



ENCOUNTERS







 Encounter  Location  Date  Diagnosis

 

 [place] [address] [place] [phone]-
[phone]     

 

 [facility]  [id number] [place] [address] [phone]-
[phone]    Acute bronchitis, unspecified organism J20.9

 

 [facility]  [address] [place] [address] 48612-
2124    Chronic pain syndrome G89.4

 

 [facility]  [id number] [place] [address] 66137-
2042  25 May, 2018   

 

 [facility]  [id number] [place] [address] 30651-
2803  24 May, 2018  Acute midline low back pain with left-sided sciatica M54.42

 

 [facility]  [id number] [place] [address] 51010-
3048  22 May, 2018   

 

 [facility]  [address] [phone]
[phone]  21 May, 2018  Bronchitis J40

 

 [facility]  [id number] [place] [address] 91745-
6276  07 May, 2018  Chronic pain syndrome G89.4 and Neuropathy G62.9

 

 [facility]  [id number] [place] [address] 54713-
7045    Acute midline low back pain with left-sided sciatica M54.42

 

 [facility]  [address] [address]-
3801     

 

 [facility]  [address] [address]-
[phone]    Anxiety disorder, unspecified F41.9

 

 [facility]  [id number] [place] [address] 20733-
4310     

 

 [facility]  [address] [address]-
0363    Chronic pain syndrome G89.4 and Acute midline low back pain 
with left-sided sciatica M54.42

 

 [facility]  [id number] [place] [address] 40705-
6626    Chronic pain syndrome G89.4

 

 [facility]  [id number] [place] [address] 76167-
4137     

 

 [facility]  [id number] [place] [address] 12267-
0554     

 

 [facility]  [id number] [place] [address] 03449-
2932  29 Mar, 2018  Injury of left knee, initial encounter S89.92XA and COPD 
suggested by initial evaluation J44.9

 

 [facility]  [address] [phone]-
[phone]  28 Mar, 2018  Acute bronchitis, unspecified organism J20.9

 

 [facility]  [id number] [place] [address] 33546-
4793  27 Mar, 2018  Acute bronchitis, unspecified organism J20.9

 

 [facility]  [id number] [place] [address] 24943-
8300  21 Mar, 2018  Anxiety disorder, unspecified F41.9 and Acute bronchitis, 
unspecified organism J20.9

 

 [facility]  [id number] [place] [address] 21711-
8775  08 Mar, 2018  Chronic pain syndrome G89.4

 

 [facility]  [id number] [place] [address] 93841-
2801  05 Mar, 2018   

 

 [facility]  [id number] [place] [address] [phone]-
4739  05 Mar, 2018  Acute midline low back pain with left-sided sciatica M54.42

 

 [facility]  [id number] [place] [address] 61968-
8576     

 

 [facility]  [id number] [place] [address] 24525-
7323    Chronic pain syndrome G89.4

 

 [facility]  [id number] [place] [address] 09206-
8095    Chronic pain syndrome G89.4

 

 [facility]  [id number] [place] [address] 59905-
8212     

 

 [facility]  [id number] [place] [address] 51755-
7640    Gastroenteritis K52.9

 

 [facility]  [id number] [place] [address] 94225-
2414     

 

 [facility]  [id number] [place] [address] 12279-
1223  15 Jerardo, 2018  Acute bronchitis, unspecified organism J20.9

 

 [facility]  [id number] [place] [address] 72247-
5419    Anxiety disorder, unspecified F41.9 and Neuropathy G62.9

 

 [place] [address] [address]-
7938    Chronic pain syndrome G89.4

 

 [place] [address] [id number] [place] [address] 86259-
2667    Bronchitis J40 and Laryngitis J04.0

 

 [place] [address] [address]123-
7772  29 Dec, 2017   

 

 [place] [address] [address]11-
2062  26 Dec, 2017  Bronchitis J40

 

 [place] [address] 07898-
0045  18 Dec, 2017   

 

 [place] [address] 03674-
8965  13 Dec, 2017  Fibromyalgia M79.7 and Chronic pain syndrome G89.4

 

 [place] [address] 43856-
1633  04 Dec, 2017  Chronic pain syndrome G89.4 and Acute bronchitis, 
unspecified organism J20.9

 

 [place] [address] [place] [id number] [place] [address] 34369-
0147    Neuropathy G62.9

 

 [place] [id number] [place] [address] 08852-
0149    Chronic pain syndrome G89.4 ; Lumbago with sciatica, left 
side M54.42 ; Lumbago with sciatica, right side M54.41 ; Screening cholesterol 
level Z13.220 ; Screening for diabetes mellitus Z13.1 ; Screening for thyroid 
disorder Z13.29 ; Fibromyalgia M79.7 ; Anxiety disorder, unspecified F41.9 and 
Neuropathy G62.9

 

 [place] [address] [place] [id number] [place] [address] 45947-
7272     

 

 [place] [address] [address]-
3374  25 Oct, 2017   

 

 [facility]  [id number] [place] [address] 09795-
3846  10 Oct, 2017  Fibromyalgia M79.7 ; Chronic pain syndrome G89.4 ; Anxiety 
disorder, unspecified F41.9 ; Neuropathy G62.9 and Acute bronchitis, 
unspecified organism J20.9

 

 [facility]  [id number] [place] [address] 58529-
7439  09 Oct, 2017   

 

 [facility]  [id number] [place] [address] 95549-
7595  25 Sep, 2017   

 

 [facility]  [id number] [place] [address] 31953-
3253  19 Sep, 2017   

 

 [facility]  [id number] [place] [address] 81158-
0975  08 Sep, 2017   

 

 [facility]  [id number] [place] [address] 39694-
6933  23 Aug, 2017   

 

 [facility]  [id number] [place] [address] 33080-
5394  22 Aug, 2017  Acute seasonal allergic rhinitis due to pollen J30.1

 

 [facility]  [address] [address]-
0272  21 Aug, 2017   

 

 [facility]  [address] [address]-
9143  09 Aug, 2017   

 

 [facility]  [address] [address]-
9404     

 

 [facility]  [id number] [address]-
1901     

 

 [facility]  [address] [address]-
9716  10 Jul, 2017   

 

 [facility]  [address] [address]-
7662     

 

 [facility]  [address] [address]-
8718     

 

 [facility]  [id number] [place] [address] 96819-
0856     

 

 [facility]  [id number] [place] [address] 82445-
7985    Chronic pain syndrome G89.4 ; Fibromyalgia M79.7 ; Anxiety 
disorder, unspecified F41.9 ; Neuropathy G62.9 and Low back pain M54.5

 

 [facility]  [id number] [place] [address] 05938-
8169  25 May, 2017  Low back pain M54.5

 

 [facility]  [id number] [place] [address] 70486-
7987  24 May, 2017   

 

 [facility]  [id number] [place] [address] 80254-
8465  22 May, 2017   

 

 [facility]  [id number] [place] [address] 73874-
0354  16 May, 2017   

 

 [facility]  [id number] [place] [address] 20440-
4252  16 May, 2017   

 

 [facility]  [id number] [place] [address] 72449-
4188  12 May, 2017  Routine adult health maintenance Z00.00 ; Fibromyalgia 
M79.7 ; Chronic pain syndrome G89.4 ; Abnormal lung sounds R09.89 ; Coronary 
artery disease involving native coronary artery of native heart without angina 
pectoris I25.10 and S/P CABG (coronary artery bypass graft) Z95.1

 

 [facility]  [address] [address]-
6125  09 May, 2017   







IMMUNIZATIONS

No Known Immunizations



SOCIAL HISTORY

Never Assessed



REASON FOR VISIT

Medication refill request



PLAN OF CARE





VITAL SIGNS





MEDICATIONS

Unknown Medications



RESULTS

No Results



PROCEDURES

No Known procedures



INSTRUCTIONS





MEDICATIONS ADMINISTERED

No Known Medications



MEDICAL (GENERAL) HISTORY







 Type  Description  Date

 

 Medical History  fibromyalgia   

 

 Medical History  MRI 2016- small central protrusion/herniation w/minimal 
impression on thecal anteriorly at C5-6, At C3-4 small bilat. uncinate spurs w/ 
mild right neural foraminal narrowing   

 

 Medical History  MRI 2016- mild degenerative changes. No spinal canal 
stenosis or foraminal narrowing of thoracic spine   

 

 Medical History  hypertension   

 

 Medical History  Anxiety disorder   

 

 Medical History  depression   

 

 Medical History  migraine headaches   

 

 Medical History  Hx of C-Diff   

 

 Medical History  Hx of Pneumonia   

 

 Surgical History  Open Heart Surgery - [facility] -Dr. Lima  (
Cardiologist- Dr Logan)  

 

 Surgical History  hysterectomy - Dr Luis   

 

 Surgical History  Left Breast Lumpectomy (Bx - Benign)- Dr Luis   

 

 Surgical History  Port - Dr Luis   

 

 Surgical History  Left Knee Surgeries x3- Dr Carolina did 2 and Dr Gan did 1   

 

 Hospitalization History  C-Diff - [facility] [place]   

 

 Hospitalization History  Pneumonia - [facility] [place]   

 

 Hospitalization History  Open Heart Surgery - [facility]

## 2019-01-07 NOTE — XMS REPORT
[facility]

 Created on: 2018



Dayami Rae

External Reference #: [id number]

: 1970

Sex: Female



Demographics







 Address  [address] [place]  [address]

 

 Preferred Language  Unknown

 

 Marital Status  Unknown

 

 Druze Affiliation  Unknown

 

 Race  Unknown

 

 Ethnic Group  Unknown





Author







 Author  KIM FUCHS

 

 Organization  [facility]

 

 Address  [address] [place]  [address]



 

 Phone  [phone]







Care Team Providers







 Care Team Member Name  Role  Phone

 

 FUCHSJAMA JACKSONELE  Unavailable  [phone]







PROBLEMS







 Type  Condition  ICD9-CM Code  NQY18-DL Code  Onset Dates  Condition Status  
SNOMED Code

 

 Problem  Neuropathy     G62.9     Active  646943143

 

 Problem  Lumbago with sciatica, left side     M54.42     Active  616178159

 

 Problem  Anxiety disorder, unspecified     F41.9     Active  474382043

 

 Problem  Chronic pain syndrome     G89.4     Active  909286922

 

 Problem  Fibromyalgia     M79.7     Active  248331577

 

 Problem  Coronary artery disease involving native coronary artery of native 
heart without angina pectoris     I25.10     Active  3810999962212

 

 Problem  Essential hypertension     I10     Active  83106952

 

 Problem  GERD with esophagitis     K21.0     Active  983594171

 

 Problem  Other chronic pain     G89.29     Active  61678499

 

 Problem  Lumbago with sciatica, right side     M54.41     Active  
[id number]

 

 Problem  COPD suggested by initial evaluation     J44.9     Active  93273636

 

 Problem  Acute midline low back pain with left-sided sciatica     M54.42     
Active  083857529







ALLERGIES

No Information



ENCOUNTERS







 Encounter  Location  Date  Diagnosis

 

 [facility]  [address] [place] [address] 88882-
9683    Essential hypertension I10 ; Coronary artery disease 
involving native coronary artery of native heart without angina pectoris I25.10 
; Fibromyalgia M79.7 ; GERD with esophagitis K21.0 and Tobacco abuse counseling 
Z71.6

 

 [facility]  [address] [place] [address] 03367-
2947    Long term (current) use of opiate analgesic Z79.891

 

 [facility]  [id number], [place] [address]-
9058     

 

 [id number] [place] [address] 47617-
1626    Acute bronchitis, unspecified organism J20.9

 

 [facility]  [id number] [place] [address] 17871-
8991     

 

 [facility]  [id number] [place] [address] 61516-
6054     

 

 [facility]  [id number] [place] [address] 33576-
4300    Chronic pain syndrome G89.4

 

 [facility]  [id number] [place] [address] 05076-
7492     

 

 [facility]  [id number] [place] [address] 86264-
1576  15 Marko, 2018   

 

 [facility]  [id number] [place] [address] 20596-
8522    Acute bronchitis, unspecified organism J20.9

 

 [facility]  [id number] [place] [address] 44382-
0244    Chronic pain syndrome G89.4

 

 [facility]  [id number] [place] [address] 42558-
4579  25 May, 2018   

 

 [facility]  [id number] [place] [address] 95127-
0894  24 May, 2018  Acute midline low back pain with left-sided sciatica M54.42

 

 [facility]  [id number] [place] [address] 62365-
4714  22 May, 2018   

 

 [facility]  [id number] [place] [address] [phone]
[phone]  21 May, 2018  Bronchitis J40

 

 [facility]  [id number] [place] [address] 55733-
9390  07 May, 2018  Chronic pain syndrome G89.4 and Neuropathy G62.9

 

 [facility]  [id number] [place] [address] 69473-
5194    Acute midline low back pain with left-sided sciatica M54.42

 

 [facility]  [id number] [place] [address] 90523-
6842     

 

 [facility]  [id number] [place] [address] 44175-
5727    Anxiety disorder, unspecified F41.9

 

 [facility]  [id number] [place] [address] 88004-
3264     

 

 [facility]  [id number] [place] [address] 33041-
2293    Chronic pain syndrome G89.4 and Acute midline low back pain 
with left-sided sciatica M54.42

 

 [facility]  [id number] [place] [address] 33458-
8885    Chronic pain syndrome G89.4

 

 [facility]  [id number] [place] [address] 66006-
9068     

 

 [facility]  [id number] [place] [address] 95571-
0022     

 

 [facility]  [id number] [place] [address] 49104-
4640  29 Mar, 2018  Injury of left knee, initial encounter S89.92XA and COPD 
suggested by initial evaluation J44.9

 

 [id number] [place] [address] 32458-
3240  28 Mar, 2018  Acute bronchitis, unspecified organism J20.9

 

 [id number] [place] [address] 15346-
4787  27 Mar, 2018  Acute bronchitis, unspecified organism J20.9

 

 [id number] [place] [address] 96141-
1143  21 Mar, 2018  Anxiety disorder, unspecified F41.9 and Acute bronchitis, 
unspecified organism J20.9

 

 [id number] [place] [address] 67291-
3562  08 Mar, 2018  Chronic pain syndrome G89.4

 

 [facility]  [id number] [place] [address] 16948-
2767  05 Mar, 2018   

 

 [facility]  [id number] [place] [address] 88478-
3746  05 Mar, 2018  Acute midline low back pain with left-sided sciatica M54.42

 

 [facility]  [id number] [place] [address] 82359-
1024     

 

 [facility]  [id number] [place] [address] 27633-
3820    Chronic pain syndrome G89.4

 

 [facility]  [id number] [place] [address] 83854-
7291    Chronic pain syndrome G89.4

 

 [facility]  [id number] [place] [address] 90133-
6989     

 

 [facility]  [id number] [place] [address] 56213-
7124    Gastroenteritis K52.9

 

 [facility]  [id number] [place] [address] 39509-
3566     

 

 [facility]  [id number] [place] [address] 34557-
9345  15 Jerardo, 2018  Acute bronchitis, unspecified organism J20.9

 

 [facility]  [id number] [place] [address] 48336-
8618    Anxiety disorder, unspecified F41.9 and Neuropathy G62.9

 

 [facility]  [id number] [place] [address] 38375-
4239    Chronic pain syndrome G89.4

 

 [facility]  [id number] [place] [address] 23900-
3051    Bronchitis J40 and Laryngitis J04.0

 

 [facility]  [id number] [place] [address] 80711-
7653  29 Dec, 2017   

 

 [address] [place] [address] 17682-
8202  26 Dec, 2017  Bronchitis J40

 

 [id number] [place] [address] 08920-
8454  18 Dec, 2017   

 

 [id number] [place] [address] 20047-
2316  13 Dec, 2017  Fibromyalgia M79.7 and Chronic pain syndrome G89.4

 

 [id number] [place] [address] 49555-
0601  04 Dec, 2017  Chronic pain syndrome G89.4 and Acute bronchitis, 
unspecified organism J20.9

 

 [id number] [place] [address] 99463-
3665    Neuropathy G62.9

 

 [id number] [place] [address] 46808-
8093    Chronic pain syndrome G89.4 ; Lumbago with sciatica, left 
side M54.42 ; Lumbago with sciatica, right side M54.41 ; Screening cholesterol 
level Z13.220 ; Screening for diabetes mellitus Z13.1 ; Screening for thyroid 
disorder Z13.29 ; Fibromyalgia M79.7 ; Anxiety disorder, unspecified F41.9 and 
Neuropathy G62.9

 

 [address], [place] [address]-
1714     

 

 [id number] [place] [address] 47334-
0858  25 Oct, 2017   

 

 [id number] [place] [address] 41542-
1219  10 Oct, 2017  Fibromyalgia M79.7 ; Chronic pain syndrome G89.4 ; Anxiety 
disorder, unspecified F41.9 ; Neuropathy G62.9 and Acute bronchitis, 
unspecified organism J20.9

 

 [address] [place] [address] 46064-
8242  09 Oct, 2017   

 

 [address] [place] [address]-
1942  25 Sep, 2017   

 

 [facility]  [address] [address]-
4717  19 Sep, 2017   

 

 [facility]  [address] [address]-
4441  08 Sep, 2017   

 

 [facility]  [address] [place] [address] 00037-
3454  23 Aug, 2017   

 

 [facility]  [id number] [place] [address] 30370-
7947  22 Aug, 2017  Acute seasonal allergic rhinitis due to pollen J30.1

 

 [facility]  [id number] [place] [address] 66448-
1735  21 Aug, 2017   

 

 [facility]  [id number] [place] [address] 68728-
1436  09 Aug, 2017   

 

 [facility]  [id number] [place] [address] 22776-
4302     

 

 [facility]  [address] [place] [address] 12550-
8031     

 

 [facility]  [id number] [place] [address] 83083-
8169  10 Jul, 2017   

 

 [facility]  [address] [place] [address] 74185-
4865     

 

 [facility]  [address] [address]-
4193     

 

 [facility]  [address] [address]-
7419     

 

 [facility]  [id number] [place] [address] 19753-
4025    Chronic pain syndrome G89.4 ; Fibromyalgia M79.7 ; Anxiety 
disorder, unspecified F41.9 ; Neuropathy G62.9 and Low back pain M54.5

 

 [facility]  [address] [place] [address] 37593-
9808  25 May, 2017  Low back pain M54.5

 

 [facility]  [address] [address]-
7313  24 May, 2017   

 

 [facility]  [address] [place] [address] 70093-
6823  22 May, 2017   

 

 [address] [place] [address] 05337-
3468  16 May, 2017   

 

 [facility]  [id number] [place] [address] 06967-
9872  16 May, 2017   

 

 [id number] [place] [address] 97097-
3598  12 May, 2017  Routine adult health maintenance Z00.00 ; Fibromyalgia 
M79.7 ; Chronic pain syndrome G89.4 ; Abnormal lung sounds R09.89 ; Coronary 
artery disease involving native coronary artery of native heart without angina 
pectoris I25.10 and S/P CABG (coronary artery bypass graft) Z95.1

 

 [address], [place] [address]-
2943  09 May, 2017   







IMMUNIZATIONS

No Known Immunizations



SOCIAL HISTORY

Never Assessed



REASON FOR VISIT

Controlled Med Refill



PLAN OF CARE





VITAL SIGNS





MEDICATIONS







 Medication  Instructions  Dosage  Frequency  Start Date  End Date  Duration  
Status

 

 Xanax 0.25 MG  Orally Twice a day  1 tablet  12h        28 days  Active







RESULTS

No Results



PROCEDURES

No Known procedures



INSTRUCTIONS





MEDICATIONS ADMINISTERED

No Known Medications



MEDICAL (GENERAL) HISTORY







 Type  Description  Date

 

 Medical History  fibromyalgia   

 

 Medical History  MRI 2016- small central protrusion/herniation w/minimal 
impression on thecal anteriorly at C5-6, At C3-4 small bilat. uncinate spurs w/ 
mild right neural foraminal narrowing   

 

 Medical History  MRI 2016- mild degenerative changes. No spinal canal 
stenosis or foraminal narrowing of thoracic spine   

 

 Medical History  hypertension   

 

 Medical History  Anxiety disorder   

 

 Medical History  depression   

 

 Medical History  migraine headaches   

 

 Medical History  Hx of C-Diff   

 

 Medical History  Hx of Pneumonia   

 

 Medical History  heart cath w/ stents placed 7/3/18   

 

 Surgical History  Open Heart Surgery - [facility] -Dr. Lima  (
Cardiologist- Dr Logan)  

 

 Surgical History  hysterectomy - Dr Luis   

 

 Surgical History  Left Breast Lumpectomy (Bx - Benign)- Dr Luis   

 

 Surgical History  Port - Dr Luis   

 

 Surgical History  Left Knee Surgeries x3- Dr Carolina did 2 and Dr Gan did 1   

 

 Surgical History  cath/stent  

 

 Hospitalization History  C-Diff - [facility]   

 

 Hospitalization History  Pneumonia - [facility]   

 

 Hospitalization History  Open Heart Surgery - [facility]

## 2019-01-07 NOTE — XMS REPORT
[facility]

 Created on: 2018



Dayami Rae

External Reference #: [id number]

: 1970

Sex: Female



Demographics







 Address  [address] [place]  [address]

 

 Preferred Language  Unknown

 

 Marital Status  Unknown

 

 [employer]an Affiliation  Unknown

 

 Race  Unknown

 

 Ethnic Group  Unknown





Author







 Author  KIM FUCHS

 

 Organization  [facility]

 

 Address  [address] [place]  [address]



 

 Phone  [phone]







Care Team Providers







 Care Team Member Name  Role  Phone

 

 FUCHSKIM JACKSON  Unavailable  [phone]







PROBLEMS







 Type  Condition  ICD9-CM Code  JAW16-AC Code  Onset Dates  Condition Status  
SNOMED Code

 

 Problem  Neuropathy     G62.9     Active  684856941

 

 Problem  Lumbago with sciatica, left side     M54.42     Active  161257442

 

 Problem  Anxiety disorder, unspecified     F41.9     Active  237542055

 

 Problem  Chronic pain syndrome     G89.4     Active  618495985

 

 Problem  Fibromyalgia     M79.7     Active  262686925

 

 Problem  Coronary artery disease involving native coronary artery of native 
heart without angina pectoris     I25.10     Active  3215107221268

 

 Problem  Essential hypertension     I10     Active  54027582

 

 Problem  GERD with esophagitis     K21.0     Active  460183258

 

 Problem  Other chronic pain     G89.29     Active  20756245

 

 Problem  Lumbago with sciatica, right side     M54.41     Active  
[id number]

 

 Problem  COPD suggested by initial evaluation     J44.9     Active  84077552

 

 Problem  Acute midline low back pain with left-sided sciatica     M54.42     
Active  701509407







ALLERGIES







 Substance  Reaction  Event Type  Date  Status

 

 Morphine Sulfate  Rash  Drug Allergy    Active







ENCOUNTERS







 Encounter  Location  Date  Diagnosis

 

 [facility]  [id number], [place] [address]-
2584  07 Sep, 2018  Acute bronchitis, unspecified organism J20.9

 

 [facility]  [id number] [address]
4720  28 Aug, 2018  Fibromyalgia M79.7

 

 [facility]  [id number] [address]-
2580  20 Aug, 2018   

 

 [facility]  [id number] [address]-
4332  16 Aug, 2018  Neuropathy G62.9

 

 [facility]  [id number] [place] [address]-
3324    Essential hypertension I10 ; Coronary artery disease 
involving native coronary artery of native heart without angina pectoris I25.10 
; Fibromyalgia M79.7 ; GERD with esophagitis K21.0 and Tobacco abuse counseling 
Z71.6

 

 [facility]  [id number] [place] [address]44-
7234    Long term (current) use of opiate analgesic Z79.891

 

 [facility]  [id number] [place] [address] 36424-
5137     

 

 [facility]  [address] [address]-
5116    Acute bronchitis, unspecified organism J20.9

 

 [facility]  [id number] [place] [address] 25307-
1182     

 

 [facility]  [id number] [place] [address] 86260-
2745     

 

 [facility]  [id number] [place] [address] 26219-
1097    Chronic pain syndrome G89.4

 

 [facility]  [id number] [place] [address] 87212-
2811     

 

 [facility]  [address] [place] [address]79-
1411  15 Marko, 2018   

 

 [facility]  [id number] [place] [address] 51621-
6217    Acute bronchitis, unspecified organism J20.9

 

 [facility]  [id number] [place] [address] 44581-
0760    Chronic pain syndrome G89.4

 

 [facility]  [id number] [place] [address] 24779-
7235  25 May, 2018   

 

 [facility]  [id number] [place] [address] 15469-
1713  24 May, 2018  Acute midline low back pain with left-sided sciatica M54.42

 

 [facility]  [id number] [place] [address] 44893-
3478  22 May, 2018   

 

 [facility]  [id number] [place] [address] [phone]
[phone]  21 May, 2018  Bronchitis J40

 

 [facility]  [address] [address]-
2673  07 May, 2018  Chronic pain syndrome G89.4 and Neuropathy G62.9

 

 [facility]  [address] [address]-
7017    Acute midline low back pain with left-sided sciatica M54.42

 

 [facility]  [address] [address]-
5942     

 

 [facility]  [address] [address]-
8835    Anxiety disorder, unspecified F41.9

 

 [facility]  [id number] [place] [address] 68612-
9013     

 

 [facility]  [address] [address]-
3004    Chronic pain syndrome G89.4 and Acute midline low back pain 
with left-sided sciatica M54.42

 

 [facility]  [id number] [place] [address] 00264-
0611    Chronic pain syndrome G89.4

 

 [facility]  [id number] [place] [address] 59541-
4992     

 

 [facility]  [address] [address]-
9179     

 

 [facility]  [id number] [place] [address] 00759-
0950  29 Mar, 2018  Injury of left knee, initial encounter S89.92XA and COPD 
suggested by initial evaluation J44.9

 

 [id number] [place] [address] 28839-
4815  28 Mar, 2018  Acute bronchitis, unspecified organism J20.9

 

 [facility]  [id number] [place] [address] 77486-
3656  27 Mar, 2018  Acute bronchitis, unspecified organism J20.9

 

 [facility]  [id number] [place] [address] 96391-
6826  21 Mar, 2018  Anxiety disorder, unspecified F41.9 and Acute bronchitis, 
unspecified organism J20.9

 

 [facility]  [id number] [place] [address] 64604-
7777  08 Mar, 2018  Chronic pain syndrome G89.4

 

 [facility]  [id number] [place] [address] 76534-
3036  05 Mar, 2018   

 

 [facility]  [id number] [place] [address] 43986-
8415  05 Mar, 2018  Acute midline low back pain with left-sided sciatica M54.42

 

 [facility]  [id number] [place] [address] 63382-
9173     

 

 [facility]  [id number] [place] [address] 18348-
1982    Chronic pain syndrome G89.4

 

 [facility]  [id number] [place] [address] 72371-
3761    Chronic pain syndrome G89.4

 

 [facility]  [id number] [place] [address] 63006-
8386     

 

 [facility]  [id number] [place] [address] 14267-
7166    Gastroenteritis K52.9

 

 [facility]  [id number] [place] [address] 39748-
7934     

 

 [facility]  [id number] [place] [address] 25758-
1207  15 Jerardo, 2018  Acute bronchitis, unspecified organism J20.9

 

 [id number] [place] [address] 74095-
2211    Anxiety disorder, unspecified F41.9 and Neuropathy G62.9

 

 [id number] [place] [address] 29293-
3878    Chronic pain syndrome G89.4

 

 [address] [address]-
3278    Bronchitis J40 and Laryngitis J04.0

 

 [address] [address]-
8964  29 Dec, 2017   

 

 [address] [address]-
8425  26 Dec, 2017  Bronchitis J40

 

 [address] [address]-
4229  18 Dec, 2017   

 

 [address] [address]-
5784  13 Dec, 2017  Fibromyalgia M79.7 and Chronic pain syndrome G89.4

 

 [address] [address]-
9889  04 Dec, 2017  Chronic pain syndrome G89.4 and Acute bronchitis, 
unspecified organism J20.9

 

 [id number] [place] [address] 33834-
0757    Neuropathy G62.9

 

 [id number] [place] [address] 466[address]-
1988    Chronic pain syndrome G89.4 ; Lumbago with sciatica, left 
side M54.42 ; Lumbago with sciatica, right side M54.41 ; Screening cholesterol 
level Z13.220 ; Screening for diabetes mellitus Z13.1 ; Screening for thyroid 
disorder Z13.29 ; Fibromyalgia M79.7 ; Anxiety disorder, unspecified F41.9 and 
Neuropathy G62.9

 

 [id number] [place] [address] 39234-
9905     

 

 [facility]  [address] [address]-
1409  25 Oct, 2017   

 

 [facility]  [id number] [place] [address] 41027-
6923  10 Oct, 2017  Fibromyalgia M79.7 ; Chronic pain syndrome G89.4 ; Anxiety 
disorder, unspecified F41.9 ; Neuropathy G62.9 and Acute bronchitis, 
unspecified organism J20.9

 

 [facility]  [id number] [address]-
6208  09 Oct, 2017   

 

 [facility]  [id number] [place] [address] 87600-
2160  25 Sep, 2017   

 

 [facility]  [id number] [place] [address] 48648-
4468  19 Sep, 2017   

 

 [facility]  [id number] [place] [address] 25351-
4296  08 Sep, 2017   

 

 [facility]  [address] [address]-
4349  23 Aug, 2017   

 

 [facility]  [id number] [place] [address] 14903-
8182  22 Aug, 2017  Acute seasonal allergic rhinitis due to pollen J30.1

 

 [facility]  [address], [place] [address]-
1359  21 Aug, 2017   

 

 [facility]  [address] [address]-
6966  09 Aug, 2017   

 

 [facility]  [address] [address]-
7273     

 

 [facility]  [id number] [address]-
8483     

 

 [facility]  [address] [address]-
4166  10 Jul, 2017   

 

 [facility]  [id number] [place] [address] 96035-
9321     

 

 [facility]  [address] [address]-
1801     

 

 [facility]  [address] [address]-
5140     

 

 [facility]  [id number] [address]-
6944    Chronic pain syndrome G89.4 ; Fibromyalgia M79.7 ; Anxiety 
disorder, unspecified F41.9 ; Neuropathy G62.9 and Low back pain M54.5

 

 [facility]  [address] [place] [address] 01877-
1286  25 May, 2017  Low back pain M54.5

 

 [facility]  [id number] [place] [address] 18196-
9282  24 May, 2017   

 

 [facility]  [id number] [place] [address] 86093-
3320  22 May, 2017   

 

 [facility]  [address] [address]-
3058  16 May, 2017   

 

 [facility]  [id number] [place] [address] 77623-
5235  16 May, 2017   

 

 [facility]  [address] [address]-
2128  12 May, 2017  Routine adult health maintenance Z00.00 ; Fibromyalgia 
M79.7 ; Chronic pain syndrome G89.4 ; Abnormal lung sounds R09.89 ; Coronary 
artery disease involving native coronary artery of native heart without angina 
pectoris I25.10 and S/P CABG (coronary artery bypass graft) Z95.1

 

 [facility]  [address], [place] [address]-
6679  09 May, 2017   







IMMUNIZATIONS

No Known Immunizations



SOCIAL HISTORY

Never Assessed



REASON FOR VISIT

Pain management (chronic)., heart cath/stent placement 7/3/18 @ [facility]
caridad



PLAN OF CARE







 Activity  Details









  









 Follow Up  3 Months, prn Reason:CHM/Pain

 

 Pending Test  PDM - 09 PANEL (PROFILE 1) 



 



VITAL SIGNS







 Height  5'2" in  2018

 

 Weight  129 lbs  2018

 

 Temperature  98.8 degrees Fahrenheit  2018

 

 Heart Rate  82 bpm  2018

 

 Respiratory Rate  20   2018

 

 BMI  23.59 kg/m2  2018

 

 Blood pressure systolic  158 mmHg  2018

 

 Blood pressure diastolic  76 mmHg  2018







MEDICATIONS







 Medication  Instructions  Dosage  Frequency  Start Date  End Date  Duration  
Status

 

 Sumatriptan Succinate 6 MG/0.5ML  Subcutaneous Once a day prn migraine  0.5 ml 
as needed           14 days  Active

 

 Metoprolol Succinate ER 25 MG  Orally Once a day  1 tablet  24h        90 days
  Active

 

 Promethazine HCl 25 MG  Orally 4 times a day  1 tablet as needed  6h     6 Sep
, 2018  14 days  Active

 

 ProAir  (90 Base) MCG/ACT  Inhalation every 4 hrs  2 puffs as needed  
4h  10 Oct, 2017     12 months  Active

 

 Enalapril Maleate 5 mg  Orally Once a day  1 tablet  24h       90 
days  Active

 

 Aspir-81 81 MG  Orally Once a day  1 tablet  24h           Active

 

 Chantix 1 MG     1/2 tablet daily x3 days, 1/2 tablet twice a day x3 days, 
then 1 tablet twice daily thereafter             Active

 

 Chantix 0.5 MG  Orally Once a day  1/2 tab daily x 3 days, 1/2 tab bid x 3 days
, then 1 tab bid  24h  31 Jul, 2018  29 Oct, 2018  30 day(s)  Active

 

 Seroquel 200 mg  Orally Once a day  2.5 tablet at bedtime  24h        30  
Active

 

 Tylenol     1 tab              Active

 

 Lipitor 40 MG  Orally HS  1 tablet              Active

 

 Chlorzoxazone 500 mg  Orally Three times a day if needed  1 tablet           
30  Active

 

 Omeprazole 20 mg  Orally Once a day  1 capsule  24h        90 days  Active

 

 Xanax 0.25 MG  Orally Twice a day  1 tablet  12h        28 days  Active

 

 Gabapentin 300 MG  Orally Once a day  1 capsule  24h  09 May, 2017     30 days
  Active

 

 Clopidogrel Bisulfate 75 MG  Orally Once a day  1 tablet  24h        90 days  
Active

 

 Symbicort 80-4.5 MCG/ACT  Inhalation Twice a day  2 puffs  12h  29 Mar, 2018  
29 Mar, 2023  12 months  Active

 

 Tramadol HCl 50 mg  Orally every 6 hrs  1 tablet as needed  6h    
   28 days  Active







RESULTS

No Results



PROCEDURES







 Procedure  Date Ordered  Result  Body Site

 

 09 PANEL (PROFILE 1)  2018      







INSTRUCTIONS





MEDICATIONS ADMINISTERED

No Known Medications



MEDICAL (GENERAL) HISTORY







 Type  Description  Date

 

 Medical History  fibromyalgia   

 

 Medical History  MRI 2016- small central protrusion/herniation w/minimal 
impression on thecal anteriorly at C5-6, At C3-4 small bilat. uncinate spurs w/ 
mild right neural foraminal narrowing   

 

 Medical History  MRI 2016- mild degenerative changes. No spinal canal 
stenosis or foraminal narrowing of thoracic spine   

 

 Medical History  hypertension   

 

 Medical History  Anxiety disorder   

 

 Medical History  depression   

 

 Medical History  migraine headaches   

 

 Medical History  Hx of C-Diff   

 

 Medical History  Hx of Pneumonia   

 

 Medical History  heart cath w/ stents placed 7/3/18   

 

 Surgical History  Open Heart Surgery - [facility] -Dr. Lima  (
Cardiologist- Dr Logan)  

 

 Surgical History  hysterectomy - Dr Luis   

 

 Surgical History  Left Breast Lumpectomy (Bx - Benign)- Dr Luis   

 

 Surgical History  Port - Dr Luis   

 

 Surgical History  Left Knee Surgeries x3- Dr Carolina did 2 and Dr Gan did 1   

 

 Surgical History  cath/stent  

 

 Hospitalization History  C-Diff - [facility]   

 

 Hospitalization History  Pneumonia - [facility]   

 

 Hospitalization History  Open Heart Surgery - [facility]

## 2019-01-07 NOTE — XMS REPORT
[facility]

 Created on: 2018



Dayami Rae

External Reference #: [id number]

: 1970

Sex: Female



Demographics







 Address  [address] [place]  [address]

 

 Preferred Language  Unknown

 

 Marital Status  Unknown

 

 Nondenominational Affiliation  Unknown

 

 Race  Unknown

 

 Ethnic Group  Unknown





Author







 Author  SURENDRA FRASER

 

 [facility]

 

 Address  [address] [place]  [address]



 

 Phone  [phone]







Care Team Providers







 Care Team Member Name  Role  Phone

 

 SURENDRA FRASER  Unavailable  [phone]







PROBLEMS







 Type  Condition  ICD9-CM Code  WYR71-TS Code  Onset Dates  Condition Status  
SNOMED Code

 

 Problem  Coronary artery disease involving native coronary artery of native 
heart without angina pectoris     I25.10     Active  6080103506467

 

 Problem  Anxiety disorder, unspecified     F41.9     Active  585735288

 

 Problem  Neuropathy     G62.9     Active  139028369

 

 Problem  Fibromyalgia     M79.7     Active  123008889

 

 Problem  Chronic pain syndrome     G89.4     Active  641909957

 

 Problem  Cervical radiculopathy     M54.12     Active  56881926

 

 Problem  Chronic obstructive pulmonary disease, unspecified COPD type     
J44.9     Active  98206756

 

 Problem  Lumbago with sciatica, right side     M54.41     Active  
[id number]

 

 Problem  Lumbago with sciatica, left side     M54.42     Active  144506519

 

 Problem  Essential hypertension     I10     Active  09765574

 

 Problem  GERD with esophagitis     K21.0     Active  600649148







ALLERGIES







 Substance  Reaction  Event Type  Date  Status

 

 Morphine Sulfate  Rash  Drug Allergy  29 Oct, 2018  Active







ENCOUNTERS







 Encounter  Location  Date  Diagnosis

 

 [place] [address] [place] [address] 20539-
[phone]     

 

 [facility] [place]  [id number] [place] [address] 56410-
9168  29 Oct, 2018  Cervical radiculopathy M54.12 ; Chronic pain syndrome G89.4 
and Anxiety disorder, unspecified F41.9

 

 [facility] [place]  [id number] [place] [address] 32164-
9532  24 Oct, 2018   

 

 [place] [address] [place] [id number] [place] [address] 56656-
3286  27 Sep, 2018  Fibromyalgia M79.7

 

 [place] [address] [place] [address] 79675-
7222  20 Sep, 2018   

 

 [place] [address] [id number] [place] [address] 87687-
9903  19 Sep, 2018   

 

 [place] [address] [id number] [place] [address] 42304-
8771  17 Sep, 2018  Pneumonia of right lower lobe due to infectious organism 
J18.1 and Chronic obstructive pulmonary disease, unspecified COPD type J44.9

 

 [place] [address] [place] [id number] [place] [address] 72156-
8078  14 Sep, 2018  Pneumonia of right lower lobe due to infectious organism 
J18.1 ; Chronic obstructive pulmonary disease, unspecified COPD type J44.9 ; 
Chronic nausea R11.0 and Cough R05

 

 [place] [address] [place] [id number] [place] [address] 99126-
1009  07 Sep, 2018  Acute bronchitis, unspecified organism J20.9

 

 [place] [address] [id number] [place] [address] 82662-
4032  28 Aug, 2018  Fibromyalgia M79.7

 

 [place] [id number] [place] [address] 14944-
0788  20 Aug, 2018   

 

 [place] [address] [place] [address] 25829-
4989  16 Aug, 2018  Neuropathy G62.9

 

 [place] [address] [place] [address] 07330-
1900    Essential hypertension I10 ; Coronary artery disease 
involving native coronary artery of native heart without angina pectoris I25.10 
; Fibromyalgia M79.7 ; GERD with esophagitis K21.0 and Tobacco abuse counseling 
Z71.6

 

 [place] [address] [id number] [place] [address] 00249-
7037  30 2018  Long term (current) use of opiate analgesic Z79.891

 

 [place] [address] [place] [address] 01955-
0716     

 

 [place] [address] [id number], [place] [address]-
5758    Acute bronchitis, unspecified organism J20.9

 

 [facility] [place]  [id number] [place] [address] 17256-
0625     

 

 [facility] [place]  [id number] [place] [address] 67760-
6251     

 

 [facility] [place]  [address] [place] [address] 31215-
6702    Chronic pain syndrome G89.4

 

 [facility] [place]  [address] [place] [address] 58962-
8089     

 

 [facility] [place]  [address] [place] [address] 49547-
0373  15 Marko, 2018   

 

 [facility] [place]  [address] [place] [address] 28655-
8620    Acute bronchitis, unspecified organism J20.9

 

 [facility] [place]  [address] [place] [address] 15288-
8037    Chronic pain syndrome G89.4

 

 [facility] [place]  [address] [place] [address] 46645-
9765  25 May, 2018   

 

 [facility] [place]  [address] [place] [address] 53749-
5322  24 May, 2018  Acute midline low back pain with left-sided sciatica M54.42

 

 [facility] [place]  [address] [place] [address] 07729-
9657  22 May, 2018   

 

 [facility]  [address] [place] [address] [phone]
[phone]  21 May, 2018  Bronchitis J40

 

 [facility] [place]  [address] [place] [address] 67068-
4197  07 May, 2018  Chronic pain syndrome G89.4 and Neuropathy G62.9

 

 [facility] [place]  [id number] [place] [address] 18951-
2829    Acute midline low back pain with left-sided sciatica M54.42

 

 [facility] [place]  [address] [place] [address] 89100-
1732     

 

 [facility] [place]  [address] [place] [address] 86003-
1588    Anxiety disorder, unspecified F41.9

 

 [facility] [place]  [address] 05555-
0962     

 

 [facility] [place]  [address] [place] [address] 45490-
1270    Chronic pain syndrome G89.4 and Acute midline low back pain 
with left-sided sciatica M54.42

 

 [facility] [place]  [address] [place] [address] 29836-
4060    Chronic pain syndrome G89.4

 

 [facility] [place]  [address] [place] [address] 39924-
2634     

 

 [facility] [place]  [address] [place] [address] 40573-
1051     

 

 [facility] [place]  [address] [place] [address] 75487-
4633  29 Mar, 2018  Injury of left knee, initial encounter S89.92XA and COPD 
suggested by initial evaluation J44.9

 

 [place] [address] [place] [address] [place] [address] 17230-
8482  28 Mar, 2018  Acute bronchitis, unspecified organism J20.9

 

 [place] [address] [place] [address] [place] [address] 89551-
4909  27 Mar, 2018  Acute bronchitis, unspecified organism J20.9

 

 [place] [address] [place] [place] [address] 50812-
1565  21 Mar, 2018  Anxiety disorder, unspecified F41.9 and Acute bronchitis, 
unspecified organism J20.9

 

 [place] [address] [place] [address] [place] [address] 02192-
9862  08 Mar, 2018  Chronic pain syndrome G89.4

 

 [facility] [place]  [address] [place] [address] 09061-
5191  05 Mar, 2018   

 

 [facility] [place]  [address] [place] [address] 045962-
6161  05 Mar, 2018  Acute midline low back pain with left-sided sciatica M54.42

 

 [facility] [place]  [address] [place] [address] 02273-
9497     

 

 [facility] [place]  [address] [place] [address] 18570-
2374    Chronic pain syndrome G89.4

 

 [facility] [place]  [address] [place] [address] 54401-
2714    Chronic pain syndrome G89.4

 

 [facility] [place]  [address] [place] [address] 90232-
2503     

 

 [facility] [place]  [address] [place] [address] 27587-
5626    Gastroenteritis K52.9

 

 [facility] [place]  [address] [place] [address] 47399-
7268     

 

 [facility] [place]  [address] [place] [address] 07246-
1491  15 Jerardo, 2018  Acute bronchitis, unspecified organism J20.9

 

 [facility] [place]  [address] [place] [address] 67532-
3710    Anxiety disorder, unspecified F41.9 and Neuropathy G62.9

 

 [facility] [place]  [address] [place] [address] 39516-
3636    Chronic pain syndrome G89.4

 

 [facility] [place]  [address] [place] [address] 05579-
4905    Bronchitis J40 and Laryngitis J04.0

 

 [facility] [place]  [address] [place] [address] 86897-
5496  29 Dec, 2017   

 

 [place] [address] [place] [address] 50991-
3161  26 Dec, 2017  Bronchitis J40

 

 [place] [address] [place] [address] [place] [address] 46582-
7287  18 Dec, 2017   

 

 [place] [address] [place] [address] [place] [address] 71662-
4230  13 Dec, 2017  Fibromyalgia M79.7 and Chronic pain syndrome G89.4

 

 [place] [address] [place] [address] 32359-
9937  04 Dec, 2017  Chronic pain syndrome G89.4 and Acute bronchitis, 
unspecified organism J20.9

 

 [place] [address] [place] [address] 27097-
0648    Neuropathy G62.9

 

 [place] [address] [place] [place] [address] 94069-
7398    Chronic pain syndrome G89.4 ; Lumbago with sciatica, left 
side M54.42 ; Lumbago with sciatica, right side M54.41 ; Screening cholesterol 
level Z13.220 ; Screening for diabetes mellitus Z13.1 ; Screening for thyroid 
disorder Z13.29 ; Fibromyalgia M79.7 ; Anxiety disorder, unspecified F41.9 and 
Neuropathy G62.9

 

 [place] [address] [address]-
2208     

 

 [place] [address] [place] [address] [place] [address] 21903-
1261  25 Oct, 2017   

 

 [place] [address] [place] [place] [address] 14357-
5964  10 Oct, 2017  Fibromyalgia M79.7 ; Chronic pain syndrome G89.4 ; Anxiety 
disorder, unspecified F41.9 ; Neuropathy G62.9 and Acute bronchitis, 
unspecified organism J20.9

 

 [place] [address] [address]-
2171  09 Oct, 2017   

 

 [place] [address] [place] [address] [address]-
5777  25 Sep, 2017   

 

 [facility] [place]  [address] [address]-
7020  19 Sep, 2017   

 

 [facility] [place]  [address] [place] [address]-
1338  08 Sep, 2017   

 

 [facility] [place]  [address] [place] [address]-
8588  23 Aug, 2017   

 

 [facility] [place]  [address] [place] [address] 95795-
5624  22 Aug, 2017  Acute seasonal allergic rhinitis due to pollen J30.1

 

 [facility] [place]  [address] [place] [address] 78198-
1654  21 Aug, 2017   

 

 [facility] [place]  [address] [address]-
3804  09 Aug, 2017   

 

 [facility] [place]  [address] [address]-
2311     

 

 [facility] [place]  [address] [address]-
5823     

 

 [facility] [place]  [address] [place] [address]-
5352  10 Jul, 2017   

 

 [facility] [place]  [address] [address]-
8521     

 

 [facility] [place]  [address] [address]-
9125     

 

 [facility] [place]  [address] [place] [address]-
4384     

 

 [facility] [place]  [address] [address]-
9154    Chronic pain syndrome G89.4 ; Fibromyalgia M79.7 ; Anxiety 
disorder, unspecified F41.9 ; Neuropathy G62.9 and Low back pain M54.5

 

 [facility] [place]  [address] [place] [address]-
5361  25 May, 2017  Low back pain M54.5

 

 [place] [address] [place] [address] 25761-
0336  24 May, 2017   

 

 [facility] [place]  [address] [place] [address] 17889-
5667  22 May, 2017   

 

 [place] [address] [place] [address] 18053-
1384  16 May, 2017   

 

 [place] [address] [place] [address] 99757-
0090  16 May, 2017   

 

 [place] [address] [place] [address] 74234-
1711  12 May, 2017  Routine adult health maintenance Z00.00 ; Fibromyalgia 
M79.7 ; Chronic pain syndrome G89.4 ; Abnormal lung sounds R09.89 ; Coronary 
artery disease involving native coronary artery of native heart without angina 
pectoris I25.10 and S/P CABG (coronary artery bypass graft) Z95.1

 

 [place] [address] [place] [address] 21743-
9950  09 May, 2017   







IMMUNIZATIONS

No Known Immunizations



SOCIAL HISTORY

Never Assessed



REASON FOR VISIT

transiton of care  from dr zain Phelps [place] , hands are going numb-  no matter 
time of day   Mattie JESSICA , tingling /swelling x1-2 weeks   cant  or hold on 
to items  Mattie JESSICA 



PLAN OF CARE







 Activity  Details









  









 Follow Up  3 Months Reason:







VITAL SIGNS







 Height  5'2" in  2018-10-29

 

 Weight  132.8 lbs  2018-10-29

 

 Temperature  97.8 degrees Fahrenheit  2018-10-29

 

 Heart Rate  91 bpm  2018-10-29

 

 Respiratory Rate  20   2018-10-29

 

 BMI  24.29 kg/m2  2018-10-29

 

 Blood pressure systolic  128 mmHg  2018-10-29

 

 Blood pressure diastolic  80 mmHg  2018-10-29







MEDICATIONS







 Medication  Instructions  Dosage  Frequency  Start Date  End Date  Duration  
Status

 

 Aspir-81 81 MG  Orally Once a day  1 tablet  24h           Active

 

 Clopidogrel Bisulfate 75 MG  Orally Once a day  1 tablet  24h        90 days  
Active

 

 Tylenol     1 tab              Active

 

 Lipitor 40 MG  Orally HS  1 tablet              Active

 

 Xanax 0.25 MG  Orally Twice a day  1 tablet AM, /2 PM  12h        28 days  
Active

 

 Seroquel 200 mg  Orally Once a day  2.5 tablet at bedtime  24h        30  
Active

 

 Chlorzoxazone 500 mg  Orally Three times a day if needed  1 tablet           
30  Active

 

 Enalapril Maleate 5 mg  Orally Once a day  1 tablet  24h       90 
days  Active

 

 Promethazine HCl 25 MG  Orally every 12 hrs  1 tablet as needed  12h        30 
day(s)  Active

 

 Gabapentin 300 MG  Orally Once a day  1 capsule  24h  09 May, 2017     30 days
  Active

 

 ProAir  (90 Base) MCG/ACT  Inhalation every 4 hrs  2 puffs as needed  
4h  10 Oct, 2017     12 months  Active

 

 Diclofenac Sodium 75 MG  Orally Twice a day  1 tablet with food or milk  12h  
29 Oct, 2018  28 Nov, 2018  30 day(s)  Active

 

 Metoprolol Succinate ER 25 MG  Orally Once a day  1 tablet  24h        90 days
  Active

 

 Tramadol HCl 50 mg  Orally every 6 hrs  1 tablet as needed  6h    28 days  Active

 

 Symbicort 80-4.5 MCG/ACT  Inhalation Twice a day  2 puffs  12h  29 Mar, 2018  
   12 months  Active

 

 Sumatriptan Succinate 6 MG/0.5ML  Subcutaneous Once a day prn migraine  0.5 ml 
as needed           14 days  Active

 

 Chantix 1 MG     1/2 tablet daily x3 days, 1/2 tablet twice a day x3 days, 
then 1 tablet twice daily thereafter             Not-Taking







RESULTS

No Results



PROCEDURES

No Known procedures



INSTRUCTIONS





MEDICATIONS ADMINISTERED

No Known Medications



MEDICAL (GENERAL) HISTORY







 Type  Description  Date

 

 Medical History  fibromyalgia   

 

 Medical History  MRI 2016- small central protrusion/herniation w/minimal 
impression on thecal anteriorly at C5-6, At C3-4 small bilat. uncinate spurs w/ 
mild right neural foraminal narrowing   

 

 Medical History  MRI 2016- mild degenerative changes. No spinal canal 
stenosis or foraminal narrowing of thoracic spine   

 

 Medical History  hypertension   

 

 Medical History  Anxiety disorder   

 

 Medical History  depression   

 

 Medical History  migraine headaches   

 

 Medical History  Hx of C-Diff   

 

 Medical History  Hx of Pneumonia   

 

 Medical History  heart cath w/ stents placed 7/3/18   

 

 Surgical History  Open Heart Surgery - [facility] -Dr. Lima  (
Cardiologist- Dr Logan)  

 

 Surgical History  hysterectomy - Dr Luis   

 

 Surgical History  Left Breast Lumpectomy (Bx - Benign)- Dr Luis   

 

 Surgical History  [place] - Dr Luis   

 

 Surgical History  Left Knee Surgeries x3- Dr Carolina did 2 and Dr Gan did 1   

 

 Surgical History  cath/stent  

 

 Hospitalization History  C-Diff - [facility]   

 

 Hospitalization History  Pneumonia - [facility]   

 

 Hospitalization History  Open Heart Surgery - [facility]

## 2019-01-07 NOTE — XMS REPORT
Continuity of Care Document

 Created on: 2019



MARILIA JONES

External Reference #: [id number]

: 1970

Sex: Female



Demographics







 Address  [address] [place]  [address]

 

 Home Phone  [phone] x

 

 Preferred Language  Unknown

 

 Marital Status  Unknown

 

 Evangelical Affiliation  Unknown

 

 Race  Unknown

 

 Ethnic Group  Unknown





Author







 Author  [facility]

 

 Organization  [facility]

 

 Address  Unknown

 

 Phone  Unavailable



              



Allergies

      





 Active            Description            Code            Type            
Severity            Reaction            Onset            Reported/Identified   
         Relationship to Patient            Clinical Status        

 

 Yes            morphine            [id number]            Drug Allergy         
   Unknown            N/A                         2010                   
               

 

 Yes            morphine            [id number]            Drug Allergy         
   Unknown            RASH - TAKES LO                         2016       
                           



                    



Medications

      



There is no data.                  



Problems

      





 Date Dx Coded            Attending            Type            Code            
Diagnosis            Diagnosed By        

 

 2013            CAMI CANALES DO S            Ot            008.45
            INTESTINAL INFECTION DUE TO CLOSTRIDIUM                      

 

 2013            GRAY CANALES DOLINE S            Ot            272.4 
           HYPERLIPIDEMIA NEC/NOS                     

 

 2013            GRAY CANALES DOLINE S            Ot            276.8 
           HYPOPOTASSEMIA                     

 

 2013            GRAY CANALES DOLINE S            Ot            300.00
            ANXIETY STATE NOS                     

 

 2013            ELIAS CANALES DOQUELINE S            Ot            305.1 
           TOBACCO USE DISORDER                     

 

 2013            GRAY CANALES DOLINE S            Ot            311   
         DEPRESSIVE DISORDER NEC                     

 

 2013            GRAY CANALES DOLINE S            Ot            338.29
            OTHER CHRONIC PAIN                     

 

 2013            GRAY CANALES DOLINE S            Ot            401.9 
           HYPERTENSION NOS                     

 

 2013            GRAY CANALES DOLINE S            Ot            414.01
            CORONARY ATHEROSCLEROSIS OF NATIVE CORON                     

 

 2013            ELIAS CANALES DOQUELINE S            Ot            466.0 
           ACUTE BRONCHITIS                     

 

 2013            ELIAS CANALES DOQUELINE S            Ot            491.9 
           CHRONIC BRONCHITIS NOS                     

 

 2013            GRAY CANALES DOLINE S            Ot            493.90
            ASTHMA, UNSPECIFIED                     

 

 2013            GRAY CANALES DOLINE S            Ot            530.81
            ESOPHAGEAL REFLUX                     

 

 2013            ELIAS CANALES DOQUELINE S            Ot            536.2 
           PERSISTENT VOMITING                     

 

 2013            ELIAS CANALES DOQUELINE S            Ot            564.1 
           IRRITABLE BOWEL SYNDROME                     

 

 2013            GRAY CANALES DOLINE S            Ot            722.52
            LUMB/LUMBOSAC DISC DEGEN                     

 

 2013            CAMI CANALES DO S            Ot            780.79
            OTH MALAISE FATIGUE                     

 

 2013            CAMI CANALES DO S            Ot            784.0 
           HEADACHE                     

 

 2013            CAMI CANALES DO S            Ot            V45.81
            AORTOCORONARY BYPASS                     

 

 10/23/2013            JOSE SOTO MD            Ot            272.0     
       PURE HYPERCHOLESTEROLEM                     

 

 10/23/2013            JOES SOTO MD            Ot            305.1     
       TOBACCO USE DISORDER                     

 

 10/23/2013            JOSE SOTO MD            Ot            401.9     
       HYPERTENSION NOS                     

 

 10/23/2013            JOSE SOTO MD            Ot            717.3     
       DERANG MED MENISCUS NEC                     

 

 10/23/2013            JOSE SOTO MD            Ot            717.7     
       CHONDROMALACIA PATELLAE                     

 

 10/23/2013            JOSE SOTO MD            Ot            V57.1     
       PHYSICAL THERAPY NEC                     

 

 2014            JOSE MANCINI MD            Ot            300.4      
      DYSTHYMIC DISORDER                     

 

 2014            JOSE MANCINI MD            Ot            305.1      
      TOBACCO USE DISORDER                     

 

 2014            JOSE MANCINI MD            Ot            414.01     
       CORONARY ATHEROSCLEROSIS OF NATIVE CORON                     

 

 2014            JOSE MANCINI MD            Ot            530.81     
       ESOPHAGEAL REFLUX                     

 

 2014            JOSE MANCINI MD            Ot            786.50     
       CHEST PAIN NOS                     

 

 2014            JOSE MANCINI MD            Ot            V17.3      
      FAM HX-ISCHEM HEART DIS                     

 

 2014            JOSE MANCINI MD, Ot            V45.81     
       AORTOCORONARY BYPASS                     

 

 2014            JOSE MANCINI MD, Ot            V58.69     
       OT MED,LT,CURRENT USE                     

 

 2014            DEMARCUS KRAUSE MD            Ot            346.90
            MIGRAINE UNSPECIFIED W/O INTRACT MGRN W/                     

 

 2014            DEMARCUS KRAUSE MD            Ot            784.0 
           HEADACHE                     

 

 2014            CAMI CANALES DO S            Ot            272.4 
           HYPERLIPIDEMIA NEC/NOS                     

 

 2014            CAMI CANALES DO S            Ot            300.00
            ANXIETY STATE NOS                     

 

 2014            CAMI CANALES DO S            Ot            305.1 
           TOBACCO USE DISORDER                     

 

 2014            CAMI CANALES DO S            Ot            401.9 
           HYPERTENSION NOS                     

 

 2014            ORENDER DO, CAMI S            Ot            412   
         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                     

 

 2014            SEMAJNDER DO, CAMI S            Ot            414.01
            CORONARY ATHEROSCLEROSIS OF NATIVE CORON                     

 

 2014            SEMAJNDER DO, CAMI S            Ot            530.81
            ESOPHAGEAL REFLUX                     

 

 2014            SEMAJNDER DO, CAIM S            Ot            786.51
            PRECORDIAL PAIN                     

 

 2014            SEMAJNDER DO, CAMI S            Ot            V45.81
            AORTOCORONARY BYPASS                     

 

 2014            [facility]            Ot            272.4 
                                 

 

 2014            ORENDER DO, CAMI S            Ot            300.00
                                  

 

 2014            [facility]I [facility]            Ot            305.1 
                                 

 

 2014            [facility], [facility]I S            Ot            401.9 
                                 

 

 2014            [facility], [facility]I S            Ot            412   
                               

 

 2014            [facility], CAMI S            Ot            414.01
                                  

 

 2014            [facility], [facility]I S            Ot            530.81
                                  

 

 2014            [facility], CAMI S            Ot            786.51
                                  

 

 2014            [facility], CAMI S            Ot            V45.81
                                  

 

 2014            SEMAJNDER DO, CAMI S            Ot            717.2 
                                 

 

 2014            [facility], [facility]I S            Ot            719.06
                                  

 

 2014            CHARLES PALACIOS, JOSE BRIDGES            Ot            717.3     
                             

 

 2014            CHARLES PALACIOS, JOSE BRIDGES            Ot            V72.83    
                              

 

 2014            CHARLES PALACIOS, JOSE BRIDGES            Ot            V74.8     
                             

 

 2014            [facility] DO, CAMI S            Ot            536.8 
                                 

 

 2014            [facility]            Ot            780.79
                                  

 

 2014            [facility], CAMI S            Ot            789.00
                                  

 

 2015                         Ot            413.9                        
          

 

 2015                         Ot            V45.81                       
           

 

 2015                         Ot            V57.89                       
           

 

 2015            CHARLES PALACIOS, JOSE BRIDGES            Ot            272.0     
       PURE HYPERCHOLESTEROLEM                     

 

 2015            CHARLES PALACIOS, JOSE BRIDGES            Ot            305.1     
       TOBACCO USE DISORDER                     

 

 2015            CHARLES PALACIOS, JOSE BRIDGES            Ot            414.01    
        CORONARY ATHEROSCLEROSIS OF NATIVE CORON                     

 

 2015            CHARLES PALACIOS, JOSE BRIDGES            Ot            717.3     
       DERANG MED MENISCUS NEC                     

 

 2015            CHARLES PALACIOS, JOSE BRIDGES            Ot            717.7     
       CHONDROMALACIA PATELLAE                     

 

 2015            CHARLES PALACIOS, JOSE BRIDGES            Ot            V57.1     
       PHYSICAL THERAPY NEC                     

 

 2015            CHARLES PALACIOS, JOSE BRIDGES            Ot            V58.69    
        OT MED,LT,CURRENT USE                     

 

 2015            CHARLES PALACIOS, JOSE BRIDGES            Ot            V74.8     
       SCREEN-BACTERIAL DIS NEC                     

 

 2015            ERIN CORONA, CAMI S            Ot            717.2 
                                 

 

 2015            GRAY CANALES DOLINE S            Ot            719.06
                                  

 

 2015            CHARLES PALACIOS, JOSE BRIDGES            Ot            717.3     
                             

 

 2015            CHARLES PALACIOS, JOSE BRIDGES            Ot            V72.83    
                              

 

 2015            CHARLES PALACIOS, JOSE BRIDGES            Ot            V74.8     
                             

 

 2015            ERIN CORONA, CAMI S            Ot            536.8 
                                 

 

 2015            ERIN CORONA, CAMI S            Ot            780.79
                                  

 

 2015            HAZEL , CAMI S            Ot            789.00
                                  

 

 2015                         Ot            717.7                        
          

 

 2015                         Ot            V72.84                       
           

 

 2015            ERIN CORONA, CAMI S            Ot            717.2 
                                 

 

 2015            ERIN CORONA, CAMI S            Ot            719.06
                                  

 

 2015            CHARLES PALACIOS, JOSE BRIDGES            Ot            717.3     
                             

 

 2015            CHARLSE PALACIOS, JOSE BRIDGES            Ot            V72.83    
                              

 

 2015            CHARLES PALACIOS, JOSE BRIDGES            Ot            V74.8     
                             

 

 2015            HAZEL , CAMI S            Ot            536.8 
                                 

 

 2015            [facility]            Ot            780.79
                                  

 

 2015            HAZEL , CAMI S            Ot            789.00
                                  

 

 2015                         Ot            717.7                        
          

 

 2015                         Ot            V72.84                       
           

 

 2015            STARR LARA ARNP            Ot            
786.07                                  

 

 2015            STARR LARA ARNP            Ot            
786.2                                  

 

 2015            STARR LARA ARNP            Ot            
793.19                                  

 

 2015            STARR LARA M ARNP            Ot            
486                                  

 

 2015            [facility] CAMI S            Ot            789.00
                                  

 

 05/15/2015            [facility] CAMI S            Ot            276.51
            DEHYDRATION                     

 

 05/15/2015            ORENDER DO, CAMI S            Ot            787.91
            DIARRHEA                     

 

 05/15/2015            ORENDER DO, CAMI S            Ot            789.00
            ABDOMINAL PAIN, UNSPECIFIED SITE                     

 

 05/15/2015            SEMAJNDER DO, CAMI S            Ot            V12.61
            PERSONAL HISTORY, PNEUMONIA (RECURRENT)                     

 

 2015            [facility] CAMI S            Ot            717.2 
                                 

 

 2015            [facility] DO, CAMI S            Ot            719.06
                                  

 

 2015            CHARLES PALACIOS, JOSE BRIDGES            Ot            717.3     
                             

 

 2015            CHARLES PALACIOS, JOSE BRIDGES            Ot            V72.83    
                              

 

 2015            CHARLES PALACIOS, JOSE BRIDGES            Ot            V74.8     
                             

 

 2015            [facility] DO, CAMI S            Ot            536.8 
                                 

 

 2015            [facility] DO, CAMI S            Ot            780.79
                                  

 

 2015            [facility], [facility]I S            Ot            789.00
                                  

 

 2015                         Ot            717.7                        
          

 

 2015                         Ot            V72.84                       
           

 

 2015            JANE, STARR M ARNP            Ot            
786.07                                  

 

 2015            TEOELAERE, STARR M ARNP            Ot            
786.2                                  

 

 2015            JANE, STARR M ARNP            Ot            
793.19                                  

 

 2015            JANE STARR M ARNP            Ot            
486                                  

 

 2015            [facility] DO, CAMI S            Ot            789.00
                                  

 

 2015            [facility], [facility]I S            Ot            717.2 
                                 

 

 2015            [facility], CAMI S            Ot            719.06
                                  

 

 2015            CHARLES PALACIOS, JOSE BRIDGES            Ot            717.3     
                             

 

 2015            CHARLES PALACIOS, JOSE BRIDGES            Ot            V72.83    
                              

 

 2015            CHARLES PALACIOS, JOSE BRIDGES            Ot            V74.8     
                             

 

 2015            [facility] DO, CAMI S            Ot            536.8 
                                 

 

 2015            SEMAJNDER DO, CAMI S            Ot            780.79
                                  

 

 2015            CAMI CANALES DO S            Ot            789.00
                                  

 

 2015                         Ot            717.7                        
          

 

 2015                         Ot            V72.84                       
           

 

 2015            STARR LARA ARNP            Ot            
786.07                                  

 

 2015            STARR LARA ARNP            Ot            
786.2                                  

 

 2015            STARR LARA ARNP            Ot            
793.19                                  

 

 2015            STARR LARA ARNP            Ot            
486                                  

 

 2015            CAMI CANALES DO S            Ot            789.00
                                  

 

 2015            GERRY SALINAS MD            Ot            272.0         
   PURE HYPERCHOLESTEROLEM                     

 

 2015            GERRY SALINAS MD            Ot            276.51        
    DEHYDRATION                     

 

 2015            GERRY SALINAS MD            Ot            305.1         
   TOBACCO USE DISORDER                     

 

 2015            GERRY SALINAS MD            Ot            401.9         
   HYPERTENSION NOS                     

 

 2015            GERRY SALINAS MD            Ot            414.00        
    CORON ATHEROSCLER NOS TYPE VESSEL, NATIV                     

 

 2015            GERRY SALINAS MD            Ot            530.11        
    REFLUX ESOPHAGITIS                     

 

 2015            GERRY SALINAS MD            Ot            535.40        
    OTH SPECIFIED GASTRITIS,W/O MENTION OF H                     

 

 2015            GERRY SALINAS MD            Ot            553.3         
   DIAPHRAGMATIC HERNIA                     

 

 2015            GERRY SALINAS MD            Ot            784.0         
   HEADACHE                     

 

 2015            GERRY SALINAS MD            Ot            V45.81        
    AORTOCORONARY BYPASS                     

 

 2015            GERRY SALINAS MD            Ot            V58.69        
    OTH MED,LT,CURRENT USE                     

 

 2015            VERITO MARTINEZ            Ot            346.90   
         MIGRAINE UNSPECIFIED W/O INTRACT MGRN W/                     

 

 2015            VERITO MARTINEZ            Ot            473.3    
        CHR SPHENOIDAL SINUSITIS                     

 

 2015            VERITO MARTINEZ            Ot            780.4    
        DIZZINESS AND GIDDINESS                     

 

 2016            CAMI CANALES DO S            Ot            717.2 
                                 

 

 2016            GRAY CANALES DOLINE S            Ot            719.06
                                  

 

 2016            JOSE SOTO MD            Ot            717.3     
                             

 

 2016            ZAFUTA MD, JOSE P            Ot            V72.83    
                              

 

 2016            CHARLES PALACIOS, JOSE P            Ot            V74.8     
                             

 

 2016            ORENDER DO, CAMI S            Ot            536.8 
                                 

 

 2016            [facility]            Ot            780.79
                                  

 

 2016            ORENDER DO, CAMI S            Ot            789.00
                                  

 

 2016                         Ot            717.7                        
          

 

 2016                         Ot            V72.84                       
           

 

 2016            VANBECELAERE, STARR M ARNP            Ot            
786.07                                  

 

 2016            VANBECELAERE, STARR M ARNP            Ot            
786.2                                  

 

 2016            VANBECELAERE, STARR M ARNP            Ot            
793.19                                  

 

 2016            VANBECELAERE, STARR M ARNP            Ot            
486                                  

 

 2016            [facility], CAMI S            Ot            789.00
                                  

 

 2016            OREND DO, CAMI S            Ot            717.2 
                                 

 

 2016            [facility], CAMI S            Ot            719.06
                                  

 

 2016            CHARLES PALACIOS, JOSE P            Ot            717.3     
                             

 

 2016            CHARLES PALACIOS, JOSE P            Ot            V72.83    
                              

 

 2016            CHARLES PALACIOS, JOSE P            Ot            V74.8     
                             

 

 2016            [facility], CAMI S            Ot            536.8 
                                 

 

 2016            OREND DO, CAMI S            Ot            780.79
                                  

 

 2016            OREND DO, CAMI S            Ot            789.00
                                  

 

 2016                         Ot            717.7                        
          

 

 2016                         Ot            V72.84                       
           

 

 2016            VANBECELAERE, STARR M ARNP            Ot            
786.07                                  

 

 2016            VANBECELAERE, STARR M ARNP            Ot            
786.2                                  

 

 2016            VANBECELAERE, STARR M ARNP            Ot            
793.19                                  

 

 2016            VANBECELAERE, STARR M ARNP            Ot            
486                                  

 

 2016            [facility], CAMI S            Ot            789.00
                                  

 

 2016            ARIANNA BRADFORD            Ot            M54.5   
                               

 

 2016            ARIANNA BRADFORD            Ot            M54.6   
                               

 

 2016            JIMI PALACIOS, DEMARCUS KRISHNAN            Ot            
F17.210            NICOTINE DEPENDENCE, CIGARETTES, UNCOMPL                     

 

 2016            JIMI PALACIOS, DEMARCUS KRISHNAN            Ot            I10   
         ESSENTIAL (PRIMARY) HYPERTENSION                     

 

 2016            JIMI PALACIOS, DEMARCUS KRISHNAN            Ot            
J45.909            UNSPECIFIED ASTHMA, UNCOMPLICATED                     

 

 2016            DEMARCUS KRAUSE MD            Ot            M54.2 
           CERVICALGIA                     

 

 2016            DEMARCUS KRAUSE MD            Ot            M54.9 
           DORSALGIA, UNSPECIFIED                     

 

 2016            JIMI PALACIOS, DEMARCUS KRISHNAN            Ot            R20.2 
           PARESTHESIA OF SKIN                     

 

 2016            DEMARCUS KRAUSE MD            Ot            R93.8 
           ABNORMAL FINDINGS ON DIAGNOSTIC IMAGING                      

 

 2016            JIMI PALACIOS, DEMARCUS KRISHNAN            Ot            Z95.1 
           PRESENCE OF AORTOCORONARY BYPASS GRAFT                     

 

 2016            CAMI CANALES DO            Ot            717.2 
                                 

 

 2016            CAMI CANALES DO            Ot            719.06
                                  

 

 2016            CHARLES PALACIOS, JOSE BRIDGES            Ot            717.3     
                             

 

 2016            CHARLES PALACIOS, JOSE BRIDGES            Ot            V72.83    
                              

 

 2016            CHARLES PALACIOS, JOSE BRIDGES            Ot            V74.8     
                             

 

 2016            CAMI CANALES DO            Ot            536.8 
                                 

 

 2016            CAMI CANALES DO S            Ot            780.79
                                  

 

 2016            CAMI CANALES DO S            Ot            789.00
                                  

 

 2016                         Ot            717.7                        
          

 

 2016                         Ot            V72.84                       
           

 

 2016            STARR LARA ARNP            Ot            
786.07                                  

 

 2016            STARR LARA ARNP            Ot            
786.2                                  

 

 2016            STARR LARA ARNP            Ot            
793.19                                  

 

 2016            STARR LARA ARNP            Ot            
486                                  

 

 2016            CAMI CANALES DO S            Ot            789.00
                                  

 

 2016            ARIANNA BRADFORD APRN            Ot            M54.5   
                               

 

 2016            ARIANNA BRADFORD APRLANNY            Ot            M54.6   
                               

 

 2016            SANCHEZ, ARIANNA N APRN            Ot            M54.2   
                               

 

 2016            ARIANNA BRADFORD APRN            Ot            M54.6   
                               

 

 2016            ARIANNA BRADFORD APRN            Ot            R20.9   
                               

 

 2016            CAMI CANALES DO            Ot            A04.7 
           ENTEROCOLITIS DUE TO CLOSTRIDIUM DIFFICI                     

 

 2016            CAMI CANALES DO S            Ot            E86.0 
           DEHYDRATION                     

 

 2016            CAMI CANALES DO S            Ot            
F17.210            NICOTINE DEPENDENCE, CIGARETTES, UNCOMPL                     

 

 2016            ELIAS CANALES DOQUELINE S            Ot            I10   
         ESSENTIAL (PRIMARY) HYPERTENSION                     

 

 2016            GRAY CANALES DOLINE S            Ot            I25.10
            ATHSCL HEART DISEASE OF NATIVE CORONARY                      

 

 2016            CAMI CANALES DO            Ot            J44.9 
           CHRONIC OBSTRUCTIVE PULMONARY DISEASE, U                     

 

 2016            CAMI CANALES DO S            Ot            
J45.909            UNSPECIFIED ASTHMA, UNCOMPLICATED                     

 

 2016            CAMI CANALES DO S            Ot            K21.9 
           GASTRO-ESOPHAGEAL REFLUX DISEASE WITHOUT                     

 

 2016            ELIAS CANALES DOQUELINE S            Ot            Z95.1 
           PRESENCE OF AORTOCORONARY BYPASS GRAFT                     

 

 2016                         Ot            413.9            [place] 
PECTORIS NEC/NOS                     

 

 2016                         Ot            V45.81            
AORTOCORONARY BYPASS                     

 

 2016                         Ot            V57.89            
REHABILITATION PROC NEC                     

 

 2016            CAMI CANALES DO            Ot            A04.7 
           ENTEROCOLITIS DUE TO CLOSTRIDIUM DIFFICI                     

 

 2016            CAMI CANALES DO S            Ot            E86.0 
           DEHYDRATION                     

 

 2016            CAMI CANALES DO S            Ot            
F17.210            NICOTINE DEPENDENCE, CIGARETTES, UNCOMPL                     

 

 2016            GRAY CANALES DOLINE S            Ot            F31.9 
           BIPOLAR DISORDER, UNSPECIFIED                     

 

 2016            GRAY CANALES DOLINE S            Ot            
G43.909            MIGRAINE, UNSP, NOT INTRACTABLE, WITHOUT                     

 

 2016            GRAY CANALES DOLINE S            Ot            I10   
         ESSENTIAL (PRIMARY) HYPERTENSION                     

 

 2016            GRAY CANALES DOLINE S            Ot            I25.10
            ATHSCL HEART DISEASE OF NATIVE CORONARY                      

 

 2016            GRAY CANALES DOLINE S            Ot            J18.9 
           PNEUMONIA, UNSPECIFIED ORGANISM                     

 

 2016            ERIN CORONA CAMI S            Ot            J44.1 
           CHRONIC OBSTRUCTIVE PULMONARY DISEASE W                      

 

 2016            CAMI CANALES DO S            Ot            K21.9 
           GASTRO-ESOPHAGEAL REFLUX DISEASE WITHOUT                     

 

 2016            ELIAS CANALES DOQUELINE S            Ot            R10.13
            EPIGASTRIC PAIN                     

 

 2016            CAMI CANALES DO S            Ot            Z23   
         ENCOUNTER FOR IMMUNIZATION                     

 

 2016            GRAY CANALES DOLINE S            Ot            Z95.1 
           PRESENCE OF AORTOCORONARY BYPASS GRAFT                     

 

 2016            ARIANNA BRADFORD APRN            Ot            M54.2   
         CERVICALGIA                     

 

 2016            ARIANNA BRADFORD APRN            Ot            M54.6   
         PAIN IN THORACIC SPINE                     

 

 2016            ARIANNA BRADFORD APRN            Ot            R20.9   
         UNSPECIFIED DISTURBANCES OF SKIN SENSATI                     

 

 2016            CAMI CANALES DO S            Ot            A04.7 
           ENTEROCOLITIS DUE TO CLOSTRIDIUM DIFFICI                     

 

 2016            GRAY CANALES DOLINE S            Ot            E78.5 
           HYPERLIPIDEMIA, UNSPECIFIED                     

 

 2016            ELIAS CANALES DOQUELINE S            Ot            E86.1 
           HYPOVOLEMIA                     

 

 2016            ELIAS CANALES DOQUELINE S            Ot            E87.6 
           HYPOKALEMIA                     

 

 2016            ELIAS CANALES DOQUELINE S            Ot            
F17.210            NICOTINE DEPENDENCE, CIGARETTES, UNCOMPL                     

 

 2016            ERIN CORONA CAMI S            Ot            
G43.909            MIGRAINE, UNSP, NOT INTRACTABLE, WITHOUT                     

 

 2016            GRAY CANALES DOLINE S            Ot            I10   
         ESSENTIAL (PRIMARY) HYPERTENSION                     

 

 2016            ELIAS CANALES DOQUELINE S            Ot            I25.10
            ATHSCL HEART DISEASE OF NATIVE CORONARY                      

 

 2016            GRAY CANALES DOLINE S            Ot            J18.9 
           PNEUMONIA, UNSPECIFIED ORGANISM                     

 

 2016            CAMI CANALES DO S            Ot            K21.9 
           GASTRO-ESOPHAGEAL REFLUX DISEASE WITHOUT                     

 

 2016            GRAY CANALES DOLINE S            Ot            R07.89
            OTHER CHEST PAIN                     

 

 2016            GRAY CANALES DOLINE S            Ot            Z95.1 
           PRESENCE OF AORTOCORONARY BYPASS GRAFT                     

 

 2016            SANCHEZ, ARIANNA N APRN            Ot            R05     
       COUGH                     

 

 2016            ARIANNA BRADFORD APRN            Ot            R10.12  
          LEFT UPPER QUADRANT PAIN                     

 

 2016            ARIANNA BRADFORD APRN            Ot            R10.32  
          LEFT LOWER QUADRANT PAIN                     

 

 2016            ARIANNA BRADFORD APRN            Ot            R19.7   
         DIARRHEA, UNSPECIFIED                     

 

 2016            ARIANNA BRADFORD APRN            Ot            R50.9   
         FEVER, UNSPECIFIED                     

 

 2016            ARIANNA BRADFORD LANNY APRN            Ot            R05     
       COUGH                     

 

 2016            ARIANNA BRADFORD LANNY APRN            Ot            R10.12  
          LEFT UPPER QUADRANT PAIN                     

 

 2016            ARIANNA BRADFORD LANNY APRN            Ot            R10.32  
          LEFT LOWER QUADRANT PAIN                     

 

 2016            ARIANNA BRADFORD LANNY APRN            Ot            R19.7   
         DIARRHEA, UNSPECIFIED                     

 

 2016            ARIANNA BRADFORD LANNY APRN            Ot            R50.9   
         FEVER, UNSPECIFIED                     

 

 2016            ARIANNA BRADFORD LANNY APRN            Ot            R05     
       COUGH                     

 

 2016            ARIANNA BRADFORD LANNY APRN            Ot            R09.89  
          OTH SYMPTOMS AND SIGNS INVOLVING THE CIR                     

 

 2016            ARIANNA BRADFORD LANNY APRN            Ot            R10.12  
          LEFT UPPER QUADRANT PAIN                     

 

 2016            ARIANNA BRADFORD LANNY APRN            Ot            R10.32  
          LEFT LOWER QUADRANT PAIN                     

 

 2016            ARIANNA BRADFORD LANNY APRN            Ot            R19.7   
         DIARRHEA, UNSPECIFIED                     

 

 2016            ARIANNA BRADFORD LANNY APRN            Ot            R50.9   
         FEVER, UNSPECIFIED                     

 

 2016            CAMI CANALES DO            Ot            717.2 
           DERANG POST MED MENISCUS                     

 

 2016            CAMI CANALES DO            Ot            719.06
            JOINT EFFUSION-L/LEG                     

 

 2016            CHARLES PALACIOS, JOSE BRIDGES            Ot            717.3     
       DERANG MED MENISCUS NEC                     

 

 2016            CHARLES PALACIOS, JOSE BRIDGES            Ot            V72.83    
        EXAM PRE-OPERATIVE NEC                     

 

 2016            CHARLES PALACIOS, JOSE BRIDGES            Ot            V74.8     
       SCREEN-BACTERIAL DIS NEC                     

 

 2016            CAMI CANALES DO            Ot            536.8 
           STOMACH FUNCTION DIS NEC                     

 

 2016            CAMI CANALES DO            Ot            780.79
            OTH MALAISE FATIGUE                     

 

 2016            ORENDER DO, CAMI S            Ot            789.00
            ABDOMINAL PAIN, UNSPECIFIED SITE                     

 

 2016                         Ot            717.7            
CHONDROMALACIA PATELLAE                     

 

 2016                         Ot            V72.84            EXAM PRE-
OPERATIVE NOS                     

 

 2016            STARR LARA ARNP            Ot            
786.07            WHEEZING                     

 

 2016            STARR LARA ARNP            Ot            
786.2            COUGH                     

 

 2016            STARR LARA ARNP            Ot            
793.19            OTHER NONSPECIFIC ABNORMAL FINDING OF ETHAN                     

 

 2016            STARR LARA ARNP            Ot            
486            PNEUMONIA, ORGANISM NOS                     

 

 2016            GRAY CANALES DOLINE S            Ot            789.00
            ABDOMINAL PAIN, UNSPECIFIED SITE                     

 

 2016            ARIANNA BRADFORD APRN            Ot            M54.5   
         LOW BACK PAIN                     

 

 2016            ARIANNA BRADFORD APRN            Ot            M54.6   
         PAIN IN THORACIC SPINE                     

 

 2016            ARIANNA BRADFORD APRN            Ot            M54.2   
         CERVICALGIA                     

 

 2016            ARIANNA BRADFORD APRN            Ot            M54.6   
         PAIN IN THORACIC SPINE                     

 

 2016            ARIANNA BRADFORD APRN            Ot            R20.9   
         UNSPECIFIED DISTURBANCES OF SKIN SENSATI                     

 

 2016            ARIANNA BRADFORD APRN            Ot            R05     
       COUGH                     

 

 2016            ARIANNA BRADFORD APRN            Ot            R09.89  
          OTH SYMPTOMS AND SIGNS INVOLVING THE CIR                     

 

 2016            ARIANNA BRADFORD APRN            Ot            R10.12  
          LEFT UPPER QUADRANT PAIN                     

 

 2016            ARIANNA BRADFORD APRN            Ot            R10.32  
          LEFT LOWER QUADRANT PAIN                     

 

 2016            ARIANNA BRADFORD APRN            Ot            R19.7   
         DIARRHEA, UNSPECIFIED                     

 

 2016            ARIANNA BRADFORD APRN            Ot            R50.9   
         FEVER, UNSPECIFIED                     

 

 09/15/2016            CAMI CANALES DO S            Ot            717.2 
           DERANG POST MED MENISCUS                     

 

 09/15/2016            CAMI CANALES DO S            Ot            719.06
            JOINT EFFUSION-L/LEG                     

 

 09/15/2016            CHARLES PALACIOS, JOSE BRIDGES            Ot            717.3     
       DERANG MED MENISCUS NEC                     

 

 09/15/2016            CHARLES PALACIOS, JOSE BRIDGES            Ot            V72.83    
        EXAM PRE-OPERATIVE NEC                     

 

 09/15/2016            CHARLES PALACIOS, JOSE BRIDGES            Ot            V74.8     
       SCREEN-BACTERIAL DIS NEC                     

 

 09/15/2016            CAMI CANALES DO            Ot            536.8 
           STOMACH FUNCTION DIS NEC                     

 

 09/15/2016            CAMI CANALES DO            Ot            780.79
            OTH MALAISE FATIGUE                     

 

 09/15/2016            CAMI CANALES DO            Ot            789.00
            ABDOMINAL PAIN, UNSPECIFIED SITE                     

 

 09/15/2016                         Ot            717.7            
CHONDROMALACIA PATELLAE                     

 

 09/15/2016                         Ot            V72.84            EXAM PRE-
OPERATIVE NOS                     

 

 09/15/2016            VANEDWARD HUTSONSA M ARNP            Ot            
786.07            WHEEZING                     

 

 09/15/2016            VANBECELAEGEN STARR M ARNP            Ot            
786.2            COUGH                     

 

 09/15/2016            VANCARYL, STARR M ARNP            Ot            
793.19            OTHER NONSPECIFIC ABNORMAL FINDING OF ETHAN                     

 

 09/15/2016            STARR LARA M ARNP            Ot            
486            PNEUMONIA, ORGANISM NOS                     

 

 09/15/2016            CAMI CANALES DO            Ot            789.00
            ABDOMINAL PAIN, UNSPECIFIED SITE                     

 

 09/15/2016            ARIANNA BRADFORD APRN            Ot            M54.5   
         LOW BACK PAIN                     

 

 09/15/2016            ARIANNA BRADFORD APRN            Ot            M54.6   
         PAIN IN THORACIC SPINE                     

 

 09/15/2016            ARIANNA BRADFORD APRN            Ot            M54.2   
         CERVICALGIA                     

 

 09/15/2016            ARIANNA BRADFORD APRN            Ot            M54.6   
         PAIN IN THORACIC SPINE                     

 

 09/15/2016            ARIANNA BRADFORD APRN            Ot            R20.9   
         UNSPECIFIED DISTURBANCES OF SKIN SENSATI                     

 

 09/15/2016            ARIANNA BRADFORD APRN            Ot            R05     
       COUGH                     

 

 09/15/2016            ARIANNA BRADFORD APRN            Ot            R09.89  
          OT SYMPTOMS AND SIGNS INVOLVING THE CIR                     

 

 09/15/2016            ARIANNA BRADFORD APRN            Ot            R10.12  
          LEFT UPPER QUADRANT PAIN                     

 

 09/15/2016            ARIANNA BRADFORD APRN            Ot            R10.32  
          LEFT LOWER QUADRANT PAIN                     

 

 09/15/2016            ARIANNA BRADFORD APRN            Ot            R19.7   
         DIARRHEA, UNSPECIFIED                     

 

 09/15/2016            ARIANNA BRADFORD APRN            Ot            R50.9   
         FEVER, UNSPECIFIED                     

 

 2016            CAMI CANALES DO            Ot            717.2 
           DERANG POST MED MENISCUS                     

 

 2016            CAMI CANALES DO S            Ot            719.06
            JOINT EFFUSION-L/LEG                     

 

 2016            CHARLES PALACIOS, JOSE BRIDGES            Ot            717.3     
       DERANG MED MENISCUS NEC                     

 

 2016            JOSE SOTO MD            Ot            V72.83    
        EXAM PRE-OPERATIVE NEC                     

 

 2016            JOSE SOTO MD            Ot            V74.8     
       SCREEN-BACTERIAL DIS NEC                     

 

 2016            CAMI CANALES DO S            Ot            536.8 
           STOMACH FUNCTION DIS NEC                     

 

 2016            CAMI CANALES DO S            Ot            780.79
            OTH MALAISE FATIGUE                     

 

 2016            CAMI CANALES DO S            Ot            789.00
            ABDOMINAL PAIN, UNSPECIFIED SITE                     

 

 2016                         Ot            717.7            
CHONDROMALACIA PATELLAE                     

 

 2016                         Ot            V72.84            EXAM PRE-
OPERATIVE NOS                     

 

 2016            VANCARYL STARR M ARNP            Ot            
786.07            WHEEZING                     

 

 2016            VANGISSELELAEGEN STARR M ARNP            Ot            
786.2            COUGH                     

 

 2016            VANNOHELIARE STARR M ARNP            Ot            
793.19            OTHER NONSPECIFIC ABNORMAL FINDING OF ETHAN                     

 

 2016            VANGISSELELAERE STARR M ARNP            Ot            
486            PNEUMONIA, ORGANISM NOS                     

 

 2016            CAMI CANALES DO S            Ot            789.00
            ABDOMINAL PAIN, UNSPECIFIED SITE                     

 

 2016            ARIANNA BRADFORD APRN            Ot            M54.5   
         LOW BACK PAIN                     

 

 2016            ARIANNA BRADFORD APRN            Ot            M54.6   
         PAIN IN THORACIC SPINE                     

 

 2016            ARIANNA BRADFORD APRN            Ot            M54.2   
         CERVICALGIA                     

 

 2016            ARIANNA BRADFORD APRN            Ot            M54.6   
         PAIN IN THORACIC SPINE                     

 

 2016            ARIANNA BRADFORD APRN            Ot            R20.9   
         UNSPECIFIED DISTURBANCES OF SKIN SENSATI                     

 

 2016            ARIANNA BRADFORD APRN            Ot            R05     
       COUGH                     

 

 2016            ARIANNA BRADFORD APRN            Ot            R09.89  
          OTH SYMPTOMS AND SIGNS INVOLVING THE CIR                     

 

 2016            ARIANNA BRADFORD APRN            Ot            R10.12  
          LEFT UPPER QUADRANT PAIN                     

 

 2016            ARIANNA BRADFORD APRN            Ot            R10.32  
          LEFT LOWER QUADRANT PAIN                     

 

 2016            ARIANNA BRADFORD APRN            Ot            R19.7   
         DIARRHEA, UNSPECIFIED                     

 

 2016            ARIANNA BRADFORD APRN            Ot            R50.9   
         FEVER, UNSPECIFIED                     

 

 2017            SEMAJNDCAMI JAMES DO            Ot            717.2 
           DERANG POST MED MENISCUS                     

 

 2017            CAMI CANALES DO            Ot            719.06
            JOINT EFFUSION-L/LEG                     

 

 2017            CHARLES PALACIOS, JOSE BRIDGES            Ot            717.3     
       DERANG MED MENISCUS NEC                     

 

 2017            CHARLES PALACIOS, JOSE BRIDGES            Ot            V72.83    
        EXAM PRE-OPERATIVE NEC                     

 

 2017            CHARLES PALACIOS, JOSE BRIDGES            Ot            V74.8     
       SCREEN-BACTERIAL DIS NEC                     

 

 2017            CAMI CANALES DO S            Ot            536.8 
           STOMACH FUNCTION DIS NEC                     

 

 2017            CAMI CANALES DO S            Ot            780.79
            OTH MALAISE FATIGUE                     

 

 2017            CAMI CANALES DO S            Ot            789.00
            ABDOMINAL PAIN, UNSPECIFIED SITE                     

 

 2017                         Ot            717.7            
CHONDROMALACIA PATELLAE                     

 

 2017                         Ot            V72.84            EXAM PRE-
OPERATIVE NOS                     

 

 2017            VANBECELAERE, STARR M ARNP            Ot            
786.07            WHEEZING                     

 

 2017            VANBECELAERE, STARR M ARNP            Ot            
786.2            COUGH                     

 

 2017            VANBECELAERE, STARR M ARNP            Ot            
793.19            OTHER NONSPECIFIC ABNORMAL FINDING OF ETHAN                     

 

 2017            VANGISSELELAERE STARR M ARNP            Ot            
486            PNEUMONIA, ORGANISM NOS                     

 

 2017            CAMI CANALES DO S            Ot            789.00
            ABDOMINAL PAIN, UNSPECIFIED SITE                     

 

 2017            ARIANNA BRADFORD APRN            Ot            M54.5   
         LOW BACK PAIN                     

 

 2017            ARIANNA BRADFORD APRN            Ot            M54.6   
         PAIN IN THORACIC SPINE                     

 

 2017            ARIANNA BRADFORD APRN            Ot            M54.2   
         CERVICALGIA                     

 

 2017            ARIANNA BRADFORD APRN            Ot            M54.6   
         PAIN IN THORACIC SPINE                     

 

 2017            ARIANNA BRADFORD APRN            Ot            R20.9   
         UNSPECIFIED DISTURBANCES OF SKIN SENSATI                     

 

 2017            ARIANNA BRADFORD APRN            Ot            R05     
       COUGH                     

 

 2017            ARIANNA BRADFORD APRN            Ot            R09.89  
          OTH SYMPTOMS AND SIGNS INVOLVING THE CIR                     

 

 2017            ARIANNA BRADFORD APRN            Ot            R10.12  
          LEFT UPPER QUADRANT PAIN                     

 

 2017            ARIANNA BRADFORD APRN            Ot            R10.32  
          LEFT LOWER QUADRANT PAIN                     

 

 2017            ARIANNA BRADFORD APRN            Ot            R19.7   
         DIARRHEA, UNSPECIFIED                     

 

 2017            ARIANNA BRADFORD APRN            Ot            R50.9   
         FEVER, UNSPECIFIED                     

 

 2017            CHARISSA PALACIOS, SUBHA REDDY            Ot            M54.5       
     LOW BACK PAIN                     

 

 2017            CHARISSA PALACIOS, SUBHA REDDY            Ot            M54.5       
     LOW BACK PAIN                     

 

 05/15/2017            CMAI CANALES DO            Ot            717.2 
           DERANG POST MED MENISCUS                     

 

 05/15/2017            CAMI CANALES DO            Ot            719.06
            JOINT EFFUSION-L/LEG                     

 

 05/15/2017            JOSE SOTO MD            Ot            717.3     
       DERANG MED MENISCUS NEC                     

 

 05/15/2017            JOSE SOTO MD            Ot            V72.83    
        EXAM PRE-OPERATIVE NEC                     

 

 05/15/2017            CHARLES PALACIOS, JOSE BRIDGES            Ot            V74.8     
       SCREEN-BACTERIAL DIS NEC                     

 

 05/15/2017            CAMI CANALES DO            Ot            536.8 
           STOMACH FUNCTION DIS NEC                     

 

 05/15/2017            CAMI CANALES DO S            Ot            780.79
            OTH MALAISE FATIGUE                     

 

 05/15/2017            CAMI CANALES DO S            Ot            789.00
            ABDOMINAL PAIN, UNSPECIFIED SITE                     

 

 05/15/2017                         Ot            717.7            
CHONDROMALACIA PATELLAE                     

 

 05/15/2017                         Ot            V72.84            EXAM PRE-
OPERATIVE NOS                     

 

 05/15/2017            VANGISSELELAERESTARR M ARNP            Ot            
786.07            WHEEZING                     

 

 05/15/2017            VANGISSELELAESTARR BLEVINS ARNP            Ot            
786.2            COUGH                     

 

 05/15/2017            VANGISSELELAERE, STARR M ARNP            Ot            
793.19            OTHER NONSPECIFIC ABNORMAL FINDING OF ETHAN                     

 

 05/15/2017            VANSTARR HUTSON M ARNP            Ot            
486            PNEUMONIA, ORGANISM NOS                     

 

 05/15/2017            CAMI CANALES DO S            Ot            789.00
            ABDOMINAL PAIN, UNSPECIFIED SITE                     

 

 05/15/2017            ARIANNA BRADFORD APRN            Ot            M54.5   
         LOW BACK PAIN                     

 

 05/15/2017            ARIANNA BRADFORD LANNY APRN            Ot            M54.6   
         PAIN IN THORACIC SPINE                     

 

 05/15/2017            ARIANNA BRADFORD LANNY APRN            Ot            M54.2   
         CERVICALGIA                     

 

 05/15/2017            ARIANNA BRADFORD LANNY APRN            Ot            M54.6   
         PAIN IN THORACIC SPINE                     

 

 05/15/2017            ARIANNA BRADFORD LANNY APRN            Ot            R20.9   
         UNSPECIFIED DISTURBANCES OF SKIN SENSATI                     

 

 05/15/2017            ARIANNA BRADFORD LANNY APRN            Ot            R05     
       COUGH                     

 

 05/15/2017            ARIANNA BRADFORD LANNY APRN            Ot            R09.89  
          OTH SYMPTOMS AND SIGNS INVOLVING THE CIR                     

 

 05/15/2017            ARIANNA BRADFORD LANNY APRN            Ot            R10.12  
          LEFT UPPER QUADRANT PAIN                     

 

 05/15/2017            ARIANNA BRADFORD LANNY APRN            Ot            R10.32  
          LEFT LOWER QUADRANT PAIN                     

 

 05/15/2017            ARIANNA BRADFORD LANNY APRN            Ot            R19.7   
         DIARRHEA, UNSPECIFIED                     

 

 05/15/2017            ARIANNA BRADFORD LANNY APRN            Ot            R50.9   
         FEVER, UNSPECIFIED                     

 

 05/15/2017            CHARISSA PALACIOS, SUBHA REDDY            Ot            M54.5       
     LOW BACK PAIN                     

 

 2017            SUBHA CARRINGTON MD            Ot            M54.5       
     LOW BACK PAIN                     

 

 2017            SUBHA CARRINGTON MD            Ot            M54.5       
     LOW BACK PAIN                     

 

 2017            CAMI CANALES DO            Ot            717.2 
           DERANG POST MED MENISCUS                     

 

 2017            CAMI CANALES DO            Ot            719.06
            JOINT EFFUSION-L/LEG                     

 

 2017            JOSE SOTO MD            Ot            717.3     
       DERANG MED MENISCUS NEC                     

 

 2017            JOSE SOTO MD            Ot            V72.83    
        EXAM PRE-OPERATIVE NEC                     

 

 2017            JOSE SOTO MD            Ot            V74.8     
       SCREEN-BACTERIAL DIS NEC                     

 

 2017            CAMI CANALES DO            Ot            536.8 
           STOMACH FUNCTION DIS NEC                     

 

 2017            CAMI CANALES DO            Ot            780.79
            OTH MALAISE FATIGUE                     

 

 2017            CAMI CANALES DO            Ot            789.00
            ABDOMINAL PAIN, UNSPECIFIED SITE                     

 

 2017                         Ot            717.7            
CHONDROMALACIA PATELLAE                     

 

 2017                         Ot            V72.84            EXAM PRE-
OPERATIVE NOS                     

 

 2017            VANBECELAERE, STARR M ARNP            Ot            
786.07            WHEEZING                     

 

 2017            STARR LARA ARNP            Ot            
786.2            COUGH                     

 

 2017            STARR LRAA ARNP            Ot            
793.19            OTHER NONSPECIFIC ABNORMAL FINDING OF ETHAN                     

 

 2017            STARR LARA ARNP            Ot            
486            PNEUMONIA, ORGANISM NOS                     

 

 2017            CAMI CANALES DO S            Ot            789.00
            ABDOMINAL PAIN, UNSPECIFIED SITE                     

 

 2017            ARIANNA BRADFORD APRN            Ot            M54.5   
         LOW BACK PAIN                     

 

 2017            ARIANNA BRADFORD APRN            Ot            M54.6   
         PAIN IN THORACIC SPINE                     

 

 2017            ARIANNA BRADFORD APRN            Ot            M54.2   
         CERVICALGIA                     

 

 2017            ARIANNA BRADFORD APRN            Ot            M54.6   
         PAIN IN THORACIC SPINE                     

 

 2017            ARIANNA BRADFORD APRN            Ot            R20.9   
         UNSPECIFIED DISTURBANCES OF SKIN SENSATI                     

 

 2017            ARIANNA BRADFORD APRN            Ot            R05     
       COUGH                     

 

 2017            ARIANNA BRADFORD APRN            Ot            R09.89  
          OTH SYMPTOMS AND SIGNS INVOLVING THE CIR                     

 

 2017            ARIANNA BRADFORD APRN            Ot            R10.12  
          LEFT UPPER QUADRANT PAIN                     

 

 2017            ARIANNA BRADFORD APRN            Ot            R10.32  
          LEFT LOWER QUADRANT PAIN                     

 

 2017            ARIANNA BRADFORD APRN            Ot            R19.7   
         DIARRHEA, UNSPECIFIED                     

 

 2017            ARIANNA BRADFORD APRN            Ot            R50.9   
         FEVER, UNSPECIFIED                     

 

 2017            CHARISSA PALACIOS, SUBHA REDDY            Ot            M54.5       
     LOW BACK PAIN                     

 

 2017            SUBHA CARRINGTON MD            Ot            M54.5       
     LOW BACK PAIN                     

 

 2017            SUBHA CARRINGTON MD            Ot            M54.5       
     LOW BACK PAIN                     

 

 2017            CAMI CANALES DO S            Ot            717.2 
           DERANG POST MED MENISCUS                     

 

 2017            CAMI CANALES DO S            Ot            719.06
            JOINT EFFUSION-L/LEG                     

 

 2017            CHARLES PALACIOS, JOSE BRIDGES            Ot            717.3     
       DERANG MED MENISCUS NEC                     

 

 2017            CHARLES PALACIOS, JOSE BRIDGES            Ot            V72.83    
        EXAM PRE-OPERATIVE NEC                     

 

 2017            CHARLES PALACIOS, JOSE BRIDGES            Ot            V74.8     
       SCREEN-BACTERIAL DIS NEC                     

 

 2017            CAMI CANALES DO            Ot            536.8 
           STOMACH FUNCTION DIS NEC                     

 

 2017            CAMI CANALES DO            Ot            780.79
            OTH MALAISE FATIGUE                     

 

 2017            CAMI CANALES DO            Ot            789.00
            ABDOMINAL PAIN, UNSPECIFIED SITE                     

 

 2017                         Ot            717.7            
CHONDROMALACIA PATELLAE                     

 

 2017                         Ot            V72.84            EXAM PRE-
OPERATIVE NOS                     

 

 2017            VANCARYL STARR M ARNP            Ot            
786.07            WHEEZING                     

 

 2017            VANGISSELELAEGEN STARR M ARNP            Ot            
786.2            COUGH                     

 

 2017            VANCARYL STARR M ARNP            Ot            
793.19            OTHER NONSPECIFIC ABNORMAL FINDING OF ETHAN                     

 

 2017            STARR LARA M ARNP            Ot            
486            PNEUMONIA, ORGANISM NOS                     

 

 2017            CAMI CANALES DO            Ot            789.00
            ABDOMINAL PAIN, UNSPECIFIED SITE                     

 

 2017            ARIANNA BRADFORD APRN            Ot            M54.5   
         LOW BACK PAIN                     

 

 2017            ARIANNA BRADFORD APRN            Ot            M54.6   
         PAIN IN THORACIC SPINE                     

 

 2017            ARIANNA BRADFORD APRN            Ot            M54.2   
         CERVICALGIA                     

 

 2017            ARIANNA BRADFORD APRN            Ot            M54.6   
         PAIN IN THORACIC SPINE                     

 

 2017            ARIANNA BRADFORD APRN            Ot            R20.9   
         UNSPECIFIED DISTURBANCES OF SKIN SENSATI                     

 

 2017            ARIANNA BRADFORD APRN            Ot            R05     
       COUGH                     

 

 2017            ARIANNA BRADFORD APRN            Ot            R09.89  
          OT SYMPTOMS AND SIGNS INVOLVING THE CIR                     

 

 2017            ARIANNA BRADFORD APRN            Ot            R10.12  
          LEFT UPPER QUADRANT PAIN                     

 

 2017            ARIANNA BRADFORD APRN            Ot            R10.32  
          LEFT LOWER QUADRANT PAIN                     

 

 2017            ARIANNA BRADFORD APRN            Ot            R19.7   
         DIARRHEA, UNSPECIFIED                     

 

 2017            ARIANNA BRADFORD APRN            Ot            R50.9   
         FEVER, UNSPECIFIED                     

 

 10/11/2017            CAMI CANALES DO            Ot            717.2 
           DERANG POST MED MENISCUS                     

 

 10/11/2017            CAMI CANALES DO S            Ot            719.06
            JOINT EFFUSION-L/LEG                     

 

 10/11/2017            CHARLES PALACIOS, JOSE BRIDGES            Ot            717.3     
       DERANG MED MENISCUS NEC                     

 

 10/11/2017            JOSE SOTO MD            Ot            V72.83    
        EXAM PRE-OPERATIVE NEC                     

 

 10/11/2017            JOSE SOTO MD            Ot            V74.8     
       SCREEN-BACTERIAL DIS NEC                     

 

 10/11/2017            GRAY CANALES DOLINE S            Ot            536.8 
           STOMACH FUNCTION DIS NEC                     

 

 10/11/2017            GRAY CANALES DOLINE S            Ot            780.79
            OTH MALAISE FATIGUE                     

 

 10/11/2017            GRAY CANALES DOLINE S            Ot            789.00
            ABDOMINAL PAIN, UNSPECIFIED SITE                     

 

 10/11/2017                         Ot            717.7            
CHONDROMALACIA PATELLAE                     

 

 10/11/2017                         Ot            V72.84            EXAM PRE-
OPERATIVE NOS                     

 

 10/11/2017            VANGISSELELAERE, STARR M ARNP            Ot            
786.07            WHEEZING                     

 

 10/11/2017            VANBECELAERE, STARR M ARNP            Ot            
786.2            COUGH                     

 

 10/11/2017            VANBECELAERE, STARR M ARNP            Ot            
793.19            OTHER NONSPECIFIC ABNORMAL FINDING OF ETHAN                     

 

 10/11/2017            VANBECELAERE, STARR M ARNP            Ot            
486            PNEUMONIA, ORGANISM NOS                     

 

 10/11/2017            CAMI CANALES DO S            Ot            789.00
            ABDOMINAL PAIN, UNSPECIFIED SITE                     

 

 10/11/2017            ARIANNA BRADFORD APRN            Ot            M54.5   
         LOW BACK PAIN                     

 

 10/11/2017            ARIANNA BRADFORD APRN            Ot            M54.6   
         PAIN IN THORACIC SPINE                     

 

 10/11/2017            ARIANNA BRADFORD APRN            Ot            M54.2   
         CERVICALGIA                     

 

 10/11/2017            ARIANNA BRADFORD APRN            Ot            M54.6   
         PAIN IN THORACIC SPINE                     

 

 10/11/2017            ARIANNA BRADFORD APRN            Ot            R20.9   
         UNSPECIFIED DISTURBANCES OF SKIN SENSATI                     

 

 10/11/2017            ARIANNA BRADFORD APRN            Ot            R05     
       COUGH                     

 

 10/11/2017            ARIANNA BRADFORD APRN            Ot            R09.89  
          OTH SYMPTOMS AND SIGNS INVOLVING THE CIR                     

 

 10/11/2017            ARIANNA BRADFORD APRN            Ot            R10.12  
          LEFT UPPER QUADRANT PAIN                     

 

 10/11/2017            ARIANNA BRADFORD APRN            Ot            R10.32  
          LEFT LOWER QUADRANT PAIN                     

 

 10/11/2017            ARIANNA BRADFORD APRN            Ot            R19.7   
         DIARRHEA, UNSPECIFIED                     

 

 10/11/2017            ARIANNA BRADFORD APRN            Ot            R50.9   
         FEVER, UNSPECIFIED                     

 

 2017            ERIN DO CAMI S            Ot            717.2 
           DERANG POST MED MENISCUS                     

 

 2017            ERIN CAMI CORONA            Ot            719.06
            JOINT EFFUSION-L/LEG                     

 

 2017            CHARLES PALACIOS, JOSE BRIDGES            Ot            717.3     
       DERANG MED MENISCUS NEC                     

 

 2017            CHARLES PALACIOS, JOSE BRIDGES            Ot            V72.83    
        EXAM PRE-OPERATIVE NEC                     

 

 2017            CHARLES PALACIOS, JOSE BRIDGES            Ot            V74.8     
       SCREEN-BACTERIAL DIS NEC                     

 

 2017            ERIN CAMI CORONA S            Ot            536.8 
           STOMACH FUNCTION DIS NEC                     

 

 2017            ERIN CORONACAMI S            Ot            780.79
            OTH MALAISE FATIGUE                     

 

 2017            HAZELERIKA CAMI CORONA S            Ot            789.00
            ABDOMINAL PAIN, UNSPECIFIED SITE                     

 

 2017                         Ot            717.7            
CHONDROMALACIA PATELLAE                     

 

 2017                         Ot            V72.84            EXAM PRE-
OPERATIVE NOS                     

 

 2017            VANBECELAERE, STARR M ARNP            Ot            
786.07            WHEEZING                     

 

 2017            VANBECELAERE, STARR M ARNP            Ot            
786.2            COUGH                     

 

 2017            VANBECELAERE, STARR M ARNP            Ot            
793.19            OTHER NONSPECIFIC ABNORMAL FINDING OF ETHAN                     

 

 2017            VANGISSELELAERE, STARR M ARNP            Ot            
486            PNEUMONIA, ORGANISM NOS                     

 

 2017            ERIN CORONA CAMI S            Ot            789.00
            ABDOMINAL PAIN, UNSPECIFIED SITE                     

 

 2017            ARIANNA BRADFORD APRN            Ot            M54.5   
         LOW BACK PAIN                     

 

 2017            ARIANNA BRADFORD APRN            Ot            M54.6   
         PAIN IN THORACIC SPINE                     

 

 2017            ARIANNA BRADFORD APRN            Ot            M54.2   
         CERVICALGIA                     

 

 2017            ARIANNA BRADFORD APRN            Ot            M54.6   
         PAIN IN THORACIC SPINE                     

 

 2017            ARIANNA BRADFORD APRN            Ot            R20.9   
         UNSPECIFIED DISTURBANCES OF SKIN SENSATI                     

 

 2017            ARIANNA BRADFORD APRN            Ot            R05     
       COUGH                     

 

 2017            ARIANNA BRADFORD APRN            Ot            R09.89  
          OTH SYMPTOMS AND SIGNS INVOLVING THE CIR                     

 

 2017            ARIANNA BRADFORD APRN            Ot            R10.12  
          LEFT UPPER QUADRANT PAIN                     

 

 2017            ARIANNA BRADFORD APRN            Ot            R10.32  
          LEFT LOWER QUADRANT PAIN                     

 

 2017            ARIANNA BRADFORD APRN            Ot            R19.7   
         DIARRHEA, UNSPECIFIED                     

 

 2017            ARIANNA BRADFORD APRN            Ot            R50.9   
         FEVER, UNSPECIFIED                     

 

 2017            CAMI CANALES DO S            Ot            717.2 
           DERANG POST MED MENISCUS                     

 

 2017            CAMI CANALES DO S            Ot            719.06
            JOINT EFFUSION-L/LEG                     

 

 2017            CHARLES PALACIOS, JOSE BRIDGES            Ot            717.3     
       DERANG MED MENISCUS NEC                     

 

 2017            CHARLES PALACIOS, JSOE BRIDGES            Ot            V72.83    
        EXAM PRE-OPERATIVE NEC                     

 

 2017            CHARLES PALACIOS, JOSE BRIDGES            Ot            V74.8     
       SCREEN-BACTERIAL DIS NEC                     

 

 2017            CAMI CANALES DO S            Ot            536.8 
           STOMACH FUNCTION DIS NEC                     

 

 2017            GRAY CANALES DOLINE S            Ot            780.79
            OTH MALAISE FATIGUE                     

 

 2017            GRAY CANALES DOLINE S            Ot            789.00
            ABDOMINAL PAIN, UNSPECIFIED SITE                     

 

 2017                         Ot            717.7            
CHONDROMALACIA PATELLAE                     

 

 2017                         Ot            V72.84            EXAM PRE-
OPERATIVE NOS                     

 

 2017            VANGISSELELAEREEDWARDSA M ARNP            Ot            
786.07            WHEEZING                     

 

 2017            VANGISSELELAERE STARR M ARNP            Ot            
786.2            COUGH                     

 

 2017            VANGISSELELAERE, STARR M ARNP            Ot            
793.19            OTHER NONSPECIFIC ABNORMAL FINDING OF ETHAN                     

 

 2017            VANSTARR HUTSON M ARNP            Ot            
486            PNEUMONIA, ORGANISM NOS                     

 

 2017            CAMI CANALES DO S            Ot            789.00
            ABDOMINAL PAIN, UNSPECIFIED SITE                     

 

 2017            ARIANNA BRADFORD APRN            Ot            M54.5   
         LOW BACK PAIN                     

 

 2017            ARIANNA BRADFORD APRN            Ot            M54.6   
         PAIN IN THORACIC SPINE                     

 

 2017            ARIANNA BRADFORD APRN            Ot            M54.2   
         CERVICALGIA                     

 

 2017            ARIANNA BRADFORD APRN            Ot            M54.6   
         PAIN IN THORACIC SPINE                     

 

 2017            ARIANNA BRADFORD APRN            Ot            R20.9   
         UNSPECIFIED DISTURBANCES OF SKIN SENSATI                     

 

 2017            ARIANNA BRADFORD LANNY APRN            Ot            R05     
       COUGH                     

 

 2017            ARIANNA BRADFORD LANNY APRN            Ot            R09.89  
          OTH SYMPTOMS AND SIGNS INVOLVING THE CIR                     

 

 2017            ARIANNA BRADFORD LANNY APRN            Ot            R10.12  
          LEFT UPPER QUADRANT PAIN                     

 

 2017            ARIANNA BRADFORD LANNY APRN            Ot            R10.32  
          LEFT LOWER QUADRANT PAIN                     

 

 2017            ARIANNA BRADFORD LANNY APRN            Ot            R19.7   
         DIARRHEA, UNSPECIFIED                     

 

 2017            ARIANNA BRADFORD LANNY APRN            Ot            R50.9   
         FEVER, UNSPECIFIED                     

 

 2018            CAMI CANALES DO            Ot            717.2 
           DERANG POST MED MENISCUS                     

 

 2018            CAMI CANALES DO            Ot            719.06
            JOINT EFFUSION-L/LEG                     

 

 2018            CHARLES PALACIOS, JOSE BRIDGES            Ot            717.3     
       DERANG MED MENISCUS NEC                     

 

 2018            CHARLES PALACIOS, JOSE BRIDGES            Ot            V72.83    
        EXAM PRE-OPERATIVE NEC                     

 

 2018            CHARLES PALACIOS, JOSE BRIDGES            Ot            V74.8     
       SCREEN-BACTERIAL DIS NEC                     

 

 2018            CAMI CANALES DO            Ot            536.8 
           STOMACH FUNCTION DIS NEC                     

 

 2018            CAMI CANALES DO            Ot            780.79
            OTH MALAISE FATIGUE                     

 

 2018            CAMI CANALES DO            Ot            789.00
            ABDOMINAL PAIN, UNSPECIFIED SITE                     

 

 2018                         Ot            717.7            
CHONDROMALACIA PATELLAE                     

 

 2018                         Ot            V72.84            EXAM PRE-
OPERATIVE NOS                     

 

 2018            VANBECELAERE, STARR M ARNP            Ot            
786.07            [place]HEEZING                     

 

 2018            VANBECELAERE, STARR M ARNP            Ot            
786.2            COUGH                     

 

 2018            VANBECELAERE, STARR M ARNP            Ot            
793.19            OTHER NONSPECIFIC ABNORMAL FINDING OF ETHAN                     

 

 2018            VANGISSELELAEREEDWARDSA M ARNP            Ot            
486            PNEUMONIA, ORGANISM NOS                     

 

 2018            ORENDER DO, CAMI S            Ot            789.00
            ABDOMINAL PAIN, UNSPECIFIED SITE                     

 

 2018            ARIANNA BRADFORD LANNY APRN            Ot            M54.5   
         LOW BACK PAIN                     

 

 2018            ARIANNA BRADFORD LANNY APRN            Ot            M54.6   
         PAIN IN THORACIC SPINE                     

 

 2018            ARIANNA BRADFORD LANNY APRN            Ot            M54.2   
         CERVICALGIA                     

 

 2018            ARIANNA BRADFORD LANNY APRN            Ot            M54.6   
         PAIN IN THORACIC SPINE                     

 

 2018            ARIANNA BRADFORD LANNY APRN            Ot            R20.9   
         UNSPECIFIED DISTURBANCES OF SKIN SENSATI                     

 

 2018            ARIANNA BRADFORD LANNY APRN            Ot            R05     
       COUGH                     

 

 2018            ARIANNA BRADFORD LANNY APRN            Ot            R09.89  
          OTH SYMPTOMS AND SIGNS INVOLVING THE CIR                     

 

 2018            ARIANNA BRADFORD LANNY APRN            Ot            R10.12  
          LEFT UPPER QUADRANT PAIN                     

 

 2018            SANCHEZ ARIANNA LANNY APRN            Ot            R10.32  
          LEFT LOWER QUADRANT PAIN                     

 

 2018            ARIANNA BRADFORD LANNY APRN            Ot            R19.7   
         DIARRHEA, UNSPECIFIED                     

 

 2018            ARIANNA BRADFORD LANNY APRN            Ot            R50.9   
         FEVER, UNSPECIFIED                     

 

 2018            CAMI CANALES DO S            Ot            717.2 
           DERANG POST MED MENISCUS                     

 

 2018            GRAY CANALES DOLINE S            Ot            719.06
            JOINT EFFUSION-L/LEG                     

 

 2018            CHARLES PALACIOS, JOSE BRIDGES            Ot            717.3     
       DERANG MED MENISCUS NEC                     

 

 2018            CHARLES PALACIOS, JOSE BRIDGES            Ot            V72.83    
        EXAM PRE-OPERATIVE NEC                     

 

 2018            CHARLES PALACIOS, JOSE BRIDGES            Ot            V74.8     
       SCREEN-BACTERIAL DIS NEC                     

 

 2018            GRAY CANALES DOLINE S            Ot            536.8 
           STOMACH FUNCTION DIS NEC                     

 

 2018            ELIAS CANALES DOQUELINE S            Ot            780.79
            OTH MALAISE FATIGUE                     

 

 2018            ELIAS CANALES DOQUELINE S            Ot            789.00
            ABDOMINAL PAIN, UNSPECIFIED SITE                     

 

 2018                         Ot            717.7            
CHONDROMALACIA PATELLAE                     

 

 2018                         Ot            V72.84            EXAM PRE-
OPERATIVE NOS                     

 

 2018            STARR LARA            Ot            
786.07            WHEEZING                     

 

 2018            VANBECELAERE, STARR M ARNP            Ot            
786.2            COUGH                     

 

 2018            STARR LARA ARNP            Ot            
793.19            OTHER NONSPECIFIC ABNORMAL FINDING OF ETHAN                     

 

 2018            STARR LARA ARNP            Ot            
486            PNEUMONIA, ORGANISM NOS                     

 

 2018            CAMI CANALES DO            Ot            789.00
            ABDOMINAL PAIN, UNSPECIFIED SITE                     

 

 2018            ARIANNA BRADFORD APRN            Ot            M54.5   
         LOW BACK PAIN                     

 

 2018            ARIANNA BRADFORD APRN            Ot            M54.6   
         PAIN IN THORACIC SPINE                     

 

 2018            ARIANNA BRADFORD APRN            Ot            M54.2   
         CERVICALGIA                     

 

 2018            ARIANNA BRADFORD APRN            Ot            M54.6   
         PAIN IN THORACIC SPINE                     

 

 2018            RAIANNA BRADFORD APRN            Ot            R20.9   
         UNSPECIFIED DISTURBANCES OF SKIN SENSATI                     

 

 2018            ARIANNA BRADFORD APRN            Ot            R05     
       COUGH                     

 

 2018            ARIANNA BRADFORD APRN            Ot            R09.89  
          OTH SYMPTOMS AND SIGNS INVOLVING THE CIR                     

 

 2018            ARIANNA BRADFORD APRN            Ot            R10.12  
          LEFT UPPER QUADRANT PAIN                     

 

 2018            ARIANNA BRADFORD APRN            Ot            R10.32  
          LEFT LOWER QUADRANT PAIN                     

 

 2018            ARIANNA BRADFORD APRN            Ot            R19.7   
         DIARRHEA, UNSPECIFIED                     

 

 2018            ARIANNA BRADFORD APRN            Ot            R50.9   
         FEVER, UNSPECIFIED                     

 

 2018            CAMI CANALES DO            Ot            717.2 
           DERANG POST MED MENISCUS                     

 

 2018            CAMI CANALES DO            Ot            719.06
            JOINT EFFUSION-L/LEG                     

 

 2018            JOSE SOTO MD            Ot            717.3     
       DERANG MED MENISCUS NEC                     

 

 2018            CHARLES PALACIOS, JOSE BRIDGES            Ot            V72.83    
        EXAM PRE-OPERATIVE NEC                     

 

 2018            CHARLES PALACIOS, JOSE BRIDGES            Ot            V74.8     
       SCREEN-BACTERIAL DIS NEC                     

 

 2018            CAMI CANALES DO            Ot            536.8 
           STOMACH FUNCTION DIS NEC                     

 

 2018            CAMI CANALES DO S            Ot            780.79
            OTH MALAISE FATIGUE                     

 

 2018            CAMI CANALES DO S            Ot            789.00
            ABDOMINAL PAIN, UNSPECIFIED SITE                     

 

 2018                         Ot            717.7            
CHONDROMALACIA PATELLAE                     

 

 2018                         Ot            V72.84            EXAM PRE-
OPERATIVE NOS                     

 

 2018            STARR LARA ARNP            Ot            
786.07            [place]HEEZING                     

 

 2018            STARR LARA ARNP            Ot            
786.2            COUGH                     

 

 2018            STARR LARA ARNP            Ot            
793.19            OTHER NONSPECIFIC ABNORMAL FINDING OF ETHAN                     

 

 2018            STARR LARA ARNP            Ot            
486            PNEUMONIA, ORGANISM NOS                     

 

 2018            CAMI CANALES DO S            Ot            789.00
            ABDOMINAL PAIN, UNSPECIFIED SITE                     

 

 2018            ARIANNA BRADFORD APRN            Ot            M54.5   
         LOW BACK PAIN                     

 

 2018            ARIANNA BRADFORD APRN            Ot            M54.6   
         PAIN IN THORACIC SPINE                     

 

 2018            ARIANNA BRADFORD APRN            Ot            M54.2   
         CERVICALGIA                     

 

 2018            ARIANNA BRADFORD APRN            Ot            M54.6   
         PAIN IN THORACIC SPINE                     

 

 2018            ARIANNA BRADFORD APRN            Ot            R20.9   
         UNSPECIFIED DISTURBANCES OF SKIN SENSATI                     

 

 2018            ARIANNA BRADFORD APRN            Ot            R05     
       COUGH                     

 

 2018            ARIANNA BRADFORD APRN            Ot            R09.89  
          OTH SYMPTOMS AND SIGNS INVOLVING THE CIR                     

 

 2018            ARIANNA BRADFORD APRN            Ot            R10.12  
          LEFT UPPER QUADRANT PAIN                     

 

 2018            ARIANNA BRADFORD APRN            Ot            R10.32  
          LEFT LOWER QUADRANT PAIN                     

 

 2018            ARIANNA BRADFORD APRN            Ot            R19.7   
         DIARRHEA, UNSPECIFIED                     

 

 2018            ARIANNA BRADFORD APRN            Ot            R50.9   
         FEVER, UNSPECIFIED                     

 

 2018            CAMI CANALES DO S            Ot            717.2 
           DERANG POST MED MENISCUS                     

 

 2018            CAMI CANALES DO S            Ot            719.06
            JOINT EFFUSION-L/LEG                     

 

 2018            CHARLES PALACIOS, JOSE BRIDGES            Ot            717.3     
       DERANG MED MENISCUS NEC                     

 

 2018            CHARLES PALACIOS, JOSE BRIDGES            Ot            V72.83    
        EXAM PRE-OPERATIVE NEC                     

 

 2018            CHARLES PALACIOS, JOSE BRIDGES            Ot            V74.8     
       SCREEN-BACTERIAL DIS NEC                     

 

 2018            ELIAS CANALES DOQUELINE S            Ot            536.8 
           STOMACH FUNCTION DIS NEC                     

 

 2018            ELIAS CANALES DOQUELINE S            Ot            780.79
            OTH MALAISE FATIGUE                     

 

 2018            ERIN DO CAMI S            Ot            789.00
            ABDOMINAL PAIN, UNSPECIFIED SITE                     

 

 2018                         Ot            717.7            
CHONDROMALACIA PATELLAE                     

 

 2018                         Ot            V72.84            EXAM PRE-
OPERATIVE NOS                     

 

 2018            VANGISSELELAERESTARR M ARNP            Ot            
786.07            [place]HEEZING                     

 

 2018            VANBECELAEREEDWARDSA M ARNP            Ot            
786.2            COUGH                     

 

 2018            VANBECELAERE, STARR M ARNP            Ot            
793.19            OTHER NONSPECIFIC ABNORMAL FINDING OF ETHAN                     

 

 2018            VANGISESLELAERE STARR M ARNP            Ot            
486            PNEUMONIA, ORGANISM NOS                     

 

 2018            SEMAJVITALIY CORONA CAMI S            Ot            789.00
            ABDOMINAL PAIN, UNSPECIFIED SITE                     

 

 2018            ARIANNA BRADFORD APRN            Ot            M54.5   
         LOW BACK PAIN                     

 

 2018            ARIANNA BRADFORD APRN            Ot            M54.6   
         PAIN IN THORACIC SPINE                     

 

 2018            ARIANNA BRADFORD APRN            Ot            M54.2   
         CERVICALGIA                     

 

 2018            ARIANNA BRADFORD APRN            Ot            M54.6   
         PAIN IN THORACIC SPINE                     

 

 2018            ARIANNA BRADFORD APRN            Ot            R20.9   
         UNSPECIFIED DISTURBANCES OF SKIN SENSATI                     

 

 2018            ARIANNA BRADFORD APRN            Ot            R05     
       COUGH                     

 

 2018            ARIANNA BRADFORD APRN            Ot            R09.89  
          OT SYMPTOMS AND SIGNS INVOLVING THE CIR                     

 

 2018            ARIANNA BRADFORD APRN            Ot            R10.12  
          LEFT UPPER QUADRANT PAIN                     

 

 2018            ARIANNA BRADFORD APRN            Ot            R10.32  
          LEFT LOWER QUADRANT PAIN                     

 

 2018            ARIANNA BRADFORD APRN            Ot            R19.7   
         DIARRHEA, UNSPECIFIED                     

 

 2018            ARIANNA BRADFORD APRN            Ot            R50.9   
         FEVER, UNSPECIFIED                     

 

 2018            CAMI CANALES DO            Ot            717.2 
           DERANG POST MED MENISCUS                     

 

 2018            ORENDER DO, CAMI S            Ot            719.06
            JOINT EFFUSION-L/LEG                     

 

 2018            CHARLES PALACIOS, JOSE BRIDGES            Ot            717.3     
       DERANG MED MENISCUS NEC                     

 

 2018            CHARLES PALACIOS, JOSE BRIDGES            Ot            V72.83    
        EXAM PRE-OPERATIVE NEC                     

 

 2018            CHARLES PALACIOS, JOSE BRIDGES            Ot            V74.8     
       SCREEN-BACTERIAL DIS NEC                     

 

 2018            CAMI CANALES DO S            Ot            536.8 
           STOMACH FUNCTION DIS NEC                     

 

 2018            ERIN CORONA CAMI S            Ot            780.79
            OTH MALAISE FATIGUE                     

 

 2018            CAMI CANALES DO S            Ot            789.00
            ABDOMINAL PAIN, UNSPECIFIED SITE                     

 

 2018                         Ot            717.7            
CHONDROMALACIA PATELLAE                     

 

 2018                         Ot            V72.84            EXAM PRE-
OPERATIVE NOS                     

 

 2018            STARR LARA M ARNP            Ot            
786.07            [place]EZING                     

 

 2018            JANE STARR M ARNP            Ot            
786.2            COUGH                     

 

 2018            JANE STARR M ARNP            Ot            
793.19            OTHER NONSPECIFIC ABNORMAL FINDING OF ETHAN                     

 

 2018            STARR LARA M ARNP            Ot            
486            PNEUMONIA, ORGANISM NOS                     

 

 2018            CAMI CANALES DO S            Ot            789.00
            ABDOMINAL PAIN, UNSPECIFIED SITE                     

 

 2018            ARIANNA BRADFORD APRN            Ot            M54.5   
         LOW BACK PAIN                     

 

 2018            ARIANNA BRADFORD APRN            Ot            M54.6   
         PAIN IN THORACIC SPINE                     

 

 2018            ARIANNA BRADFORD APRN            Ot            M54.2   
         CERVICALGIA                     

 

 2018            ARIANNA BRADFORD APRN            Ot            M54.6   
         PAIN IN THORACIC SPINE                     

 

 2018            ARIANNA BRADFORD APRN            Ot            R20.9   
         UNSPECIFIED DISTURBANCES OF SKIN SENSATI                     

 

 2018            ARIANNA BRADFORD APRN            Ot            R05     
       COUGH                     

 

 2018            ARIANNA BRADFORD APRN            Ot            R09.89  
          OTH SYMPTOMS AND SIGNS INVOLVING THE CIR                     

 

 2018            ARIANNA BRADFORD APRN            Ot            R10.12  
          LEFT UPPER QUADRANT PAIN                     

 

 2018            ARIANNA BRADFORD APRN            Ot            R10.32  
          LEFT LOWER QUADRANT PAIN                     

 

 2018            ARIANNA BRADFORD APRN            Ot            R19.7   
         DIARRHEA, UNSPECIFIED                     

 

 2018            ARIANNA BRADFORD APRN            Ot            R50.9   
         FEVER, UNSPECIFIED                     

 

 2018            ERIN CORONA CAMI S            Ot            717.2 
           DERANG POST MED MENISCUS                     

 

 2018            ERIN CORONA CAMI S            Ot            719.06
            JOINT EFFUSION-L/LEG                     

 

 2018            CHARLES PALACIOS, JOSE BRIDGES            Ot            717.3     
       DERANG MED MENISCUS NEC                     

 

 2018            CHARLES PALACIOS, JOSE BRIDGES            Ot            V72.83    
        EXAM PRE-OPERATIVE NEC                     

 

 2018            CHARLES PALACIOS, JOSE BRIDGES            Ot            V74.8     
       SCREEN-BACTERIAL DIS NEC                     

 

 2018            ERIN CORONA CAMI S            Ot            536.8 
           STOMACH FUNCTION DIS NEC                     

 

 2018            ERIN CORONA CAMI S            Ot            780.79
            OTH MALAISE FATIGUE                     

 

 2018            ERIN CORONA CAMI S            Ot            789.00
            ABDOMINAL PAIN, UNSPECIFIED SITE                     

 

 2018                         Ot            717.7            
CHONDROMALACIA PATELLAE                     

 

 2018                         Ot            V72.84            EXAM PRE-
OPERATIVE NOS                     

 

 2018            VANBECELAERE, STARR M ARNP            Ot            
786.07            [place]ING                     

 

 2018            VANBECELAERE, STARR M ARNP            Ot            
786.2            COUGH                     

 

 2018            VANBECELAERE, STARR M ARNP            Ot            
793.19            OTHER NONSPECIFIC ABNORMAL FINDING OF ETHAN                     

 

 2018            VANBECELAERE, STARR M ARNP            Ot            
486            PNEUMONIA, ORGANISM NOS                     

 

 2018            CAMI CANALES DO S            Ot            789.00
            ABDOMINAL PAIN, UNSPECIFIED SITE                     

 

 2018            ARIANNA BRADFORD APRN            Ot            M54.5   
         LOW BACK PAIN                     

 

 2018            ARIANNA BRADOFRD APRN            Ot            M54.6   
         PAIN IN THORACIC SPINE                     

 

 2018            ARIANNA BRADFORD APRN            Ot            M54.2   
         CERVICALGIA                     

 

 2018            ARIANNA BRADFORD APRN            Ot            M54.6   
         PAIN IN THORACIC SPINE                     

 

 2018            ARIANNA BRADFORD APRN            Ot            R20.9   
         UNSPECIFIED DISTURBANCES OF SKIN SENSATI                     

 

 2018            ARIANNA BRADFORD APRN            Ot            R05     
       COUGH                     

 

 2018            ARIANNA BRADFORD APRN            Ot            R09.89  
          OT SYMPTOMS AND SIGNS INVOLVING THE CIR                     

 

 2018            ARIANNA BRADFORD APRN            Ot            R10.12  
          LEFT UPPER QUADRANT PAIN                     

 

 2018            ARIANNA BRADFORD APRN            Ot            R10.32  
          LEFT LOWER QUADRANT PAIN                     

 

 2018            ARIANNA BRADFORD APRN            Ot            R19.7   
         DIARRHEA, UNSPECIFIED                     

 

 2018            ARIANNA BRADFORD APRN            Ot            R50.9   
         FEVER, UNSPECIFIED                     

 

 2018            CAMI CANALES DO S            Ot            717.2 
           DERANG POST MED MENISCUS                     

 

 2018            GRAY CANALES DOLINE S            Ot            719.06
            JOINT EFFUSION-L/LEG                     

 

 2018            CHARLES PALACIOS, JOSE BRIDGES            Ot            717.3     
       DERANG MED MENISCUS NEC                     

 

 2018            CHARLES PALACIOS, JOSE BRIDGES            Ot            V72.83    
        EXAM PRE-OPERATIVE NEC                     

 

 2018            CHARLES PALACIOS, JOSE BRIDGES            Ot            V74.8     
       SCREEN-BACTERIAL DIS NEC                     

 

 2018            ELIAS CANALES DOQUELINE S            Ot            536.8 
           STOMACH FUNCTION DIS NEC                     

 

 2018            ELIAS CANALES DOQUELINE S            Ot            780.79
            OTH MALAISE FATIGUE                     

 

 2018            ELIAS CANALES DOQUELINE S            Ot            789.00
            ABDOMINAL PAIN, UNSPECIFIED SITE                     

 

 2018                         Ot            717.7            
CHONDROMALACIA PATELLAE                     

 

 2018                         Ot            V72.84            EXAM PRE-
OPERATIVE NOS                     

 

 2018            VANBECELAERE, STARR M ARNP            Ot            
786.07            [place]                     

 

 2018            VANBECELAERE, STARR M ARNP            Ot            
786.2            COUGH                     

 

 2018            VANBECELAERE, STARR M ARNP            Ot            
793.19            OTHER NONSPECIFIC ABNORMAL FINDING OF ETHAN                     

 

 2018            VANBECELAERE, STARR M ARNP            Ot            
486            PNEUMONIA, ORGANISM NOS                     

 

 2018            GRAY CANALES DOLINE S            Ot            789.00
            ABDOMINAL PAIN, UNSPECIFIED SITE                     

 

 2018            ARIANNA BRADFORD APRN            Ot            M54.5   
         LOW BACK PAIN                     

 

 2018            ARIANNA BRADFORD APRN            Ot            M54.6   
         PAIN IN THORACIC SPINE                     

 

 2018            ARIANNA BRADFORD APRN            Ot            M54.2   
         CERVICALGIA                     

 

 2018            ARIANNA BRADFORD APRN            Ot            M54.6   
         PAIN IN THORACIC SPINE                     

 

 2018            ARIANNA BRADFORD LANNY APRN            Ot            R20.9   
         UNSPECIFIED DISTURBANCES OF SKIN SENSATI                     

 

 2018            ARIANNA BRADFORD LANNY APRN            Ot            R05     
       COUGH                     

 

 2018            ARIANNA BRADFORD LANNY APRN            Ot            R09.89  
          OTH SYMPTOMS AND SIGNS INVOLVING THE CIR                     

 

 2018            ARIANNA BRADFORD LANNY APRN            Ot            R10.12  
          LEFT UPPER QUADRANT PAIN                     

 

 2018            ARIANNA BRADFORD LANNY APRN            Ot            R10.32  
          LEFT LOWER QUADRANT PAIN                     

 

 2018            ARIANNA BRADFORD LANNY APRN            Ot            R19.7   
         DIARRHEA, UNSPECIFIED                     

 

 2018            ARIANNA BRADFORD LANNY APRN            Ot            R50.9   
         FEVER, UNSPECIFIED                     

 

 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                     

 

 2018            KIM FUCHS APRN            Ot            M94.262  
          CHONDROMALACIA, LEFT KNEE                     

 

 2018            KIM FUCHS APRN            Ot            S89.92XA 
           UNSPECIFIED INJURY OF LEFT LOWER LEG, IN                     

 

 2018            KIM FUCHS APRN            Ot            Z98.890  
          OTHER SPECIFIED POSTPROCEDURAL STATES                     

 

 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                     

 

 2018            KIM FUCHS APRN            Ot            M94.262  
          CHONDROMALACIA, LEFT KNEE                     

 

 2018            KIM FUCHS APRN            Ot            S89.92XA 
           UNSPECIFIED INJURY OF LEFT LOWER LEG, IN                     

 

 2018            KIM FUCHS APRN            Ot            Z98.890  
          OTHER SPECIFIED POSTPROCEDURAL STATES                     

 

 2018            CHARISSA PALACIOS, SUBHA REDDY            Ot            M54.5       
     LOW BACK PAIN                     

 

 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                     

 

 2018            KIM FUCHS APRN            Ot            M94.262  
          CHONDROMALACIA, LEFT KNEE                     

 

 2018            KIM FUCHS R APRN            Ot            S89.92XA 
           UNSPECIFIED INJURY OF LEFT LOWER LEG, IN                     

 

 2018            KIM FUCHS APRN            Ot            Z98.890  
          OTHER SPECIFIED POSTPROCEDURAL STATES                     

 

 2018            MATTHEW PALACIOS FACC, DARREL FACP CCDS            Ot            
E78.5            HYPERLIPIDEMIA, UNSPECIFIED                     

 

 2018            MATTHEW PALACIOS FACC, ALI FACP CCDS            Ot            
F17.210            NICOTINE DEPENDENCE, CIGARETTES, UNCOMPL                     

 

 2018            MATTHEW PALACIOS FACC, ALI FACP CCDS            Ot            
F32.9            MAJOR DEPRESSIVE DISORDER, SINGLE EPISOD                     

 

 2018            MATTHEW PALACIOS FACC, ALI FACP CCDS            Ot            
F41.9            ANXIETY DISORDER, UNSPECIFIED                     

 

 2018            MATTHEW PALACIOS FACC, ALI FACP CCDS            Ot            
I10            ESSENTIAL (PRIMARY) HYPERTENSION                     

 

 2018            MATTHEW PALACIOS FACC, ALI FACP CCDS            Ot            
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                      

 

 2018            MATTHEW PALACIOS FACC, DARREL FACP CCDS            Ot            
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                     

 

 2018            MATTHEW PALACIOS FACC, ALI FACP CCDS            Ot            
J30.2            OTHER SEASONAL ALLERGIC RHINITIS                     

 

 2018            MATTHEW PALACIOS FACC, ALI FACP CCDS            Ot            
K21.9            GASTRO-ESOPHAGEAL REFLUX DISEASE WITHOUT                     

 

 2018            MATTHEW PALACIOS FACC, ALI FACP CCDS            Ot            
M47.816            SPONDYLOSIS W/O MYELOPATHY OR RADICULOPA                     

 

 2018            MATTHEW PALACIOS FACC, ALI FACP CCDS            Ot            
M53.3            SACROCOCCYGEAL DISORDERS, NOT ELSEWHERE                      

 

 2018            MATTHEW PALACIOS FACC, ALI FACP CCDS            Ot            
M54.16            RADICULOPATHY, LUMBAR REGION                     

 

 2018            MATTHEW PALACIOS FACC, ALI FACP CCDS            Ot            
R07.89            OTHER CHEST PAIN                     

 

 2018            MATTHEW PALACIOS FACC, ALI FACP CCDS            Ot            
Z79.82            LONG TERM (CURRENT) USE OF ASPIRIN                     

 

 2018            MATTHEW PALACIOS FACC, ALI FACP CCDS            Ot            
Z79.899            OTHER LONG TERM (CURRENT) DRUG THERAPY                     

 

 2018            MATTHEW PALACIOS FACC, ALI FACP CCDS            Ot            
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                     

 

 2018            MATTHEW PALACIOS FACC, ALI FACP CCDS            Ot            
E78.5            HYPERLIPIDEMIA, UNSPECIFIED                     

 

 2018            MATTHEW PALACIOS FACC, ALI FACP CCDS            Ot            
F17.210            NICOTINE DEPENDENCE, CIGARETTES, UNCOMPL                     

 

 2018            MATTHEW PALACIOS FACC, ALI FACP CCDS            Ot            
F32.9            MAJOR DEPRESSIVE DISORDER, SINGLE EPISOD                     

 

 2018            MATTHEW PALACIOS FACC, ALI FACP CCDS            Ot            
F41.9            ANXIETY DISORDER, UNSPECIFIED                     

 

 2018            MATTHEW PALACIOS FACC, ALI FACP CCDS            Ot            
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                     

 

 2018            MATTHEW FULTONC, DARREL FACP CCDS            Ot            
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                     

 

 2018            MATTHEW PALACIOS FACC, ALI FACP CCDS            Ot            
J30.2            OTHER SEASONAL ALLERGIC RHINITIS                     

 

 2018            MATTHEW PALACIOS FACC, ALI FACP CCDS            Ot            
K21.9            GASTRO-ESOPHAGEAL REFLUX DISEASE WITHOUT                     

 

 2018            MATTHEW PALACIOS FACC, ALI FACP CCDS            Ot            
M47.816            SPONDYLOSIS W/O MYELOPATHY OR RADICULOPA                     

 

 2018            MATTHEW PALACIOS FACC, ALI FACP CCDS            Ot            
M53.3            SACROCOCCYGEAL DISORDERS, NOT ELSEWHERE                      

 

 2018            MATTHEW PALACIOS FACC, ALI FACP CCDS            Ot            
M54.16            RADICULOPATHY, LUMBAR REGION                     

 

 2018            MATTHEW PALACIOS FACC, ALI FACP CCDS            Ot            
R94.31            ABNORMAL ELECTROCARDIOGRAM [ECG] [EKG]                     

 

 2018            MATTHEW PALACIOS FACC, ALI FACP CCDS            Ot            
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                     

 

 2018            MATTHEW PALACIOS FACC, ALI FACP CCDS            Ot            
E78.5            HYPERLIPIDEMIA, UNSPECIFIED                     

 

 2018            MATTHEW PALACIOS FACC, ALI FACP CCDS            Ot            
F17.210            NICOTINE DEPENDENCE, CIGARETTES, UNCOMPL                     

 

 2018            MATTHEW PALACIOS FACC, ALI FACP CCDS            Ot            
F32.9            MAJOR DEPRESSIVE DISORDER, SINGLE EPISOD                     

 

 2018            MATTHEW FULTONC, ALI FACP CCDS            Ot            
F41.9            ANXIETY DISORDER, UNSPECIFIED                     

 

 2018            MATTHEW PALACIOS FACC, ALI FACP CCDS            Ot            
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                     

 

 2018            MATTHEW PALACIOS FACC, ALI FACP CCDS            Ot            
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                     

 

 2018            MATTHEW PALACIOS FACC, ALI FACP CCDS            Ot            
J30.2            OTHER SEASONAL ALLERGIC RHINITIS                     

 

 2018            MATTHEW PALACIOS FACC, DARREL FACP CCDS            Ot            
K21.9            GASTRO-ESOPHAGEAL REFLUX DISEASE WITHOUT                     

 

 2018            MATTHEW PALACIOS FACC, ALI FACP CCDS            Ot            
M47.816            SPONDYLOSIS W/O MYELOPATHY OR RADICULOPA                     

 

 2018            MATTHEW PALACIOS FACC, ALI FACP CCDS            Ot            
M53.3            SACROCOCCYGEAL DISORDERS, NOT ELSEWHERE                      

 

 2018            MATTHEW PALACIOS FACC, ALI FACP CCDS            Ot            
M54.16            RADICULOPATHY, LUMBAR REGION                     

 

 2018            MATTHEW PALACIOS FACC, ALI FACP CCDS            Ot            
R94.31            ABNORMAL ELECTROCARDIOGRAM [ECG] [EKG]                     

 

 2018            DARREL CASTRO MD, FACC FACP CCDS            Ot            
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                     

 

 07/10/2018            MATTHEW PALACIOS FACC, ALI FACP CCDS            Ot            
E78.5            HYPERLIPIDEMIA, UNSPECIFIED                     

 

 07/10/2018            MATTHEW PALACIOS FACC, ALI FACP CCDS            Ot            
F17.210            NICOTINE DEPENDENCE, CIGARETTES, UNCOMPL                     

 

 07/10/2018            MATTHEW PALACIOS FACC, ALI FACP CCDS            Ot            
F32.9            MAJOR DEPRESSIVE DISORDER, SINGLE EPISOD                     

 

 07/10/2018            MATHTEW PALACIOS FACC, ALI FACP CCDS            Ot            
F41.9            ANXIETY DISORDER, UNSPECIFIED                     

 

 07/10/2018            MATTHEW PALACIOS FACC, ALI FACP CCDS            Ot            
I10            ESSENTIAL (PRIMARY) HYPERTENSION                     

 

 07/10/2018            MATTHEW PALACIOS FACC, ALI FACP CCDS            Ot            
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                      

 

 07/10/2018            MATTHEW PALACIOS FACC, DARREL FACP CCDS            Ot            
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                     

 

 07/10/2018            MATTHEW PALACIOS FACC, ALI FACP CCDS            Ot            
J30.2            OTHER SEASONAL ALLERGIC RHINITIS                     

 

 07/10/2018            MATTHEW PALACIOS FACC, ALI FACP CCDS            Ot            
K21.9            GASTRO-ESOPHAGEAL REFLUX DISEASE WITHOUT                     

 

 07/10/2018            MATTHEW PALACIOS FACC, ALI FACP CCDS            Ot            
M47.816            SPONDYLOSIS W/O MYELOPATHY OR RADICULOPA                     

 

 07/10/2018            MATTHEW PALACIOS FACC, DARREL FACP CCDS            Ot            
M53.3            SACROCOCCYGEAL DISORDERS, NOT ELSEWHERE                      

 

 07/10/2018            MATTHEW PALACIOS FACC, ALI FACP CCDS            Ot            
M54.16            RADICULOPATHY, LUMBAR REGION                     

 

 07/10/2018            MATTHEW PALACIOS FACC, DARREL FACP CCDS            Ot            
R07.89            OTHER CHEST PAIN                     

 

 07/10/2018            MATTHEW PALACIOS FACC, ALI FACP CCDS            Ot            
Z79.82            LONG TERM (CURRENT) USE OF ASPIRIN                     

 

 07/10/2018            MATTHEW PALACIOS FACC, ALI FACP CCDS            Ot            
Z79.899            OTHER LONG TERM (CURRENT) DRUG THERAPY                     

 

 07/10/2018            MATTHEW PALACIOS FACC, ALI FACP CCDS            Ot            
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                     

 

 2018            MATTHEW PALACIOS FACC, ALI FACP CCDS            Ot            
E78.5            HYPERLIPIDEMIA, UNSPECIFIED                     

 

 2018            MATTHEW PALACIOS FACC, ALI FACP CCDS            Ot            
F17.210            NICOTINE DEPENDENCE, CIGARETTES, UNCOMPL                     

 

 2018            MATTHEW PALACIOS FACC, ALI FACP CCDS            Ot            
F32.9            MAJOR DEPRESSIVE DISORDER, SINGLE EPISOD                     

 

 2018            MATTHEW PALACIOS FACC, ALI FACP CCDS            Ot            
F41.9            ANXIETY DISORDER, UNSPECIFIED                     

 

 2018            MATTHEW PALACIOS FACC, ALI FACP CCDS            Ot            
I10            ESSENTIAL (PRIMARY) HYPERTENSION                     

 

 2018            MATTHEW PALACIOS FACC, ALI FACP CCDS            Ot            
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                      

 

 2018            MATTHEW PALACIOS FACC, DARREL FACP CCDS            Ot            
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                     

 

 2018            MATTHEW PALACIOS FACC, ALI FACP CCDS            Ot            
J30.2            OTHER SEASONAL ALLERGIC RHINITIS                     

 

 2018            MATTHEW PALACIOS FACC, ALI FACP CCDS            Ot            
K21.9            GASTRO-ESOPHAGEAL REFLUX DISEASE WITHOUT                     

 

 2018            MATTHEW PALACIOS FACC, ALI FACP CCDS            Ot            
M47.816            SPONDYLOSIS W/O MYELOPATHY OR RADICULOPA                     

 

 2018            MATTHEW PALACIOS FACC, ALI FACP CCDS            Ot            
M53.3            SACROCOCCYGEAL DISORDERS, NOT ELSEWHERE                      

 

 2018            MATTHEW PALACIOS FACC, ALI FACP CCDS            Ot            
M54.16            RADICULOPATHY, LUMBAR REGION                     

 

 2018            MATTHEW PALACIOS FACC, ALI FACP CCDS            Ot            
R07.89            OTHER CHEST PAIN                     

 

 2018            MATTHEW PALACIOS FACC, DARREL FACP CCDS            Ot            
Z79.82            LONG TERM (CURRENT) USE OF ASPIRIN                     

 

 2018            MATTHEW PALACIOS FACC, DARREL FACP CCDS            Ot            
Z79.899            OTHER LONG TERM (CURRENT) DRUG THERAPY                     

 

 2018            MATTHEW PALACIOS FACC, ALI FACP CCDS            Ot            
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                     

 

 2018            MATTHEW PALACIOS FACC, ALI FACP CCDS            Ot            
E78.5            HYPERLIPIDEMIA, UNSPECIFIED                     

 

 2018            MATTHEW PALACIOS FACC, ALI FACP CCDS            Ot            
F17.210            NICOTINE DEPENDENCE, CIGARETTES, UNCOMPL                     

 

 2018            MATTHEW PALACIOS FACC, ALI FACP CCDS            Ot            
F32.9            MAJOR DEPRESSIVE DISORDER, SINGLE EPISOD                     

 

 2018            MATTHEW PALACIOS FACC, ALI FACP CCDS            Ot            
F41.9            ANXIETY DISORDER, UNSPECIFIED                     

 

 2018            MATTHEW PALACIOS FACC, ALI FACP CCDS            Ot            
I10            ESSENTIAL (PRIMARY) HYPERTENSION                     

 

 2018            MATTHEW PALACIOS FACC, ALI FACP CCDS            Ot            
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                      

 

 2018            MATTHEW PALACIOS FACC, DARREL FACP CCDS            Ot            
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                     

 

 2018            MATTHEW PALACIOS FACC, ALI FACP CCDS            Ot            
J30.2            OTHER SEASONAL ALLERGIC RHINITIS                     

 

 2018            MATTHEW PALACIOS FACC, ALI FACP CCDS            Ot            
K21.9            GASTRO-ESOPHAGEAL REFLUX DISEASE WITHOUT                     

 

 2018            MATTHEW PALACIOS FACC, ALI FACP CCDS            Ot            
M47.816            SPONDYLOSIS W/O MYELOPATHY OR RADICULOPA                     

 

 2018            MATTHEW PALACIOS FACC, ALI FACP CCDS            Ot            
M53.3            SACROCOCCYGEAL DISORDERS, NOT ELSEWHERE                      

 

 2018            MATTHEW PALACIOS FACC, ALI FACP CCDS            Ot            
M54.16            RADICULOPATHY, LUMBAR REGION                     

 

 2018            MATTHEW PALACIOS FACC, ALI FACP CCDS            Ot            
R07.89            OTHER CHEST PAIN                     

 

 2018            MATTHEW PALACIOS FACC, ALI FACP CCDS            Ot            
Z79.82            LONG TERM (CURRENT) USE OF ASPIRIN                     

 

 2018            MATTHEW PALACIOS FACC, ALI FACP CCDS            Ot            
Z79.899            OTHER LONG TERM (CURRENT) DRUG THERAPY                     

 

 2018            MATTHEW PALACIOS FACC, ALI FACP CCDS            Ot            
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                     

 

 2018            MATTHEW PALACIOS FACC, DARREL FACP CCDS            Ot            
E78.5            HYPERLIPIDEMIA, UNSPECIFIED                     

 

 2018            MATTHEW PALACIOS FACC, ALI FACP CCDS            Ot            
F17.210            NICOTINE DEPENDENCE, CIGARETTES, UNCOMPL                     

 

 2018            MATTHEW PALACIOS FACC, ALI FACP CCDS            Ot            
F32.9            MAJOR DEPRESSIVE DISORDER, SINGLE EPISOD                     

 

 2018            MATTHEW FULTONC, ALI FACP CCDS            Ot            
F41.9            ANXIETY DISORDER, UNSPECIFIED                     

 

 2018            MATTHEW PALACIOS FACC, ALI FACP CCDS            Ot            
I10            ESSENTIAL (PRIMARY) HYPERTENSION                     

 

 2018            MATTHEW PALACIOS FACC, ALI FACP CCDS            Ot            
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                      

 

 2018            MATTHEW PALACIOS FACC, DARREL FACP CCDS            Ot            
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                     

 

 2018            MATTHEW PALACIOS FACC, ALI FACP CCDS            Ot            
J30.2            OTHER SEASONAL ALLERGIC RHINITIS                     

 

 2018            MATTHEW PALACIOS FACC, ALI FACP CCDS            Ot            
K21.9            GASTRO-ESOPHAGEAL REFLUX DISEASE WITHOUT                     

 

 2018            MATTHEW PALACIOS FACC, ALI FACP CCDS            Ot            
M47.816            SPONDYLOSIS W/O MYELOPATHY OR RADICULOPA                     

 

 2018            MATTHEW PALACIOS FACC, ALI FACP CCDS            Ot            
M53.3            SACROCOCCYGEAL DISORDERS, NOT ELSEWHERE                      

 

 2018            MATTHEW PALACIOS FACC, ALI FACP CCDS            Ot            
M54.16            RADICULOPATHY, LUMBAR REGION                     

 

 2018            MATTHEW PALACIOS FACC, ALI FACP CCDS            Ot            
R07.89            OTHER CHEST PAIN                     

 

 2018            MATTHEW PALACIOS FACC, ALI FACP CCDS            Ot            
Z79.82            LONG TERM (CURRENT) USE OF ASPIRIN                     

 

 2018            MATTHEW PALACIOS FACC, ALI FACP CCDS            Ot            
Z79.899            OTHER LONG TERM (CURRENT) DRUG THERAPY                     

 

 2018            MATTHEW PALACIOS FACC, ALI FACP CCDS            Ot            
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                     

 

 2018            MATTHEW PALACIOS FACC, ALI FACP CCDS            Ot            
E78.5            HYPERLIPIDEMIA, UNSPECIFIED                     

 

 2018            MATTHEW PALACIOS FACC, ALI FACP CCDS            Ot            
F17.210            NICOTINE DEPENDENCE, CIGARETTES, UNCOMPL                     

 

 2018            MATTHEW MD FACC, ALI FACP CCDS            Ot            
F32.9            MAJOR DEPRESSIVE DISORDER, SINGLE EPISOD                     

 

 2018            MATTHEW PALACIOS FACC, ALI FACP CCDS            Ot            
F41.9            ANXIETY DISORDER, UNSPECIFIED                     

 

 2018            MATTHEW PALACIOS FACC, ALI FACP CCDS            Ot            
I10            ESSENTIAL (PRIMARY) HYPERTENSION                     

 

 2018            MATTHEW PALACIOS FACC, ALI FACP CCDS            Ot            
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                      

 

 2018            MATTHEW PALACIOS FACC, ALI FACP CCDS            Ot            
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                     

 

 2018            MATTHEW PALACIOS FACC, ALI FACP CCDS            Ot            
J30.2            OTHER SEASONAL ALLERGIC RHINITIS                     

 

 2018            MATTHEW PALACIOS FACC, ALI FACP CCDS            Ot            
K21.9            GASTRO-ESOPHAGEAL REFLUX DISEASE WITHOUT                     

 

 2018            MATTHEW PALACIOS FACC, ALI FACP CCDS            Ot            
M47.816            SPONDYLOSIS W/O MYELOPATHY OR RADICULOPA                     

 

 2018            MATTHEW PALACIOS FACC, ALI FACP CCDS            Ot            
M53.3            SACROCOCCYGEAL DISORDERS, NOT ELSEWHERE                      

 

 2018            MATTHEW PALACIOS FACC, ALI FACP CCDS            Ot            
M54.16            RADICULOPATHY, LUMBAR REGION                     

 

 2018            MATTHEW PALACIOS FACC, ALI FACP CCDS            Ot            
R07.89            OTHER CHEST PAIN                     

 

 2018            MATTHEW PALACIOS FACC, DARREL FACP CCDS            Ot            
Z79.82            LONG TERM (CURRENT) USE OF ASPIRIN                     

 

 2018            MATTHEW PALACIOS FACC, ALI FACP CCDS            Ot            
Z79.899            OTHER LONG TERM (CURRENT) DRUG THERAPY                     

 

 2018            MATTHEW PALACIOS FACC, ALI FACP CCDS            Ot            
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                     

 

 2018            MATTHEW PALACIOS FACC, ALI FACP CCDS            Ot            
E78.5            HYPERLIPIDEMIA, UNSPECIFIED                     

 

 2018            MTATHEW PALACIOS FACC, ALI FACP CCDS            Ot            
F17.210            NICOTINE DEPENDENCE, CIGARETTES, UNCOMPL                     

 

 2018            MATTHEW PALACIOS FACC, ALI FACP CCDS            Ot            
F32.9            MAJOR DEPRESSIVE DISORDER, SINGLE EPISOD                     

 

 2018            MATTHEW PALACIOS FACC, ALI FACP CCDS            Ot            
F41.9            ANXIETY DISORDER, UNSPECIFIED                     

 

 2018            MATTHEW PALACIOS FACC, ALI FACP CCDS            Ot            
I10            ESSENTIAL (PRIMARY) HYPERTENSION                     

 

 2018            MATTHEW PALACIOS FACC, ALI FACP CCDS            Ot            
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                      

 

 2018            MATTHEW PALACIOS FACC, ALI FACP CCDS            Ot            
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                     

 

 2018            MATTHEW PALACIOS FACC, ALI FACP CCDS            Ot            
J30.2            OTHER SEASONAL ALLERGIC RHINITIS                     

 

 2018            MATTHEW PALACIOS FACC, ALI FACP CCDS            Ot            
K21.9            GASTRO-ESOPHAGEAL REFLUX DISEASE WITHOUT                     

 

 2018            MATTHEW PALACIOS FACC, ALI FACP CCDS            Ot            
M47.816            SPONDYLOSIS W/O MYELOPATHY OR RADICULOPA                     

 

 2018            MATTHEW PALACIOS FACC, DARREL FACP CCDS            Ot            
M53.3            SACROCOCCYGEAL DISORDERS, NOT ELSEWHERE                      

 

 2018            MATTHEW PALACIOS FACC, ALI FACP CCDS            Ot            
M54.16            RADICULOPATHY, LUMBAR REGION                     

 

 2018            MATTHEW PALACIOS FACC, DARREL FACP CCDS            Ot            
R07.89            OTHER CHEST PAIN                     

 

 2018            MATTHEW PALACIOS FACC, DARREL FACP CCDS            Ot            
Z79.82            LONG TERM (CURRENT) USE OF ASPIRIN                     

 

 2018            MATTHEW PALACIOS FACC, ALI FACP CCDS            Ot            
Z79.899            OTHER LONG TERM (CURRENT) DRUG THERAPY                     

 

 2018            MATTHEW PALACIOS FACC, ALI FACP CCDS            Ot            
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                     



                                                                               
                                                                               
                                                                               
                                                                               
                                                                               
                                                                               
                                                                               
                                                                               
                                                                               
                                                                               
                                                                               
                                                                               
                                                                               
                                                                               
                                                                               
                                                                               
                                                                               
                                                                               
                                                                               
                                                                               
                                                                               
                                                                               
  



Procedures

      



There is no data.                  



Results

      





 Test            Result            Range        









 Complete blood count (CBC) with automated white blood cell (WBC) differential 
- 16 17:30         









 Blood leukocytes automated count (number/volume)            11.4 10*3/uL      
      4.3-11.0        

 

 Blood erythrocytes automated count (number/volume)            4.65 10*6/uL    
        4.35-5.85        

 

 Venous blood hemoglobin measurement (mass/volume)            14.5 g/dL        
    11.5-16.0        

 

 Blood hematocrit (volume fraction)            41 %            35-52        

 

 Automated erythrocyte mean corpuscular volume            88 [foz_us]          
  80-99        

 

 Automated erythrocyte mean corpuscular hemoglobin (mass per erythrocyte)      
      31 pg            25-34        

 

 Automated erythrocyte mean corpuscular hemoglobin concentration measurement (
mass/volume)            35 g/dL            32-36        

 

 Automated erythrocyte distribution width ratio            12.8 %            
10.0-14.5        

 

 Automated blood platelet count (count/volume)            244 10*3/uL          
  130-400        

 

 Automated blood platelet mean volume measurement            9.7 [foz_us]      
      7.4-10.4        

 

 Automated blood neutrophils/100 leukocytes            69 %            42-75   
     

 

 Automated blood lymphocytes/100 leukocytes            21 %            12-44   
     

 

 Blood monocytes/100 leukocytes            10 %            0-12        

 

 Automated blood eosinophils/100 leukocytes            0 %            0-10     
   

 

 Automated blood basophils/100 leukocytes            0 %            0-10        

 

 Blood neutrophils automated count (number/volume)            7.9 10*3         
   1.8-7.8        

 

 Blood lymphocytes automated count (number/volume)            2.4 10*3         
   1.0-4.0        

 

 Blood monocytes automated count (number/volume)            1.1 10*3            
0.0-1.0        

 

 Automated eosinophil count            0.0 10*3/uL            0.0-0.3        

 

 Automated blood basophil count (count/volume)            0.0 10*3/uL          
  0.0-0.1        

 

 Blood blood smear finding identification by light microscopy            YES   
          [facility]        









 Comprehensive metabolic panel - 16 17:30         









 Serum or plasma sodium measurement (moles/volume)            137 mmol/L       
     135-145        

 

 Serum or plasma potassium measurement (moles/volume)            3.2 mmol/L    
        3.6-5.0        

 

 Serum or plasma chloride measurement (moles/volume)            101 mmol/L     
               

 

 Carbon dioxide            21 mmol/L            21-32        

 

 Serum or plasma anion gap determination (moles/volume)            15 mmol/L   
         5-14        

 

 Serum or plasma urea nitrogen measurement (mass/volume)            6 mg/dL    
        7-18        

 

 Serum or plasma creatinine measurement (mass/volume)            0.71 mg/dL    
        0.60-1.30        

 

 Serum or plasma urea nitrogen/creatinine mass ratio            8             
NRG        

 

 Serum or plasma creatinine measurement with calculation of estimated 
glomerular filtration rate            >             NRG        

 

 Serum or plasma glucose measurement (mass/volume)            101 mg/dL        
            

 

 Serum or plasma calcium measurement (mass/volume)            9.7 mg/dL        
    8.5-10.1        

 

 Serum or plasma total bilirubin measurement (mass/volume)            0.3 mg/dL
            0.1-1.0        

 

 Serum or plasma alkaline phosphatase measurement (enzymatic activity/volume)  
          83 U/L                    

 

 Serum or plasma aspartate aminotransferase measurement (enzymatic activity/
volume)            9 U/L            5-34        

 

 Serum or plasma alanine aminotransferase measurement (enzymatic activity/volume
)            6 U/L            0-55        

 

 Serum or plasma protein measurement (mass/volume)            7.7 g/dL         
   6.4-8.2        

 

 Serum or plasma albumin measurement (mass/volume)            4.3 g/dL         
   3.2-4.5        









 A1C - 17 10:41         









 HEMOGLOBIN A1c            5.0 % of total Hgb            <5.7        









 Complete blood count (CBC) with automated white blood cell (WBC) differential 
- 18 16:00         









 Blood leukocytes automated count (number/volume)            8.2 10*3/uL       
     4.3-11.0        

 

 Blood erythrocytes automated count (number/volume)            4.23 10*6/uL    
        4.35-5.85        

 

 Venous blood hemoglobin measurement (mass/volume)            13.2 g/dL        
    11.5-16.0        

 

 Blood hematocrit (volume fraction)            38 %            35-52        

 

 Automated erythrocyte mean corpuscular volume            90 [foz_us]          
  80-99        

 

 Automated erythrocyte mean corpuscular hemoglobin (mass per erythrocyte)      
      31 pg            25-34        

 

 Automated erythrocyte mean corpuscular hemoglobin concentration measurement (
mass/volume)            35 g/dL            32-36        

 

 Automated erythrocyte distribution width ratio            13.4 %            
10.0-14.5        

 

 Automated blood platelet count (count/volume)            251 10*3/uL          
  130-400        

 

 Automated blood platelet mean volume measurement            9.8 [foz_us]      
      7.4-10.4        

 

 Automated blood neutrophils/100 leukocytes            60 %            42-75   
     

 

 Automated blood lymphocytes/100 leukocytes            29 %            12-44   
     

 

 Blood monocytes/100 leukocytes            8 %            0-12        

 

 Automated blood eosinophils/100 leukocytes            3 %            0-10     
   

 

 Automated blood basophils/100 leukocytes            1 %            0-10        

 

 Blood neutrophils automated count (number/volume)            4.9 10*3         
   1.8-7.8        

 

 Blood lymphocytes automated count (number/volume)            2.4 10*3         
   1.0-4.0        

 

 Blood monocytes automated count (number/volume)            0.6 10*3            
0.0-1.0        

 

 Automated eosinophil count            0.2 10*3/uL            0.0-0.3        

 

 Automated blood basophil count (count/volume)            0.0 10*3/uL          
  0.0-0.1        









 PT panel in platelet poor plasma by coagulation assay - 18 16:00         









 Prothrombin time (PT) in platelet poor plasma by coagulation assay            
13.9 s            12.2-14.7        

 

 INR in platelet poor plasma or blood by coagulation assay            1.1      
       0.8-1.4        









 Activated partial thromboplastin time (aPTT) in platelet poor plasma 
bycoagulation assay - 18 16:00         









 Activated partial thromboplastin time (aPTT) in platelet poor plasma 
bycoagulation assay            57 s            24-35        









 Comprehensive metabolic panel - 18 16:00         









 Serum or plasma sodium measurement (moles/volume)            140 mmol/L       
     135-145        

 

 Serum or plasma potassium measurement (moles/volume)            3.6 mmol/L    
        3.6-5.0        

 

 Serum or plasma chloride measurement (moles/volume)            106 mmol/L     
               

 

 Carbon dioxide            24 mmol/L            21-32        

 

 Serum or plasma anion gap determination (moles/volume)            10 mmol/L   
         5-14        

 

 Serum or plasma urea nitrogen measurement (mass/volume)            7 mg/dL    
        7-18        

 

 Serum or plasma creatinine measurement (mass/volume)            0.71 mg/dL    
        0.60-1.30        

 

 Serum or plasma urea nitrogen/creatinine mass ratio            10             
NRG        

 

 Serum or plasma creatinine measurement with calculation of estimated 
glomerular filtration rate            >             NRG        

 

 Serum or plasma glucose measurement (mass/volume)            82 mg/dL         
           

 

 Serum or plasma calcium measurement (mass/volume)            9.1 mg/dL        
    8.5-10.1        

 

 Serum or plasma total bilirubin measurement (mass/volume)            0.4 mg/dL
            0.1-1.0        

 

 Serum or plasma alkaline phosphatase measurement (enzymatic activity/volume)  
          73 U/L                    

 

 Serum or plasma aspartate aminotransferase measurement (enzymatic activity/
volume)            11 U/L            5-34        

 

 Serum or plasma alanine aminotransferase measurement (enzymatic activity/volume
)            7 U/L            0-55        

 

 Serum or plasma protein measurement (mass/volume)            6.9 g/dL         
   6.4-8.2        

 

 Serum or plasma albumin measurement (mass/volume)            4.0 g/dL         
   3.2-4.5        









 Magnesium - 18 16:00         









 Magnesium            2.2 mg/dL            1.8-2.4        









 Lipid 1996 panel - 18 16:00         









 Serum or plasma triglyceride measurement (mass/volume)            67 mg/dL    
        <150        

 

 Serum or plasma cholesterol measurement (mass/volume)            136 mg/dL    
        < 200        

 

 Serum or plasma cholesterol in HDL measurement (mass/volume)            40 mg/
dL            40-60        

 

 Cholesterol in LDL [mass/volume] in serum or plasma by direct assay            
81 mg/dL            1-129        

 

 Serum or plasma cholesterol in VLDL measurement (mass/volume)            13 mg/
dL            5-40        









 Complete blood count (CBC) with automated white blood cell (WBC) differential 
- 18 04:10         









 Blood leukocytes automated count (number/volume)            5.6 10*3/uL       
     4.3-11.0        

 

 Blood erythrocytes automated count (number/volume)            3.87 10*6/uL    
        4.35-5.85        

 

 Venous blood hemoglobin measurement (mass/volume)            12.0 g/dL        
    11.5-16.0        

 

 Blood hematocrit (volume fraction)            35 %            35-52        

 

 Automated erythrocyte mean corpuscular volume            91 [foz_us]          
  80-99        

 

 Automated erythrocyte mean corpuscular hemoglobin (mass per erythrocyte)      
      31 pg            25-34        

 

 Automated erythrocyte mean corpuscular hemoglobin concentration measurement (
mass/volume)            34 g/dL            32-36        

 

 Automated erythrocyte distribution width ratio            13.2 %            
10.0-14.5        

 

 Automated blood platelet count (count/volume)            234 10*3/uL          
  130-400        

 

 Automated blood platelet mean volume measurement            9.7 [foz_us]      
      7.4-10.4        

 

 Automated blood neutrophils/100 leukocytes            42 %            42-75   
     

 

 Automated blood lymphocytes/100 leukocytes            45 %            12-44   
     

 

 Blood monocytes/100 leukocytes            7 %            0-12        

 

 Automated blood eosinophils/100 leukocytes            5 %            0-10     
   

 

 Automated blood basophils/100 leukocytes            1 %            0-10        

 

 Blood neutrophils automated count (number/volume)            2.4 10*3         
   1.8-7.8        

 

 Blood lymphocytes automated count (number/volume)            2.5 10*3         
   1.0-4.0        

 

 Blood monocytes automated count (number/volume)            0.4 10*3            
0.0-1.0        

 

 Automated eosinophil count            0.3 10*3/uL            0.0-0.3        

 

 Automated blood basophil count (count/volume)            0.0 10*3/uL          
  0.0-0.1        









 Whole blood basic metabolic panel - 18 04:10         









 Serum or plasma sodium measurement (moles/volume)            141 mmol/L       
     135-145        

 

 Serum or plasma potassium measurement (moles/volume)            3.9 mmol/L    
        3.6-5.0        

 

 Serum or plasma chloride measurement (moles/volume)            108 mmol/L     
               

 

 Carbon dioxide            23 mmol/L            21-32        

 

 Serum or plasma anion gap determination (moles/volume)            10 mmol/L   
         5-14        

 

 Serum or plasma urea nitrogen measurement (mass/volume)            11 mg/dL   
         7-18        

 

 Serum or plasma creatinine measurement (mass/volume)            0.76 mg/dL    
        0.60-1.30        

 

 Serum or plasma urea nitrogen/creatinine mass ratio            14             
NRG        

 

 Serum or plasma creatinine measurement with calculation of estimated 
glomerular filtration rate            >             NRG        

 

 Serum or plasma glucose measurement (mass/volume)            86 mg/dL         
           

 

 Serum or plasma calcium measurement (mass/volume)            8.4 mg/dL        
    8.5-10.1        









 THYROID STIMULATING HORMONE - 18 04:10         









 THYROID STIMULATING HORMONE            3.35 u[iU]/mL            0.35-4.94     
   









 Methicillin resistant Staphylococcus aureus (MRSA) screening culture -  04:10         









 Methicillin resistant Staphylococcus aureus (MRSA) screening culture          
  NEG             NRG        









 PDM - 09 PANEL (PROFILE 1) - 18 15:24         









 Prescribed Drug 1            Tramadol             NRG        

 

 Creatinine            21.1 mg/dL            > or=20.0        

 

 pH            6.44             4.5 - 9.0        

 

 Oxidant            NEGATIVE mcg/mL            <200        

 

 Amphetamines            NEGATIVE ng/mL            <500        

 

 medMATCH Amphetamines            CONSISTENT             NRG        

 

 Benzodiazepines            POSITIVE ng/mL            <100        

 

 Marijuana Metabolite            NEGATIVE ng/mL            <20        

 

 medMATCH Marijuana Metab            CONSISTENT             NRG        

 

 Cocaine Metabolite            NEGATIVE ng/mL            <150        

 

 medMATCH Cocaine Metab            CONSISTENT             NRG        

 

 Opiates            NEGATIVE ng/mL            <100        

 

 medMATCH Opiates            CONSISTENT             NRG        

 

 Oxycodone            NEGATIVE ng/mL            <100        

 

 medMATCH Oxycodone            CONSISTENT             NRG        

 

 COMMENT                         NRG        

 

   Alphahydroxyalprazolam            195 ng/mL            <25        

 

 medMATCH aOH alprazolam            CONSISTENT             NRG        

 

   Alphahydroxymidazolam            NEGATIVE ng/mL            <50        

 

 medMATCH aOH midazolam            CONSISTENT             NRG        

 

   Alphahydroxytriazolam            NEGATIVE ng/mL            <50        

 

 medMATCH aOH triazolam            CONSISTENT             NRG        

 

   Aminoclonazepam            NEGATIVE ng/mL            <25        

 

 medMATCH Aminoclonazepam            CONSISTENT             NRG        

 

   Hydroxyethylflurazepam            NEGATIVE ng/mL            <50        

 

 medMATCH OH,Et flurazepam            CONSISTENT             NRG        

 

   Lorazepam            NEGATIVE ng/mL            <50        

 

 medMATCH Lorazepam            CONSISTENT             NRG        

 

   Nordiazepam            NEGATIVE ng/mL            <50        

 

 medMATCH Nordiazepam            CONSISTENT             NRG        

 

   Oxazepam            NEGATIVE ng/mL            <50        

 

 medMATCH Oxazepam            CONSISTENT             NRG        

 

   Temazepam            NEGATIVE ng/mL            <50        

 

 medMATCH Temazepam            CONSISTENT             NRG        

 

 Prescribed Drug 2            Xanax(TM)             NRG        

 

 Barbiturates            NEGATIVE ng/mL            <300        

 

 medMATCH Barbiturates            CONSISTENT             NRG        

 

 Methadone Metabolite            NEGATIVE ng/mL            <100        

 

 medMATCH Methadone Metab            CONSISTENT             NRG        

 

 Phencyclidine            NEGATIVE ng/mL            <25        

 

 medMATCH Phencyclidine            CONSISTENT             NRG        



                                          



Encounters

      





 ACCT No.            Visit Date/Time            Discharge            Status    
        Pt. Type            Provider            Facility            Loc./Unit  
          Complaint        

 

 [id number]            2018 14:21:00            2018 16:07:00    
        DIS            Outpatient            MATTHEW PALACIOS FACC, DARREL RODNEY CCDS     
       [facility]            CATH            UNSTABLE 
ANGINA        

 

 [id number]            2018 15:26:00            2018 23:59:59    
        CLS            Outpatient            KIM FUCHS APRN            
[facility]            RAD            [id number] INJURY OF 
LT KNEE        

 

 [id number]            2017 13:08:00            2017 23:59:59    
        CLS            Outpatient            CHARISSA PALACIOS, SUBHA REDDY            [facility] 
[facility]            RAD            LOW BACK PAIN        

 

 [id number]            2016 16:31:00            2016 23:59:59    
        CLS            Outpatient            ARIANNA BRADFORD            
[facility]            COUGH,CHEST 
CONGESITON,DIARRHEA,LLQ/LUQ ABD PAIN        

 

 [id number]            2016 20:45:00            2016 17:50:00    
        DIS            Inpatient            GRAY CANALES DOLINE S            
[facility]            ICU            CP,HYPOKALEMIA,
EPIGASTRIC PAIN,PNEUMONIA        

 

 [id number]            2016 17:39:00            2016 14:56:00    
        DIS            Inpatient            SEMAJNDERIKA CORONA CAMI S            
[facility]            4TH            ABD PAIN,VOMITTING  
      

 

 [id number]            2016 11:54:00            2016 16:58:00    
        DIS            Inpatient            SEMAJNDERIKA CORONA CAMI S            
[facility]            4TH             COLLITIS,
DEHYDRATION        

 

 [id number]            2016 12:29:00            2016 23:59:59    
        CLS            Outpatient            ARIANNA BRADFORD APRN            
[facility]            RAD            CERVICAL AND THORAIC 
PAIN        

 

 [id number]            2016 06:54:00            2016 10:15:00    
        DIS            Emergency            DEMARCUS KRAUSE MD            
[facility]            ER            SEVERE BACK PAIN     
   

 

 [id number]            2016 07:38:00            2016 23:59:59    
        CLS            Outpatient            ARIANNA BRADFORD APRN            
[facility]            RAD            THORACIC   LUMBAR 
PAIN        

 

 [id number]            2015 13:43:00            2015 17:39:00    
        DIS            Emergency            VERITO MARTINEZ            
[facility]            ER            DIZZINESS ELEV BP    
    

 

 [id number]            2015 11:40:00            2015 15:30:00    
        DIS            Outpatient            GERRY SALINAS MD            [facility] 
[facility]            DEHYDRATION,CHOLITIS    
    

 

 [id number]            2015 10:06:00            05/15/2015 11:00:00    
        DIS            Inpatient            CAMI CANALES DO            
[facility]            SURGICAL            DIARRHEA/
SUSPECT RECURRENT C.DIFF        

 

 [id number]            2015 10:00:00            2015 23:59:59    
        CLS            Preadmit            CAMI CANALES DO            
[facility]            CARD            ABD PAIN        

 

 [id number]            2015 06:32:00            2015 23:59:59    
        CLS            Outpatient            CAMI CANALES DO          
  [facility]            RAD            ABDOMINAL PAIN    
    

 

 [id number]            2015 16:07:00            2015 23:59:59    
        CLS            Outpatient            STARR LARA ARNP      
      [facility]            RAD            PNUEMONIA     
   

 

 [id number]            2015 11:20:00            2015 23:59:59    
        CLS            Outpatient            STARR LARA ARNP      
      [facility]            RAD            COUGH,WHEEZING
        

 

 [id number]            2015 07:04:00            2015 12:20:00    
        DIS            Outpatient            CHARLES PALACIOS, JOSE BRIDGES            
[facility]            LEFT KNEE 
CHONDROMALACIA        

 

 [id number]            12/15/2014 19:30:00            2014 16:50:00    
        DIS            Inpatient            CAMI CANALES DO            
[facility]            CSD            CHEST PAIN        

 

 [id number]            2014 20:22:00            2014 23:00:00    
        DIS            Emergency            DEMARCUS KRAUSE MD            
[facility]            ER            HEADACHE        

 

 [id number]            2014 22:10:00            2014 10:10:00    
        DIS            Outpatient            JOSE MANCINI MD            [facility] 
[facility]            CATH            CHEST PAIN        

 

 [id number]            2014 15:53:00            2014 23:59:59    
        CLS            Outpatient            CAMI CANALES DO          
  [facility]            LAB            MALISE   FATIGUE,
ABD PAIN,DYSPEPSIA        

 

 [id number]            10/23/2013 07:50:00            10/23/2013 12:44:00    
        DIS            Outpatient            JOSE SOTO MD            
[facility]            SDC            LEFT KNEE TORN 
MENISCUS        

 

 [id number]            10/22/2013 14:49:00            10/22/2013 23:59:59    
        CLS            Outpatient            JOSE SOTO MD            
[facility]            PREOP            LEFT KNEE TORN 
MEDIAL MENSICUS        

 

 [id number]            10/03/2013 08:32:00            10/03/2013 23:59:59    
        CLS            Outpatient            CAMI CANALES DO S          
  [facility]            RAD            LT KNEE PAIN      
  

 

 [id number]            2013 09:31:00            2013 14:40:00    
        DIS            Inpatient            CAMI CANALES DO            
[facility]            4TH            ABD PAIN NAUSEA/
VOMITING/DIARRHEA HEADACHE        

 

 [id number]            2015 10:52:00                                   
   Document Registration                                                       
     

 

 [id number]            2012 11:00:00                                   
   Document Registration                                                       
     

 

 18 20:09:38            2018 23:59:59        
    CLS            Outpatient            Cami Canales.                
                               

 

 [id number]            10/29/2018 15:40:00            10/29/2018 23:59:59          
  CLS            Outpatient            [facility]IM FUCHS                         
[facility]                     

 

 [id number]            2018 14:20:00                                      
Document Registration                                                          
  

 

 [id number]            2017 10:40:00                                      
Document Registration

## 2019-01-07 NOTE — XMS REPORT
[facility]

 Created on: 2018



Dayami Rae

External Reference #: [id number]

: 1970

Sex: Female



Demographics







 Address  [address] [place]  [address]

 

 Preferred Language  Unknown

 

 Marital Status  Unknown

 

 Oriental orthodox Affiliation  Unknown

 

 Race  Unknown

 

 Ethnic Group  Unknown





Author







 Author  KIM FUCHS

 

 Organization  [facility]

 

 Address  [address] [place]  [address]



 

 Phone  [phone]







Care Team Providers







 Care Team Member Name  Role  Phone

 

 FUCHSKIM JACKSON  Unavailable  [phone]







PROBLEMS







 Type  Condition  ICD9-CM Code  WAB18-FU Code  Onset Dates  Condition Status  
SNOMED Code

 

 Problem  Neuropathy     G62.9     Active  701553103

 

 Problem  Lumbago with sciatica, left side     M54.42     Active  226161648

 

 Problem  Anxiety disorder, unspecified     F41.9     Active  479736453

 

 Problem  Chronic pain syndrome     G89.4     Active  148986139

 

 Problem  Fibromyalgia     M79.7     Active  339172875

 

 Problem  Coronary artery disease involving native coronary artery of native 
heart without angina pectoris     I25.10     Active  [id number]

 

 Problem  Essential hypertension     I10     Active  78607351

 

 Problem  GERD with esophagitis     K21.0     Active  629269849

 

 Problem  Other chronic pain     G89.29     Active  30552280

 

 Problem  Lumbago with sciatica, right side     M54.41     Active  
[id number]

 

 Problem  COPD suggested by initial evaluation     J44.9     Active  46157633

 

 Problem  Acute midline low back pain with left-sided sciatica     M54.42     
Active  252102398







ALLERGIES

No Information



ENCOUNTERS







 Encounter  Location  Date  Diagnosis

 

 [facility]  [id number], [place] [address]-
8988  07 Sep, 2018  Acute bronchitis, unspecified organism J20.9

 

 [facility]  [id number], [place] [address]
2173  28 Aug, 2018  Fibromyalgia M79.7

 

 [facility]  [address] [place] [address]-
2603  20 Aug, 2018   

 

 [facility]  [address] [place] [address] 11395-
2413  16 Aug, 2018  Neuropathy G62.9

 

 [facility]  [address] [place] [address] 52489-
6771    Essential hypertension I10 ; Coronary artery disease 
involving native coronary artery of native heart without angina pectoris I25.10 
; Fibromyalgia M79.7 ; GERD with esophagitis K21.0 and Tobacco abuse counseling 
Z71.6

 

 [facility]  [id number] [place] [address] 61421-
9806    Long term (current) use of opiate analgesic Z79.891

 

 [facility]  [id number] [place] [address] 07772-
1208     

 

 [facility]  [id number] [place] [address] 24618-
7722    Acute bronchitis, unspecified organism J20.9

 

 [facility]  [id number] [place] [address] 89370-
4411     

 

 [facility]  [id number] [place] [address] 29170-
7437     

 

 [facility]  [id number] [place] [address] 52173-
3222    Chronic pain syndrome G89.4

 

 [facility]  [id number] [place] [address] 70048-
7118     

 

 [facility]  [id number] [place] [address] 42996-
8213  15 Marko, 2018   

 

 [facility]  [id number] [place] [address] 58794-
9269    Acute bronchitis, unspecified organism J20.9

 

 [facility]  [id number] [place] [address] 38997-
0673    Chronic pain syndrome G89.4

 

 [facility]  [id number] [place] [address] 51231-
3828  25 May, 2018   

 

 [facility]  [id number] [place] [address] 85314-
3941  24 May, 2018  Acute midline low back pain with left-sided sciatica M54.42

 

 [facility]  [id number] [place] [address] 95005-
8516  22 May, 2018   

 

 [facility]  [address] [address]
-[phone]  21 May, 2018  Bronchitis J40

 

 [facility]  [id number] [place] [address] 07877-
6332  07 May, 2018  Chronic pain syndrome G89.4 and Neuropathy G62.9

 

 [facility]  [address] [address]-
7286    Acute midline low back pain with left-sided sciatica M54.42

 

 [facility]  [address] [address]-
0846     

 

 [facility]  [address] [address]-
6968    Anxiety disorder, unspecified F41.9

 

 [facility]  [id number] [place] [address] 90744-
2033     

 

 [facility]  [address] [address]-
0551    Chronic pain syndrome G89.4 and Acute midline low back pain 
with left-sided sciatica M54.42

 

 [facility]  [id number] [place] [address] 23412-
2415    Chronic pain syndrome G89.4

 

 [facility]  [id number] [place] [address] 84717-
1533     

 

 [facility]  [id number] [place] [address] 26823-
0690     

 

 [facility]  [id number] [place] [address] 73857-
9979  29 Mar, 2018  Injury of left knee, initial encounter S89.92XA and COPD 
suggested by initial evaluation J44.9

 

 [facility]  [id number] [place] [address] 70300-
1447  28 Mar, 2018  Acute bronchitis, unspecified organism J20.9

 

 [facility]  [id number] [place] [address] 59283-
0446  27 Mar, 2018  Acute bronchitis, unspecified organism J20.9

 

 [facility]  [id number] [place] [address] 91581-
9059  21 Mar, 2018  Anxiety disorder, unspecified F41.9 and Acute bronchitis, 
unspecified organism J20.9

 

 [facility]  [id number] [place] [address] 80078-
9031  08 Mar, 2018  Chronic pain syndrome G89.4

 

 [facility]  [id number] [place] [address] 09001-
6905  05 Mar, 2018   

 

 [facility]  [id number] [place] [address] 30650-
2613  05 Mar, 2018  Acute midline low back pain with left-sided sciatica M54.42

 

 [facility]  [id number] [place] [address] 26195-
3065     

 

 [facility]  [id number] [place] [address] 98459-
9866    Chronic pain syndrome G89.4

 

 [facility]  [id number] [place] [address] 86371-
8901    Chronic pain syndrome G89.4

 

 [facility]  [id number] [place] [address] 53791-
6815     

 

 [facility]  [id number] [place] [address] 82038-
1644    Gastroenteritis K52.9

 

 [facility]  [id number] [place] [address] 54809-
5025     

 

 [facility]  [id number] [place] [address] 53557-
1072  15 Jerardo, 2018  Acute bronchitis, unspecified organism J20.9

 

 [facility] [id number] [place] [address] 90951-
2520    Anxiety disorder, unspecified F41.9 and Neuropathy G62.9

 

 [address] [address]-
6108    Chronic pain syndrome G89.4

 

 [address] [address]-
6545    Bronchitis J40 and Laryngitis J04.0

 

 [address] [address]-
1800  29 Dec, 2017   

 

 [address] [address]-
6013  26 Dec, 2017  Bronchitis J40

 

 [address] [address]-
4529  18 Dec, 2017   

 

 [address] [address]-
4115  13 Dec, 2017  Fibromyalgia M79.7 and Chronic pain syndrome G89.4

 

 [address] [address]-
5988  04 Dec, 2017  Chronic pain syndrome G89.4 and Acute bronchitis, 
unspecified organism J20.9

 

 [id number] [place] [address] 38381-
2432    Neuropathy G62.9

 

 [address] [address]-
5045    Chronic pain syndrome G89.4 ; Lumbago with sciatica, left 
side M54.42 ; Lumbago with sciatica, right side M54.41 ; Screening cholesterol 
level Z13.220 ; Screening for diabetes mellitus Z13.1 ; Screening for thyroid 
disorder Z13.29 ; Fibromyalgia M79.7 ; Anxiety disorder, unspecified F41.9 and 
Neuropathy G62.9

 

 [id number] [place] [address] 84772-
1615     

 

 [facility]  [address], [place] [address]-
0881  25 Oct, 2017   

 

 [facility]  [id number] [place] [address] 37424-
1210  10 Oct, 2017  Fibromyalgia M79.7 ; Chronic pain syndrome G89.4 ; Anxiety 
disorder, unspecified F41.9 ; Neuropathy G62.9 and Acute bronchitis, 
unspecified organism J20.9

 

 [facility]  [id number] [place] [address] 90117-
7146  09 Oct, 2017   

 

 [facility]  [id number] [place] [address] 98703-
5958  25 Sep, 2017   

 

 [facility]  [id number] [place] [address] 78941-
1693  19 Sep, 2017   

 

 [facility]  [id number] [place] [address] 16010-
7500  08 Sep, 2017   

 

 [facility]  [id number] [place] [address] 63739-
6178  23 Aug, 2017   

 

 [facility]  [id number] [address]-
8051  22 Aug, 2017  Acute seasonal allergic rhinitis due to pollen J30.1

 

 [facility]  [id number], [place] [address]-
1897  21 Aug, 2017   

 

 [facility]  [address], [place] [address]-
7108  09 Aug, 2017   

 

 [facility]  [address], [place] [address]-
5115     

 

 [facility]  [id number], [place] [address]-
5341     

 

 [facility]  [id number], [place] [address]-
9764  10 Jul, 2017   

 

 [facility]  [id number], [place] [address]-
9780     

 

 [facility]  [address], [place] [address]-
0277     

 

 [facility]  [address] [place] [address] 69522-
3859     

 

 [facility]  [id number] [address]-
6574    Chronic pain syndrome G89.4 ; Fibromyalgia M79.7 ; Anxiety 
disorder, unspecified F41.9 ; Neuropathy G62.9 and Low back pain M54.5

 

 [facility]  [address], [place] [address]-
0652  25 May, 2017  Low back pain M54.5

 

 [facility]  [id number] [place] [address] 91387-
2123  24 May, 2017   

 

 [facility]  [id number] [place] [address] 22404-
0254  22 May, 2017   

 

 [facility]  [id number] [place] [address] 62764-
2134  16 May, 2017   

 

 [facility]  [id number] [place] [address] 43492-
5466  16 May, 2017   

 

 [facility]  [id number] [address]-
6185  12 May, 2017  Routine adult health maintenance Z00.00 ; Fibromyalgia 
M79.7 ; Chronic pain syndrome G89.4 ; Abnormal lung sounds R09.89 ; Coronary 
artery disease involving native coronary artery of native heart without angina 
pectoris I25.10 and S/P CABG (coronary artery bypass graft) Z95.1

 

 [facility]  [address], [place] [address]-
6232  09 May, 2017   







IMMUNIZATIONS

No Known Immunizations



SOCIAL HISTORY

Never Assessed



REASON FOR VISIT

Refill request



PLAN OF CARE





VITAL SIGNS





MEDICATIONS







 Medication  Instructions  Dosage  Frequency  Start Date  End Date  Duration  
Status

 

 Seroquel 200 mg  Orally Once a day  2.5 tablet at bedtime  24h        30  
Active







RESULTS

No Results



PROCEDURES

No Known procedures



INSTRUCTIONS





MEDICATIONS ADMINISTERED

No Known Medications



MEDICAL (GENERAL) HISTORY







 Type  Description  Date

 

 Medical History  fibromyalgia   

 

 Medical History  MRI 2016- small central protrusion/herniation w/minimal 
impression on thecal anteriorly at C5-6, At C3-4 small bilat. uncinate spurs w/ 
mild right neural foraminal narrowing   

 

 Medical History  MRI 2016- mild degenerative changes. No spinal canal 
stenosis or foraminal narrowing of thoracic spine   

 

 Medical History  hypertension   

 

 Medical History  Anxiety disorder   

 

 Medical History  depression   

 

 Medical History  migraine headaches   

 

 Medical History  Hx of C-Diff   

 

 Medical History  Hx of Pneumonia   

 

 Medical History  heart cath w/ stents placed 7/3/18   

 

 Surgical History  Open Heart Surgery - [facility] -Dr. Lima  (
Cardiologist- Dr Logan)  2013

 

 Surgical History  hysterectomy - Dr Luis   

 

 Surgical History  Left Breast Lumpectomy (Bx - Benign)- Dr Luis   

 

 Surgical History  Port - Dr Luis   

 

 Surgical History  Left Knee Surgeries x3- Dr Carolina did 2 and Dr Gan did 1   

 

 Surgical History  cath/stent  

 

 Hospitalization History  C-Diff - [facility]   

 

 Hospitalization History  Pneumonia - [facility]   

 

 Hospitalization History  Open Heart Surgery - [facility]

## 2019-01-07 NOTE — XMS REPORT
[facility]

 Created on: 2018



Dayami Rae

External Reference #: [id number]

: 1970

Sex: Female



Demographics







 Address  [address] [place]  [address]

 

 Preferred Language  Unknown

 

 Marital Status  Unknown

 

 Cheondoism Affiliation  Unknown

 

 Race  Unknown

 

 Ethnic Group  Unknown





Author







 Author  KIM FUCHS

 

 Organization  [facility]

 

 Address  [address] [place]  [address]



 

 Phone  [phone]







Care Team Providers







 Care Team Member Name  Role  Phone

 

 FUCHSJAMA JACKSONELE  Unavailable  [phone]







PROBLEMS







 Type  Condition  ICD9-CM Code  WEW73-KM Code  Onset Dates  Condition Status  
SNOMED Code

 

 Problem  Fibromyalgia     M79.7     Active  143584355

 

 Problem  Neuropathy     G62.9     Active  704043994

 

 Problem  Coronary artery disease involving native coronary artery of native 
heart without angina pectoris     I25.10     Active  [id number]

 

 Problem  Chronic pain syndrome     G89.4     Active  893357068

 

 Problem  COPD suggested by initial evaluation     J44.9     Active  16779937

 

 Problem  Acute midline low back pain with left-sided sciatica     M54.42     
Active  024099924

 

 Problem  Lumbago with sciatica, left side     M54.42     Active  624002328

 

 Problem  Anxiety disorder, unspecified     F41.9     Active  008039330

 

 Problem  Other chronic pain     G89.29     Active  93938838

 

 Problem  Lumbago with sciatica, right side     M54.41     Active  
[id number]







ALLERGIES







 Substance  Reaction  Event Type  Date  Status

 

 Morphine Sulfate  Rash  Drug Allergy  29 Mar, 2018  Active







ENCOUNTERS







 Encounter  Location  Date  Diagnosis

 

 [facility]  [id number] [address]     

 

 [facility]  [address] [address]
6707     

 

 [facility]  [address] [address]
7156    Chronic pain syndrome G89.4

 

 [facility]  [id number] [address]-
1203     

 

 [facility]  [address] [address]-
99[address]  15 Marko, 2018   

 

 [facility]  [id number] [place] [address]211-
7295    Acute bronchitis, unspecified organism J20.9

 

 [facility]  [id number] [place] [address] 76642-
7771    Chronic pain syndrome G89.4

 

 [facility]  [id number] [place] [address] 59231-
0087  25 May, 2018   

 

 [facility]  [id number] [place] [address] 86822-
6210  24 May, 2018  Acute midline low back pain with left-sided sciatica M54.42

 

 [facility]  [address] [address]-
0615  22 May, 2018   

 

 [facility]  [id number] [place] [phone]
[phone]  21 May, 2018  Bronchitis J40

 

 [facility]  [address] [address]-
8358  07 May, 2018  Chronic pain syndrome G89.4 and Neuropathy G62.9

 

 [facility]  [id number] [place] [address] 14573-
5182    Acute midline low back pain with left-sided sciatica M54.42

 

 [facility]  [id number] [place] [address] 51948-
4777     

 

 [facility]  [id number] [place] [address] 68204-
7203    Anxiety disorder, unspecified F41.9

 

 [facility]  [id number] [place] [address] 69107-
3273     

 

 [facility]  [id number] [place] [address] 34234-
4117    Chronic pain syndrome G89.4 and Acute midline low back pain 
with left-sided sciatica M54.42

 

 [facility]  [id number] [place] [address] 79059-
3080    Chronic pain syndrome G89.4

 

 [facility]  [id number] [place] [address] 15532-
8029     

 

 [facility]  [id number] [place] [address] 79081-
6160     

 

 [facility]  [id number] [place] [address] 17883-
0447  29 Mar, 2018  Injury of left knee, initial encounter S89.92XA and COPD 
suggested by initial evaluation J44.9

 

 [facility]  [id number] [place] [address] 06215-
6762  28 Mar, 2018  Acute bronchitis, unspecified organism J20.9

 

 [facility]  [id number] [place] [address] 18272-
9342  27 Mar, 2018  Acute bronchitis, unspecified organism J20.9

 

 [facility]  [id number] [place] [address] 92242-
8190  21 Mar, 2018  Anxiety disorder, unspecified F41.9 and Acute bronchitis, 
unspecified organism J20.9

 

 [facility]  [id number] [place] [address] 52690-
7223  08 Mar, 2018  Chronic pain syndrome G89.4

 

 [facility]  [id number] [place] [address] 24649-
9419  05 Mar, 2018   

 

 [facility]  [id number] [place] [address] 00588-
4143  05 Mar, 2018  Acute midline low back pain with left-sided sciatica M54.42

 

 [facility]  [id number] [place] [address] 05748-
8879     

 

 [facility]  [id number] [place] [address] 91973-
7044    Chronic pain syndrome G89.4

 

 [facility]  [id number] [place] [address] 87342-
8064    Chronic pain syndrome G89.4

 

 [place] [id number] [place] [address] 69209-
1225     

 

 [facility]  [address] [address]-
4327    Gastroenteritis K52.9

 

 [facility]  [id number] [place] [address] 21401-
5440     

 

 [facility]  [address] [address]-
7671  15 Jerardo, 2018  Acute bronchitis, unspecified organism J20.9

 

 [facility]  [id number] [place] [address] 49085-
3302    Anxiety disorder, unspecified F41.9 and Neuropathy G62.9

 

 [facility]  [id number] [place] [address] 64572-
1176    Chronic pain syndrome G89.4

 

 [facility]  [id number] [place] [address] 14666-
5611    Bronchitis J40 and Laryngitis J04.0

 

 [facility]  [id number] [place] [address] 46494-
2354  29 Dec, 2017   

 

 [facility]  [id number] [place] [address] 14506-
0130  26 Dec, 2017  Bronchitis J40

 

 [facility]  [id number] [place] [address] 64652-
7205  18 Dec, 2017   

 

 [facility]  [id number] [place] [address] 07409-
6333  13 Dec, 2017  Fibromyalgia M79.7 and Chronic pain syndrome G89.4

 

 [facility]  [id number] [place] [address] 30495-
1734  04 Dec, 2017  Chronic pain syndrome G89.4 and Acute bronchitis, 
unspecified organism J20.9

 

 [facility]  [id number] [place] [address] 21344-
6915    Neuropathy G62.9

 

 [address] [place] [id number] [place] [address] 35805-
1884    Chronic pain syndrome G89.4 ; Lumbago with sciatica, left 
side M54.42 ; Lumbago with sciatica, right side M54.41 ; Screening cholesterol 
level Z13.220 ; Screening for diabetes mellitus Z13.1 ; Screening for thyroid 
disorder Z13.29 ; Fibromyalgia M79.7 ; Anxiety disorder, unspecified F41.9 and 
Neuropathy G62.9

 

 [facility]  [id number] [place] [address] 88659-
5279     

 

 [facility]  [address] [address]-
3841  25 Oct, 2017   

 

 [facility]  [id number] [place] [address] 60140-
2979  10 Oct, 2017  Fibromyalgia M79.7 ; Chronic pain syndrome G89.4 ; Anxiety 
disorder, unspecified F41.9 ; Neuropathy G62.9 and Acute bronchitis, 
unspecified organism J20.9

 

 [facility]  [id number] [place] [address] 37011-
7737  09 Oct, 2017   

 

 [facility]  [id number] [place] [address] 13841-
4160  25 Sep, 2017   

 

 [facility]  [id number] [place] [address] 76108-
0086  19 Sep, 2017   

 

 [facility]  [id number] [place] [address] 47696-
7524  08 Sep, 2017   

 

 [facility]  [id number] [place] [address] 28960-
7936  23 Aug, 2017   

 

 [facility]  [address] [address]-
2301  22 Aug, 2017  Acute seasonal allergic rhinitis due to pollen J30.1

 

 [facility]  [id number] [place] [address] 06965-
8849  21 Aug, 2017   

 

 [facility]  [id number] [place] [address] 78329-
1163  09 Aug, 2017   

 

 [facility]  [address] [address]-
4[address]     

 

 [facility]  [address] [address]-
6654     

 

 [facility]  [id number] [address]-
5579  10 Jul, 2017   

 

 [facility]  [id number] [place] [address] 50054-
7957     

 

 [facility]  [address] [place] [address] 21234-
5610     

 

 [facility]  [id number] [place] [address] 54141-
0552     

 

 [facility]  [address] [place] [address] 27587-
0189    Chronic pain syndrome G89.4 ; Fibromyalgia M79.7 ; Anxiety 
disorder, unspecified F41.9 ; Neuropathy G62.9 and Low back pain M54.5

 

 [facility]  [address] [address]-
8075  25 May, 2017  Low back pain M54.5

 

 [facility]  [address] [address]-
1237  24 May, 2017   

 

 [facility]  [address] [address]-
8371  22 May, 2017   

 

 [facility]  [address] [address]-
9731  16 May, 2017   

 

 [facility]  [address] [address]-
0592  16 May, 2017   

 

 [facility]  [address] [address]-
5532  12 May, 2017  Routine adult health maintenance Z00.00 ; Fibromyalgia 
M79.7 ; Chronic pain syndrome G89.4 ; Abnormal lung sounds R09.89 ; Coronary 
artery disease involving native coronary artery of native heart without angina 
pectoris I25.10 and S/P CABG (coronary artery bypass graft) Z95.1

 

 [facility]  [address] [facility] [id number] [place] [address]-
1790  09 May, 2017   







IMMUNIZATIONS

No Known Immunizations



SOCIAL HISTORY

Never Assessed



REASON FOR VISIT

Pain (acute)knee, left knee, pt believes she has torn her meniscus again, 
states she felt it and she also has had 4 prior surgeries on it-AHarrymanRN, 
would like symbicort rx sent



PLAN OF CARE







 Activity  Details









  









 Follow Up  3 Months, prn Reason:







VITAL SIGNS







 Height  5'2" in  2018

 

 Weight  128.2 lbs  2018

 

 Temperature  98.2 degrees Fahrenheit  2018

 

 Heart Rate  84 bpm  2018

 

 Respiratory Rate  18   2018

 

 BMI  23.45 kg/m2  2018

 

 Blood pressure systolic  122 mmHg  2018

 

 Blood pressure diastolic  76 mmHg  2018







MEDICATIONS







 Medication  Instructions  Dosage  Frequency  Start Date  End Date  Duration  
Status

 

 Aspir-81 81 MG  Orally Once a day  1 tablet  24h           Active

 

 Gabapentin 300 MG  Orally Once a day  1 capsule  24h  09 May, 2017     90 days
  Active

 

 Amitriptyline HCl 25 MG  Orally Once a day  1 tablet  24h  10 Oct, 2017     30 
days  Active

 

 Sumatriptan Succinate 6 MG/0.5ML  Subcutaneous Once a day prn migraine  0.5 ml 
as needed              Active

 

 Chlorzoxazone 500 mg  Orally Three times a day if needed  1 tablet        4   30 days  Active

 

 Xanax 0.25 MG  Orally Twice a day  1 tablet  12h        28 days  Active

 

 Promethazine-Codeine 6.25-10 MG/5ML  Orally every 4 hrs  1-2 tsp as needed  4h
  26 Dec, 2017        Not-Taking

 

 Promethazine HCl 25 MG  Orally 4 times a day prn  1 tablet as needed           
30 days  Active

 

 Tramadol HCl 50 mg  Orally every 6 hrs  1 tablet as needed  6h    
   28 days  Active

 

 Seroquel 200 mg  Orally Once a day  2.5 tablet at bedtime  24h        30 days  
Active

 

 ProAir  (90 Base) MCG/ACT  Inhalation every 6 hrs  2 puffs as needed  
6h  29 Mar, 2018     12 months  Active

 

 Tylenol     1 tab              Active

 

 ProAir  (90 Base) MCG/ACT  Inhalation every 4 hrs  2 puffs as needed  
4h  10 Oct, 2017     12 months  Active

 

 Symbicort 80-4.5 MCG/ACT  Inhalation Twice a day  2 puffs  12h  29 Mar, 2018  
29 Mar, 2023  12 months  Active







RESULTS







 Name  Result  Date  Reference Range

 

 MRI : Knee, Left w/o contrast     2018   







PROCEDURES

No Known procedures



INSTRUCTIONS





MEDICATIONS ADMINISTERED

No Known Medications



MEDICAL (GENERAL) HISTORY







 Type  Description  Date

 

 Medical History  fibromyalgia   

 

 Medical History  MRI 2016- small central protrusion/herniation w/minimal 
impression on thecal anteriorly at C5-6, At C3-4 small bilat. uncinate spurs w/ 
mild right neural foraminal narrowing   

 

 Medical History  MRI 2016- mild degenerative changes. No spinal canal 
stenosis or foraminal narrowing of thoracic spine   

 

 Medical History  hypertension   

 

 Medical History  Anxiety disorder   

 

 Medical History  depression   

 

 Medical History  migraine headaches   

 

 Medical History  Hx of C-Diff   

 

 Medical History  Hx of Pneumonia   

 

 Surgical History  Open Heart Surgery - [facility] -Dr. Lima  (
Cardiologist- Dr Logan)  

 

 Surgical History  hysterectomy - Dr Luis   

 

 Surgical History  Left Breast Lumpectomy (Bx - Benign)- Dr Luis   

 

 Surgical History  Port - Dr Luis   

 

 Surgical History  Left Knee Surgeries x3- Dr Carolina did 2 and Dr Gan did 1   

 

 Hospitalization History  C-Diff - [facility]   

 

 Hospitalization History  Pneumonia - [facility]   

 

 Hospitalization History  Open Heart Surgery - [facility]

## 2019-01-07 NOTE — XMS REPORT
[facility]

 Created on: 11/15/2018



Dayami Rae

External Reference #: [id number]

: 1970

Sex: Female



Demographics







 Address  [address] [place]  [address]

 

 Preferred Language  Unknown

 

 Marital Status  Unknown

 

 Advent Affiliation  Unknown

 

 Race  Unknown

 

 Ethnic Group  Unknown





Author







 Author  SURENDRA FRASER

 

 [facility]

 

 Address  [address] [place]  [address]



 

 Phone  [phone]







Care Team Providers







 Care Team Member Name  Role  Phone

 

 SURENDRA FRASER  Unavailable  [phone]







PROBLEMS







 Type  Condition  ICD9-CM Code  IFG93-NB Code  Onset Dates  Condition Status  
SNOMED Code

 

 Problem  Coronary artery disease involving native coronary artery of native 
heart without angina pectoris     I25.10     Active  5715252247288

 

 Problem  Anxiety disorder, unspecified     F41.9     Active  834336386

 

 Problem  Neuropathy     G62.9     Active  682140211

 

 Problem  Fibromyalgia     M79.7     Active  297694627

 

 Problem  Chronic pain syndrome     G89.4     Active  396681793

 

 Problem  Cervical radiculopathy     M54.12     Active  97488527

 

 Problem  Chronic obstructive pulmonary disease, unspecified COPD type     
J44.9     Active  35268783

 

 Problem  Lumbago with sciatica, right side     M54.41     Active  
709397177260486

 

 Problem  Lumbago with sciatica, left side     M54.42     Active  717191179

 

 Problem  Essential hypertension     I10     Active  98929665

 

 Problem  GERD with esophagitis     K21.0     Active  064258309







ALLERGIES

No Information



ENCOUNTERS







 Encounter  Location  Date  Diagnosis

 

 [place] [id number] [place] [address] [phone]-
[phone]  15 Nov, 2018   

 

 [facility]  [id number] [place] [address] [phone]-
[phone]     

 

 [facility]  [id number] [place] [address] 44019-
8495  29 Oct, 2018  Cervical radiculopathy M54.12 ; Chronic pain syndrome G89.4 
and Anxiety disorder, unspecified F41.9

 

 [facility]  [id number] [place] [address] 51138-
5815  24 Oct, 2018   

 

 [facility]  [id number] [place] [address] 43540-
7039  27 Sep, 2018  Fibromyalgia M79.7

 

 [place] [id number] [place] [address] 26225-
[phone]  20 Sep, 2018   

 

 [place] [id number] [place] [address] 88778-
[phone]  19 Sep, 2018   

 

 [place] [id number] [place] [address] 43238-
8203  17 Sep, 2018  Pneumonia of right lower lobe due to infectious organism 
J18.1 and Chronic obstructive pulmonary disease, unspecified COPD type J44.9

 

 [place] [id number] [place] [address] 05714-
8289  14 Sep, 2018  Pneumonia of right lower lobe due to infectious organism 
J18.1 ; Chronic obstructive pulmonary disease, unspecified COPD type J44.9 ; 
Chronic nausea R11.0 and Cough R05

 

 [place] [address] [address]-
5137  07 Sep, 2018  Acute bronchitis, unspecified organism J20.9

 

 [place] [id number] [place] [address] 51800-
[phone]  28 Aug, 2018  Fibromyalgia M79.7

 

 [place] [id number] [place] [address] 92676-
[phone]  20 Aug, 2018   

 

 [place] [id number] [place] [address] [phone]-
[phone]  16 Aug, 2018  Neuropathy G62.9

 

 [place] [id number] [place] [address] 74795-
6804    Essential hypertension I10 ; Coronary artery disease 
involving native coronary artery of native heart without angina pectoris I25.10 
; Fibromyalgia M79.7 ; GERD with esophagitis K21.0 and Tobacco abuse counseling 
Z71.6

 

 [place] [id number] [place] [address] 44816-
7965    Long term (current) use of opiate analgesic Z79.891

 

 [place] [id number] [place] [address] 52360-
5693     

 

 [facility]  [address] [facility]note 3[facility] [id number] [address]-
4247    Acute bronchitis, unspecified organism J20.9

 

 [facility]  [id number] [address]-
3977     

 

 [facility]  [address] [place] [address] 87268-
8110     

 

 [facility]  [id number] [place] [address] 74772-
3001    Chronic pain syndrome G89.4

 

 [facility]  [id number] [place] [address] 73488-
1387     

 

 [facility]  [address] [place] [address] 51209-
2217  15 Marko, 2018   

 

 [facility]  [id number] [place] [address] 05566-
8417    Acute bronchitis, unspecified organism J20.9

 

 [facility]  [address] [place] [address] 01001-
6156    Chronic pain syndrome G89.4

 

 [facility]  [id number] [address]-
7763  25 May, 2018   

 

 [facility]  [address] [place] [address] 70311-
0911  24 May, 2018  Acute midline low back pain with left-sided sciatica M54.42

 

 [facility]  [address] [address]-
4379  22 May, 2018   

 

 [facility]  [address] [place] [address] [phone]
-[phone]  21 May, 2018  Bronchitis J40

 

 [facility]  [address] [place] [address] 79755-
1661  07 May, 2018  Chronic pain syndrome G89.4 and Neuropathy G62.9

 

 [facility]  [id number] [place] [address] 24492-
0742    Acute midline low back pain with left-sided sciatica M54.42

 

 [facility]  [id number] [place] [address] 40456-
0832     

 

 [facility]  [id number] [place] [address] 14190-
0341    Anxiety disorder, unspecified F41.9

 

 [facility]  [id number] [place] [address] 73318-
0335     

 

 [place] [address] [place] [address] 99130-
9019    Chronic pain syndrome G89.4 and Acute midline low back pain 
with left-sided sciatica M54.42

 

 [place] [id number] [place] [address] 71489-
5246    Chronic pain syndrome G89.4

 

 [place] [id number] [place] [address] 72270-
2966     

 

 [place] [address] [place] [address] 16733-
8491     

 

 [place] [address] [place] [address] 02287-
0801  29 Mar, 2018  Injury of left knee, initial encounter S89.92XA and COPD 
suggested by initial evaluation J44.9

 

 [place] [id number] [place] [address] 55328-
4419  28 Mar, 2018  Acute bronchitis, unspecified organism J20.9

 

 [place] [address] [place] [address] 14262-
0777  27 Mar, 2018  Acute bronchitis, unspecified organism J20.9

 

 [place] [address] [place] [address] 71678-
7377  21 Mar, 2018  Anxiety disorder, unspecified F41.9 and Acute bronchitis, 
unspecified organism J20.9

 

 [place] [address] [place] [address] 55649-
9106  08 Mar, 2018  Chronic pain syndrome G89.4

 

 [facility]  [address] [place] [address] 77699-
2153  05 Mar, 2018   

 

 [facility]  [address] [place] [address] 73257-
0505  05 Mar, 2018  Acute midline low back pain with left-sided sciatica M54.42

 

 [facility]  [address] [place] [address] 77058-
6144     

 

 [facility]  [address] [place] [address] 64726-
6427    Chronic pain syndrome G89.4

 

 [facility]  [address] [place] [address] 46852-
1925    Chronic pain syndrome G89.4

 

 [facility]  [address] [address]65-
5865     

 

 [facility]  [address] [place] [address] 80527-
3000    Gastroenteritis K52.9

 

 [facility]  [address] [place] [address] 32834-
4490     

 

 [facility]  [address] [place] [address] 27316-
4368  15 Jerardo, 2018  Acute bronchitis, unspecified organism J20.9

 

 [facility]  [address] [place] [address] 71724-
6829    Anxiety disorder, unspecified F41.9 and Neuropathy G62.9

 

 [facility]  [address] 96693-
0199    Chronic pain syndrome G89.4

 

 [facility]  [address] [place] [address] 91531-
3945    Bronchitis J40 and Laryngitis J04.0

 

 [facility]  [address] [place] [address] 44871-
2569  29 Dec, 2017   

 

 [place] [address] [place] [address] 05905-
9564  26 Dec, 2017  Bronchitis J40

 

 [place] [address] [place] [address] 20901-
0436  18 Dec, 2017   

 

 [place] [address] [place] [address] 84224-
7783  13 Dec, 2017  Fibromyalgia M79.7 and Chronic pain syndrome G89.4

 

 [place] [address] 18921-
7633  04 Dec, 2017  Chronic pain syndrome G89.4 and Acute bronchitis, 
unspecified organism J20.9

 

 [place] [id number] [place] [address] 68640-
5844    Neuropathy G62.9

 

 [place] [address] [place] [address] 41062-
5043    Chronic pain syndrome G89.4 ; Lumbago with sciatica, left 
side M54.42 ; Lumbago with sciatica, right side M54.41 ; Screening cholesterol 
level Z13.220 ; Screening for diabetes mellitus Z13.1 ; Screening for thyroid 
disorder Z13.29 ; Fibromyalgia M79.7 ; Anxiety disorder, unspecified F41.9 and 
Neuropathy G62.9

 

 [place] [address] [place] [address] 59801-
7994     

 

 [place] [address] [place] [address] 75753-
6895  25 Oct, 2017   

 

 [place] [address] [place] [address] 38028-
0105  10 Oct, 2017  Fibromyalgia M79.7 ; Chronic pain syndrome G89.4 ; Anxiety 
disorder, unspecified F41.9 ; Neuropathy G62.9 and Acute bronchitis, 
unspecified organism J20.9

 

 [place] [id number] [place] [address] 26985-
1276  09 Oct, 2017   

 

 [facility]  [address] [facility] [id number] [place] [address]-
1077  25 Sep, 2017   

 

 [facility]  [address] [facility]] [id number] [address]-
7666  19 Sep, 2017   

 

 [facility]  [id number], [place] [address]-
5427  08 Sep, 2017   

 

 [facility]  [id number], [place] [address]-
7433  23 Aug, 2017   

 

 [facility]  [address] [facility]note 3] [id number] [address]-
1972  22 Aug, 2017  Acute seasonal allergic rhinitis due to pollen J30.1

 

 [facility]  [address], [place] [address]-
4694  21 Aug, 2017   

 

 [facility]  [address] [address]-
9164  09 Aug, 2017   

 

 [facility]  [address], [place] [address]-
6955     

 

 [facility]  [address], [place] [address]-
7335     

 

 [facility]  [address] [address]-
3696  10 Jul, 2017   

 

 [facility]  [address] [address]-
7590     

 

 [facility]  [address], [place] [address]-
6239     

 

 [facility]  [address], [place] [address]-
1312     

 

 [facility]  [address] [address]-
9383    Chronic pain syndrome G89.4 ; Fibromyalgia M79.7 ; Anxiety 
disorder, unspecified F41.9 ; Neuropathy G62.9 and Low back pain M54.5

 

 [facility]  [id number] [address]-
5122  25 May, 2017  Low back pain M54.5

 

 [facility]  [address], [place] [address]-
1212  24 May, 2017   

 

 [facility]  [address], [place] [address]-
6699  22 May, 2017   

 

 [facility]  [address], [place] [address]-
4162  16 May, 2017   

 

 [facility]  [address], [place] [address]-
3850  16 May, 2017   

 

 [facility]  [address] [place] [address] 77664-
5549  12 May, 2017  Routine adult health maintenance Z00.00 ; Fibromyalgia 
M79.7 ; Chronic pain syndrome G89.4 ; Abnormal lung sounds R09.89 ; Coronary 
artery disease involving native coronary artery of native heart without angina 
pectoris I25.10 and S/P CABG (coronary artery bypass graft) Z95.1

 

 [facility]  [address], [place] [address]-
6240  09 May, 2017   







IMMUNIZATIONS

No Known Immunizations



SOCIAL HISTORY

Never Assessed



REASON FOR VISIT

refill request



PLAN OF CARE





VITAL SIGNS





MEDICATIONS

Unknown Medications



RESULTS

No Results



PROCEDURES

No Known procedures



INSTRUCTIONS





MEDICATIONS ADMINISTERED

No Known Medications



MEDICAL (GENERAL) HISTORY







 Type  Description  Date

 

 Medical History  fibromyalgia   

 

 Medical History  MRI 2016- small central protrusion/herniation w/minimal 
impression on thecal anteriorly at C5-6, At C3-4 small bilat. uncinate spurs w/ 
mild right neural foraminal narrowing   

 

 Medical History  MRI 2016- mild degenerative changes. No spinal canal 
stenosis or foraminal narrowing of thoracic spine   

 

 Medical History  hypertension   

 

 Medical History  Anxiety disorder   

 

 Medical History  depression   

 

 Medical History  migraine headaches   

 

 Medical History  Hx of C-Diff   

 

 Medical History  Hx of Pneumonia   

 

 Medical History  heart cath w/ stents placed 7/3/18   

 

 Surgical History  Open Heart Surgery - [facility] -Dr. Lima  (
Cardiologist- Dr Logan)  

 

 Surgical History  hysterectomy - Dr Luis   

 

 Surgical History  Left Breast Lumpectomy (Bx - Benign)- Dr Luis   

 

 Surgical History  Port - Dr Luis   

 

 Surgical History  Left Knee Surgeries x3- Dr Carolina did 2 and Dr Gan did 1   

 

 Surgical History  cath/stent  2018

 

 Hospitalization History  C-Diff - [facility]   

 

 Hospitalization History  Pneumonia - [facility]   

 

 Hospitalization History  Open Heart Surgery - [facility]

## 2019-01-07 NOTE — XMS REPORT
[facility]

 Created on: 2018



Dayami Rae

External Reference #: [id number]

: 1970

Sex: Female



Demographics







 Address  [address] [place]  [address]

 

 Preferred Language  Unknown

 

 Marital Status  Unknown

 

 Nondenominational Affiliation  Unknown

 

 Race  Unknown

 

 Ethnic Group  Unknown





Author







 Author  KIM FUCHS

 

 Organization  [facility]

 

 Address  [address] [place]  [address]



 

 Phone  [phone]







Care Team Providers







 Care Team Member Name  Role  Phone

 

 FUCHSJAMA JACKSONELE  Unavailable  [phone]







PROBLEMS







 Type  Condition  ICD9-CM Code  SMI55-ZL Code  Onset Dates  Condition Status  
SNOMED Code

 

 Problem  Fibromyalgia     M79.7     Active  272255024

 

 Problem  Neuropathy     G62.9     Active  155733755

 

 Problem  Coronary artery disease involving native coronary artery of native 
heart without angina pectoris     I25.10     Active  3605909325419

 

 Problem  Chronic pain syndrome     G89.4     Active  638694490

 

 Problem  COPD suggested by initial evaluation     J44.9     Active  81853863

 

 Problem  Acute midline low back pain with left-sided sciatica     M54.42     
Active  245103853

 

 Problem  Lumbago with sciatica, left side     M54.42     Active  979429091

 

 Problem  Anxiety disorder, unspecified     F41.9     Active  678686063

 

 Problem  Other chronic pain     G89.29     Active  99202844

 

 Problem  Lumbago with sciatica, right side     M54.41     Active  
[id number]







ALLERGIES

No Information



ENCOUNTERS







 Encounter  Location  Date  Diagnosis

 

 [facility]  [id number] [place] [address] 80555-
0529     

 

 [facility]  [id number] [place] [address] 75898-
8763    Chronic pain syndrome G89.4

 

 [facility]  [id number] [place] [address] 22664-
6515  25 May, 2018   

 

 [facility]  [id number] [place] [address] 56090-
0232  24 May, 2018  Acute midline low back pain with left-sided sciatica M54.42

 

 [facility]  [id number] [place] [address] 18845-
8615  22 May, 2018   

 

 [facility]  [id number] [place] [address] 55394
-[phone]  21 May, 2018  Bronchitis J40

 

 [facility]  [address] [address]-
2391  07 May, 2018  Chronic pain syndrome G89.4 and Neuropathy G62.9

 

 [facility]  [address] [address]-
1048    Acute midline low back pain with left-sided sciatica M54.42

 

 [facility]  [address] 84656-
7469     

 

 [facility]  [address] 22440-
7674    Anxiety disorder, unspecified F41.9

 

 [facility]  [address] [address]63-
9978     

 

 [facility]  [address] 07372-
2436    Chronic pain syndrome G89.4 and Acute midline low back pain 
with left-sided sciatica M54.42

 

 [facility]  [address] 81098-
5914    Chronic pain syndrome G89.4

 

 [facility]  [id number] [place] [address] 34505-
0382     

 

 [facility]  [id number] [place] [address] 76786-
7855     

 

 [facility]  [id number] [place] [address] 30879-
2994  29 Mar, 2018  Injury of left knee, initial encounter S89.92XA and COPD 
suggested by initial evaluation J44.9

 

 [facility]  [id number] [place] [address] 36044-
8317  28 Mar, 2018  Acute bronchitis, unspecified organism J20.9

 

 [facility]  [address] 44991-
8158  27 Mar, 2018  Acute bronchitis, unspecified organism J20.9

 

 [facility]  [id number] [place] [address] 67386-
3618  21 Mar, 2018  Anxiety disorder, unspecified F41.9 and Acute bronchitis, 
unspecified organism J20.9

 

 [facility]  [id number] [place] [address] 85170-
6018  08 Mar, 2018  Chronic pain syndrome G89.4

 

 [facility]  [id number] [place] [address] 35138-
6378  05 Mar, 2018   

 

 [facility]  [id number] [place] [address] 61121-
8439  05 Mar, 2018  Acute midline low back pain with left-sided sciatica M54.42

 

 [facility]  [id number] [place] [address] 67606-
7522     

 

 [facility]  [id number] [place] [address] 66469-
8451    Chronic pain syndrome G89.4

 

 [facility]  [id number] [place] [address] 70175-
7750    Chronic pain syndrome G89.4

 

 [facility]  [id number] [place] [address] 34828-
3920     

 

 [facility]  [id number] [place] [address] 92511-
1245    Gastroenteritis K52.9

 

 [facility]  [id number] [place] [address] 57298-
4119     

 

 [facility]  [id number] [place] [address] 40287-
8042  15 Jerardo, 2018  Acute bronchitis, unspecified organism J20.9

 

 [facility]  [id number] [place] [address] 85845-
2882    Anxiety disorder, unspecified F41.9 and Neuropathy G62.9

 

 [facility] [id number] [place] [address] 80240-
8909    Chronic pain syndrome G89.4

 

 [place] [address] 57381-
0997    Bronchitis J40 and Laryngitis J04.0

 

 [place] [address] 39513-
7294  29 Dec, 2017   

 

 [place] [address] 09634-
5053  26 Dec, 2017  Bronchitis J40

 

 [place] [address] 15112-
9762  18 Dec, 2017   

 

 [place] [address] 62462-
4450  13 Dec, 2017  Fibromyalgia M79.7 and Chronic pain syndrome G89.4

 

 [place] [address] 70509-
1780  04 Dec, 2017  Chronic pain syndrome G89.4 and Acute bronchitis, 
unspecified organism J20.9

 

 [place] [address] 37647-
8650    Neuropathy G62.9

 

 [place] [id number] [place] [address] 26762-
6614    Chronic pain syndrome G89.4 ; Lumbago with sciatica, left 
side M54.42 ; Lumbago with sciatica, right side M54.41 ; Screening cholesterol 
level Z13.220 ; Screening for diabetes mellitus Z13.1 ; Screening for thyroid 
disorder Z13.29 ; Fibromyalgia M79.7 ; Anxiety disorder, unspecified F41.9 and 
Neuropathy G62.9

 

 [place] [id number] [place] [address] 58427-
0214     

 

 [place] [id number] [place] [address] 98301-
1888  25 Oct, 2017   

 

 [place] [address] [place] [address]-
7772  10 Oct, 2017  Fibromyalgia M79.7 ; Chronic pain syndrome G89.4 ; Anxiety 
disorder, unspecified F41.9 ; Neuropathy G62.9 and Acute bronchitis, 
unspecified organism J20.9

 

 [facility]  [id number] [address]-
8344  09 Oct, 2017   

 

 [facility]  [id number] [place] [address] 74144-
7686  25 Sep, 2017   

 

 [facility]  [id number] [place] [address] 58289-
7873  19 Sep, 2017   

 

 [facility]  [id number] [place] [address] 27028-
9645  08 Sep, 2017   

 

 [facility]  [id number] [place] [address] 52207-
1780  23 Aug, 2017   

 

 [facility]  [id number] [place] [address] 41275-
7374  22 Aug, 2017  Acute seasonal allergic rhinitis due to pollen J30.1

 

 [facility]  [address] [address]-
5823  21 Aug, 2017   

 

 [facility]  [address] [place] [address] 41650-
9423  09 Aug, 2017   

 

 [facility]  [address] [address]-
2107     

 

 [facility]  [address] [address]-
8557     

 

 [facility]  [address] [address]-
1351  10 Jul, 2017   

 

 [facility]  [address] [address]169-
4909     

 

 [facility]  [address] [address]-
0192     

 

 [facility]  [id number] [place] [address] 39639-
9733     

 

 [facility]  [id number] [place] [address] 95195-
1424    Chronic pain syndrome G89.4 ; Fibromyalgia M79.7 ; Anxiety 
disorder, unspecified F41.9 ; Neuropathy G62.9 and Low back pain M54.5

 

 [place] [id number] [place] [address] 46744-
5506  25 May, 2017  Low back pain M54.5

 

 [facility]  [id number] [place] [address] 32849-
2725  24 May, 2017   

 

 [place] [address] 97323-
1622  22 May, 2017   

 

 [address] [place] [address] 55629-
5317  16 May, 2017   

 

 [place] [address] 32347-
7239  16 May, 2017   

 

 [place] [id number] [place] [address] 33376-
3882  12 May, 2017  Routine adult health maintenance Z00.00 ; Fibromyalgia 
M79.7 ; Chronic pain syndrome G89.4 ; Abnormal lung sounds R09.89 ; Coronary 
artery disease involving native coronary artery of native heart without angina 
pectoris I25.10 and S/P CABG (coronary artery bypass graft) Z95.1

 

 [place] [id number] [place] [address] 94517-
0643  09 May, 2017   







IMMUNIZATIONS

No Known Immunizations



SOCIAL HISTORY

Never Assessed



REASON FOR VISIT

refill



PLAN OF CARE





VITAL SIGNS





MEDICATIONS







 Medication  Instructions  Dosage  Frequency  Start Date  End Date  Duration  
Status

 

 Chlorzoxazone 500 mg  Orally Three times a day  1 tablet  8h       
30 days  Active

 

 Amitriptyline HCl 25 MG  Orally Once a day  1 tablet  24h  10 Oct, 2017     30 
days  Active







RESULTS

No Results



PROCEDURES

No Known procedures



INSTRUCTIONS





MEDICATIONS ADMINISTERED

No Known Medications



MEDICAL (GENERAL) HISTORY







 Type  Description  Date

 

 Medical History  fibromyalgia   

 

 Medical History  MRI 2016- small central protrusion/herniation w/minimal 
impression on thecal anteriorly at C5-6, At C3-4 small bilat. uncinate spurs w/ 
mild right neural foraminal narrowing   

 

 Medical History  MRI 2016- mild degenerative changes. No spinal canal 
stenosis or foraminal narrowing of thoracic spine   

 

 Medical History  hypertension   

 

 Medical History  Anxiety disorder   

 

 Medical History  depression   

 

 Medical History  migraine headaches   

 

 Medical History  Hx of C-Diff   

 

 Medical History  Hx of Pneumonia   

 

 Surgical History  Open Heart Surgery - [facility] -Dr. Lima  (
Cardiologist- Dr Logan)  

 

 Surgical History  hysterectomy - Dr Luis   

 

 Surgical History  Left Breast Lumpectomy (Bx - Benign)- Dr Luis   

 

 Surgical History  Port - Dr Luis   

 

 Surgical History  Left Knee Surgeries x3- Dr Carolina did 2 and Dr Gan did 1   

 

 Hospitalization History  C-Diff - [facility]   

 

 Hospitalization History  Pneumonia - [facility]   

 

 Hospitalization History  Open Heart Surgery - [facility]

## 2019-01-07 NOTE — XMS REPORT
[facility]

 Created on: 2018



Dayami Rae

External Reference #: [id number]

: 1970

Sex: Female



Demographics







 Address  [address] [place]  [address]

 

 Preferred Language  Unknown

 

 Marital Status  Unknown

 

 Judaism Affiliation  Unknown

 

 Race  Unknown

 

 Ethnic Group  Unknown





Author







 Author  KIM FUCHS

 

 Organization  [facility]

 

 Address  [address] [place]  [address]



 

 Phone  [phone]







Care Team Providers







 Care Team Member Name  Role  Phone

 

 FUCHSKIM JACKSON  Unavailable  [phone]







PROBLEMS







 Type  Condition  ICD9-CM Code  RYR60-RS Code  Onset Dates  Condition Status  
SNOMED Code

 

 Problem  Fibromyalgia     M79.7     Active  251738921

 

 Problem  Neuropathy     G62.9     Active  791092445

 

 Problem  Coronary artery disease involving native coronary artery of native 
heart without angina pectoris     I25.10     Active  3551544509193

 

 Problem  Chronic pain syndrome     G89.4     Active  537726999

 

 Problem  COPD suggested by initial evaluation     J44.9     Active  64425908

 

 Problem  Acute midline low back pain with left-sided sciatica     M54.42     
Active  461489990

 

 Problem  Lumbago with sciatica, left side     M54.42     Active  650500559

 

 Problem  Anxiety disorder, unspecified     F41.9     Active  419532508

 

 Problem  Other chronic pain     G89.29     Active  51822702

 

 Problem  Lumbago with sciatica, right side     M54.41     Active  
508880554368697







ALLERGIES

No Information



ENCOUNTERS







 Encounter  Location  Date  Diagnosis

 

 [id number] [place] [address] 19927-
1740  01 May, 2018   

 

 [id number] [place] [address] 96115-
9581     

 

 [facility]  [id number] [place] [address] 56963-
2478    Anxiety disorder, unspecified F41.9

 

 [address] [address]-
9712     

 

 [id number] [place] [address] 30102-
9053    Chronic pain syndrome G89.4 and Acute midline low back pain 
with left-sided sciatica M54.42

 

 [facility]  [id number] [place] [address] 53775-
1324    Chronic pain syndrome G89.4

 

 [facility]  [address] 80620-
2500     

 

 [facility]  [address] 07319-
2145     

 

 [facility]  [address] 97624-
9293  29 Mar, 2018  Injury of left knee, initial encounter S89.92XA and COPD 
suggested by initial evaluation J44.9

 

 [address] 44238-
0818  28 Mar, 2018  Acute bronchitis, unspecified organism J20.9

 

 [address] 93255-
7832  27 Mar, 2018  Acute bronchitis, unspecified organism J20.9

 

 [facility]  [id number] [place] [address] 13339-
2798  21 Mar, 2018  Anxiety disorder, unspecified F41.9 and Acute bronchitis, 
unspecified organism J20.9

 

 [id number] [place] [address] 93487-
2420  08 Mar, 2018  Chronic pain syndrome G89.4

 

 [id number] [place] [address] 32248-
6846  05 Mar, 2018   

 

 [facility]  [id number] [place] [address] 43868-
6688  05 Mar, 2018  Acute midline low back pain with left-sided sciatica M54.42

 

 [facility]  [address] 45611-
6474     

 

 [facility]  [id number] [place] [address] 48619-
6040    Chronic pain syndrome G89.4

 

 [facility]  [address] [place] [address]-
3611    Chronic pain syndrome G89.4

 

 [facility]  [id number] [place] [address] 38914-
9674     

 

 [facility]  [id number] [place] [address] 14253-
4775    Gastroenteritis K52.9

 

 [facility]  [address] 27357-
1075     

 

 [facility]  [address] 86083-
8959  15 Jerardo, 2018  Acute bronchitis, unspecified organism J20.9

 

 [facility]  [id number] [place] [address] 00897-
9698    Anxiety disorder, unspecified F41.9 and Neuropathy G62.9

 

 [facility]  [address] 61576-
7752    Chronic pain syndrome G89.4

 

 [facility]  [id number] [place] [address] 61941-
5725    Bronchitis J40 and Laryngitis J04.0

 

 [facility]  [id number] [place] [address] 66001-
6233  29 Dec, 2017   

 

 [facility]  [id number] [place] [address] 38125-
5078  26 Dec, 2017  Bronchitis J40

 

 [facility]  [id number] [place] [address] 97696-
8231  18 Dec, 2017   

 

 [facility]  [id number] [place] [address] 30388-
5561  13 Dec, 2017  Fibromyalgia M79.7 and Chronic pain syndrome G89.4

 

 [facility]  [id number] [place] [address] 94463-
9490  04 Dec, 2017  Chronic pain syndrome G89.4 and Acute bronchitis, 
unspecified organism J20.9

 

 [facility]  [id number] [place] [address] 46800-
9180    Neuropathy G62.9

 

 [facility]  [address] 36931-
1828    Chronic pain syndrome G89.4 ; Lumbago with sciatica, left 
side M54.42 ; Lumbago with sciatica, right side M54.41 ; Screening cholesterol 
level Z13.220 ; Screening for diabetes mellitus Z13.1 ; Screening for thyroid 
disorder Z13.29 ; Fibromyalgia M79.7 ; Anxiety disorder, unspecified F41.9 and 
Neuropathy G62.9

 

 [address] [address] 02831-
5755     

 

 [address] [address] 03142-
8480  25 Oct, 2017   

 

 [address] [address] 06570-
6457  10 Oct, 2017  Fibromyalgia M79.7 ; Chronic pain syndrome G89.4 ; Anxiety 
disorder, unspecified F41.9 ; Neuropathy G62.9 and Acute bronchitis, 
unspecified organism J20.9

 

 [address] [address] 44378-
7537  09 Oct, 2017   

 

 [address] [id number] [place] [address] 27945-
9183  25 Sep, 2017   

 

 [place] [id number] [place] [address] 08333-
0642  19 Sep, 2017   

 

 [facility]  [id number] [place] [address] 30672-
8810  08 Sep, 2017   

 

 [address] [place] [address] 02651-
6919  23 Aug, 2017   

 

 [place] [id number] [place] [address] 11191-
5277  22 Aug, 2017  Acute seasonal allergic rhinitis due to pollen J30.1

 

 [place] [id number] [place] [address] 40904-
6596  21 Aug, 2017   

 

 [facility]  [id number] [place] [address]-
5826  09 Aug, 2017   

 

 [facility]  [id number] [place] [address]-
2166     

 

 [facility]  [id number] [place] [address]-
3366     

 

 [facility]  [id number] [place] [address]-
8947  10 Jul, 2017   

 

 [facility]  [id number] [place] [address]-
5346     

 

 [facility]  [id number] [place] [address]-
4416     

 

 [facility]  [id number] [place] [address]-
8236     

 

 [facility]  [address] [place] [address]-
5136    Chronic pain syndrome G89.4 ; Fibromyalgia M79.7 ; Anxiety 
disorder, unspecified F41.9 ; Neuropathy G62.9 and Low back pain M54.5

 

 [facility]  [address], [place] [address]-
4966  25 May, 2017  Low back pain M54.5

 

 [facility]  [address] [place] [address]-
8586  24 May, 2017   

 

 [facility]  [id number] [address]-
0937  22 May, 2017   

 

 [facility]  [id number] [place] [address]-
5866  16 May, 2017   

 

 [facility]  [id number] [place] [address]-
2826  16 May, 2017   

 

 [facility]  [address], [place] [address]-
5834  12 May, 2017  Routine adult health maintenance Z00.00 ; Fibromyalgia 
M79.7 ; Chronic pain syndrome G89.4 ; Abnormal lung sounds R09.89 ; Coronary 
artery disease involving native coronary artery of native heart without angina 
pectoris I25.10 and S/P CABG (coronary artery bypass graft) Z95.1

 

 [facility]  [address] [facility] [id number] [place] [address]-
1569  09 May, 2017   







IMMUNIZATIONS

No Known Immunizations



SOCIAL HISTORY

Never Assessed



REASON FOR VISIT

Refill request



PLAN OF CARE





VITAL SIGNS





MEDICATIONS







 Medication  Instructions  Dosage  Frequency  Start Date  End Date  Duration  
Status

 

 Chlorzoxazone 500 mg  Orally Three times a day  1 tablet  8h        30 days  
Active







RESULTS

No Results



PROCEDURES

No Known procedures



INSTRUCTIONS





MEDICATIONS ADMINISTERED

No Known Medications



MEDICAL (GENERAL) HISTORY







 Type  Description  Date

 

 Medical History  fibromyalgia   

 

 Medical History  MRI 2016- small central protrusion/herniation w/minimal 
impression on thecal anteriorly at C5-6, At C3-4 small bilat. uncinate spurs w/ 
mild right neural foraminal narrowing   

 

 Medical History  MRI 2016- mild degenerative changes. No spinal canal 
stenosis or foraminal narrowing of thoracic spine   

 

 Medical History  hypertension   

 

 Medical History  Anxiety disorder   

 

 Medical History  depression   

 

 Medical History  migraine headaches   

 

 Medical History  Hx of C-Diff   

 

 Medical History  Hx of Pneumonia   

 

 Surgical History  Open Heart Surgery - [facility] -Dr. L[place]  (
Cardiologist- Dr Logan)  

 

 Surgical History  hysterectomy - Dr Luis   

 

 Surgical History  Left Breast Lumpectomy (Bx - Benign)- Dr Luis   

 

 Surgical History  Port - Dr Luis   

 

 Surgical History  Left Knee Surgeries x3- Dr Carolina did 2 and Dr Gan did 1   

 

 Hospitalization History  C-Diff - [facility]   

 

 Hospitalization History  Pneumonia - [facility]   

 

 Hospitalization History  Open Heart Surgery - [facility]

## 2019-01-07 NOTE — XMS REPORT
[facility]

 Created on: 10/26/2018



Dayami Rae

External Reference #: [id number]

: 1970

Sex: Female



Demographics







 Address  [address] [place]  [address]

 

 Preferred Language  Unknown

 

 Marital Status  Unknown

 

 Synagogue Affiliation  Unknown

 

 Race  Unknown

 

 Ethnic Group  Unknown





Author







 Author  SURENDRA FRASER

 

 [facility]

 

 Address  [address] [place]  [address]



 

 Phone  [phone]







Care Team Providers







 Care Team Member Name  Role  Phone

 

 SURENDRA FRASER  Unavailable  [phone]







PROBLEMS







 Type  Condition  ICD9-CM Code  ZXB99-TL Code  Onset Dates  Condition Status  
SNOMED Code

 

 Problem  Chronic pain syndrome     G89.4     Active  364918183

 

 Problem  Neuropathy     G62.9     Active  262223337

 

 Problem  Coronary artery disease involving native coronary artery of native 
heart without angina pectoris     I25.10     Active  8877480003085

 

 Problem  Fibromyalgia     M79.7     Active  100897507

 

 Problem  Chronic obstructive pulmonary disease, unspecified COPD type     
J44.9     Active  56530221

 

 Problem  Essential hypertension     I10     Active  51233210

 

 Problem  Lumbago with sciatica, left side     M54.42     Active  412874470

 

 Problem  Anxiety disorder, unspecified     F41.9     Active  327408604

 

 Problem  GERD with esophagitis     K21.0     Active  634103361

 

 Problem  Lumbago with sciatica, right side     M54.41     Active  
[id number]







ALLERGIES

No Information



ENCOUNTERS







 Encounter  Location  Date  Diagnosis

 

 [place] [id number] [place] [address] [phone]-
[phone]  29 Oct, 2018   

 

 [place] [id number] [place] [address] [phone]-
[phone]  24 Oct, 2018   

 

 [place] [id number] [place] [address] 38277-
[phone]  27 Sep, 2018  Fibromyalgia M79.7

 

 [facility]  [address] [phone]
[phone]  20 Sep, 2018   

 

 [place] [id number] [place] [address] 57337-
[phone]  19 Sep, 2018   

 

 [place] [id number] [place] [address] [phone]-
[phone]  17 Sep, 2018  Pneumonia of right lower lobe due to infectious organism 
J18.1 and Chronic obstructive pulmonary disease, unspecified COPD type J44.9

 

 [place] [id number] [place] [address] [phone]-
[phone]  14 Sep, 2018  Pneumonia of right lower lobe due to infectious organism 
J18.1 ; Chronic obstructive pulmonary disease, unspecified COPD type J44.9 ; 
Chronic nausea R11.0 and Cough R05

 

 [place] [address] [address]-
1825  07 Sep, 2018  Acute bronchitis, unspecified organism J20.9

 

 [place] [id number] [place] [address] 25019-
0383  28 Aug, 2018  Fibromyalgia M79.7

 

 [place] [address] [address]-
1540  20 Aug, 2018   

 

 [place] [address] [address]-
6650  16 Aug, 2018  Neuropathy G62.9

 

 [place] [address] [address]-
4700    Essential hypertension I10 ; Coronary artery disease 
involving native coronary artery of native heart without angina pectoris I25.10 
; Fibromyalgia M79.7 ; GERD with esophagitis K21.0 and Tobacco abuse counseling 
Z71.6

 

 [place] [address] [place] [address] 99054-
2674    Long term (current) use of opiate analgesic Z79.891

 

 [place] [address] [place] [address] 57450-
3296     

 

 [place] [address] [place] [address] 46521-
3981    Acute bronchitis, unspecified organism J20.9

 

 [place] [address] [place] [address] 37495-
8829     

 

 [place] [address] [place] [address] 28834-
6005     

 

 [facility]  [address] [place] [address] 00004-
8884    Chronic pain syndrome G89.4

 

 [facility]  [id number] [place] [address] 43935-
4721     

 

 [facility]  [address] [place] [address] 33883-
8408  15 Marko, 2018   

 

 [facility]  [address] [place] [address] 20303-
6125    Acute bronchitis, unspecified organism J20.9

 

 [facility]  [address] [address]-
7193    Chronic pain syndrome G89.4

 

 [facility]  [address] [place] [address] 14239-
9018  25 May, 2018   

 

 [facility]  [address] [place] [address] 32106-
9799  24 May, 2018  Acute midline low back pain with left-sided sciatica M54.42

 

 [facility]  [address] [place] [address] 02870-
8706  22 May, 2018   

 

 [facility]  [address] [place] [address] [phone]
-[phone]  21 May, 2018  Bronchitis J40

 

 [facility]  [address] [place] [address] 97559-
8225  07 May, 2018  Chronic pain syndrome G89.4 and Neuropathy G62.9

 

 [facility]  [address] [place] [address] [phone]-
3493    Acute midline low back pain with left-sided sciatica M54.42

 

 [facility]  [address] [place] [address] 58123-
6501     

 

 [facility]  [address] [place] [address] 66603-
3390    Anxiety disorder, unspecified F41.9

 

 [facility]  [address] [place] [address] 40798-
5947     

 

 [facility]  [address] [place] [address] 76760-
3097    Chronic pain syndrome G89.4 and Acute midline low back pain 
with left-sided sciatica M54.42

 

 [place] [address] [place] [address] 05993-
9204    Chronic pain syndrome G89.4

 

 [facility]  [address] [place] [address] 53146-
7274     

 

 [facility]  [address] [place] [address] 16276-
2420     

 

 [place] [address] [place] [address] 34650-
8175  29 Mar, 2018  Injury of left knee, initial encounter S89.92XA and COPD 
suggested by initial evaluation J44.9

 

 [place] [address] [place] [address] 20015-
5443  28 Mar, 2018  Acute bronchitis, unspecified organism J20.9

 

 [place] [address] [place] [address] 33241-
9654  27 Mar, 2018  Acute bronchitis, unspecified organism J20.9

 

 [place] [address] [place] [address] 79581-
9428  21 Mar, 2018  Anxiety disorder, unspecified F41.9 and Acute bronchitis, 
unspecified organism J20.9

 

 [facility]  [address] [place] [address] 21054-
7605  08 Mar, 2018  Chronic pain syndrome G89.4

 

 [facility]  [address] [place] [address] 22787-
1161  05 Mar, 2018   

 

 [facility]  [address] [place] [address] 14497-
6608  05 Mar, 2018  Acute midline low back pain with left-sided sciatica M54.42

 

 [facility]  [address] [place] [address] 36871-
3648     

 

 [facility]  [address] [place] [address] 24737-
4805    Chronic pain syndrome G89.4

 

 [facility]  [address] [place] [address] 88749-
0657    Chronic pain syndrome G89.4

 

 [facility]  [address] [place] [address] 46098-
7971     

 

 [facility]  [address] [place] [address] 88224-
0815    Gastroenteritis K52.9

 

 [facility]  [address] [place] [address] 62331-
0952     

 

 [facility]  [address] [place] [address] 59491-
0004  15 Jerardo, 2018  Acute bronchitis, unspecified organism J20.9

 

 [facility]  [address] [place] [address] 98217-
6590    Anxiety disorder, unspecified F41.9 and Neuropathy G62.9

 

 [facility]  [address] [place] [address] 33430-
9581    Chronic pain syndrome G89.4

 

 [facility]  [address] [place] [address] 42960-
4006    Bronchitis J40 and Laryngitis J04.0

 

 [facility]  [address] [place] [address] 37447-
0657  29 Dec, 2017   

 

 [facility]  [address] [place] [address] 18415-
7485  26 Dec, 2017  Bronchitis J40

 

 [facility]  [address] [place] [address] 80587-
1995  18 Dec, 2017   

 

 [facility]  [address] [place] [address] 08862-
9283  13 Dec, 2017  Fibromyalgia M79.7 and Chronic pain syndrome G89.4

 

 [place] [address] 27770-
7565  04 Dec, 2017  Chronic pain syndrome G89.4 and Acute bronchitis, 
unspecified organism J20.9

 

 [place] [address] [place] [address] 22361-
2718    Neuropathy G62.9

 

 [place] [address] 6[address]00-
3892    Chronic pain syndrome G89.4 ; Lumbago with sciatica, left 
side M54.42 ; Lumbago with sciatica, right side M54.41 ; Screening cholesterol 
level Z13.220 ; Screening for diabetes mellitus Z13.1 ; Screening for thyroid 
disorder Z13.29 ; Fibromyalgia M79.7 ; Anxiety disorder, unspecified F41.9 and 
Neuropathy G62.9

 

 [place] [address] [place] [address] 13073-
1095     

 

 [place] [address] [place] [address] 49485-
3522  25 Oct, 2017   

 

 [place] [address] [place] [address] 35994-
3591  10 Oct, 2017  Fibromyalgia M79.7 ; Chronic pain syndrome G89.4 ; Anxiety 
disorder, unspecified F41.9 ; Neuropathy G62.9 and Acute bronchitis, 
unspecified organism J20.9

 

 [place] [address] [place] [address] 24356-
3695  09 Oct, 2017   

 

 [place] [address] 65107-
5922  25 Sep, 2017   

 

 [place] [address] 59509-
6265  19 Sep, 2017   

 

 [place] [address] [place] [address] 94010-
3433  08 Sep, 2017   

 

 [place] [address] [address]-
0641  23 Aug, 2017   

 

 [facility]  [address] [place] [address] 19417-
2283  22 Aug, 2017  Acute seasonal allergic rhinitis due to pollen J30.1

 

 [facility]  [address] [address]-
7404  21 Aug, 2017   

 

 [facility]  [address] [place] [address] 90001-
5121  09 Aug, 2017   

 

 [facility]  [address] [place] [address] 13149-
0771     

 

 [facility]  [address] [place] [address] 22410-
7356     

 

 [facility]  [address] [place] [address] 59308-
0621  10 Jul, 2017   

 

 [facility]  [address] [place] [address] 52137-
3795     

 

 [facility]  [address] [place] [address] 07934-
9069     

 

 [facility]  [address] [place] [address] 97332-
1732     

 

 [facility]  [address] [address]-
2605    Chronic pain syndrome G89.4 ; Fibromyalgia M79.7 ; Anxiety 
disorder, unspecified F41.9 ; Neuropathy G62.9 and Low back pain M54.5

 

 [facility]  [address] [address]-
0750  25 May, 2017  Low back pain M54.5

 

 [facility]  [address] [address]-
8294  24 May, 2017   

 

 [facility]  [address] [place] [address]-
7598  22 May, 2017   

 

 [facility]  [address] [facility] [id number] [address]-
4367  16 May, 2017   

 

 [facility]  [address] [facility] [id number] [address]-
4115  16 May, 2017   

 

 [facility]  [address] [facility] [id number] [address]-
3282  12 May, 2017  Routine adult health maintenance Z00.00 ; Fibromyalgia 
M79.7 ; Chronic pain syndrome G89.4 ; Abnormal lung sounds R09.89 ; Coronary 
artery disease involving native coronary artery of native heart without angina 
pectoris I25.10 and S/P CABG (coronary artery bypass graft) Z95.1

 

 [place] [address] [facility] [id number] [address]-
1808  09 May, 2017   







IMMUNIZATIONS

No Known Immunizations



SOCIAL HISTORY

Never Assessed



REASON FOR VISIT

Medication refill request



PLAN OF CARE





VITAL SIGNS





MEDICATIONS

Unknown Medications



RESULTS

No Results



PROCEDURES

No Known procedures



INSTRUCTIONS





MEDICATIONS ADMINISTERED

No Known Medications



MEDICAL (GENERAL) HISTORY







 Type  Description  Date

 

 Medical History  fibromyalgia   

 

 Medical History  MRI 2016- small central protrusion/herniation w/minimal 
impression on thecal anteriorly at C5-6, At C3-4 small bilat. uncinate spurs w/ 
mild right neural foraminal narrowing   

 

 Medical History  MRI 2016- mild degenerative changes. No spinal canal 
stenosis or foraminal narrowing of thoracic spine   

 

 Medical History  hypertension   

 

 Medical History  Anxiety disorder   

 

 Medical History  depression   

 

 Medical History  migraine headaches   

 

 Medical History  Hx of C-Diff   

 

 Medical History  Hx of Pneumonia   

 

 Medical History  heart cath w/ stents placed 7/3/18   

 

 Surgical History  Open Heart Surgery - [facility] -Dr. Lima  (
Cardiologist- Dr Logan)  

 

 Surgical History  hysterectomy - Dr Lius   

 

 Surgical History  Left Breast Lumpectomy (Bx - Benign)- Dr Luis   

 

 Surgical History  Port - Dr Luis   

 

 Surgical History  Left Knee Surgeries x3- Dr Carolina did 2 and Dr Gan did 1   

 

 Surgical History  cath/stent  

 

 Hospitalization History  C-Diff - [facility] [place]   

 

 Hospitalization History  Pneumonia - [facility] [place]   

 

 Hospitalization History  Open Heart Surgery - [facility]

## 2019-01-07 NOTE — XMS REPORT
[facility]

 Created on: 2018



Dayami Rae

External Reference #: [id number]

: 1970

Sex: Female



Demographics







 Address  [address] [place]  [address]

 

 Preferred Language  Unknown

 

 Marital Status  Unknown

 

 [employer] Affiliation  Unknown

 

 Race  Unknown

 

 Ethnic Group  Unknown





Author







 Author  KIM FUCHS

 

 Organization  [facility]

 

 Address  [address] [place]  [address]



 

 Phone  [phone]







Care Team Providers







 Care Team Member Name  Role  Phone

 

 FUCHSKIM JACKSON  Unavailable  [phone]







PROBLEMS







 Type  Condition  ICD9-CM Code  RQZ58-LT Code  Onset Dates  Condition Status  
SNOMED Code

 

 Problem  Chronic pain syndrome     G89.4     Active  357518778

 

 Problem  Neuropathy     G62.9     Active  834526018

 

 Problem  Coronary artery disease involving native coronary artery of native 
heart without angina pectoris     I25.10     Active  [id number]

 

 Problem  Fibromyalgia     M79.7     Active  337992923

 

 Problem  Chronic obstructive pulmonary disease, unspecified COPD type     
J44.9     Active  20842376

 

 Problem  Essential hypertension     I10     Active  78614031

 

 Problem  Lumbago with sciatica, left side     M54.42     Active  786181090

 

 Problem  Anxiety disorder, unspecified     F41.9     Active  094572637

 

 Problem  GERD with esophagitis     K21.0     Active  678421463

 

 Problem  Lumbago with sciatica, right side     M54.41     Active  
[id number]







ALLERGIES

No Information



ENCOUNTERS







 Encounter  Location  Date  Diagnosis

 

 [place] [id number] [place] [address] [phone]-
[phone]  20 Sep, 2018   

 

 [place] [address] [place] [id number] [place] [address] [phone]-
0004  19 Sep, 2018   

 

 [facility]  [id number] [place] [address] [phone]-
2145  17 Sep, 2018  Pneumonia of right lower lobe due to infectious organism 
J18.1 and Chronic obstructive pulmonary disease, unspecified COPD type J44.9

 

 [facility]  [id number] [place] [address] 14936-
0426  14 Sep, 2018  Pneumonia of right lower lobe due to infectious organism 
J18.1 ; Chronic obstructive pulmonary disease, unspecified COPD type J44.9 ; 
Chronic nausea R11.0 and Cough R05

 

 [place] [address] [place] [id number] [place] [address] 91558-
1426  07 Sep, 2018  Acute bronchitis, unspecified organism J20.9

 

 [facility]  [id number] [place] [address] 50816-
4324  28 Aug, 2018  Fibromyalgia M79.7

 

 [facility]  [id number] [place] [address] 78677-
2870  20 Aug, 2018   

 

 [facility]  [address] [address]-
3108  16 Aug, 2018  Neuropathy G62.9

 

 [place] [address] 9[address]45-
3999    Essential hypertension I10 ; Coronary artery disease 
involving native coronary artery of native heart without angina pectoris I25.10 
; Fibromyalgia M79.7 ; GERD with esophagitis K21.0 and Tobacco abuse counseling 
Z71.6

 

 [place] [address] [place] [id number] [place] [address] 76552-
5195    Long term (current) use of opiate analgesic Z79.891

 

 [place] [address] [place] [id number] [place] [address] 84347-
1578     

 

 [facility]  [id number] [place] [address] 94128-
3828    Acute bronchitis, unspecified organism J20.9

 

 [facility]  [id number] [place] [address] 21412-
1183     

 

 [facility]  [id number] [place] [address] 88622-
5845     

 

 [facility]  [address] 91508-
6091    Chronic pain syndrome G89.4

 

 [facility]  [id number] [place] [address] 53835-
3466     

 

 [facility]  [address] 87369-
9273  15 2018   

 

 [facility]  [id number] [place] [address] 35398-
5902    Acute bronchitis, unspecified organism J20.9

 

 [facility]  [id number] [place] [address] 06936-
9638    Chronic pain syndrome G89.4

 

 [facility]  [id number] [place] [address] 10064-
0084  25 May, 2018   

 

 [facility]  [id number] [place] [address] 10038-
1863  24 May, 2018  Acute midline low back pain with left-sided sciatica M54.42

 

 [facility]  [id number] [place] [address] 71922-
3470  22 May, 2018   

 

 [facility]  [id number] [place] [address] [phone]
-[phone]  21 May, 2018  Bronchitis J40

 

 [facility]  [id number] [place] [address] 92514-
3521  07 May, 2018  Chronic pain syndrome G89.4 and Neuropathy G62.9

 

 [facility]  [id number] [place] [address] 89990-
1831    Acute midline low back pain with left-sided sciatica M54.42

 

 [facility]  [id number] [place] [address] 12703-
3281     

 

 [facility]  [id number] [place] [address] 52508-
6623    Anxiety disorder, unspecified F41.9

 

 [facility]  [id number] [place] [address] 59170-
2537     

 

 [facility]  [id number] [place] [address] 34623-
2492    Chronic pain syndrome G89.4 and Acute midline low back pain 
with left-sided sciatica M54.42

 

 [facility]  [id number] [place] [address] 92157-
9695    Chronic pain syndrome G89.4

 

 [facility]  [id number] [place] [address] 77860-
6870     

 

 [facility]  [id number] [place] [address] 09197-
4385     

 

 [facility]  [address] 5[address]76-
2021  29 Mar, 2018  Injury of left knee, initial encounter S89.92XA and COPD 
suggested by initial evaluation J44.9

 

 [place] [address] 83747-
4267  28 Mar, 2018  Acute bronchitis, unspecified organism J20.9

 

 [place] [address] [place] [id number] [place] [address] 35048-
3698  27 Mar, 2018  Acute bronchitis, unspecified organism J20.9

 

 [facility]  [id number] [place] [address] 10589-
2689  21 Mar, 2018  Anxiety disorder, unspecified F41.9 and Acute bronchitis, 
unspecified organism J20.9

 

 [facility]  [address] [id number] [place] [address] 53964-
2452  08 Mar, 2018  Chronic pain syndrome G89.4

 

 [facility]  [id number] [place] [address] 99052-
1936  05 Mar, 2018   

 

 [facility]  [id number] [place] [address] 66468-
2041  05 Mar, 2018  Acute midline low back pain with left-sided sciatica M54.42

 

 [facility]  [address] [place] [address] [id number] [place] [address] 57185-
3751     

 

 [facility]  [id number] [place] [address] 35606-
6287    Chronic pain syndrome G89.4

 

 [facility]  [id number] [place] [address] 04227-
2597    Chronic pain syndrome G89.4

 

 [facility]  [id number] [place] [address] 50452-
3533     

 

 [facility]  [address] 82155-
3775    Gastroenteritis K52.9

 

 [facility]  [address] 13367-
7612     

 

 [facility]  [address] 37601-
8579  15 Jerardo, 2018  Acute bronchitis, unspecified organism J20.9

 

 [facility]  [address] 37291-
9010    Anxiety disorder, unspecified F41.9 and Neuropathy G62.9

 

 [place] [address] 87314-
6223    Chronic pain syndrome G89.4

 

 [facility]  [id number] [place] [address] 69138-
3811    Bronchitis J40 and Laryngitis J04.0

 

 [place] [address] [place] [id number] [place] [address] 36738-
4398  29 Dec, 2017   

 

 [facility]  [id number] [place] [address] 44179-
3641  26 Dec, 2017  Bronchitis J40

 

 [facility]  [id number] [place] [address] 39312-
6713  18 Dec, 2017   

 

 [facility]  [id number] [place] [address] 66941-
6796  13 Dec, 2017  Fibromyalgia M79.7 and Chronic pain syndrome G89.4

 

 [facility]  [id number] [place] [address] 73904-
6219  04 Dec, 2017  Chronic pain syndrome G89.4 and Acute bronchitis, 
unspecified organism J20.9

 

 [facility]  [address] [address]25-
8505    Neuropathy G62.9

 

 [facility]  [address] [place] [address] 04068-
7297    Chronic pain syndrome G89.4 ; Lumbago with sciatica, left 
side M54.42 ; Lumbago with sciatica, right side M54.41 ; Screening cholesterol 
level Z13.220 ; Screening for diabetes mellitus Z13.1 ; Screening for thyroid 
disorder Z13.29 ; Fibromyalgia M79.7 ; Anxiety disorder, unspecified F41.9 and 
Neuropathy G62.9

 

 [facility]  [id number] [place] [address] 32986-
8120     

 

 [facility]  [id number] [place] [address] 07474-
0580  25 Oct, 2017   

 

 [facility]  [id number] [place] [address] 46194-
5182  10 Oct, 2017  Fibromyalgia M79.7 ; Chronic pain syndrome G89.4 ; Anxiety 
disorder, unspecified F41.9 ; Neuropathy G62.9 and Acute bronchitis, 
unspecified organism J20.9

 

 [facility]  [id number] [place] [address] 10064-
3819  09 Oct, 2017   

 

 [facility]  [id number] [place] [address] 01251-
2327  25 Sep, 2017   

 

 [facility]  [id number] [place] [address] 03238-
8319  19 Sep, 2017   

 

 [facility]  [id number] [place] [address] 25392-
2849  08 Sep, 2017   

 

 [facility]  [id number] [place] [address] 87272-
4888  23 Aug, 2017   

 

 [facility]  [id number] [place] [address] 70088-
3132  22 Aug, 2017  Acute seasonal allergic rhinitis due to pollen J30.1

 

 [facility]  [id number] [place] [address] 81983-
5760  21 Aug, 2017   

 

 [facility]  [address] [address]-
4717  09 Aug, 2017   

 

 [facility]  [id number] [address]-
0544     

 

 [facility]  [address] [address]-
4740     

 

 [facility]  [id number] [address]-
6881  10 Jul, 2017   

 

 [facility]  [address] [address]-
3154     

 

 [facility]  [address] [address]-
5159     

 

 [facility]  [address] [address]-
4946     

 

 [facility]  [address] [address]-
6162    Chronic pain syndrome G89.4 ; Fibromyalgia M79.7 ; Anxiety 
disorder, unspecified F41.9 ; Neuropathy G62.9 and Low back pain M54.5

 

 [facility]  [address] [address]-
8678  25 May, 2017  Low back pain M54.5

 

 [facility]  [id number] [address]-
5880  24 May, 2017   

 

 [facility]  [address] [address]-
8990  22 May, 2017   

 

 [facility]  [address] [address]-
1772  16 May, 2017   

 

 [facility]  [address] [address]-
9615  16 May, 2017   

 

 [facility]  [id number] [address]-
0026  12 May, 2017  Routine adult health maintenance Z00.00 ; Fibromyalgia 
M79.7 ; Chronic pain syndrome G89.4 ; Abnormal lung sounds R09.89 ; Coronary 
artery disease involving native coronary artery of native heart without angina 
pectoris I25.10 and S/P CABG (coronary artery bypass graft) Z95.1

 

 [facility]  [address] [facility] [id number] [place] [address]-
5877  09 May, 2017   







IMMUNIZATIONS

No Known Immunizations



SOCIAL HISTORY

Never Assessed



REASON FOR VISIT

LVM



PLAN OF CARE





VITAL SIGNS





MEDICATIONS







 Medication  Instructions  Dosage  Frequency  Start Date  End Date  Duration  
Status

 

 Seroquel 200 mg  Orally Once a day  2.5 tablet at bedtime  24h        30  
Active







RESULTS

No Results



PROCEDURES

No Known procedures



INSTRUCTIONS





MEDICATIONS ADMINISTERED

No Known Medications



MEDICAL (GENERAL) HISTORY







 Type  Description  Date

 

 Medical History  fibromyalgia   

 

 Medical History  MRI 2016- small central protrusion/herniation w/minimal 
impression on thecal anteriorly at C5-6, At C3-4 small bilat. uncinate spurs w/ 
mild right neural foraminal narrowing   

 

 Medical History  MRI 2016- mild degenerative changes. No spinal canal 
stenosis or foraminal narrowing of thoracic spine   

 

 Medical History  hypertension   

 

 Medical History  Anxiety disorder   

 

 Medical History  depression   

 

 Medical History  migraine headaches   

 

 Medical History  Hx of C-Diff   

 

 Medical History  Hx of Pneumonia   

 

 Medical History  heart cath w/ stents placed 7/3/18   

 

 Surgical History  Open Heart Surgery - [facility] -Dr. Lima  (
Cardiologist- Dr Logan)  

 

 Surgical History  hysterectomy - Dr Luis   

 

 Surgical History  Left Breast Lumpectomy (Bx - Benign)- Dr Luis   

 

 Surgical History  Port - Dr Luis   

 

 Surgical History  Left Knee Surgeries x3- Dr Carolina did 2 and Dr Gan did 1   

 

 Surgical History  cath/stent  

 

 Hospitalization History  C-Diff - [facility]   

 

 Hospitalization History  Pneumonia - [facility]   

 

 Hospitalization History  Open Heart Surgery - [facility]

## 2019-01-07 NOTE — XMS REPORT
[facility]

 Created on: 2018



Dayami Rae

External Reference #: [id number]

: 1970

Sex: Female



Demographics







 Address  [address] [place]  [address]

 

 Preferred Language  Unknown

 

 Marital Status  Unknown

 

 Denominational Affiliation  Unknown

 

 Race  Unknown

 

 Ethnic Group  Unknown





Author







 Author  MATT PENALOZA

 

 [facility] [place] [facility]

 

 Address  [address] [place]  [address]



 

 Phone  [phone]







Care Team Providers







 Care Team Member Name  Role  Phone

 

 MATT PENALOZA  Unavailable  [phone]







PROBLEMS







 Type  Condition  ICD9-CM Code  PYC13-CF Code  Onset Dates  Condition Status  
SNOMED Code

 

 Problem  Neuropathy     G62.9     Active  573490111

 

 Problem  Lumbago with sciatica, left side     M54.42     Active  713750107

 

 Problem  Anxiety disorder, unspecified     F41.9     Active  524376344

 

 Problem  Chronic pain syndrome     G89.4     Active  168925372

 

 Problem  Fibromyalgia     M79.7     Active  434340158

 

 Problem  Coronary artery disease involving native coronary artery of native 
heart without angina pectoris     I25.10     Active  1171848857627

 

 Problem  Essential hypertension     I10     Active  06877663

 

 Problem  GERD with esophagitis     K21.0     Active  213506464

 

 Problem  Other chronic pain     G89.29     Active  13279171

 

 Problem  Lumbago with sciatica, right side     M54.41     Active  
[id number]

 

 Problem  COPD suggested by initial evaluation     J44.9     Active  79314539

 

 Problem  Acute midline low back pain with left-sided sciatica     M54.42     
Active  379464569







ALLERGIES







 Substance  Reaction  Event Type  Date  Status

 

 Morphine Sulfate  Rash  Drug Allergy  21 May, 2018  Active







ENCOUNTERS







 Encounter  Location  Date  Diagnosis

 

 [facility]  [id number] [address]-
0500  16 Aug, 2018   

 

 [facility]  [id number] [address]-
8511    Essential hypertension I10 ; Coronary artery disease 
involving native coronary artery of native heart without angina pectoris I25.10 
; Fibromyalgia M79.7 ; GERD with esophagitis K21.0 and Tobacco abuse counseling 
Z71.6

 

 [facility]  [id number] [address]
0354    Long term (current) use of opiate analgesic Z79.891

 

 [facility]  [id number] [place] [phone]-
[phone]     

 

 [facility]  [id number] [place] [address] 18120-
6192    Acute bronchitis, unspecified organism J20.9

 

 [facility]  [id number] [place] [address] 36095-
8336     

 

 [facility]  [id number] [place] [address] 11891-
[phone]     

 

 [facility]  [id number] [place] [address] 84885-
2014    Chronic pain syndrome G89.4

 

 [facility]  [id number] [place] [address] 08845-
[phone]     

 

 [facility]  [id number] [place] [address] [phone]-
[phone]  15 Marko, 2018   

 

 [facility]  [id number] [place] [address] [phone]-
[phone]    Acute bronchitis, unspecified organism J20.9

 

 [facility]  [id number] [place] [address] [phone]-
[phone]    Chronic pain syndrome G89.4

 

 [facility]  [id number] [place] [address] 68077-
7522  25 May, 2018   

 

 [facility]  [id number] [place] [address] [phone]-
1310  24 May, 2018  Acute midline low back pain with left-sided sciatica M54.42

 

 [facility]  [id number] [place] [address] 65019-
1604  22 May, 2018   

 

 [facility]  [address] [place] [address] [phone]
[phone]  21 May, 2018  Bronchitis J40

 

 [facility]  [id number] [place] [address] [phone]-
[phone]  07 May, 2018  Chronic pain syndrome G89.4 and Neuropathy G62.9

 

 [facility]  [id number] [place] [address] [phone]-
[phone]    Acute midline low back pain with left-sided sciatica M54.42

 

 [facility]  [id number] [place] [address] 19570-
[phone]     

 

 [facility]  [id number] [place] [address] 55549-
6914    Anxiety disorder, unspecified F41.9

 

 [facility]  [id number] [place] [address] [phone]-
4834     

 

 [facility]  [id number] [place] [address] 22391-
2792    Chronic pain syndrome G89.4 and Acute midline low back pain 
with left-sided sciatica M54.42

 

 [facility]  [id number] [place] [address] 55522-
8634    Chronic pain syndrome G89.4

 

 [facility]  [id number] [place] [address] 74935-
0830     

 

 [facility]  [id number] [place] [address] 54449-
[phone]     

 

 [facility]  [id number] [place] [address] [phone]-
[phone]  29 Mar, 2018  Injury of left knee, initial encounter S89.92XA and COPD 
suggested by initial evaluation J44.9

 

 [facility]  [id number] [place] [address] [phone]-
[phone]  28 Mar, 2018  Acute bronchitis, unspecified organism J20.9

 

 [facility]  [id number] [place] [address] [phone]-
[phone]  27 Mar, 2018  Acute bronchitis, unspecified organism J20.9

 

 [facility]  [id number] [place] [address] [phone]-
[phone]  21 Mar, 2018  Anxiety disorder, unspecified F41.9 and Acute bronchitis, 
unspecified organism J20.9

 

 [facility]  [id number] [place] [address] [phone]-
[phone]  08 Mar, 2018  Chronic pain syndrome G89.4

 

 [facility]  [id number] [place] [address] 75806-
[phone]  05 Mar, 2018   

 

 [facility]  [id number] [place] [address] 20246-
3583  05 Mar, 2018  Acute midline low back pain with left-sided sciatica M54.42

 

 [facility]  [id number] [place] [address] 99844-
1060     

 

 [facility]  [id number] [place] [address] 49471-
5256    Chronic pain syndrome G89.4

 

 [facility]  [id number] [place] [address] 88168-
0596    Chronic pain syndrome G89.4

 

 [facility]  [id number] [place] [address] 86082-
4691     

 

 [facility]  [id number] [place] [address] [phone]-
[phone]    Gastroenteritis K52.9

 

 [facility]  [id number] [place] [address] 13218-
[phone]     

 

 [facility]  [id number] [place] [address] [phone]-
[phone]  15 Jerardo, 2018  Acute bronchitis, unspecified organism J20.9

 

 [facility]  [id number] [place] [address] 03221-
0453    Anxiety disorder, unspecified F41.9 and Neuropathy G62.9

 

 [facility]  [id number] [place] [address] 06847-
6278    Chronic pain syndrome G89.4

 

 [facility]  [id number] [place] [address] [phone]-
3127    Bronchitis J40 and Laryngitis J04.0

 

 [id number] [place] [address] 10298-
5518  29 Dec, 2017   

 

 [id number] [place] [address] 21289-
[phone]  26 Dec, 2017  Bronchitis J40

 

 [id number] [place] [address] 54355-
0826  18 Dec, 2017   

 

 [id number] [place] [address] 20408-
[phone]  13 Dec, 2017  Fibromyalgia M79.7 and Chronic pain syndrome G89.4

 

 [id number] [place] [address] 42113-
7666  04 Dec, 2017  Chronic pain syndrome G89.4 and Acute bronchitis, 
unspecified organism J20.9

 

 [id number] [place] [address] 16312-
8904    Neuropathy G62.9

 

 [id number] [place] [address] 20515-
8416    Chronic pain syndrome G89.4 ; Lumbago with sciatica, left 
side M54.42 ; Lumbago with sciatica, right side M54.41 ; Screening cholesterol 
level Z13.220 ; Screening for diabetes mellitus Z13.1 ; Screening for thyroid 
disorder Z13.29 ; Fibromyalgia M79.7 ; Anxiety disorder, unspecified F41.9 and 
Neuropathy G62.9

 

 [address] [place] [address] 35144-
1792     

 

 [id number] [place] [address] 68555-
[phone]  25 Oct, 2017   

 

 [id number] [place] [address] 54022-
4243  10 Oct, 2017  Fibromyalgia M79.7 ; Chronic pain syndrome G89.4 ; Anxiety 
disorder, unspecified F41.9 ; Neuropathy G62.9 and Acute bronchitis, 
unspecified organism J20.9

 

 [facility]  [id number] [place] [address]-
4445  09 Oct, 2017   

 

 [facility]  [id number] [place] [address]-
7456  25 Sep, 2017   

 

 [facility]  [id number] [place] [address]-
6992  19 Sep, 2017   

 

 [facility]  [id number] [place] [address] [phone]-
[phone]  08 Sep, 2017   

 

 [facility]  [id number] [place] [address]-
4383  23 Aug, 2017   

 

 [facility]  [id number] [place] [address] [phone]-
[phone]  22 Aug, 2017  Acute seasonal allergic rhinitis due to pollen J30.1

 

 [facility]  [id number] [place] [address] 30474-
[phone]  21 Aug, 2017   

 

 [facility]  [address] [address]-
4929  09 Aug, 2017   

 

 [facility]  [id number] [address]-
8212     

 

 [facility]  [id number] [address]-
8371     

 

 [facility]  [id number] [address]-
6159  10 Jul, 2017   

 

 [facility]  [id number] [address]-
7379     

 

 [facility]  [id number] [address]-
7159     

 

 [facility]  [id number] [address]-
9241     

 

 [facility]  [id number] [address]-
5071    Chronic pain syndrome G89.4 ; Fibromyalgia M79.7 ; Anxiety 
disorder, unspecified F41.9 ; Neuropathy G62.9 and Low back pain M54.5

 

 [facility]  [id number] [place] [address] [phone]-
[phone]  25 May, 2017  Low back pain M54.5

 

 [facility]  [id number] [place] [address] 57778-
[phone]  24 May, 2017   

 

 [facility]  [id number] [place] [address] 61733-
7340  22 May, 2017   

 

 [facility]  [id number] [place] [address] [phone]-
[phone]  16 May, 2017   

 

 [facility]  [id number] [place] [address] [phone]-
8633  16 May, 2017   

 

 [facility]  [id number] [place] [address] 54690-
3865  12 May, 2017  Routine adult health maintenance Z00.00 ; Fibromyalgia 
M79.7 ; Chronic pain syndrome G89.4 ; Abnormal lung sounds R09.89 ; Coronary 
artery disease involving native coronary artery of native heart without angina 
pectoris I25.10 and S/P CABG (coronary artery bypass graft) Z95.1

 

 [facility]  [id number] [place] [address] 70892-
6808  09 May, 2017   







IMMUNIZATIONS

No Known Immunizations



SOCIAL HISTORY

Never Assessed



REASON FOR VISIT

chest congestion, productive cough that has green tinged sputum. been sick for 
3 weeks. kbullardrelva



PLAN OF CARE







 Activity  Details









  









 Follow Up  prn Reason:







VITAL SIGNS







 Height  5'2" in  2018

 

 Weight  126.6 lbs  2018

 

 Temperature  97.7 degrees Fahrenheit  2018

 

 Heart Rate  80 bpm  2018

 

 Respiratory Rate  20   2018

 

 BMI  23.15 kg/m2  2018

 

 Blood pressure systolic  124 mmHg  2018

 

 Blood pressure diastolic  74 mmHg  2018







MEDICATIONS







 Medication  Instructions  Dosage  Frequency  Start Date  End Date  Duration  
Status

 

 Doxycycline Hyclate 100 MG  Orally every 12 hrs  1 capsule  12h  21 May, 2018  
31 May, 2018  10 days  Active

 

 Promethazine HCl 25 MG  Orally 4 times a day prn  1 tablet as needed           
30 days  Active

 

 Sumatriptan Succinate 6 MG/0.5ML  Subcutaneous Once a day prn migraine  0.5 ml 
as needed              Not-Taking

 

 Amitriptyline HCl 25 MG  Orally Once a day  1 tablet  24h  10 Oct, 2017     30 
days  Not-Taking

 

 Promethazine-Codeine 6.25-10 MG/5ML  Orally every 4 hrs  1-2 tsp as needed  4h
  26 Dec, 2017        Not-Taking

 

 Seroquel 200 mg  Orally Once a day  2.5 tablet at bedtime  24h        30 days  
Active

 

 ProAir  (90 Base) MCG/ACT  Inhalation every 4 hrs  2 puffs as needed  
4h  10 Oct, 2017     12 months  Active

 

 ProAir  (90 Base) MCG/ACT  Inhalation every 6 hrs  2 puffs as needed  
6h  29 Mar, 2018     12 months  Not-Taking

 

 Tylenol     1 tab              Not-Taking

 

 Aspir-81 81 MG  Orally Once a day  1 tablet  24h           Active

 

 PredniSONE 20 MG  Orally Once a day  2 tablet  24h  21 May, 2018  26 May, 2018
  5 days  Active

 

 Symbicort 80-4.5 MCG/ACT  Inhalation Twice a day  2 puffs  12h  29 Mar, 2018  
29 Mar, 2023  12 months  Active

 

 Gabapentin 300 MG  Orally Once a day  1 capsule  24h  09 May, 2017     30 days
  Active

 

 Benzonatate 200 MG  Orally Three times a day  1 capsule  8h  21 May, 2018  28 
May, 2018  7 days  Active

 

 Xanax 0.25 MG  Orally Twice a day  1 tablet  12h        28 days  Active

 

 Chlorzoxazone 500 mg  Orally Three times a day if needed  1 tablet           
30 days  Active

 

 Tramadol HCl 50 mg  Orally every 6 hrs  1 tablet as needed  6h    
   28 days  Active







RESULTS

No Results



PROCEDURES

No Known procedures



INSTRUCTIONS





MEDICATIONS ADMINISTERED

No Known Medications



MEDICAL (GENERAL) HISTORY







 Type  Description  Date

 

 Medical History  fibromyalgia   

 

 Medical History  MRI 2016- small central protrusion/herniation w/minimal 
impression on thecal anteriorly at C5-6, At C3-4 small bilat. uncinate spurs w/ 
mild right neural foraminal narrowing   

 

 Medical History  MRI 2016- mild degenerative changes. No spinal canal 
stenosis or foraminal narrowing of thoracic spine   

 

 Medical History  hypertension   

 

 Medical History  Anxiety disorder   

 

 Medical History  depression   

 

 Medical History  migraine headaches   

 

 Medical History  Hx of C-Diff   

 

 Medical History  Hx of Pneumonia   

 

 Medical History  heart cath w/ stents placed 7/3/18   

 

 Surgical History  Open Heart Surgery - [facility] -Dr. Lima  (
Cardiologist- Dr Logan)  2013

 

 Surgical History  hysterectomy - Dr Luis   

 

 Surgical History  Left Breast Lumpectomy (Bx - Benign)- Dr Luis   

 

 Surgical History  Port - Dr Luis   

 

 Surgical History  Left Knee Surgeries x3- Dr Carolina did 2 and Dr Gan did 1   

 

 Surgical History  cath/stent  

 

 Hospitalization History  C-Diff - [facility]   

 

 Hospitalization History  Pneumonia - [facility]   

 

 Hospitalization History  Open Heart Surgery - [facility]

## 2019-01-07 NOTE — XMS REPORT
[facility]

 Created on: 2018



Dayami Rae

External Reference #: [id number]

: 1970

Sex: Female



Demographics







 Address  [address] [place]  [address]

 

 Preferred Language  Unknown

 

 Marital Status  Unknown

 

 Rastafarian Affiliation  Unknown

 

 Race  Unknown

 

 Ethnic Group  Unknown





Author







 Author  KIM FUCHS

 

 Organization  [facility]

 

 Address  [address] [place]  [address]



 

 Phone  [phone]







Care Team Providers







 Care Team Member Name  Role  Phone

 

 FUCHSJAMA JACKSONELE  Unavailable  [phone]







PROBLEMS







 Type  Condition  ICD9-CM Code  AZH21-FV Code  Onset Dates  Condition Status  
SNOMED Code

 

 Problem  Fibromyalgia     M79.7     Active  666653237

 

 Problem  Neuropathy     G62.9     Active  973081370

 

 Problem  Coronary artery disease involving native coronary artery of native 
heart without angina pectoris     I25.10     Active  [id number]

 

 Problem  Chronic pain syndrome     G89.4     Active  444926745

 

 Problem  COPD suggested by initial evaluation     J44.9     Active  63380245

 

 Problem  Acute midline low back pain with left-sided sciatica     M54.42     
Active  246124636

 

 Problem  Lumbago with sciatica, left side     M54.42     Active  133500527

 

 Problem  Anxiety disorder, unspecified     F41.9     Active  092986993

 

 Problem  Other chronic pain     G89.29     Active  86601637

 

 Problem  Lumbago with sciatica, right side     M54.41     Active  
[id number]







ALLERGIES

No Information



ENCOUNTERS







 Encounter  Location  Date  Diagnosis

 

 [place] [id number] [place] [address] 30749-
8580     

 

 [facility]  [id number] [place] [address] [phone]-
0835  15 Marko, 2018   

 

 [facility]  [id number] [place] [address] 14911-
0006    Acute bronchitis, unspecified organism J20.9

 

 [facility]  [id number] [place] [address] 64543-
4135    Chronic pain syndrome G89.4

 

 [facility]  [id number] [place] [address] 70144-
6671  25 May, 2018   

 

 [facility]  [address] [place] [address]-
7541  24 May, 2018  Acute midline low back pain with left-sided sciatica M54.42

 

 [facility]  [id number] [place] [address] 03767-
8706  22 May, 2018   

 

 [facility]  [id number] [place] [address] [phone]
[phone]  21 May, 2018  Bronchitis J40

 

 [facility]  [address] [address]-
6078  07 May, 2018  Chronic pain syndrome G89.4 and Neuropathy G62.9

 

 [facility]  [address] [address]-
4523    Acute midline low back pain with left-sided sciatica M54.42

 

 [facility]  [address] [address]-
2804     

 

 [facility]  [address] [address]-
1009    Anxiety disorder, unspecified F41.9

 

 [facility]  [id number] [place] [address] 08036-
5535     

 

 [facility]  [id number] [place] [address] 72073-
7742    Chronic pain syndrome G89.4 and Acute midline low back pain 
with left-sided sciatica M54.42

 

 [facility]  [id number] [place] [address] 03945-
1792    Chronic pain syndrome G89.4

 

 [facility]  [id number] [place] [address] 92299-
2337     

 

 [facility]  [id number] [place] [address] 27713-
6075     

 

 [facility]  [id number] [place] [address] 44538-
5018  29 Mar, 2018  Injury of left knee, initial encounter S89.92XA and COPD 
suggested by initial evaluation J44.9

 

 [facility]  [id number] [place] [address] 16510-
4499  28 Mar, 2018  Acute bronchitis, unspecified organism J20.9

 

 [facility]  [id number] [place] [address] 20296-
5636  27 Mar, 2018  Acute bronchitis, unspecified organism J20.9

 

 [facility]  [address] [address]-
0713  21 Mar, 2018  Anxiety disorder, unspecified F41.9 and Acute bronchitis, 
unspecified organism J20.9

 

 [place] [address] [address]-
4831  08 Mar, 2018  Chronic pain syndrome G89.4

 

 [place] [address] [address]-
0610  05 Mar, 2018   

 

 [facility]  [address] [address]67-
6180  05 Mar, 2018  Acute midline low back pain with left-sided sciatica M54.42

 

 [place] [address] [address]-
2894     

 

 [facility]  [id number] [place] [address] 52420-
9806    Chronic pain syndrome G89.4

 

 [facility]  [id number] [place] [address] 78207-
9275    Chronic pain syndrome G89.4

 

 [facility]  [id number] [place] [address] 59949-
9055     

 

 [facility]  [address] [address]704-
2051    Gastroenteritis K52.9

 

 [facility]  [id number] [place] [address] 32983-
4739     

 

 [facility]  [address] [address]-
6254  15 Jerardo, 2018  Acute bronchitis, unspecified organism J20.9

 

 [place] [id number] [place] [address] 01404-
2906    Anxiety disorder, unspecified F41.9 and Neuropathy G62.9

 

 [place] [id number] [place] [address] 05217-
6944    Chronic pain syndrome G89.4

 

 [place] [address] [address]-
7239    Bronchitis J40 and Laryngitis J04.0

 

 [place] [address] [address]-
3796  29 Dec, 2017   

 

 [place] [address] [address]-
1705  26 Dec, 2017  Bronchitis J40

 

 [place] [address] 8[address]13-
1183  18 Dec, 2017   

 

 [place] [address] [address]-
2232  13 Dec, 2017  Fibromyalgia M79.7 and Chronic pain syndrome G89.4

 

 [place] [id number] [place] [address] 61094-
8418  04 Dec, 2017  Chronic pain syndrome G89.4 and Acute bronchitis, 
unspecified organism J20.9

 

 [place] [id number] [place] [address] 72031-
8563    Neuropathy G62.9

 

 [place] [id number] [place] [address] 16596-
0578    Chronic pain syndrome G89.4 ; Lumbago with sciatica, left 
side M54.42 ; Lumbago with sciatica, right side M54.41 ; Screening cholesterol 
level Z13.220 ; Screening for diabetes mellitus Z13.1 ; Screening for thyroid 
disorder Z13.29 ; Fibromyalgia M79.7 ; Anxiety disorder, unspecified F41.9 and 
Neuropathy G62.9

 

 [place] [address] [address]-
9309     

 

 [facility]  [id number] [place] [address] 19841-
5189  25 Oct, 2017   

 

 [facility]  [id number] [address]-
5484  10 Oct, 2017  Fibromyalgia M79.7 ; Chronic pain syndrome G89.4 ; Anxiety 
disorder, unspecified F41.9 ; Neuropathy G62.9 and Acute bronchitis, 
unspecified organism J20.9

 

 [facility]  [address] [address]-
0402  09 Oct, 2017   

 

 [facility]  [id number] [place] [address] 86304-
6816  25 Sep, 2017   

 

 [facility]  [id number] [place] [address] 06898-
0092  19 Sep, 2017   

 

 [facility]  [id number] [place] [address] 21971-
5818  08 Sep, 2017   

 

 [facility]  [address] [address]-
9201  23 Aug, 2017   

 

 [facility]  [address] [place] [address] 06098-
5099  22 Aug, 2017  Acute seasonal allergic rhinitis due to pollen J30.1

 

 [facility]  [address] [address]-
1450  21 Aug, 2017   

 

 [facility]  [address] [address]-
7718  09 Aug, 2017   

 

 [facility]  [address] [address]-
6833     

 

 [facility]  [address] [address]-
8122     

 

 [facility]  [address] [address]-
6693  10 Jul, 2017   

 

 [facility]  [address] [address]-
4772     

 

 [facility]  [id number] [place] [address] 13473-
7429     

 

 [facility]  [id number] [place] [address] 73971-
7405     

 

 [facility]  [id number] [place] [address] 86622-
1133    Chronic pain syndrome G89.4 ; Fibromyalgia M79.7 ; Anxiety 
disorder, unspecified F41.9 ; Neuropathy G62.9 and Low back pain M54.5

 

 [facility]  [id number] [place] [address] 49913-
5090  25 May, 2017  Low back pain M54.5

 

 [facility]  [id number] [place] [address] 50608-
1668  24 May, 2017   

 

 [facility]  [id number] [place] [address] 88054-
8125  22 May, 2017   

 

 [facility]  [id number] [place] [address] 42687-
1941  16 May, 2017   

 

 [facility]  [id number] [place] [address] 86215-
2926  16 May, 2017   

 

 [facility]  [id number] [place] [address] 93811-
6596  12 May, 2017  Routine adult health maintenance Z00.00 ; Fibromyalgia 
M79.7 ; Chronic pain syndrome G89.4 ; Abnormal lung sounds R09.89 ; Coronary 
artery disease involving native coronary artery of native heart without angina 
pectoris I25.10 and S/P CABG (coronary artery bypass graft) Z95.1

 

 [facility]  [id number] [place] [address] 55909-
9205  09 May, 2017   







IMMUNIZATIONS

No Known Immunizations



SOCIAL HISTORY

Never Assessed



REASON FOR VISIT

refill



PLAN OF CARE





VITAL SIGNS





MEDICATIONS







 Medication  Instructions  Dosage  Frequency  Start Date  End Date  Duration  
Status

 

 Tramadol HCl 50 mg  Orally every 6 hrs  1 tablet as needed  6h    
   28 days  Active







RESULTS

No Results



PROCEDURES

No Known procedures



INSTRUCTIONS





MEDICATIONS ADMINISTERED

No Known Medications



MEDICAL (GENERAL) HISTORY







 Type  Description  Date

 

 Medical History  fibromyalgia   

 

 Medical History  MRI 2016- small central protrusion/herniation w/minimal 
impression on thecal anteriorly at C5-6, At C3-4 small bilat. uncinate spurs w/ 
mild right neural foraminal narrowing   

 

 Medical History  MRI 2016- mild degenerative changes. No spinal canal 
stenosis or foraminal narrowing of thoracic spine   

 

 Medical History  hypertension   

 

 Medical History  Anxiety disorder   

 

 Medical History  depression   

 

 Medical History  migraine headaches   

 

 Medical History  Hx of C-Diff   

 

 Medical History  Hx of Pneumonia   

 

 Surgical History  Open Heart Surgery - [facility] -Dr. Lima  (
Cardiologist- Dr Logan)  

 

 Surgical History  hysterectomy - Dr Luis   

 

 Surgical History  Left Breast Lumpectomy (Bx - Benign)- Dr Luis   

 

 Surgical History  Port - Dr Luis   

 

 Surgical History  Left Knee Surgeries x3- Dr Carolina did 2 and Dr Gan did 1   

 

 Hospitalization History  C-Diff - [facility]   

 

 Hospitalization History  Pneumonia - [facility]   

 

 Hospitalization History  Open Heart Surgery - [facility]

## 2019-01-07 NOTE — XMS REPORT
[facility]

 Created on: 2018



Dayami Rae

External Reference #: [id number]

: 1970

Sex: Female



Demographics







 Address  [address] [place]  [address]

 

 Preferred Language  Unknown

 

 Marital Status  Unknown

 

 Hoahaoism Affiliation  Unknown

 

 Race  Unknown

 

 Ethnic Group  Unknown





Author







 Author  KIM FUCHS

 

 Organization  [facility]

 

 Address  [address] [place]  [address]



 

 Phone  [phone]







Care Team Providers







 Care Team Member Name  Role  Phone

 

 FUCHSKIM JACKSON  Unavailable  [phone]







PROBLEMS







 Type  Condition  ICD9-CM Code  NEQ47-EB Code  Onset Dates  Condition Status  
SNOMED Code

 

 Problem  Neuropathy     G62.9     Active  828016900

 

 Problem  Lumbago with sciatica, left side     M54.42     Active  426461628

 

 Problem  Anxiety disorder, unspecified     F41.9     Active  903577465

 

 Problem  Chronic pain syndrome     G89.4     Active  123313677

 

 Problem  Fibromyalgia     M79.7     Active  147185643

 

 Problem  Coronary artery disease involving native coronary artery of native 
heart without angina pectoris     I25.10     Active  [id number]

 

 Problem  Essential hypertension     I10     Active  96830045

 

 Problem  GERD with esophagitis     K21.0     Active  760515357

 

 Problem  Other chronic pain     G89.29     Active  99575899

 

 Problem  Lumbago with sciatica, right side     M54.41     Active  
[id number]

 

 Problem  COPD suggested by initial evaluation     J44.9     Active  35248959

 

 Problem  Acute midline low back pain with left-sided sciatica     M54.42     
Active  325663292







ALLERGIES

No Information



ENCOUNTERS







 Encounter  Location  Date  Diagnosis

 

 [facility]  [address] [place] [address] 53224-
6130    Essential hypertension I10 ; Coronary artery disease 
involving native coronary artery of native heart without angina pectoris I25.10 
; Fibromyalgia M79.7 ; GERD with esophagitis K21.0 and Tobacco abuse counseling 
Z71.6

 

 [facility]  [address] [place] [address] 11142-
4958    Long term (current) use of opiate analgesic Z79.891

 

 [facility]  [id number], [place] [address]-
8136     

 

 [facility]  [id number] [place] [address] 68216-
5862    Acute bronchitis, unspecified organism J20.9

 

 [facility]  [id number] [place] [address] 98123-
9068     

 

 [facility]  [id number] [place] [address] 26765-
2912     

 

 [facility]  [id number] [place] [address] 03222-
3052    Chronic pain syndrome G89.4

 

 [facility]  [id number] [place] [address] 75866-
9615     

 

 [facility]  [id number] [place] [address] 26033-
1068  15 Marko, 2018   

 

 [facility]  [id number] [place] [address] 80252-
9399    Acute bronchitis, unspecified organism J20.9

 

 [facility]  [id number] [place] [address] 90297-
0870    Chronic pain syndrome G89.4

 

 [facility]  [id number] [place] [address] 29336-
0979  25 May, 2018   

 

 [facility]  [id number] [place] [address] 81414-
0203  24 May, 2018  Acute midline low back pain with left-sided sciatica M54.42

 

 [facility]  [id number] [place] [address] 83753-
3669  22 May, 2018   

 

 [facility]  [id number] [place] [address] 88684
-[phone]  21 May, 2018  Bronchitis J40

 

 [facility]  [id number] [place] [address] 16250-
3188  07 May, 2018  Chronic pain syndrome G89.4 and Neuropathy G62.9

 

 [facility]  [id number] [place] [address] 59350-
4359    Acute midline low back pain with left-sided sciatica M54.42

 

 [facility]  [id number] [place] [address] 62252-
7335     

 

 [facility]  [id number] [place] [address] 77438-
4146    Anxiety disorder, unspecified F41.9

 

 [facility]  [id number] [place] [address] 45900-
4404     

 

 [facility]  [id number] [place] [address] 68576-
7278    Chronic pain syndrome G89.4 and Acute midline low back pain 
with left-sided sciatica M54.42

 

 [facility]  [id number] [place] [address] 18015-
3835    Chronic pain syndrome G89.4

 

 [facility]  [id number] [place] [address] 72111-
2310     

 

 [facility]  [id number] [place] [address] 31871-
7275     

 

 [facility]  [id number] [place] [address] 15568-
7676  29 Mar, 2018  Injury of left knee, initial encounter S89.92XA and COPD 
suggested by initial evaluation J44.9

 

 [id number] [place] [address] 46623-
1958  28 Mar, 2018  Acute bronchitis, unspecified organism J20.9

 

 [address] [id number] [place] [address] 00674-
7951  27 Mar, 2018  Acute bronchitis, unspecified organism J20.9

 

 [address] [id number] [place] [address] 96667-
7196  21 Mar, 2018  Anxiety disorder, unspecified F41.9 and Acute bronchitis, 
unspecified organism J20.9

 

 [id number] [place] [address] 14470-
9097  08 Mar, 2018  Chronic pain syndrome G89.4

 

 [facility]  [id number] [place] [address] 27051-
2143  05 Mar, 2018   

 

 [facility]  [id number] [place] [address] 88123-
6060  05 Mar, 2018  Acute midline low back pain with left-sided sciatica M54.42

 

 [facility]  [id number] [place] [address] 25871-
2371     

 

 [facility]  [id number] [place] [address] 02261-
5144    Chronic pain syndrome G89.4

 

 [facility]  [id number] [place] [address] 75697-
3440    Chronic pain syndrome G89.4

 

 [facility]  [id number] [place] [address] 08903-
2771     

 

 [facility]  [id number] [place] [address] 96484-
7136    Gastroenteritis K52.9

 

 [facility]  [id number] [place] [address] 85132-
8310     

 

 [facility]  [id number] [place] [address] 15936-
3973  15 Jerardo, 2018  Acute bronchitis, unspecified organism J20.9

 

 [facility]  [id number] [place] [address] 74787-
6899    Anxiety disorder, unspecified F41.9 and Neuropathy G62.9

 

 [facility]  [id number] [place] [address] 83976-
0478    Chronic pain syndrome G89.4

 

 [facility]  [id number] [place] [address] 18346-
0942    Bronchitis J40 and Laryngitis J04.0

 

 [facility]  [id number] [place] [address] 23443-
1639  29 Dec, 2017   

 

 [address] [place] [address] 22156-
6741  26 Dec, 2017  Bronchitis J40

 

 [id number] [place] [address] 32248-
5580  18 Dec, 2017   

 

 [id number] [place] [address] 11950-
1217  13 Dec, 2017  Fibromyalgia M79.7 and Chronic pain syndrome G89.4

 

 [id number] [place] [address] 09157-
6443  04 Dec, 2017  Chronic pain syndrome G89.4 and Acute bronchitis, 
unspecified organism J20.9

 

 [id number] [place] [address] 45331-
1224    Neuropathy G62.9

 

 [id number] [place] [address] 34642-
0030    Chronic pain syndrome G89.4 ; Lumbago with sciatica, left 
side M54.42 ; Lumbago with sciatica, right side M54.41 ; Screening cholesterol 
level Z13.220 ; Screening for diabetes mellitus Z13.1 ; Screening for thyroid 
disorder Z13.29 ; Fibromyalgia M79.7 ; Anxiety disorder, unspecified F41.9 and 
Neuropathy G62.9

 

 [address] [address]-
2202     

 

 [id number] [place] [address] 80079-
1238  25 Oct, 2017   

 

 [id number] [place] [address] 73935-
6876  10 Oct, 2017  Fibromyalgia M79.7 ; Chronic pain syndrome G89.4 ; Anxiety 
disorder, unspecified F41.9 ; Neuropathy G62.9 and Acute bronchitis, 
unspecified organism J20.9

 

 [address] [address] [place] [address] 74800-
2917  09 Oct, 2017   

 

 [address] [address] [place] [address]-
9951  25 Sep, 2017   

 

 [facility]  [address] [address]-
6352  19 Sep, 2017   

 

 [facility]  [address] [address]-
7806  08 Sep, 2017   

 

 [facility]  [address] [place] [address] 63346-
3588  23 Aug, 2017   

 

 [facility]  [id number] [place] [address] 35692-
4768  22 Aug, 2017  Acute seasonal allergic rhinitis due to pollen J30.1

 

 [facility]  [id number] [place] [address] 80428-
1746  21 Aug, 2017   

 

 [facility]  [id number] [place] [address] 12020-
5564  09 Aug, 2017   

 

 [facility]  [id number] [place] [address] 89605-
3726     

 

 [facility]  [address] [place] [address] 14369-
0840     

 

 [facility]  [id number] [place] [address] 35870-
8468  10 Jul, 2017   

 

 [facility]  [address] [place] [address] 09194-
5236     

 

 [facility]  [address] [address]-
9537     

 

 [facility]  [address] [address]-
2390     

 

 [facility]  [id number] [place] [address] 81509-
4748    Chronic pain syndrome G89.4 ; Fibromyalgia M79.7 ; Anxiety 
disorder, unspecified F41.9 ; Neuropathy G62.9 and Low back pain M54.5

 

 [facility]  [address] [place] [address] 23110-
8865  25 May, 2017  Low back pain M54.5

 

 [address] [address]-
2185  24 May, 2017   

 

 [facility]  [address] [address]-
2605  22 May, 2017   

 

 [address] [address]-
3916  16 May, 2017   

 

 [facility]  [address] [place] [address] 05950-
5697  16 May, 2017   

 

 [address] [place] [id number] [place] [address] 13275-
6082  12 May, 2017  Routine adult health maintenance Z00.00 ; Fibromyalgia 
M79.7 ; Chronic pain syndrome G89.4 ; Abnormal lung sounds R09.89 ; Coronary 
artery disease involving native coronary artery of native heart without angina 
pectoris I25.10 and S/P CABG (coronary artery bypass graft) Z95.1

 

 [address] [address]-
4487  09 May, 2017   







IMMUNIZATIONS

No Known Immunizations



SOCIAL HISTORY

Never Assessed



REASON FOR VISIT

Medication refill request



PLAN OF CARE





VITAL SIGNS





MEDICATIONS







 Medication  Instructions  Dosage  Frequency  Start Date  End Date  Duration  
Status

 

 Seroquel 200 mg  Orally Once a day  2.5 tablet at bedtime  24h        30 days  
Active







RESULTS

No Results



PROCEDURES

No Known procedures



INSTRUCTIONS





MEDICATIONS ADMINISTERED

No Known Medications



MEDICAL (GENERAL) HISTORY







 Type  Description  Date

 

 Medical History  fibromyalgia   

 

 Medical History  MRI 2016- small central protrusion/herniation w/minimal 
impression on thecal anteriorly at C5-6, At C3-4 small bilat. uncinate spurs w/ 
mild right neural foraminal narrowing   

 

 Medical History  MRI 2016- mild degenerative changes. No spinal canal 
stenosis or foraminal narrowing of thoracic spine   

 

 Medical History  hypertension   

 

 Medical History  Anxiety disorder   

 

 Medical History  depression   

 

 Medical History  migraine headaches   

 

 Medical History  Hx of C-Diff   

 

 Medical History  Hx of Pneumonia   

 

 Medical History  heart cath w/ stents placed 7/3/18   

 

 Surgical History  Open Heart Surgery - [facility] -Dr. Lima  (
Cardiologist- Dr Logan)  

 

 Surgical History  hysterectomy - Dr Luis   

 

 Surgical History  Left Breast Lumpectomy (Bx - Benign)- Dr Luis   

 

 Surgical History  Port - Dr Luis   

 

 Surgical History  Left Knee Surgeries x3- Dr Carolina did 2 and Dr Gan did 1   

 

 Surgical History  cath/stent  

 

 Hospitalization History  C-Diff - [facility]   

 

 Hospitalization History  Pneumonia - [facility]   

 

 Hospitalization History  Open Heart Surgery - [facility]

## 2019-01-07 NOTE — DIAGNOSTIC IMAGING REPORT
INDICATION: 

Chest pain.



TECHNIQUE: 

A frontal chest was obtained at 3:15 PM. 



COMPARISON:     

07/02/2018.



FINDINGS:

The patient has had previous sternotomy. The heart is normal in

size. The mediastinal silhouette appears unremarkable. The

left-sided Port-A-Cath is unchanged. There is no focal

infiltrate, pneumothorax, or pleural fluid.



IMPRESSION:

Post operative changes with no acute process in the chest.



Dictated by: 



  Dictated on workstation # [id number]

## 2019-01-07 NOTE — XMS REPORT
[facility]

 Created on: 2018



Dayami Rae

External Reference #: [id number]

: 1970

Sex: Female



Demographics







 Address  [address] [place]  [address]

 

 Preferred Language  Unknown

 

 Marital Status  Unknown

 

 Religion Affiliation  Unknown

 

 Race  Unknown

 

 Ethnic Group  Unknown





Author







 Author  JEF  KIM

 

 Organization  [facility]

 

 Address  [address] [place]  [address]



 

 Phone  [phone]







Care Team Providers







 Care Team Member Name  Role  Phone

 

 FUCHSKIM JACKSON  Unavailable  [phone]







PROBLEMS







 Type  Condition  ICD9-CM Code  HCB75-OF Code  Onset Dates  Condition Status  
SNOMED Code

 

 Problem  Chronic pain syndrome     G89.4     Active  460120217

 

 Problem  Neuropathy     G62.9     Active  452857873

 

 Problem  Coronary artery disease involving native coronary artery of native 
heart without angina pectoris     I25.10     Active  [id number]

 

 Problem  Fibromyalgia     M79.7     Active  308378235

 

 Problem  Chronic obstructive pulmonary disease, unspecified COPD type     
J44.9     Active  85739218

 

 Problem  Essential hypertension     I10     Active  61245648

 

 Problem  Lumbago with sciatica, left side     M54.42     Active  798213520

 

 Problem  Anxiety disorder, unspecified     F41.9     Active  698223135

 

 Problem  GERD with esophagitis     K21.0     Active  189324398

 

 Problem  Lumbago with sciatica, right side     M54.41     Active  
[id number]







ALLERGIES

No Information



ENCOUNTERS







 Encounter  Location  Date  Diagnosis

 

 [facility]  [id number] [place] [phone]-
[phone]  19 Sep, 2018   

 

 [facility]  [id number] [place] [address] 61641-
0669  17 Sep, 2018  Pneumonia of right lower lobe due to infectious organism 
J18.1 and Chronic obstructive pulmonary disease, unspecified COPD type J44.9

 

 [facility]  [id number] [place] [address] 81543-
3055  14 Sep, 2018  Pneumonia of right lower lobe due to infectious organism 
J18.1 ; Chronic obstructive pulmonary disease, unspecified COPD type J44.9 ; 
Chronic nausea R11.0 and Cough R05

 

 [facility]  [id number] [place] [address] 78828-
4120  07 Sep, 2018  Acute bronchitis, unspecified organism J20.9

 

 [facility]  [id number] [place] [address] 13145-
2098  28 Aug, 2018  Fibromyalgia M79.7

 

 [facility]  [id number] [place] [address] 43453
2546  20 Aug, 2018   

 

 [facility]  [id number] [place] [address] 02414-
2898  16 Aug, 2018  Neuropathy G62.9

 

 [facility]  [address] [address]-
6842    Essential hypertension I10 ; Coronary artery disease 
involving native coronary artery of native heart without angina pectoris I25.10 
; Fibromyalgia M79.7 ; GERD with esophagitis K21.0 and Tobacco abuse counseling 
Z71.6

 

 [facility]  [address] [address]-
6144    Long term (current) use of opiate analgesic Z79.891

 

 [facility]  [address] [address]-
0626     

 

 [facility]  [address] [address]-
2626    Acute bronchitis, unspecified organism J20.9

 

 [facility]  [id number] [place] [address] 30922-
3134     

 

 [facility]  [id number] [place] [address] 17013-
7753     

 

 [facility]  [id number] [place] [address] 70770-
0426    Chronic pain syndrome G89.4

 

 [facility]  [address] [address]-
5767     

 

 [facility]  [id number] [place] [address] 13002-
3070  15 Marko, 2018   

 

 [facility]  [address] [address]-
1500    Acute bronchitis, unspecified organism J20.9

 

 [facility]  [id number] [place] [address] 62499-
0898    Chronic pain syndrome G89.4

 

 [facility]  [id number] [place] [address] 06363-
7877  25 May, 2018   

 

 [facility]  [id number] [place] [address] 28447-
0604  24 May, 2018  Acute midline low back pain with left-sided sciatica M54.42

 

 [facility]  [id number] [place] [address] 26555-
8279  22 May, 2018   

 

 [facility]  [id number] [place] [address] [phone]
[phone]  21 May, 2018  Bronchitis J40

 

 [facility]  [id number] [place] [address] 52479-
4652  07 May, 2018  Chronic pain syndrome G89.4 and Neuropathy G62.9

 

 [facility]  [id number] [place] [address] 23449-
3179    Acute midline low back pain with left-sided sciatica M54.42

 

 [facility]  [id number] [place] [address] 87989-
1043     

 

 [facility]  [id number] [place] [address] 20313-
5673    Anxiety disorder, unspecified F41.9

 

 [facility]  [id number] [place] [address] 94533-
8459     

 

 [facility]  [id number] [place] [address] 08220-
1439    Chronic pain syndrome G89.4 and Acute midline low back pain 
with left-sided sciatica M54.42

 

 [facility]  [address] [place] [address] 06865-
4314    Chronic pain syndrome G89.4

 

 [facility]  [address], [place] [address]-
7945     

 

 [facility]  [id number] [place] [address] 31054-
7267     

 

 [facility]  [id number] [place] [address] 70809-
5012  29 Mar, 2018  Injury of left knee, initial encounter S89.92XA and COPD 
suggested by initial evaluation J44.9

 

 [facility]  [id number] [place] [address] 56676-
8830  28 Mar, 2018  Acute bronchitis, unspecified organism J20.9

 

 [id number] [place] [address] 33782-
7377  27 Mar, 2018  Acute bronchitis, unspecified organism J20.9

 

 [place] [id number] [place] [address] 84827-
4290  21 Mar, 2018  Anxiety disorder, unspecified F41.9 and Acute bronchitis, 
unspecified organism J20.9

 

 [facility]  [id number] [place] [address] 94003-
3813  08 Mar, 2018  Chronic pain syndrome G89.4

 

 [place] [id number] [place] [address] 02317-
7097  05 Mar, 2018   

 

 [facility]  [id number] [place] [address] 15191-
4311  05 Mar, 2018  Acute midline low back pain with left-sided sciatica M54.42

 

 [facility]  [address] [place] [address] 77456-
2159     

 

 [facility]  [id number] [place] [address] 36711-
2953    Chronic pain syndrome G89.4

 

 [facility]  [id number] [place] [address] 63068-
7350    Chronic pain syndrome G89.4

 

 [address] [id number] [place] [address] 38932-
9960     

 

 [facility]  [id number] [place] [address] 33719-
9037    Gastroenteritis K52.9

 

 [facility]  [address] 78883-
3959     

 

 [facility]  [address] 96962-
2987  15 Jerardo, 2018  Acute bronchitis, unspecified organism J20.9

 

 [facility]  [address] 73885-
2340    Anxiety disorder, unspecified F41.9 and Neuropathy G62.9

 

 [facility]  [id number] [place] [address] 58113-
3698    Chronic pain syndrome G89.4

 

 [facility]  [address] 03849-
2789    Bronchitis J40 and Laryngitis J04.0

 

 [facility]  [id number] [place] [address] 52459-
3047  29 Dec, 2017   

 

 [facility]  [address] 09474-
7229  26 Dec, 2017  Bronchitis J40

 

 [facility]  [address] 86149-
1902  18 Dec, 2017   

 

 [facility]  [address] 28736-
7541  13 Dec, 2017  Fibromyalgia M79.7 and Chronic pain syndrome G89.4

 

 [facility]  [id number] [place] [address] 72291-
9331  04 Dec, 2017  Chronic pain syndrome G89.4 and Acute bronchitis, 
unspecified organism J20.9

 

 [facility]  [id number] [place] [address] 14571-
2069    Neuropathy G62.9

 

 [facility]  [address] [address]64-
9407    Chronic pain syndrome G89.4 ; Lumbago with sciatica, left 
side M54.42 ; Lumbago with sciatica, right side M54.41 ; Screening cholesterol 
level Z13.220 ; Screening for diabetes mellitus Z13.1 ; Screening for thyroid 
disorder Z13.29 ; Fibromyalgia M79.7 ; Anxiety disorder, unspecified F41.9 and 
Neuropathy G62.9

 

 [facility]  [id number] [place] [address] 87770-
8997     

 

 [facility]  [id number] [place] [address] 19654-
7556  25 Oct, 2017   

 

 [place] [id number] [place] [address] 37661-
1424  10 Oct, 2017  Fibromyalgia M79.7 ; Chronic pain syndrome G89.4 ; Anxiety 
disorder, unspecified F41.9 ; Neuropathy G62.9 and Acute bronchitis, 
unspecified organism J20.9

 

 [facility]  [id number] [place] [address] 22431-
6166  09 Oct, 2017   

 

 [facility]  [id number] [place] [address] 27526-
0477  25 Sep, 2017   

 

 [facility]  [id number] [place] [address] 89239-
6993  19 Sep, 2017   

 

 [facility]  [id number] [place] [address] 46870-
5292  08 Sep, 2017   

 

 [facility]  [id number] [place] [address] 05289-
3777  23 Aug, 2017   

 

 [facility]  [id number] [place] [address] 33993-
8058  22 Aug, 2017  Acute seasonal allergic rhinitis due to pollen J30.1

 

 [facility]  [id number] [place] [address] 25128-
9027  21 Aug, 2017   

 

 [facility]  [id number] [place] [address] 49683-
0702  09 Aug, 2017   

 

 [facility]  [address] [address]-
9452     

 

 [facility]  [address] [address]-
9153     

 

 [facility]  [address] [address]-
5436  10 Jul, 2017   

 

 [facility]  [address] [address]-
1391     

 

 [facility]  [address] [address]-
8904     

 

 [facility]  [address] [address]-
5342     

 

 [facility]  [address] [address]-
5182    Chronic pain syndrome G89.4 ; Fibromyalgia M79.7 ; Anxiety 
disorder, unspecified F41.9 ; Neuropathy G62.9 and Low back pain M54.5

 

 [facility]  [address] [address]-
1210  25 May, 2017  Low back pain M54.5

 

 [facility]  [address] [address]-
5141  24 May, 2017   

 

 [facility]  [id number] [address]-
9896  22 May, 2017   

 

 [facility]  [id number] [address]-
7040  16 May, 2017   

 

 [facility]  [id number] [address]-
6427  16 May, 2017   

 

 [facility]  [id number], [place] [address]-
8408  12 May, 2017  Routine adult health maintenance Z00.00 ; Fibromyalgia 
M79.7 ; Chronic pain syndrome G89.4 ; Abnormal lung sounds R09.89 ; Coronary 
artery disease involving native coronary artery of native heart without angina 
pectoris I25.10 and S/P CABG (coronary artery bypass graft) Z95.1

 

 [facility]  [address] [facility] [id number] [place] [address]-
2316  09 May, 2017   







IMMUNIZATIONS

No Known Immunizations



SOCIAL HISTORY

Never Assessed



REASON FOR VISIT

Medication refill request



PLAN OF CARE





VITAL SIGNS





MEDICATIONS







 Medication  Instructions  Dosage  Frequency  Start Date  End Date  Duration  
Status

 

 Gabapentin 300 MG  Orally Once a day  1 capsule  24h  09 May, 2017     30 days
  Active

 

 Chlorzoxazone 500 mg  Orally Three times a day if needed  1 tablet           
30  Active







RESULTS

No Results



PROCEDURES

No Known procedures



INSTRUCTIONS





MEDICATIONS ADMINISTERED

No Known Medications



MEDICAL (GENERAL) HISTORY







 Type  Description  Date

 

 Medical History  fibromyalgia   

 

 Medical History  MRI 2016- small central protrusion/herniation w/minimal 
impression on thecal anteriorly at C5-6, At C3-4 small bilat. uncinate spurs w/ 
mild right neural foraminal narrowing   

 

 Medical History  MRI 2016- mild degenerative changes. No spinal canal 
stenosis or foraminal narrowing of thoracic spine   

 

 Medical History  hypertension   

 

 Medical History  Anxiety disorder   

 

 Medical History  depression   

 

 Medical History  migraine headaches   

 

 Medical History  Hx of C-Diff   

 

 Medical History  Hx of Pneumonia   

 

 Medical History  heart cath w/ stents placed 7/3/18   

 

 Surgical History  Open Heart Surgery - [facility] -Dr. Lima  (
Cardiologist- Dr Logan)  

 

 Surgical History  hysterectomy - Dr Luis   

 

 Surgical History  Left Breast Lumpectomy (Bx - Benign)- Dr Luis   

 

 Surgical History  Port - Dr Luis   

 

 Surgical History  Left Knee Surgeries x3- Dr Carolina did 2 and Dr Gan did 1   

 

 Surgical History  cath/stent  

 

 Hospitalization History  C-Diff - [facility]   

 

 Hospitalization History  Pneumonia - [facility]   

 

 Hospitalization History  Open Heart Surgery - [facility]

## 2019-01-07 NOTE — XMS REPORT
[facility]

 Created on: 2018



Dayami Rae

External Reference #: [id number]

: 1970

Sex: Female



Demographics







 Address  [address] [place]  [address]

 

 Preferred Language  Unknown

 

 Marital Status  Unknown

 

 Taoism Affiliation  Unknown

 

 Race  Unknown

 

 Ethnic Group  Unknown





Author







 Author  SURENDRA FRASER

 

 [facility]

 

 Address  [address] [place]  [address]



 

 Phone  [phone]







Care Team Providers







 Care Team Member Name  Role  Phone

 

 SURENDRA FRASER  Unavailable  [phone]







PROBLEMS







 Type  Condition  ICD9-CM Code  RSR50-QC Code  Onset Dates  Condition Status  
SNOMED Code

 

 Problem  Coronary artery disease involving native coronary artery of native 
heart without angina pectoris     I25.10     Active  5424490982229

 

 Problem  Anxiety disorder, unspecified     F41.9     Active  694547774

 

 Problem  Neuropathy     G62.9     Active  775221591

 

 Problem  Fibromyalgia     M79.7     Active  119146591

 

 Problem  Chronic pain syndrome     G89.4     Active  851812877

 

 Problem  Cervical radiculopathy     M54.12     Active  40855494

 

 Problem  Chronic obstructive pulmonary disease, unspecified COPD type     
J44.9     Active  95485688

 

 Problem  Lumbago with sciatica, right side     M54.41     Active  
896280296618561

 

 Problem  Lumbago with sciatica, left side     M54.42     Active  599093388

 

 Problem  Essential hypertension     I10     Active  40536307

 

 Problem  GERD with esophagitis     K21.0     Active  251827459







ALLERGIES

No Information



ENCOUNTERS







 Encounter  Location  Date  Diagnosis

 

 [facility], [place]  [address] [place] [address] 99975-
[phone]  20 Dec, 2018   

 

 [facility] [place]  [address] [place] [address] 54204-
3733  19 Dec, 2018   

 

 [facility] [place]  [id number] [place] [address] 14460-
3077  06 Dec, 2018  Neuropathy G62.9

 

 [facility] [place]  [address] [place] [address] 97605-
9081    Anxiety disorder, unspecified F41.9

 

 [facility] [place]  [address] [place] [address] 84440-
8335    Chronic pain syndrome G89.4 and Anxiety disorder, 
unspecified F41.9

 

 [facility] [place]  [id number] [place] [address] 49959-
7930  15 Nov, 2018   

 

 [facility] [place]  [id number] [place] [address] 76256-
1981     

 

 [facility] [place]  [id number] [place] [address] 14858-
4297  29 Oct, 2018  Cervical radiculopathy M54.12 ; Chronic pain syndrome G89.4 
and Anxiety disorder, unspecified F41.9

 

 [facility] [place]  [id number] [place] [address] 25804-
2189  24 Oct, 2018   

 

 [facility] [place]  [id number] [place] [address] 01698-
4128  27 Sep, 2018  Fibromyalgia M79.7

 

 [facility] [place]  [id number] [place] [address] 79593-
6377  20 Sep, 2018   

 

 [facility] [place]  [id number] [place] [address] 38684-
9845  19 Sep, 2018   

 

 [facility] [place]  [id number] [place] [address] 27547-
2624  17 Sep, 2018  Pneumonia of right lower lobe due to infectious organism 
J18.1 and Chronic obstructive pulmonary disease, unspecified COPD type J44.9

 

 [facility] [place]  [id number] [place] [address] 48227-
3331  14 Sep, 2018  Pneumonia of right lower lobe due to infectious organism 
J18.1 ; Chronic obstructive pulmonary disease, unspecified COPD type J44.9 ; 
Chronic nausea R11.0 and Cough R05

 

 [facility] [place]  [id number] [place] [address] 88557-
9522  07 Sep, 2018  Acute bronchitis, unspecified organism J20.9

 

 [facility] [place]  [id number] [place] [address] 44405-
4383  28 Aug, 2018  Fibromyalgia M79.7

 

 [facility] [place]  [id number] [place] [address] 20270-
3284  20 Aug, 2018   

 

 [facility] [place]  [address], [place] [address]-
7567  16 Aug, 2018  Neuropathy G62.9

 

 [facility] [place]  [address] [place] [address] 93622-
7956    Essential hypertension I10 ; Coronary artery disease 
involving native coronary artery of native heart without angina pectoris I25.10 
; Fibromyalgia M79.7 ; GERD with esophagitis K21.0 and Tobacco abuse counseling 
Z71.6

 

 [facility] [place]  [address] [address]-
0942    Long term (current) use of opiate analgesic Z79.891

 

 [facility] [place]  [id number] [place] [address] 85206-
2967     

 

 [facility] [place]  [id number] [place] [address] 74815-
7706    Acute bronchitis, unspecified organism J20.9

 

 [facility] [place]  [address] [place] [address] 38387-
1854     

 

 [facility] [place]  [id number] [place] [address] 85075-
2702     

 

 [facility] [place]  [address] [address]-
8266    Chronic pain syndrome G89.4

 

 [facility] [place]  [address] [place] [address] 65339-
5575     

 

 [facility] [place]  [address] [place] [address] 78223-
7630  15 Marko, 2018   

 

 [facility] [place]  [id number] [place] [address] 29271-
8038    Acute bronchitis, unspecified organism J20.9

 

 [facility] [place]  [address] [address]-
4874    Chronic pain syndrome G89.4

 

 [facility] [place]  [id number] [address]-
7027  25 May, 2018   

 

 [facility] [place]  [id number] [place] [address] 40641-
1014  24 May, 2018  Acute midline low back pain with left-sided sciatica M54.42

 

 [facility] [place]  [id number] [place] [address] 63666-
3884  22 May, 2018   

 

 [facility]  [id number] [place] [address] 31364
-[phone]  21 May, 2018  Bronchitis J40

 

 [facility] [place]  [id number] [place] [address] 56734-
8595  07 May, 2018  Chronic pain syndrome G89.4 and Neuropathy G62.9

 

 [facility] [place]  [id number] [place] [address] 04180-
5951    Acute midline low back pain with left-sided sciatica M54.42

 

 [facility] [place]  [id number] [place] [address] 61119-
5950     

 

 [facility] [place]  [id number] [place] [address] 33888-
0503    Anxiety disorder, unspecified F41.9

 

 [facility] [place]  [id number] [place] [address] 91084-
2510     

 

 [facility] [place]  [id number] [place] [address] 36378-
9053    Chronic pain syndrome G89.4 and Acute midline low back pain 
with left-sided sciatica M54.42

 

 [facility] [place]  [id number] [place] [address] 20731-
3569    Chronic pain syndrome G89.4

 

 [facility] [place]  [id number] [place] [address] 71420-
5037     

 

 [facility] [place]  [id number] [place] [address] 06938-
1766     

 

 [facility] [place]  [id number] [place] [address] 21065-
5232  29 Mar, 2018  Injury of left knee, initial encounter S89.92XA and COPD 
suggested by initial evaluation J44.9

 

 [place] [facility] [id number] [place] [address] 21999-
6921  28 Mar, 2018  Acute bronchitis, unspecified organism J20.9

 

 [place] [address] [address]656-
5089  27 Mar, 2018  Acute bronchitis, unspecified organism J20.9

 

 [address] [address] 84475-
9324  21 Mar, 2018  Anxiety disorder, unspecified F41.9 and Acute bronchitis, 
unspecified organism J20.9

 

 [facility] [id number] [place] [address] 16553-
1174  08 Mar, 2018  Chronic pain syndrome G89.4

 

 [place] [address] 10271-
6531  05 Mar, 2018   

 

 [place] [address] [address]80-
5807  05 Mar, 2018  Acute midline low back pain with left-sided sciatica M54.42

 

 [facility] [id number] [place] [address] 77834-
4060     

 

 [facility] [id number] [place] [address] 57157-
8134    Chronic pain syndrome G89.4

 

 [facility] [id number] [place] [address] 70703-
0997    Chronic pain syndrome G89.4

 

 [place] [address] 62744-
8434     

 

 [facility] [id number] [place] [address] 10350-
4325    Gastroenteritis K52.9

 

 [place] [address] 66214-
2787     

 

 [facility] [id number] [place] [address] 54228-
1114  15 Jerardo, 2018  Acute bronchitis, unspecified organism J20.9

 

 [facility] [id number] [place] [address] 25561-
9440    Anxiety disorder, unspecified F41.9 and Neuropathy G62.9

 

 [facility] [id number] [place] [address] 52916-
4918    Chronic pain syndrome G89.4

 

 [facility] [id number] [place] [address] 87075-
1288    Bronchitis J40 and Laryngitis J04.0

 

 [facility] [id number] [place] [address] 11235-
8771  29 Dec, 2017   

 

 [place] [address] 02228-
2840  26 Dec, 2017  Bronchitis J40

 

 [place] [facility] [id number] [place] [address] 58125-
9264  18 Dec, 2017   

 

 [place] [facility] [id number] [place] [address] 38466-
4389  13 Dec, 2017  Fibromyalgia M79.7 and Chronic pain syndrome G89.4

 

 [facility] [id number] [place] [address] 67778-
9515  04 Dec, 2017  Chronic pain syndrome G89.4 and Acute bronchitis, 
unspecified organism J20.9

 

 [place] [facility] [id number] [place] [address] 81576-
8479    Neuropathy G62.9

 

 [facility] [id number] [place] [address] 71716-
3168    Chronic pain syndrome G89.4 ; Lumbago with sciatica, left 
side M54.42 ; Lumbago with sciatica, right side M54.41 ; Screening cholesterol 
level Z13.220 ; Screening for diabetes mellitus Z13.1 ; Screening for thyroid 
disorder Z13.29 ; Fibromyalgia M79.7 ; Anxiety disorder, unspecified F41.9 and 
Neuropathy G62.9

 

 [facility] [place]  [id number] [place] [address] 79767-
5910     

 

 [facility] [place]  [id number] [place] [address] 62411-
4896  25 Oct, 2017   

 

 [facility] [place]  [address] 90291-
7529  10 Oct, 2017  Fibromyalgia M79.7 ; Chronic pain syndrome G89.4 ; Anxiety 
disorder, unspecified F41.9 ; Neuropathy G62.9 and Acute bronchitis, 
unspecified organism J20.9

 

 [facility] [place]  [id number] [place] [address] 04677-
6265  09 Oct, 2017   

 

 [facility] [place]  [id number] [place] [address] 82264-
6622  25 Sep, 2017   

 

 [facility] [place]  [id number] [place] [address] 23155-
1860  19 Sep, 2017   

 

 [facility] [place]  [id number] [place] [address] 33592-
4862  08 Sep, 2017   

 

 [facility] [place]  [id number] [place] [address] 17818-
6310  23 Aug, 2017   

 

 [facility] [place]  [id number] [place] [address] 05525-
1854  22 Aug, 2017  Acute seasonal allergic rhinitis due to pollen J30.1

 

 [facility] [place]  [id number] [place] [address] 76575-
0944  21 Aug, 2017   

 

 [facility] [place]  [id number] [place] [address] 49277-
5263  09 Aug, 2017   

 

 [facility] [place]  [id number] [place] [address] 26836-
3857     

 

 [facility] [place]  [id number] [place] [address] 68884-
8181     

 

 [facility] [place]  [address] [address]-
9492  10 Jul, 2017   

 

 [facility] [place]  [address] [address]-
9678     

 

 [facility] [place]  [address] [address]-
7339     

 

 [facility] [place]  [id number] [address]-
7741     

 

 [facility] [place]  [address] [address]-
6388    Chronic pain syndrome G89.4 ; Fibromyalgia M79.7 ; Anxiety 
disorder, unspecified F41.9 ; Neuropathy G62.9 and Low back pain M54.5

 

 [facility] [place]  [address] [address]-
3045  25 May, 2017  Low back pain M54.5

 

 [facility] [place]  [address] [address]-
3239  24 May, 2017   

 

 [facility] [place]  [address] [address]-
5200  22 May, 2017   

 

 [facility] [place]  [address] [address]-
2253  16 May, 2017   

 

 [facility] [place]  [address] [address]-
4812  16 May, 2017   

 

 [facility] [place]  [address] [address]-
8726  12 May, 2017  Routine adult health maintenance Z00.00 ; Fibromyalgia 
M79.7 ; Chronic pain syndrome G89.4 ; Abnormal lung sounds R09.89 ; Coronary 
artery disease involving native coronary artery of native heart without angina 
pectoris I25.10 and S/P CABG (coronary artery bypass graft) Z95.1

 

 [facility] [place]  [id number] [address]-
8684  09 May, 2017   







IMMUNIZATIONS

No Known Immunizations



SOCIAL HISTORY

Never Assessed



REASON FOR VISIT

Refill request



PLAN OF CARE





VITAL SIGNS





MEDICATIONS







 Medication  Instructions  Dosage  Frequency  Start Date  End Date  Duration  
Status

 

 Chlorzoxazone 500 mg  Orally Three times a day if needed  1 tablet           
20  Active







RESULTS

No Results



PROCEDURES

No Known procedures



INSTRUCTIONS





MEDICATIONS ADMINISTERED

No Known Medications



MEDICAL (GENERAL) HISTORY







 Type  Description  Date

 

 Medical History  fibromyalgia   

 

 Medical History  MRI 2016- small central protrusion/herniation w/minimal 
impression on thecal anteriorly at C5-6, At C3-4 small bilat. uncinate spurs w/ 
mild right neural foraminal narrowing   

 

 Medical History  MRI 2016- mild degenerative changes. No spinal canal 
stenosis or foraminal narrowing of thoracic spine   

 

 Medical History  hypertension   

 

 Medical History  Anxiety disorder   

 

 Medical History  depression   

 

 Medical History  migraine headaches   

 

 Medical History  Hx of C-Diff   

 

 Medical History  Hx of Pneumonia   

 

 Medical History  heart cath w/ stents placed 7/3/18   

 

 Surgical History  Open Heart Surgery - [facility] -Dr. Lima  (
Cardiologist- Dr Logan)  

 

 Surgical History  hysterectomy - Dr Luis   

 

 Surgical History  Left Breast Lumpectomy (Bx - Benign)- Dr Luis   

 

 Surgical History  Port - Dr Luis   

 

 Surgical History  Left Knee Surgeries x3- Dr Carolina did 2 and Dr Gan did 1   

 

 Surgical History  cath/stent  

 

 Hospitalization History  C-Diff - [facility]   

 

 Hospitalization History  Pneumonia - [facility]   

 

 Hospitalization History  Open Heart Surgery - [facility]

## 2019-01-07 NOTE — ED CHEST PAIN
General


Stated Complaint:  CHEST PAIN


Source:  patient, spouse


Exam Limitations:  no limitations





History of Present Illness


Date Seen by Provider:  2019


Time Seen by Provider:  14:49


Initial Comments


Patient presents to ER by private conveyance with chief complaint she's been 

having intermittent episodic chest pain for the past week and a half. She says 

she has a history of coronary artery disease status post CABG in  and her 

last catheterization by Dr. Logan approximately 8 months ago resulting 12 

stents being placed. She took a nitroglycerin earlier in the week and that made 

her pain go away. She is associating this pain with a burning sensation middle 

of her chest radiating to her left shoulder and up into her neck. She thought 

it was acid reflux so she been taking multiple different antacids with no 

relief. Today she is concerned it might be more related to her heart. She is on 

Plavix but does not take aspirin daily. She denies any history of COPD or 

asthma. She's not having shortness of breath or cough. She denies fevers or 

chills. She smokes about half pack per day. She does not have a history of 

thyroid disease, diabetes but she does have high blood pressure and high 

cholesterol.





Allergies and Home Medications


Allergies


Coded Allergies:  


     morphine (Verified  Allergy, Unknown, RASH - TAKES LORTAB AT HOME WITH NO 

ISSUES, 16)





Home Medications


Alprazolam 0.5 Mg Tablet, 0.5 MG PO BID PRN for ANXIETY, (Reported)


Amitriptyline HCl 25 Mg Tablet, 25 MG PO DAILY, (Reported)


Aspirin 81 Mg Tablet., 81 MG PO DAILY, (Reported)


Atorvastatin Calcium 40 Mg Tablet, 40 MG PO HS


   Prescribed by: DARREL LOGAN on 7/3/18 164


Chlorzoxazone 500 Mg Tablet, 500 MG PO TID PRN for MUSCLE SPASMS, (Reported)


Clopidogrel Bisulfate 75 Mg Tablet, 75 MG PO DAILY


   Prescribed by: DARREL LOGAN on 7/3/18 164


Gabapentin 300 Mg Capsule, 300 MG PO DAILY, (Reported)


Ipratropium/Albuterol Sulfate 3 Ml Ampul.neb, 3 ML IH Q4H PRN for SHORTNESS OF 

BREATH, (Reported)


Metoprolol Succinate 25 Mg Tab.er.24h, 25 MG PO DAILY


   Prescribed by: DARREL LOGAN on 7/3/18 164


Omeprazole 20 Mg Capsule., 20 MG PO DAILY


   Prescribed by: DARREL LOGAN on 7/3/18 8939


Promethazine HCl 25 Mg Tablet, 25 MG PO Q4H PRN for NAUSEA/VOMITING, (Reported)


Quetiapine Fumarate 200 Mg Tablet, 400 MG PO HS, (Reported)


   TAKES 2 (200MG) TABLETS 


Sumatriptan Succinate 6 Mg/0.5 Ml Cartridge, UD PRN for MIGRAINE, (Reported)


Tramadol HCl 50 Mg Tablet, 50 MG PO TID PRN for PAIN, (Reported)





Patient Home Medication List


Home Medication List Reviewed:  Yes





Review of Systems


Review of Systems


Constitutional:  No chills, No diaphoresis


EENTM:  No Blurred Vision, No Double Vision


Respiratory:  Denies Cough, Denies Shortness of Air


Cardiovascular:  See HPI, Chest Pain; Denies Edema, Denies Irregular Heart Rate

, Denies Lightheadedness, Denies Palpitations, Denies Syncope


Gastrointestinal:  See HPI (history of GERD); Denies Constipated, Denies 

Diarrhea, Denies Nausea


Genitourinary:  Denies Burning, Denies Discharge


Musculoskeletal:  No gout, No joint pain


Skin:  No pruritus, No rash


Psychiatric/Neurological:  Denies Headache, Denies Numbness, Denies Paresthesia





Past Medical-Social-Family Hx


Patient Social History


Alcohol Use:  Denies Use


Recreational Drug Use:  No


Smoking Status:  Current Everyday Smoker


Type Used:  Cigarettes


Recent Hopitalizations:  Yes





Immunizations Up To Date


Tetanus Booster (TDap):  Unknown


PED Vaccines UTD:  No


Date of Pneumonia Vaccine:  2016


Date of Influenza Vaccine:  Oct 1, 2015





Past Medical History


Surgeries:  Yes (left total knee 2015, cabgX1, port)


CABG, Hysterectomy, Open Heart Surgery, Orthopedic


Respiratory:  Yes (tobaccoism)


Pneumonia, Chronic Bronchitis


Cardiac:  Yes (CABG)


Coronary Artery Disease, Hypertension


Neurological:  Yes


Headaches /Migraines


Reproductive Disorders:  No


Female Reproductive Disorders:  Denies


GYN History:  Hysterectomy


Sexually Transmitted Disease:  No


HIV/AIDS:  No


Gastrointestinal:  Yes


Colitis, Gastroesophageal Reflux, Diverticulosis, Hemorrhoids, Chronic Diarrhea

, Ulcer, Irritable Bowel


Musculoskeletal:  Yes


Degenerate Disk Disease, Arthritis, Chronic Back Pain, Fractures


Endocrine:  No


Loss of Vision:  Denies


Hearing Impairment:  Denies


Cancer:  No


Psychosocial:  Yes


Sleep Difficulties, Anxiety, Depression


Integumentary:  No


Blood Disorders:  No


Adverse Reaction/Blood Tranf:  No





Family Medical History





Cancer (brain and back)


  G8 SISTER


  G8 SISTER


Cataracts


  19 MOTHER


Headache disorder


  G8 SISTER


Hypercholesterolemia


  19 FATHER


Hypertension


  19 FATHER


Myocardial infarction


  19 FATHER


  19 FATHER


Myocardial infarction ( from MI at 55


)


  19 FATHER


  19 FATHER


Neoplasm


  G8 SISTER


Psychosocial problem


  G8 SISTER


Visual disorder


  G8 SISTER





No Family History of:


  AIDS


  Abdominal aortic aneurysm


  Spray's disease


  Alcoholism


  Alzheimer's disease


  Aphasia


  Arthritis


  Asthma


  Cancer of mouth


  Cardiovascular disease


  Colon cancer


  Completed stroke


  Congenital disease


  Congenital heart disease


  Coronary thrombosis


  Cystic fibrosis


  Deafness or hearing loss


  Dementia


  Diabetes mellitus


  Drug abuse


  Dysphasia


  Fibrocystic disease of breast


  Gastroenteritis


  Glaucoma


  Infertility


  Kidney disease


  Not obtainable due to adoption


  Osteoporosis


  Parkinson's disease


  Prostate cancer


  Respiratory disorder


  Seizure disorder


  Severe allergy


  Thyroid disease


  Tuberculosis


Cancer





Physical Exam


Vital Signs





Vital Signs - First Documented




















Capillary Refill :


Height, Weight, BMI


Height: 5'2.00"


Weight: 130lbs. 0.0oz. [id number]; 20.5 BMI


Method:Stated


General Appearance:  WD/WN, Anxious, Mild Distress


HEENT:  PERRL/EOMI, Pharynx Normal, Moist Mucous Membranes


Neck:  Normal Inspection, Non Tender


Respiratory:  Chest Non Tender, Lungs Clear, Normal Breath Sounds, No Accessory 

Muscle Use, No Respiratory Distress


Cardiovascular:  Regular Rate, Rhythm, No Edema, No Gallop, Normal Peripheral 

Pulses


Gastrointestinal:  Normal Bowel Sounds, Non Tender, Soft


Extremity:  Normal Capillary Refill, Normal Inspection, No Pedal Edema


Neurologic/Psychiatric:  Alert, Oriented x3


Skin:  Normal Color, Warm/Dry





Progress/Results/Core Measures


Results/Orders


Lab Results





Laboratory Tests








Test


 19


15:05 19


16:18 Range/Units


 


 


White Blood Count


 5.6 


 


 4.3-11.0


10^3/uL


 


Red Blood Count


 4.07 L


 


 4.35-5.85


10^6/uL


 


Hemoglobin 12.4   11.5-16.0  G/DL


 


Hematocrit 36   35-52  %


 


Mean Corpuscular Volume 89   80-99  FL


 


Mean Corpuscular Hemoglobin 31   25-34  PG


 


Mean Corpuscular Hemoglobin


Concent 34 


 


 32-36  G/DL





 


Red Cell Distribution Width 13.0   10.0-14.5  %


 


Platelet Count


 206 


 


 130-400


10^3/uL


 


Mean Platelet Volume 9.7   7.4-10.4  FL


 


Neutrophils (%) (Auto) 46   42-75  %


 


Lymphocytes (%) (Auto) 40   12-44  %


 


Monocytes (%) (Auto) 9   0-12  %


 


Eosinophils (%) (Auto) 4   0-10  %


 


Basophils (%) (Auto) 1   0-10  %


 


Neutrophils # (Auto) 2.6   1.8-7.8  X 10^3


 


Lymphocytes # (Auto) 2.3   1.0-4.0  X 10^3


 


Monocytes # (Auto) 0.5   0.0-1.0  X 10^3


 


Eosinophils # (Auto)


 0.2 


 


 0.0-0.3


10^3/uL


 


Basophils # (Auto)


 0.0 


 


 0.0-0.1


10^3/uL


 


Sodium Level 139   135-145  MMOL/L


 


Potassium Level 2.9 L  3.6-5.0  MMOL/L


 


Chloride Level 104     MMOL/L


 


Carbon Dioxide Level 25   21-32  MMOL/L


 


Anion Gap 10   5-14  MMOL/L


 


Blood Urea Nitrogen 6 L  7-18  MG/DL


 


Creatinine


 0.78 


 


 0.60-1.30


MG/DL


 


Estimat Glomerular Filtration


Rate > 60 


 


  





 


BUN/Creatinine Ratio 8    


 


Glucose Level 91     MG/DL


 


Calcium Level 9.0   8.5-10.1  MG/DL


 


Corrected Calcium 8.9   8.5-10.1  MG/DL


 


Magnesium Level 1.9   1.8-2.4  MG/DL


 


Total Bilirubin 0.3   0.1-1.0  MG/DL


 


Aspartate Amino Transf


(AST/SGOT) 11 


 


 5-34  U/L





 


Alanine Aminotransferase


(ALT/SGPT) < 6 


 


 0-55  U/L





 


Alkaline Phosphatase 89     U/L


 


Myoglobin


 22.7 


 


 10.0-92.0


NG/ML


 


Troponin I < 0.028   <0.028  NG/ML


 


Total Protein 7.5   6.4-8.2  GM/DL


 


Albumin 4.1   3.2-4.5  GM/DL


 


Prothrombin Time  14.0  12.2-14.7  SEC


 


INR Comment  1.1  0.8-1.4  


 


Activated Partial


Thromboplast Time 


 36 H


 24-35  SEC











My Orders





Orders - STEPHANIE ARELLANO


Cbc With Automated Diff (19 14:57)


Magnesium (19 14:57)


Chest 1 View, Ap/Pa Only (19 14:57)


Ekg Tracing (19 14:57)


Cardiac Profile 1 (19 14:57)


Comprehensive Metabolic Panel (19 14:57)


Myoglobin Serum (19 14:57)


Protime With Inr (19 14:57)


Partial Thromboplastin Time (19 14:57)


O2 (19 14:57)


Monitor-Rhythm Ecg Trace Only (19 14:57)


Lipid Panel (19 06:00)


Aspirin Chewable Tablet (Baby Aspirin Ch (19 15:00)


Nitroglycerin 0.4 Mg Btl 25's (Nitrostat (19 15:00)


Saline Lock/Iv-Start (19 14:57)


Nitroglycerin 0.4 Mg Btl 25's (Nitrostat (19 14:54)


Aspirin Chewable Tablet (Baby Aspirin Ch (19 14:54)


Lidocaine 2% Viscous 15 Ml (Xylocaine Vi (19 16:00)


Famotidine Tablet (Pepcid Tablet) (19 15:48)


Antacid  Suspension (Mylanta  Suspension (19 16:00)


Potassium Chloride (Tablet) (K Dur Table (19 16:15)


Ondansetron Injection (Zofran Injectio (19 16:15)


Potassium Cl 10meq/50ml Ivpb (Kcl 10 Meq (19 16:15)





Medications Given in ED





Current Medications








 Medications  Dose


 Ordered  Sig/Elena


 Route  Start Time


 Stop Time Status Last Admin


Dose Admin


 


 Al Hydrox/Mg


 Hydrox/Simethicone  30 ml  ONCE  ONCE


 PO  19 16:00


 19 16:01 DC 19 16:00


30 ML


 


 Aspirin  324 mg  ONCE  ONCE


 PO  19 15:00


 19 15:01 DC 19 14:55


324 MG


 


 Lidocaine HCl  15 ml  ONCE  ONCE


 PO  19 16:00


 19 16:01 DC 19 16:00


15 ML


 


 Nitroglycerin  0.4 mg  UD  PRN


 SL  19 15:00


    19 14:55


0.4 MG


 


 Ondansetron HCl  4 mg  ONCE  ONCE


 IVP  19 16:15


 1/7/19 16:16 DC 19 16:33


4 MG


 


 Potassium Chloride  50 ml @ 50


 mls/hr  ONCE  ONCE


 IV  19 16:15


 19 17:14 DC 19 16:33


50 MLS/HR








Vital Signs/I&O











 19





 14:50 14:50


 


Temp 98.5 


 


Pulse 91 


 


Resp 20 


 


B/P (MAP) 171/91 (117) 


 


Pulse Ox 96 


 


O2 Delivery Room Air Room Air











Progress


Progress Note #1:  


   Time:  15:50


Progress Note


Sounds like recently changing pattern, unstable angina. Initial nitroglycerin 

brought her pain down from a 10 out of 10 to a 4 out of 10. Now she is asking 

for something for her acid reflux so we'll try a GI cocktail.





Cardiac catheterization Dr. Logan 2018:


50-60% stenosis in the ostial proximal left mid anterior descending artery with 

fractional flow reserve across all these lesions of 0.85.


Patent left internal mammary artery graft to mid to distal left anterior 

descending artery with 70-80% stenosis just prior to its insertion into the 

left anterior descending artery. Does not appear to be significant rather 

chronic. Normal global left ventricular systolic function with an EF of 65-70%.


Mild elevation of left ventricular end-diastolic pressure. No significant 

mitral regurg.





Cardiac bypass surgery was in 2011. Dr. lomeli.





Last echocardiogram .





ED ACS 13 points low risk.


Progress Note #2:  


   Time:  18:02


Progress Note


Patient's describing that she's having a burning reflux as well as a chest pain 

and the nitroglycerin dose of breath or chest pain down before we didn't make 

it go away. The burning acid reflux feeling went away with the GI cocktail.


Initial ECG Impression Date:  2019


Initial ECG Impression Time:  14:52


Initial ECG Rate:  82


Initial ECG Rhythm:  Normal Sinus


Initial ECG Intervals:  Normal


Initial ECG Impression:  Normal


Initial ECG Comparisson:  Unchanged


Comment


No ST T-segment elevation or depression.





Diagnostic Imaging





   Diagonstic Imaging:  Xray


   Plain Films/CT/US/NM/MRI:  chest


Comments





NAME:      MARILIA JONES


MED REC#:   [id number]


ACCOUNT#:   [id number]


PHYSICIAN:    STEPHANIE ARELLANO MD


 








CC:   RAUL MARTINEZ MD; STEPHANIE ARELLANO


 Page 1 of 1


 RADIOLOGY REPORT





 [facility].


 [place], [place]





CC:   RAUL MARTINEZ MD; STEPHANIE ARELLANO


 Page 1 of 1


 RADIOLOGY REPORT





NAME:      MARILIA JONES


MED REC#:   [id number]


ACCOUNT#:   [id number]


PT STATUS:   REG ER


:      1970


PHYSICIAN:    STEPHANIE ARELLANO MD


ADMIT DATE:   19/ER


 ***Signed***


Date of Exam:   19





CHEST 1 VIEW, AP/PA ONLY


 





INDICATION: 


Chest pain.





TECHNIQUE: 


A frontal chest was obtained at 3:15 PM. 





COMPARISON:     


2018.





FINDINGS:


The patient has had previous sternotomy. The heart is normal in


size. The mediastinal silhouette appears unremarkable. The


left-sided Port-A-Cath is unchanged. There is no focal


infiltrate, pneumothorax, or pleural fluid.





IMPRESSION:


Post operative changes with no acute process in the chest.





Dictated by: 





  Dictated on workstation # [id number]





[id number]





Dict:      19 1522


Trans:      19 1542





Interpreted by:         RAUL MARTINEZ MD


Electronically signed by:   RAUL MARTINEZ MD 19 1542


   Reviewed:  Reviewed by Me





Departure


Communication (Admissions)


Time/Spoke to Admitting Phy:  18:25


Discussed case lab imaging findings with Dr. Ohara she agrees to observe the 

patient overnight.


Time/Spoke to Consulting Phy:  18:28


Discussed the case with Dr. Bell and he wants Dr. Logan to follow the 

patient in the morning.





Impression





 Primary Impression:  


 Chest pain


 Qualified Codes:  R07.9 - Chest pain, unspecified


 Additional Impressions:  


 Acute coronary syndrome


 GERD (gastroesophageal reflux disease)


 Qualified Codes:  K21.0 - Gastro-esophageal reflux disease with esophagitis


Disposition:   ADMITTED AS INPATIENT


Condition:  Stable





Admissions


Decision to Admit Reason:  Admit from ER (General)


Decision to Admit/Date:  2019


Time/Decision to Admit Time:  18:29





Departure-Patient Inst.


Referrals:  


[facility] (PCP)


Primary Care Physician








KIM FUCHS (Family)


Primary Care Physician











STEPHANIE ARELLANO 2019 15:01

## 2019-01-07 NOTE — XMS REPORT
[facility]

 Created on: 2017



Dayami Rae

External Reference #: [id number]

: 1970

Sex: Female



Demographics







 Address  [address] [place]  [address]

 

 Preferred Language  Unknown

 

 Marital Status  Unknown

 

 Am[employer] Affiliation  Unknown

 

 Race  Unknown

 

 Ethnic Group  Unknown





Author







 Author  KIM FUCHS

 

 Organization  [facility]

 

 Address  [address] [place]  [address]



 

 Phone  [phone]







Care Team Providers







 Care Team Member Name  Role  Phone

 

 FUCHSJAMA JACKSONELE  Unavailable  [phone]







PROBLEMS







 Type  Condition  ICD9-CM Code  RZO50-NB Code  Onset Dates  Condition Status  
SNOMED Code

 

 Problem  Anxiety disorder, unspecified     F41.9     Active  428525277

 

 Problem  Neuropathy     G62.9     Active  [id number]

 

 Problem  Fibromyalgia     M79.7     Active  [id number]

 

 Problem  Coronary artery disease involving native coronary artery of native 
heart without angina pectoris     I25.10     Active  [id number]

 

 Problem  Chronic pain syndrome     G89.4     Active  616227010







ALLERGIES

No Information



SOCIAL HISTORY

Never Assessed



PLAN OF CARE





VITAL SIGNS





MEDICATIONS

Unknown Medications



RESULTS

No Results



PROCEDURES

No Known procedures



IMMUNIZATIONS

No Known Immunizations



MEDICAL (GENERAL) HISTORY







 Type  Description  Date

 

 Medical History  fibromyalgia   

 

 Medical History  MRI 2016- small central protrusion/herniation w/minimal 
impression on thecal anteriorly at C5-6, At C3-4 small bilat. uncinate spurs w/ 
mild right neural foraminal narrowing   

 

 Medical History  MRI 2016- mild degenerative changes. No spinal canal 
stenosis or foraminal narrowing of thoracic spine   

 

 Medical History  hypertension   

 

 Medical History  Anxiety disorder   

 

 Medical History  depression   

 

 Medical History  migraine headaches   

 

 Medical History  Hx of C-Diff   

 

 Medical History  Hx of Pneumonia   

 

 Surgical History  Open Heart Surgery - [facility] -Dr. Lima  (
Cardiologist- Dr Logan)  

 

 Surgical History  hysterectomy - Dr Luis   

 

 Surgical History  Left Breast Lumpectomy (Bx - Benign)- Dr Luis   

 

 Surgical History  Port - Dr Luis   

 

 Surgical History  Left Knee Surgeries x3- Dr Carolina did 2 and Dr Gan did 1   

 

 Hospitalization History  C-Diff - [facility]   

 

 Hospitalization History  Pneumonia - [facility]   

 

 Hospitalization History  Open Heart Surgery - [facility]

## 2019-01-07 NOTE — XMS REPORT
[facility]

 Created on: 2018



Dayami Rae

External Reference #: [id number]

: 1970

Sex: Female



Demographics







 Address  [address] [place]  [address]

 

 Preferred Language  Unknown

 

 Marital Status  Unknown

 

 Alevism Affiliation  Unknown

 

 Race  Unknown

 

 Ethnic Group  Unknown





Author







 Author  KIM FUCHS

 

 Organization  [facility]

 

 Address  [address] [place]  [address]



 

 Phone  [phone]







Care Team Providers







 Care Team Member Name  Role  Phone

 

 FUCHSJAMA JACKSONELE  Unavailable  [phone]







PROBLEMS







 Type  Condition  ICD9-CM Code  SRZ02-RG Code  Onset Dates  Condition Status  
SNOMED Code

 

 Problem  Neuropathy     G62.9     Active  726548513

 

 Problem  Lumbago with sciatica, left side     M54.42     Active  811672314

 

 Problem  Anxiety disorder, unspecified     F41.9     Active  137400198

 

 Problem  Chronic pain syndrome     G89.4     Active  444943894

 

 Problem  Fibromyalgia     M79.7     Active  863127413

 

 Problem  Coronary artery disease involving native coronary artery of native 
heart without angina pectoris     I25.10     Active  1330367529643

 

 Problem  Essential hypertension     I10     Active  10095030

 

 Problem  GERD with esophagitis     K21.0     Active  533716787

 

 Problem  Other chronic pain     G89.29     Active  72258703

 

 Problem  Lumbago with sciatica, right side     M54.41     Active  
[id number]

 

 Problem  COPD suggested by initial evaluation     J44.9     Active  70277205

 

 Problem  Acute midline low back pain with left-sided sciatica     M54.42     
Active  352557677







ALLERGIES

No Information



ENCOUNTERS







 Encounter  Location  Date  Diagnosis

 

 [facility]  [address] [place] [address] 65699-
3010    Essential hypertension I10 ; Coronary artery disease 
involving native coronary artery of native heart without angina pectoris I25.10 
; Fibromyalgia M79.7 ; GERD with esophagitis K21.0 and Tobacco abuse counseling 
Z71.6

 

 [facility]  [address] [place] [address] 52739-
3263    Long term (current) use of opiate analgesic Z79.891

 

 [facility]  [id number] [address]-
3959     

 

 [facility]  [id number] [place] [address] 45224-
7215    Acute bronchitis, unspecified organism J20.9

 

 [facility]  [id number] [place] [address] 29668-
7595     

 

 [facility]  [id number] [place] [address] 36519-
6468     

 

 [facility]  [id number] [place] [address] 44866-
2196    Chronic pain syndrome G89.4

 

 [facility]  [id number] [place] [address] 34088-
3984     

 

 [facility]  [id number] [place] [address] 76420-
9324  15 Marko, 2018   

 

 [facility]  [id number] [place] [address] 34376-
2239    Acute bronchitis, unspecified organism J20.9

 

 [facility]  [id number] [place] [address] 19791-
5197    Chronic pain syndrome G89.4

 

 [facility]  [id number] [place] [address] 09846-
0696  25 May, 2018   

 

 [facility]  [id number] [place] [address] 66169-
3436  24 May, 2018  Acute midline low back pain with left-sided sciatica M54.42

 

 [facility]  [id number] [place] [address] 54796-
7255  22 May, 2018   

 

 [facility]  [id number] [place] [address] [phone]
[phone]  21 May, 2018  Bronchitis J40

 

 [facility]  [id number] [place] [address] 69294-
9746  07 May, 2018  Chronic pain syndrome G89.4 and Neuropathy G62.9

 

 [facility]  [id number] [place] [address] 80907-
7020    Acute midline low back pain with left-sided sciatica M54.42

 

 [facility]  [id number] [place] [address] 10004-
8113     

 

 [facility]  [id number] [place] [address] 29049-
8294    Anxiety disorder, unspecified F41.9

 

 [facility]  [id number] [place] [address] 65055-
4852     

 

 [facility]  [id number] [place] [address] 33995-
3879    Chronic pain syndrome G89.4 and Acute midline low back pain 
with left-sided sciatica M54.42

 

 [facility]  [id number] [place] [address] 15564-
8119    Chronic pain syndrome G89.4

 

 [facility]  [id number] [place] [address] 24797-
3590     

 

 [facility]  [id number] [place] [address] 00821-
1684     

 

 [facility]  [id number] [place] [address] 42559-
6977  29 Mar, 2018  Injury of left knee, initial encounter S89.92XA and COPD 
suggested by initial evaluation J44.9

 

 [id number] [place] [address] 43481-
6712  28 Mar, 2018  Acute bronchitis, unspecified organism J20.9

 

 [id number] [place] [address] 62317-
7202  27 Mar, 2018  Acute bronchitis, unspecified organism J20.9

 

 [id number] [place] [address] 03052-
8613  21 Mar, 2018  Anxiety disorder, unspecified F41.9 and Acute bronchitis, 
unspecified organism J20.9

 

 [id number] [place] [address] 73066-
1641  08 Mar, 2018  Chronic pain syndrome G89.4

 

 [facility]  [id number] [place] [address] 37857-
0253  05 Mar, 2018   

 

 [facility]  [id number] [place] [address] 77505-
0367  05 Mar, 2018  Acute midline low back pain with left-sided sciatica M54.42

 

 [facility]  [id number] [place] [address] 68691-
4954     

 

 [facility]  [id number] [place] [address] 03209-
1056    Chronic pain syndrome G89.4

 

 [facility]  [id number] [place] [address] 78917-
5349    Chronic pain syndrome G89.4

 

 [facility]  [id number] [place] [address] 50927-
6008     

 

 [facility]  [id number] [place] [address] 55053-
8790    Gastroenteritis K52.9

 

 [facility]  [id number] [place] [address] 73919-
1277     

 

 [facility]  [id number] [place] [address] 20223-
6414  15 Jerardo, 2018  Acute bronchitis, unspecified organism J20.9

 

 [facility]  [id number] [place] [address] 97853-
5068    Anxiety disorder, unspecified F41.9 and Neuropathy G62.9

 

 [facility]  [id number] [place] [address] 79346-
1766    Chronic pain syndrome G89.4

 

 [facility]  [id number] [place] [address] 15359-
2751    Bronchitis J40 and Laryngitis J04.0

 

 [facility]  [id number] [place] [address] 68032-
3113  29 Dec, 2017   

 

 [address] [place] [address] 72360-
4665  26 Dec, 2017  Bronchitis J40

 

 [id number] [place] [address] 67054-
7380  18 Dec, 2017   

 

 [id number] [place] [address] 21032-
3005  13 Dec, 2017  Fibromyalgia M79.7 and Chronic pain syndrome G89.4

 

 [id number] [place] [address] 96211-
6520  04 Dec, 2017  Chronic pain syndrome G89.4 and Acute bronchitis, 
unspecified organism J20.9

 

 [id number] [place] [address] 46245-
2730    Neuropathy G62.9

 

 [id number] [place] [address] 15939-
8719    Chronic pain syndrome G89.4 ; Lumbago with sciatica, left 
side M54.42 ; Lumbago with sciatica, right side M54.41 ; Screening cholesterol 
level Z13.220 ; Screening for diabetes mellitus Z13.1 ; Screening for thyroid 
disorder Z13.29 ; Fibromyalgia M79.7 ; Anxiety disorder, unspecified F41.9 and 
Neuropathy G62.9

 

 [address] [address]-
3665     

 

 [id number] [place] [address] 05653-
0808  25 Oct, 2017   

 

 [id number] [place] [address] 26744-
5107  10 Oct, 2017  Fibromyalgia M79.7 ; Chronic pain syndrome G89.4 ; Anxiety 
disorder, unspecified F41.9 ; Neuropathy G62.9 and Acute bronchitis, 
unspecified organism J20.9

 

 [address] [place] [address] 58282-
1649  09 Oct, 2017   

 

 [address] [place] [address]-
5531  25 Sep, 2017   

 

 [facility]  [address] [address]-
0358  19 Sep, 2017   

 

 [facility]  [address] [address]-
8652  08 Sep, 2017   

 

 [facility]  [address] [place] [address] 26282-
2524  23 Aug, 2017   

 

 [facility]  [id number] [place] [address] 59607-
7669  22 Aug, 2017  Acute seasonal allergic rhinitis due to pollen J30.1

 

 [facility]  [id number] [place] [address] 30863-
1711  21 Aug, 2017   

 

 [facility]  [id number] [place] [address] 22546-
4137  09 Aug, 2017   

 

 [facility]  [id number] [place] [address] 41259-
1437     

 

 [facility]  [address] [place] [address] 36554-
9830     

 

 [facility]  [id number] [place] [address] 34468-
6341  10 Jul, 2017   

 

 [facility]  [address] [place] [address] 98546-
4952     

 

 [facility]  [address] [address]-
3444     

 

 [facility]  [address] [address]-
8518     

 

 [facility]  [id number] [place] [address] 78438-
3741    Chronic pain syndrome G89.4 ; Fibromyalgia M79.7 ; Anxiety 
disorder, unspecified F41.9 ; Neuropathy G62.9 and Low back pain M54.5

 

 [facility]  [address] [place] [address] 96423-
4382  25 May, 2017  Low back pain M54.5

 

 [address] [address]-
9088  24 May, 2017   

 

 [facility]  [address] [place] [address] 74610-
7713  22 May, 2017   

 

 [address] [place] [address] 66899-
5997  16 May, 2017   

 

 [id number] [place] [address] 37505-
6033  16 May, 2017   

 

 [id number] [place] [address] 24244-
2120  12 May, 2017  Routine adult health maintenance Z00.00 ; Fibromyalgia 
M79.7 ; Chronic pain syndrome G89.4 ; Abnormal lung sounds R09.89 ; Coronary 
artery disease involving native coronary artery of native heart without angina 
pectoris I25.10 and S/P CABG (coronary artery bypass graft) Z95.1

 

 [address] [address]-
5543  09 May, 2017   







IMMUNIZATIONS

No Known Immunizations



SOCIAL HISTORY

Never Assessed



REASON FOR VISIT

Controlled Med Refill



PLAN OF CARE





VITAL SIGNS





MEDICATIONS







 Medication  Instructions  Dosage  Frequency  Start Date  End Date  Duration  
Status

 

 Gabapentin 300 MG  Orally Once a day  1 capsule  24h  09 May, 2017     30 days
  Active







RESULTS

No Results



PROCEDURES

No Known procedures



INSTRUCTIONS





MEDICATIONS ADMINISTERED

No Known Medications



MEDICAL (GENERAL) HISTORY







 Type  Description  Date

 

 Medical History  fibromyalgia   

 

 Medical History  MRI 2016- small central protrusion/herniation w/minimal 
impression on thecal anteriorly at C5-6, At C3-4 small bilat. uncinate spurs w/ 
mild right neural foraminal narrowing   

 

 Medical History  MRI 2016- mild degenerative changes. No spinal canal 
stenosis or foraminal narrowing of thoracic spine   

 

 Medical History  hypertension   

 

 Medical History  Anxiety disorder   

 

 Medical History  depression   

 

 Medical History  migraine headaches   

 

 Medical History  Hx of C-Diff   

 

 Medical History  Hx of Pneumonia   

 

 Medical History  heart cath w/ stents placed 7/3/18   

 

 Surgical History  Open Heart Surgery - [facility] -Dr. Lima  (
Cardiologist- Dr Logan)  

 

 Surgical History  hysterectomy - Dr Luis   

 

 Surgical History  Left Breast Lumpectomy (Bx - Benign)- Dr Luis   

 

 Surgical History  Port - Dr Luis   

 

 Surgical History  Left Knee Surgeries x3- Dr Carolina did 2 and Dr Gan did 1   

 

 Surgical History  cath/stent  

 

 Hospitalization History  C-Diff - [facility]   

 

 Hospitalization History  Pneumonia - [facility]   

 

 Hospitalization History  Open Heart Surgery - [facility]

## 2019-01-07 NOTE — XMS REPORT
[facility]

 Created on: 2018



Dayami Rae

External Reference #: [id number]

: 1970

Sex: Female



Demographics







 Address  [address] [place]  [address]

 

 Preferred Language  Unknown

 

 Marital Status  Unknown

 

 [employer] Affiliation  Unknown

 

 Race  Unknown

 

 Ethnic Group  Unknown





Author







 Author  KIM FUCHS

 

 Organization  [facility]

 

 Address  [address] [place]  [address]



 

 Phone  [phone]







Care Team Providers







 Care Team Member Name  Role  Phone

 

 FUCHSJAMA JACKSONELE  Unavailable  [phone]







PROBLEMS







 Type  Condition  ICD9-CM Code  KKB95-QU Code  Onset Dates  Condition Status  
SNOMED Code

 

 Problem  Fibromyalgia     M79.7     Active  157755305

 

 Problem  Neuropathy     G62.9     Active  482499037

 

 Problem  Coronary artery disease involving native coronary artery of native 
heart without angina pectoris     I25.10     Active  [id number]

 

 Problem  Chronic pain syndrome     G89.4     Active  066863246

 

 Problem  COPD suggested by initial evaluation     J44.9     Active  15857408

 

 Problem  Acute midline low back pain with left-sided sciatica     M54.42     
Active  524154784

 

 Problem  Lumbago with sciatica, left side     M54.42     Active  983745569

 

 Problem  Anxiety disorder, unspecified     F41.9     Active  155301847

 

 Problem  Other chronic pain     G89.29     Active  40001906

 

 Problem  Lumbago with sciatica, right side     M54.41     Active  
[id number]







ALLERGIES







 Substance  Reaction  Event Type  Date  Status

 

 Morphine Sulfate  Rash  Drug Allergy  05 Mar, 2018  Active







ENCOUNTERS







 Encounter  Location  Date  Diagnosis

 

 [facility]  [id number], [place] [address]-
0807     

 

 [facility]  [id number] [address]-
4905     

 

 [facility]  [id number] [address]-
2774     

 

 [facility]  [id number], [place] [address]
3609    Chronic pain syndrome G89.4

 

 [facility]  [id number] [address]-
3776     

 

 [facility]  [id number] [place] [address]-
5324  15 2018   

 

 [facility]  [id number] [place] [address] 93428-
1002    Acute bronchitis, unspecified organism J20.9

 

 [facility]  [id number] [place] [address] 05091-
2907    Chronic pain syndrome G89.4

 

 [facility]  [id number] [place] [address] 88272-
2444  25 May, 2018   

 

 [facility]  [id number] [place] [address] 89623-
0468  24 May, 2018  Acute midline low back pain with left-sided sciatica M54.42

 

 [facility]  [id number] [place] [address] 04979-
4196  22 May, 2018   

 

 [facility] WALK IN [facility]  [id number] [place] [address] [phone]
-[phone]  21 May, 2018  Bronchitis J40

 

 [facility]  [id number] [place] [address] 24001-
6722  07 May, 2018  Chronic pain syndrome G89.4 and Neuropathy G62.9

 

 [facility]  [id number] [place] [address] 35757-
6542    Acute midline low back pain with left-sided sciatica M54.42

 

 [facility]  [id number] [place] [address] 03577-
0081     

 

 [facility]  [address] [place] [address] 90916-
6095    Anxiety disorder, unspecified F41.9

 

 [facility]  [id number] [place] [address] 10911-
2139     

 

 [facility]  [id number] [place] [address] 97013-
5431    Chronic pain syndrome G89.4 and Acute midline low back pain 
with left-sided sciatica M54.42

 

 [facility]  [id number] [place] [address] 54945-
9416    Chronic pain syndrome G89.4

 

 [facility]  [id number] [place] [address] 22266-
3630     

 

 [facility]  [id number] [place] [address] 13082-
7445     

 

 [facility]  [address] [address]-
4964  29 Mar, 2018  Injury of left knee, initial encounter S89.92XA and COPD 
suggested by initial evaluation J44.9

 

 [place] [address] [address] [address]-
5416  28 Mar, 2018  Acute bronchitis, unspecified organism J20.9

 

 [place] [address] [address]-
8934  27 Mar, 2018  Acute bronchitis, unspecified organism J20.9

 

 [facility]  [id number] [place] [address] 81843-
7861  21 Mar, 2018  Anxiety disorder, unspecified F41.9 and Acute bronchitis, 
unspecified organism J20.9

 

 [facility]  [id number] [place] [address] 66616-
9271  08 Mar, 2018  Chronic pain syndrome G89.4

 

 [place] [id number] [place] [address] 97851-
1279  05 Mar, 2018   

 

 [facility]  [id number] [place] [address] 13584-
5191  05 Mar, 2018  Acute midline low back pain with left-sided sciatica M54.42

 

 [facility]  [id number] [place] [address] 14143-
9934     

 

 [facility]  [id number] [place] [address] 03765-
7190    Chronic pain syndrome G89.4

 

 [facility]  [address] [place] [address]-
1762    Chronic pain syndrome G89.4

 

 [facility]  [id number] [place] [address] 30510-
8820     

 

 [facility]  [id number] [place] [address] 07159-
2092    Gastroenteritis K52.9

 

 [facility]  [address] [address]-
8566     

 

 [facility]  [address] [address]-
8604  15 Jerardo, 2018  Acute bronchitis, unspecified organism J20.9

 

 [facility]  [id number] [place] [address] 93869-
0433    Anxiety disorder, unspecified F41.9 and Neuropathy G62.9

 

 [place] [address] [address]-
4420    Chronic pain syndrome G89.4

 

 [facility]  [id number] [place] [address] 43945-
0478    Bronchitis J40 and Laryngitis J04.0

 

 [facility]  [id number] [place] [address] 71968-
5142  29 Dec, 2017   

 

 [facility]  [id number] [place] [address] 84929-
9544  26 Dec, 2017  Bronchitis J40

 

 [facility]  [id number] [place] [address] 65294-
6227  18 Dec, 2017   

 

 [facility]  [id number] [place] [address] 69695-
6505  13 Dec, 2017  Fibromyalgia M79.7 and Chronic pain syndrome G89.4

 

 [facility]  [id number] [place] [address] 99840-
1773  04 Dec, 2017  Chronic pain syndrome G89.4 and Acute bronchitis, 
unspecified organism J20.9

 

 [facility]  [id number] [place] [address] 20445-
3111    Neuropathy G62.9

 

 [facility]  [id number] [place] [address] 93781-
3211    Chronic pain syndrome G89.4 ; Lumbago with sciatica, left 
side M54.42 ; Lumbago with sciatica, right side M54.41 ; Screening cholesterol 
level Z13.220 ; Screening for diabetes mellitus Z13.1 ; Screening for thyroid 
disorder Z13.29 ; Fibromyalgia M79.7 ; Anxiety disorder, unspecified F41.9 and 
Neuropathy G62.9

 

 [address] [id number] [place] [address] 54473-
4013     

 

 [id number] [place] [address] 83469-
2573  25 Oct, 2017   

 

 [address] [address]-
0749  10 Oct, 2017  Fibromyalgia M79.7 ; Chronic pain syndrome G89.4 ; Anxiety 
disorder, unspecified F41.9 ; Neuropathy G62.9 and Acute bronchitis, 
unspecified organism J20.9

 

 [place] [id number] [place] [address] 05578-
6903  09 Oct, 2017   

 

 [address] [id number] [place] [address] 69408-
8957  25 Sep, 2017   

 

 [id number] [place] [address] 09897-
0187  19 Sep, 2017   

 

 [facility]  [id number] [place] [address] 37788-
9398  08 Sep, 2017   

 

 [place] [address] [address]-
2625  23 Aug, 2017   

 

 [id number] [place] [address] 65316-
2139  22 Aug, 2017  Acute seasonal allergic rhinitis due to pollen J30.1

 

 [place] [id number] [place] [address] 14366-
8036  21 Aug, 2017   

 

 [facility]  [id number] [place] [address]-
3416  09 Aug, 2017   

 

 [facility]  [id number] [place] [address]-
4186     

 

 [facility]  [id number] [place] [address]-
1506     

 

 [facility]  [id number] [address]-
1893  10 Jul, 2017   

 

 [facility]  [address] [place] [address], [place] [address]-
2496     

 

 [facility]  [id number] [place] [address]-
7716     

 

 [facility]  [id number] [address]-
3716     

 

 [facility]  [id number] [address]-
8306    Chronic pain syndrome G89.4 ; Fibromyalgia M79.7 ; Anxiety 
disorder, unspecified F41.9 ; Neuropathy G62.9 and Low back pain M54.5

 

 [facility]  [id number] [address]-
0196  25 May, 2017  Low back pain M54.5

 

 [facility]  [id number] [address]-
0996  24 May, 2017   

 

 [facility]  [id number] [address]-
0012  22 May, 2017   

 

 [facility]  [id number] [address]-
7206  16 May, 2017   

 

 [facility]  [id number] [address]-
1426  16 May, 2017   

 

 [facility]  [id number] [address]-
4533  12 May, 2017  Routine adult health maintenance Z00.00 ; Fibromyalgia 
M79.7 ; Chronic pain syndrome G89.4 ; Abnormal lung sounds R09.89 ; Coronary 
artery disease involving native coronary artery of native heart without angina 
pectoris I25.10 and S/P CABG (coronary artery bypass graft) Z95.1

 

 [facility]  [address] [facility] [id number] [place] [address]-
0743  09 May, 2017   







IMMUNIZATIONS







 Vaccine  Route  Administration Date  Status

 

 TORADOL (IM) 60 MG/2ML (UP TO 15 MG)  IM Intramuscular  2018  
Administered

 

 DEXAMETHASONE 4MG/ML (PER 1 MG)  IM Intramuscular  2018  Administered

 

 DEPO MEDROL 40 MG/ML  IM Intramuscular  2018  Administered







SOCIAL HISTORY

Never Assessed



REASON FOR VISIT

Pain (acute) back, States she has had lower back problems for years.  Became 
increasingly worse over the weekend.  Increased her tramadol to 1.5 TID which 
did give her some relief.  Also using prednisone that she had at home(unsure of 
dose)-LING [facility]



PLAN OF CARE







 Activity  Details









  









 Follow Up  3 Months, prn Reason:CHM/







VITAL SIGNS







 Height  5'2" in  2018

 

 Weight  134 lbs  2018

 

 Temperature  98 degrees Fahrenheit  2018

 

 Heart Rate  90 bpm  2018

 

 Respiratory Rate  18   2018

 

 BMI  24.51 kg/m2  2018

 

 Blood pressure systolic  126 mmHg  2018

 

 Blood pressure diastolic  80 mmHg  2018







MEDICATIONS







 Medication  Instructions  Dosage  Frequency  Start Date  End Date  Duration  
Status

 

 Tramadol HCl 50 mg  Orally every 6 hrs  1 tablet as needed  6h    
   28 days  Active

 

 Aspir-81 81 MG  Orally Once a day  1 tablet  24h           Active

 

 Sumatriptan Succinate 6 MG/0.5ML  Subcutaneous Once a day prn migraine  0.5 ml 
as needed              Active

 

 Gabapentin 300 MG  Orally Once a day  1 capsule  24h  09 May, 2017     90 days
  Active

 

 ProAir  (90 Base) MCG/ACT  Inhalation every 4 hrs  2 puffs as needed  
4h  10 Oct, 2017     12 months  Active

 

 Promethazine HCl 25 MG  Orally 4 times a day prn  1 tablet as needed           
30 days  Active

 

 Chlorzoxazone 500 mg  Orally Three times a day if needed  1 tablet          30 days  Active

 

 Tylenol     1 tab              Active

 

 Seroquel 200 mg  Orally Once a day  2.5 tablet at bedtime  24h        30 days  
Active

 

 Xanax 0.25 MG  Orally Twice a day  1 tablet  12h        28 days  Active

 

 Amitriptyline HCl 25 MG  Orally Once a day  1 tablet  24h  10 Oct, 2017     30 
days  Active

 

 Promethazine-Codeine 6.25-10 MG/5ML  Orally every 4 hrs  1-2 tsp as needed  4h
  26 Dec, 2017        Not-Taking







RESULTS

No Results



PROCEDURES







 Procedure  Date Ordered  Result  Body Site

 

 TORADOL (IM) 60 MG/2ML (UP TO 15 MG)  2018      

 

 DEPO MEDROL 40 MG/ML  2018      

 

 THER/PROPH/DIAG INJ, SC/IM  2018      

 

 DEXAMETHASONE 4MG/ML (PER 1 MG)  2018      







INSTRUCTIONS





MEDICATIONS ADMINISTERED

No Known Medications



MEDICAL (GENERAL) HISTORY







 Type  Description  Date

 

 Medical History  fibromyalgia   

 

 Medical History  MRI 2016- small central protrusion/herniation w/minimal 
impression on thecal anteriorly at C5-6, At C3-4 small bilat. uncinate spurs w/ 
mild right neural foraminal narrowing   

 

 Medical History  MRI 2016- mild degenerative changes. No spinal canal 
stenosis or foraminal narrowing of thoracic spine   

 

 Medical History  hypertension   

 

 Medical History  Anxiety disorder   

 

 Medical History  depression   

 

 Medical History  migraine headaches   

 

 Medical History  Hx of C-Diff   

 

 Medical History  Hx of Pneumonia   

 

 Surgical History  Open Heart Surgery - [facility] -Dr. Lima  (
Cardiologist- Dr Logan)  

 

 Surgical History  hysterectomy - Dr Luis   

 

 Surgical History  Left Breast Lumpectomy (Bx - Benign)- Dr Luis   

 

 Surgical History  Port - Dr Luis   

 

 Surgical History  Left Knee Surgeries x3- Dr Carolina did 2 and Dr Gan did 1   

 

 Hospitalization History  C-Diff - [facility]   

 

 Hospitalization History  Pneumonia - [facility]   

 

 Hospitalization History  Open Heart Surgery - [facility]

## 2019-01-07 NOTE — XMS REPORT
[facility]

 Created on: 2018



Dayami Rae

External Reference #: [id number]

: 1970

Sex: Female



Demographics







 Address  [address] [place]  [address]

 

 Preferred Language  Unknown

 

 Marital Status  Unknown

 

 Tenriism Affiliation  Unknown

 

 Race  Unknown

 

 Ethnic Group  Unknown





Author







 Author  KIM FUCHS

 

 Organization  [facility]

 

 Address  [address] [place]  [address]



 

 Phone  [phone]







Care Team Providers







 Care Team Member Name  Role  Phone

 

 FUCHSJAMA JACKSONELE  Unavailable  [phone]







PROBLEMS







 Type  Condition  ICD9-CM Code  PND74-ET Code  Onset Dates  Condition Status  
SNOMED Code

 

 Problem  Neuropathy     G62.9     Active  206263975

 

 Problem  Lumbago with sciatica, left side     M54.42     Active  388676701

 

 Problem  Anxiety disorder, unspecified     F41.9     Active  470104838

 

 Problem  Chronic pain syndrome     G89.4     Active  205953760

 

 Problem  Fibromyalgia     M79.7     Active  803798886

 

 Problem  Coronary artery disease involving native coronary artery of native 
heart without angina pectoris     I25.10     Active  6261902065525

 

 Problem  Essential hypertension     I10     Active  43225808

 

 Problem  GERD with esophagitis     K21.0     Active  862557958

 

 Problem  Other chronic pain     G89.29     Active  37097276

 

 Problem  Lumbago with sciatica, right side     M54.41     Active  
313095814719853

 

 Problem  COPD suggested by initial evaluation     J44.9     Active  42284390

 

 Problem  Acute midline low back pain with left-sided sciatica     M54.42     
Active  746818470







ALLERGIES

No Information



ENCOUNTERS







 Encounter  Location  Date  Diagnosis

 

 [place] [id number], [place] [address]
7[address]  16 Aug, 2018   

 

 [facility]  [id number] [place] [address]-
9614    Essential hypertension I10 ; Coronary artery disease 
involving native coronary artery of native heart without angina pectoris I25.10 
; Fibromyalgia M79.7 ; GERD with esophagitis K21.0 and Tobacco abuse counseling 
Z71.6

 

 [place] [address], [place] [address]
3056    Long term (current) use of opiate analgesic Z79.891

 

 [place] [id number] [address]-
5831     

 

 [facility]  [id number] [place] [address]-
3718    Acute bronchitis, unspecified organism J20.9

 

 [facility]  [id number] [place] [address] 16932-
5713     

 

 [facility]  [id number] [place] [address] 91858-
8523     

 

 [facility]  [id number] [place] [address] 29206-
1335    Chronic pain syndrome G89.4

 

 [facility]  [id number] [place] [address] 72478-
2992     

 

 [facility]  [id number] [place] [address] 37099-
9564  15 Marko, 2018   

 

 [facility]  [id number] [place] [address] 75537-
0212    Acute bronchitis, unspecified organism J20.9

 

 [facility]  [id number] [place] [address] 14132-
4284    Chronic pain syndrome G89.4

 

 [facility]  [id number] [place] [address] 01485-
2879  25 May, 2018   

 

 [facility]  [id number] [place] [address] 96752-
9957  24 May, 2018  Acute midline low back pain with left-sided sciatica M54.42

 

 [facility]  [id number] [place] [address] 07336-
4292  22 May, 2018   

 

 [facility]  [id number] [place] [address] [phone]
-[phone]  21 May, 2018  Bronchitis J40

 

 [facility]  [id number] [place] [address] 58088-
7700  07 May, 2018  Chronic pain syndrome G89.4 and Neuropathy G62.9

 

 [facility]  [id number] [place] [address] 12440-
0594    Acute midline low back pain with left-sided sciatica M54.42

 

 [facility]  [id number] [place] [address] 84905-
3866     

 

 [facility]  [id number] [place] [address] 62664-
1630    Anxiety disorder, unspecified F41.9

 

 [facility]  [id number] [place] [address] 96727-
1878     

 

 [place] [address] [address]-
5037    Chronic pain syndrome G89.4 and Acute midline low back pain 
with left-sided sciatica M54.42

 

 [place] [id number] [place] [address] 38[address]-
0657    Chronic pain syndrome G89.4

 

 [facility]  [id number] [place] [address] 06280-
6900     

 

 [place] [id number] [place] [address] 10834-
6699     

 

 [place] [id number] [place] [address] 71135-
5503  29 Mar, 2018  Injury of left knee, initial encounter S89.92XA and COPD 
suggested by initial evaluation J44.9

 

 [place] [id number] [place] [address] 73442-
4607  28 Mar, 2018  Acute bronchitis, unspecified organism J20.9

 

 [place] [id number] [place] [address] 85829-
3472  27 Mar, 2018  Acute bronchitis, unspecified organism J20.9

 

 [facility]  [id number] [place] [address] 18088-
2962  21 Mar, 2018  Anxiety disorder, unspecified F41.9 and Acute bronchitis, 
unspecified organism J20.9

 

 [facility]  [id number] [place] [address] 51385-
3520  08 Mar, 2018  Chronic pain syndrome G89.4

 

 [facility]  [id number] [place] [address] 58994-
4026  05 Mar, 2018   

 

 [facility]  [id number] [place] [address] 76288-
2925  05 Mar, 2018  Acute midline low back pain with left-sided sciatica M54.42

 

 [facility]  [id number] [place] [address] 28251-
2678     

 

 [facility]  [address] [address]-
9369    Chronic pain syndrome G89.4

 

 [facility]  [id number] [place] [address] 60569-
1811    Chronic pain syndrome G89.4

 

 [facility]  [id number] [place] [address] 15790-
9397     

 

 [facility]  [id number] [place] [address] 80471-
4624    Gastroenteritis K52.9

 

 [facility]  [id number] [place] [address] 93397-
5656     

 

 [facility]  [id number] [place] [address] 04214-
8455  15 Jerardo, 2018  Acute bronchitis, unspecified organism J20.9

 

 [facility]  [id number] [place] [address] 26512-
6960    Anxiety disorder, unspecified F41.9 and Neuropathy G62.9

 

 [facility]  [id number] [place] [address] 98560-
8257    Chronic pain syndrome G89.4

 

 [facility]  [id number] [place] [address] 98073-
2562    Bronchitis J40 and Laryngitis J04.0

 

 [place] [address] [place] [address] 34918-
5577  29 Dec, 2017   

 

 [place] [id number] [place] [address] 40037-
6771  26 Dec, 2017  Bronchitis J40

 

 [place] [id number] [place] [address] 69634-
0220  18 Dec, 2017   

 

 [place] [address] [address]-
9388  13 Dec, 2017  Fibromyalgia M79.7 and Chronic pain syndrome G89.4

 

 [address] [address]-
7822  04 Dec, 2017  Chronic pain syndrome G89.4 and Acute bronchitis, 
unspecified organism J20.9

 

 [place] [id number] [place] [address] 15444-
8819    Neuropathy G62.9

 

 [place] [address] [id number] [place] [address] 92288-
9067    Chronic pain syndrome G89.4 ; Lumbago with sciatica, left 
side M54.42 ; Lumbago with sciatica, right side M54.41 ; Screening cholesterol 
level Z13.220 ; Screening for diabetes mellitus Z13.1 ; Screening for thyroid 
disorder Z13.29 ; Fibromyalgia M79.7 ; Anxiety disorder, unspecified F41.9 and 
Neuropathy G62.9

 

 [place] [id number] [place] [address] 96879-
4750     

 

 [place] [address] [id number] [place] [address] 51024-
7585  25 Oct, 2017   

 

 [place] [id number] [place] [address] 53657-
3775  10 Oct, 2017  Fibromyalgia M79.7 ; Chronic pain syndrome G89.4 ; Anxiety 
disorder, unspecified F41.9 ; Neuropathy G62.9 and Acute bronchitis, 
unspecified organism J20.9

 

 [place] [address] [place] [address]-
0593  09 Oct, 2017   

 

 [facility]  [address] [address]-
2131  25 Sep, 2017   

 

 [facility]  [address] [address]-
9257  19 Sep, 2017   

 

 [facility]  [address] [place] [address] 46516-
2065  08 Sep, 2017   

 

 [facility]  [address] [address]-
2723  23 Aug, 2017   

 

 [facility]  [id number] [place] [address] 87448-
6378  22 Aug, 2017  Acute seasonal allergic rhinitis due to pollen J30.1

 

 [facility]  [address] [address]-
9283  21 Aug, 2017   

 

 [facility]  [id number] [place] [address] 44705-
9980  09 Aug, 2017   

 

 [facility]  [address] [address]-
5353     

 

 [facility]  [address] [address]-
2125     

 

 [facility]  [address] [address]-
1665  10 Jul, 2017   

 

 [facility]  [address] [address]-
4743     

 

 [facility]  [address], [place] [address]-
8626     

 

 [facility]  [address] [address]-
1736     

 

 [facility]  [address] [address]-
2491    Chronic pain syndrome G89.4 ; Fibromyalgia M79.7 ; Anxiety 
disorder, unspecified F41.9 ; Neuropathy G62.9 and Low back pain M54.5

 

 [facility]  [address] [address]-
2424  25 May, 2017  Low back pain M54.5

 

 [facility]  [address] [address]-
9944  24 May, 2017   

 

 [facility]  [id number] [place] [address] 70454-
2714  22 May, 2017   

 

 [facility]  [id number] [place] [address] 16503-
1371  16 May, 2017   

 

 [facility]  [id number] [place] [address] 64467-
1912  16 May, 2017   

 

 [facility]  [id number] [place] [address] 56246-
5452  12 May, 2017  Routine adult health maintenance Z00.00 ; Fibromyalgia 
M79.7 ; Chronic pain syndrome G89.4 ; Abnormal lung sounds R09.89 ; Coronary 
artery disease involving native coronary artery of native heart without angina 
pectoris I25.10 and S/P CABG (coronary artery bypass graft) Z95.1

 

 [facility]  [address] [place] [address] 98397-
9552  09 May, 2017   







IMMUNIZATIONS

No Known Immunizations



SOCIAL HISTORY

Never Assessed



REASON FOR VISIT

Medication refill request



PLAN OF CARE





VITAL SIGNS





MEDICATIONS

Unknown Medications



RESULTS

No Results



PROCEDURES

No Known procedures



INSTRUCTIONS





MEDICATIONS ADMINISTERED

No Known Medications



MEDICAL (GENERAL) HISTORY







 Type  Description  Date

 

 Medical History  fibromyalgia   

 

 Medical History  MRI 2016- small central protrusion/herniation w/minimal 
impression on thecal anteriorly at C5-6, At C3-4 small bilat. uncinate spurs w/ 
mild right neural foraminal narrowing   

 

 Medical History  MRI 2016- mild degenerative changes. No spinal canal 
stenosis or foraminal narrowing of thoracic spine   

 

 Medical History  hypertension   

 

 Medical History  Anxiety disorder   

 

 Medical History  depression   

 

 Medical History  migraine headaches   

 

 Medical History  Hx of C-Diff   

 

 Medical History  Hx of Pneumonia   

 

 Medical History  heart cath w/ stents placed 7/3/18   

 

 Surgical History  Open Heart Surgery - [facility] -Dr. Lima  (
Cardiologist- Dr Logan)  

 

 Surgical History  hysterectomy - Dr Luis   

 

 Surgical History  Left Breast Lumpectomy (Bx - Benign)- Dr Luis   

 

 Surgical History  Port - Dr Luis   

 

 Surgical History  Left Knee Surgeries x3- Dr Carolina did 2 and Dr Gan did 1   

 

 Surgical History  cath/stent  2018

 

 Hospitalization History  C-Diff - [facility]   

 

 Hospitalization History  Pneumonia - [facility]   

 

 Hospitalization History  Open Heart Surgery - [facility]

## 2019-01-07 NOTE — XMS REPORT
[facility]

 Created on: 2018



Dayami Rae

External Reference #: [id number]

: 1970

Sex: Female



Demographics







 Address  [address] [place]  [address]

 

 Preferred Language  Unknown

 

 Marital Status  Unknown

 

 Synagogue Affiliation  Unknown

 

 Race  Unknown

 

 Ethnic Group  Unknown





Author







 Author  KIM FUCHS

 

 Organization  [facility]

 

 Address  [address] [place]  [address]



 

 Phone  [phone]







Care Team Providers







 Care Team Member Name  Role  Phone

 

 FUCHSJAMA JACKSONELE  Unavailable  [phone]







PROBLEMS







 Type  Condition  ICD9-CM Code  VMJ93-AT Code  Onset Dates  Condition Status  
SNOMED Code

 

 Problem  Fibromyalgia     M79.7     Active  535261675

 

 Problem  Neuropathy     G62.9     Active  420004802

 

 Problem  Coronary artery disease involving native coronary artery of native 
heart without angina pectoris     I25.10     Active  [id number]

 

 Problem  Chronic pain syndrome     G89.4     Active  171146970

 

 Problem  COPD suggested by initial evaluation     J44.9     Active  99017167

 

 Problem  Acute midline low back pain with left-sided sciatica     M54.42     
Active  939304858

 

 Problem  Lumbago with sciatica, left side     M54.42     Active  847254978

 

 Problem  Anxiety disorder, unspecified     F41.9     Active  206603454

 

 Problem  Other chronic pain     G89.29     Active  51414479

 

 Problem  Lumbago with sciatica, right side     M54.41     Active  
[id number]







ALLERGIES

No Information



ENCOUNTERS







 Encounter  Location  Date  Diagnosis

 

 [place] [id number] [place] [address]143-
8158     

 

 [facility]  [id number] [place] [address]251-
8531  15 Mrako, 2018   

 

 [facility]  [id number] [place] [address] 17680-
9027    Acute bronchitis, unspecified organism J20.9

 

 [facility]  [id number] [place] [address] 16537-
2578    Chronic pain syndrome G89.4

 

 [facility]  [id number] [place] [address] 85888-
8312  25 May, 2018   

 

 [facility]  [address] [place] [address]-
5525  24 May, 2018  Acute midline low back pain with left-sided sciatica M54.42

 

 [facility]  [id number] [place] [address] 50747-
2212  22 May, 2018   

 

 [facility]  [id number] [place] [address] 64883
[phone]  21 May, 2018  Bronchitis J40

 

 [facility]  [address] [address]-
1648  07 May, 2018  Chronic pain syndrome G89.4 and Neuropathy G62.9

 

 [facility]  [address] [address]-
0544    Acute midline low back pain with left-sided sciatica M54.42

 

 [facility]  [address] [address]-
7453     

 

 [facility]  [address] [address]-
7242    Anxiety disorder, unspecified F41.9

 

 [facility]  [id number] [place] [address] 70362-
7623     

 

 [facility]  [id number] [place] [address] 06084-
8685    Chronic pain syndrome G89.4 and Acute midline low back pain 
with left-sided sciatica M54.42

 

 [facility]  [id number] [place] [address] 01875-
1265    Chronic pain syndrome G89.4

 

 [facility]  [id number] [place] [address] 98305-
4840     

 

 [facility]  [id number] [place] [address] 57957-
3644     

 

 [facility]  [id number] [place] [address] 87145-
1407  29 Mar, 2018  Injury of left knee, initial encounter S89.92XA and COPD 
suggested by initial evaluation J44.9

 

 [facility]  [id number] [place] [address] 58389-
2418  28 Mar, 2018  Acute bronchitis, unspecified organism J20.9

 

 [facility]  [id number] [place] [address] 54032-
2162  27 Mar, 2018  Acute bronchitis, unspecified organism J20.9

 

 [facility]  [address] [address]-
2246  21 Mar, 2018  Anxiety disorder, unspecified F41.9 and Acute bronchitis, 
unspecified organism J20.9

 

 [place] [address] [address]-
8135  08 Mar, 2018  Chronic pain syndrome G89.4

 

 [place] [address] [address]-
1281  05 Mar, 2018   

 

 [facility]  [address] [address]-
1411  05 Mar, 2018  Acute midline low back pain with left-sided sciatica M54.42

 

 [place] [address] [address]54-
2227     

 

 [facility]  [id number] [place] [address] 77382-
4593    Chronic pain syndrome G89.4

 

 [facility]  [id number] [place] [address] 27697-
3657    Chronic pain syndrome G89.4

 

 [facility]  [id number] [place] [address] 03092-
2548     

 

 [facility]  [address] [address]-
4097    Gastroenteritis K52.9

 

 [facility]  [id number] [place] [address] 71363-
0713     

 

 [facility]  [address] [address]-
4310  15 Jerardo, 2018  Acute bronchitis, unspecified organism J20.9

 

 [place] [id number] [place] [address] 50001-
0214    Anxiety disorder, unspecified F41.9 and Neuropathy G62.9

 

 [place] [id number] [place] [address] 64258-
3376    Chronic pain syndrome G89.4

 

 [place] [address] [address]-
1757    Bronchitis J40 and Laryngitis J04.0

 

 [place] [address] [address]-
6888  29 Dec, 2017   

 

 [place] [address] [address]95-
8896  26 Dec, 2017  Bronchitis J40

 

 [place] [address] [address]91-
6960  18 Dec, 2017   

 

 [place] [address] [address]34-
0212  13 Dec, 2017  Fibromyalgia M79.7 and Chronic pain syndrome G89.4

 

 [place] [id number] [place] [address] 24638-
0359  04 Dec, 2017  Chronic pain syndrome G89.4 and Acute bronchitis, 
unspecified organism J20.9

 

 [place] [id number] [place] [address] 89247-
2322    Neuropathy G62.9

 

 [place] [id number] [place] [address] 84979-
9781    Chronic pain syndrome G89.4 ; Lumbago with sciatica, left 
side M54.42 ; Lumbago with sciatica, right side M54.41 ; Screening cholesterol 
level Z13.220 ; Screening for diabetes mellitus Z13.1 ; Screening for thyroid 
disorder Z13.29 ; Fibromyalgia M79.7 ; Anxiety disorder, unspecified F41.9 and 
Neuropathy G62.9

 

 [place] [address] [address]-
8223     

 

 [facility]  [id number] [place] [address] 14610-
4907  25 Oct, 2017   

 

 [facility]  [address] [address]-
9707  10 Oct, 2017  Fibromyalgia M79.7 ; Chronic pain syndrome G89.4 ; Anxiety 
disorder, unspecified F41.9 ; Neuropathy G62.9 and Acute bronchitis, 
unspecified organism J20.9

 

 [facility]  [address] [address]-
3091  09 Oct, 2017   

 

 [facility]  [id number] [place] [address] 10031-
4479  25 Sep, 2017   

 

 [facility]  [id number] [place] [address] 00461-
7703  19 Sep, 2017   

 

 [facility]  [id number] [place] [address] 99966-
5589  08 Sep, 2017   

 

 [facility]  [address] [address]-
5981  23 Aug, 2017   

 

 [facility]  [address] [place] [address] 58036-
3867  22 Aug, 2017  Acute seasonal allergic rhinitis due to pollen J30.1

 

 [facility]  [address] [address]-
8560  21 Aug, 2017   

 

 [facility]  [address] [address]-
4798  09 Aug, 2017   

 

 [facility]  [address] [address]-
1371     

 

 [facility]  [address] [address]-
6410     

 

 [facility]  [address] [address]-
9000  10 Jul, 2017   

 

 [facility]  [address] [address]-
2124     

 

 [facility]  [id number] [place] [address] 17088-
9847     

 

 [facility]  [id number] [place] [address] 91311-
7961     

 

 [facility]  [id number] [place] [address] 70564-
4230    Chronic pain syndrome G89.4 ; Fibromyalgia M79.7 ; Anxiety 
disorder, unspecified F41.9 ; Neuropathy G62.9 and Low back pain M54.5

 

 [facility]  [id number] [place] [address] 16466-
6964  25 May, 2017  Low back pain M54.5

 

 [facility]  [id number] [place] [address] 44808-
3341  24 May, 2017   

 

 [facility]  [id number] [place] [address] 01425-
6205  22 May, 2017   

 

 [facility]  [id number] [place] [address] 61934-
5047  16 May, 2017   

 

 [facility]  [id number] [place] [address] 53117-
6190  16 May, 2017   

 

 [facility]  [id number] [place] [address] 30634-
0030  12 May, 2017  Routine adult health maintenance Z00.00 ; Fibromyalgia 
M79.7 ; Chronic pain syndrome G89.4 ; Abnormal lung sounds R09.89 ; Coronary 
artery disease involving native coronary artery of native heart without angina 
pectoris I25.10 and S/P CABG (coronary artery bypass graft) Z95.1

 

 [facility]  [id number] [place] [address] 02791-
5770  09 May, 2017   







IMMUNIZATIONS

No Known Immunizations



SOCIAL HISTORY

Never Assessed



REASON FOR VISIT

Medication refill request



PLAN OF CARE





VITAL SIGNS





MEDICATIONS







 Medication  Instructions  Dosage  Frequency  Start Date  End Date  Duration  
Status

 

 Xanax 0.25 MG  Orally Twice a day  1 tablet  12h        28 days  Active







RESULTS

No Results



PROCEDURES

No Known procedures



INSTRUCTIONS





MEDICATIONS ADMINISTERED

No Known Medications



MEDICAL (GENERAL) HISTORY







 Type  Description  Date

 

 Medical History  fibromyalgia   

 

 Medical History  MRI 2016- small central protrusion/herniation w/minimal 
impression on thecal anteriorly at C5-6, At C3-4 small bilat. uncinate spurs w/ 
mild right neural foraminal narrowing   

 

 Medical History  MRI 2016- mild degenerative changes. No spinal canal 
stenosis or foraminal narrowing of thoracic spine   

 

 Medical History  hypertension   

 

 Medical History  Anxiety disorder   

 

 Medical History  depression   

 

 Medical History  migraine headaches   

 

 Medical History  Hx of C-Diff   

 

 Medical History  Hx of Pneumonia   

 

 Surgical History  Open Heart Surgery - [facility] -Dr. Lima  (
Cardiologist- Dr Logan)  

 

 Surgical History  hysterectomy - Dr Luis   

 

 Surgical History  Left Breast Lumpectomy (Bx - Benign)- Dr Luis   

 

 Surgical History  Port - Dr Luis   

 

 Surgical History  Left Knee Surgeries x3- Dr Carolina did 2 and Dr Gan did 1   

 

 Hospitalization History  C-Diff - [facility]   

 

 Hospitalization History  Pneumonia - [facility]   

 

 Hospitalization History  Open Heart Surgery - [facility]

## 2019-01-07 NOTE — XMS REPORT
[facility]

 Created on: 2018



Dayami Rae

External Reference #: [id number]

: 1970

Sex: Female



Demographics







 Address  [address] [place]  [address]

 

 Preferred Language  Unknown

 

 Marital Status  Unknown

 

 Mandaeism Affiliation  Unknown

 

 Race  Unknown

 

 Ethnic Group  Unknown





Author







 Author  SURENDRA FRASER

 

 [facility]

 

 Address  [address] [place]  [address]



 

 Phone  [phone]







Care Team Providers







 Care Team Member Name  Role  Phone

 

 SURENDRA FRASER  Unavailable  [phone]







PROBLEMS







 Type  Condition  ICD9-CM Code  [id number] Code  Onset Dates  Condition Status  
SNOMED Code

 

 Problem  Coronary artery disease involving native coronary artery of native 
heart without angina pectoris     I25.10     Active  7024390797946

 

 Problem  Anxiety disorder, unspecified     F41.9     Active  802305781

 

 Problem  Neuropathy     G62.9     Active  946100237

 

 Problem  Fibromyalgia     M79.7     Active  328199852

 

 Problem  Chronic pain syndrome     G89.4     Active  530997189

 

 Problem  Cervical radiculopathy     M54.12     Active  57215469

 

 Problem  Chronic obstructive pulmonary disease, unspecified COPD type     
J44.9     Active  09662450

 

 Problem  Lumbago with sciatica, right side     M54.41     Active  
529659514577471

 

 Problem  Lumbago with sciatica, left side     M54.42     Active  384397693

 

 Problem  Essential hypertension     I10     Active  55266329

 

 Problem  GERD with esophagitis     K21.0     Active  517985827







ALLERGIES

No Information



ENCOUNTERS







 Encounter  Location  Date  Diagnosis

 

 [facility]  [id number] [place] [address] [phone]-
[phone]    Anxiety disorder, unspecified F41.9

 

 [facility]  [address] [place] [address] 47952-
7809    Chronic pain syndrome G89.4 and Anxiety disorder, 
unspecified F41.9

 

 [facility]  [id number] [place] [address] 98455-
6003  15 2018   

 

 [facility]  [id number] [place] [address] [phone]-
1974     

 

 [facility]  [id number] [place] [address] 80974-
3651  29 Oct, 2018  Cervical radiculopathy M54.12 ; Chronic pain syndrome G89.4 
and Anxiety disorder, unspecified F41.9

 

 [place] [address] [address]-
8304  24 Oct, 2018   

 

 [place] [address] [address]-
7778  27 Sep, 2018  Fibromyalgia M79.7

 

 [place] [address] [address]90-
6584  20 Sep, 2018   

 

 [place] [address] [address]65-
2216  19 Sep, 2018   

 

 [place] [address] 0[address]-
0315  17 Sep, 2018  Pneumonia of right lower lobe due to infectious organism 
J18.1 and Chronic obstructive pulmonary disease, unspecified COPD type J44.9

 

 [place] [address] [address]-
0411  14 Sep, 2018  Pneumonia of right lower lobe due to infectious organism 
J18.1 ; Chronic obstructive pulmonary disease, unspecified COPD type J44.9 ; 
Chronic nausea R11.0 and Cough R05

 

 [place] [id number] [place] [address] 11921-
5693  07 Sep, 2018  Acute bronchitis, unspecified organism J20.9

 

 [place] [id number] [place] [address] [phone]-
9707  28 Aug, 2018  Fibromyalgia M79.7

 

 [place] [id number] [place] [address] 97455-
0256  20 Aug, 2018   

 

 [place] [address] [address]-
8462  16 Aug, 2018  Neuropathy G62.9

 

 [place] [id number] [place] [address] 72956-
7414    Essential hypertension I10 ; Coronary artery disease 
involving native coronary artery of native heart without angina pectoris I25.10 
; Fibromyalgia M79.7 ; GERD with esophagitis K21.0 and Tobacco abuse counseling 
Z71.6

 

 [facility]  [id number] [place] [address] 27706-
3807    Long term (current) use of opiate analgesic Z79.891

 

 [facility]  [id number] [place] [address] 02341-
8676     

 

 [facility]  [id number] [place] [address] 14010-
9157    Acute bronchitis, unspecified organism J20.9

 

 [facility]  [id number] [place] [address] 28660-
0553     

 

 [facility]  [id number] [place] [address] 66890-
9311     

 

 [facility]  [id number] [place] [address] 78816-
4841    Chronic pain syndrome G89.4

 

 [facility]  [id number] [place] [address] 81060-
0189     

 

 [facility]  [id number] [place] [address] 96329-
7665  15 Marko, 2018   

 

 [facility]  [id number] [place] [address] 99391-
0924    Acute bronchitis, unspecified organism J20.9

 

 [facility]  [id number] [place] [address] 11295-
1968    Chronic pain syndrome G89.4

 

 [facility]  [id number] [place] [address] 49429-
7665  25 May, 2018   

 

 [facility]  [id number] [place] [address] 31068-
6683  24 May, 2018  Acute midline low back pain with left-sided sciatica M54.42

 

 [facility]  [id number] [place] [address] 01508-
8147  22 May, 2018   

 

 [facility]  [id number] [place] [address] [phone]
-[phone]  21 May, 2018  Bronchitis J40

 

 [facility]  [address] 60686-
0986  07 May, 2018  Chronic pain syndrome G89.4 and Neuropathy G62.9

 

 [facility]  [id number] [place] [address] 79806-
8077    Acute midline low back pain with left-sided sciatica M54.42

 

 [facility]  [address] [address]75-
8800     

 

 [facility]  [address] [address]646-
6417    Anxiety disorder, unspecified F41.9

 

 [facility]  [address] 61345-
7889     

 

 [facility]  [address] 80817-
0209    Chronic pain syndrome G89.4 and Acute midline low back pain 
with left-sided sciatica M54.42

 

 [facility]  [address] [address]158-
3108    Chronic pain syndrome G89.4

 

 [facility]  [id number] [place] [address] 07723-
8622     

 

 [facility]  [id number] [place] [address] 33783-
8474     

 

 [facility]  [id number] [place] [address] 37428-
3886  29 Mar, 2018  Injury of left knee, initial encounter S89.92XA and COPD 
suggested by initial evaluation J44.9

 

 [facility]  [id number] [place] [address] 71004-
9285  28 Mar, 2018  Acute bronchitis, unspecified organism J20.9

 

 [facility]  [address] [place] [address]-
6216  27 Mar, 2018  Acute bronchitis, unspecified organism J20.9

 

 [facility]  [id number] [place] [address] 44442-
1385  21 Mar, 2018  Anxiety disorder, unspecified F41.9 and Acute bronchitis, 
unspecified organism J20.9

 

 [facility]  [id number] [place] [address] 59785-
0315  08 Mar, 2018  Chronic pain syndrome G89.4

 

 [facility]  [address] 82069-
3903  05 Mar, 2018   

 

 [facility]  [address] 39416-
3798  05 Mar, 2018  Acute midline low back pain with left-sided sciatica M54.42

 

 [facility]  [id number] [place] [address] 27267-
2637     

 

 [facility]  [id number] [place] [address] 37483-
3042    Chronic pain syndrome G89.4

 

 [facility]  [id number] [place] [address] 50026-
8600    Chronic pain syndrome G89.4

 

 [facility]  [id number] [place] [address] 70002-
3987     

 

 [facility]  [id number] [place] [address] 91809-
6288    Gastroenteritis K52.9

 

 [facility]  [id number] [place] [address] 88757-
2119     

 

 [facility]  [id number] [place] [address] 74752-
1963  15 Jerardo, 2018  Acute bronchitis, unspecified organism J20.9

 

 [facility]  [id number] [place] [address] 99135-
7494    Anxiety disorder, unspecified F41.9 and Neuropathy G62.9

 

 [place] [id number] [place] [address] 92267-
5033    Chronic pain syndrome G89.4

 

 [place] [id number] [place] [address] 92408-
5476    Bronchitis J40 and Laryngitis J04.0

 

 [place] [address] 75164-
5758  29 Dec, 2017   

 

 [place] [address] 00927-
6979  26 Dec, 2017  Bronchitis J40

 

 [place] [address] 96803-
2662  18 Dec, 2017   

 

 [place] [address] 69207-
5103  13 Dec, 2017  Fibromyalgia M79.7 and Chronic pain syndrome G89.4

 

 [place] [address] 83538-
3050  04 Dec, 2017  Chronic pain syndrome G89.4 and Acute bronchitis, 
unspecified organism J20.9

 

 [place] [address] 98691-
5976    Neuropathy G62.9

 

 [place] [id number] [place] [address] 78880-
6755    Chronic pain syndrome G89.4 ; Lumbago with sciatica, left 
side M54.42 ; Lumbago with sciatica, right side M54.41 ; Screening cholesterol 
level Z13.220 ; Screening for diabetes mellitus Z13.1 ; Screening for thyroid 
disorder Z13.29 ; Fibromyalgia M79.7 ; Anxiety disorder, unspecified F41.9 and 
Neuropathy G62.9

 

 [place] [id number] [place] [address] 44708-
4225     

 

 [place] [id number] [place] [address] 03784-
2906  25 Oct, 2017   

 

 [address] [address] [address]-
4182  10 Oct, 2017  Fibromyalgia M79.7 ; Chronic pain syndrome G89.4 ; Anxiety 
disorder, unspecified F41.9 ; Neuropathy G62.9 and Acute bronchitis, 
unspecified organism J20.9

 

 [facility]  [address] [address]-
6256  09 Oct, 2017   

 

 [facility]  [id number] [place] [address] 77844-
5415  25 Sep, 2017   

 

 [facility]  [id number] [place] [address] 89867-
8758  19 Sep, 2017   

 

 [facility]  [id number] [place] [address] 79527-
6552  08 Sep, 2017   

 

 [facility]  [id number] [place] [address] 06649-
9952  23 Aug, 2017   

 

 [facility]  [id number] [place] [address] 67362-
7785  22 Aug, 2017  Acute seasonal allergic rhinitis due to pollen J30.1

 

 [facility]  [address] [address]-
5999  21 Aug, 2017   

 

 [facility]  [address] [address]-
9966  09 Aug, 2017   

 

 [facility]  [address] [address]-
1154     

 

 [facility]  [address] [address]-
2758     

 

 [facility]  [address] [address]-
2485  10 Jul, 2017   

 

 [facility]  [address] 935741-
3464     

 

 [facility]  [address] [address]-
4600     

 

 [facility]  [id number] [place] [address] 21004-
4617     

 

 [place] [id number] [place] [address] 95032-
9372    Chronic pain syndrome G89.4 ; Fibromyalgia M79.7 ; Anxiety 
disorder, unspecified F41.9 ; Neuropathy G62.9 and Low back pain M54.5

 

 [place] [id number] [place] [address] 68991-
2311  25 May, 2017  Low back pain M54.5

 

 [facility]  [id number] [place] [address] 67444-
3867  24 May, 2017   

 

 [place] [address] [place] [address] 85606-
6290  22 May, 2017   

 

 [place] [address] [place] [address] 40433-
5176  16 May, 2017   

 

 [address] [address] [place] [address] 18119-
1204  16 May, 2017   

 

 [place] [address] [place] [address] 91228-
2054  12 May, 2017  Routine adult health maintenance Z00.00 ; Fibromyalgia 
M79.7 ; Chronic pain syndrome G89.4 ; Abnormal lung sounds R09.89 ; Coronary 
artery disease involving native coronary artery of native heart without angina 
pectoris I25.10 and S/P CABG (coronary artery bypass graft) Z95.1

 

 [place] [id number] [place] [address] 60355-
0601  09 May, 2017   







IMMUNIZATIONS

No Known Immunizations



SOCIAL HISTORY

Never Assessed



REASON FOR VISIT

Cotrolled refill



PLAN OF CARE





VITAL SIGNS





MEDICATIONS







 Medication  Instructions  Dosage  Frequency  Start Date  End Date  Duration  
Status

 

 Xanax 0.25 MG  Orally Twice a day  1 tablet AM, 1/2 PM  12h        28 days  
Active







RESULTS

No Results



PROCEDURES

No Known procedures



INSTRUCTIONS





MEDICATIONS ADMINISTERED

No Known Medications



MEDICAL (GENERAL) HISTORY







 Type  Description  Date

 

 Medical History  fibromyalgia   

 

 Medical History  MRI 2016- small central protrusion/herniation w/minimal 
impression on thecal anteriorly at C5-6, At C3-4 small bilat. uncinate spurs w/ 
mild right neural foraminal narrowing   

 

 Medical History  MRI 2016- mild degenerative changes. No spinal canal 
stenosis or foraminal narrowing of thoracic spine   

 

 Medical History  hypertension   

 

 Medical History  Anxiety disorder   

 

 Medical History  depression   

 

 Medical History  migraine headaches   

 

 Medical History  Hx of C-Diff   

 

 Medical History  Hx of Pneumonia   

 

 Medical History  heart cath w/ stents placed 7/3/18   

 

 Surgical History  Open Heart Surgery - [facility] -Dr. Lima  (
Cardiologist- Dr Logan)  

 

 Surgical History  hysterectomy - Dr Luis   

 

 Surgical History  Left Breast Lumpectomy (Bx - Benign)- Dr Luis   

 

 Surgical History  Port - Dr Luis   

 

 Surgical History  Left Knee Surgeries x3- Dr Carolina did 2 and Dr Gan did 1   

 

 Surgical History  cath/stent  

 

 Hospitalization History  C-Diff - [facility]   

 

 Hospitalization History  Pneumonia - [facility]   

 

 Hospitalization History  Open Heart Surgery - [facility]

## 2019-01-07 NOTE — XMS REPORT
[facility]

 Created on: 10/03/2018



Dayami Rae

External Reference #: [id number]

: 1970

Sex: Female



Demographics







 Address  [address] [place]  [address]

 

 Preferred Language  Unknown

 

 Marital Status  Unknown

 

 [employer] Affiliation  Unknown

 

 Race  Unknown

 

 Ethnic Group  Unknown





Author







 Author  JEF  KIM

 

 [facility]

 

 Address  [address] [place]  [address]



 

 Phone  [phone]







Care Team Providers







 Care Team Member Name  Role  Phone

 

 FUCHSKIM JACKSON  Unavailable  [phone]







PROBLEMS







 Type  Condition  ICD9-CM Code  NOT22-WH Code  Onset Dates  Condition Status  
SNOMED Code

 

 Problem  Chronic pain syndrome     G89.4     Active  210476026

 

 Problem  Neuropathy     G62.9     Active  675803326

 

 Problem  Coronary artery disease involving native coronary artery of native 
heart without angina pectoris     I25.10     Active  [id number]

 

 Problem  Fibromyalgia     M79.7     Active  440205923

 

 Problem  Chronic obstructive pulmonary disease, unspecified COPD type     
J44.9     Active  76998333

 

 Problem  Essential hypertension     I10     Active  67289253

 

 Problem  Lumbago with sciatica, left side     M54.42     Active  041052902

 

 Problem  Anxiety disorder, unspecified     F41.9     Active  067433171

 

 Problem  GERD with esophagitis     K21.0     Active  761482413

 

 Problem  Lumbago with sciatica, right side     M54.41     Active  
[id number]







ALLERGIES







 Substance  Reaction  Event Type  Date  Status

 

 Morphine Sulfate  Rash  Drug Allergy  17 Sep, 2018  Active







ENCOUNTERS







 Encounter  Location  Date  Diagnosis

 

 [facility]  [address] [place] [address] 24051-
5659  27 Sep, 2018  Fibromyalgia M79.7

 

 [facility]  [id number] [place] [address] 22886-
7670  20 Sep, 2018   

 

 [facility]  [address] [place] [address] 72804-
9719  19 Sep, 2018   

 

 [facility]  [id number] [place] [address] [phone]-
2533  17 Sep, 2018  Pneumonia of right lower lobe due to infectious organism 
J18.1 and Chronic obstructive pulmonary disease, unspecified COPD type J44.9

 

 [facility]  [id number] [place] [address] 55089-
1021  14 Sep, 2018  Pneumonia of right lower lobe due to infectious organism 
J18.1 ; Chronic obstructive pulmonary disease, unspecified COPD type J44.9 ; 
Chronic nausea R11.0 and Cough R05

 

 [place] [id number] [place] [address] 21412-
9610  07 Sep, 2018  Acute bronchitis, unspecified organism J20.9

 

 [place] [address] [address]-
7976  28 Aug, 2018  Fibromyalgia M79.7

 

 [place] [address] 7[address]11-
6674  20 Aug, 2018   

 

 [place] [address] [address]-
8213  16 Aug, 2018  Neuropathy G62.9

 

 [place] [address] [address]-
7253    Essential hypertension I10 ; Coronary artery disease 
involving native coronary artery of native heart without angina pectoris I25.10 
; Fibromyalgia M79.7 ; GERD with esophagitis K21.0 and Tobacco abuse counseling 
Z71.6

 

 [place] [address] [address]12-
3520    Long term (current) use of opiate analgesic Z79.891

 

 [place] [address] 7[address]82-
5428     

 

 [place] [address] [address]8736-
5405    Acute bronchitis, unspecified organism J20.9

 

 [place] [id number] [place] [address] 78312-
1402     

 

 [place] [address] [address]63-
9167     

 

 [place] [address] [address]63-
7134    Chronic pain syndrome G89.4

 

 [place] [id number] [place] [address] 41606-
6269     

 

 [facility]  [id number] [place] [address] 09947-
5240  15 Marko, 2018   

 

 [facility]  [id number] [place] [address] 79658-
7359  08 2018  Acute bronchitis, unspecified organism J20.9

 

 [facility]  [id number] [place] [address] 95321-
9017    Chronic pain syndrome G89.4

 

 [facility]  [id number] [place] [address] 61867-
9963  25 May, 2018   

 

 [facility]  [id number] [place] [address] 01128-
8717  24 May, 2018  Acute midline low back pain with left-sided sciatica M54.42

 

 [facility]  [id number] [place] [address] 73209-
2718  22 May, 2018   

 

 [facility]  [id number] [place] [address] [phone]
[phone]  21 May, 2018  Bronchitis J40

 

 [facility]  [id number] [place] [address] 19997-
5339  07 May, 2018  Chronic pain syndrome G89.4 and Neuropathy G62.9

 

 [facility]  [id number] [place] [address] 37027-
7944    Acute midline low back pain with left-sided sciatica M54.42

 

 [facility]  [id number] [place] [address] 22790-
5972     

 

 [facility]  [id number] [place] [address] 62665-
8113    Anxiety disorder, unspecified F41.9

 

 [facility]  [id number] [place] [address] 67801-
9709     

 

 [facility]  [id number] [place] [address] 72513-
4106    Chronic pain syndrome G89.4 and Acute midline low back pain 
with left-sided sciatica M54.42

 

 [place] [address] 37832-
2085    Chronic pain syndrome G89.4

 

 [facility]  [address] 96391-
3610     

 

 [facility]  [address] 72851-
6527     

 

 [facility]  [address] 9[address]21-
3165  29 Mar, 2018  Injury of left knee, initial encounter S89.92XA and COPD 
suggested by initial evaluation J44.9

 

 [place] [address] 75636-
8888  28 Mar, 2018  Acute bronchitis, unspecified organism J20.9

 

 [place] [address] 87702-
8791  27 Mar, 2018  Acute bronchitis, unspecified organism J20.9

 

 [place] [address] 29637-
9728  21 Mar, 2018  Anxiety disorder, unspecified F41.9 and Acute bronchitis, 
unspecified organism J20.9

 

 [place] [id number] [place] [address] 19037-
2961  08 Mar, 2018  Chronic pain syndrome G89.4

 

 [facility]  [id number] [place] [address] 93646-
1134  05 Mar, 2018   

 

 [facility]  [address] 9[address]14-
3735  05 Mar, 2018  Acute midline low back pain with left-sided sciatica M54.42

 

 [facility]  [id number] [place] [address] 96751-
6587     

 

 [facility]  [address] [place] [address]-
6857    Chronic pain syndrome G89.4

 

 [facility]  [address] [address]80-
6154    Chronic pain syndrome G89.4

 

 [facility]  [address] 41286-
6408     

 

 [facility]  [address] 43997-
8062    Gastroenteritis K52.9

 

 [facility]  [address] 02520-
1647     

 

 [facility]  [address] [address]89-
1744  15 Jerardo, 2018  Acute bronchitis, unspecified organism J20.9

 

 [place] [address] 01606-
3754    Anxiety disorder, unspecified F41.9 and Neuropathy G62.9

 

 [facility]  [address] [address]22-
4421    Chronic pain syndrome G89.4

 

 [facility]  [address] 25636-
2779    Bronchitis J40 and Laryngitis J04.0

 

 [facility]  [id number] [place] [address] 61490-
4893  29 Dec, 2017   

 

 [facility]  [id number] [place] [address] 63168-
2976  26 Dec, 2017  Bronchitis J40

 

 [facility]  [id number] [place] [address] 18278-
3564  18 Dec, 2017   

 

 [facility]  [address] 26089-
9760  13 Dec, 2017  Fibromyalgia M79.7 and Chronic pain syndrome G89.4

 

 [facility]  [address] 6[address]25-
0530  04 Dec, 2017  Chronic pain syndrome G89.4 and Acute bronchitis, 
unspecified organism J20.9

 

 [facility]  [id number] [place] [address] 72000-
0486    Neuropathy G62.9

 

 [facility]  [id number] [place] [address] 41129-
2569    Chronic pain syndrome G89.4 ; Lumbago with sciatica, left 
side M54.42 ; Lumbago with sciatica, right side M54.41 ; Screening cholesterol 
level Z13.220 ; Screening for diabetes mellitus Z13.1 ; Screening for thyroid 
disorder Z13.29 ; Fibromyalgia M79.7 ; Anxiety disorder, unspecified F41.9 and 
Neuropathy G62.9

 

 [facility]  [id number] [place] [address] 93170-
2388     

 

 [facility]  [id number] [place] [address] 79883-
8293  25 Oct, 2017   

 

 [facility]  [id number] [place] [address] 78668-
5917  10 Oct, 2017  Fibromyalgia M79.7 ; Chronic pain syndrome G89.4 ; Anxiety 
disorder, unspecified F41.9 ; Neuropathy G62.9 and Acute bronchitis, 
unspecified organism J20.9

 

 [facility]  [id number] [place] [address] 36858-
6167  09 Oct, 2017   

 

 [facility]  [id number] [place] [address] 19293-
8729  25 Sep, 2017   

 

 [facility]  [id number] [place] [address] 77060-
5301  19 Sep, 2017   

 

 [facility]  [id number] [place] [address] 76705-
1637  08 Sep, 2017   

 

 [facility]  [id number] [place] [address] 79938-
5813  23 Aug, 2017   

 

 [facility]  [id number] [place] [address] 10962-
9171  22 Aug, 2017  Acute seasonal allergic rhinitis due to pollen J30.1

 

 [facility]  [address] [address]-
5944  21 Aug, 2017   

 

 [facility]  [address] [address]-
2622  09 Aug, 2017   

 

 [facility]  [address] [place] [address] 97420-
7806     

 

 [facility]  [id number] [place] [address] 86253-
7350     

 

 [facility]  [id number] [place] [address] 51768-
4983  10 Jul, 2017   

 

 [facility]  [id number] [place] [address] 73464-
0430     

 

 [facility]  [id number] [place] [address] 85219-
3918     

 

 [facility]  [address] [place] [address] 01127-
0916     

 

 [facility]  [id number] [place] [address] 60744-
7802    Chronic pain syndrome G89.4 ; Fibromyalgia M79.7 ; Anxiety 
disorder, unspecified F41.9 ; Neuropathy G62.9 and Low back pain M54.5

 

 [facility]  [address] [address]-
8855  25 May, 2017  Low back pain M54.5

 

 [facility]  [address] [address]-
0662  24 May, 2017   

 

 [facility]  [id number] [address]-
0484  22 May, 2017   

 

 [facility]  [address] [address]797-
8359  16 May, 2017   

 

 [facility]  [id number] [address]-
2546  16 May, 2017   

 

 [facility]  [address] [facility] [id number] [place] [address]-
7426  12 May, 2017  Routine adult health maintenance Z00.00 ; Fibromyalgia 
M79.7 ; Chronic pain syndrome G89.4 ; Abnormal lung sounds R09.89 ; Coronary 
artery disease involving native coronary artery of native heart without angina 
pectoris I25.10 and S/P CABG (coronary artery bypass graft) Z95.1

 

 [facility]  [address] [facility] [id number] [address]-
9666  09 May, 2017   







IMMUNIZATIONS

No Known Immunizations



SOCIAL HISTORY

Never Assessed



REASON FOR VISIT

Congestion/Fever/Cough fu. Pt was seen on Friday, and is still not feeling any 
better.-awoods



PLAN OF CARE







 Activity  Details









  









 Follow Up  3 Months, prn Reason:CHM/Pain w/ Yvonne







VITAL SIGNS







 Height  5'2" in  2018

 

 Weight  124.2 lbs  2018

 

 Temperature  97.4 degrees Fahrenheit  2018

 

 Heart Rate  90 bpm  2018

 

 Respiratory Rate  18   2018

 

 Oximetry  99 %  2018

 

 BMI  22.71 kg/m2  2018

 

 Blood pressure systolic  122 mmHg  2018

 

 Blood pressure diastolic  74 mmHg  2018







MEDICATIONS







 Medication  Instructions  Dosage  Frequency  Start Date  End Date  Duration  
Status

 

 Enalapril Maleate 5 mg  Orally Once a day  1 tablet  24h       90 
days  Active

 

 Lipitor 40 MG  Orally HS  1 tablet              Active

 

 Seroquel 200 mg  Orally Once a day  2.5 tablet at bedtime  24h        30  
Active

 

 Tylenol     1 tab              Active

 

 Mucinex 600 MG  Orally every 12 hrs  1 tablet as needed  12h  17 Sep, 2018  1 
Oct, 2018  14 days  Active

 

 Metoprolol Succinate ER 25 MG  Orally Once a day  1 tablet  24h        90 days
  Active

 

 Xanax 0.25 MG  Orally Twice a day  1 tablet  12h        28 days  Active

 

 PredniSONE 20 mg  Orally Once a day  1 tablet  24h  17 Sep, 2018  22 Sep, 2018
  5 days  Active

 

 Symbicort 80-4.5 MCG/ACT  Inhalation Twice a day  2 puffs  12h  29 Mar, 2018  
   12 months  Active

 

 Promethazine HCl 25 MG  Orally every 12 hrs  1 tablet as needed  12h        30 
day(s)  Active

 

 Tramadol HCl 50 mg  Orally every 6 hrs  1 tablet as needed  6h    
   28 days  Active

 

 Gabapentin 300 MG  Orally Once a day  1 capsule  24h  09 May, 2017     30 days
  Active

 

 Chlorzoxazone 500 mg  Orally Three times a day if needed  1 tablet           
30  Active

 

 ProAir  (90 Base) MCG/ACT  Inhalation every 4 hrs  2 puffs as needed  
4h  10 Oct, 2017     12 months  Active

 

 Levaquin 750 MG  Orally Once a day  1 tablet  24h  14 Sep, 2018  24 Sep, 2018  
10 day(s)  Active

 

 Omeprazole 20 mg  Orally Once a day  1 capsule  24h        90 days  Active

 

 Clopidogrel Bisulfate 75 MG  Orally Once a day  1 tablet  24h        90 days  
Active

 

 Chantix 1 MG     1/2 tablet daily x3 days, 1/2 tablet twice a day x3 days, 
then 1 tablet twice daily thereafter             Active

 

 Sumatriptan Succinate 6 MG/0.5ML  Subcutaneous Once a day prn migraine  0.5 ml 
as needed           14 days  Active

 

 Aspir-81 81 MG  Orally Once a day  1 tablet  24h           Active

 

 Cheratussin -10 MG/5ML  Orally every 4 hrs  5 ml  4h  14 Sep, 2018      
  Active







RESULTS

No Results



PROCEDURES

No Known procedures



INSTRUCTIONS





MEDICATIONS ADMINISTERED

No Known Medications



MEDICAL (GENERAL) HISTORY







 Type  Description  Date

 

 Medical History  fibromyalgia   

 

 Medical History  MRI 2016- small central protrusion/herniation w/minimal 
impression on thecal anteriorly at C5-6, At C3-4 small bilat. uncinate spurs w/ 
mild right neural foraminal narrowing   

 

 Medical History  MRI 2016- mild degenerative changes. No spinal canal 
stenosis or foraminal narrowing of thoracic spine   

 

 Medical History  hypertension   

 

 Medical History  Anxiety disorder   

 

 Medical History  depression   

 

 Medical History  migraine headaches   

 

 Medical History  Hx of C-Diff   

 

 Medical History  Hx of Pneumonia   

 

 Medical History  heart cath w/ stents placed 7/3/18   

 

 Surgical History  Open Heart Surgery - [facility] -Dr. Lima  (
Cardiologist- Dr Logan)  

 

 Surgical History  hysterectomy - Dr Luis   

 

 Surgical History  Left Breast Lumpectomy (Bx - Benign)- Dr Luis   

 

 Surgical History  Port - Dr Luis   

 

 Surgical History  Left Knee Surgeries x3- Dr Carolina did 2 and Dr Gan did 1   

 

 Surgical History  cath/stent  

 

 Hospitalization History  C-Diff - [facility]   

 

 Hospitalization History  Pneumonia - [facility]   

 

 Hospitalization History  Open Heart Surgery - [facility]

## 2019-01-07 NOTE — XMS REPORT
[facility]

 Created on: 2017



Dayami Rae

External Reference #: [id number]

: 1970

Sex: Female



Demographics







 Address  [address] [place]  [address]

 

 Preferred Language  Unknown

 

 Marital Status  Unknown

 

 [employer]ism Affiliation  Unknown

 

 Race  Unknown

 

 Ethnic Group  Unknown





Author







 Author  KIM FUCHS

 

 Organization  [facility]

 

 Address  [address] [place]  [address]



 

 Phone  [phone]







Care Team Providers







 Care Team Member Name  Role  Phone

 

 KIM FUCHS  Unavailable  [phone]







PROBLEMS







 Type  Condition  ICD9-CM Code  TYC71-PX Code  Onset Dates  Condition Status  
SNOMED Code

 

 Problem  Anxiety disorder, unspecified     F41.9     Active  845666198

 

 Problem  Neuropathy     G62.9     Active  315622441

 

 Problem  Fibromyalgia     M79.7     Active  875659587

 

 Problem  Coronary artery disease involving native coronary artery of native 
heart without angina pectoris     I25.10     Active  [id number]

 

 Problem  Chronic pain syndrome     G89.4     Active  682119883







ALLERGIES

No Information



SOCIAL HISTORY

Never Assessed



PLAN OF CARE





VITAL SIGNS





MEDICATIONS







 Medication  Instructions  Dosage  Frequency  Start Date  End Date  Duration  
Status

 

 Chlorzoxazone 500 mg  Orally Three times a day  1 tablet  8h        30 days  
Active

 

 Promethazine HCl 25 MG  Orally 4 times a day prn  1 tablet as needed           
30 days  Active







RESULTS

No Results



PROCEDURES

No Known procedures



IMMUNIZATIONS

No Known Immunizations



MEDICAL (GENERAL) HISTORY







 Type  Description  Date

 

 Medical History  fibromyalgia   

 

 Medical History  MRI 2016- small central protrusion/herniation w/minimal 
impression on thecal anteriorly at C5-6, At C3-4 small bilat. uncinate spurs w/ 
mild right neural foraminal narrowing   

 

 Medical History  MRI 2016- mild degenerative changes. No spinal canal 
stenosis or foraminal narrowing of thoracic spine   

 

 Medical History  hypertension   

 

 Medical History  Anxiety disorder   

 

 Medical History  depression   

 

 Medical History  migraine headaches   

 

 Medical History  Hx of C-Diff   

 

 Medical History  Hx of Pneumonia   

 

 Surgical History  Open Heart Surgery - [facility] -Dr. Lima  (
Cardiologist- Dr Logan)  

 

 Surgical History  hysterectomy - Dr Luis   

 

 Surgical History  Left Breast Lumpectomy (Bx - Benign)- Dr Luis   

 

 Surgical History  Port - Dr Luis   

 

 Surgical History  Left Knee Surgeries x3- Dr Carolina did 2 and Dr Gan did 1   

 

 Hospitalization History  C[facility] - [facility]   

 

 Hospitalization History  Pneumonia - [facility]   

 

 Hospitalization History  Open Heart Surgery - [facility]

## 2019-01-07 NOTE — XMS REPORT
[facility]

 Created on: 10/07/2018



Dayami Rae

External Reference #: [id number]

: 1970

Sex: Female



Demographics







 Address  [address] [place]  [address]

 

 Preferred Language  Unknown

 

 Marital Status  Unknown

 

 Christianity Affiliation  Unknown

 

 Race  Unknown

 

 Ethnic Group  Unknown





Author







 Author  KIM Whiting

 

 Organization  [facility]

 

 Address  [address] [place]  [address]



 

 Phone  [phone]







Care Team Providers







 Care Team Member Name  Role  Phone

 

 KIM Whiting  Unavailable  [phone]







PROBLEMS







 Type  Condition  ICD9-CM Code  JGU75-VF Code  Onset Dates  Condition Status  
SNOMED Code

 

 Problem  Chronic pain syndrome     G89.4     Active  170212681

 

 Problem  Neuropathy     G62.9     Active  196656879

 

 Problem  Coronary artery disease involving native coronary artery of native 
heart without angina pectoris     I25.10     Active  [id number]

 

 Problem  Fibromyalgia     M79.7     Active  280390321

 

 Problem  Chronic obstructive pulmonary disease, unspecified COPD type     
J44.9     Active  42057382

 

 Problem  Essential hypertension     I10     Active  62769216

 

 Problem  Lumbago with sciatica, left side     M54.42     Active  535941762

 

 Problem  Anxiety disorder, unspecified     F41.9     Active  717168640

 

 Problem  GERD with esophagitis     K21.0     Active  629767515

 

 Problem  Lumbago with sciatica, right side     M54.41     Active  
[id number]







ALLERGIES

No Information



ENCOUNTERS







 Encounter  Location  Date  Diagnosis

 

 [facility]  [address] [place] [address]146-
3758  27 Sep, 2018  Fibromyalgia M79.7

 

 [facility]  [id number] [place] [address] 33145-
1822  20 Sep, 2018   

 

 [facility]  [address] [place] [address] 59374-
3812  19 Sep, 2018   

 

 [facility]  [id number] [place] [address] 92306-
2418  17 Sep, 2018  Pneumonia of right lower lobe due to infectious organism 
J18.1 and Chronic obstructive pulmonary disease, unspecified COPD type J44.9

 

 [facility]  [address] [place] [address] 80420-
9173  14 Sep, 2018  Pneumonia of right lower lobe due to infectious organism 
J18.1 ; Chronic obstructive pulmonary disease, unspecified COPD type J44.9 ; 
Chronic nausea R11.0 and Cough R05

 

 [place] [address] [address]-
5284  07 Sep, 2018  Acute bronchitis, unspecified organism J20.9

 

 [place] [address] [address]-
6386  28 Aug, 2018  Fibromyalgia M79.7

 

 [place] [address] 7[address]-
4161  20 Aug, 2018   

 

 [place] [address] [address]-
4173  16 Aug, 2018  Neuropathy G62.9

 

 [place] [address] [address]-
2318    Essential hypertension I10 ; Coronary artery disease 
involving native coronary artery of native heart without angina pectoris I25.10 
; Fibromyalgia M79.7 ; GERD with esophagitis K21.0 and Tobacco abuse counseling 
Z71.6

 

 [place] [address] [address]-
6305    Long term (current) use of opiate analgesic Z79.891

 

 [place] [address] [address]-
2316     

 

 [place] [address] [address]-
8874    Acute bronchitis, unspecified organism J20.9

 

 [place] [id number] [place] [address] 89055-
0530     

 

 [place] [address] [address]-
1067     

 

 [place] [address] [address]-
8297    Chronic pain syndrome G89.4

 

 [place] [address] [place] [address]-
3933     

 

 [facility]  [id number] [place] [address] 32578-
2777  15 Marko, 2018   

 

 [facility]  [id number] [place] [address] 81235-
9220  08 2018  Acute bronchitis, unspecified organism J20.9

 

 [facility]  [id number] [place] [address] 21496-
8808    Chronic pain syndrome G89.4

 

 [facility]  [id number] [place] [address] 15318-
0806  25 May, 2018   

 

 [facility]  [address] [place] [address] 15485-
2712  24 May, 2018  Acute midline low back pain with left-sided sciatica M54.42

 

 [facility]  [id number] [place] [address] 45190-
6075  22 May, 2018   

 

 [facility]  [id number] [place] [address] 54333
-3137  21 May, 2018  Bronchitis J40

 

 [facility]  [id number] [place] [address] 64919-
4299  07 May, 2018  Chronic pain syndrome G89.4 and Neuropathy G62.9

 

 [facility]  [id number] [place] [address] 07770-
6272    Acute midline low back pain with left-sided sciatica M54.42

 

 [facility]  [id number] [place] [address] 18018-
8330     

 

 [facility]  [id number] [place] [address] 67102-
9910    Anxiety disorder, unspecified F41.9

 

 [facility]  [id number] [place] [address] 49045-
0438     

 

 [facility]  [id number] [place] [address] 44727-
4057    Chronic pain syndrome G89.4 and Acute midline low back pain 
with left-sided sciatica M54.42

 

 [place] [address] [address]158-
3956    Chronic pain syndrome G89.4

 

 [facility]  [address] 0[address]87-
6876     

 

 [facility]  [address] 7[address]19-
5926     

 

 [place] [address] 02014-
0723  29 Mar, 2018  Injury of left knee, initial encounter S89.92XA and COPD 
suggested by initial evaluation J44.9

 

 [place] [address] 43189-
4587  28 Mar, 2018  Acute bronchitis, unspecified organism J20.9

 

 [place] [address] 65703-
8969  27 Mar, 2018  Acute bronchitis, unspecified organism J20.9

 

 [place] [address] 85770-
5450  21 Mar, 2018  Anxiety disorder, unspecified F41.9 and Acute bronchitis, 
unspecified organism J20.9

 

 [place] [id number] [place] [address] 49089-
0565  08 Mar, 2018  Chronic pain syndrome G89.4

 

 [facility]  [address] [place] [address] 33453-
5563  05 Mar, 2018   

 

 [facility]  [address] [place] [address] 48957-
1341  05 Mar, 2018  Acute midline low back pain with left-sided sciatica M54.42

 

 [facility]  [address] [place] [address] 11622-
5347     

 

 [facility]  [address] 29882-
4193    Chronic pain syndrome G89.4

 

 [facility]  [address] [place] [address] 11966-
3385    Chronic pain syndrome G89.4

 

 [facility]  [address] [place] [address] 38938-
1793     

 

 [facility]  [address] [place] [address] 35218-
1249    Gastroenteritis K52.9

 

 [facility]  [address] [place] [address] 61876-
5169     

 

 [facility]  [address] 28458-
3957  15 Jerardo, 2018  Acute bronchitis, unspecified organism J20.9

 

 [facility]  [address] [place] [address] 74349-
6896    Anxiety disorder, unspecified F41.9 and Neuropathy G62.9

 

 [facility]  [address] [place] [address] 94106-
3310    Chronic pain syndrome G89.4

 

 [facility]  [address] [place] [address] 75650-
4857    Bronchitis J40 and Laryngitis J04.0

 

 [facility]  [address] [place] [address] 91937-
6626  29 Dec, 2017   

 

 [facility]  [address] [place] [address] 90341-
0895  26 Dec, 2017  Bronchitis J40

 

 [facility]  [address] [place] [address] 62111-
6165  18 Dec, 2017   

 

 [facility]  [address] 8[address]29-
3276  13 Dec, 2017  Fibromyalgia M79.7 and Chronic pain syndrome G89.4

 

 [facility]  [address] [place] [address] 69035-
1805  04 Dec, 2017  Chronic pain syndrome G89.4 and Acute bronchitis, 
unspecified organism J20.9

 

 [facility]  [address] [place] [address] 22627-
7758    Neuropathy G62.9

 

 [facility]  [address] [place] [address] 50983-
7219    Chronic pain syndrome G89.4 ; Lumbago with sciatica, left 
side M54.42 ; Lumbago with sciatica, right side M54.41 ; Screening cholesterol 
level Z13.220 ; Screening for diabetes mellitus Z13.1 ; Screening for thyroid 
disorder Z13.29 ; Fibromyalgia M79.7 ; Anxiety disorder, unspecified F41.9 and 
Neuropathy G62.9

 

 [facility]  [address] [place] [address] 23167-
0717     

 

 [facility]  [address] [place] [address] 49343-
9453  25 Oct, 2017   

 

 [facility]  [address] [address]149-
4350  10 Oct, 2017  Fibromyalgia M79.7 ; Chronic pain syndrome G89.4 ; Anxiety 
disorder, unspecified F41.9 ; Neuropathy G62.9 and Acute bronchitis, 
unspecified organism J20.9

 

 [facility]  [id number] [place] [address] 53266-
5056  09 Oct, 2017   

 

 [facility]  [address] [place] [address] 89425-
8969  25 Sep, 2017   

 

 [facility]  [address] [place] [address] 50558-
2529  19 Sep, 2017   

 

 [facility]  [address] [place] [address] 58512-
7098  08 Sep, 2017   

 

 [facility]  [address] [place] [address] 62533-
7192  23 Aug, 2017   

 

 [facility]  [address] [place] [address] 60372-
1463  22 Aug, 2017  Acute seasonal allergic rhinitis due to pollen J30.1

 

 [facility]  [address] [place] [address]-
5753  21 Aug, 2017   

 

 [facility]  [address] [address]-
2104  09 Aug, 2017   

 

 [facility]  [address] [address]-
9314     

 

 [facility]  [address] [place] [address]-
6400     

 

 [facility]  [address] [address]-
0964  10 Jul, 2017   

 

 [facility]  [address] [place] [address] 58601-
4665     

 

 [facility]  [address] [address]-
8205     

 

 [facility]  [address] [address]-
9212     

 

 [facility]  [address] [place] [address]-
4518    Chronic pain syndrome G89.4 ; Fibromyalgia M79.7 ; Anxiety 
disorder, unspecified F41.9 ; Neuropathy G62.9 and Low back pain M54.5

 

 [facility]  [address] [address]-
4853  25 May, 2017  Low back pain M54.5

 

 [facility]  [address] [place] [address]-
8033  24 May, 2017   

 

 [facility]  [address] [address]-
5647  22 May, 2017   

 

 [facility]  [address] [address]-
0783  16 May, 2017   

 

 [facility]  [address] [place] [address]-
0513  16 May, 2017   

 

 [facility]  [address] [facility] [id number] [place] [address]-
0166  12 May, 2017  Routine adult health maintenance Z00.00 ; Fibromyalgia 
M79.7 ; Chronic pain syndrome G89.4 ; Abnormal lung sounds R09.89 ; Coronary 
artery disease involving native coronary artery of native heart without angina 
pectoris I25.10 and S/P CABG (coronary artery bypass graft) Z95.1

 

 [facility]  [address] [facility] [id number] [address]-
6556  09 May, 2017   







IMMUNIZATIONS

No Known Immunizations



SOCIAL HISTORY

Never Assessed



REASON FOR VISIT

Controlled x''s 2



PLAN OF CARE





VITAL SIGNS





MEDICATIONS







 Medication  Instructions  Dosage  Frequency  Start Date  End Date  Duration  
Status

 

 Tramadol HCl 50 mg  Orally every 6 hrs  1 tablet as needed  6h    
   28 days  Active

 

 Xanax 0.25 MG  Orally Twice a day  1 tablet  12h        28 days  Active







RESULTS

No Results



PROCEDURES

No Known procedures



INSTRUCTIONS





MEDICATIONS ADMINISTERED

No Known Medications



MEDICAL (GENERAL) HISTORY







 Type  Description  Date

 

 Medical History  fibromyalgia   

 

 Medical History  MRI 2016- small central protrusion/herniation w/minimal 
impression on thecal anteriorly at C5-6, At C3-4 small bilat. uncinate spurs w/ 
mild right neural foraminal narrowing   

 

 Medical History  MRI 2016- mild degenerative changes. No spinal canal 
stenosis or foraminal narrowing of thoracic spine   

 

 Medical History  hypertension   

 

 Medical History  Anxiety disorder   

 

 Medical History  depression   

 

 Medical History  migraine headaches   

 

 Medical History  Hx of C-Diff   

 

 Medical History  Hx of Pneumonia   

 

 Medical History  heart cath w/ stents placed 7/3/18   

 

 Surgical History  Open Heart Surgery - [facility] -Dr. Lima  (
Cardiologist- Dr Logan)  

 

 Surgical History  hysterectomy - Dr Luis   

 

 Surgical History  Left Breast Lumpectomy (Bx - Benign)- Dr Luis   

 

 Surgical History  Port - Dr Luis   

 

 Surgical History  Left Knee Surgeries x3- Dr Carolina did 2 and Dr Gan did 1   

 

 Surgical History  cath/stent  

 

 Hospitalization History  C-Diff - [facility]   

 

 Hospitalization History  Pneumonia - [facility]   

 

 Hospitalization History  Open Heart Surgery - [facility]

## 2019-01-07 NOTE — XMS REPORT
Clinical Summary

 Created on: 2019



Dayami Rae

External Reference #: [id number]

: 1970

Sex: Female



Demographics







 Address  [address]

[place]  [address]

 

 Home Phone  [phone]

 

 Preferred Language  English

 

 Marital Status  Unknown

 

 Church Affiliation  [employer]

 

 Race  White

 

 Ethnic Group  Not  or 





Author







 Author  [facility]

 

 Organization  [facility]

 

 Address  Unknown

 

 Phone  Unavailable







Support







 Name  Relationship  Address  Phone

 

 Savanah Rodríguez  [employer]  Unknown  [phone]

 

 SAVANAH RODRÍGUEZ  [employer]  Unknown  [phone]







Care Team Providers







 Care Team Member Name  Role  Phone

 

  PCP  Unavailable







Source Comments

Some departments are not documenting in the electronic medical record.  If you 
do not see the information that you expected, contact Release of Information in 
the Health Information Management department at [phone] for further 
assistance in locating additional records.[facility]



Allergies

Not on File



Medications

Not on file



Active Problems





Not on file



Social History







     Date



  Tobacco Use   Types   Packs/Day   Years Used 

 

      



  Never Assessed    









 



  Sex Assigned at Birth   Date Recorded

 

 



  Not on file 









   Industry



  Job Start Date   Occupation 

 

   Not on file



  Not on file   Not on file 









   Travel End



  Travel History   Travel Start 













  No recent travel history available.







Last Filed Vital Signs

Not on file



Plan of Treatment







   



  Health Maintenance   Due Date   Last Done   Comments

 

   



  PHYSICAL (COMPREHENSIVE)   1975  



  EXAM   

 

   



  HIV SCREENING   1983  

 

   



  DTAP/TDAP VACCINES (1 -   1986  



  Tdap)   

 

   



  CERVICAL CANCER SCREENING   1998  

 

   



  BREAST CANCER SCREENING   2008  

 

   



  COLORECTAL CANCER   2018  



  SCREENING   

 

   



  SHINGLES RECOMBINANT   2018  



  VACCINE (1 of 2)   

 

   



  INFLUENZA VACCINE   2018  







Results

Not on filefrom Last 3 Months

## 2019-01-07 NOTE — XMS REPORT
[facility]

 Created on: 2018



Dayami Rae

External Reference #: [id number]

: 1970

Sex: Female



Demographics







 Address  [address] [place]  [address]

 

 Preferred Language  Unknown

 

 Marital Status  Unknown

 

 Sabianist Affiliation  Unknown

 

 Race  Unknown

 

 Ethnic Group  Unknown





Author







 Author  KIM FUCHS

 

 Organization  [facility]

 

 Address  [address] [place]  [address]



 

 Phone  [phone]







Care Team Providers







 Care Team Member Name  Role  Phone

 

 FUCHSKIM JACKSON  Unavailable  [phone]







PROBLEMS







 Type  Condition  ICD9-CM Code  WGW65-UY Code  Onset Dates  Condition Status  
SNOMED Code

 

 Problem  Fibromyalgia     M79.7     Active  794846839

 

 Problem  Neuropathy     G62.9     Active  821553066

 

 Problem  Coronary artery disease involving native coronary artery of native 
heart without angina pectoris     I25.10     Active  4961121255413

 

 Problem  Chronic pain syndrome     G89.4     Active  064543973

 

 Problem  COPD suggested by initial evaluation     J44.9     Active  09640743

 

 Problem  Acute midline low back pain with left-sided sciatica     M54.42     
Active  458432052

 

 Problem  Lumbago with sciatica, left side     M54.42     Active  609237298

 

 Problem  Anxiety disorder, unspecified     F41.9     Active  173850384

 

 Problem  Other chronic pain     G89.29     Active  52899997

 

 Problem  Lumbago with sciatica, right side     M54.41     Active  
[id number]







ALLERGIES

No Information



ENCOUNTERS







 Encounter  Location  Date  Diagnosis

 

 [facility]  [address] [place] [address]162-
2548     

 

 [facility]  [address] [place] [address] 69772-
4924    Acute bronchitis, unspecified organism J20.9

 

 [facility]  [address] [place] [address] 00343-
8620     

 

 [facility]  [id number] [place] [address] 21753-
4145     

 

 [facility]  [id number] [place] [address] 13161-
8844    Chronic pain syndrome G89.4

 

 [facility]  [address] [place] [address]-
0822     

 

 [facility]  [id number] [place] [address] 53817-
2067  15 Marko, 2018   

 

 [facility]  [id number] [place] [address] 06510-
3097  08 2018  Acute bronchitis, unspecified organism J20.9

 

 [facility]  [address] [address]-
9361    Chronic pain syndrome G89.4

 

 [facility]  [id number] [place] [address] 87535-
2182  25 May, 2018   

 

 [facility]  [id number] [place] [address] 63614-
5495  24 May, 2018  Acute midline low back pain with left-sided sciatica M54.42

 

 [facility]  [id number] [place] [address] 99687-
4149  22 May, 2018   

 

 [facility]  [id number] [place] [address] 83102
-[phone]  21 May, 2018  Bronchitis J40

 

 [facility]  [id number] [place] [address] 08564-
2362  07 May, 2018  Chronic pain syndrome G89.4 and Neuropathy G62.9

 

 [facility]  [id number] [place] [address] 22703-
0361    Acute midline low back pain with left-sided sciatica M54.42

 

 [facility]  [id number] [place] [address] 01724-
6311     

 

 [facility]  [id number] [place] [address] 47904-
6737    Anxiety disorder, unspecified F41.9

 

 [facility]  [id number] [place] [address] 54379-
6495     

 

 [facility]  [id number] [place] [address] 31471-
9039    Chronic pain syndrome G89.4 and Acute midline low back pain 
with left-sided sciatica M54.42

 

 [place] [address] [address]-
4801    Chronic pain syndrome G89.4

 

 [facility]  [address] [address]-
2594     

 

 [facility]  [address] [address]703-
1699     

 

 [place] [address] [address]684-
1613  29 Mar, 2018  Injury of left knee, initial encounter S89.92XA and COPD 
suggested by initial evaluation J44.9

 

 [place] [address] [address]644-
2773  28 Mar, 2018  Acute bronchitis, unspecified organism J20.9

 

 [place] [address] 74546-
5095  27 Mar, 2018  Acute bronchitis, unspecified organism J20.9

 

 [place] [address] 05464-
5893  21 Mar, 2018  Anxiety disorder, unspecified F41.9 and Acute bronchitis, 
unspecified organism J20.9

 

 [place] [id number] [place] [address] 88651-
5931  08 Mar, 2018  Chronic pain syndrome G89.4

 

 [facility]  [id number] [place] [address] 38567-
5336  05 Mar, 2018   

 

 [facility]  [id number] [place] [address] 64562-
9090  05 Mar, 2018  Acute midline low back pain with left-sided sciatica M54.42

 

 [facility]  [id number] [place] [address] 02857-
4277     

 

 [place] [address] 19410-
1756    Chronic pain syndrome G89.4

 

 [facility]  [id number] [place] [address] 87410-
7965    Chronic pain syndrome G89.4

 

 [facility]  [id number] [place] [address] 92784-
4518     

 

 [facility]  [address] 35729-
5835    Gastroenteritis K52.9

 

 [facility]  [id number] [place] [address] 17144-
7762     

 

 [facility]  [address] 9[address]33-
8638  15 Jerardo, 2018  Acute bronchitis, unspecified organism J20.9

 

 [facility]  [address] [address]107-
7424    Anxiety disorder, unspecified F41.9 and Neuropathy G62.9

 

 [facility]  [id number] [place] [address] 34561-
5821    Chronic pain syndrome G89.4

 

 [facility]  [id number] [place] [address] 57685-
2180    Bronchitis J40 and Laryngitis J04.0

 

 [facility]  [id number] [place] [address] 07437-
9497  29 Dec, 2017   

 

 [facility]  [id number] [place] [address] 10841-
6384  26 Dec, 2017  Bronchitis J40

 

 [facility]  [id number] [place] [address] 05287-
2660  18 Dec, 2017   

 

 [facility]  [address] 88475-
8137  13 Dec, 2017  Fibromyalgia M79.7 and Chronic pain syndrome G89.4

 

 [facility]  [id number] [place] [address] 69770-
4473  04 Dec, 2017  Chronic pain syndrome G89.4 and Acute bronchitis, 
unspecified organism J20.9

 

 [facility]  [id number] [place] [address] 03349-
6928    Neuropathy G62.9

 

 [facility]  [id number] [place] [address] 78947-
4355    Chronic pain syndrome G89.4 ; Lumbago with sciatica, left 
side M54.42 ; Lumbago with sciatica, right side M54.41 ; Screening cholesterol 
level Z13.220 ; Screening for diabetes mellitus Z13.1 ; Screening for thyroid 
disorder Z13.29 ; Fibromyalgia M79.7 ; Anxiety disorder, unspecified F41.9 and 
Neuropathy G62.9

 

 [place] [id number] [place] [address] 66006-
0183     

 

 [place] [id number] [place] [address] 36911-
0400  25 Oct, 2017   

 

 [facility]  [address] [address]591-
8323  10 Oct, 2017  Fibromyalgia M79.7 ; Chronic pain syndrome G89.4 ; Anxiety 
disorder, unspecified F41.9 ; Neuropathy G62.9 and Acute bronchitis, 
unspecified organism J20.9

 

 [facility]  [id number] [place] [address] 00428-
3858  09 Oct, 2017   

 

 [facility]  [id number] [place] [address] 78916-
4370  25 Sep, 2017   

 

 [facility]  [id number] [place] [address] 57995-
0361  19 Sep, 2017   

 

 [facility]  [id number] [place] [address] 96399-
4092  08 Sep, 2017   

 

 [facility]  [id number] [place] [address] 74998-
6878  23 Aug, 2017   

 

 [facility]  [id number] [place] [address] 63906-
8184  22 Aug, 2017  Acute seasonal allergic rhinitis due to pollen J30.1

 

 [facility]  [address] [address]-
5746  21 Aug, 2017   

 

 [facility]  [address] [address]-
7159  09 Aug, 2017   

 

 [facility]  [address] [address]-
0687     

 

 [facility]  [address] [address]-
4687     

 

 [facility]  [address] [address]-
3837  10 Jul, 2017   

 

 [facility]  [address] [address]-
7994     

 

 [facility]  [address] [address]-
8324     

 

 [facility]  [address] [address]-
5765     

 

 [facility]  [address] [address]-
2714    Chronic pain syndrome G89.4 ; Fibromyalgia M79.7 ; Anxiety 
disorder, unspecified F41.9 ; Neuropathy G62.9 and Low back pain M54.5

 

 [facility]  [address] [address]-
6099  25 May, 2017  Low back pain M54.5

 

 [facility]  [address] [address]-
2190  24 May, 2017   

 

 [facility]  [address] [place] [address]-
6114  22 May, 2017   

 

 [facility]  [address] [place] [address]-
5284  16 May, 2017   

 

 [facility]  [address] [place] [address]-
8668  16 May, 2017   

 

 [facility]  [address] [facility] [id number] [place] [address]-
2546  12 May, 2017  Routine adult health maintenance Z00.00 ; Fibromyalgia 
M79.7 ; Chronic pain syndrome G89.4 ; Abnormal lung sounds R09.89 ; Coronary 
artery disease involving native coronary artery of native heart without angina 
pectoris I25.10 and S/P CABG (coronary artery bypass graft) Z95.1

 

 [facility]  [address] [facility] [id number] [address]-
2546  09 May, 2017   







IMMUNIZATIONS

No Known Immunizations



SOCIAL HISTORY

Never Assessed



REASON FOR VISIT

Medication refill request



PLAN OF CARE





VITAL SIGNS





MEDICATIONS







 Medication  Instructions  Dosage  Frequency  Start Date  End Date  Duration  
Status

 

 Chlorzoxazone 500 mg  Orally Three times a day if needed  1 tablet           
30 days  Active

 

 Tramadol HCl 50 mg  Orally every 6 hrs  1 tablet as needed  6h    
   28 days  Active







RESULTS

No Results



PROCEDURES

No Known procedures



INSTRUCTIONS





MEDICATIONS ADMINISTERED

No Known Medications



MEDICAL (GENERAL) HISTORY







 Type  Description  Date

 

 Medical History  fibromyalgia   

 

 Medical History  MRI 2016- small central protrusion/herniation w/minimal 
impression on thecal anteriorly at C5-6, At C3-4 small bilat. uncinate spurs w/ 
mild right neural foraminal narrowing   

 

 Medical History  MRI 2016- mild degenerative changes. No spinal canal 
stenosis or foraminal narrowing of thoracic spine   

 

 Medical History  hypertension   

 

 Medical History  Anxiety disorder   

 

 Medical History  depression   

 

 Medical History  migraine headaches   

 

 Medical History  Hx of C-Diff   

 

 Medical History  Hx of Pneumonia   

 

 Surgical History  Open Heart Surgery - [facility] -Dr. Lima  (
Cardiologist- Dr Logan)  

 

 Surgical History  hysterectomy - Dr Luis   

 

 Surgical History  Left Breast Lumpectomy (Bx - Benign)- Dr Luis   

 

 Surgical History  Port - Dr Luis   

 

 Surgical History  Left Knee Surgeries x3- Dr Carolina did 2 and Dr Gan did 1   

 

 Hospitalization History  C-Diff - [facility]   

 

 Hospitalization History  Pneumonia - [facility]   

 

 Hospitalization History  Open Heart Surgery - [facility]

## 2019-01-07 NOTE — XMS REPORT
[facility]

 Created on: 2018



Dayami Rae

External Reference #: [id number]

: 1970

Sex: Female



Demographics







 Address  [address] [place]  [address]

 

 Preferred Language  Unknown

 

 Marital Status  Unknown

 

 Lutheran Affiliation  Unknown

 

 Race  Unknown

 

 Ethnic Group  Unknown





Author







 Author  KIM FUCHS

 

 Organization  [facility] [place]

 

 Address  [address] [place]  [address]



 

 Phone  [phone]







Care Team Providers







 Care Team Member Name  Role  Phone

 

 FUCHSKIM JACKSON  Unavailable  [phone]







PROBLEMS







 Type  Condition  ICD9-CM Code  DZL10-XA Code  Onset Dates  Condition Status  
SNOMED Code

 

 Problem  Fibromyalgia     M79.7     Active  447247887

 

 Problem  Neuropathy     G62.9     Active  242170920

 

 Problem  Coronary artery disease involving native coronary artery of native 
heart without angina pectoris     I25.10     Active  [id number]

 

 Problem  Chronic pain syndrome     G89.4     Active  663198969

 

 Problem  COPD suggested by initial evaluation     J44.9     Active  19413892

 

 Problem  Acute midline low back pain with left-sided sciatica     M54.42     
Active  212137544

 

 Problem  Lumbago with sciatica, left side     M54.42     Active  728269885

 

 Problem  Anxiety disorder, unspecified     F41.9     Active  565797659

 

 Problem  Other chronic pain     G89.29     Active  97240516

 

 Problem  Lumbago with sciatica, right side     M54.41     Active  
[id number]







ALLERGIES

No Information



ENCOUNTERS







 Encounter  Location  Date  Diagnosis

 

 [place] [id number] [place] [address]47-
9587  07 May, 2018  Chronic pain syndrome G89.4 and Neuropathy G62.9

 

 [facility] [place]  [id number] [place] [address] 26257-
0299    Acute midline low back pain with left-sided sciatica M54.42

 

 [facility] [place]  [id number] [place] [address] 88906-
3817     

 

 [facility] [place]  [id number] [place] [address] 34156-
3513    Anxiety disorder, unspecified F41.9

 

 [facility] [place]  [id number] [place] [address] 55566-
5284     

 

 [place] [id number] [place] [address] 24862-
8466    Chronic pain syndrome G89.4 and Acute midline low back pain 
with left-sided sciatica M54.42

 

 [place] [id number] [place] [address] 34191-
6995    Chronic pain syndrome G89.4

 

 [place] [address] [address]51-
6578     

 

 [place] [address] [address]-
3436     

 

 [place] [address] [address]-
0335  29 Mar, 2018  Injury of left knee, initial encounter S89.92XA and COPD 
suggested by initial evaluation J44.9

 

 [place] [address] [address]-
2179  28 Mar, 2018  Acute bronchitis, unspecified organism J20.9

 

 [place] [id number] [place] [address] 73151-
3049  27 Mar, 2018  Acute bronchitis, unspecified organism J20.9

 

 [place] [id number] [place] [address] 44658-
5295  21 Mar, 2018  Anxiety disorder, unspecified F41.9 and Acute bronchitis, 
unspecified organism J20.9

 

 [place] [id number] [place] [address] 58076-
8656  08 Mar, 2018  Chronic pain syndrome G89.4

 

 [place] [id number] [place] [address] 13051-
0825  05 Mar, 2018   

 

 [place] [id number] [place] [address] 28257-
3425  05 Mar, 2018  Acute midline low back pain with left-sided sciatica M54.42

 

 [place] [id number] [place] [address] 69789-
4218     

 

 [facility] [place]  [id number] [place] [address] 55249-
9559    Chronic pain syndrome G89.4

 

 [facility] [place]  [id number] [place] [address] 43459-
4883    Chronic pain syndrome G89.4

 

 [facility] [place]  [address] 24143-
3455     

 

 [facility] [place]  [address] [address]01-
2373    Gastroenteritis K52.9

 

 [facility] [place]  [address] [address]22-
3983     

 

 [facility] [place]  [address] 6[address]32-
1133  15 Jerardo, 2018  Acute bronchitis, unspecified organism J20.9

 

 [facility] [place]  [address] 19484-
4553    Anxiety disorder, unspecified F41.9 and Neuropathy G62.9

 

 [facility] [place]  [address] 70377-
6941    Chronic pain syndrome G89.4

 

 [facility] [place]  [id number] [place] [address] 76643-
7703    Bronchitis J40 and Laryngitis J04.0

 

 [facility] [place]  [id number] [place] [address] 39104-
9727  29 Dec, 2017   

 

 [facility] [place]  [id number] [place] [address] 38886-
3505  26 Dec, 2017  Bronchitis J40

 

 [facility] [place]  [id number] [place] [address] 66127-
5806  18 Dec, 2017   

 

 [facility] [place]  [id number] [place] [address] 17700-
5332  13 Dec, 2017  Fibromyalgia M79.7 and Chronic pain syndrome G89.4

 

 [facility] [place]  [id number] [place] [address] 78943-
9313  04 Dec, 2017  Chronic pain syndrome G89.4 and Acute bronchitis, 
unspecified organism J20.9

 

 [facility] [place]  [id number] [place] [address] 05309-
2959    Neuropathy G62.9

 

 [facility] [place]  [id number] [place] [address] 84409-
2234    Chronic pain syndrome G89.4 ; Lumbago with sciatica, left 
side M54.42 ; Lumbago with sciatica, right side M54.41 ; Screening cholesterol 
level Z13.220 ; Screening for diabetes mellitus Z13.1 ; Screening for thyroid 
disorder Z13.29 ; Fibromyalgia M79.7 ; Anxiety disorder, unspecified F41.9 and 
Neuropathy G62.9

 

 [facility] [place]  [id number] [place] [address] 91957-
0304     

 

 [facility] [place]  [id number] [place] [address] 44148-
2348  25 Oct, 2017   

 

 [facility] [place]  [id number] [place] [address] 37632-
3126  10 Oct, 2017  Fibromyalgia M79.7 ; Chronic pain syndrome G89.4 ; Anxiety 
disorder, unspecified F41.9 ; Neuropathy G62.9 and Acute bronchitis, 
unspecified organism J20.9

 

 [facility] [place]  [id number] [place] [address] 73597-
2668  09 Oct, 2017   

 

 [facility] [place]  [id number] [place] [address] 63766-
4497  25 Sep, 2017   

 

 [facility] [place]  [id number] [place] [address] 70793-
5769  19 Sep, 2017   

 

 [facility] [place]  [id number] [place] [address] 65475-
5135  08 Sep, 2017   

 

 [facility] [place]  [id number] [place] [address] 15021-
1767  23 Aug, 2017   

 

 [facility] [place]  [address], [place] [address]-
6560  22 Aug, 2017  Acute seasonal allergic rhinitis due to pollen J30.1

 

 [facility] [place]  [address], [place] [address]-
5680  21 Aug, 2017   

 

 [facility] [place]  [address] [address]-
2923  09 Aug, 2017   

 

 [facility] [place]  [address] [address]-
2669     

 

 [facility] [place]  [address] [address]-
5626     

 

 [facility] [place]  [address] [place] [address] 07090-
2230  10 Jul, 2017   

 

 [facility] [place]  [id number] [place] [address] 99610-
2580     

 

 [facility] [place]  [address] [address]-
0114     

 

 [facility] [place]  [address] [address]-
3885     

 

 [facility] [place]  [address], [place] [address]-
0048    Chronic pain syndrome G89.4 ; Fibromyalgia M79.7 ; Anxiety 
disorder, unspecified F41.9 ; Neuropathy G62.9 and Low back pain M54.5

 

 [facility] [place]  [address] [address]-
1600  25 May, 2017  Low back pain M54.5

 

 [facility] [place]  [address] [address]-
6943  24 May, 2017   

 

 [facility] [place]  [address] [address]-
4748  22 May, 2017   

 

 [facility] [place]  [address] [address]-
1565  16 May, 2017   

 

 [facility] [place]  [address] [facility] [id number] [place] [address]-
5915  16 May, 2017   

 

 [facility] [place]  [address] [facility] [id number] [address]-
1729  12 May, 2017  Routine adult health maintenance Z00.00 ; Fibromyalgia 
M79.7 ; Chronic pain syndrome G89.4 ; Abnormal lung sounds R09.89 ; Coronary 
artery disease involving native coronary artery of native heart without angina 
pectoris I25.10 and S/P CABG (coronary artery bypass graft) Z95.1

 

 [facility] [place]  [address] [facility] [id number] [address]-
7289  09 May, 2017   







IMMUNIZATIONS

No Known Immunizations



SOCIAL HISTORY

Never Assessed



REASON FOR VISIT

Controlled Refill Request 



PLAN OF CARE





VITAL SIGNS





MEDICATIONS







 Medication  Instructions  Dosage  Frequency  Start Date  End Date  Duration  
Status

 

 Xanax 0.25 MG  Orally Twice a day  1 tablet  12h        28 days  Active







RESULTS

No Results



PROCEDURES

No Known procedures



INSTRUCTIONS





MEDICATIONS ADMINISTERED

No Known Medications



MEDICAL (GENERAL) HISTORY







 Type  Description  Date

 

 Medical History  fibromyalgia   

 

 Medical History  MRI 2016- small central protrusion/herniation w/minimal 
impression on thecal anteriorly at C5-6, At C3-4 small bilat. uncinate spurs w/ 
mild right neural foraminal narrowing   

 

 Medical History  MRI 2016- mild degenerative changes. No spinal canal 
stenosis or foraminal narrowing of thoracic spine   

 

 Medical History  hypertension   

 

 Medical History  Anxiety disorder   

 

 Medical History  depression   

 

 Medical History  migraine headaches   

 

 Medical History  Hx of C-Diff   

 

 Medical History  Hx of Pneumonia   

 

 Surgical History  Open Heart Surgery - [facility] -Dr. Lima  (
Cardiologist- Dr Logan)  

 

 Surgical History  hysterectomy - Dr Luis   

 

 Surgical History  Left Breast Lumpectomy (Bx - Benign)- Dr Luis   

 

 Surgical History  Port - Dr Luis   

 

 Surgical History  Left Knee Surgeries x3- Dr Carolina did 2 and Dr Gan did 1   

 

 Hospitalization History  C-Diff - [facility]   

 

 Hospitalization History  Pneumonia - [facility]   

 

 Hospitalization History  Open Heart Surgery - [facility]

## 2019-01-07 NOTE — XMS REPORT
[facility]

 Created on: 2018



Dayami Rae

External Reference #: [id number]

: 1970

Sex: Female



Demographics







 Address  [address] [place]  [address]

 

 Preferred Language  Unknown

 

 Marital Status  Unknown

 

 [employer] Affiliation  Unknown

 

 Race  Unknown

 

 Ethnic Group  Unknown





Author







 Author  KIM FUCHS

 

 Organization  [facility]

 

 Address  [address] [place]  [address]



 

 Phone  [phone]







Care Team Providers







 Care Team Member Name  Role  Phone

 

 FUCHSJAMA JACKSONELE  Unavailable  [phone]







PROBLEMS







 Type  Condition  ICD9-CM Code  NBN69-TA Code  Onset Dates  Condition Status  
SNOMED Code

 

 Problem  Fibromyalgia     M79.7     Active  991115278

 

 Problem  Neuropathy     G62.9     Active  640321050

 

 Problem  Coronary artery disease involving native coronary artery of native 
heart without angina pectoris     I25.10     Active  [id number]

 

 Problem  Chronic pain syndrome     G89.4     Active  847045576

 

 Problem  COPD suggested by initial evaluation     J44.9     Active  72996455

 

 Problem  Acute midline low back pain with left-sided sciatica     M54.42     
Active  122939279

 

 Problem  Lumbago with sciatica, left side     M54.42     Active  912192140

 

 Problem  Anxiety disorder, unspecified     F41.9     Active  095591610

 

 Problem  Other chronic pain     G89.29     Active  37268213

 

 Problem  Lumbago with sciatica, right side     M54.41     Active  
[id number]







ALLERGIES

No Information



ENCOUNTERS







 Encounter  Location  Date  Diagnosis

 

 [facility]  [address] [place] [address] 11894-
[phone]     

 

 [facility]  [address] [place] [address] 25304-
5364  05 2018   

 

 [facility]  [address] [place] [address] 95321-
2312    Chronic pain syndrome G89.4

 

 [facility]  [address] [place] [address] 58770-
8571  26 2018   

 

 [facility]  [address] [place] [address] 89248-
6525  15 2018   

 

 [facility]  [address] [place] [address] 36386-
3144    Acute bronchitis, unspecified organism J20.9

 

 [facility]  [address] [place] [address] 44150-
2632    Chronic pain syndrome G89.4

 

 [facility]  [address] [place] [address] 18512-
1170  25 May, 2018   

 

 [facility]  [address] [place] [address] 84845-
3337  24 May, 2018  Acute midline low back pain with left-sided sciatica M54.42

 

 [facility]  [address] [place] [address] 42838-
2497  22 May, 2018   

 

 [facility]  [address] [place] [address] [phone]
[phone]  21 May, 2018  Bronchitis J40

 

 [facility]  [address] [place] [address] 28356-
3488  07 May, 2018  Chronic pain syndrome G89.4 and Neuropathy G62.9

 

 [facility]  [address] [place] [address] 29615-
2920    Acute midline low back pain with left-sided sciatica M54.42

 

 [facility]  [address] [place] [address] 69484-
6705     

 

 [facility]  [address] [place] [address] 47642-
8134    Anxiety disorder, unspecified F41.9

 

 [facility]  [address] [place] [address] 48472-
4339     

 

 [facility]  [address] [place] [address] 18470-
1011    Chronic pain syndrome G89.4 and Acute midline low back pain 
with left-sided sciatica M54.42

 

 [facility]  [address] [place] [address] 85421-
6286    Chronic pain syndrome G89.4

 

 [facility]  [address], [place] [address]-
2411     

 

 [facility]  [address] [place] [address] 01511-
9865     

 

 [facility]  [address] [place] [address] 39939-
7474  29 Mar, 2018  Injury of left knee, initial encounter S89.92XA and COPD 
suggested by initial evaluation J44.9

 

 [facility]  [address] [place] [address] 70996-
9041  28 Mar, 2018  Acute bronchitis, unspecified organism J20.9

 

 [place] [address] [address] [place] [address] 86452-
3951  27 Mar, 2018  Acute bronchitis, unspecified organism J20.9

 

 [place] [address] [place] [address] 66744-
8471  21 Mar, 2018  Anxiety disorder, unspecified F41.9 and Acute bronchitis, 
unspecified organism J20.9

 

 [facility]  [address] [place] [address] 21375-
6850  08 Mar, 2018  Chronic pain syndrome G89.4

 

 [place] [address] [place] [address] 04139-
2446  05 Mar, 2018   

 

 [facility]  [address] [place] [address] 48277-
3933  05 Mar, 2018  Acute midline low back pain with left-sided sciatica M54.42

 

 [facility]  [address] [place] [address] 61572-
4505     

 

 [facility]  [address] [place] [address] 75896-
1129    Chronic pain syndrome G89.4

 

 [facility]  [address] [place] [address] 53677-
1727    Chronic pain syndrome G89.4

 

 [place] [address] [place] [address] 37896-
4406     

 

 [facility]  [address] [place] [address] 72465-
4933    Gastroenteritis K52.9

 

 [facility]  [address] 79309-
5266     

 

 [facility]  [address] 41316-
6698  15 Jerardo, 2018  Acute bronchitis, unspecified organism J20.9

 

 [facility]  [address] 07802-
9513    Anxiety disorder, unspecified F41.9 and Neuropathy G62.9

 

 [facility]  [address] [place] [address] 16548-
6351    Chronic pain syndrome G89.4

 

 [facility]  [address] 09520-
9822    Bronchitis J40 and Laryngitis J04.0

 

 [facility]  [address] [place] [address] 18007-
1912  29 Dec, 2017   

 

 [facility]  [address] 09416-
2780  26 Dec, 2017  Bronchitis J40

 

 [facility]  [address] 35467-
1457  18 Dec, 2017   

 

 [facility]  [address] 38815-
7206  13 Dec, 2017  Fibromyalgia M79.7 and Chronic pain syndrome G89.4

 

 [facility]  [address] [place] [address] 31926-
5873  04 Dec, 2017  Chronic pain syndrome G89.4 and Acute bronchitis, 
unspecified organism J20.9

 

 [facility]  [address] [place] [address] 63303-
0217    Neuropathy G62.9

 

 [facility]  [address] [address]-
4178    Chronic pain syndrome G89.4 ; Lumbago with sciatica, left 
side M54.42 ; Lumbago with sciatica, right side M54.41 ; Screening cholesterol 
level Z13.220 ; Screening for diabetes mellitus Z13.1 ; Screening for thyroid 
disorder Z13.29 ; Fibromyalgia M79.7 ; Anxiety disorder, unspecified F41.9 and 
Neuropathy G62.9

 

 [facility]  [address] [place] [address] 01288-
9801     

 

 [facility]  [address] [place] [address] 22832-
1462  25 Oct, 2017   

 

 [place] [address] [place] [address] 29257-
5270  10 Oct, 2017  Fibromyalgia M79.7 ; Chronic pain syndrome G89.4 ; Anxiety 
disorder, unspecified F41.9 ; Neuropathy G62.9 and Acute bronchitis, 
unspecified organism J20.9

 

 [facility]  [address] [place] [address] 91900-
1920  09 Oct, 2017   

 

 [facility]  [address] [place] [address] 02579-
5723  25 Sep, 2017   

 

 [facility]  [address] [place] [address] 57197-
8355  19 Sep, 2017   

 

 [facility]  [address] [place] [address] 58640-
9640  08 Sep, 2017   

 

 [facility]  [address] [place] [address] 30152-
2379  23 Aug, 2017   

 

 [facility]  [address] [place] [address] 63956-
3790  22 Aug, 2017  Acute seasonal allergic rhinitis due to pollen J30.1

 

 [facility]  [address] [place] [address] 42232-
3015  21 Aug, 2017   

 

 [facility]  [address] [place] [address] 30846-
9993  09 Aug, 2017   

 

 [facility]  [address] [address]-
8310     

 

 [facility]  [address] [address]-
3674     

 

 [facility]  [address] [address]-
5783  10 Jul, 2017   

 

 [facility]  [address] [address]-
4764     

 

 [facility]  [address] [address]-
1871     

 

 [facility]  [address] [address]-
2675     

 

 [facility]  [address] [address]-
9429    Chronic pain syndrome G89.4 ; Fibromyalgia M79.7 ; Anxiety 
disorder, unspecified F41.9 ; Neuropathy G62.9 and Low back pain M54.5

 

 [facility]  [address] [address]-
3702  25 May, 2017  Low back pain M54.5

 

 [facility]  [address] [address]-
9067  24 May, 2017   

 

 [facility]  [id number] [address]-
5103  22 May, 2017   

 

 [facility]  [id number] [address]-
1335  16 May, 2017   

 

 [facility]  [id number] [address]-
9003  16 May, 2017   

 

 [facility]  [id number] [address]-
3624  12 May, 2017  Routine adult health maintenance Z00.00 ; Fibromyalgia 
M79.7 ; Chronic pain syndrome G89.4 ; Abnormal lung sounds R09.89 ; Coronary 
artery disease involving native coronary artery of native heart without angina 
pectoris I25.10 and S/P CABG (coronary artery bypass graft) Z95.1

 

 [facility]  [address] [facility] [id number] [place] [address]-
9137  09 May, 2017   







IMMUNIZATIONS

No Known Immunizations



SOCIAL HISTORY

Never Assessed



REASON FOR VISIT

Controlled Med Refill



PLAN OF CARE





VITAL SIGNS





MEDICATIONS







 Medication  Instructions  Dosage  Frequency  Start Date  End Date  Duration  
Status

 

 Xanax 0.25 MG  Orally Twice a day  1 tablet  12h        28 days  Active

 

 ProAir  (90 Base) MCG/ACT  Inhalation every 4 hrs  2 puffs as needed  
4h  10 Oct, 2017     12 months  Active







RESULTS

No Results



PROCEDURES

No Known procedures



INSTRUCTIONS





MEDICATIONS ADMINISTERED

No Known Medications



MEDICAL (GENERAL) HISTORY







 Type  Description  Date

 

 Medical History  fibromyalgia   

 

 Medical History  MRI 2016- small central protrusion/herniation w/minimal 
impression on thecal anteriorly at C5-6, At C3-4 small bilat. uncinate spurs w/ 
mild right neural foraminal narrowing   

 

 Medical History  MRI 2016- mild degenerative changes. No spinal canal 
stenosis or foraminal narrowing of thoracic spine   

 

 Medical History  hypertension   

 

 Medical History  Anxiety disorder   

 

 Medical History  depression   

 

 Medical History  migraine headaches   

 

 Medical History  Hx of C-Diff   

 

 Medical History  Hx of Pneumonia   

 

 Surgical History  Open Heart Surgery - [facility] -Dr. Lima  (
Cardiologist- Dr Logan)  

 

 Surgical History  hysterectomy - Dr Luis   

 

 Surgical History  Left Breast Lumpectomy (Bx - Benign)- Dr Luis   

 

 Surgical History  Port - Dr Luis   

 

 Surgical History  Left Knee Surgeries x3- Dr Carolina did 2 and Dr Gan did 1   

 

 Hospitalization History  C-Diff - [facility]   

 

 Hospitalization History  Pneumonia - [facility]   

 

 Hospitalization History  Open Heart Surgery - [facility]

## 2019-01-07 NOTE — XMS REPORT
[facility]

 Created on: 2018



Dayami Rae

External Reference #: [id number]

: 1970

Sex: Female



Demographics







 Address  [address] [place]  [address]

 

 Preferred Language  Unknown

 

 Marital Status  Unknown

 

 Caodaism Affiliation  Unknown

 

 Race  Unknown

 

 Ethnic Group  Unknown





Author







 Author  KIM FUCHS

 

 Organization  [facility]

 

 Address  [address] [place]  [address]



 

 Phone  [phone]







Care Team Providers







 Care Team Member Name  Role  Phone

 

 FUCHSJAMA JACKSONELE  Unavailable  [phone]







PROBLEMS







 Type  Condition  ICD9-CM Code  KQR82-KF Code  Onset Dates  Condition Status  
SNOMED Code

 

 Problem  Fibromyalgia     M79.7     Active  317900133

 

 Problem  Neuropathy     G62.9     Active  588678423

 

 Problem  Coronary artery disease involving native coronary artery of native 
heart without angina pectoris     I25.10     Active  [id number]

 

 Problem  Chronic pain syndrome     G89.4     Active  010118282

 

 Problem  COPD suggested by initial evaluation     J44.9     Active  14476708

 

 Problem  Acute midline low back pain with left-sided sciatica     M54.42     
Active  193910603

 

 Problem  Lumbago with sciatica, left side     M54.42     Active  154173760

 

 Problem  Anxiety disorder, unspecified     F41.9     Active  230290296

 

 Problem  Other chronic pain     G89.29     Active  88503827

 

 Problem  Lumbago with sciatica, right side     M54.41     Active  
[id number]







ALLERGIES

No Information



ENCOUNTERS







 Encounter  Location  Date  Diagnosis

 

 [facility]  [address] [place] [address] 34361-
4189     

 

 [facility]  [address] [place] [address] 37481-
9033  05 2018   

 

 [facility]  [address] [place] [address] 89471-
9051    Chronic pain syndrome G89.4

 

 [facility]  [id number] [place] [address] 84343-
0232     

 

 [facility]  [id number] [place] [address] 84755-
1479  15 2018   

 

 [facility]  [id number] [place] [address] 05846-
5331    Acute bronchitis, unspecified organism J20.9

 

 [facility]  [id number] [place] [address] 58321-
2268    Chronic pain syndrome G89.4

 

 [facility]  [id number] [place] [address] 64626-
3882  25 May, 2018   

 

 [facility]  [id number] [place] [address] 86912-
4008  24 May, 2018  Acute midline low back pain with left-sided sciatica M54.42

 

 [facility]  [id number] [place] [address] 47316-
7825  22 May, 2018   

 

 [facility]  [id number] [place] [address] [phone]
-[phone]  21 May, 2018  Bronchitis J40

 

 [facility]  [id number] [place] [address] 01746-
0760  07 May, 2018  Chronic pain syndrome G89.4 and Neuropathy G62.9

 

 [facility]  [id number] [place] [address] 40353-
4290    Acute midline low back pain with left-sided sciatica M54.42

 

 [facility]  [id number] [place] [address] 54140-
3966     

 

 [facility]  [id number] [place] [address] 05991-
1460    Anxiety disorder, unspecified F41.9

 

 [facility]  [id number] [place] [address] 49268-
6351     

 

 [facility]  [id number] [place] [address] 14125-
1563    Chronic pain syndrome G89.4 and Acute midline low back pain 
with left-sided sciatica M54.42

 

 [facility]  [address] [place] [address] 51999-
4318    Chronic pain syndrome G89.4

 

 [facility]  [address] [address]-
2733     

 

 [facility]  [id number] [place] [address] 54647-
9875     

 

 [facility]  [id number] [place] [address] 45085-
4282  29 Mar, 2018  Injury of left knee, initial encounter S89.92XA and COPD 
suggested by initial evaluation J44.9

 

 [facility]  [id number] [place] [address] 73862-
0553  28 Mar, 2018  Acute bronchitis, unspecified organism J20.9

 

 [place] [id number] [place] [address] 74262-
9242  27 Mar, 2018  Acute bronchitis, unspecified organism J20.9

 

 [place] [id number] [place] [address] 89144-
7889  21 Mar, 2018  Anxiety disorder, unspecified F41.9 and Acute bronchitis, 
unspecified organism J20.9

 

 [facility]  [id number] [place] [address] 34400-
5545  08 Mar, 2018  Chronic pain syndrome G89.4

 

 [place] [id number] [place] [address] 32149-
7710  05 Mar, 2018   

 

 [facility]  [id number] [place] [address] 09945-
8982  05 Mar, 2018  Acute midline low back pain with left-sided sciatica M54.42

 

 [facility]  [address] [place] [address] 68624-
6159     

 

 [facility]  [id number] [place] [address] 79607-
2447    Chronic pain syndrome G89.4

 

 [facility]  [address] [place] [address] 28447-
4561    Chronic pain syndrome G89.4

 

 [place] [id number] [place] [address] 10950-
0688     

 

 [facility]  [id number] [place] [address] 62209-
4061    Gastroenteritis K52.9

 

 [facility]  [address] [address]312-
1383     

 

 [facility]  [address] 90884-
9646  15 Jerardo, 2018  Acute bronchitis, unspecified organism J20.9

 

 [facility]  [address] 07339-
9628    Anxiety disorder, unspecified F41.9 and Neuropathy G62.9

 

 [facility]  [id number] [place] [address] 59688-
2163    Chronic pain syndrome G89.4

 

 [facility]  [address] 81801-
1898    Bronchitis J40 and Laryngitis J04.0

 

 [facility]  [id number] [place] [address] 06781-
6024  29 Dec, 2017   

 

 [facility]  [address] [address]241-
5537  26 Dec, 2017  Bronchitis J40

 

 [facility]  [address] 59379-
3188  18 Dec, 2017   

 

 [facility]  [address] 05634-
8774  13 Dec, 2017  Fibromyalgia M79.7 and Chronic pain syndrome G89.4

 

 [facility]  [id number] [place] [address] 05056-
6770  04 Dec, 2017  Chronic pain syndrome G89.4 and Acute bronchitis, 
unspecified organism J20.9

 

 [facility]  [id number] [place] [address] 80389-
8659    Neuropathy G62.9

 

 [facility]  [address] [address]-
9329    Chronic pain syndrome G89.4 ; Lumbago with sciatica, left 
side M54.42 ; Lumbago with sciatica, right side M54.41 ; Screening cholesterol 
level Z13.220 ; Screening for diabetes mellitus Z13.1 ; Screening for thyroid 
disorder Z13.29 ; Fibromyalgia M79.7 ; Anxiety disorder, unspecified F41.9 and 
Neuropathy G62.9

 

 [facility]  [id number] [place] [address] 33713-
7009     

 

 [facility]  [address] [place] [address] 40176-
8146  25 Oct, 2017   

 

 [place] [address] [place] [address] 78398-
4385  10 Oct, 2017  Fibromyalgia M79.7 ; Chronic pain syndrome G89.4 ; Anxiety 
disorder, unspecified F41.9 ; Neuropathy G62.9 and Acute bronchitis, 
unspecified organism J20.9

 

 [facility]  [id number] [place] [address] 58049-
2675  09 Oct, 2017   

 

 [facility]  [address] [place] [address] 94165-
3393  25 Sep, 2017   

 

 [facility]  [address] [place] [address] 85934-
6552  19 Sep, 2017   

 

 [facility]  [address] [place] [address] 15212-
9671  08 Sep, 2017   

 

 [facility]  [address] [place] [address] 14228-
2358  23 Aug, 2017   

 

 [facility]  [address] [place] [address] 76970-
0935  22 Aug, 2017  Acute seasonal allergic rhinitis due to pollen J30.1

 

 [facility]  [address] [place] [address] 02776-
2257  21 Aug, 2017   

 

 [facility]  [address] [place] [address] 91787-
0445  09 Aug, 2017   

 

 [facility]  [address] [address]-
3656     

 

 [facility]  [address] [address]-
4974     

 

 [facility]  [address] [address]-
2260  10 Jul, 2017   

 

 [facility]  [address] [address]-
9582     

 

 [facility]  [address] [address]-
6938     

 

 [facility]  [address] [address]-
6438     

 

 [facility]  [address] [address]-
4914    Chronic pain syndrome G89.4 ; Fibromyalgia M79.7 ; Anxiety 
disorder, unspecified F41.9 ; Neuropathy G62.9 and Low back pain M54.5

 

 [facility]  [address] [address]-
2306  25 May, 2017  Low back pain M54.5

 

 [facility]  [address] [address]-
4869  24 May, 2017   

 

 [facility]  [id number] [address]-
8061  22 May, 2017   

 

 [facility]  [id number] [address]-
2685  16 May, 2017   

 

 [facility]  [id number] [address]-
1523  16 May, 2017   

 

 [facility]  [id number] [address]-
4421  12 May, 2017  Routine adult health maintenance Z00.00 ; Fibromyalgia 
M79.7 ; Chronic pain syndrome G89.4 ; Abnormal lung sounds R09.89 ; Coronary 
artery disease involving native coronary artery of native heart without angina 
pectoris I25.10 and S/P CABG (coronary artery bypass graft) Z95.1

 

 [facility]  [address] [facility] [id number] [place] [address]-
6399  09 May, 2017   







IMMUNIZATIONS

No Known Immunizations



SOCIAL HISTORY

Never Assessed



REASON FOR VISIT

Medication refill request



PLAN OF CARE





VITAL SIGNS





MEDICATIONS







 Medication  Instructions  Dosage  Frequency  Start Date  End Date  Duration  
Status

 

 Promethazine HCl 25 MG  Orally 4 times a day prn  1 tablet as needed           
30 days  Active







RESULTS

No Results



PROCEDURES

No Known procedures



INSTRUCTIONS





MEDICATIONS ADMINISTERED

No Known Medications



MEDICAL (GENERAL) HISTORY







 Type  Description  Date

 

 Medical History  fibromyalgia   

 

 Medical History  MRI 2016- small central protrusion/herniation w/minimal 
impression on thecal anteriorly at C5-6, At C3-4 small bilat. uncinate spurs w/ 
mild right neural foraminal narrowing   

 

 Medical History  MRI 2016- mild degenerative changes. No spinal canal 
stenosis or foraminal narrowing of thoracic spine   

 

 Medical History  hypertension   

 

 Medical History  Anxiety disorder   

 

 Medical History  depression   

 

 Medical History  migraine headaches   

 

 Medical History  Hx of C-Diff   

 

 Medical History  Hx of Pneumonia   

 

 Surgical History  Open Heart Surgery - [facility] -Dr. Lima  (
Cardiologist- Dr Logan)  

 

 Surgical History  hysterectomy - Dr Luis   

 

 Surgical History  Left Breast Lumpectomy (Bx - Benign)- Dr Luis   

 

 Surgical History  Port - Dr Luis   

 

 Surgical History  Left Knee Surgeries x3- Dr Carolina did 2 and Dr Gan did 1   

 

 Hospitalization History  C-Diff - [facility]   

 

 Hospitalization History  Pneumonia - [facility]   

 

 Hospitalization History  Open Heart Surgery - [facility]

## 2019-01-07 NOTE — XMS REPORT
[facility]

 Created on: 2018



Dayami Rae

External Reference #: [id number]

: 1970

Sex: Female



Demographics







 Address  [address] [place]  [address]

 

 Preferred Language  Unknown

 

 Marital Status  Unknown

 

 Bahai Affiliation  Unknown

 

 Race  Unknown

 

 Ethnic Group  Unknown





Author







 Author  KIM FUCHS

 

 Organization  [facility]

 

 Address  [address] [place]  [address]



 

 Phone  [phone]







Care Team Providers







 Care Team Member Name  Role  Phone

 

 FUCHSKIM JACKSON  Unavailable  [phone]







PROBLEMS







 Type  Condition  ICD9-CM Code  OQS82-QW Code  Onset Dates  Condition Status  
SNOMED Code

 

 Problem  Fibromyalgia     M79.7     Active  593200469

 

 Problem  Neuropathy     G62.9     Active  191290597

 

 Problem  Coronary artery disease involving native coronary artery of native 
heart without angina pectoris     I25.10     Active  0301626891911

 

 Problem  Chronic pain syndrome     G89.4     Active  417868005

 

 Problem  COPD suggested by initial evaluation     J44.9     Active  68657371

 

 Problem  Acute midline low back pain with left-sided sciatica     M54.42     
Active  721514336

 

 Problem  Lumbago with sciatica, left side     M54.42     Active  430702625

 

 Problem  Anxiety disorder, unspecified     F41.9     Active  278316901

 

 Problem  Other chronic pain     G89.29     Active  03563161

 

 Problem  Lumbago with sciatica, right side     M54.41     Active  
[id number]







ALLERGIES

No Information



ENCOUNTERS







 Encounter  Location  Date  Diagnosis

 

 [facility]  [address] [place] [address] 20168-
3938     

 

 [facility]  [address] [place] [address] 41635-
7834    Acute bronchitis, unspecified organism J20.9

 

 [facility]  [address] [place] [address] 11815-
7964     

 

 [facility]  [id number] [place] [address] 38690-
0892     

 

 [facility]  [id number] [place] [address] 45884-
4763    Chronic pain syndrome G89.4

 

 [facility]  [address] [place] [address]-
1663     

 

 [facility]  [id number] [place] [address] 82344-
3745  15 Marko, 2018   

 

 [facility]  [id number] [place] [address] 18001-
7617  08 2018  Acute bronchitis, unspecified organism J20.9

 

 [facility]  [address] [address]-
0798    Chronic pain syndrome G89.4

 

 [facility]  [id number] [place] [address] 01036-
2583  25 May, 2018   

 

 [facility]  [id number] [place] [address] 49696-
3505  24 May, 2018  Acute midline low back pain with left-sided sciatica M54.42

 

 [facility]  [id number] [place] [address] 64654-
0431  22 May, 2018   

 

 [facility]  [id number] [place] [address] [phone]
[phone]  21 May, 2018  Bronchitis J40

 

 [facility]  [id number] [place] [address] 53633-
1268  07 May, 2018  Chronic pain syndrome G89.4 and Neuropathy G62.9

 

 [facility]  [id number] [place] [address] 52771-
5910    Acute midline low back pain with left-sided sciatica M54.42

 

 [facility]  [id number] [place] [address] 53410-
4013     

 

 [facility]  [id number] [place] [address] 38534-
4970    Anxiety disorder, unspecified F41.9

 

 [facility]  [id number] [place] [address] 32639-
8707     

 

 [facility]  [id number] [place] [address] 73954-
7748    Chronic pain syndrome G89.4 and Acute midline low back pain 
with left-sided sciatica M54.42

 

 [place] [address] [address]-
4501    Chronic pain syndrome G89.4

 

 [facility]  [address] [address]-
2266     

 

 [facility]  [address] [address]-
5902     

 

 [place] [address] [address]-
0203  29 Mar, 2018  Injury of left knee, initial encounter S89.92XA and COPD 
suggested by initial evaluation J44.9

 

 [place] [address] [address]-
0826  28 Mar, 2018  Acute bronchitis, unspecified organism J20.9

 

 [place] [address] [address]-
3163  27 Mar, 2018  Acute bronchitis, unspecified organism J20.9

 

 [place] [address] [address]697-
6106  21 Mar, 2018  Anxiety disorder, unspecified F41.9 and Acute bronchitis, 
unspecified organism J20.9

 

 [place] [id number] [place] [address] 48893-
5593  08 Mar, 2018  Chronic pain syndrome G89.4

 

 [facility]  [id number] [place] [address] 77234-
6069  05 Mar, 2018   

 

 [facility]  [id number] [place] [address] 94536-
0835  05 Mar, 2018  Acute midline low back pain with left-sided sciatica M54.42

 

 [facility]  [id number] [place] [address] 87707-
6518     

 

 [place] [address] [address]-
5763    Chronic pain syndrome G89.4

 

 [facility]  [id number] [place] [address] 97934-
8979    Chronic pain syndrome G89.4

 

 [facility]  [id number] [place] [address] 41310-
9168     

 

 [facility]  [address] 47[address]-
0277    Gastroenteritis K52.9

 

 [facility]  [id number] [place] [address] 49565-
1236     

 

 [facility]  [address] [address]-
9383  15 Jerardo, 2018  Acute bronchitis, unspecified organism J20.9

 

 [facility]  [address] [address]-
3275    Anxiety disorder, unspecified F41.9 and Neuropathy G62.9

 

 [facility]  [id number] [place] [address] 52101-
0767    Chronic pain syndrome G89.4

 

 [facility]  [address] [place] [address] 59886-
1782    Bronchitis J40 and Laryngitis J04.0

 

 [facility]  [address] [place] [address] 01143-
4227  29 Dec, 2017   

 

 [facility]  [address] [place] [address] 69823-
4181  26 Dec, 2017  Bronchitis J40

 

 [facility]  [address] [place] [address] 56015-
2518  18 Dec, 2017   

 

 [facility]  [address] [address]-
3485  13 Dec, 2017  Fibromyalgia M79.7 and Chronic pain syndrome G89.4

 

 [facility]  [address] [place] [address] 59918-
2855  04 Dec, 2017  Chronic pain syndrome G89.4 and Acute bronchitis, 
unspecified organism J20.9

 

 [facility]  [address] [place] [address] 05351-
9333    Neuropathy G62.9

 

 [facility]  [address] [place] [address] 15703-
2599    Chronic pain syndrome G89.4 ; Lumbago with sciatica, left 
side M54.42 ; Lumbago with sciatica, right side M54.41 ; Screening cholesterol 
level Z13.220 ; Screening for diabetes mellitus Z13.1 ; Screening for thyroid 
disorder Z13.29 ; Fibromyalgia M79.7 ; Anxiety disorder, unspecified F41.9 and 
Neuropathy G62.9

 

 [place] [address] [place] [address] 84707-
0310     

 

 [place] [address] [place] [address] 24204-
4030  25 Oct, 2017   

 

 [facility]  [address] [address]-
8148  10 Oct, 2017  Fibromyalgia M79.7 ; Chronic pain syndrome G89.4 ; Anxiety 
disorder, unspecified F41.9 ; Neuropathy G62.9 and Acute bronchitis, 
unspecified organism J20.9

 

 [facility]  [address] [place] [address] 04265-
7418  09 Oct, 2017   

 

 [facility]  [address] [place] [address] 80565-
6306  25 Sep, 2017   

 

 [facility]  [address] [place] [address] 19931-
6727  19 Sep, 2017   

 

 [facility]  [address] [place] [address] 57200-
0187  08 Sep, 2017   

 

 [facility]  [address] [place] [address] 89260-
8613  23 Aug, 2017   

 

 [facility]  [address] [place] [address] 43034-
8295  22 Aug, 2017  Acute seasonal allergic rhinitis due to pollen J30.1

 

 [facility]  [address] [address]-
8486  21 Aug, 2017   

 

 [facility]  [address] [address]-
7654  09 Aug, 2017   

 

 [facility]  [address] [address]-
2730     

 

 [facility]  [address] [address]-
1696     

 

 [facility]  [address] [address]-
5742  10 Jul, 2017   

 

 [facility]  [address] [address]-
0698     

 

 [facility]  [address] [address]-
2640     

 

 [facility]  [address] [address]-
7463     

 

 [facility]  [address] [address]-
3942    Chronic pain syndrome G89.4 ; Fibromyalgia M79.7 ; Anxiety 
disorder, unspecified F41.9 ; Neuropathy G62.9 and Low back pain M54.5

 

 [facility]  [address] [address]-
6068  25 May, 2017  Low back pain M54.5

 

 [facility]  [address] [address]-
7470  24 May, 2017   

 

 [facility]  [address] [address]-
7473  22 May, 2017   

 

 [facility]  [address] [address]-
3618  16 May, 2017   

 

 [facility]  [address] [place] [address]-
1829  16 May, 2017   

 

 [facility]  [address] [facility] [id number] [place] [address]-
2546  12 May, 2017  Routine adult health maintenance Z00.00 ; Fibromyalgia 
M79.7 ; Chronic pain syndrome G89.4 ; Abnormal lung sounds R09.89 ; Coronary 
artery disease involving native coronary artery of native heart without angina 
pectoris I25.10 and S/P CABG (coronary artery bypass graft) Z95.1

 

 [facility]  [address] [facility] [id number] [address]-
2546  09 May, 2017   







IMMUNIZATIONS

No Known Immunizations



SOCIAL HISTORY

Never Assessed



REASON FOR VISIT

Controlled refill request



PLAN OF CARE





VITAL SIGNS





MEDICATIONS







 Medication  Instructions  Dosage  Frequency  Start Date  End Date  Duration  
Status

 

 Tramadol HCl 50 mg  Orally every 6 hrs  1 tablet as needed  6h    
   28 days  Active







RESULTS

No Results



PROCEDURES

No Known procedures



INSTRUCTIONS





MEDICATIONS ADMINISTERED

No Known Medications



MEDICAL (GENERAL) HISTORY







 Type  Description  Date

 

 Medical History  fibromyalgia   

 

 Medical History  MRI 2016- small central protrusion/herniation w/minimal 
impression on thecal anteriorly at C5-6, At C3-4 small bilat. uncinate spurs w/ 
mild right neural foraminal narrowing   

 

 Medical History  MRI 2016- mild degenerative changes. No spinal canal 
stenosis or foraminal narrowing of thoracic spine   

 

 Medical History  hypertension   

 

 Medical History  Anxiety disorder   

 

 Medical History  depression   

 

 Medical History  migraine headaches   

 

 Medical History  Hx of C-Diff   

 

 Medical History  Hx of Pneumonia   

 

 Surgical History  Open Heart Surgery - [facility] -Dr. Lima  (
Cardiologist- Dr Logan)  

 

 Surgical History  hysterectomy - Dr Luis   

 

 Surgical History  Left Breast Lumpectomy (Bx - Benign)- Dr Luis   

 

 Surgical History  Port - Dr Luis   

 

 Surgical History  Left Knee Surgeries x3- Dr Carolina did 2 and Dr Gan did 1   

 

 Hospitalization History  C-Diff - [facility]   

 

 Hospitalization History  Pneumonia - [facility]   

 

 Hospitalization History  Open Heart Surgery - [facility]

## 2019-01-08 VITALS — SYSTOLIC BLOOD PRESSURE: 110 MMHG | DIASTOLIC BLOOD PRESSURE: 61 MMHG

## 2019-01-08 VITALS — SYSTOLIC BLOOD PRESSURE: 117 MMHG | DIASTOLIC BLOOD PRESSURE: 75 MMHG

## 2019-01-08 VITALS — DIASTOLIC BLOOD PRESSURE: 62 MMHG | SYSTOLIC BLOOD PRESSURE: 116 MMHG

## 2019-01-08 VITALS — SYSTOLIC BLOOD PRESSURE: 107 MMHG | DIASTOLIC BLOOD PRESSURE: 59 MMHG

## 2019-01-08 VITALS — SYSTOLIC BLOOD PRESSURE: 98 MMHG | DIASTOLIC BLOOD PRESSURE: 61 MMHG

## 2019-01-08 LAB
BASOPHILS # BLD AUTO: 0 10^3/UL (ref 0–0.1)
BASOPHILS NFR BLD AUTO: 1 % (ref 0–10)
BUN/CREAT SERPL: 9
CALCIUM SERPL-MCNC: 8.4 MG/DL (ref 8.5–10.1)
CHLORIDE SERPL-SCNC: 107 MMOL/L (ref 98–107)
CHOLEST SERPL-MCNC: 87 MG/DL (ref ?–200)
CO2 SERPL-SCNC: 26 MMOL/L (ref 21–32)
CREAT SERPL-MCNC: 0.75 MG/DL (ref 0.6–1.3)
EOSINOPHIL # BLD AUTO: 0.3 10^3/UL (ref 0–0.3)
EOSINOPHIL NFR BLD AUTO: 7 % (ref 0–10)
ERYTHROCYTE [DISTWIDTH] IN BLOOD BY AUTOMATED COUNT: 13.2 % (ref 10–14.5)
GFR SERPLBLD BASED ON 1.73 SQ M-ARVRAT: > 60 ML/MIN
GLUCOSE SERPL-MCNC: 89 MG/DL (ref 70–105)
HCT VFR BLD CALC: 35 % (ref 35–52)
HDLC SERPL-MCNC: 28 MG/DL (ref 40–60)
HGB BLD-MCNC: 12.2 G/DL (ref 11.5–16)
LYMPHOCYTES # BLD AUTO: 2.1 X 10^3 (ref 1–4)
LYMPHOCYTES NFR BLD AUTO: 44 % (ref 12–44)
MANUAL DIFFERENTIAL PERFORMED BLD QL: NO
MCH RBC QN AUTO: 31 PG (ref 25–34)
MCHC RBC AUTO-ENTMCNC: 35 G/DL (ref 32–36)
MCV RBC AUTO: 90 FL (ref 80–99)
MONOCYTES # BLD AUTO: 0.4 X 10^3 (ref 0–1)
MONOCYTES NFR BLD AUTO: 8 % (ref 0–12)
NEUTROPHILS # BLD AUTO: 1.9 X 10^3 (ref 1.8–7.8)
NEUTROPHILS NFR BLD AUTO: 40 % (ref 42–75)
PLATELET # BLD: 228 10^3/UL (ref 130–400)
PMV BLD AUTO: 9.9 FL (ref 7.4–10.4)
POTASSIUM SERPL-SCNC: 3.1 MMOL/L (ref 3.6–5)
RBC # BLD AUTO: 3.93 10^6/UL (ref 4.35–5.85)
SODIUM SERPL-SCNC: 142 MMOL/L (ref 135–145)
TRIGL SERPL-MCNC: 80 MG/DL (ref ?–150)
VLDLC SERPL CALC-MCNC: 16 MG/DL (ref 5–40)
WBC # BLD AUTO: 4.8 10^3/UL (ref 4.3–11)

## 2019-01-08 RX ADMIN — Medication SCH ML: at 05:40

## 2019-01-08 RX ADMIN — ONDANSETRON PRN MG: 2 INJECTION, SOLUTION INTRAMUSCULAR; INTRAVENOUS at 08:39

## 2019-01-08 RX ADMIN — Medication SCH ML: at 08:39

## 2019-01-08 NOTE — DISCHARGE INSTRUCTIONS
Discharge [facility]


Discharge Medications


New, Converted or Re-Newed RX:  Transmitted to Pharmacy


New Medications:  


Pantoprazole Sodium (Pantoprazole Sodium) 40 Mg Tablet.dr


40 MG PO DAILY for 30 Days, #30 TAB 0 Refills





Sucralfate (Carafate) 1 Gm Tablet


1 GM PO QID, #120 TAB 0 Refills





 


Continued Medications:  


Alprazolam (Alprazolam) 0.25 Mg Tablet


0.25 MG PO DAILY PRN for ANXIETY, TAB





Alprazolam (Alprazolam) 0.25 Mg Tablet


0.125 MG PO HS PRN for ANXIETY, TAB


TAKES 1/2 (0.25MG) TABLETS


Atorvastatin Calcium (Atorvastatin Calcium) 40 Mg Tablet


40 MG PO HS, TAB


LAST FILLED #90 7-5-18


Chlorzoxazone (Chlorzoxazone) 500 Mg Tablet


500 MG PO TID PRN for MUSCLE SPASMS, TAB





Clopidogrel Bisulfate (Plavix) 75 Mg Tablet


75 MG PO HS, TAB





Gabapentin (Gabapentin) 300 Mg Capsule


300 MG PO HS, CAP





Ipratropium/Albuterol Sulfate (Iprat-Albut 0.5-3(2.5) mg/3 ml) 3 Ml Ampul.neb


3 ML NEB Q4H PRN for SHORTNESS OF BREATH, EA





Metoprolol Succinate (Metoprolol Succinate) 25 Mg Tab.er.24h


25 MG PO HS, TAB





Promethazine HCl (Promethazine Tablet) 25 Mg Tablet


25 MG PO Q4H PRN for NAUSEA/VOMITING-2ND LINE, TAB





Quetiapine Fumarate (Quetiapine Fumarate) 200 Mg Tablet


400 MG PO HS, TAB


TAKES 2 (200MG) TABLETS


Sumatriptan Succinate (Imitrex) 6 Mg/0.5 Ml Cartridge


UD PRN for MIGRAINE, EA





Tramadol HCl (Tramadol HCl) 50 Mg Tablet


50 MG PO Q6H PRN for PAIN-MODERATE, TAB











Patient Instructions


Goal/Follow Up Appt:  


Follow up with Dr. Bill on 1/14 at 3:40 pm.


[facility]  


Difficulty breathing, blood in stools





Activity & Diet


Discharge Diet:  Cardiac Diet


Activity as Tolerated:  Yes











WENDIE STONE MD Jan 8, 2019 13:43

## 2019-01-08 NOTE — NUR
PATIENT LISTED HER MEDICATIONS TO ME, I CALLED [facility] TO VERIFY DOSES AND LAST FILL 
DATES.



[facility] FILLED:

12-27-18 XANAX 0.25MG 1 AM 1/2 HS (STATES SHE TAKES PRN)

12-24-18 TRAMADOL 50MG Q6H PRN 

12-19-18 CHLORZOXAZONE 500MG TID PRN

12-14-18 METOPROLOL SUCCINATE 25MG HS

12-14-18 PLAVIX 75MG HS #30

12-6-18 GABAPENTIN 300MG HS (ALSO HAS A REFILL READY FOR  NOW)

12-4-18 SEROQUEL 200MG 2.5HS (ONLY TAKES 2 TABS HS)



SHE STATES SHE ALSO TAKES A STATIN HS. [facility] HAS NEVER FILLED ANY STATINS. THE CLINIC 
HAS LIPITOR ON FILE AS REPORTED FROM DR. CASTRO. AFTER SPEAKING WITH THE PATIENT AGAIN SHE 
STATES SHE LAST FILLED IT AT [facility] WHEN SHE HAD INSURANCE, SHE STATES HE  IS 
PRESCRIBED THE SAME MEDICATION AND HE CUTS HIS TABLET IN HALF TO GET THE SAME DOSE SHE IS 
PRESCRIBED.  I VERIFIED WITH [facility] THEY LAST FILLED LIPITOR 40MG HS #90 7-5-18 FOR THE 
PATIENT.



PATIENT ALSO STATES SHE HAS PROMETHAZINE, IMITREX, AND DUONEB THAT WAS ON FILE FROM HER LAST 
VISIT ON HAND AT HOME AS NEEDED.

## 2019-01-08 NOTE — NUR
DR CASTRO'S NP [facility] WAS ON FLOOR AND THIS RN ASKED IF IT WAS OK TO GIVE PT'S AM  MEDS -- 
SHE VOICED PT HAD BEEN CATH 1 YR AGO AND IT WAS FINE TO GO AHEAD AND GIVE PT'S MEDS ON MAR 
-- DID SO WITH SIPS OF H2O

## 2019-01-08 NOTE — CONSULTATION-CARDIOLOGY
HPI-Cardiology


Cardiology Consultation:


Date of Consultation


19


Time Seen by a Provider:  11:40


Date of Admission





Attending Physician


Janki Ohara MD


Admitting Physician


Puma Bill MD


Consulting Physician


DARREL CASTRO MD, MA, FACP, FACC, FSCAI, CCDS





HPI:


Chief Complaint:


Chest discomfort


Ms. Rae is a 48 year old female admitted to 415 from the ED with c/o CP.  She 

reports for approx the last 2 weeks she has been having epigastric pain which 

radiates up her chest.  She reports yesterday the discomfort radiated into her 

neck bilat.  She states she has been experiencing a lot of heartburn as well.  

She states the chest pain is a pressure that lasts for a few minutes.  It comes 

on without r/t activity, but seems to occur more frequently at work.  She 

denies any n/v/d.  She denies any diaphoresis.  She denies any dyspnea.  She 

states she has been taking OTC antacids as well as Pepcid, Prilosec and Zantac 

with only minimal relief.  She states she was given nitro in the ED which did 

help the chest pain, but she had persistent heartburn.  She denies any LE 

edema.  No palpitations.  No syncope or near syncope.  She is currently pain 

free.  She does report tenderness with palpation in the epigastric region.





Review of Systems-Cardiology


Review of Systems


Constitutional:  No chills, No fever


Eyes:  No vision change


Ears/Nose/Throat:  No epistaxis, No recent hearing loss


Respiratory:  As described under HPI


Cardiovascular:  As described under HPI


Gastrointestinal:  No constipation, No diarrhea, No nausea, No vomiting; other (

c/o heartburn)


Genitourinary:  No dysuria, No hematuria


Musculoskeletal:  no symptoms reported


Skin:  No rash, No ulcerations


Psychiatric/Neurological:  No seizure, No focal weakness, No syncope


Hematologic:  No bleeding abnormalities





PMH-Social-Family Hx


Patient Social History


Alcohol Use:  Denies Use


Recreational Drug Use:  No


Smoking Status:  Current Everyday Smoker


Type Used:  Cigarettes


2nd Hand Smoke Exposure:  Yes


Recent Foreign Travel:  No


Recent Infectious Disease Expo:  No


Hospitalization with Isolation:  Denies


Physical Abuse Screen:  No


Sexual Abuse:  No





Immunizations Up To Date


Tetanus Booster (TDap):  Unknown


Date of Pneumonia Vaccine:  2016


Date of Influenza Vaccine:  Dec 7, 2018





Past Medical History


PMH


As described under Assessment.





Family Medical History


Family Medical History:  


She reports her father had CAD, HTN and HLP.


Family History:  


Cancer (brain and back)


  G8 SISTER


  G8 SISTER


Cataracts


  19 MOTHER


Headache disorder


  G8 SISTER


Hypercholesterolemia


  19 FATHER


Hypertension


  19 FATHER


Myocardial infarction


  19 FATHER


  19 FATHER


Myocardial infarction ( from MI at 55


)


  19 FATHER


  19 FATHER


Neoplasm


  G8 SISTER


Psychosocial problem


  G8 SISTER


Visual disorder


  G8 SISTER





No Family History of:


  AIDS


  Abdominal aortic aneurysm


  Han's disease


  Alcoholism


  Alzheimer's disease


  Aphasia


  Arthritis


  Asthma


  Cancer of mouth


  Cardiovascular disease


  Colon cancer


  Completed stroke


  Congenital disease


  Congenital heart disease


  Coronary thrombosis


  Cystic fibrosis


  Deafness or hearing loss


  Dementia


  Diabetes mellitus


  Drug abuse


  Dysphasia


  Fibrocystic disease of breast


  Gastroenteritis


  Glaucoma


  Infertility


  Kidney disease


  Not obtainable due to adoption


  Osteoporosis


  Parkinson's disease


  Prostate cancer


  Respiratory disorder


  Seizure disorder


  Severe allergy


  Thyroid disease


  Tuberculosis





Allergies and Home Medications


Allergies


Coded Allergies:  


     morphine (Verified  Allergy, Unknown, RASH - TAKES LORTAB AT HOME WITH NO 

ISSUES, 16)





Home Medications


Alprazolam 0.25 Mg Tablet, 0.25 MG PO DAILY PRN for ANXIETY, (Reported)


Alprazolam 0.25 Mg Tablet, 0.125 MG PO HS PRN for ANXIETY, (Reported)


   TAKES 1/2 (0.25MG) TABLETS 


Atorvastatin Calcium 40 Mg Tablet, 40 MG PO HS, (Reported)


   LAST FILLED #90 7-5-18 


Chlorzoxazone 500 Mg Tablet, 500 MG PO TID PRN for MUSCLE SPASMS, (Reported)


Clopidogrel Bisulfate 75 Mg Tablet, 75 MG PO HS, (Reported)


Gabapentin 300 Mg Capsule, 300 MG PO HS, (Reported)


Ipratropium/Albuterol Sulfate 3 Ml Ampul.neb, 3 ML NEB Q4H PRN for SHORTNESS OF 

BREATH, (Reported)


Metoprolol Succinate 25 Mg Tab.er.24h, 25 MG PO HS, (Reported)


Promethazine HCl 25 Mg Tablet, 25 MG PO Q4H PRN for NAUSEA/VOMITING-2ND LINE, (

Reported)


Quetiapine Fumarate 200 Mg Tablet, 400 MG PO HS, (Reported)


   TAKES 2 (200MG) TABLETS 


Sumatriptan Succinate 6 Mg/0.5 Ml Cartridge, UD PRN for MIGRAINE, (Reported)


Tramadol HCl 50 Mg Tablet, 50 MG PO Q6H PRN for PAIN-MODERATE, (Reported)





Patient Home Medication List


Home Medication List Reviewed:  Yes





Physical Exam-Cardiology


Physical Exam


Vital Signs/I&O











 19





 00:04 00:50 01:00 04:06


 


Temp 97.8 97.6  97.2


 


Pulse 72 69 67 65


 


Resp 18 18  18


 


B/P (MAP) 107/59 (75) 116/62 (80)  110/61 (77)


 


Pulse Ox 96 96  95


 


O2 Delivery Room Air Room Air  Room Air


 


    





 19 





 07:00 08:00 08:00 


 


Temp   97.8 


 


Pulse 62  58 


 


Resp   16 


 


B/P (MAP)   98/61 (73) 


 


Pulse Ox  95 97 


 


O2 Delivery  Room Air Room Air 














 19





 00:00


 


Intake Total 270 ml


 


Output Total 300 ml


 


Balance -30 ml





Capillary Refill : Less Than 3 Seconds


Constitutional:  AAO x 3, well-developed, other (thin)


HEENT:  PERRL, hearing is well preserved, oral hygience is good; No ulceration


Neck:  No carotid bruit; carotid pulses are 2 + bilaterally


Respiratory:  No accessory muscle use, No respiratory distress; chest expansion 

is symmetric, chest is bilaterally symmetric, lungs clear to auscultation


Cardiovascular:  regular rate-rhythm; No JVD; S1 and S2


Gastrointestinal:  tender (epigastric), soft, round


Rectal:  deferred


Extremities:  no lower extremity edema bilateral


Neurologic/Psychiatric:  grossly intact, power is 5/5 both on sides


Skin:  No rash, No ulcerations





Data Review


Labs


Laboratory Tests


19 15:05: 


White Blood Count 5.6, Red Blood Count 4.07L, Hemoglobin 12.4, Hematocrit 36, 

Mean Corpuscular Volume 89, Mean Corpuscular Hemoglobin 31, Mean Corpuscular 

Hemoglobin Concent 34, Red Cell Distribution Width 13.0, Platelet Count 206, 

Mean Platelet Volume 9.7, Neutrophils (%) (Auto) 46, Lymphocytes (%) (Auto) 40, 

Monocytes (%) (Auto) 9, Eosinophils (%) (Auto) 4, Basophils (%) (Auto) 1, 

Neutrophils # (Auto) 2.6, Lymphocytes # (Auto) 2.3, Monocytes # (Auto) 0.5, 

Eosinophils # (Auto) 0.2, Basophils # (Auto) 0.0, Sodium Level 139, Potassium 

Level 2.9L, Chloride Level 104, Carbon Dioxide Level 25, Anion Gap 10, Blood 

Urea Nitrogen 6L, Creatinine 0.78, Estimat Glomerular Filtration Rate > 60, BUN/

Creatinine Ratio 8, Glucose Level 91, Calcium Level 9.0, Corrected Calcium 8.9, 

Magnesium Level 1.9, Total Bilirubin 0.3, Aspartate Amino Transf (AST/SGOT) 11, 

Alanine Aminotransferase (ALT/SGPT) < 6, Alkaline Phosphatase 89, Myoglobin 22.7

, Troponin I < 0.028, Total Protein 7.5, Albumin 4.1


19 16:18: 


Prothrombin Time 14.0, INR Comment 1.1, Activated Partial Thromboplast Time 36H


19 21:20: Troponin I < 0.028


19 03:00: 


White Blood Count 4.8, Red Blood Count 3.93L, Hemoglobin 12.2, Hematocrit 35, 

Mean Corpuscular Volume 90, Mean Corpuscular Hemoglobin 31, Mean Corpuscular 

Hemoglobin Concent 35, Red Cell Distribution Width 13.2, Platelet Count 228, 

Mean Platelet Volume 9.9, Neutrophils (%) (Auto) 40L, Lymphocytes (%) (Auto) 44

, Monocytes (%) (Auto) 8, Eosinophils (%) (Auto) 7, Basophils (%) (Auto) 1, 

Neutrophils # (Auto) 1.9, Lymphocytes # (Auto) 2.1, Monocytes # (Auto) 0.4, 

Eosinophils # (Auto) 0.3, Basophils # (Auto) 0.0, Sodium Level 142, Potassium 

Level 3.1L, Chloride Level 107, Carbon Dioxide Level 26, Anion Gap 9, Blood 

Urea Nitrogen 7, Creatinine 0.75, Estimat Glomerular Filtration Rate > 60, BUN/

Creatinine Ratio 9, Glucose Level 89, Calcium Level 8.4L, Troponin I < 0.028, 

Triglycerides Level 80, Cholesterol Level 87, LDL Cholesterol Direct 45, VLDL 

Cholesterol 16, HDL Cholesterol 28L








A/P-Cardiology


Assessment/Admission Diagnosis


Chest and epigastric discomfort, etiology undetermined, no evidence of ACS





Coronary artery disease with h/o CABG (LIMA to LAD in ). Stable cor status 

on last card cath of 7/3/18 that showed CAD primarily consisting of 50 to 60% 

stenosis in the ostial, proximal and mid left anterior descending artery with a 

fractional flow reserve across all of these lesions at 0.85 (indicating 

hemodynamic nonsignificance). Patent left internal mammary artery graft to mid 

to distal left anterior descending artery.  This graft has 70 to 80% stenosis 

just prior to its insertion into the left anterior descending artery, but this 

is a chronic lesion and there does not appear to be significant obstructive 

disease in the left anterior descending artery itself, therefore, this lesion 

not need intervention. Normal global left ventricular systolic function with 

ejection fraction of 65 to 70%. Mild elevation of left ventricular end-

diastolic pressure. No significant mitral regurgitation





Hypertension, controlled





Hyperlipidemia being treated with statin therapy and being followed by her PCP





History of anxiety, controlled





History of gastroesophageal reflux being treated with proton pump inhibitors





Chronic tobacco use - cessation advised





Abnormal ECG. ECG of 18 shows frequent PVCs





Hypokalemia - replacement given





Discussion and Recomendations





* No evidence of ACS


* Replenish K


* Advised to completely avoid tobacco use


* Risk factor mod reviewed


* Outpt f/u advised





Clinical Quality Measures


DVT/VTE Risk/Contraindication:


Risk Factor Score Per Nursin


RFS Level Per Nursing on Admit:  2=Moderate











DARREL CASTRO MD FACP FACC CCDS 2019 11:52

## 2019-01-08 NOTE — SHORT STAY SUMMARY
History of Present Illness


History of Present Illness


Reason for visit/HPI


49 yo female presented to ER due to chest pain for about a week. She had both 

burning and heavy chest pain and has a history of coronary artery bypass. She 

states she felt it was mostly heartburn that was the issue, but she has 

historically used PPI to help before her bypass and it had worked well. This 

time, PPI, H2 blocker and antacids had not helped. She feels better this 

morning as far as her heavy chest pain, but is still having burning pain.


Date of Admission


2019 at 18:30


Date of Discharge


2019


Time Seen by Provider:  11:10


Attending Physician


Wendie Stone MD


Admitting Physician


Puma Fraser MD


Consult








Allergies and Home Medications


Allergies


Coded Allergies:  


     morphine (Verified  Allergy, Unknown, RASH - TAKES LORTAB AT HOME WITH NO 

ISSUES, 16)





Home Medications


Alprazolam 0.25 Mg Tablet, 0.25 MG PO DAILY PRN for ANXIETY, (Reported)


Alprazolam 0.25 Mg Tablet, 0.125 MG PO HS PRN for ANXIETY, (Reported)


   TAKES 1/2 (0.25MG) TABLETS 


Atorvastatin Calcium 40 Mg Tablet, 40 MG PO HS, (Reported)


   LAST FILLED #90 18 


Chlorzoxazone 500 Mg Tablet, 500 MG PO TID PRN for MUSCLE SPASMS, (Reported)


Clopidogrel Bisulfate 75 Mg Tablet, 75 MG PO HS, (Reported)


Gabapentin 300 Mg Capsule, 300 MG PO HS, (Reported)


Ipratropium/Albuterol Sulfate 3 Ml Ampul.neb, 3 ML NEB Q4H PRN for SHORTNESS OF 

BREATH, (Reported)


Metoprolol Succinate 25 Mg Tab.er.24h, 25 MG PO HS, (Reported)


Pantoprazole Sodium 40 Mg Tablet.dr, 40 MG PO DAILY


   Prescribed by: WENDIE STONE on 19 1341


Promethazine HCl 25 Mg Tablet, 25 MG PO Q4H PRN for NAUSEA/VOMITING-2ND LINE, (

Reported)


Quetiapine Fumarate 200 Mg Tablet, 400 MG PO HS, (Reported)


   TAKES 2 (200MG) TABLETS 


Sucralfate 1 Gm Tablet, 1 GM PO QID


   Prescribed by: WENDIE STONE on 19 1341


Sumatriptan Succinate 6 Mg/0.5 Ml Cartridge, UD PRN for MIGRAINE, (Reported)


Tramadol HCl 50 Mg Tablet, 50 MG PO Q6H PRN for PAIN-MODERATE, (Reported)





Patient Home Medication List


Home Medication List Reviewed:  Yes





Past Medical-Social-Family Hx


Patient Social History


Alcohol Use:  Denies Use


Recreational Drug Use:  No


Smoking Status:  Current Everyday Smoker


Type Used:  Cigarettes


2nd Hand Smoke Exposure:  Yes


Physical Abuse Screen:  No


Sexual Abuse:  No


Recent Foreign Travel:  No


Contact w/other who traveled:  No


Recent Hopitalizations:  No


Recent Infectious Disease Expo:  No





Immunizations Up To Date


Tetanus Booster (TDap):  Unknown


Pediatric:  No


Date of Pneumonia Vaccine:  2016


Date of Influenza Vaccine:  Dec 7, 2018





Surgeries


Yes (left total knee 2015, cabgX1, port, STENTS X2)


CABG, Hysterectomy, Open Heart Surgery, Orthopedic





Respiratory


Yes (tobaccoism)


Asthma, COPD


Currently Using CPAP:  No


Currently Using BIPAP:  No





Cardiovascular


Yes (CABG)


Coronary Artery Disease, Hypertension





Neurological


Yes


Headaches /Migraines





Reproductive System


Pregnant:  No


Hx Reproductive Disorders:  No


Sexually Transmitted Disease:  No


HIV/AIDS:  No


Female Reproductive Disorders:  Denies


GYN History:  Hysterectomy





Genitourinary


No





Gastrointestinal


Yes


Colitis, Gastroesophageal Reflux, Diverticulosis, Hemorrhoids, Chronic Diarrhea

, Ulcer, Irritable Bowel





Musculoskeletal


Yes (FRACTURE LEFT WRIST)


Degenerate Disk Disease, Arthritis, Chronic Back Pain, Fractures





Endocrine


History of Endocrine Disorders:  No





HEENT


History of HEENT Disorders:  No


Loss of Vision:  Denies


Hearing Impairment:  Denies





Cancer


No





Psychosocial


History of Psychiatric Problem:  Yes


Behavioral Health Disorders:  Sleep Difficulties, Anxiety, Depression





Integumentary


History of Skin or Integumenta:  No





Blood Transfusions


History of Blood Disorders:  No


Adverse Reaction to a Blood Tr:  No





Family Medical History


Significant Family History:  Cancer


Family Hx:  


Cancer (brain and back)


  G8 SISTER


  G8 SISTER


Cataracts


  19 MOTHER


Headache disorder


  G8 SISTER


Hypercholesterolemia


  19 FATHER


Hypertension


  19 FATHER


Myocardial infarction


  19 FATHER


  19 FATHER


Myocardial infarction ( from MI at 55


)


  19 FATHER


  19 FATHER


Neoplasm


  G8 SISTER


Psychosocial problem


  G8 SISTER


Visual disorder


  G8 SISTER





No Family History of:


  AIDS


  Abdominal aortic aneurysm


  Stewart's disease


  Alcoholism


  Alzheimer's disease


  Aphasia


  Arthritis


  Asthma


  Cancer of mouth


  Cardiovascular disease


  Colon cancer


  Completed stroke


  Congenital disease


  Congenital heart disease


  Coronary thrombosis


  Cystic fibrosis


  Deafness or hearing loss


  Dementia


  Diabetes mellitus


  Drug abuse


  Dysphasia


  Fibrocystic disease of breast


  Gastroenteritis


  Glaucoma


  Infertility


  Kidney disease


  Not obtainable due to adoption


  Osteoporosis


  Parkinson's disease


  Prostate cancer


  Respiratory disorder


  Seizure disorder


  Severe allergy


  Thyroid disease


  Tuberculosis





Review of Systems


Constitutional:  No fever


EENTM:  no symptoms reported


Respiratory:  cough


Cardiovascular:  see HPI


Gastrointestinal:  No abdominal pain, No constipation, No diarrhea; heartburn, 

melena, nausea; No vomiting


Genitourinary:  no symptoms reported


Musculoskeletal:  no symptoms reported


Skin:  no symptoms reported


Psychiatric/Neurological:  No Symptoms Reported





Physical Exam


Vital Signs





Vital Signs - First Documented




















Capillary Refill : Less Than 3 Seconds


Height, Weight, BMI


Height: 5'2.00"


Weight: 124lbs. 7.0oz. [id number]; 22.8 BMI


Method:Stated


General Appearance:  No Apparent Distress, WD/WN


Respiratory:  Lungs Clear, Normal Breath Sounds


Cardiovascular:  Regular Rate, Rhythm, No Murmur


Gastrointestinal:  Normal Bowel Sounds, Non Tender, Soft; No Distended


Extremity:  No Pedal Edema


Neurologic/Psychiatric:  Alert, Normal Mood/Affect


Skin:  Normal Color, Warm/Dry





Clinical Quality Measures


DVT/VTE Risk/Contraindication:


Risk Factor Score Per Nursin


RFS Level Per Nursing on Admit:  2=Moderate





Short Stay Diagnosis


Discharge Diagnosis-Short Stay


Admission Diagnosis:  


Chest pain


GERD


Coronary artery disease


Tobacco use


Final Discharge Diagnosis:  


Chest pain- seen by Cardiology, negative troponins, had recent enough


catheterization they did not feel repeat warranted. Suspect chest pain is


GERD related.





GERD- add carafate and start pantoprazole, may need EGD, H pylori testing,


etc if no improvement.





Coronary artery disease-  continue PTA meds





Tobacco use- discussed cessation, she wants to quit but was told she could


not take Chantix due to heart, encouraged to discuss with Dr. Logan.





Conclusion


Labs


Laboratory Tests


19 15:05: 


White Blood Count 5.6, Red Blood Count 4.07L, Hemoglobin 12.4, Hematocrit 36, 

Mean Corpuscular Volume 89, Mean Corpuscular Hemoglobin 31, Mean Corpuscular 

Hemoglobin Concent 34, Red Cell Distribution Width 13.0, Platelet Count 206, 

Mean Platelet Volume 9.7, Neutrophils (%) (Auto) 46, Lymphocytes (%) (Auto) 40, 

Monocytes (%) (Auto) 9, Eosinophils (%) (Auto) 4, Basophils (%) (Auto) 1, 

Neutrophils # (Auto) 2.6, Lymphocytes # (Auto) 2.3, Monocytes # (Auto) 0.5, 

Eosinophils # (Auto) 0.2, Basophils # (Auto) 0.0, Sodium Level 139, Potassium 

Level 2.9L, Chloride Level 104, Carbon Dioxide Level 25, Anion Gap 10, Blood 

Urea Nitrogen 6L, Creatinine 0.78, Estimat Glomerular Filtration Rate > 60, BUN/

Creatinine Ratio 8, Glucose Level 91, Calcium Level 9.0, Corrected Calcium 8.9, 

Magnesium Level 1.9, Total Bilirubin 0.3, Aspartate Amino Transf (AST/SGOT) 11, 

Alanine Aminotransferase (ALT/SGPT) < 6, Alkaline Phosphatase 89, Myoglobin 22.7

, Troponin I < 0.028, Total Protein 7.5, Albumin 4.1


19 16:18: 


Prothrombin Time 14.0, INR Comment 1.1, Activated Partial Thromboplast Time 36H


19 21:20: Troponin I < 0.028


19 03:00: 


White Blood Count 4.8, Red Blood Count 3.93L, Hemoglobin 12.2, Hematocrit 35, 

Mean Corpuscular Volume 90, Mean Corpuscular Hemoglobin 31, Mean Corpuscular 

Hemoglobin Concent 35, Red Cell Distribution Width 13.2, Platelet Count 228, 

Mean Platelet Volume 9.9, Neutrophils (%) (Auto) 40L, Lymphocytes (%) (Auto) 44

, Monocytes (%) (Auto) 8, Eosinophils (%) (Auto) 7, Basophils (%) (Auto) 1, 

Neutrophils # (Auto) 1.9, Lymphocytes # (Auto) 2.1, Monocytes # (Auto) 0.4, 

Eosinophils # (Auto) 0.3, Basophils # (Auto) 0.0, Sodium Level 142, Potassium 

Level 3.1L, Chloride Level 107, Carbon Dioxide Level 26, Anion Gap 9, Blood 

Urea Nitrogen 7, Creatinine 0.75, Estimat Glomerular Filtration Rate > 60, BUN/

Creatinine Ratio 9, Glucose Level 89, Calcium Level 8.4L, Troponin I < 0.028, 

Triglycerides Level 80, Cholesterol Level 87, LDL Cholesterol Direct 45, VLDL 

Cholesterol 16, HDL Cholesterol 28L


Conclusion/Plan


See final discharge diagnosis.





Copy


Copies To 1:   PUMA FRASER MD, BETHANY N MD 2019 14:24

## 2019-01-08 NOTE — CONSULTATION-CARDIOLOGY
HPI-Cardiology


Cardiology Consultation:


Date of Consultation


19


Time Seen by a Provider:  08:35


Date of Admission


19


Attending Physician


Wendie Stone MD


Admitting Physician


Puma Bill MD


Consulting Physician


Rosemarie Logan MD





HPI:


Chief Complaint:


Chest pain


Ms. Jones is a 48 year old female admitted to 415 from the ED with c/o CP.  She 

reports for approx the last 2 weeks she has been having epigastric pain which 

radiates up her chest.  She reports yesterday the discomfort radiated into her 

neck bilat.  She states she has been experiencing a lot of heartburn as well.  

She states the chest pain is a pressure that lasts for a few minutes.  It comes 

on without r/t activity, but seems to occur more frequently at work.  She 

denies any n/v/d.  She denies any diaphoresis.  She denies any dyspnea.  She 

states she has been taking OTC antacids as well as Pepcid, Prilosec and Zantac 

with only minimal relief.  She states she was given nitro in the ED which did 

help the chest pain, but she had persistent heartburn.  She denies any LE 

edema.  No palpitations.  No syncope or near syncope.  She is currently pain 

free.  She does report tenderness with palpation in the epigastric region.





Review of Systems-Cardiology


Review of Systems


Constitutional:  No chills, No fever


Eyes:  No vision change


Ears/Nose/Throat:  No epistaxis, No recent hearing loss


Respiratory:  As described under HPI


Cardiovascular:  As described under HPI


Gastrointestinal:  No constipation, No diarrhea, No nausea, No vomiting; other (

c/o heartburn)


Genitourinary:  No dysuria, No hematuria


Musculoskeletal:  no symptoms reported


Skin:  No rash, No ulcerations


Psychiatric/Neurological:  No seizure, No focal weakness, No syncope


Hematologic:  No bleeding abnormalities





PMH-Social-Family Hx


Patient Social History


Alcohol Use:  Denies Use


Recreational Drug Use:  No


Smoking Status:  Current Everyday Smoker


Type Used:  Cigarettes


2nd Hand Smoke Exposure:  Yes


Recent Foreign Travel:  No


Recent Infectious Disease Expo:  No


Hospitalization with Isolation:  Denies


Physical Abuse Screen:  No


Sexual Abuse:  No





Immunizations Up To Date


Tetanus Booster (TDap):  Unknown


Date of Pneumonia Vaccine:  2016


Date of Influenza Vaccine:  Dec 7, 2018





Past Medical History


PMH


As described under Assessment.





Family Medical History


Family Medical History:  


She reports her father had CAD, HTN and HLP.


Family History:  


Cancer (brain and back)


  G8 SISTER


  G8 SISTER


Cataracts


  19 MOTHER


Headache disorder


  G8 SISTER


Hypercholesterolemia


  19 FATHER


Hypertension


  19 FATHER


Myocardial infarction


  19 FATHER


  19 FATHER


Myocardial infarction ( from MI at 55


)


  19 FATHER


  19 FATHER


Neoplasm


  G8 SISTER


Psychosocial problem


  G8 SISTER


Visual disorder


  G8 SISTER





No Family History of:


  AIDS


  Abdominal aortic aneurysm


  Han's disease


  Alcoholism


  Alzheimer's disease


  Aphasia


  Arthritis


  Asthma


  Cancer of mouth


  Cardiovascular disease


  Colon cancer


  Completed stroke


  Congenital disease


  Congenital heart disease


  Coronary thrombosis


  Cystic fibrosis


  Deafness or hearing loss


  Dementia


  Diabetes mellitus


  Drug abuse


  Dysphasia


  Fibrocystic disease of breast


  Gastroenteritis


  Glaucoma


  Infertility


  Kidney disease


  Not obtainable due to adoption


  Osteoporosis


  Parkinson's disease


  Prostate cancer


  Respiratory disorder


  Seizure disorder


  Severe allergy


  Thyroid disease


  Tuberculosis





Allergies and Home Medications


Allergies


Coded Allergies:  


     morphine (Verified  Allergy, Unknown, RASH - TAKES LORTAB AT HOME WITH NO 

ISSUES, 16)





Home Medications


Alprazolam 0.25 Mg Tablet, 0.25 MG PO DAILY PRN for ANXIETY, (Reported)


Alprazolam 0.25 Mg Tablet, 0.125 MG PO HS PRN for ANXIETY, (Reported)


   TAKES 1/2 (0.25MG) TABLETS 


Atorvastatin Calcium 40 Mg Tablet, 40 MG PO HS, (Reported)


   LAST FILLED #90 [phone] 


Chlorzoxazone 500 Mg Tablet, 500 MG PO TID PRN for MUSCLE SPASMS, (Reported)


Clopidogrel Bisulfate 75 Mg Tablet, 75 MG PO HS, (Reported)


Gabapentin 300 Mg Capsule, 300 MG PO HS, (Reported)


Ipratropium/Albuterol Sulfate 3 Ml Ampul.neb, 3 ML NEB Q4H PRN for SHORTNESS OF 

BREATH, (Reported)


Metoprolol Succinate 25 Mg Tab.er.24h, 25 MG PO HS, (Reported)


Pantoprazole Sodium 40 Mg Tablet.dr, 40 MG PO DAILY


   Prescribed by: WENDIE STONE on 19 1341


Promethazine HCl 25 Mg Tablet, 25 MG PO Q4H PRN for NAUSEA/VOMITING-2ND LINE, (

Reported)


Quetiapine Fumarate 200 Mg Tablet, 400 MG PO HS, (Reported)


   TAKES 2 (200MG) TABLETS 


Sucralfate 1 Gm Tablet, 1 GM PO QID


   Prescribed by: WENDIE STONE on 19 1341


Sumatriptan Succinate 6 Mg/0.5 Ml Cartridge, UD PRN for MIGRAINE, (Reported)


Tramadol HCl 50 Mg Tablet, 50 MG PO Q6H PRN for PAIN-MODERATE, (Reported)





Physical Exam-Cardiology


Physical Exam


Vital Signs/I&O











 19





 04:06 07:00 08:00 08:00


 


Temp 97.2   97.8


 


Pulse 65 62  58


 


Resp 18   16


 


B/P (MAP) 110/61 (77)   98/61 (73)


 


Pulse Ox 95  95 97


 


O2 Delivery Room Air  Room Air Room Air


 


    





 19  





 12:00 13:00  


 


Temp 98.8   


 


Pulse 66 64  


 


Resp 18   


 


B/P (MAP) 117/75 (89)   


 


Pulse Ox 97   


 


O2 Delivery Room Air   














 19





 00:00


 


Intake Total 270 ml


 


Output Total 300 ml


 


Balance -30 ml





Capillary Refill : Less Than 3 Seconds


Constitutional:  AAO x 3, well-developed, other (thin)


HEENT:  PERRL, hearing is well preserved, oral hygience is good; No ulceration


Neck:  No carotid bruit; carotid pulses are 2 + bilaterally


Respiratory:  No accessory muscle use, No respiratory distress; chest expansion 

is symmetric, chest is bilaterally symmetric, lungs clear to auscultation


Cardiovascular:  regular rate-rhythm; No JVD; S1 and S2


Gastrointestinal:  tender (epigastric), soft, round


Rectal:  deferred


Extremities:  no lower extremity edema bilateral


Neurologic/Psychiatric:  grossly intact, power is 5/5 both on sides


Skin:  No rash, No ulcerations





Data Review


Labs


Laboratory Tests


19 16:18: 


Prothrombin Time 14.0, INR Comment 1.1, Activated Partial Thromboplast Time 36H


19 21:20: Troponin I < 0.028


19 03:00: 


Troponin I < 0.028, White Blood Count 4.8, Red Blood Count 3.93L, Hemoglobin 

12.2, Hematocrit 35, Mean Corpuscular Volume 90, Mean Corpuscular Hemoglobin 31

, Mean Corpuscular Hemoglobin Concent 35, Red Cell Distribution Width 13.2, 

Platelet Count 228, Mean Platelet Volume 9.9, Neutrophils (%) (Auto) 40L, 

Lymphocytes (%) (Auto) 44, Monocytes (%) (Auto) 8, Eosinophils (%) (Auto) 7, 

Basophils (%) (Auto) 1, Neutrophils # (Auto) 1.9, Lymphocytes # (Auto) 2.1, 

Monocytes # (Auto) 0.4, Eosinophils # (Auto) 0.3, Basophils # (Auto) 0.0, 

Sodium Level 142, Potassium Level 3.1L, Chloride Level 107, Carbon Dioxide 

Level 26, Anion Gap 9, Blood Urea Nitrogen 7, Creatinine 0.75, Estimat 

Glomerular Filtration Rate > 60, BUN/Creatinine Ratio 9, Glucose Level 89, 

Calcium Level 8.4L, Triglycerides Level 80, Cholesterol Level 87, LDL 

Cholesterol Direct 45, VLDL Cholesterol 16, HDL Cholesterol 28L








Radiology


NAME:      MARILIA JONES


MED REC#:   [id number]


ACCOUNT#:   [id number]


PT STATUS:   REG ER


:      1970


PHYSICIAN:    STEPHANIE ARELLANO MD


ADMIT DATE:   19/ER


***Signed***


Date of Exam:   19





CHEST 1 VIEW, AP/PA ONLY


 





INDICATION: 


Chest pain.





TECHNIQUE: 


A frontal chest was obtained at 3:15 PM. 





COMPARISON:     


2018.





FINDINGS:


The patient has had previous sternotomy. The heart is normal in


size. The mediastinal silhouette appears unremarkable. The


left-sided Port-A-Cath is unchanged. There is no focal


infiltrate, pneumothorax, or pleural fluid.





IMPRESSION:


Post operative changes with no acute process in the chest.





Dictated by: 





  Dictated on workstation # [id number]





[id number]





Dict:      19 1522


Trans:      19 1542





Interpreted by:         RAUL MARTINEZ MD


Electronically signed by:   RAUL MARTINEZ MD 19 1542





ECG Impression


ECG


Initial ECG Rhythm:  Normal Sinus





A/P-Cardiology


Assessment/Admission Diagnosis


Chest pain of undetermined etiology - no evidence of ACS thus far





Coronary artery disease primarily consisting of 50 to 60% stenosis in the ostial

, proximal and mid left anterior descending artery with a fractional flow 

reserve across all of these lesions at 0.85 (indicating hemodynamic 

nonsignificance). Patent left internal mammary artery graft to mid to distal 

left anterior descending artery.  This graft has 70 to 80% stenosis just prior 

to its insertion into the left anterior descending artery, but this is a 

chronic lesion and there does not appear to be significant obstructive disease 

in the left anterior descending artery, therefore, this lesion was not 

intervened on. Normal global left ventricular systolic function with ejection 

fraction of 65 to 70%. Mild elevation of left ventricular end-diastolic 

pressure. No significant mitral regurgitation.  Per cardiac cath of July 3, 2018





Coronary artery disease with a history of cardiac bypass surgery in 2011 at [facility] by Dr. Lima consisting of left internal mammary 

artery graft to left anterior descending artery.





Hypertension, controlled





Hyperlipidemia being treated with statin therapy and being followed by her PCP





History of anxiety, controlled





History of gastroesophageal reflux being treated with proton pump inhibitors





Chronic tobacco use - cessation advised





Abnormal ECG. ECG of 18 shows frequent PVCs





Hypokalemia - replacement given





Clinical Quality Measures


DVT/VTE Risk/Contraindication:


Risk Factor Score Per Nursin


RFS Level Per Nursing on Admit:  2=Moderate











OVI LI 2019 08:11

## 2019-03-25 NOTE — XMS REPORT
Assisted Lola Elizabeth at bedside with right nare packing removal and surgicel placement in right nare. Pt instructed not to blow her nose. Floor RN at bedside.    [facility]

 Created on: 2018



Dayami Rae

External Reference #: [id number]

: 1970

Sex: Female



Demographics







 Address  [address] [place]  [address]

 

 Preferred Language  Unknown

 

 Marital Status  Unknown

 

 Buddhism Affiliation  Unknown

 

 Race  Unknown

 

 Ethnic Group  Unknown





Author







 Author  KIM FUCHS

 

 Organization  [facility]

 

 Address  [address] [place]  [address]



 

 Phone  [phone]







Care Team Providers







 Care Team Member Name  Role  Phone

 

 FUCHSJAMA JACKSONELE  Unavailable  [phone]







PROBLEMS







 Type  Condition  ICD9-CM Code  FLE56-JI Code  Onset Dates  Condition Status  
SNOMED Code

 

 Problem  Neuropathy     G62.9     Active  848738621

 

 Problem  Lumbago with sciatica, left side     M54.42     Active  394364755

 

 Problem  Anxiety disorder, unspecified     F41.9     Active  601783695

 

 Problem  Chronic pain syndrome     G89.4     Active  128125869

 

 Problem  Fibromyalgia     M79.7     Active  108177440

 

 Problem  Coronary artery disease involving native coronary artery of native 
heart without angina pectoris     I25.10     Active  [id number]

 

 Problem  Essential hypertension     I10     Active  17308993

 

 Problem  GERD with esophagitis     K21.0     Active  083726741

 

 Problem  Other chronic pain     G89.29     Active  38452533

 

 Problem  Lumbago with sciatica, right side     M54.41     Active  
[id number]

 

 Problem  COPD suggested by initial evaluation     J44.9     Active  97402151

 

 Problem  Acute midline low back pain with left-sided sciatica     M54.42     
Active  713147548







ALLERGIES

No Information



ENCOUNTERS







 Encounter  Location  Date  Diagnosis

 

 [facility]  [address] [place] [address]19-
4730    Essential hypertension I10 ; Coronary artery disease 
involving native coronary artery of native heart without angina pectoris I25.10 
; Fibromyalgia M79.7 ; GERD with esophagitis K21.0 and Tobacco abuse counseling 
Z71.6

 

 [facility]  [address] [place] [address] [phone]-
[phone]    Long term (current) use of opiate analgesic Z79.891

 

 [facility]  [id number], [place] [address]-
0207     

 

 [facility]  [id number] [place] [address] 37560-
5621    Acute bronchitis, unspecified organism J20.9

 

 [facility]  [id number] [place] [address] 94550-
5322     

 

 [facility]  [id number] [place] [address] 29775-
1351     

 

 [facility]  [id number] [place] [address] 70662-
1306    Chronic pain syndrome G89.4

 

 [facility]  [id number] [place] [address] 95491-
6536     

 

 [facility]  [id number] [place] [address] 89958-
5949  15 Marko, 2018   

 

 [facility]  [id number] [place] [address] 85143-
1995    Acute bronchitis, unspecified organism J20.9

 

 [facility]  [id number] [place] [address] 61260-
1242    Chronic pain syndrome G89.4

 

 [facility]  [id number] [place] [address] 47739-
2421  25 May, 2018   

 

 [facility]  [id number] [place] [address] 55691-
3413  24 May, 2018  Acute midline low back pain with left-sided sciatica M54.42

 

 [facility]  [id number] [place] [address] [phone]-
9151  22 May, 2018   

 

 [facility]  [id number] [place] [address] [phone]
[phone]  21 May, 2018  Bronchitis J40

 

 [facility]  [id number] [place] [address] [phone]-
6182  07 May, 2018  Chronic pain syndrome G89.4 and Neuropathy G62.9

 

 [facility]  [id number] [place] [address] 16916-
2926    Acute midline low back pain with left-sided sciatica M54.42

 

 [facility]  [id number] [place] [address] 72426-
3771     

 

 [facility]  [id number] [place] [address] 43523-
4302    Anxiety disorder, unspecified F41.9

 

 [facility]  [id number] [place] [address] 87952-
2964     

 

 [facility]  [id number] [place] [address] 87304-
7689    Chronic pain syndrome G89.4 and Acute midline low back pain 
with left-sided sciatica M54.42

 

 [facility]  [id number] [place] [address] 92328-
8526    Chronic pain syndrome G89.4

 

 [facility]  [id number] [place] [address] 31282-
7992     

 

 [facility]  [id number] [place] [address] 43996-
8259     

 

 [facility]  [id number] [place] [address] 48738-
4158  29 Mar, 2018  Injury of left knee, initial encounter S89.92XA and COPD 
suggested by initial evaluation J44.9

 

 [id number] [place] [address] 31786-
4720  28 Mar, 2018  Acute bronchitis, unspecified organism J20.9

 

 [id number] [place] [address] 87159-
4905  27 Mar, 2018  Acute bronchitis, unspecified organism J20.9

 

 [id number] [place] [address] 27354-
3634  21 Mar, 2018  Anxiety disorder, unspecified F41.9 and Acute bronchitis, 
unspecified organism J20.9

 

 [id number] [place] [address] [phone]-
5200  08 Mar, 2018  Chronic pain syndrome G89.4

 

 [facility]  [id number] [place] [address] [phone]-
0280  05 Mar, 2018   

 

 [facility]  [id number] [place] [address] 95199-
6304  05 Mar, 2018  Acute midline low back pain with left-sided sciatica M54.42

 

 [facility]  [id number] [place] [address] 60456-
8623     

 

 [facility]  [id number] [place] [address] 17616-
0208    Chronic pain syndrome G89.4

 

 [facility]  [id number] [place] [address] 53427-
1532    Chronic pain syndrome G89.4

 

 [facility]  [id number] [place] [address] 94278-
6776     

 

 [facility]  [id number] [place] [address] 54305-
0593    Gastroenteritis K52.9

 

 [facility]  [id number] [place] [address] 28487-
5640     

 

 [facility]  [id number] [place] [address] 31453-
1902  15 Jerardo, 2018  Acute bronchitis, unspecified organism J20.9

 

 [facility]  [id number] [place] [address] 91427-
2727    Anxiety disorder, unspecified F41.9 and Neuropathy G62.9

 

 [facility]  [id number] [place] [address] 83880-
3554    Chronic pain syndrome G89.4

 

 [facility]  [id number] [place] [address] 05143-
8073    Bronchitis J40 and Laryngitis J04.0

 

 [facility]  [id number] [place] [address] 49528-
5841  29 Dec, 2017   

 

 [address] [place] [address] 17106-
2809  26 Dec, 2017  Bronchitis J40

 

 [id number] [place] [address] 90544-
9204  18 Dec, 2017   

 

 [id number] [place] [address] 23948-
4824  13 Dec, 2017  Fibromyalgia M79.7 and Chronic pain syndrome G89.4

 

 [id number] [place] [address] 74630-
6916  04 Dec, 2017  Chronic pain syndrome G89.4 and Acute bronchitis, 
unspecified organism J20.9

 

 [id number] [place] [address] 08894-
7997    Neuropathy G62.9

 

 [address] [place] [address] 76940-
7798    Chronic pain syndrome G89.4 ; Lumbago with sciatica, left 
side M54.42 ; Lumbago with sciatica, right side M54.41 ; Screening cholesterol 
level Z13.220 ; Screening for diabetes mellitus Z13.1 ; Screening for thyroid 
disorder Z13.29 ; Fibromyalgia M79.7 ; Anxiety disorder, unspecified F41.9 and 
Neuropathy G62.9

 

 [address] [address]-
5320     

 

 [address] [place] [address] 73861-
3977  25 Oct, 2017   

 

 [id number] [place] [address] 05007-
2028  10 Oct, 2017  Fibromyalgia M79.7 ; Chronic pain syndrome G89.4 ; Anxiety 
disorder, unspecified F41.9 ; Neuropathy G62.9 and Acute bronchitis, 
unspecified organism J20.9

 

 [address] [place] [address] 21708-
0324  09 Oct, 2017   

 

 [address] [place] [address]-
9419  25 Sep, 2017   

 

 [facility]  [address] [address]-
1500  19 Sep, 2017   

 

 [facility]  [address] [address]-
4767  08 Sep, 2017   

 

 [facility]  [address] [place] [address] 43478-
3924  23 Aug, 2017   

 

 [facility]  [id number] [place] [address] 72457-
0843  22 Aug, 2017  Acute seasonal allergic rhinitis due to pollen J30.1

 

 [facility]  [id number] [place] [address] 25736-
2389  21 Aug, 2017   

 

 [facility]  [id number] [place] [address] 34995-
4572  09 Aug, 2017   

 

 [facility]  [id number] [place] [address] 41278-
6363     

 

 [facility]  [address] [place] [address] 89864-
8347     

 

 [facility]  [id number] [place] [address] 91157-
5443  10 Jul, 2017   

 

 [facility]  [address] [place] [address] 02670-
[phone]     

 

 [facility]  [address] [address]-
5248     

 

 [facility]  [address] [address]-
8507     

 

 [facility]  [id number] [place] [address] 31402-
1412    Chronic pain syndrome G89.4 ; Fibromyalgia M79.7 ; Anxiety 
disorder, unspecified F41.9 ; Neuropathy G62.9 and Low back pain M54.5

 

 [facility]  [address] [place] [address] 01020-
0875  25 May, 2017  Low back pain M54.5

 

 [facility]  [address] [address]-
0792  24 May, 2017   

 

 [facility]  [address] [address]-
0218  22 May, 2017   

 

 [address] [place] [address] 32479-
[phone]  16 May, 2017   

 

 [facility]  [address] [place] [address] 09614-
7468  16 May, 2017   

 

 [address] [place] [address] 37949-
8433  12 May, 2017  Routine adult health maintenance Z00.00 ; Fibromyalgia 
M79.7 ; Chronic pain syndrome G89.4 ; Abnormal lung sounds R09.89 ; Coronary 
artery disease involving native coronary artery of native heart without angina 
pectoris I25.10 and S/P CABG (coronary artery bypass graft) Z95.1

 

 [address] [address]-
1885  09 May, 2017   







IMMUNIZATIONS

No Known Immunizations



SOCIAL HISTORY

Never Assessed



REASON FOR VISIT

Refill request



PLAN OF CARE





VITAL SIGNS





MEDICATIONS







 Medication  Instructions  Dosage  Frequency  Start Date  End Date  Duration  
Status

 

 Chlorzoxazone 500 mg  Orally Three times a day if needed  1 tablet           
30 days  Active







RESULTS

No Results



PROCEDURES

No Known procedures



INSTRUCTIONS





MEDICATIONS ADMINISTERED

No Known Medications



MEDICAL (GENERAL) HISTORY







 Type  Description  Date

 

 Medical History  fibromyalgia   

 

 Medical History  MRI 2016- small central protrusion/herniation w/minimal 
impression on thecal anteriorly at C5-6, At C3-4 small bilat. uncinate spurs w/ 
mild right neural foraminal narrowing   

 

 Medical History  MRI 2016- mild degenerative changes. No spinal canal 
stenosis or foraminal narrowing of thoracic spine   

 

 Medical History  hypertension   

 

 Medical History  Anxiety disorder   

 

 Medical History  depression   

 

 Medical History  migraine headaches   

 

 Medical History  Hx of C-Diff   

 

 Medical History  Hx of Pneumonia   

 

 Medical History  heart cath w/ stents placed 7/3/18   

 

 Surgical History  Open Heart Surgery - [facility] -Dr. Lima  (
Cardiologist- Dr Logan)  

 

 Surgical History  hysterectomy - Dr Luis   

 

 Surgical History  Left Breast Lumpectomy (Bx - Benign)- Dr Luis   

 

 Surgical History  Port - Dr Luis   

 

 Surgical History  Left Knee Surgeries x3- Dr Carolina did 2 and Dr Gan did 1   

 

 Surgical History  cath/stent  

 

 Hospitalization History  C-Diff - [facility]   

 

 Hospitalization History  Pneumonia - [facility]   

 

 Hospitalization History  Open Heart Surgery - [facility]

## 2019-07-22 ENCOUNTER — HOSPITAL ENCOUNTER (EMERGENCY)
Dept: HOSPITAL 75 - ER | Age: 49
Discharge: HOME | End: 2019-07-22
Payer: SELF-PAY

## 2019-07-22 VITALS — HEIGHT: 62 IN | WEIGHT: 115 LBS | BODY MASS INDEX: 21.16 KG/M2

## 2019-07-22 VITALS — SYSTOLIC BLOOD PRESSURE: 100 MMHG | DIASTOLIC BLOOD PRESSURE: 70 MMHG

## 2019-07-22 DIAGNOSIS — Z79.02: ICD-10-CM

## 2019-07-22 DIAGNOSIS — G43.909: ICD-10-CM

## 2019-07-22 DIAGNOSIS — F32.9: ICD-10-CM

## 2019-07-22 DIAGNOSIS — I25.10: ICD-10-CM

## 2019-07-22 DIAGNOSIS — Z90.710: ICD-10-CM

## 2019-07-22 DIAGNOSIS — F41.9: ICD-10-CM

## 2019-07-22 DIAGNOSIS — F17.210: ICD-10-CM

## 2019-07-22 DIAGNOSIS — J42: ICD-10-CM

## 2019-07-22 DIAGNOSIS — Z96.652: ICD-10-CM

## 2019-07-22 DIAGNOSIS — K58.9: ICD-10-CM

## 2019-07-22 DIAGNOSIS — I10: ICD-10-CM

## 2019-07-22 DIAGNOSIS — Z87.01: ICD-10-CM

## 2019-07-22 DIAGNOSIS — Z95.1: ICD-10-CM

## 2019-07-22 DIAGNOSIS — K21.9: ICD-10-CM

## 2019-07-22 DIAGNOSIS — Z82.49: ICD-10-CM

## 2019-07-22 DIAGNOSIS — Z87.19: ICD-10-CM

## 2019-07-22 DIAGNOSIS — J20.9: Primary | ICD-10-CM

## 2019-07-22 DIAGNOSIS — Z88.5: ICD-10-CM

## 2019-07-22 DIAGNOSIS — Z80.8: ICD-10-CM

## 2019-07-22 LAB
ALBUMIN SERPL-MCNC: 3.8 GM/DL (ref 3.2–4.5)
ALP SERPL-CCNC: 73 U/L (ref 40–136)
ALT SERPL-CCNC: 8 U/L (ref 0–55)
APTT BLD: 32 SEC (ref 24–35)
APTT PPP: YELLOW S
BACTERIA #/AREA URNS HPF: (no result) /HPF
BASOPHILS # BLD AUTO: 0 10^3/UL (ref 0–0.1)
BASOPHILS NFR BLD AUTO: 1 % (ref 0–10)
BILIRUB SERPL-MCNC: 0.3 MG/DL (ref 0.1–1)
BILIRUB UR QL STRIP: (no result)
BUN/CREAT SERPL: 9
CALCIUM SERPL-MCNC: 9.3 MG/DL (ref 8.5–10.1)
CHLORIDE SERPL-SCNC: 104 MMOL/L (ref 98–107)
CO2 SERPL-SCNC: 24 MMOL/L (ref 21–32)
CREAT SERPL-MCNC: 0.78 MG/DL (ref 0.6–1.3)
EOSINOPHIL # BLD AUTO: 0.1 10^3/UL (ref 0–0.3)
EOSINOPHIL NFR BLD AUTO: 4 % (ref 0–10)
ERYTHROCYTE [DISTWIDTH] IN BLOOD BY AUTOMATED COUNT: 13.8 % (ref 10–14.5)
FIBRINOGEN PPP-MCNC: (no result) MG/DL
GFR SERPLBLD BASED ON 1.73 SQ M-ARVRAT: > 60 ML/MIN
GLUCOSE SERPL-MCNC: 96 MG/DL (ref 70–105)
GLUCOSE UR STRIP-MCNC: NEGATIVE MG/DL
HCT VFR BLD CALC: 38 % (ref 35–52)
HGB BLD-MCNC: 12.9 G/DL (ref 11.5–16)
INR PPP: 1.1 (ref 0.8–1.4)
KETONES UR QL STRIP: NEGATIVE
LEUKOCYTE ESTERASE UR QL STRIP: (no result)
LYMPHOCYTES # BLD AUTO: 1.4 X 10^3 (ref 1–4)
LYMPHOCYTES NFR BLD AUTO: 35 % (ref 12–44)
MANUAL DIFFERENTIAL PERFORMED BLD QL: NO
MCH RBC QN AUTO: 30 PG (ref 25–34)
MCHC RBC AUTO-ENTMCNC: 34 G/DL (ref 32–36)
MCV RBC AUTO: 90 FL (ref 80–99)
MONOCYTES # BLD AUTO: 0.3 X 10^3 (ref 0–1)
MONOCYTES NFR BLD AUTO: 8 % (ref 0–12)
NEUTROPHILS # BLD AUTO: 2.1 X 10^3 (ref 1.8–7.8)
NEUTROPHILS NFR BLD AUTO: 53 % (ref 42–75)
NITRITE UR QL STRIP: NEGATIVE
PH UR STRIP: 6 [PH] (ref 5–9)
PLATELET # BLD: 240 10^3/UL (ref 130–400)
PMV BLD AUTO: 9.9 FL (ref 7.4–10.4)
POTASSIUM SERPL-SCNC: 3.5 MMOL/L (ref 3.6–5)
PROT SERPL-MCNC: 6.8 GM/DL (ref 6.4–8.2)
PROT UR QL STRIP: (no result)
PROTHROMBIN TIME: 14.4 SEC (ref 12.2–14.7)
RBC #/AREA URNS HPF: (no result) /HPF
SODIUM SERPL-SCNC: 140 MMOL/L (ref 135–145)
SP GR UR STRIP: 1.02 (ref 1.02–1.02)
SQUAMOUS #/AREA URNS HPF: (no result) /HPF
UROBILINOGEN UR-MCNC: 1 MG/DL
WBC # BLD AUTO: 4 10^3/UL (ref 4.3–11)
WBC #/AREA URNS HPF: (no result) /HPF

## 2019-07-22 PROCEDURE — 80053 COMPREHEN METABOLIC PANEL: CPT

## 2019-07-22 PROCEDURE — 85610 PROTHROMBIN TIME: CPT

## 2019-07-22 PROCEDURE — 87088 URINE BACTERIA CULTURE: CPT

## 2019-07-22 PROCEDURE — 85730 THROMBOPLASTIN TIME PARTIAL: CPT

## 2019-07-22 PROCEDURE — 36415 COLL VENOUS BLD VENIPUNCTURE: CPT

## 2019-07-22 PROCEDURE — 87040 BLOOD CULTURE FOR BACTERIA: CPT

## 2019-07-22 PROCEDURE — 83605 ASSAY OF LACTIC ACID: CPT

## 2019-07-22 PROCEDURE — 96374 THER/PROPH/DIAG INJ IV PUSH: CPT

## 2019-07-22 PROCEDURE — 71046 X-RAY EXAM CHEST 2 VIEWS: CPT

## 2019-07-22 PROCEDURE — 81000 URINALYSIS NONAUTO W/SCOPE: CPT

## 2019-07-22 PROCEDURE — 96361 HYDRATE IV INFUSION ADD-ON: CPT

## 2019-07-22 PROCEDURE — 94640 AIRWAY INHALATION TREATMENT: CPT

## 2019-07-22 PROCEDURE — 85025 COMPLETE CBC W/AUTO DIFF WBC: CPT

## 2019-07-22 NOTE — XMS REPORT
Continuity of Care Document

                             Created on: 2019



Dayami Rae

External Reference #: [id number]

: 1970

Sex: Female



Demographics







                          Address                   [address] [place]  [address]

 

                          Home Phone                [phone] x

 

                          Preferred Language        Unknown

 

                          Marital Status            Unknown

 

                          Faith Affiliation     Unknown

 

                          Race                      Unknown

 

                          Ethnic Group              Unknown





Author







                          Organization              Unknown

 

                          Address                   Unknown

 

                          Phone                     [phone]



              



Allergies

      





             Active              Description              Code              Type              Severity

                Reaction              Onset              Reported/Identified              Relationship

 to Patient                             Clinical Status        

 

                Yes              morphine              [id number]              Drug Allergy           

             Unknown              N/A                             2010                         

                                                 

 

                Yes              morphine              [id number]              Drug Allergy           

                Unknown              RASH - TAKES LO                              2016           

                                                             



                    



Medications

      



There is no data.                  



Problems

      





             Date Dx Coded              Attending              Type              Code              Diagnosis

                                        Diagnosed By        

 

                2013              BROOKLYN KHAN DO S              Ot              008.45  

                          INTESTINAL INFECTION DUE TO CLOSTRIDIUM                        

 

                2013              ELIAS KHAN DOQUELINE S              Ot              272.4   

                          HYPERLIPIDEMIA NEC/NOS                       

 

                2013              ELIAS KHAN DOQUELINE S              Ot              276.8   

                          HYPOPOTASSEMIA                       

 

                2013              ELIAS KHAN DOQUELINE S              Ot              300.00  

                          ANXIETY STATE NOS                       

 

                2013              ELIAS KHAN DOQUELINE S              Ot              305.1   

                          TOBACCO USE DISORDER                       

 

                2013              ELIAS KHAN DOQUELINE S              Ot              311     

                          DEPRESSIVE DISORDER NEC                       

 

                2013              ELIAS KHAN DOQUELINE S              Ot              338.29  

                          OTHER CHRONIC PAIN                       

 

                2013              ELIAS KHAN DOQUELINE S              Ot              401.9   

                          HYPERTENSION NOS                       

 

                2013              ELIAS KHAN DOQUELINE S              Ot              414.01  

                          CORONARY ATHEROSCLEROSIS OF NATIVE CORON                       

 

                2013              ELIAS KHAN DOQUELINE S              Ot              466.0   

                          ACUTE BRONCHITIS                       

 

                2013              ELIAS KHAN DOQUELINE S              Ot              491.9   

                          CHRONIC BRONCHITIS NOS                       

 

                2013              ELIAS KHAN DOQUELINE S              Ot              493.90  

                          ASTHMA, UNSPECIFIED                       

 

                2013              ELIAS KHAN DOQUELINE S              Ot              530.81  

                          ESOPHAGEAL REFLUX                       

 

                2013              ELIAS KHAN DOQUELINE S              Ot              536.2   

                          PERSISTENT VOMITING                       

 

                2013              ELIAS KHAN DOQUELINE S              Ot              564.1   

                          IRRITABLE BOWEL SYNDROME                       

 

                2013              ELIAS KHAN DOQUELINE S              Ot              722.52  

                          LUMB/LUMBOSAC DISC DEGEN                       

 

                2013              BROOKLYN KHAN DO S              Ot              780.79  

                          OTH MALAISE FATIGUE                       

 

                2013              BROOKLYN KHAN DO S              Ot              784.0   

                          HEADACHE                           

 

                2013              BROOKLYN KHAN DO S              Ot              V45.81  

                          AORTOCORONARY BYPASS                       

 

                10/23/2013              JOSE SOTO MD              Ot              272.0       

                          PURE HYPERCHOLESTEROLEM                       

 

                10/23/2013              JOSE SOTO MD              Ot              305.1       

                          TOBACCO USE DISORDER                       

 

                10/23/2013              JOSE SOTO MD              Ot              401.9       

                          HYPERTENSION NOS                       

 

                10/23/2013              JOSE SOTO MD              Ot              717.3       

                          DERANG MED MENISCUS NEC                       

 

                10/23/2013              JOSE SOTO MD              Ot              717.7       

                          CHONDROMALACIA PATELLAE                       

 

                10/23/2013              JOSE SOTO MD              Ot              V57.1       

                          PHYSICAL THERAPY NEC                       

 

                2014              JOSE MANCINI MD              Ot              300.4        

                          DYSTHYMIC DISORDER                       

 

                2014              JOSE MANCINI MD              Ot              305.1        

                          TOBACCO USE DISORDER                       

 

                2014              JOSE MANCINI MD              Ot              414.01       

                          CORONARY ATHEROSCLEROSIS OF NATIVE CORON                       

 

                2014              JOSE MANCINI MD              Ot              530.81       

                          ESOPHAGEAL REFLUX                       

 

                2014              JOSE MANCINI MD              Ot              786.50       

                          CHEST PAIN NOS                       

 

                2014              JOSE MANCINI MD              Ot              V17.3        

                          FAM HX-ISCHEM HEART DIS                       

 

                2014              JOSE MANCINI MD, Ot              V45.81       

                          AORTOCORONARY BYPASS                       

 

                2014              JOSE MANCINI MD, Ot              V58.69       

                          OTH MED,LT,CURRENT USE                       

 

                2014              DEMARCUS KRAUSE MD              Ot              346.90  

                          MIGRAINE UNSPECIFIED W/O INTRACT MGRN W/                       

 

                2014              DEMARCUS KRAUSE MD              Ot              784.0   

                          HEADACHE                           

 

                2014              BROOKLYN KHAN DO S              Ot              272.4   

                          HYPERLIPIDEMIA NEC/NOS                       

 

                2014              BROOKLYN KHAN DO S              Ot              300.00  

                          ANXIETY STATE NOS                       

 

                2014              BROOKLYN KHAN DO S              Ot              305.1   

                          TOBACCO USE DISORDER                       

 

                2014              BROOKLYN KHAN DO S              Ot              401.9   

                          HYPERTENSION NOS                       

 

                2014              ORENDER DO, BROOKLYN S              Ot              412     

                          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                       

 

                2014              SEMAJND , BROOKLYN S              Ot              414.01  

                          CORONARY ATHEROSCLEROSIS OF NATIVE CORON                       

 

                2014              SEMAJND DO, BROOKLYN S              Ot              530.81  

                          ESOPHAGEAL REFLUX                       

 

                2014              SEMAJNDER DO, BROOKLYN S              Ot              786.51  

                          PRECORDIAL PAIN                       

 

                2014              SEMAJND DO, BROOKLYN S              Ot              V45.81  

                          AORTOCORONARY BYPASS                       

 

                2014              [facility] DO, BROOKLYN S              Ot              272.4   

                                                             

 

                [facility]              [facility]              300.00  

                                                             

 

                2014              [facility]              Ot              305.1   

                                                             

 

                2014              [facility] DO, BROOKLYN S              Ot              401.9   

                                                             

 

                2014              [facility]              Ot              412     

                                                             

 

                2014              [facility]              Ot              414.01  

                                                             

 

                2014              [facility]              Ot              530.81  

                                                             

 

                2014              [facility] DO, BROOKLYN S              Ot              786.51  

                                                             

 

                2014              [facility] DO, BROOKLYN S              Ot              V45.81  

                                                             

 

                2014              [facility]              Ot              717.2   

                                                             

 

                2014              [facility] DO BROOKLYN S              Ot              719.06  

                                                             

 

                2014              CHARLES PALACIOS, JOSE BRIDGES              Ot              717.3       

                                                             

 

                2014              CHARLES PALACIOS, JOSE BRIDGES              Ot              V72.83      

                                                             

 

                2014              CHARLES PALACIOS, JOSE BRIDGES              Ot              V74.8       

                                                             

 

                2014              [facility] DO BROOKLYN S              Ot              536.8   

                                                             

 

                2014              [facility] DO, BROOKLYN S              Ot              780.79  

                                                             

 

                2014              [facility]              Ot              789.00  

                                                             

 

           2015                             Ot              413.9                              

        

 

           2015                             Ot              V45.81                             

         

 

           2015                             Ot              V57.89                             

         

 

                2015              CHARLES PALACIOS, JOSE BRIDGES              Ot              272.0       

                          PURE HYPERCHOLESTEROLEM                       

 

                2015              CHARLES PALACIOS, JOSE BRIDGES              Ot              305.1       

                          TOBACCO USE DISORDER                       

 

                2015              CHARLES PALACIOS, JOSE BRIDGES              Ot              414.01      

                          CORONARY ATHEROSCLEROSIS OF NATIVE CORON                       

 

                2015              CHARLES PALACIOS, JOSE BRIDGES              Ot              717.3       

                          DERANG MED MENISCUS NEC                       

 

                2015              CHARLES PALACIOS, JOSE BRIDGES              Ot              717.7       

                          CHONDROMALACIA PATELLAE                       

 

                2015              CHARLES PALACIOS, JOSE BRIDGES              Ot              V57.1       

                          PHYSICAL THERAPY NEC                       

 

                2015              CHARLES PALACIOS, JOSE BRIDGES              Ot              V58.69      

                          OT MED,LT,CURRENT USE                       

 

                2015              CHARLES PALACIOS, JOSE BRIDGES              Ot              V74.8       

                          SCREEN-BACTERIAL DIS NEC                       

 

                2015              ERIN CORONA, BROOKLYN S              Ot              717.2   

                                                             

 

                2015              ERIN CORONA, BROOKLYN S              Ot              719.06  

                                                             

 

                2015              CHARLES PALACIOS, JOSE BRIDGES              Ot              717.3       

                                                             

 

                2015              CHARLES PALACIOS, JOSE BRIDGES              Ot              V72.83      

                                                             

 

                2015              CHARLES PALACIOS, JOSE BRIDGES              Ot              V74.8       

                                                             

 

                2015              GRAY KHAN DOLINE S              Ot              536.8   

                                                             

 

                2015              GRAY KHAN DOLINE S              Ot              780.79  

                                                             

 

                2015              ERIN CORONA, BROOKLYN S              Ot              789.00  

                                                             

 

           2015                             Ot              717.7                              

        

 

           2015                             Ot              V72.84                             

         

 

                2015              ERIN CORONA, BROOKLYN S              Ot              717.2   

                                                             

 

                2015              ERIN CORONA, BROOKLYN S              Ot              719.06  

                                                             

 

                2015              CHARLES PALACIOS, JOSE BRIDGES              Ot              717.3       

                                                             

 

                2015              CHARLES PALACIOS, JOSE BRIDGES              Ot              V72.83      

                                                             

 

                2015              CHARLES PALACIOS, JOSE BRIDGES              Ot              V74.8       

                                                             

 

                2015              HAZEL DO BROOKLYN S              Ot              536.8   

                                                             

 

                2015              HAZEL ELIAS CORONABROOKLYN S              Ot              780.79  

                                                             

 

                2015              [facility]              Ot              789.00  

                                                             

 

           2015                             Ot              717.7                              

        

 

           2015                             Ot              V72.84                             

         

 

                2015              STARR LARA ARNP              Ot              786.07

                                                             

 

                2015              STARR LARA ARNP              Ot              786.2

                                                             

 

                2015              STARR LARA ARNP              Ot              793.19

                                                             

 

                2015              VANBECELAERE, STARR M ARNP              Ot              486 

                                                             

 

                2015              [facility]              Ot              789.00  

                                                             

 

                05/15/2015              [facility] DO, BROOKLYN S              Ot              276.51  

                          DEHYDRATION                        

 

                05/15/2015              [facility]              Ot              787.91  

                          DIARRHEA                           

 

                05/15/2015              [facility]              Ot              789.00  

                          ABDOMINAL PAIN, UNSPECIFIED SITE                       

 

                05/15/2015              SEMAJNDER DO, BROOKLYN S              Ot              V12.61  

                          PERSONAL HISTORY, PNEUMONIA (RECURRENT)                       

 

                2015              [facility] DO, BROOKLYN S              Ot              717.2   

                                                             

 

                2015              [facility], BROOKLYN S              Ot              719.06  

                                                             

 

                2015              CHARLES PALACIOS, JOSE BRIDGES              Ot              717.3       

                                                             

 

                2015              CHARLES PALACIOS, JOSE BRIDGES              Ot              V72.83      

                                                             

 

                2015              CHARLES PALACIOS, JOSE BRIDGES              Ot              V74.8       

                                                             

 

                2015              [facility], BROOKLYN S              Ot              536.8   

                                                             

 

                2015              [facility], BROOKLYN S              Ot              780.79  

                                                             

 

                2015              [facility], BROOKLYN S              Ot              789.00  

                                                             

 

           2015                             Ot              717.7                              

        

 

           2015                             Ot              V72.84                             

         

 

                2015              JANE STARR M ARNP              Ot              786.07

                                                             

 

                2015              VANBECELAERE, STARR M ARNP              Ot              786.2

                                                             

 

                2015              VANBECELAERE, STARR M ARNP              Ot              793.19

                                                             

 

                2015              JANE STARR M ARNP              Ot              486 

                                                             

 

                2015              [facility] DO, BROOKLYN S              Ot              789.00  

                                                             

 

                2015              [facility] DO, BROOKLYN S              Ot              717.2   

                                                             

 

                2015              [facility] DO, BROOKLYN S              Ot              719.06  

                                                             

 

                2015              CHARLES PALACIOS, JOSE BRIDGES              Ot              717.3       

                                                             

 

                2015              CHARLES PALACIOS, JOSE BRIDGES              Ot              V72.83      

                                                             

 

                2015              CHARLES PALACIOS, JOSE BRIDGES              Ot              V74.8       

                                                             

 

                2015              [facility], BROOKLYN S              Ot              536.8   

                                                             

 

                2015              [facility], BROOKLYN S              Ot              780.79  

                                                             

 

                2015              GRAY KHAN DOLINE S              Ot              789.00  

                                                             

 

           2015                             Ot              717.7                              

        

 

           2015                             Ot              V72.84                             

         

 

                2015              STARR LARA ARNP              Ot              786.07

                                                             

 

                2015              STARR LARA ARNP              Ot              786.2

                                                             

 

                2015              STARR LARA ARNP              Ot              793.19

                                                             

 

                2015              STARR LARA ARNP              Ot              486 

                                                             

 

                2015              GRAY KHAN DOLINE S              Ot              789.00  

                                                             

 

                2015              GERRY SALINAS MD              Ot              272.0           

                          PURE HYPERCHOLESTEROLEM                       

 

                2015              GERRY SALINAS MD              Ot              276.51          

                          DEHYDRATION                        

 

                2015              GERRY SALINAS MD              Ot              305.1           

                          TOBACCO USE DISORDER                       

 

                2015              GERRY SALINAS MD              Ot              401.9           

                          HYPERTENSION NOS                       

 

                2015              GERRY SALINAS MD              Ot              414.00          

                          CORON ATHEROSCLER NOS TYPE VESSEL, NATIV                       

 

                2015              GERRY SALINAS MD              Ot              530.11          

                          REFLUX ESOPHAGITIS                       

 

                2015              GERRY SALINAS MD              Ot              535.40          

                          OTH SPECIFIED GASTRITIS,W/O MENTION OF H                       

 

                2015              GERRY SALINAS MD              Ot              553.3           

                          DIAPHRAGMATIC HERNIA                       

 

                2015              GERRY SALINAS MD              Ot              784.0           

                          HEADACHE                           

 

                2015              GERRY SALINAS MD              Ot              V45.81          

                          AORTOCORONARY BYPASS                       

 

                2015              GERRY SALINAS MD              Ot              V58.69          

                          OTH MED,LT,CURRENT USE                       

 

                2015              VERITO MARTINEZ              Ot              346.90     

                          MIGRAINE UNSPECIFIED W/O INTRACT MGRN W/                       

 

                2015              VERITO MARTINEZ              Ot              473.3      

                          CHR SPHENOIDAL SINUSITIS                       

 

                2015              VERITO MARTINEZ              Ot              780.4      

                          DIZZINESS AND GIDDINESS                       

 

                2016              BROOKLYN KHAN DO S              Ot              717.2   

                                                             

 

                2016              BROOKLYN KHAN DO S              Ot              719.06  

                                                             

 

                2016              JOSE SOTO MD              Ot              717.3       

                                                             

 

                2016              ZAFUTA MD, JOSE P              Ot              V72.83      

                                                             

 

                2016              CHARLES PALACIOS, JOSE P              Ot              V74.8       

                                                             

 

                2016              ORENDER DO, BROOKLYN S              Ot              536.8   

                                                             

 

                2016              ORENDER DO, BROOKLYN S              Ot              780.79  

                                                             

 

                2016              ORENDER DO, BROOKLYN S              Ot              789.00  

                                                             

 

           2016                             Ot              717.7                              

        

 

           2016                             Ot              V72.84                             

         

 

                2016              VANBECELAERE, STARR M ARNP              Ot              786.07

                                                             

 

                2016              VANBECELAERE, STARR M ARNP              Ot              786.2

                                                             

 

                2016              VANBECELAERE, STARR M ARNP              Ot              793.19

                                                             

 

                2016              VANBECELAERE, STARR M ARNP              Ot              486 

                                                             

 

                2016              ORENDER DO, BROOKLYN S              Ot              789.00  

                                                             

 

                2016              ORENDER DO, BROOKLYN S              Ot              717.2   

                                                             

 

                2016              [facility] DO, BROOKLYN S              Ot              719.06  

                                                             

 

                2016              CHARLES PALACIOS, JOSE BRIDGES              Ot              717.3       

                                                             

 

                2016              CHARLES PALACIOS, JOSE BRIDGES              Ot              V72.83      

                                                             

 

                2016              CHARLES PALACIOS, JOSE P              Ot              V74.8       

                                                             

 

                2016              ORENDER DO, BROOKLYN S              Ot              536.8   

                                                             

 

                2016              ORENDER DO, BROOKLYN S              Ot              780.79  

                                                             

 

                2016              ORENDER DO, BROOKLYN S              Ot              789.00  

                                                             

 

           2016                             Ot              717.7                              

        

 

           2016                             Ot              V72.84                             

         

 

                2016              VANBECELAERE, STARR M ARNP              Ot              786.07

                                                             

 

                2016              VANBECELAERE, STARR M ARNP              Ot              786.2

                                                             

 

                2016              VANBECELAERE, STARR M ARNP              Ot              793.19

                                                             

 

                2016              VANBECELAERE, STARR M ARNP              Ot              486 

                                                             

 

                [facility]              [facility]              [facility]              789.00  

                                                             

 

                2016              ARIANNA BRADFORD APRN              Ot              M54.5     

                                                             

 

                2016              ARIANNA BRADFORD              Ot              M54.6     

                                                             

 

                2016              JIMI PALACIOS, DEMARCUS KRISHNAN              Ot              F17.210 

                          NICOTINE DEPENDENCE, CIGARETTES, UNCOMPL                       

 

                2016              JIMI PALACIOS, DEMARCUS KRISHNAN              Ot              I10     

                          ESSENTIAL (PRIMARY) HYPERTENSION                       

 

                2016              JIMI PALACIOS, DEMARCUS KRISHNAN              Ot              J45.909 

                          UNSPECIFIED ASTHMA, UNCOMPLICATED                       

 

                2016              DEMARCUS KRAUSE MD              Ot              M54.2   

                          CERVICALGIA                        

 

                2016              DEMARCUS KRAUSE MD              Ot              M54.9   

                          DORSALGIA, UNSPECIFIED                       

 

                2016              JIMI PALACIOS, DEMARCUS KRISHNAN              Ot              R20.2   

                          PARESTHESIA OF SKIN                       

 

                2016              DEMARCUS KRAUSE MD              Ot              R93.8   

                          ABNORMAL FINDINGS ON DIAGNOSTIC IMAGING                        

 

                2016              JIMI PALACIOS, DEMARCUS KRISHNAN              Ot              Z95.1   

                          PRESENCE OF AORTOCORONARY BYPASS GRAFT                       

 

                2016              BROOKLYN KHAN DO              Ot              717.2   

                                                             

 

                2016              BROOKLYN KHAN DO              Ot              719.06  

                                                             

 

                2016              CHARLES PALACIOS, JOSE BRIDGES              Ot              717.3       

                                                             

 

                2016              CHARLES PALACIOS, JOSE BRIDGES              Ot              V72.83      

                                                             

 

                2016              CHARLES PALACIOS, JOSE BRIDGES              Ot              V74.8       

                                                             

 

                2016              BROOKLYN KHAN DO S              Ot              536.8   

                                                             

 

                2016              BROOKLYN KHAN DO S              Ot              780.79  

                                                             

 

                2016              BROOKLYN KHAN DO S              Ot              789.00  

                                                             

 

           2016                             Ot              717.7                              

        

 

           2016                             Ot              V72.84                             

         

 

                2016              STARR LARA ARNP              Ot              786.07

                                                             

 

                2016              STARR LARA ARNP              Ot              786.2

                                                             

 

                2016              STARR LARA ARNP              Ot              793.19

                                                             

 

                2016              STARR LARA ARNP              Ot              486 

                                                             

 

                2016              BROOKLYN KHAN DO S              Ot              789.00  

                                                             

 

                2016              ARIANNA BRADFORD APRN              Ot              M54.5     

                                                             

 

                2016              ARIANNA BRADFORD APRN              Ot              M54.6     

                                                             

 

                2016              ARIANNA BRADFORD APRN              Ot              M54.2     

                                                             

 

                2016              ARIANNA BRADFORD APRN              Ot              M54.6     

                                                             

 

                2016              ARIANNA BRADFORD APRN              Ot              R20.9     

                                                             

 

                2016              BROOKLYN KHAN DO              Ot              A04.7   

                          ENTEROCOLITIS DUE TO CLOSTRIDIUM DIFFICI                       

 

                2016              BROOKLYN KHAN DO              Ot              E86.0   

                          DEHYDRATION                        

 

                2016              BROOKLYN KHAN DO              Ot              F17.210 

                          NICOTINE DEPENDENCE, CIGARETTES, UNCOMPL                       

 

                2016              BROOKLYN KHAN DO              Ot              I10     

                          ESSENTIAL (PRIMARY) HYPERTENSION                       

 

                2016              BROOKLYN KHAN DO              Ot              I25.10  

                          ATHSCL HEART DISEASE OF NATIVE CORONARY                        

 

                2016              BROOKLYN KHAN DO              Ot              J44.9   

                          CHRONIC OBSTRUCTIVE PULMONARY DISEASE, U                       

 

                2016              BROOKLYN KHAN DO              Ot              J45.909 

                          UNSPECIFIED ASTHMA, UNCOMPLICATED                       

 

                2016              BROOKLYN KHAN DO              Ot              K21.9   

                          GASTRO-ESOPHAGEAL REFLUX DISEASE WITHOUT                       

 

                2016              BROOKLYN KHAN DO              Ot              Z95.1   

                          PRESENCE OF AORTOCORONARY BYPASS GRAFT                       

 

             2016                             Ot              413.9              ANGINA PECTORIS

 NEC/NOS                                         

 

             2016                             Ot              V45.81              AORTOCORONARY

 BYPASS                                          

 

             2016                             Ot              V57.89              REHABILITATION

 PROC NEC                                        

 

                2016              BROOKLYN KHAN DO              Ot              A04.7   

                          ENTEROCOLITIS DUE TO CLOSTRIDIUM DIFFICI                       

 

                2016              BROOKLYN KHAN DO              Ot              E86.0   

                          DEHYDRATION                        

 

                2016              BROOKLYN KHAN DO              Ot              F17.210 

                          NICOTINE DEPENDENCE, CIGARETTES, UNCOMPL                       

 

                2016              BROOKLYN KHAN DO              Ot              F31.9   

                          BIPOLAR DISORDER, UNSPECIFIED                       

 

                2016              BROOKLYN KHAN DO              Ot              G43.909 

                          MIGRAINE, UNSP, NOT INTRACTABLE, WITHOUT                       

 

                2016              BROOKLYN KHAN DO              Ot              I10     

                          ESSENTIAL (PRIMARY) HYPERTENSION                       

 

                2016              BROOKLYN KHAN DO              Ot              I25.10  

                          ATHSCL HEART DISEASE OF NATIVE CORONARY                        

 

                2016              BROOKLYN KHAN DO              Ot              J18.9   

                          PNEUMONIA, UNSPECIFIED ORGANISM                       

 

                2016              ERIN CORONA BROOKLYN S              Ot              J44.1   

                          CHRONIC OBSTRUCTIVE PULMONARY DISEASE W                        

 

                2016              ELIAS KHAN DOQUELINE S              Ot              K21.9   

                          GASTRO-ESOPHAGEAL REFLUX DISEASE WITHOUT                       

 

                2016              ERIN CORONA BROOKLYN S              Ot              R10.13  

                          EPIGASTRIC PAIN                       

 

                2016              BROOKLYN KHAN DO S              Ot              Z23     

                          ENCOUNTER FOR IMMUNIZATION                       

 

                2016              ERIN CORONA BROOKLYN S              Ot              Z95.1   

                          PRESENCE OF AORTOCORONARY BYPASS GRAFT                       

 

                2016              ARIANNA BRADFORD APRN              Ot              M54.2     

                          CERVICALGIA                        

 

                2016              ARIANNA BRADFORD APRN              Ot              M54.6     

                          PAIN IN THORACIC SPINE                       

 

                2016              ARIANNA BRADFORD APRN              Ot              R20.9     

                          UNSPECIFIED DISTURBANCES OF SKIN SENSATI                       

 

                2016              BROOKLYN KHAN DO S              Ot              A04.7   

                          ENTEROCOLITIS DUE TO CLOSTRIDIUM DIFFICI                       

 

                2016              BROOKLYN KAHN DO S              Ot              E78.5   

                          HYPERLIPIDEMIA, UNSPECIFIED                       

 

                2016              ERIN CORONA BROOKLYN S              Ot              E86.1   

                          HYPOVOLEMIA                        

 

                2016              ERIN CORONA BROOKLYN S              Ot              E87.6   

                          HYPOKALEMIA                        

 

                2016              ERIN CORONA BROOKLYN S              Ot              F17.210 

                          NICOTINE DEPENDENCE, CIGARETTES, UNCOMPL                       

 

                2016              ERIN CORONA BROOKLYN S              Ot              G43.909 

                          MIGRAINE, UNSP, NOT INTRACTABLE, WITHOUT                       

 

                2016              ELIAS KHAN DOQUELINE S              Ot              I10     

                          ESSENTIAL (PRIMARY) HYPERTENSION                       

 

                2016              ELIAS KHAN DOQUELINE S              Ot              I25.10  

                          ATHSCL HEART DISEASE OF NATIVE CORONARY                        

 

                2016              ELIAS KHAN DOQUELINE S              Ot              J18.9   

                          PNEUMONIA, UNSPECIFIED ORGANISM                       

 

                2016              ELIAS KHAN DOQUELINE S              Ot              K21.9   

                          GASTRO-ESOPHAGEAL REFLUX DISEASE WITHOUT                       

 

                2016              ELIAS KHAN DOQUELINE S              Ot              R07.89  

                          OTHER CHEST PAIN                       

 

                2016              ELIAS KHAN DOQUELINE S              Ot              Z95.1   

                          PRESENCE OF AORTOCORONARY BYPASS GRAFT                       

 

                2016              ARIANNA BRADFORD APRN              Ot              R05       

                          COUGH                              

 

                2016              ARIANNA BRADFORD LANNY APRN              Ot              R10.12    

                          LEFT UPPER QUADRANT PAIN                       

 

                2016              ARIANNA BRADFORD LANNY APRN              Ot              R10.32    

                          LEFT LOWER QUADRANT PAIN                       

 

                2016              ARIANNA BRADFORD APRN              Ot              R19.7     

                          DIARRHEA, UNSPECIFIED                       

 

                2016              ARIANNA BRADFORD LANNY APRN              Ot              R50.9     

                          FEVER, UNSPECIFIED                       

 

                2016              ARIANNA BRADFORD LANNY APRN              Ot              R05       

                          COUGH                              

 

                2016              ARIANNA BRADFORD LANNY APRN              Ot              R10.12    

                          LEFT UPPER QUADRANT PAIN                       

 

                2016              ARIANNA BRADFORD LANNY APRN              Ot              R10.32    

                          LEFT LOWER QUADRANT PAIN                       

 

                2016              ARIANNA BRADFORD LANNY APRN              Ot              R19.7     

                          DIARRHEA, UNSPECIFIED                       

 

                2016              ARIANNA BRADFORD LANNY APRN              Ot              R50.9     

                          FEVER, UNSPECIFIED                       

 

                2016              ARIANNA BRADFORD LANNY APRN              Ot              R05       

                          COUGH                              

 

                2016              ARIANNA BRADFORD LANYN APRN              Ot              R09.89    

                          OTH SYMPTOMS AND SIGNS INVOLVING THE CIR                       

 

                2016              ARIANNA BRADFORD LANNY APRN              Ot              R10.12    

                          LEFT UPPER QUADRANT PAIN                       

 

                2016              ARIANNA BRADFORD LANNY APRN              Ot              R10.32    

                          LEFT LOWER QUADRANT PAIN                       

 

                2016              ARIANNA BRADFORD LANNY APRN              Ot              R19.7     

                          DIARRHEA, UNSPECIFIED                       

 

                2016              ARIANNA BRADFORD LANNY APRN              Ot              R50.9     

                          FEVER, UNSPECIFIED                       

 

                2016              BROOKLYN KHAN DO              Ot              717.2   

                          DERANG POST MED MENISCUS                       

 

                2016              BROOKLYN KHAN DO              Ot              719.06  

                          JOINT EFFUSION-L/LEG                       

 

                2016              JOSE SOTO MD              Ot              717.3       

                          DERANG MED MENISCUS NEC                       

 

                2016              JOSE SOTO MD              Ot              V72.83      

                          EXAM PRE-OPERATIVE NEC                       

 

                2016              JOSE SOTO MD              Ot              V74.8       

                          SCREEN-BACTERIAL DIS NEC                       

 

                2016              BROOKLYN KHAN DO              Ot              536.8   

                          STOMACH FUNCTION DIS NEC                       

 

                2016              BROOKLYN KHAN DO              Ot              780.79  

                          OTH MALAISE FATIGUE                       

 

                2016              ORENDER DO, BROOKLYN S              Ot              789.00  

                          ABDOMINAL PAIN, UNSPECIFIED SITE                       

 

             2016                             Ot              717.7              CHONDROMALACIA

 PATELLAE                                        

 

             2016                             Ot              V72.84              EXAM PRE-OPERATIVE

 NOS                                             

 

                2016              STARR LARA ARNP              Ot              786.07

                          WHEEZING                           

 

                2016              STARR LARA ARNP              Ot              786.2

                          COUGH                              

 

                2016              STARR LARA ARNP              Ot              793.19

                          OTHER NONSPECIFIC ABNORMAL FINDING OF ETHAN                       

 

                2016              STARR LARA ARNP              Ot              486 

                          PNEUMONIA, ORGANISM NOS                       

 

                2016              BROOKLYN KHAN DO S              Ot              789.00  

                          ABDOMINAL PAIN, UNSPECIFIED SITE                       

 

                2016              ARIANNA BRADFORD APRN              Ot              M54.5     

                          LOW BACK PAIN                       

 

                2016              ARIANNA BRADFORD APRN              Ot              M54.6     

                          PAIN IN THORACIC SPINE                       

 

                2016              ARIANNA BRADFORD APRN              Ot              M54.2     

                          CERVICALGIA                        

 

                2016              ARIANNA BRADFORD APRN              Ot              M54.6     

                          PAIN IN THORACIC SPINE                       

 

                2016              ARIANNA BRADFORD APRN              Ot              R20.9     

                          UNSPECIFIED DISTURBANCES OF SKIN SENSATI                       

 

                2016              ARIANNA BRADFORD APRN              Ot              R05       

                          COUGH                              

 

                2016              ARIANNA BRADFORD APRN              Ot              R09.89    

                          OTH SYMPTOMS AND SIGNS INVOLVING THE CIR                       

 

                2016              ARIANNA BRADFORD APRN              Ot              R10.12    

                          LEFT UPPER QUADRANT PAIN                       

 

                2016              ARIANNA BRADFORD APRN              Ot              R10.32    

                          LEFT LOWER QUADRANT PAIN                       

 

                2016              ARIANNA BRADFORD APRN              Ot              R19.7     

                          DIARRHEA, UNSPECIFIED                       

 

                2016              ARIANNA BRADFORD APRN              Ot              R50.9     

                          FEVER, UNSPECIFIED                       

 

                09/15/2016              BROOKLYN KHAN DO S              Ot              717.2   

                          DERANG POST MED MENISCUS                       

 

                09/15/2016              BROOKLYN KHAN DO S              Ot              719.06  

                          JOINT EFFUSION-L/LEG                       

 

                09/15/2016              CHARLES PALACIOS, JOSE BRIDGES              Ot              717.3       

                          DERANG MED MENISCUS NEC                       

 

                09/15/2016              CHARLES PALACIOS, JOSE BRIDGES              Ot              V72.83      

                          EXAM PRE-OPERATIVE NEC                       

 

                09/15/2016              CHARLES PALACIOS, JOSE P              Ot              V74.8       

                          SCREEN-BACTERIAL DIS NEC                       

 

                09/15/2016              GRAY KHAN DOLINE S              Ot              536.8   

                          STOMACH FUNCTION DIS NEC                       

 

                09/15/2016              GRAY KHAN DOLINE S              Ot              780.79  

                          OTH MALAISE FATIGUE                       

 

                09/15/2016              ERIN DO BROOKLYN S              Ot              789.00  

                          ABDOMINAL PAIN, UNSPECIFIED SITE                       

 

             09/15/2016                             Ot              717.7              CHONDROMALACIA

 PATELLAE                                        

 

             09/15/2016                             Ot              V72.84              EXAM PRE-OPERATIVE

 NOS                                             

 

                09/15/2016              VANCARYLSTARR M ARNP              Ot              786.07

                          WHEEZING                           

 

                09/15/2016              VANBECELAERE STARR M ARNP              Ot              786.2

                          COUGH                              

 

                09/15/2016              VANGISSELELAESTARR BLEVINS M ARNP              Ot              793.19

                          OTHER NONSPECIFIC ABNORMAL FINDING OF ETHAN                       

 

                09/15/2016              VANSTARR HUTSON M ARNP              Ot              486 

                          PNEUMONIA, ORGANISM NOS                       

 

                09/15/2016              SEMAJELIAS BURKS DOQUELINE S              Ot              789.00  

                          ABDOMINAL PAIN, UNSPECIFIED SITE                       

 

                09/15/2016              ARIANNA BRADFORD APRN              Ot              M54.5     

                          LOW BACK PAIN                       

 

                09/15/2016              ARIANNA BRADFORD APRN              Ot              M54.6     

                          PAIN IN THORACIC SPINE                       

 

                09/15/2016              ARIANNA BRADFORD APRN              Ot              M54.2     

                          CERVICALGIA                        

 

                09/15/2016              ARIANNA BRADFORD APRN              Ot              M54.6     

                          PAIN IN THORACIC SPINE                       

 

                09/15/2016              ARIANNA BRADFORD APRN              Ot              R20.9     

                          UNSPECIFIED DISTURBANCES OF SKIN SENSATI                       

 

                09/15/2016              ARIANNA BRADFORD APRN              Ot              R05       

                          COUGH                              

 

                09/15/2016              ARIANNA BRADFORD APRN              Ot              R09.89    

                          OTH SYMPTOMS AND SIGNS INVOLVING THE CIR                       

 

                09/15/2016              ARIANNA BRADFORD APRN              Ot              R10.12    

                          LEFT UPPER QUADRANT PAIN                       

 

                09/15/2016              ARIANNA BRADFORD APRN              Ot              R10.32    

                          LEFT LOWER QUADRANT PAIN                       

 

                09/15/2016              ARIANNA BRADFORD APRN              Ot              R19.7     

                          DIARRHEA, UNSPECIFIED                       

 

                09/15/2016              ARIANNA BRADFORD APRN              Ot              R50.9     

                          FEVER, UNSPECIFIED                       

 

                2016              BROOKLYN KHAN DO              Ot              717.2   

                          DERANG POST MED MENISCUS                       

 

                2016              BROOKLYN KHAN DO S              Ot              719.06  

                          JOINT EFFUSION-L/LEG                       

 

                2016              CHARLES PALACIOS, JOSE BRIDGES              Ot              717.3       

                          DERANG MED MENISCUS NEC                       

 

                2016              CHARLES PALACIOS, JOSE BRIDGES              Ot              V72.83      

                          EXAM PRE-OPERATIVE NEC                       

 

                2016              JOSE SOTO MD              Ot              V74.8       

                          SCREEN-BACTERIAL DIS NEC                       

 

                2016              BROOKLYN KHAN DO              Ot              536.8   

                          STOMACH FUNCTION DIS NEC                       

 

                2016              BROOKLYN KHAN DO S              Ot              780.79  

                          OTH MALAISE FATIGUE                       

 

                2016              BROOKLYN KHAN DO              Ot              789.00  

                          ABDOMINAL PAIN, UNSPECIFIED SITE                       

 

             2016                             Ot              717.7              CHONDROMALACIA

 PATELLAE                                        

 

             2016                             Ot              V72.84              EXAM PRE-OPERATIVE

 NOS                                             

 

                2016              STARR LARA M ARNP              Ot              786.07

                          WHEEZING                           

 

                2016              VANCARYL STARR M ARNP              Ot              786.2

                          COUGH                              

 

                2016              JANE STARR M ARNP              Ot              793.19

                          OTHER NONSPECIFIC ABNORMAL FINDING OF ETHAN                       

 

                2016              STARR LARA M ARNP              Ot              486 

                          PNEUMONIA, ORGANISM NOS                       

 

                2016              BROOKLYN KHAN DO              Ot              789.00  

                          ABDOMINAL PAIN, UNSPECIFIED SITE                       

 

                2016              ARIANNA BRADFORD APRN              Ot              M54.5     

                          LOW BACK PAIN                       

 

                2016              ARIANNA BRADFORD APRN              Ot              M54.6     

                          PAIN IN THORACIC SPINE                       

 

                2016              ARIANNA BRADFORD APRN              Ot              M54.2     

                          CERVICALGIA                        

 

                2016              ARIANNA BRADFORD APRN              Ot              M54.6     

                          PAIN IN THORACIC SPINE                       

 

                2016              ARIANNA BRADFORD APRN              Ot              R20.9     

                          UNSPECIFIED DISTURBANCES OF SKIN SENSATI                       

 

                2016              ARIANNA BRADFORD APRN              Ot              R05       

                          COUGH                              

 

                2016              ARIANNA BRADFORD APRN              Ot              R09.89    

                          OTH SYMPTOMS AND SIGNS INVOLVING THE CIR                       

 

                2016              ARIANNA BRADFORD APRN              Ot              R10.12    

                          LEFT UPPER QUADRANT PAIN                       

 

                2016              ARIANNA BRADFORD APRN              Ot              R10.32    

                          LEFT LOWER QUADRANT PAIN                       

 

                2016              ARIANNA BRADFORD APRN              Ot              R19.7     

                          DIARRHEA, UNSPECIFIED                       

 

                2016              ARIANNA BRADFORD APRN              Ot              R50.9     

                          FEVER, UNSPECIFIED                       

 

                2017              HAZELBROOKLYN JAMES DO              Ot              717.2   

                          DERANG POST MED MENISCUS                       

 

                2017              BROOKLYN KHAN DO              Ot              719.06  

                          JOINT EFFUSION-L/LEG                       

 

                2017              CHARLES PALACIOS, JOSE BRIDGES              Ot              717.3       

                          DERANG MED MENISCUS NEC                       

 

                2017              JOSE SOTO MD              Ot              V72.83      

                          EXAM PRE-OPERATIVE NEC                       

 

                2017              JOSE SOTO MD              Ot              V74.8       

                          SCREEN-BACTERIAL DIS NEC                       

 

                2017              SEMAJBROOKLYN BURKS DO              Ot              536.8   

                          STOMACH FUNCTION DIS NEC                       

 

                2017              ERIN BROOKLYN CORONA S              Ot              780.79  

                          OTH MALAISE FATIGUE                       

 

                2017              BROOKLYN KHAN DO S              Ot              789.00  

                          ABDOMINAL PAIN, UNSPECIFIED SITE                       

 

             2017                             Ot              717.7              CHONDROMALACIA

 PATELLAE                                        

 

             2017                             Ot              V72.84              EXAM PRE-OPERATIVE

 NOS                                             

 

                2017              VANBECELAERE, STARR M ARNP              Ot              786.07

                          WHEEZING                           

 

                2017              VANBECELAERE, STARR M ARNP              Ot              786.2

                          COUGH                              

 

                2017              VANBECELAERE, STARR M ARNP              Ot              793.19

                          OTHER NONSPECIFIC ABNORMAL FINDING OF ETHAN                       

 

                2017              VANBECELAERE, STARR M ARNP              Ot              486 

                          PNEUMONIA, ORGANISM NOS                       

 

                2017              HAZELERIKA CORONA BROOKLYN S              Ot              789.00  

                          ABDOMINAL PAIN, UNSPECIFIED SITE                       

 

                2017              ARIANNA BRADFORD APRN              Ot              M54.5     

                          LOW BACK PAIN                       

 

                2017              ARIANNA BRADFORD APRN              Ot              M54.6     

                          PAIN IN THORACIC SPINE                       

 

                2017              ARIANNA BRADFORD APRN              Ot              M54.2     

                          CERVICALGIA                        

 

                2017              ARIANNA BRADFORD APRN              Ot              M54.6     

                          PAIN IN THORACIC SPINE                       

 

                2017              ARIANNA BRADFORD APRN              Ot              R20.9     

                          UNSPECIFIED DISTURBANCES OF SKIN SENSATI                       

 

                2017              ARIANNA BRADFORD APRN              Ot              R05       

                          COUGH                              

 

                2017              ARIANNA BRADFORD APRN              Ot              R09.89    

                          OT SYMPTOMS AND SIGNS INVOLVING THE CIR                       

 

                2017              SANCHEZARIANNA APRN              Ot              R10.12    

                          LEFT UPPER QUADRANT PAIN                       

 

                2017              ARIANNA BRADFORD APRN              Ot              R10.32    

                          LEFT LOWER QUADRANT PAIN                       

 

                2017              ARIANNA BRADFORD APRN              Ot              R19.7     

                          DIARRHEA, UNSPECIFIED                       

 

                2017              ARIANNA BRADFORD APRN              Ot              R50.9     

                          FEVER, UNSPECIFIED                       

 

                2017              CHARISSA PALACIOS, SUBHA REDDY              Ot              M54.5         

                          LOW BACK PAIN                       

 

                2017              SUBHA CARRINGTON MD              Ot              M54.5         

                          LOW BACK PAIN                       

 

                05/15/2017              BROOKLYN KHAN DO S              Ot              717.2   

                          DERANG POST MED MENISCUS                       

 

                05/15/2017              BROOKLYN KHAN DO S              Ot              719.06  

                          JOINT EFFUSION-L/LEG                       

 

                05/15/2017              JOSE SOTO MD              Ot              717.3       

                          DERANG MED MENISCUS NEC                       

 

                05/15/2017              CHARLES PALACIOS, JOSE BRIDGES              Ot              V72.83      

                          EXAM PRE-OPERATIVE NEC                       

 

                05/15/2017              CHARLES PALACIOS, JOSE BRIDGES              Ot              V74.8       

                          SCREEN-BACTERIAL DIS NEC                       

 

                05/15/2017              BROOKLYN KHAN DO S              Ot              536.8   

                          STOMACH FUNCTION DIS NEC                       

 

                05/15/2017              BROOKLYN KHAN DO S              Ot              780.79  

                          OTH MALAISE FATIGUE                       

 

                05/15/2017              BROOKLYN KHAN DO S              Ot              789.00  

                          ABDOMINAL PAIN, UNSPECIFIED SITE                       

 

             05/15/2017                             Ot              717.7              CHONDROMALACIA

 PATELLAE                                        

 

             05/15/2017                             Ot              V72.84              EXAM PRE-OPERATIVE

 NOS                                             

 

                05/15/2017              VANGISSELELAERESTARR M ARNP              Ot              786.07

                          WHEEZING                           

 

                05/15/2017              VANGISSELELAESTARR BLEVINS ARNP              Ot              786.2

                          COUGH                              

 

                05/15/2017              VANGISSELELAEREEDWARDSA M ARNP              Ot              793.19

                          OTHER NONSPECIFIC ABNORMAL FINDING OF ETHAN                       

 

                05/15/2017              STARR LARA M ARNP              Ot              486 

                          PNEUMONIA, ORGANISM NOS                       

 

                05/15/2017              BROOKLYN KHAN DO S              Ot              789.00  

                          ABDOMINAL PAIN, UNSPECIFIED SITE                       

 

                05/15/2017              ARIANNA BRADFORD APRN              Ot              M54.5     

                          LOW BACK PAIN                       

 

                05/15/2017              ARIANNA BRADFORD APRN              Ot              M54.6     

                          PAIN IN THORACIC SPINE                       

 

                05/15/2017              ARIANNA BRADFORD LANNY APRN              Ot              M54.2     

                          CERVICALGIA                        

 

                05/15/2017              ARIANNA BRADFORD LANNY APRN              Ot              M54.6     

                          PAIN IN THORACIC SPINE                       

 

                05/15/2017              ARIANNA BRADFORD LANNY APRN              Ot              R20.9     

                          UNSPECIFIED DISTURBANCES OF SKIN SENSATI                       

 

                05/15/2017              ARIANNA BRADFORD LANNY APRN              Ot              R05       

                          COUGH                              

 

                05/15/2017              ARIANNA BRADFORD LANNY APRN              Ot              R09.89    

                          OTH SYMPTOMS AND SIGNS INVOLVING THE CIR                       

 

                05/15/2017              ARIANNA BRADFORD LANNY APRN              Ot              R10.12    

                          LEFT UPPER QUADRANT PAIN                       

 

                05/15/2017              ARIANNA BRADFORD LANNY APRN              Ot              R10.32    

                          LEFT LOWER QUADRANT PAIN                       

 

                05/15/2017              ARIANNA BRADFORD LANNY APRN              Ot              R19.7     

                          DIARRHEA, UNSPECIFIED                       

 

                05/15/2017              ARIANNA BRADFORD LANNY APRN              Ot              R50.9     

                          FEVER, UNSPECIFIED                       

 

                05/15/2017              CHARISSA PALACIOS, SUBHA REDDY              Ot              M54.5         

                          LOW BACK PAIN                       

 

                2017              SUBHA CARRINGTON MD              Ot              M54.5         

                          LOW BACK PAIN                       

 

                2017              SUBHA CARRINGTON MD              Ot              M54.5         

                          LOW BACK PAIN                       

 

                2017              BROOKLYN KHAN DO              Ot              717.2   

                          DERANG POST MED MENISCUS                       

 

                2017              BROOKLYN KHAN DO              Ot              719.06  

                          JOINT EFFUSION-L/LEG                       

 

                2017              JOSE SOTO MD              Ot              717.3       

                          DERANG MED MENISCUS NEC                       

 

                2017              JOSE SOTO MD              Ot              V72.83      

                          EXAM PRE-OPERATIVE NEC                       

 

                2017              CHARLES PALACIOS, JOSE BRIDGES              Ot              V74.8       

                          SCREEN-BACTERIAL DIS NEC                       

 

                2017              BROOKLYN KHAN DO              Ot              536.8   

                          STOMACH FUNCTION DIS NEC                       

 

                2017              BROOKLYN KHAN DO              Ot              780.79  

                          OTH MALAISE FATIGUE                       

 

                2017              BROOKLYN KHAN DO              Ot              789.00  

                          ABDOMINAL PAIN, UNSPECIFIED SITE                       

 

             2017                             Ot              717.7              CHONDROMALACIA

 PATELLAE                                        

 

             2017                             Ot              V72.84              EXAM PRE-OPERATIVE

 NOS                                             

 

                2017              VANBECELAERE, STARR M ARNP              Ot              786.07

                          WHEEZING                           

 

                2017              STARR LARA ARNP              Ot              786.2

                          COUGH                              

 

                2017              STARR LARA ARNP              Ot              793.19

                          OTHER NONSPECIFIC ABNORMAL FINDING OF ETHAN                       

 

                2017              STARR LARA ARNP              Ot              486 

                          PNEUMONIA, ORGANISM NOS                       

 

                2017              BROOKLYN KHAN DO              Ot              789.00  

                          ABDOMINAL PAIN, UNSPECIFIED SITE                       

 

                2017              ARIANNA BRADFORD APRN              Ot              M54.5     

                          LOW BACK PAIN                       

 

                2017              ARIANNA BRADFORD APRN              Ot              M54.6     

                          PAIN IN THORACIC SPINE                       

 

                2017              ARIANNA BRADFORD APRN              Ot              M54.2     

                          CERVICALGIA                        

 

                2017              ARIANNA BRADFORD APRN              Ot              M54.6     

                          PAIN IN THORACIC SPINE                       

 

                2017              ARIANNA BRADFORD APRN              Ot              R20.9     

                          UNSPECIFIED DISTURBANCES OF SKIN SENSATI                       

 

                2017              ARIANNA BRADFORD APRN              Ot              R05       

                          COUGH                              

 

                2017              ARIANNA BRADFORD APRN              Ot              R09.89    

                          OTH SYMPTOMS AND SIGNS INVOLVING THE CIR                       

 

                2017              ARIANNA BRADFORD APRN              Ot              R10.12    

                          LEFT UPPER QUADRANT PAIN                       

 

                2017              ARIANNA BRADFORD APRN              Ot              R10.32    

                          LEFT LOWER QUADRANT PAIN                       

 

                2017              ARIANNA BRADFORD APRN              Ot              R19.7     

                          DIARRHEA, UNSPECIFIED                       

 

                2017              ARIANNA BRADFORD APRN              Ot              R50.9     

                          FEVER, UNSPECIFIED                       

 

                2017              SUBHA CARRINGTON MD              Ot              M54.5         

                          LOW BACK PAIN                       

 

                2017              SUBHA CARRINGTON MD              Ot              M54.5         

                          LOW BACK PAIN                       

 

                2017              SUBHA CARRINGTON MD              Ot              M54.5         

                          LOW BACK PAIN                       

 

                2017              BROOKLYN KHAN DO              Ot              717.2   

                          DERANG POST MED MENISCUS                       

 

                2017              BROOKLYN KHAN DO              Ot              719.06  

                          JOINT EFFUSION-L/LEG                       

 

                2017              JOSE SOTO MD              Ot              717.3       

                          DERANG MED MENISCUS NEC                       

 

                2017              JOSE SOTO MD              Ot              V72.83      

                          EXAM PRE-OPERATIVE NEC                       

 

                2017              ZAFUTA MD, JOSE P              Ot              V74.8       

                          SCREEN-BACTERIAL DIS NEC                       

 

                2017              BROOKLYN KHAN DO              Ot              536.8   

                          STOMACH FUNCTION DIS NEC                       

 

                2017              BROOKLYN KHAN DO              Ot              780.79  

                          OTH MALAISE FATIGUE                       

 

                2017              BROOKLYN KHAN DO              Ot              789.00  

                          ABDOMINAL PAIN, UNSPECIFIED SITE                       

 

             2017                             Ot              717.7              CHONDROMALACIA

 PATELLAE                                        

 

             2017                             Ot              V72.84              EXAM PRE-OPERATIVE

 NOS                                             

 

                2017              VANCARYL STARR M ARNP              Ot              786.07

                          WHEEZING                           

 

                2017              VANBECELAERE STARR M ARNP              Ot              786.2

                          COUGH                              

 

                2017              VANGISSELELAEGEN, STARR M ARNP              Ot              793.19

                          OTHER NONSPECIFIC ABNORMAL FINDING OF ETHAN                       

 

                2017              VANGISSELELAESTARR BLEVINS M ARNP              Ot              486 

                          PNEUMONIA, ORGANISM NOS                       

 

                2017              BROOKLYN KHAN DO              Ot              789.00  

                          ABDOMINAL PAIN, UNSPECIFIED SITE                       

 

                2017              ARIANNA BRADFORD APRN              Ot              M54.5     

                          LOW BACK PAIN                       

 

                2017              ARIANNA BRADFORD APRN              Ot              M54.6     

                          PAIN IN THORACIC SPINE                       

 

                2017              ARIANNA BRADFORD APRN              Ot              M54.2     

                          CERVICALGIA                        

 

                2017              ARIANNA BRADFORD APRN              Ot              M54.6     

                          PAIN IN THORACIC SPINE                       

 

                2017              ARIANNA BRADFORD APRN              Ot              R20.9     

                          UNSPECIFIED DISTURBANCES OF SKIN SENSATI                       

 

                2017              ARIANNA BRADFORD APRN              Ot              R05       

                          COUGH                              

 

                2017              ARIANNA BRADFORD APRN              Ot              R09.89    

                          OT SYMPTOMS AND SIGNS INVOLVING THE CIR                       

 

                2017              ARIANNA BRADFORD APRN              Ot              R10.12    

                          LEFT UPPER QUADRANT PAIN                       

 

                2017              ARIANNA BRADFORD APRN              Ot              R10.32    

                          LEFT LOWER QUADRANT PAIN                       

 

                2017              ARINANA BARDFORD APRN              Ot              R19.7     

                          DIARRHEA, UNSPECIFIED                       

 

                2017              ARIANNA BRADFORD APRN              Ot              R50.9     

                          FEVER, UNSPECIFIED                       

 

                10/11/2017              BROOKLYN KHAN DO              Ot              717.2   

                          DERANG POST MED MENISCUS                       

 

                10/11/2017              ORENDER DO, BROOKLYN S              Ot              719.06  

                          JOINT EFFUSION-L/LEG                       

 

                10/11/2017              CHARLES PALACIOS, JOSE BRIDGES              Ot              717.3       

                          DERANG MED MENISCUS NEC                       

 

                10/11/2017              JOSE SOTO MD              Ot              V72.83      

                          EXAM PRE-OPERATIVE NEC                       

 

                10/11/2017              JOSE SOTO MD              Ot              V74.8       

                          SCREEN-BACTERIAL DIS NEC                       

 

                10/11/2017              GRAY KHAN DOLINE S              Ot              536.8   

                          STOMACH FUNCTION DIS NEC                       

 

                10/11/2017              GRAY KHAN DOLINE S              Ot              780.79  

                          OTH MALAISE FATIGUE                       

 

                10/11/2017              GRAY KHAN DOLINE S              Ot              789.00  

                          ABDOMINAL PAIN, UNSPECIFIED SITE                       

 

             10/11/2017                             Ot              717.7              CHONDROMALACIA

 PATELLAE                                        

 

             10/11/2017                             Ot              V72.84              EXAM PRE-OPERATIVE

 NOS                                             

 

                10/11/2017              VANGISSELELAERE, STARR M ARNP              Ot              786.07

                          WHEEZING                           

 

                10/11/2017              VANBECELAERE, STARR M ARNP              Ot              786.2

                          COUGH                              

 

                10/11/2017              VANGISSELELAERE, STARR M ARNP              Ot              793.19

                          OTHER NONSPECIFIC ABNORMAL FINDING OF ETHAN                       

 

                10/11/2017              VANGISSELELAERE, STARR M ARNP              Ot              486 

                          PNEUMONIA, ORGANISM NOS                       

 

                10/11/2017              BROOKLYN KHAN DO S              Ot              789.00  

                          ABDOMINAL PAIN, UNSPECIFIED SITE                       

 

                10/11/2017              ARIANNA BRADFORD APRN              Ot              M54.5     

                          LOW BACK PAIN                       

 

                10/11/2017              ARIANNA BRADFORD APRN              Ot              M54.6     

                          PAIN IN THORACIC SPINE                       

 

                10/11/2017              ARIANNA BRADFORD APRN              Ot              M54.2     

                          CERVICALGIA                        

 

                10/11/2017              ARIANNA BRADFORD APRN              Ot              M54.6     

                          PAIN IN THORACIC SPINE                       

 

                10/11/2017              ARIANNA BRADFORD APRN              Ot              R20.9     

                          UNSPECIFIED DISTURBANCES OF SKIN SENSATI                       

 

                10/11/2017              ARIANNA BRADFORD APRN              Ot              R05       

                          COUGH                              

 

                10/11/2017              ARIANNA BRADFORD APRN              Ot              R09.89    

                          OTH SYMPTOMS AND SIGNS INVOLVING THE CIR                       

 

                10/11/2017              ARIANNA BRADFORD APRN              Ot              R10.12    

                          LEFT UPPER QUADRANT PAIN                       

 

                10/11/2017              ARIANNA BRADFORD APRN              Ot              R10.32    

                          LEFT LOWER QUADRANT PAIN                       

 

                10/11/2017              ARIANNA BRADFORD APRN              Ot              R19.7     

                          DIARRHEA, UNSPECIFIED                       

 

                10/11/2017              ARIANNA BRADFORD APRN              Ot              R50.9     

                          FEVER, UNSPECIFIED                       

 

                2017              ERIN CORONA BROOKLYN S              Ot              717.2   

                          DERANG POST MED MENISCUS                       

 

                2017              ERIN CORONABROOKLYN S              Ot              719.06  

                          JOINT EFFUSION-L/LEG                       

 

                2017              CHARLES PALACIOS, JOSE BRIDGES              Ot              717.3       

                          DERANG MED MENISCUS NEC                       

 

                2017              JOSE SOTO MD              Ot              V72.83      

                          EXAM PRE-OPERATIVE NEC                       

 

                2017              CHARLES PALACIOS, JOSE BRIDGES              Ot              V74.8       

                          SCREEN-BACTERIAL DIS NEC                       

 

                2017              ERIN CORONA BROOKLYN S              Ot              536.8   

                          STOMACH FUNCTION DIS NEC                       

 

                2017              ERIN CORONA BROOKLYN S              Ot              780.79  

                          OTH MALAISE FATIGUE                       

 

                2017              ERIN CORONA BROOKLYN S              Ot              789.00  

                          ABDOMINAL PAIN, UNSPECIFIED SITE                       

 

             2017                             Ot              717.7              CHONDROMALACIA

 PATELLAE                                        

 

             2017                             Ot              V72.84              EXAM PRE-OPERATIVE

 NOS                                             

 

                2017              VANBECELAERE, STARR M ARNP              Ot              786.07

                          WHEEZING                           

 

                2017              VANBECELAERE, STARR M ARNP              Ot              786.2

                          COUGH                              

 

                2017              VANBECELAERE, STARR M ARNP              Ot              793.19

                          OTHER NONSPECIFIC ABNORMAL FINDING OF ETHAN                       

 

                2017              VANBECELAERE, STARR M ARNP              Ot              486 

                          PNEUMONIA, ORGANISM NOS                       

 

                2017              ERIN CORONA BROOKLYN S              Ot              789.00  

                          ABDOMINAL PAIN, UNSPECIFIED SITE                       

 

                2017              ARIANNA BRADFORD APRN              Ot              M54.5     

                          LOW BACK PAIN                       

 

                2017              ARIANNA BRADFORD APRN              Ot              M54.6     

                          PAIN IN THORACIC SPINE                       

 

                2017              ARIANNA BRADFORD APRN              Ot              M54.2     

                          CERVICALGIA                        

 

                2017              ARIANNA BRADFORD APRN              Ot              M54.6     

                          PAIN IN THORACIC SPINE                       

 

                2017              ARIANNA BRADFORD APRN              Ot              R20.9     

                          UNSPECIFIED DISTURBANCES OF SKIN SENSATI                       

 

                2017              ARIANNA BRADFORD APRN              Ot              R05       

                          COUGH                              

 

                2017              ARIANNA BRADFORD APRN              Ot              R09.89    

                          OT SYMPTOMS AND SIGNS INVOLVING THE CIR                       

 

                2017              ARIANNA BRADFORD APRN              Ot              R10.12    

                          LEFT UPPER QUADRANT PAIN                       

 

                2017              ARIANNA BRADFORD APRN              Ot              R10.32    

                          LEFT LOWER QUADRANT PAIN                       

 

                2017              ARIANNA BRADFORD APRN              Ot              R19.7     

                          DIARRHEA, UNSPECIFIED                       

 

                2017              ARIANNA BRADFORD APRN              Ot              R50.9     

                          FEVER, UNSPECIFIED                       

 

                2017              BROOKLYN KHAN DO S              Ot              717.2   

                          DERANG POST MED MENISCUS                       

 

                2017              BROOKLYN KHAN DO S              Ot              719.06  

                          JOINT EFFUSION-L/LEG                       

 

                2017              CHARLES PALACIOS, JOSE BRIDGES              Ot              717.3       

                          DERANG MED MENISCUS NEC                       

 

                2017              CHARLES PALACIOS, JOSE BRIDGES              Ot              V72.83      

                          EXAM PRE-OPERATIVE NEC                       

 

                2017              CHARLES PALACIOS, JOSE BRIDGES              Ot              V74.8       

                          SCREEN-BACTERIAL DIS NEC                       

 

                2017              GRAY KHAN DOLINE S              Ot              536.8   

                          STOMACH FUNCTION DIS NEC                       

 

                2017              GRAY KHAN DOLINE S              Ot              780.79  

                          OTH MALAISE FATIGUE                       

 

                2017              GRAY KHAN DOLINE S              Ot              789.00  

                          ABDOMINAL PAIN, UNSPECIFIED SITE                       

 

             2017                             Ot              717.7              CHONDROMALACIA

 PATELLAE                                        

 

             2017                             Ot              V72.84              EXAM PRE-OPERATIVE

 NOS                                             

 

                2017              VANBECELAERE, STARR M ARNP              Ot              786.07

                          WHEEZING                           

 

                2017              VANBECELAERE, STARR M ARNP              Ot              786.2

                          COUGH                              

 

                2017              VANBECELAERE, STARR M ARNP              Ot              793.19

                          OTHER NONSPECIFIC ABNORMAL FINDING OF ETHAN                       

 

                2017              VANBECELAERE, STARR M ARNP              Ot              486 

                          PNEUMONIA, ORGANISM NOS                       

 

                2017              GRAY KHAN DOLINE S              Ot              789.00  

                          ABDOMINAL PAIN, UNSPECIFIED SITE                       

 

                2017              ARIANNA BRADFORD APRN              Ot              M54.5     

                          LOW BACK PAIN                       

 

                2017              ARIANNA BRADFORD APRN              Ot              M54.6     

                          PAIN IN THORACIC SPINE                       

 

                2017              ARIANNA BRADFORD APRN              Ot              M54.2     

                          CERVICALGIA                        

 

                2017              ARIANNA BRADFORD LANNY APRN              Ot              M54.6     

                          PAIN IN THORACIC SPINE                       

 

                2017              ARIANNA BRADFORD LANNY APRN              Ot              R20.9     

                          UNSPECIFIED DISTURBANCES OF SKIN SENSATI                       

 

                2017              ARIANNA BRADFORD LANNY APRN              Ot              R05       

                          COUGH                              

 

                2017              ARIANNA BRADFORD LANNY APRN              Ot              R09.89    

                          OTH SYMPTOMS AND SIGNS INVOLVING THE CIR                       

 

                2017              ARIANNA BRADFORD LANNY APRN              Ot              R10.12    

                          LEFT UPPER QUADRANT PAIN                       

 

                2017              SANCHEZ ARIANNA LANNY APRN              Ot              R10.32    

                          LEFT LOWER QUADRANT PAIN                       

 

                2017              ARIANNA BRADFORD LANNY APRN              Ot              R19.7     

                          DIARRHEA, UNSPECIFIED                       

 

                2017              ARIANNA BRADFORD LANNY APRN              Ot              R50.9     

                          FEVER, UNSPECIFIED                       

 

                2018              BROOKLYN KHAN DO              Ot              717.2   

                          DERANG POST MED MENISCUS                       

 

                2018              BROOKLYN KHAN DO              Ot              719.06  

                          JOINT EFFUSION-L/LEG                       

 

                2018              CHARLES PALACIOS, JOSE BRIDGES              Ot              717.3       

                          DERANG MED MENISCUS NEC                       

 

                2018              JOSE SOTO MD              Ot              V72.83      

                          EXAM PRE-OPERATIVE NEC                       

 

                2018              CHARLES PALACIOS, JOSE BRIDGES              Ot              V74.8       

                          SCREEN-BACTERIAL DIS NEC                       

 

                2018              BROOKLYN KHAN DO              Ot              536.8   

                          STOMACH FUNCTION DIS NEC                       

 

                2018              BROOKLYN KHAN DO              Ot              780.79  

                          OTH MALAISE FATIGUE                       

 

                2018              BROOKLYN KHAN DO S              Ot              789.00  

                          ABDOMINAL PAIN, UNSPECIFIED SITE                       

 

             2018                             Ot              717.7              CHONDROMALACIA

 PATELLAE                                        

 

             2018                             Ot              V72.84              EXAM PRE-OPERATIVE

 NOS                                             

 

                2018              VANBECELAERE, STARR M ARNP              Ot              786.07

                          WHEEZING                           

 

                2018              VANBECELAERE, STARR M ARNP              Ot              786.2

                          COUGH                              

 

                2018              VANBECELAERE, STARR M ARNP              Ot              793.19

                          OTHER NONSPECIFIC ABNORMAL FINDING OF ETHAN                       

 

                2018              VANGISSELELAERE, STARR M ARNP              Ot              486 

                          PNEUMONIA, ORGANISM NOS                       

 

                2018              BROOKLYN KHAN DO S              Ot              789.00  

                          ABDOMINAL PAIN, UNSPECIFIED SITE                       

 

                2018              SANCHEZ ARIANNA N APRN              Ot              M54.5     

                          LOW BACK PAIN                       

 

                2018              SANCHEZ ARIANNA N APRN              Ot              M54.6     

                          PAIN IN THORACIC SPINE                       

 

                2018              SANCHEZ ARIANNA N APRN              Ot              M54.2     

                          CERVICALGIA                        

 

                2018              SANCHEZ ARIANNA N APRN              Ot              M54.6     

                          PAIN IN THORACIC SPINE                       

 

                2018              SANCHEZ ARIANNA N APRN              Ot              R20.9     

                          UNSPECIFIED DISTURBANCES OF SKIN SENSATI                       

 

                2018              SANCHEZ ARIANNA N APRN              Ot              R05       

                          COUGH                              

 

                2018              SANCHEZ ARIANNA N APRN              Ot              R09.89    

                          OTH SYMPTOMS AND SIGNS INVOLVING THE CIR                       

 

                2018              SANCHEZ ARIANNA N APRN              Ot              R10.12    

                          LEFT UPPER QUADRANT PAIN                       

 

                2018              SANCHEZ ARIANNA N APRN              Ot              R10.32    

                          LEFT LOWER QUADRANT PAIN                       

 

                2018              SANCHEZ ARIANNA N APRN              Ot              R19.7     

                          DIARRHEA, UNSPECIFIED                       

 

                2018              SANCHEZ ARIANNA N APRN              Ot              R50.9     

                          FEVER, UNSPECIFIED                       

 

                2018              BROOKLYN KHAN DO S              Ot              717.2   

                          DERANG POST MED MENISCUS                       

 

                2018              BROOKLYN KHAN DO S              Ot              719.06  

                          JOINT EFFUSION-L/LEG                       

 

                2018              CHARLES PALACIOS, JOSE BRIDGES              Ot              717.3       

                          DERANG MED MENISCUS NEC                       

 

                2018              CHARLES PALACIOS, JOSE BRIDGES              Ot              V72.83      

                          EXAM PRE-OPERATIVE NEC                       

 

                2018              CHARLES PALACIOS, JOSE BRIDGES              Ot              V74.8       

                          SCREEN-BACTERIAL DIS NEC                       

 

                2018              GRAY KHAN DOLINE S              Ot              536.8   

                          STOMACH FUNCTION DIS NEC                       

 

                2018              GRAY KHAN DOLINE S              Ot              780.79  

                          OTH MALAISE FATIGUE                       

 

                2018              BROOKLYN KHNA DO S              Ot              789.00  

                          ABDOMINAL PAIN, UNSPECIFIED SITE                       

 

             2018                             Ot              717.7              CHONDROMALACIA

 PATELLAE                                        

 

             2018                             Ot              V72.84              EXAM PRE-OPERATIVE

 NOS                                             

 

                2018              VANSTARR HUTSON ARNP              Ot              786.07

                          WHEEZING                           

 

                2018              STARR LARA ARNP              Ot              786.2

                          COUGH                              

 

                2018              STARR LARA ARNP              Ot              793.19

                          OTHER NONSPECIFIC ABNORMAL FINDING OF ETHAN                       

 

                2018              STARR LARA ARNP              Ot              486 

                          PNEUMONIA, ORGANISM NOS                       

 

                2018              BROOKLYN KHAN DO              Ot              789.00  

                          ABDOMINAL PAIN, UNSPECIFIED SITE                       

 

                2018              SANCHEZ ARIANNA N APRN              Ot              M54.5     

                          LOW BACK PAIN                       

 

                2018              SANCHEZ ARIANNA N APRN              Ot              M54.6     

                          PAIN IN THORACIC SPINE                       

 

                2018              SANCHEZ ARIANNA N APRN              Ot              M54.2     

                          CERVICALGIA                        

 

                2018              SANCHEZ ARIANNA N APRN              Ot              M54.6     

                          PAIN IN THORACIC SPINE                       

 

                2018              SANCHEZ ARIANNA N APRN              Ot              R20.9     

                          UNSPECIFIED DISTURBANCES OF SKIN SENSATI                       

 

                2018              SANCHEZ ARIANNA N APRN              Ot              R05       

                          COUGH                              

 

                2018              SANCHEZ ARIANNA N APRN              Ot              R09.89    

                          OTH SYMPTOMS AND SIGNS INVOLVING THE CIR                       

 

                2018              ARIANNA BRADFORD APRN              Ot              R10.12    

                          LEFT UPPER QUADRANT PAIN                       

 

                2018              SANCHEZ ARIANNA N APRN              Ot              R10.32    

                          LEFT LOWER QUADRANT PAIN                       

 

                2018              SANCHEZ ARIANNA N APRN              Ot              R19.7     

                          DIARRHEA, UNSPECIFIED                       

 

                2018              SANCHEZ ARIANNA N APRN              Ot              R50.9     

                          FEVER, UNSPECIFIED                       

 

                2018              BROOKLYN KHAN DO S              Ot              717.2   

                          DERANG POST MED MENISCUS                       

 

                2018              BROOKLYN KHAN DO S              Ot              719.06  

                          JOINT EFFUSION-L/LEG                       

 

                2018              CHARLES PALACIOS, JOSE BRIDGES              Ot              717.3       

                          DERANG MED MENISCUS NEC                       

 

                2018              CHARLES PALACIOS, JOSE BRIDGES              Ot              V72.83      

                          EXAM PRE-OPERATIVE NEC                       

 

                2018              CHARLES PALACIOS, JOSE BRIDGES              Ot              V74.8       

                          SCREEN-BACTERIAL DIS NEC                       

 

                2018              BROOKLYN KHAN DO S              Ot              536.8   

                          STOMACH FUNCTION DIS NEC                       

 

                2018              BROOKLYN KHAN DO S              Ot              780.79  

                          OTH MALAISE FATIGUE                       

 

                2018              BROOKLYN KHAN DO S              Ot              789.00  

                          ABDOMINAL PAIN, UNSPECIFIED SITE                       

 

             2018                             Ot              717.7              CHONDROMALACIA

 PATELLAE                                        

 

             2018                             Ot              V72.84              EXAM PRE-OPERATIVE

 NOS                                             

 

                2018              STARR LARA ARNP              Ot              786.07

                          WHEEZING                           

 

                2018              STARR LARA ARNP              Ot              786.2

                          COUGH                              

 

                2018              STARR LARA ARNP              Ot              793.19

                          OTHER NONSPECIFIC ABNORMAL FINDING OF ETHAN                       

 

                2018              STARR LARA ARNP              Ot              486 

                          PNEUMONIA, ORGANISM NOS                       

 

                2018              BROOKLYN KHAN DO S              Ot              789.00  

                          ABDOMINAL PAIN, UNSPECIFIED SITE                       

 

                2018              ARIANNA BRADFORD APRN              Ot              M54.5     

                          LOW BACK PAIN                       

 

                2018              ARIANNA BRADFORD APRN              Ot              M54.6     

                          PAIN IN THORACIC SPINE                       

 

                2018              ARIANNA BRADFORD APRN              Ot              M54.2     

                          CERVICALGIA                        

 

                2018              ARIANNA BRADFORD APRN              Ot              M54.6     

                          PAIN IN THORACIC SPINE                       

 

                2018              ARIANNA BRADFORD APRN              Ot              R20.9     

                          UNSPECIFIED DISTURBANCES OF SKIN SENSATI                       

 

                2018              ARIANNA BRADFORD APRN              Ot              R05       

                          COUGH                              

 

                2018              ARIANNA BRADFORD APRN              Ot              R09.89    

                          OTH SYMPTOMS AND SIGNS INVOLVING THE CIR                       

 

                2018              ARIANNA BRADFORD APRN              Ot              R10.12    

                          LEFT UPPER QUADRANT PAIN                       

 

                2018              ARIANNA BRADFORD APRN              Ot              R10.32    

                          LEFT LOWER QUADRANT PAIN                       

 

                2018              ARIANNA BRADFORD APRN              Ot              R19.7     

                          DIARRHEA, UNSPECIFIED                       

 

                2018              ARIANNA BRADFORD APRN              Ot              R50.9     

                          FEVER, UNSPECIFIED                       

 

                2018              BROOKLYN KHAN DO S              Ot              717.2   

                          DERANG POST MED MENISCUS                       

 

                2018              BROOKLYN KHAN DO S              Ot              719.06  

                          JOINT EFFUSION-L/LEG                       

 

                2018              CHARLES PALACIOS, JOSE BRIDGES              Ot              717.3       

                          DERANG MED MENISCUS NEC                       

 

                2018              CHARLES PALACIOS, JOSE BRIDGES              Ot              V72.83      

                          EXAM PRE-OPERATIVE NEC                       

 

                2018              CHARLES PALACIOS, JOSE BRIDGES              Ot              V74.8       

                          SCREEN-BACTERIAL DIS NEC                       

 

                2018              ERIN ELIASBROOKLYN S              Ot              536.8   

                          STOMACH FUNCTION DIS NEC                       

 

                2018              GRAY KHAN DOLINE S              Ot              780.79  

                          OTH MALAISE FATIGUE                       

 

                2018              ERIN DO BROOKLYN S              Ot              789.00  

                          ABDOMINAL PAIN, UNSPECIFIED SITE                       

 

             2018                             Ot              717.7              CHONDROMALACIA

 PATELLAE                                        

 

             2018                             Ot              V72.84              EXAM PRE-OPERATIVE

 NOS                                             

 

                2018              VANGISSELELAERE, STARR M ARNP              Ot              786.07

                          WHEEZING                           

 

                2018              VANBECELAERE STARR M ARNP              Ot              786.2

                          COUGH                              

 

                2018              VANBECELAERE, STARR M ARNP              Ot              793.19

                          OTHER NONSPECIFIC ABNORMAL FINDING OF ETHAN                       

 

                2018              VANSTARR HUTSON M ARNP              Ot              486 

                          PNEUMONIA, ORGANISM NOS                       

 

                2018              HAZELERIKA ELIASBROOKLYN S              Ot              789.00  

                          ABDOMINAL PAIN, UNSPECIFIED SITE                       

 

                2018              ARIANNA BRADFORD APRN              Ot              M54.5     

                          LOW BACK PAIN                       

 

                2018              ARIANNA BRADFORD APRN              Ot              M54.6     

                          PAIN IN THORACIC SPINE                       

 

                2018              ARIANNA BRADFORD APRN              Ot              M54.2     

                          CERVICALGIA                        

 

                2018              ARIANNA BRADFORD APRN              Ot              M54.6     

                          PAIN IN THORACIC SPINE                       

 

                2018              ARIANNA BRADFORD APRN              Ot              R20.9     

                          UNSPECIFIED DISTURBANCES OF SKIN SENSATI                       

 

                2018              ARIANNA BRADFORD APRN              Ot              R05       

                          COUGH                              

 

                2018              ARIANNA BRADFORD APRN              Ot              R09.89    

                          OTH SYMPTOMS AND SIGNS INVOLVING THE CIR                       

 

                2018              ARIANNA BRADFORD APRN              Ot              R10.12    

                          LEFT UPPER QUADRANT PAIN                       

 

                2018              ARIANNA BRADFORD APRN              Ot              R10.32    

                          LEFT LOWER QUADRANT PAIN                       

 

                2018              ARIANNA BRADFORD APRN              Ot              R19.7     

                          DIARRHEA, UNSPECIFIED                       

 

                2018              ARIANNA BRADFORD APRN              Ot              R50.9     

                          FEVER, UNSPECIFIED                       

 

                2018              BROOKLYN KHAN DO S              Ot              717.2   

                          DERANG POST MED MENISCUS                       

 

                2018              BROOKLYN KHAN DO S              Ot              719.06  

                          JOINT EFFUSION-L/LEG                       

 

                2018              CHARLES PALACIOS, JOSE BRIDGES              Ot              717.3       

                          DERANG MED MENISCUS NEC                       

 

                2018              CHARLES PALACIOS, JOSE BRIDGES              Ot              V72.83      

                          EXAM PRE-OPERATIVE NEC                       

 

                2018              CHARLES PALACIOS, JOSE BRIDGES              Ot              V74.8       

                          SCREEN-BACTERIAL DIS NEC                       

 

                2018              ERIN CORONA, BROOKLYN S              Ot              536.8   

                          STOMACH FUNCTION DIS NEC                       

 

                2018              ERIN CORONA, BROOKLYN S              Ot              780.79  

                          OTH MALAISE FATIGUE                       

 

                2018              ERIN CORONA, BROOKLYN S              Ot              789.00  

                          ABDOMINAL PAIN, UNSPECIFIED SITE                       

 

             2018                             Ot              717.7              CHONDROMALACIA

 PATELLAE                                        

 

             2018                             Ot              V72.84              EXAM PRE-OPERATIVE

 NOS                                             

 

                2018              VANSTARR HUTSON M ARNP              Ot              786.07

                          [place]EZING                           

 

                2018              VANGISSELELAERE STARR M ARNP              Ot              786.2

                          COUGH                              

 

                2018              VANCARYL STARR M ARNP              Ot              793.19

                          OTHER NONSPECIFIC ABNORMAL FINDING OF ETHAN                       

 

                2018              VANGISSELELAEGEN STARR M ARNP              Ot              486 

                          PNEUMONIA, ORGANISM NOS                       

 

                2018              BROOLKYN KHAN DO S              Ot              789.00  

                          ABDOMINAL PAIN, UNSPECIFIED SITE                       

 

                2018              ARIANNA BRADFORD APRN              Ot              M54.5     

                          LOW BACK PAIN                       

 

                2018              ARIANNA BRADFORD APRN              Ot              M54.6     

                          PAIN IN THORACIC SPINE                       

 

                2018              ARIANNA BRADFORD APRN              Ot              M54.2     

                          CERVICALGIA                        

 

                2018              ARIANNA BRADFORD APRN              Ot              M54.6     

                          PAIN IN THORACIC SPINE                       

 

                2018              ARIANNA BRADFORD APRN              Ot              R20.9     

                          UNSPECIFIED DISTURBANCES OF SKIN SENSATI                       

 

                2018              ARIANNA BRADFORD APRN              Ot              R05       

                          COUGH                              

 

                2018              ARIANNA BRADFORD APRN              Ot              R09.89    

                          OTH SYMPTOMS AND SIGNS INVOLVING THE CIR                       

 

                2018              ARIANNA BRADFORD APRN              Ot              R10.12    

                          LEFT UPPER QUADRANT PAIN                       

 

                2018              ARIANNA BRADFORD APRN              Ot              R10.32    

                          LEFT LOWER QUADRANT PAIN                       

 

                2018              ARIANNA BRADFORD APRN              Ot              R19.7     

                          DIARRHEA, UNSPECIFIED                       

 

                2018              ARIANNA BRADFORD APRN              Ot              R50.9     

                          FEVER, UNSPECIFIED                       

 

                2018              GRAY KHAN DOLINE S              Ot              717.2   

                          DERANG POST MED MENISCUS                       

 

                2018              ERIN CORONA BROOKLYN S              Ot              719.06  

                          JOINT EFFUSION-L/LEG                       

 

                2018              CHARLES PALACIOS, JOSE BRIDEGS              Ot              717.3       

                          DERANG MED MENISCUS NEC                       

 

                2018              CHARLES PALACIOS, JOSE BRIDGES              Ot              V72.83      

                          EXAM PRE-OPERATIVE NEC                       

 

                2018              CHARLES PALACIOS, JOSE BRIDGES              Ot              V74.8       

                          SCREEN-BACTERIAL DIS NEC                       

 

                2018              ERIN CORONA BROOKLYN S              Ot              536.8   

                          STOMACH FUNCTION DIS NEC                       

 

                2018              ERIN CORONA BROOKLYN S              Ot              780.79  

                          OTH MALAISE FATIGUE                       

 

                2018              ERIN CORONA BROOKLYN S              Ot              789.00  

                          ABDOMINAL PAIN, UNSPECIFIED SITE                       

 

             2018                             Ot              717.7              CHONDROMALACIA

 PATELLAE                                        

 

             2018                             Ot              V72.84              EXAM PRE-OPERATIVE

 NOS                                             

 

                2018              VANBECELAERE, STARR M ARNP              Ot              786.07

                          [place]EZING                           

 

                2018              VANBECELAERE, STARR M ARNP              Ot              786.2

                          COUGH                              

 

                2018              VANBECELAERE, STARR M ARNP              Ot              793.19

                          OTHER NONSPECIFIC ABNORMAL FINDING OF ETHAN                       

 

                2018              VANBECELAERE, STARR M ARNP              Ot              486 

                          PNEUMONIA, ORGANISM NOS                       

 

                2018              GRAY KHAN DOLINE S              Ot              789.00  

                          ABDOMINAL PAIN, UNSPECIFIED SITE                       

 

                2018              ARIANNA BRADFORD APRN              Ot              M54.5     

                          LOW BACK PAIN                       

 

                2018              ARIANNA BRADFORD APRN              Ot              M54.6     

                          PAIN IN THORACIC SPINE                       

 

                2018              ARIANNA BRADFORD APRN              Ot              M54.2     

                          CERVICALGIA                        

 

                2018              ARIANNA BRADFORD APRN              Ot              M54.6     

                          PAIN IN THORACIC SPINE                       

 

                2018              ARIANNA BRADFORD APRN              Ot              R20.9     

                          UNSPECIFIED DISTURBANCES OF SKIN SENSATI                       

 

                2018              ARIANNA BRADFORD APRN              Ot              R05       

                          COUGH                              

 

                2018              ARIANNA BRADFORD APRN              Ot              R09.89    

                          OTH SYMPTOMS AND SIGNS INVOLVING THE CIR                       

 

                2018              ARIANNA BRADFORD APRN              Ot              R10.12    

                          LEFT UPPER QUADRANT PAIN                       

 

                2018              ARIANNA BRADFORD APRN              Ot              R10.32    

                          LEFT LOWER QUADRANT PAIN                       

 

                2018              ARIANNA BRADFORD APRN              Ot              R19.7     

                          DIARRHEA, UNSPECIFIED                       

 

                2018              ARIANNA BRADFORD APRN              Ot              R50.9     

                          FEVER, UNSPECIFIED                       

 

                2018              BROOKLYN KHAN DO S              Ot              717.2   

                          DERANG POST MED MENISCUS                       

 

                2018              BROOKLYN KHAN DO S              Ot              719.06  

                          JOINT EFFUSION-L/LEG                       

 

                2018              CHARLES PALACIOS, JOSE BRIDGES              Ot              717.3       

                          DERANG MED MENISCUS NEC                       

 

                2018              CHARLES PALACIOS, JOSE BRIDGES              Ot              V72.83      

                          EXAM PRE-OPERATIVE NEC                       

 

                2018              CHARLES PALACIOS, JOSE BRIDGES              Ot              V74.8       

                          SCREEN-BACTERIAL DIS NEC                       

 

                2018              BROOKLYN KHAN DO S              Ot              536.8   

                          STOMACH FUNCTION DIS NEC                       

 

                2018              GRAY KHAN DOLINE S              Ot              780.79  

                          OTH MALAISE FATIGUE                       

 

                2018              GRAY KHAN DOLINE S              Ot              789.00  

                          ABDOMINAL PAIN, UNSPECIFIED SITE                       

 

             2018                             Ot              717.7              CHONDROMALACIA

 PATELLAE                                        

 

             2018                             Ot              V72.84              EXAM PRE-OPERATIVE

 NOS                                             

 

                2018              VANBECELAERE, STARR M ARNP              Ot              786.07

                          WHEEZING                           

 

                2018              VANBECELAERE, STARR M ARNP              Ot              786.2

                          COUGH                              

 

                2018              VANBECELAERE, STARR M ARNP              Ot              793.19

                          OTHER NONSPECIFIC ABNORMAL FINDING OF ETHAN                       

 

                2018              VANBECELAERE, STARR M ARNP              Ot              486 

                          PNEUMONIA, ORGANISM NOS                       

 

                2018              BROOKLYN KHAN DO S              Ot              789.00  

                          ABDOMINAL PAIN, UNSPECIFIED SITE                       

 

                2018              ARIANNA BRADFORD APRN              Ot              M54.5     

                          LOW BACK PAIN                       

 

                2018              ARIANNA BRADFORD APRN              Ot              M54.6     

                          PAIN IN THORACIC SPINE                       

 

                2018              ARIANNA BRADFORD APRN              Ot              M54.2     

                          CERVICALGIA                        

 

                2018              SANCHEZ, ARIANNA N APRN              Ot              M54.6     

                          PAIN IN THORACIC SPINE                       

 

                2018              ARIANNA BRADFORD LANNY APRN              Ot              R20.9     

                          UNSPECIFIED DISTURBANCES OF SKIN SENSATI                       

 

                2018              ARIANNA BRADFORD LANNY APRN              Ot              R05       

                          COUGH                              

 

                2018              ARIANNA BRADFORD LANNY APRN              Ot              R09.89    

                          OTH SYMPTOMS AND SIGNS INVOLVING THE CIR                       

 

                2018              ARIANNA BRADFORD LANNY APRN              Ot              R10.12    

                          LEFT UPPER QUADRANT PAIN                       

 

                2018              ARIANNA BRADFORD LANNY APRN              Ot              R10.32    

                          LEFT LOWER QUADRANT PAIN                       

 

                2018              ARIANNA BRADFORD LANNY APRN              Ot              R19.7     

                          DIARRHEA, UNSPECIFIED                       

 

                2018              ARIANNA BRADFORD LANNY APRN              Ot              R50.9     

                          FEVER, UNSPECIFIED                       

 

                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                       

 

                2018              KIM FUCHS APRN              Ot              M94.262    

                          CHONDROMALACIA, LEFT KNEE                       

 

                2018              KIM FUCHS R APRN              Ot              S89.92XA   

                          UNSPECIFIED INJURY OF LEFT LOWER LEG, IN                       

 

                2018              KIM FUCHS APRN              Ot              Z98.890    

                          OTHER SPECIFIED POSTPROCEDURAL STATES                       

 

                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                       

 

                2018              KIM FUCHS R APRN              Ot              M94.262    

                          CHONDROMALACIA, LEFT KNEE                       

 

                2018              KIM FUCHS R APRN              Ot              S89.92XA   

                          UNSPECIFIED INJURY OF LEFT LOWER LEG, IN                       

 

                2018              KIM FUCHS APRN              Ot              Z98.890    

                          OTHER SPECIFIED POSTPROCEDURAL STATES                       

 

                2018              CHARISSA PALACIOS, SUBHA REDDY              Ot              M54.5         

                          LOW BACK PAIN                       

 

                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                       

 

                2018              KIM FUCHS R APRN              Ot              M94.262    

                          CHONDROMALACIA, LEFT KNEE                       

 

                2018              KIM FUCHS R APRN              Ot              S89.92XA   

                          UNSPECIFIED INJURY OF LEFT LOWER LEG, IN                       

 

                2018              KIM FUCHS R APRN              Ot              Z98.890    

                          OTHER SPECIFIED POSTPROCEDURAL STATES                       

 

                2018              MATTHEW PALACIOS FACC, DARREL FACP CCDS              Ot              E78.5

                          HYPERLIPIDEMIA, UNSPECIFIED                       

 

                2018              MATTHEW PALACIOS FACC, ALI FACP CCDS              Ot              F17.210

                          NICOTINE DEPENDENCE, CIGARETTES, UNCOMPL                       

 

                2018              MATTHEW PALACIOS FACC, ALI FACP CCDS              Ot              F32.9

                          MAJOR DEPRESSIVE DISORDER, SINGLE EPISOD                       

 

                2018              MATTHEW FULTONC, ALI FACP CCDS              Ot              F41.9

                          ANXIETY DISORDER, UNSPECIFIED                       

 

                2018              MATTHEW PALACIOS FACC, ALI FACP CCDS              Ot              I10

                          ESSENTIAL (PRIMARY) HYPERTENSION                       

 

                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

                          ATHSCL HEART DISEASE OF NATIVE CORONARY                        

 

                2018              MATTHEW PALACIOS FACC, DARREL FACP CCDS              Ot              I49.3

                          VENTRICULAR PREMATURE DEPOLARIZATION                       

 

                2018              MATTHEW PALACIOS FACC, ALI FACP CCDS              Ot              J30.2

                          OTHER SEASONAL ALLERGIC RHINITIS                       

 

                2018              MATTHEW PALACIOS FACC, ALI FACP CCDS              Ot              K21.9

                          GASTRO-ESOPHAGEAL REFLUX DISEASE WITHOUT                       

 

                2018              MATTHEW PALACIOS FACC, ALI FACP CCDS              Ot              M47.816

                          SPONDYLOSIS W/O MYELOPATHY OR RADICULOPA                       

 

                2018              MATTHEW PALACIOS FACC, ALI FACP CCDS              Ot              M53.3

                          SACROCOCCYGEAL DISORDERS, NOT ELSEWHERE                        

 

                2018              MATTHEW PALACIOS FACC, ALI FACP CCDS              Ot              M54.16

                          RADICULOPATHY, LUMBAR REGION                       

 

                2018              MATTHEW PALACIOS FACC, ALI FACP CCDS              Ot              R07.89

                          OTHER CHEST PAIN                       

 

                2018              MATTHEW PALACIOS FACC, ALI FACP CCDS              Ot              Z79.82

                          LONG TERM (CURRENT) USE OF ASPIRIN                       

 

                2018              MATTHEW PALACIOS FACC, ALI FACP CCDS              Ot              Z79.899

                          OTHER LONG TERM (CURRENT) DRUG THERAPY                       

 

                2018              MATTHEW PALACIOS FACC, ALI FACP CCDS              Ot              Z95.1

                          PRESENCE OF AORTOCORONARY BYPASS GRAFT                       

 

                2018              MATTHEW PALACIOS FACC, ALI FACP CCDS              Ot              E78.5

                          HYPERLIPIDEMIA, UNSPECIFIED                       

 

                2018              MATTHEW PALACIOS FACC, ALI FACP CCDS              Ot              F17.210

                          NICOTINE DEPENDENCE, CIGARETTES, UNCOMPL                       

 

                2018              MATTHEW MD FACC, ALI FACP CCDS              Ot              F32.9

                          MAJOR DEPRESSIVE DISORDER, SINGLE EPISOD                       

 

                2018              MATTHEW PALACIOS FACC, ALI FACP CCDS              Ot              F41.9

                          ANXIETY DISORDER, UNSPECIFIED                       

 

                2018              MATTHEW PALACIOS FACC, ALI FACP CCDS              Ot              I25.110

                          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                       

 

                2018              MATTHEW FULTONC, ALI FACP CCDS              Ot              I49.3

                          VENTRICULAR PREMATURE DEPOLARIZATION                       

 

                2018              MATTHEW PALACIOS FACC, ALI FACP CCDS              Ot              J30.2

                          OTHER SEASONAL ALLERGIC RHINITIS                       

 

                2018              MATTHEW PALACIOS FACC, DARREL FACP CCDS              Ot              K21.9

                          GASTRO-ESOPHAGEAL REFLUX DISEASE WITHOUT                       

 

                2018              MATTHEW PALACIOS FACC, ALI FACP CCDS              Ot              M47.816

                          SPONDYLOSIS W/O MYELOPATHY OR RADICULOPA                       

 

                2018              MATTHEW PALACIOS FACC, ALI FACP CCDS              Ot              M53.3

                          SACROCOCCYGEAL DISORDERS, NOT ELSEWHERE                        

 

                2018              MATTHEW PALACIOS FACC, ALI FACP CCDS              Ot              M54.16

                          RADICULOPATHY, LUMBAR REGION                       

 

                2018              MATTHEW PALACIOS FACC, DARREL FACP CCDS              Ot              R94.31

                          ABNORMAL ELECTROCARDIOGRAM [ECG] [EKG]                       

 

                2018              MATTHEW PALACIOS FACC, ALI FACP CCDS              Ot              Z95.1

                          PRESENCE OF AORTOCORONARY BYPASS GRAFT                       

 

                2018              MATTHEW PALACIOS FACC, ALI FACP CCDS              Ot              E78.5

                          HYPERLIPIDEMIA, UNSPECIFIED                       

 

                2018              MATTHEW PALACIOS FACC, ALI FACP CCDS              Ot              F17.210

                          NICOTINE DEPENDENCE, CIGARETTES, UNCOMPL                       

 

                2018              MATTHEW PALACIOS FACC, ALI FACP CCDS              Ot              F32.9

                          MAJOR DEPRESSIVE DISORDER, SINGLE EPISOD                       

 

                2018              MATTHEW PALACIOS FACC, ALI FACP CCDS              Ot              F41.9

                          ANXIETY DISORDER, UNSPECIFIED                       

 

                2018              MATTHEW PALACIOS FACC, ALI FACP CCDS              Ot              I25.110

                          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                       

 

                2018              MATTHEW PALACIOS FACC, ALI FACP CCDS              Ot              I49.3

                          VENTRICULAR PREMATURE DEPOLARIZATION                       

 

                2018              MATTHEW PALACIOS FACC, ALI FACP CCDS              Ot              J30.2

                          OTHER SEASONAL ALLERGIC RHINITIS                       

 

                2018              MATTHEW PALACIOS FACC, ALI FACP CCDS              Ot              K21.9

                          GASTRO-ESOPHAGEAL REFLUX DISEASE WITHOUT                       

 

                2018              MATTHEW PALACIOS FACC, ALI FACP CCDS              Ot              M47.816

                          SPONDYLOSIS W/O MYELOPATHY OR RADICULOPA                       

 

                2018              MATTHEW PALACIOS FACC, ALI FACP CCDS              Ot              M53.3

                          SACROCOCCYGEAL DISORDERS, NOT ELSEWHERE                        

 

                2018              MATTHEW PALACIOS FACC, ALI FACP CCDS              Ot              M54.16

                          RADICULOPATHY, LUMBAR REGION                       

 

                2018              MATTHEW PALACIOS FACC, DARREL FACP CCDS              Ot              R94.31

                          ABNORMAL ELECTROCARDIOGRAM [ECG] [EKG]                       

 

                2018              MATTHEW PALACIOS FACC, ALI FACP CCDS              Ot              Z95.1

                          PRESENCE OF AORTOCORONARY BYPASS GRAFT                       

 

                07/10/2018              MATTHEW PALACIOS FACC, DARREL FACP CCDS              Ot              E78.5

                          HYPERLIPIDEMIA, UNSPECIFIED                       

 

                07/10/2018              MATTHEW PALACIOS FACC, ALI FACP CCDS              Ot              F17.210

                          NICOTINE DEPENDENCE, CIGARETTES, UNCOMPL                       

 

                07/10/2018              MATTHEW PALACIOS FACC, DARREL FACP CCDS              Ot              F32.9

                          MAJOR DEPRESSIVE DISORDER, SINGLE EPISOD                       

 

                07/10/2018              MATTHEW PALACIOS FACC, ALI FACP CCDS              Ot              F41.9

                          ANXIETY DISORDER, UNSPECIFIED                       

 

                07/10/2018              MATTHEW PALACIOS FACC, ALI FACP CCDS              Ot              I10

                          ESSENTIAL (PRIMARY) HYPERTENSION                       

 

                07/10/2018              MATTHEW PALACIOS FACC, ALI FACP CCDS              Ot              I25.10

                          ATHSCL HEART DISEASE OF NATIVE CORONARY                        

 

                07/10/2018              DARREL CASTRO MD, FACC FACP CCDS              Ot              I49.3

                          VENTRICULAR PREMATURE DEPOLARIZATION                       

 

                07/10/2018              MATTHEW PALACIOS FACC, DARREL FACP CCDS              Ot              J30.2

                          OTHER SEASONAL ALLERGIC RHINITIS                       

 

                07/10/2018              MATTHEW PALACIOS FACC, ALI FACP CCDS              Ot              K21.9

                          GASTRO-ESOPHAGEAL REFLUX DISEASE WITHOUT                       

 

                07/10/2018              MATTHEW PALACIOS FACC, DARREL FACP CCDS              Ot              M47.816

                          SPONDYLOSIS W/O MYELOPATHY OR RADICULOPA                       

 

                07/10/2018              MATTHEW PALACIOS FACC, ALI FACP CCDS              Ot              M53.3

                          SACROCOCCYGEAL DISORDERS, NOT ELSEWHERE                        

 

                07/10/2018              MATTHEW PALACIOS FACC, ALI FACP CCDS              Ot              M54.16

                          RADICULOPATHY, LUMBAR REGION                       

 

                07/10/2018              MATTHEW PALACIOS FACC, DARREL FACP CCDS              Ot              R07.89

                          OTHER CHEST PAIN                       

 

                07/10/2018              MATTHEW PALACIOS FACC, ALI FACP CCDS              Ot              Z79.82

                          LONG TERM (CURRENT) USE OF ASPIRIN                       

 

                07/10/2018              MATTHEW PALACIOS FACC, ALI FACP CCDS              Ot              Z79.899

                          OTHER LONG TERM (CURRENT) DRUG THERAPY                       

 

                07/10/2018              MATTHEW PALACIOS FACC, ALI FACP CCDS              Ot              Z95.1

                          PRESENCE OF AORTOCORONARY BYPASS GRAFT                       

 

                2018              MATTHEW PALACIOS FACC, ALI FACP CCDS              Ot              E78.5

                          HYPERLIPIDEMIA, UNSPECIFIED                       

 

                2018              MATTHEW PALACIOS FACC, ALI FACP CCDS              Ot              F17.210

                          NICOTINE DEPENDENCE, CIGARETTES, UNCOMPL                       

 

                2018              MATTHEW PALACIOS FACC, ALI FACP CCDS              Ot              F32.9

                          MAJOR DEPRESSIVE DISORDER, SINGLE EPISOD                       

 

                2018              MATTHEW PALACIOS FACC, ALI FACP CCDS              Ot              F41.9

                          ANXIETY DISORDER, UNSPECIFIED                       

 

                2018              MATTHEW PALACIOS FACC, ALI FACP CCDS              Ot              I10

                          ESSENTIAL (PRIMARY) HYPERTENSION                       

 

                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

                          ATHSCL HEART DISEASE OF NATIVE CORONARY                        

 

                2018              MATTHEW PALACIOS FACC, DARREL FACP CCDS              Ot              I49.3

                          VENTRICULAR PREMATURE DEPOLARIZATION                       

 

                2018              MATTHEW PALACIOS FACC, ALI FACP CCDS              Ot              J30.2

                          OTHER SEASONAL ALLERGIC RHINITIS                       

 

                2018              MATTHEW PALACIOS FACC, ALI FACP CCDS              Ot              K21.9

                          GASTRO-ESOPHAGEAL REFLUX DISEASE WITHOUT                       

 

                2018              MATTHEW PALACIOS FACC, ALI FACP CCDS              Ot              M47.816

                          SPONDYLOSIS W/O MYELOPATHY OR RADICULOPA                       

 

                2018              MATTHEW PALACIOS FACC, ALI FACP CCDS              Ot              M53.3

                          SACROCOCCYGEAL DISORDERS, NOT ELSEWHERE                        

 

                2018              MATTHEW PALACIOS FACC, ALI FACP CCDS              Ot              M54.16

                          RADICULOPATHY, LUMBAR REGION                       

 

                2018              MATTHEW PALACIOS FACC, ALI FACP CCDS              Ot              R07.89

                          OTHER CHEST PAIN                       

 

                2018              MATTHWE PALACIOS FACC, ALI FACP CCDS              Ot              Z79.82

                          LONG TERM (CURRENT) USE OF ASPIRIN                       

 

                2018              MATTHEW PALACIOS FACC, ALI FACP CCDS              Ot              Z79.899

                          OTHER LONG TERM (CURRENT) DRUG THERAPY                       

 

                2018              MATTHEW PALACIOS FACC, DARREL FACP CCDS              Ot              Z95.1

                          PRESENCE OF AORTOCORONARY BYPASS GRAFT                       

 

                2018              MATTHEW PALACIOS FACC, DARREL FACP CCDS              Ot              E78.5

                          HYPERLIPIDEMIA, UNSPECIFIED                       

 

                2018              MATTHEW PALACIOS FACC, ALI FACP CCDS              Ot              F17.210

                          NICOTINE DEPENDENCE, CIGARETTES, UNCOMPL                       

 

                2018              MATTHEW PALACIOS FACC, ALI FACP CCDS              Ot              F32.9

                          MAJOR DEPRESSIVE DISORDER, SINGLE EPISOD                       

 

                2018              MATTHEW PALACIOS FACC, DARREL FACP CCDS              Ot              F41.9

                          ANXIETY DISORDER, UNSPECIFIED                       

 

                2018              MATTHEW PALACIOS FACC, DARREL FACP CCDS              Ot              I10

                          ESSENTIAL (PRIMARY) HYPERTENSION                       

 

                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

                          ATHSCL HEART DISEASE OF NATIVE CORONARY                        

 

                2018              MATTHEW PALACIOS FACC, DARREL FACP CCDS              Ot              I49.3

                          VENTRICULAR PREMATURE DEPOLARIZATION                       

 

                2018              MATTHEW PALACIOS FACC, DARREL FACP CCDS              Ot              J30.2

                          OTHER SEASONAL ALLERGIC RHINITIS                       

 

                2018              MATTHEW PALACIOS FACC, DARREL FACP CCDS              Ot              K21.9

                          GASTRO-ESOPHAGEAL REFLUX DISEASE WITHOUT                       

 

                2018              MATTHEW PALACIOS FACC, ALI FACP CCDS              Ot              M47.816

                          SPONDYLOSIS W/O MYELOPATHY OR RADICULOPA                       

 

                2018              MATTHEW PALACIOS FACC, DARREL FACP CCDS              Ot              M53.3

                          SACROCOCCYGEAL DISORDERS, NOT ELSEWHERE                        

 

                2018              MATTHEW PALACIOS FACC, ALI FACP CCDS              Ot              M54.16

                          RADICULOPATHY, LUMBAR REGION                       

 

                2018              MATTHEW PALACIOS FACC, ALI FACP CCDS              Ot              R07.89

                          OTHER CHEST PAIN                       

 

                2018              MATTHEW PALACIOS FACC, DARREL FACP CCDS              Ot              Z79.82

                          LONG TERM (CURRENT) USE OF ASPIRIN                       

 

                2018              MATTHEW PALACIOS FACC, ALI FACP CCDS              Ot              Z79.899

                          OTHER LONG TERM (CURRENT) DRUG THERAPY                       

 

                2018              MATTHEW PALACIOS FACC, ALI FACP CCDS              Ot              Z95.1

                          PRESENCE OF AORTOCORONARY BYPASS GRAFT                       

 

                2018              MATTHEW MD FACC, ALI FACP CCDS              Ot              E78.5

                          HYPERLIPIDEMIA, UNSPECIFIED                       

 

                2018              MATTHEW PALACIOS FACC, ALI FACP CCDS              Ot              F17.210

                          NICOTINE DEPENDENCE, CIGARETTES, UNCOMPL                       

 

                2018              MATTHEW PALACIOS FACC, ALI FACP CCDS              Ot              F32.9

                          MAJOR DEPRESSIVE DISORDER, SINGLE EPISOD                       

 

                2018              MATTHEW PALACIOS FACC, ALI FACP CCDS              Ot              F41.9

                          ANXIETY DISORDER, UNSPECIFIED                       

 

                2018              MATTHEW PALACIOS FACC, ALI FACP CCDS              Ot              I10

                          ESSENTIAL (PRIMARY) HYPERTENSION                       

 

                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

                          ATHSCL HEART DISEASE OF NATIVE CORONARY                        

 

                2018              MATTHEW PALACIOS FACC, DARREL FACP CCDS              Ot              I49.3

                          VENTRICULAR PREMATURE DEPOLARIZATION                       

 

                2018              MATTHEW PALACIOS FACC, ALI FACP CCDS              Ot              J30.2

                          OTHER SEASONAL ALLERGIC RHINITIS                       

 

                2018              MATTHEW PALACIOS FACC, ALI FACP CCDS              Ot              K21.9

                          GASTRO-ESOPHAGEAL REFLUX DISEASE WITHOUT                       

 

                2018              MATTHEW PALACIOS FACC, ALI FACP CCDS              Ot              M47.816

                          SPONDYLOSIS W/O MYELOPATHY OR RADICULOPA                       

 

                2018              MATTHEW PALACIOS FACC, ALI FACP CCDS              Ot              M53.3

                          SACROCOCCYGEAL DISORDERS, NOT ELSEWHERE                        

 

                2018              MATTHEW PALACIOS FACC, ALI FACP CCDS              Ot              M54.16

                          RADICULOPATHY, LUMBAR REGION                       

 

                2018              MATTHEW PALACIOS FACC, ALI FACP CCDS              Ot              R07.89

                          OTHER CHEST PAIN                       

 

                2018              MATTHEW PALACIOS FACC, ALI FACP CCDS              Ot              Z79.82

                          LONG TERM (CURRENT) USE OF ASPIRIN                       

 

                2018              MATTHEW PALACIOS FACC, ALI FACP CCDS              Ot              Z79.899

                          OTHER LONG TERM (CURRENT) DRUG THERAPY                       

 

                2018              MATTHEW PALACIOS FACC, ALI FACP CCDS              Ot              Z95.1

                          PRESENCE OF AORTOCORONARY BYPASS GRAFT                       

 

                2018              MATTHEW PALACIOS FACC, ALI FACP CCDS              Ot              E78.5

                          HYPERLIPIDEMIA, UNSPECIFIED                       

 

                2018              MATTHEW PALACIOS FACC, ALI FACP CCDS              Ot              F17.210

                          NICOTINE DEPENDENCE, CIGARETTES, UNCOMPL                       

 

                2018              MATTHEW PALACIOS FACC, ALI FACP CCDS              Ot              F32.9

                          MAJOR DEPRESSIVE DISORDER, SINGLE EPISOD                       

 

                2018              MATTHEW PALACIOS FACC, ALI FACP CCDS              Ot              F41.9

                          ANXIETY DISORDER, UNSPECIFIED                       

 

                2018              MATTHEW PALACIOS FACC, ALI FACP CCDS              Ot              I10

                          ESSENTIAL (PRIMARY) HYPERTENSION                       

 

                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

                          ATHSCL HEART DISEASE OF NATIVE CORONARY                        

 

                2018              MATTHEW PALACOIS FACC, ALI FACP CCDS              Ot              I49.3

                          VENTRICULAR PREMATURE DEPOLARIZATION                       

 

                2018              MATTHEW PALACIOS FACC, ALI FACP CCDS              Ot              J30.2

                          OTHER SEASONAL ALLERGIC RHINITIS                       

 

                2018              MATTHEW PALACIOS FACC, ALI FACP CCDS              Ot              K21.9

                          GASTRO-ESOPHAGEAL REFLUX DISEASE WITHOUT                       

 

                2018              MATTHEW PALACIOS FACC, ALI FACP CCDS              Ot              M47.816

                          SPONDYLOSIS W/O MYELOPATHY OR RADICULOPA                       

 

                2018              MATTHEW PALACIOS FACC, ALI FACP CCDS              Ot              M53.3

                          SACROCOCCYGEAL DISORDERS, NOT ELSEWHERE                        

 

                2018              MATTHEW PALACIOS FACC, ALI FACP CCDS              Ot              M54.16

                          RADICULOPATHY, LUMBAR REGION                       

 

                2018              MATTHEW PALACIOS FACC, ALI FACP CCDS              Ot              R07.89

                          OTHER CHEST PAIN                       

 

                2018              MATTHEW PALACIOS FACC, ALI FACP CCDS              Ot              Z79.82

                          LONG TERM (CURRENT) USE OF ASPIRIN                       

 

                2018              MATTHEW PALACIOS FACC, ALI FACP CCDS              Ot              Z79.899

                          OTHER LONG TERM (CURRENT) DRUG THERAPY                       

 

                2018              MATTHEW PALACIOS FACC, ALI FACP CCDS              Ot              Z95.1

                          PRESENCE OF AORTOCORONARY BYPASS GRAFT                       

 

                2018              MATTHEW PALACIOS FACC, ALI FACP CCDS              Ot              E78.5

                          HYPERLIPIDEMIA, UNSPECIFIED                       

 

                2018              MATTHEW PALACIOS FACC, ALI FACP CCDS              Ot              F17.210

                          NICOTINE DEPENDENCE, CIGARETTES, UNCOMPL                       

 

                2018              MATTHEW PALACIOS FACC, ALI FACP CCDS              Ot              F32.9

                          MAJOR DEPRESSIVE DISORDER, SINGLE EPISOD                       

 

                2018              MATTHEW PALACIOS FACC, ALI FACP CCDS              Ot              F41.9

                          ANXIETY DISORDER, UNSPECIFIED                       

 

                2018              MATTHEW PALACIOS FACC, ALI FACP CCDS              Ot              I10

                          ESSENTIAL (PRIMARY) HYPERTENSION                       

 

                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

                          ATHSCL HEART DISEASE OF NATIVE CORONARY                        

 

                2018              MATTHEW PALACIOS FACC, ALI FACP CCDS              Ot              I49.3

                          VENTRICULAR PREMATURE DEPOLARIZATION                       

 

                2018              MATTHEW PALACIOS FACC, ALI FACP CCDS              Ot              J30.2

                          OTHER SEASONAL ALLERGIC RHINITIS                       

 

                2018              MATTHEW PALACIOS FACC, ALI FACP CCDS              Ot              K21.9

                          GASTRO-ESOPHAGEAL REFLUX DISEASE WITHOUT                       

 

                2018              MATTHEW PALACIOS FACC, ALI FACP CCDS              Ot              M47.816

                          SPONDYLOSIS W/O MYELOPATHY OR RADICULOPA                       

 

                2018              MATTHEW PALACIOS FACC, ALI FACP CCDS              Ot              M53.3

                          SACROCOCCYGEAL DISORDERS, NOT ELSEWHERE                        

 

                2018              MATTHEW PALACIOS FACC, ALI FACP CCDS              Ot              M54.16

                          RADICULOPATHY, LUMBAR REGION                       

 

                2018              MATTHEW PALACIOS FACC, ALI FACP CCDS              Ot              R07.89

                          OTHER CHEST PAIN                       

 

                2018              MATTHEW PALACIOS FACC, ALI FACP CCDS              Ot              Z79.82

                          LONG TERM (CURRENT) USE OF ASPIRIN                       

 

                2018              MATTHEW PALACIOS FACC, ALI FACP CCDS              Ot              Z79.899

                          OTHER LONG TERM (CURRENT) DRUG THERAPY                       

 

                2018              MATTHEW PALACIOS FACC, ALI FACP CCDS              Ot              Z95.1

                          PRESENCE OF AORTOCORONARY BYPASS GRAFT                       

 

                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                       

 

                2019              KIM FUCHS APRN              Ot              M94.262    

                          CHONDROMALACIA, LEFT KNEE                       

 

                2019              KIM FUCHS APRN              Ot              S89.92XA   

                          UNSPECIFIED INJURY OF LEFT LOWER LEG, IN                       

 

                2019              KIM FUCHS APRN              Ot              Z98.890    

                          OTHER SPECIFIED POSTPROCEDURAL STATES                       

 

                2019              WENDIE STONE MD              Ot              E78.00       

                          PURE HYPERCHOLESTEROLEMIA, UNSPECIFIED                       

 

                2019              WENDIE STONE MD              Ot              E78.5        

                          HYPERLIPIDEMIA, UNSPECIFIED                       

 

                2019              WENDIE STNOE MD              Ot              E87.6        

                          HYPOKALEMIA                        

 

                2019              WENDIE STONE MD              Ot              F17.210      

                          NICOTINE DEPENDENCE, CIGARETTES, UNCOMPL                       

 

                2019              WENDIE STONE MD, Ot              F32.9        

                          MAJOR DEPRESSIVE DISORDER, SINGLE EPISOD                       

 

                2019              WENDIE STONE MD, Ot              F41.9        

                          ANXIETY DISORDER, UNSPECIFIED                       

 

                2019              WENDIE STONE MD, Ot              I10          

                          ESSENTIAL (PRIMARY) HYPERTENSION                       

 

                2019              WENDIE STONE MD, Ot              I25.10       

                          ATHSCL HEART DISEASE OF NATIVE CORONARY                        

 

                2019              WENDIE STONE MD, Ot              J44.9        

                          CHRONIC OBSTRUCTIVE PULMONARY DISEASE, U                       

 

                2019              WENDIE STONE MD, Ot              K21.0        

                          GASTRO-ESOPHAGEAL REFLUX DISEASE WITH ES                       

 

                2019              WENDIE STONE MD, Ot              K52.9        

                          NONINFECTIVE GASTROENTERITIS AND COLITIS                       

 

                2019              WENDIE STONE MD, Ot              Z79.899      

                          OTHER LONG TERM (CURRENT) DRUG THERAPY                       

 

                2019              WENDIE STONE MD, Ot              Z95.1        

                          PRESENCE OF AORTOCORONARY BYPASS GRAFT                       

 

                2019              WENDIE STONE MD, Ot              Z95.5        

                          PRESENCE OF CORONARY ANGIOPLASTY IMPLANT                       

 

                2019              WENDIE STONE MD, Ot              Z96.652      

                          PRESENCE OF LEFT ARTIFICIAL KNEE JOINT                       



                                                                                
                                                                                
                                                                                
                                                                                
                                                                                
                                                                                
                                                                                
                                                                                
                                                                                
                                                                                
                                                                                
                                                                                
                                                                                
                                                                                
                                                                                
                                                                                
                                                                                
                                                                                
                                                                                
                                                                                
                                                                                
                                                                                
                  



Procedures

      



There is no data.                  



Results

      





                    Test                Result              Range        









                                        Complete blood count (CBC) with automated white blood cell (WBC) differential - 

16 17:30         









                          Blood leukocytes automated count (number/volume)              11.4 10*3/uL        

                                        4.3-11.0        

 

                          Blood erythrocytes automated count (number/volume)              4.65 10*6/uL      

                                        4.35-5.85        

 

                          Venous blood hemoglobin measurement (mass/volume)              14.5 g/dL          

                                        11.5-16.0        

 

                    Blood hematocrit (volume fraction)              41 %                35-52        

 

                    Automated erythrocyte mean corpuscular volume              88 [foz_us]              

80-99        

 

                                        Automated erythrocyte mean corpuscular hemoglobin (mass per erythrocyte)        

                          31 pg                     25-34        

 

                                        Automated erythrocyte mean corpuscular hemoglobin concentration measurement (mass/volume)

                          35 g/dL                   32-36        

 

                    Automated erythrocyte distribution width ratio              12.8 %              10.0-

14.5        

 

                    Automated blood platelet count (count/volume)              244 10*3/uL              

130-400        

 

                          Automated blood platelet mean volume measurement              9.7 [foz_us]        

                                        7.4-10.4        

 

                    Automated blood neutrophils/100 leukocytes              69 %                42-75     

   

 

                    Automated blood lymphocytes/100 leukocytes              21 %                12-44     

   

 

                    Blood monocytes/100 leukocytes              10 %                0-12        

 

                    Automated blood eosinophils/100 leukocytes              0 %                 0-10       

 

 

                    Automated blood basophils/100 leukocytes              0 %                 0-10        

 

                          Blood neutrophils automated count (number/volume)              7.9 10*3           

                                        1.8-7.8        

 

                          Blood lymphocytes automated count (number/volume)              2.4 10*3           

                                        1.0-4.0        

 

                    Blood monocytes automated count (number/volume)              1.1 10*3              0.0-

1.0        

 

                    Automated eosinophil count              0.0 10*3/uL              0.0-0.3        

 

                    Automated blood basophil count (count/volume)              0.0 10*3/uL              

0.0-0.1        

 

                          Blood blood smear finding identification by light microscopy              YES     

                                        NRG        









                                        Comprehensive metabolic panel - 16 17:30         









                          Serum or plasma sodium measurement (moles/volume)              137 mmol/L         

                                        135-145        

 

                          Serum or plasma potassium measurement (moles/volume)              3.2 mmol/L      

                                        3.6-5.0        

 

                          Serum or plasma chloride measurement (moles/volume)              101 mmol/L       

                                                

 

                    Carbon dioxide              21 mmol/L              21-32        

 

                          Serum or plasma anion gap determination (moles/volume)              15 mmol/L     

                                        5-14        

 

                          Serum or plasma urea nitrogen measurement (mass/volume)              6 mg/dL      

                                        7-18        

 

                          Serum or plasma creatinine measurement (mass/volume)              0.71 mg/dL      

                                        0.60-1.30        

 

                    Serum or plasma urea nitrogen/creatinine mass ratio              8                   NRG

        

 

                                        Serum or plasma creatinine measurement with calculation of estimated glomerular 

filtration rate              >                         NRG        

 

                          Serum or plasma glucose measurement (mass/volume)              101 mg/dL          

                                                

 

                          Serum or plasma calcium measurement (mass/volume)              9.7 mg/dL          

                                        8.5-10.1        

 

                          Serum or plasma total bilirubin measurement (mass/volume)              0.3 mg/dL  

                                        0.1-1.0        

 

                                        Serum or plasma alkaline phosphatase measurement (enzymatic activity/volume)    

                          83 U/L                            

 

                                        Serum or plasma aspartate aminotransferase measurement (enzymatic activity/volume)

                          9 U/L                     5-34        

 

                                        Serum or plasma alanine aminotransferase measurement (enzymatic activity/volume)

                          6 U/L                     0-55        

 

                          Serum or plasma protein measurement (mass/volume)              7.7 g/dL           

                                        6.4-8.2        

 

                          Serum or plasma albumin measurement (mass/volume)              4.3 g/dL           

                                        3.2-4.5        









                                        A1C - 17 10:41         









                    HEMOGLOBIN A1c              5.0 % of total Hgb              <5.7        









                                        Complete blood count (CBC) with automated white blood cell (WBC) differential - 

18 16:00         









                          Blood leukocytes automated count (number/volume)              8.2 10*3/uL         

                                        4.3-11.0        

 

                          Blood erythrocytes automated count (number/volume)              4.23 10*6/uL      

                                        4.35-5.85        

 

                          Venous blood hemoglobin measurement (mass/volume)              13.2 g/dL          

                                        11.5-16.0        

 

                    Blood hematocrit (volume fraction)              38 %                35-52        

 

                    Automated erythrocyte mean corpuscular volume              90 [foz_us]              

80-99        

 

                                        Automated erythrocyte mean corpuscular hemoglobin (mass per erythrocyte)        

                          31 pg                     25-34        

 

                                        Automated erythrocyte mean corpuscular hemoglobin concentration measurement (mass/volume)

                          35 g/dL                   32-36        

 

                    Automated erythrocyte distribution width ratio              13.4 %              10.0-

14.5        

 

                    Automated blood platelet count (count/volume)              251 10*3/uL              

130-400        

 

                          Automated blood platelet mean volume measurement              9.8 [foz_us]        

                                        7.4-10.4        

 

                    Automated blood neutrophils/100 leukocytes              60 %                42-75     

   

 

                    Automated blood lymphocytes/100 leukocytes              29 %                12-44     

   

 

                    Blood monocytes/100 leukocytes              8 %                 0-12        

 

                    Automated blood eosinophils/100 leukocytes              3 %                 0-10       

 

 

                    Automated blood basophils/100 leukocytes              1 %                 0-10        

 

                          Blood neutrophils automated count (number/volume)              4.9 10*3           

                                        1.8-7.8        

 

                          Blood lymphocytes automated count (number/volume)              2.4 10*3           

                                        1.0-4.0        

 

                    Blood monocytes automated count (number/volume)              0.6 10*3              0.0-

1.0        

 

                    Automated eosinophil count              0.2 10*3/uL              0.0-0.3        

 

                    Automated blood basophil count (count/volume)              0.0 10*3/uL              

0.0-0.1        









                                        PT panel in platelet poor plasma by coagulation assay - 18 16:00         









                          Prothrombin time (PT) in platelet poor plasma by coagulation assay              13.9

 s                                      12.2-14.7        

 

                          INR in platelet poor plasma or blood by coagulation assay              1.1        

                                        0.8-1.4        









                                        Activated partial thromboplastin time (aPTT) in platelet poor plasma bycoagulation

 assay - 18 16:00         









                                        Activated partial thromboplastin time (aPTT) in platelet poor plasma bycoagulation

 assay                    57 s                      24-35        









                                        Comprehensive metabolic panel - 18 16:00         









                          Serum or plasma sodium measurement (moles/volume)              140 mmol/L         

                                        135-145        

 

                          Serum or plasma potassium measurement (moles/volume)              3.6 mmol/L      

                                        3.6-5.0        

 

                          Serum or plasma chloride measurement (moles/volume)              106 mmol/L       

                                                

 

                    Carbon dioxide              24 mmol/L              21-32        

 

                          Serum or plasma anion gap determination (moles/volume)              10 mmol/L     

                                        5-14        

 

                          Serum or plasma urea nitrogen measurement (mass/volume)              7 mg/dL      

                                        7-18        

 

                          Serum or plasma creatinine measurement (mass/volume)              0.71 mg/dL      

                                        0.60-1.30        

 

                    Serum or plasma urea nitrogen/creatinine mass ratio              10                  NRG

        

 

                                        Serum or plasma creatinine measurement with calculation of estimated glomerular 

filtration rate              >                         NRG        

 

                          Serum or plasma glucose measurement (mass/volume)              82 mg/dL           

                                                

 

                          Serum or plasma calcium measurement (mass/volume)              9.1 mg/dL          

                                        8.5-10.1        

 

                          Serum or plasma total bilirubin measurement (mass/volume)              0.4 mg/dL  

                                        0.1-1.0        

 

                                        Serum or plasma alkaline phosphatase measurement (enzymatic activity/volume)    

                          73 U/L                            

 

                                        Serum or plasma aspartate aminotransferase measurement (enzymatic activity/volume)

                          11 U/L                    5-34        

 

                                        Serum or plasma alanine aminotransferase measurement (enzymatic activity/volume)

                          7 U/L                     0-55        

 

                          Serum or plasma protein measurement (mass/volume)              6.9 g/dL           

                                        6.4-8.2        

 

                          Serum or plasma albumin measurement (mass/volume)              4.0 g/dL           

                                        3.2-4.5        









                                        Magnesium - 18 16:00         









                    Magnesium              2.2 mg/dL              1.8-2.4        









                                        Lipid 1996 panel - 18 16:00         









                          Serum or plasma triglyceride measurement (mass/volume)              67 mg/dL      

                                        <150        

 

                          Serum or plasma cholesterol measurement (mass/volume)              136 mg/dL      

                                        < 200        

 

                          Serum or plasma cholesterol in HDL measurement (mass/volume)              40 mg/dL

                                        40-60        

 

                          Cholesterol in LDL [mass/volume] in serum or plasma by direct assay              81

 mg/dL                                  1-129        

 

                          Serum or plasma cholesterol in VLDL measurement (mass/volume)              13 mg/dL

                                        5-40        









                                        Complete blood count (CBC) with automated white blood cell (WBC) differential - 

18 04:10         









                          Blood leukocytes automated count (number/volume)              5.6 10*3/uL         

                                        4.3-11.0        

 

                          Blood erythrocytes automated count (number/volume)              3.87 10*6/uL      

                                        4.35-5.85        

 

                          Venous blood hemoglobin measurement (mass/volume)              12.0 g/dL          

                                        11.5-16.0        

 

                    Blood hematocrit (volume fraction)              35 %                35-52        

 

                    Automated erythrocyte mean corpuscular volume              91 [foz_us]              

80-99        

 

                                        Automated erythrocyte mean corpuscular hemoglobin (mass per erythrocyte)        

                          31 pg                     25-34        

 

                                        Automated erythrocyte mean corpuscular hemoglobin concentration measurement (mass/volume)

                          34 g/dL                   32-36        

 

                    Automated erythrocyte distribution width ratio              13.2 %              10.0-

14.5        

 

                    Automated blood platelet count (count/volume)              234 10*3/uL              

130-400        

 

                          Automated blood platelet mean volume measurement              9.7 [foz_us]        

                                        7.4-10.4        

 

                    Automated blood neutrophils/100 leukocytes              42 %                42-75     

   

 

                    Automated blood lymphocytes/100 leukocytes              45 %                12-44     

   

 

                    Blood monocytes/100 leukocytes              7 %                 0-12        

 

                    Automated blood eosinophils/100 leukocytes              5 %                 0-10       

 

 

                    Automated blood basophils/100 leukocytes              1 %                 0-10        

 

                          Blood neutrophils automated count (number/volume)              2.4 10*3           

                                        1.8-7.8        

 

                          Blood lymphocytes automated count (number/volume)              2.5 10*3           

                                        1.0-4.0        

 

                    Blood monocytes automated count (number/volume)              0.4 10*3              0.0-

1.0        

 

                    Automated eosinophil count              0.3 10*3/uL              0.0-0.3        

 

                    Automated blood basophil count (count/volume)              0.0 10*3/uL              

0.0-0.1        









                                        Whole blood basic metabolic panel - 18 04:10         









                          Serum or plasma sodium measurement (moles/volume)              141 mmol/L         

                                        135-145        

 

                          Serum or plasma potassium measurement (moles/volume)              3.9 mmol/L      

                                        3.6-5.0        

 

                          Serum or plasma chloride measurement (moles/volume)              108 mmol/L       

                                                

 

                    Carbon dioxide              23 mmol/L              21-32        

 

                          Serum or plasma anion gap determination (moles/volume)              10 mmol/L     

                                        5-14        

 

                          Serum or plasma urea nitrogen measurement (mass/volume)              11 mg/dL     

                                        7-18        

 

                          Serum or plasma creatinine measurement (mass/volume)              0.76 mg/dL      

                                        0.60-1.30        

 

                    Serum or plasma urea nitrogen/creatinine mass ratio              14                  NRG

        

 

                                        Serum or plasma creatinine measurement with calculation of estimated glomerular 

filtration rate              >                         NRG        

 

                          Serum or plasma glucose measurement (mass/volume)              86 mg/dL           

                                                

 

                          Serum or plasma calcium measurement (mass/volume)              8.4 mg/dL          

                                        8.5-10.1        









                                        THYROID STIMULATING HORMONE - 18 04:10         









                    THYROID STIMULATING HORMONE              3.35 u[iU]/mL              0.35-4.94       

 









                                        Methicillin resistant Staphylococcus aureus (MRSA) screening culture - 18 

04:10         









                          Methicillin resistant Staphylococcus aureus (MRSA) screening culture              

NEG                                     NRG        









                                        PDM - 09 PANEL (PROFILE 1) - 18 15:24         









                    Prescribed Drug 1              Tramadol               NRG        

 

                    Creatinine              21.1 mg/dL              > or=20.0        

 

                    pH                  6.44                4.5 - 9.0        

 

                    Oxidant              NEGATIVE mcg/mL              <200        

 

                    Amphetamines              NEGATIVE ng/mL              <500        

 

                    medMATCH Amphetamines              CONSISTENT               NRG        

 

                    Benzodiazepines              POSITIVE ng/mL              <100        

 

                    Marijuana Metabolite              NEGATIVE ng/mL              <20        

 

                    medMATCH Marijuana Metab              CONSISTENT               NRG        

 

                    Cocaine Metabolite              NEGATIVE ng/mL              <150        

 

                    medMATCH Cocaine Metab              CONSISTENT               NRG        

 

                    Opiates              NEGATIVE ng/mL              <100        

 

                    medMATCH Opiates              CONSISTENT               NRG        

 

                    Oxycodone              NEGATIVE ng/mL              <100        

 

                    medMATCH Oxycodone              CONSISTENT               NRG        

 

                    COMMENT                                  NRG        

 

                      Alphahydroxyalprazolam              195 ng/mL              <25        

 

                    medMATCH aOH alprazolam              CONSISTENT               NRG        

 

                      Alphahydroxymidazolam              NEGATIVE ng/mL              <50        

 

                    medMATCH aOH midazolam              CONSISTENT               NRG        

 

                      Alphahydroxytriazolam              NEGATIVE ng/mL              <50        

 

                    medMATCH aOH triazolam              CONSISTENT               NRG        

 

                      Aminoclonazepam              NEGATIVE ng/mL              <25        

 

                    medMATCH Aminoclonazepam              CONSISTENT               NRG        

 

                      Hydroxyethylflurazepam              NEGATIVE ng/mL              <50        

 

                    medMATCH OH,Et flurazepam              CONSISTENT               NRG        

 

                      Lorazepam              NEGATIVE ng/mL              <50        

 

                    medMATCH Lorazepam              CONSISTENT               NRG        

 

                      Nordiazepam              NEGATIVE ng/mL              <50        

 

                    medMATCH Nordiazepam              CONSISTENT               NRG        

 

                      Oxazepam              NEGATIVE ng/mL              <50        

 

                    medMATCH Oxazepam              CONSISTENT               NRG        

 

                      Temazepam              NEGATIVE ng/mL              <50        

 

                    medMATCH Temazepam              CONSISTENT               NRG        

 

                    Prescribed Drug 2              Xanax(TM)               NRG        

 

                    Barbiturates              NEGATIVE ng/mL              <300        

 

                    medMATCH Barbiturates              CONSISTENT               NRG        

 

                    Methadone Metabolite              NEGATIVE ng/mL              <100        

 

                    medMATCH Methadone Metab              CONSISTENT               NRG        

 

                    Phencyclidine              NEGATIVE ng/mL              <25        

 

                    medMATCH Phencyclidine              CONSISTENT               NRG        









                                        Complete blood count (CBC) with automated white blood cell (WBC) differential - 

19 15:05         









                          Blood leukocytes automated count (number/volume)              5.6 10*3/uL         

                                        4.3-11.0        

 

                          Blood erythrocytes automated count (number/volume)              4.07 10*6/uL      

                                        4.35-5.85        

 

                          Venous blood hemoglobin measurement (mass/volume)              12.4 g/dL          

                                        11.5-16.0        

 

                    Blood hematocrit (volume fraction)              36 %                35-52        

 

                    Automated erythrocyte mean corpuscular volume              89 [foz_us]              

80-99        

 

                                        Automated erythrocyte mean corpuscular hemoglobin (mass per erythrocyte)        

                          31 pg                     25-34        

 

                                        Automated erythrocyte mean corpuscular hemoglobin concentration measurement (mass/volume)

                          34 g/dL                   32-36        

 

                    Automated erythrocyte distribution width ratio              13.0 %              10.0-

14.5        

 

                    Automated blood platelet count (count/volume)              206 10*3/uL              

130-400        

 

                          Automated blood platelet mean volume measurement              9.7 [foz_us]        

                                        7.4-10.4        

 

                    Automated blood neutrophils/100 leukocytes              46 %                42-75     

   

 

                    Automated blood lymphocytes/100 leukocytes              40 %                12-44     

   

 

                    Blood monocytes/100 leukocytes              9 %                 0-12        

 

                    Automated blood eosinophils/100 leukocytes              4 %                 0-10       

 

 

                    Automated blood basophils/100 leukocytes              1 %                 0-10        

 

                          Blood neutrophils automated count (number/volume)              2.6 10*3           

                                        1.8-7.8        

 

                          Blood lymphocytes automated count (number/volume)              2.3 10*3           

                                        1.0-4.0        

 

                    Blood monocytes automated count (number/volume)              0.5 10*3              0.0-

1.0        

 

                    Automated eosinophil count              0.2 10*3/uL              0.0-0.3        

 

                    Automated blood basophil count (count/volume)              0.0 10*3/uL              

0.0-0.1        









                                        Comprehensive metabolic panel - 19 15:05         









                          Serum or plasma sodium measurement (moles/volume)              139 mmol/L         

                                        135-145        

 

                          Serum or plasma potassium measurement (moles/volume)              2.9 mmol/L      

                                        3.6-5.0        

 

                          Serum or plasma chloride measurement (moles/volume)              104 mmol/L       

                                                

 

                    Carbon dioxide              25 mmol/L              21-32        

 

                          Serum or plasma anion gap determination (moles/volume)              10 mmol/L     

                                        5-14        

 

                          Serum or plasma urea nitrogen measurement (mass/volume)              6 mg/dL      

                                        7-18        

 

                          Serum or plasma creatinine measurement (mass/volume)              0.78 mg/dL      

                                        0.60-1.30        

 

                    Serum or plasma urea nitrogen/creatinine mass ratio              8                   NRG

        

 

                                        Serum or plasma creatinine measurement with calculation of estimated glomerular 

filtration rate              >                         NRG        

 

                          Serum or plasma glucose measurement (mass/volume)              91 mg/dL           

                                                

 

                          Serum or plasma calcium measurement (mass/volume)              9.0 mg/dL          

                                        8.5-10.1        

 

                          Serum or plasma total bilirubin measurement (mass/volume)              0.3 mg/dL  

                                        0.1-1.0        

 

                                        Serum or plasma alkaline phosphatase measurement (enzymatic activity/volume)    

                          89 U/L                            

 

                                        Serum or plasma aspartate aminotransferase measurement (enzymatic activity/volume)

                          11 U/L                    5-34        

 

                                        Serum or plasma alanine aminotransferase measurement (enzymatic activity/volume)

                          < U/L                     0-55        

 

                          Serum or plasma protein measurement (mass/volume)              7.5 g/dL           

                                        6.4-8.2        

 

                          Serum or plasma albumin measurement (mass/volume)              4.1 g/dL           

                                        3.2-4.5        

 

                    CALCIUM CORRECTED              8.9 mg/dL              8.5-10.1        









                                        Magnesium - 19 15:05         









                    Magnesium              1.9 mg/dL              1.8-2.4        









                                        Serum or plasma troponin i.cardiac measurement (mass/volume) - 19 15:05   

      









                          Serum or plasma troponin i.cardiac measurement (mass/volume)              < ng/mL 

                                        <0.028        









                                        Myoglobin, serum - 19 15:05         









                    Myoglobin, serum              22.7 ng/mL              10.0-92.0        









                                        PT panel in platelet poor plasma by coagulation assay - 19 16:18         









                          Prothrombin time (PT) in platelet poor plasma by coagulation assay              14.0

 s                                      12.2-14.7        

 

                          INR in platelet poor plasma or blood by coagulation assay              1.1        

                                        0.8-1.4        









                                        Activated partial thromboplastin time (aPTT) in platelet poor plasma bycoagulation

 assay - 19 16:18         









                                        Activated partial thromboplastin time (aPTT) in platelet poor plasma bycoagulation

 assay                    36 s                      24-35        









                                        Serum or plasma troponin i.cardiac measurement (mass/volume) - 19 21:20   

      









                          Serum or plasma troponin i.cardiac measurement (mass/volume)              < ng/mL 

                                        <0.028        









                                        Complete blood count (CBC) with automated white blood cell (WBC) differential - 

19 03:00         









                          Blood leukocytes automated count (number/volume)              4.8 10*3/uL         

                                        4.3-11.0        

 

                          Blood erythrocytes automated count (number/volume)              3.93 10*6/uL      

                                        4.35-5.85        

 

                          Venous blood hemoglobin measurement (mass/volume)              12.2 g/dL          

                                        11.5-16.0        

 

                    Blood hematocrit (volume fraction)              35 %                35-52        

 

                    Automated erythrocyte mean corpuscular volume              90 [foz_us]              

80-99        

 

                                        Automated erythrocyte mean corpuscular hemoglobin (mass per erythrocyte)        

                          31 pg                     25-34        

 

                                        Automated erythrocyte mean corpuscular hemoglobin concentration measurement (mass/volume)

                          35 g/dL                   32-36        

 

                    Automated erythrocyte distribution width ratio              13.2 %              10.0-

14.5        

 

                    Automated blood platelet count (count/volume)              228 10*3/uL              

130-400        

 

                          Automated blood platelet mean volume measurement              9.9 [foz_us]        

                                        7.4-10.4        

 

                    Automated blood neutrophils/100 leukocytes              40 %                42-75     

   

 

                    Automated blood lymphocytes/100 leukocytes              44 %                12-44     

   

 

                    Blood monocytes/100 leukocytes              8 %                 0-12        

 

                    Automated blood eosinophils/100 leukocytes              7 %                 0-10       

 

 

                    Automated blood basophils/100 leukocytes              1 %                 0-10        

 

                          Blood neutrophils automated count (number/volume)              1.9 10*3           

                                        1.8-7.8        

 

                          Blood lymphocytes automated count (number/volume)              2.1 10*3           

                                        1.0-4.0        

 

                    Blood monocytes automated count (number/volume)              0.4 10*3              0.0-

1.0        

 

                    Automated eosinophil count              0.3 10*3/uL              0.0-0.3        

 

                    Automated blood basophil count (count/volume)              0.0 10*3/uL              

0.0-0.1        









                                        Whole blood basic metabolic panel - 19 03:00         









                          Serum or plasma sodium measurement (moles/volume)              142 mmol/L         

                                        135-145        

 

                          Serum or plasma potassium measurement (moles/volume)              3.1 mmol/L      

                                        3.6-5.0        

 

                          Serum or plasma chloride measurement (moles/volume)              107 mmol/L       

                                                

 

                    Carbon dioxide              26 mmol/L              21-32        

 

                          Serum or plasma anion gap determination (moles/volume)              9 mmol/L      

                                        5-14        

 

                          Serum or plasma urea nitrogen measurement (mass/volume)              7 mg/dL      

                                        7-18        

 

                          Serum or plasma creatinine measurement (mass/volume)              0.75 mg/dL      

                                        0.60-1.30        

 

                    Serum or plasma urea nitrogen/creatinine mass ratio              9                   NRG

        

 

                                        Serum or plasma creatinine measurement with calculation of estimated glomerular 

filtration rate              >                         NRG        

 

                          Serum or plasma glucose measurement (mass/volume)              89 mg/dL           

                                                

 

                          Serum or plasma calcium measurement (mass/volume)              8.4 mg/dL          

                                        8.5-10.1        









                                        Serum or plasma troponin i.cardiac measurement (mass/volume) - 19 03:00   

      









                          Serum or plasma troponin i.cardiac measurement (mass/volume)              < ng/mL 

                                        <0.028        









                                        Lipid 1996 panel - 19 03:00         









                          Serum or plasma triglyceride measurement (mass/volume)              80 mg/dL      

                                        <150        

 

                          Serum or plasma cholesterol measurement (mass/volume)              87 mg/dL       

                                        < 200        

 

                          Serum or plasma cholesterol in HDL measurement (mass/volume)              28 mg/dL

                                        40-60        

 

                          Cholesterol in LDL [mass/volume] in serum or plasma by direct assay              45

 mg/dL                                  1-129        

 

                          Serum or plasma cholesterol in VLDL measurement (mass/volume)              16 mg/dL

                                        5-40        









                                        Blood lactic acid measurement (moles/volume) - 19 10:30         









                    Blood lactic acid measurement (moles/volume)              0.72 mmol/L              0.50-

2.00        









                                        Complete blood count (CBC) with automated white blood cell (WBC) differential - 

19 10:43         









                          Blood leukocytes automated count (number/volume)              4.0 10*3/uL         

                                        4.3-11.0        

 

                          Blood erythrocytes automated count (number/volume)              4.29 10*6/uL      

                                        4.35-5.85        

 

                          Venous blood hemoglobin measurement (mass/volume)              12.9 g/dL          

                                        11.5-16.0        

 

                    Blood hematocrit (volume fraction)              38 %                35-52        

 

                    Automated erythrocyte mean corpuscular volume              90 [foz_us]              

80-99        

 

                                        Automated erythrocyte mean corpuscular hemoglobin (mass per erythrocyte)        

                          30 pg                     25-34        

 

                                        Automated erythrocyte mean corpuscular hemoglobin concentration measurement (mass/volume)

                          34 g/dL                   32-36        

 

                    Automated erythrocyte distribution width ratio              13.8 %              10.0-

14.5        

 

                    Automated blood platelet count (count/volume)              240 10*3/uL              

130-400        

 

                          Automated blood platelet mean volume measurement              9.9 [foz_us]        

                                        7.4-10.4        

 

                    Automated blood neutrophils/100 leukocytes              53 %                42-75     

   

 

                    Automated blood lymphocytes/100 leukocytes              35 %                12-44     

   

 

                    Blood monocytes/100 leukocytes              8 %                 0-12        

 

                    Automated blood eosinophils/100 leukocytes              4 %                 0-10       

 

 

                    Automated blood basophils/100 leukocytes              1 %                 0-10        

 

                          Blood neutrophils automated count (number/volume)              2.1 10*3           

                                        1.8-7.8        

 

                          Blood lymphocytes automated count (number/volume)              1.4 10*3           

                                        1.0-4.0        

 

                    Blood monocytes automated count (number/volume)              0.3 10*3              0.0-

1.0        

 

                    Automated eosinophil count              0.1 10*3/uL              0.0-0.3        

 

                    Automated blood basophil count (count/volume)              0.0 10*3/uL              

0.0-0.1        









                                        PT panel in platelet poor plasma by coagulation assay - 19 10:43         









                          Prothrombin time (PT) in platelet poor plasma by coagulation assay              14.4

 s                                      12.2-14.7        

 

                          INR in platelet poor plasma or blood by coagulation assay              1.1        

                                        0.8-1.4        









                                        Activated partial thromboplastin time (aPTT) in platelet poor plasma bycoagulation

 assay - 19 10:43         









                                        Activated partial thromboplastin time (aPTT) in platelet poor plasma bycoagulation

 assay                    32 s                      24-35        









                                        Comprehensive metabolic panel - 19 10:43         









                          Serum or plasma sodium measurement (moles/volume)              140 mmol/L         

                                        135-145        

 

                          Serum or plasma potassium measurement (moles/volume)              3.5 mmol/L      

                                        3.6-5.0        

 

                          Serum or plasma chloride measurement (moles/volume)              104 mmol/L       

                                                

 

                    Carbon dioxide              24 mmol/L              21-32        

 

                          Serum or plasma anion gap determination (moles/volume)              12 mmol/L     

                                        5-14        

 

                          Serum or plasma urea nitrogen measurement (mass/volume)              7 mg/dL      

                                        7-18        

 

                          Serum or plasma creatinine measurement (mass/volume)              0.78 mg/dL      

                                        0.60-1.30        

 

                    Serum or plasma urea nitrogen/creatinine mass ratio              9                   NRG

        

 

                                        Serum or plasma creatinine measurement with calculation of estimated glomerular 

filtration rate              >                         NRG        

 

                          Serum or plasma glucose measurement (mass/volume)              96 mg/dL           

                                                

 

                          Serum or plasma calcium measurement (mass/volume)              9.3 mg/dL          

                                        8.5-10.1        

 

                          Serum or plasma total bilirubin measurement (mass/volume)              0.3 mg/dL  

                                        0.1-1.0        

 

                                        Serum or plasma alkaline phosphatase measurement (enzymatic activity/volume)    

                          73 U/L                            

 

                                        Serum or plasma aspartate aminotransferase measurement (enzymatic activity/volume)

                          11 U/L                    5-34        

 

                                        Serum or plasma alanine aminotransferase measurement (enzymatic activity/volume)

                          8 U/L                     0-55        

 

                          Serum or plasma protein measurement (mass/volume)              6.8 g/dL           

                                        6.4-8.2        

 

                          Serum or plasma albumin measurement (mass/volume)              3.8 g/dL           

                                        3.2-4.5        

 

                    CALCIUM CORRECTED              9.5 mg/dL              8.5-10.1        









                                        Complete urinalysis with reflex to culture - 19 10:54         









                    Urine color determination              YELLOW               NRG        

 

                    Urine clarity determination              SLIGHTLY CLOUDY               NRG        

 

                    Urine pH measurement by test strip              6                   5-9        

 

                    Specific gravity of urine by test strip              1.020               1.016-1.022

        

 

                    Urine protein assay by test strip, semi-quantitative              1+                  NEGATIVE

        

 

                    Urine glucose detection by automated test strip              NEGATIVE               

NEGATIVE        

 

                          Erythrocytes detection in urine sediment by light microscopy              NEGATIVE

                                        NEGATIVE        

 

                    Urine ketones detection by automated test strip              NEGATIVE               

NEGATIVE        

 

                    Urine nitrite detection by test strip              NEGATIVE               NEGATIVE  

      

 

                    Urine total bilirubin detection by test strip              1+                  NEGATIVE

        

 

                          Urine urobilinogen measurement by automated test strip (mass/volume)              

1 mg/dL                                 NORMAL        

 

                    Urine leukocyte esterase detection by dipstick              1+                  NEGATIVE

        

 

                                        Automated urine sediment erythrocyte count by microscopy (number/high power field)

                          NONE                      NRG        

 

                                        Automated urine sediment leukocyte count by microscopy (number/high power field)

                           [HPF]                    NRG        

 

                          Bacteria detection in urine sediment by light microscopy              TRACE       

                                        NRG        

 

                                        Squamous epithelial cells detection in urine sediment by light microscopy       

                          0-2                       NRG        

 

                          Crystals detection in urine sediment by light microscopy              NONE        

                                        NRG        

 

                          Casts detection in urine sediment by light microscopy              NONE           

                                        NRG        

 

                          Mucus detection in urine sediment by light microscopy              MODERATE       

                                        NRG        

 

                          Complete urinalysis with reflex to culture              CULTURE PENDING           

                                        NRG        



                                                                              



Encounters

      





                ACCT No.              Visit Date/Time              Discharge              Status      

                Pt. Type              Provider              Facility              Loc./Unit      

                                        Complaint        

 

                [id number]              2019 16:40:00              2019 23:59:59              

CLS              Outpatient              EVELIO PALACIOS, SURENDRA LEVY

 [facility]                                  

 

             [id number]              2018 14:20:00                                            Document

 Registration                                                                       

 

             [id number]              2017 10:40:00                                            Document

 Registration                                                                       

 

                    [id number]              2019 18:30:00              2019 23:59:59      

                CLS              Outpatient              WENDIE STONE MD              [facility]              4TH                       CP R/O ACS, GERD        

 

                    [id number]              2018 14:21:00              2018 16:07:00      

                    DIS                 Outpatient              MATTHEW PALACIOS FACC, DARREL FACCYRUS CCDS         

                    [facility]              CATH                UNSTABLE ANGINA   

     

 

                    [id number]              2018 15:26:00              2018 23:59:59      

                CLS              Outpatient              KIM FUCHS APRN              [facility]              RAD                       S89.92XA INJURY OF LT KNEE       

 

 

                    [id number]              2017 13:08:00              2017 23:59:59      

                CLS              Outpatient              SUBHA CARRINGTON MD              [facility]              RAD                       LOW BACK PAIN        

 

                    [id number]              2016 16:31:00              2016 23:59:59      

                CLS              Outpatient              ARIANNA BRADFORD APRN              [facility] 

[facility]              SDC                       COUGH,CHEST CONGESITON,DIARRHEA,LLQ/LUQ

 ABD PAIN        

 

                    [id number]              2016 20:45:00              2016 17:50:00      

                DIS              Inpatient              ORENDER DO BROOKLYN S              [facility]

 [facility]              ICU                       CP,HYPOKALEMIA,EPIGASTRIC

 PAIN,PNEUMONIA        

 

                    [id number]              2016 17:39:00              2016 14:56:00      

                DIS              Inpatient              ORENDER DO, BROOKLYN S              [facility]

 [facility]              4TH                       ABD PAIN,VOMITTING       

 

 

                    [id number]              2016 11:54:00              2016 16:58:00      

                DIS              Inpatient              ORENDER DO BROOKLYN S              [facility]

 [facility]              4TH                        COLLITIS,DEHYDRATION    

    

 

                    [id number]              2016 12:29:00              2016 23:59:59      

                CLS              Outpatient              ARIANNA BRADFORD APRN              [facility] 

[facility]              RAD                       CERVICAL AND THORAIC PAIN 

       

 

                    [id number]              2016 06:54:00              2016 10:15:00      

                DIS              Emergency              JIMI PALACIOS, DEMARCUS KRISHNAN              [facility]

 [facility]              ER                        SEVERE BACK PAIN        

 

                    [id number]              2016 07:38:00              2016 23:59:59      

                CLS              Outpatient              ARIANNA BRADFORD ISAAC              [facility] 

[facility]              RAD                       THORACIC   LUMBAR PAIN    

    

 

                    [id number]              2015 13:43:00              2015 17:39:00      

                DIS              Emergency              VERITO MARTINEZ              [facility]              ER                        DIZZINESS ELEV BP        

 

                    [id number]              2015 11:40:00              2015 15:30:00      

                DIS              Outpatient              GERRY SALINAS MD              [facility]              SDC                       DEHYDRATION,CHOLITIS        

 

                    [id number]              2015 10:06:00              05/15/2015 11:00:00      

                DIS              Inpatient              BROOKLYN KHAN DO S              [facility]ia

 [facility]              SURGICAL                  DIARRHEA/SUSPECT RECURRENT

 C.DIFF        

 

                    [id number]              2015 10:00:00              2015 23:59:59      

                CLS              Preadmit              BROOKLYN KHAN DO S              [facility] 

[facility]              CARD                      ABD PAIN        

 

                    [id number]              2015 06:32:00              2015 23:59:59      

                CLS              Outpatient              BROOKLYN KHAN DO S              [facility]

 [facility]              RAD                       ABDOMINAL PAIN        

 

                    [id number]              2015 16:07:00              2015 23:59:59      

                    CLS                 Outpatient              SATRR LARA ARNP          

                    [facility]              RAD                 PNUEMONIA        

 

                    [id number]              2015 11:20:00              2015 23:59:59      

                    CLS                 Outpatient              STARR LARA ARNP          

                    [facility]              RAD                 COUGH,WHEEZING      

  

 

                    [id number]              2015 07:04:00              2015 12:20:00      

                DIS              Outpatient              CHARLES PALACIOS, JOSE BRIDGES              [facility]                       LEFT KNEE CHONDROMALACIA        

 

                    [id number]              12/15/2014 19:30:00              2014 16:50:00      

                DIS              Inpatient              BROOKLYN KHAN DO S              [facility]

 [facility]              CSD                       CHEST PAIN        

 

                    [id number]              2014 20:22:00              2014 23:00:00      

                DIS              Emergency              BRUEGGEMANN MD, DEMARCUS KRISHNAN              [facility]

 [facility]              ER                        HEADACHE        

 

                    [id number]              2014 22:10:00              2014 10:10:00      

                DIS              Outpatient              JOSE MANCINI MD              [facility]              CATH                      CHEST PAIN        

 

                    [id number]              2014 15:53:00              2014 23:59:59      

                CLS              Outpatient              BROOKLYN KHAN DO              [facility]

 [facility]              LAB                       MALISE   FATIGUE,ABD PAIN,DYSPEPSIA

        

 

                    [id number]              10/23/2013 07:50:00              10/23/2013 12:44:00      

                DIS              Outpatient              JOSE SOTO MD              [facility]              SDC                       LEFT KNEE TORN MENISCUS        

 

                    [id number]              10/22/2013 14:49:00              10/22/2013 23:59:59      

                CLS              Outpatient              JOSE SOTO MD              [facility]              PREOP                     LEFT KNEE TORN MEDIAL MENSICUS 

       

 

                    [id number]              10/03/2013 08:32:00              10/03/2013 23:59:59      

                CLS              Outpatient              BROOKLYN KHAN DO S              [facility]

 [facility]              RAD                       LT KNEE PAIN        

 

                    [id number]              2013 09:31:00              2013 14:40:00      

                DIS              Inpatient              BROOKLYN KHAN DO S              [facility]

 [facility]              4TH                       ABD PAIN NAUSEA/VOMITING/DIARRHEA

 HEADACHE        

 

                [id number]              2019 10:54:00                                          

             Document Registration                                                                   

 

 

                [id number]              2015 10:52:00                                          

             Document Registration                                                                   

 

 

                [id number]              2012 11:00:00                                          

             Document Registration                                                                   

 

 

                [id number]              2019 11:01:26              2019 23:59:59              

CLS              Outpatient              JOSE ZARCO

## 2019-07-22 NOTE — ED GENERAL
General


Chief Complaint:  Respiratory Problems


Stated Complaint:  PNEUMONIA


Nursing Triage Note:  


Pt presents to the ED with concerns that her symptoms of pneumonia are not 


improving. Pt was diagnosed with pneumonia 5 days ago from her PCP and is on her




5th day of an antibiotic. Pt reports coughing, nausea and vomiting which has 


made her feel dehydrated.


Nursing Sepsis Screen:  No Definite Risk


Source of Information:  Patient


Exam Limitations:  No Limitations





History of Present Illness


Date Seen by Provider:  2019


Time Seen by Provider:  10:08


Initial Comments


Here with report of concerns of pneumonia. She has been treated for pneumonia 

for the last 5 days with Levaquin from her provider. Provider is very concerned 

about left-sided pneumonia. Does have long history of heart disease although 

apparently well controlled. She does have chronic lung issues as well. She 

reports coughing as well as shortness of breath. Reports that she's had some 

nausea and vomiting which is typical with illness. She did a breathing treatment

this morning. Overall feeling worse and instructed to report here. Does report 

fevers over the last few days although afebrile currently.


Timing/Duration:  5-6 Days


Severity:  Moderate


Associated Systoms:  No Chest Pain; Cough, Fever/Chills, Nausea/Vomiting, 

Shortness of Air, Weakness





Allergies and Home Medications


Allergies


Coded Allergies:  


     morphine (Verified  Allergy, Unknown, RASH - TAKES LORTAB AT HOME WITH NO 

ISSUES, 16)





Home Medications


Clopidogrel Bisulfate 75 Mg Tablet, 75 MG PO HS, (Reported)


Gabapentin 300 Mg Capsule, 300 MG PO HS, (Reported)


Ondansetron 4 Mg Tab.rapdis, 4 MG PO Q6H PRN for NAUSEA/VOMITING


   Prescribed by: TODD DE LEON on 19 1140


Prednisone 20 Mg Tab, 40 MG PO DAILY


   Prescribed by: TODD DE LEON on 19 1140





Patient Home Medication List


Home Medication List Reviewed:  Yes





Review of Systems


Review of Systems


Constitutional:  see HPI, chills, fever


EENTM:  no symptoms reported


Respiratory:  see HPI, short of breath, wheezing


Cardiovascular:  No chest pain, No edema; Hx of Intervention


Gastrointestinal:  No abdominal pain; nausea, vomiting


Genitourinary:  no symptoms reported


Musculoskeletal:  no symptoms reported


Skin:  no symptoms reported


Psychiatric/Neurological:  No Symptoms Reported





All Other Systems Reviewed


Negative Unless Noted:  Yes





Past Medical-Social-Family Hx


Past Med/Social Hx:  Reviewed Nursing Past Med/Soc Hx


Patient Social History


Alcohol Use:  Denies Use


Recreational Drug Use:  No


Smoking Status:  Current Someday Smoker


Type Used:  Cigarettes


2nd Hand Smoke Exposure:  Yes


Recent Foreign Travel:  No


Contact w/Someone Who Travel:  No


Recent Infectious Disease Expo:  No


Recent Hopitalizations:  No


Physical Abuse:  No


Sexual Abuse:  No


Mistreated:  No





Immunizations Up To Date


Tetanus Booster (TDap):  Unknown


PED Vaccines UTD:  No


Date of Pneumonia Vaccine:  2016


Date of Influenza Vaccine:  Dec 7, 2018





Seasonal Allergies


Seasonal Allergies:  No





Past Medical History


Surgeries:  Yes (left total knee 2015, cabgX1, port, STENTS X2)


CABG, Hysterectomy, Open Heart Surgery, Orthopedic


Respiratory:  Yes (tobaccoism)


Pneumonia, Chronic Bronchitis


Currently Using CPAP:  No


Currently Using BIPAP:  No


Cardiac:  Yes (CABG)


Coronary Artery Disease, Hypertension


Neurological:  Yes


Headaches /Migraines


Reproductive Disorders:  No


Female Reproductive Disorders:  Denies


GYN History:  Hysterectomy


Sexually Transmitted Disease:  No


HIV/AIDS:  No


Genitourinary:  No


Gastrointestinal:  Yes


Colitis, Gastroesophageal Reflux, Diverticulosis, Hemorrhoids, Chronic Diarrhea,

Ulcer, Irritable Bowel


Musculoskeletal:  Yes (FRACTURE LEFT WRIST)


Degenerate Disk Disease, Arthritis, Chronic Back Pain, Fractures


Endocrine:  No


HEENT:  No


Loss of Vision:  Denies


Hearing Impairment:  Denies


Cancer:  No


Psychosocial:  Yes


Sleep Difficulties, Anxiety, Depression


Integumentary:  No


Blood Disorders:  No


Adverse Reaction/Blood Tranf:  No





Family Medical History


Reviewed Nursing Family Hx





Cancer (brain and back)


  G8 SISTER


  G8 SISTER


Cataracts


  19 MOTHER


Headache disorder


  G8 SISTER


Hypercholesterolemia


  19 FATHER


Hypertension


  19 FATHER


Myocardial infarction


  19 FATHER


  19 FATHER


Myocardial infarction ( from MI at 55


)


  19 FATHER


  19 FATHER


Neoplasm


  G8 SISTER


Psychosocial problem


  G8 SISTER


Visual disorder


  G8 SISTER





No Family History of:


  AIDS


  Abdominal aortic aneurysm


  Cade's disease


  Alcoholism


  Alzheimer's disease


  Aphasia


  Arthritis


  Asthma


  Cancer of mouth


  Cardiovascular disease


  Colon cancer


  Completed stroke


  Congenital disease


  Congenital heart disease


  Coronary thrombosis


  Cystic fibrosis


  Deafness or hearing loss


  Dementia


  Diabetes mellitus


  Drug abuse


  Dysphasia


  Fibrocystic disease of breast


  Gastroenteritis


  Glaucoma


  Infertility


  Kidney disease


  Not obtainable due to adoption


  Osteoporosis


  Parkinson's disease


  Prostate cancer


  Respiratory disorder


  Seizure disorder


  Severe allergy


  Thyroid disease


  Tuberculosis


Cancer





Physical Exam-Suspected Sepsis


Physical Exam


Vital Signs





Vital Signs - First Documented








 19





 10:08 10:44


 


Temp 96.6 


 


Pulse 99 


 


Resp 19 


 


B/P (MAP) 137/89 (105) 


 


Pulse Ox  98


 


O2 Delivery Room Air 





Capillary Refill : Less Than 3 Seconds








Blood Pressure Mean:                    105








Height, Weight, BMI


Height: 5'2.00"


Weight: 115lbs. 7.0oz. 52.489462yp; 22.8 BMI


Method:Stated


General Appearance:  No Apparent Distress, WD/WN


HEENT:  PERRL/EOMI, Pharynx Normal


Neck:  Non Tender, Supple


Respiratory:  No Accessory Muscle Use, Expiration, Inspiration, Rhonci, Wheezing


Cardiovascular:  Regular Rate, Rhythm, No Murmur


Gastrointestinal:  Non Tender, Soft


Back:  Normal Inspection, No CVA Tenderness, No Vertebral Tenderness


Extremity:  Normal Range of Motion, Non Tender


Neurologic/Psychiatric:  Alert, Oriented x3


Skin:  normal color, warm/dry





Focused Exam


Lactate Level


19 10:30: Lactic Acid Level 0.72





Lactic Acid Level





Laboratory Tests








Test


 19


10:30


 


Lactic Acid Level


 0.72 MMOL/L


(0.50-2.00)











Progress/Results/Core Measures


Suspected Sepsis


Recent Fever Within 48 Hours:  No


Infection Criteria Present:  None


New/Unexplained  Altered Menta:  No


Sepsis Screen:  No Definite Risk


SIRS


Temperature:96.6 


Pulse: 99 


Respiratory Rate: 19


 


Laboratory Tests


19 10:43: White Blood Count 4.0L


Blood Pressure 137 /89 


Mean: 105


 





19 10:30: Lactic Acid Level 0.72


Laboratory Tests


19 10:43: 


Creatinine 0.78, INR Comment 1.1, Platelet Count 240, Total Bilirubin 0.3








Results/Orders


Lab Results





Laboratory Tests








Test


 19


10:30 19


10:43 19


10:54 Range/Units


 


 


Lactic Acid Level


 0.72 


 


 


 0.50-2.00


MMOL/L


 


White Blood Count


 


 4.0 L


 


 4.3-11.0


10^3/uL


 


Red Blood Count


 


 4.29 L


 


 4.35-5.85


10^6/uL


 


Hemoglobin  12.9   11.5-16.0  G/DL


 


Hematocrit  38   35-52  %


 


Mean Corpuscular Volume  90   80-99  FL


 


Mean Corpuscular Hemoglobin  30   25-34  PG


 


Mean Corpuscular Hemoglobin


Concent 


 34 


 


 32-36  G/DL





 


Red Cell Distribution Width  13.8   10.0-14.5  %


 


Platelet Count


 


 240 


 


 130-400


10^3/uL


 


Mean Platelet Volume  9.9   7.4-10.4  FL


 


Neutrophils (%) (Auto)  53   42-75  %


 


Lymphocytes (%) (Auto)  35   12-44  %


 


Monocytes (%) (Auto)  8   0-12  %


 


Eosinophils (%) (Auto)  4   0-10  %


 


Basophils (%) (Auto)  1   0-10  %


 


Neutrophils # (Auto)  2.1   1.8-7.8  X 10^3


 


Lymphocytes # (Auto)  1.4   1.0-4.0  X 10^3


 


Monocytes # (Auto)  0.3   0.0-1.0  X 10^3


 


Eosinophils # (Auto)


 


 0.1 


 


 0.0-0.3


10^3/uL


 


Basophils # (Auto)


 


 0.0 


 


 0.0-0.1


10^3/uL


 


Prothrombin Time  14.4   12.2-14.7  SEC


 


INR Comment  1.1   0.8-1.4  


 


Activated Partial


Thromboplast Time 


 32 


 


 24-35  SEC





 


Sodium Level  140   135-145  MMOL/L


 


Potassium Level  3.5 L  3.6-5.0  MMOL/L


 


Chloride Level  104     MMOL/L


 


Carbon Dioxide Level  24   21-32  MMOL/L


 


Anion Gap  12   5-14  MMOL/L


 


Blood Urea Nitrogen  7   7-18  MG/DL


 


Creatinine


 


 0.78 


 


 0.60-1.30


MG/DL


 


Estimat Glomerular Filtration


Rate 


 > 60 


 


  





 


BUN/Creatinine Ratio  9    


 


Glucose Level  96     MG/DL


 


Calcium Level  9.3   8.5-10.1  MG/DL


 


Corrected Calcium  9.5   8.5-10.1  MG/DL


 


Total Bilirubin  0.3   0.1-1.0  MG/DL


 


Aspartate Amino Transf


(AST/SGOT) 


 11 


 


 5-34  U/L





 


Alanine Aminotransferase


(ALT/SGPT) 


 8 


 


 0-55  U/L





 


Alkaline Phosphatase  73     U/L


 


Total Protein  6.8   6.4-8.2  GM/DL


 


Albumin  3.8   3.2-4.5  GM/DL


 


Urine Color   YELLOW   


 


Urine Clarity


 


 


 SLIGHTLY


CLOUDY  





 


Urine pH   6  5-9  


 


Urine Specific Gravity   1.020  1.016-1.022  


 


Urine Protein   1+ H NEGATIVE  


 


Urine Glucose (UA)   NEGATIVE  NEGATIVE  


 


Urine Ketones   NEGATIVE  NEGATIVE  


 


Urine Nitrite   NEGATIVE  NEGATIVE  


 


Urine Bilirubin   1+ H NEGATIVE  


 


Urine Urobilinogen   1  NORMAL  MG/DL


 


Urine Leukocyte Esterase   1+ H NEGATIVE  


 


Urine RBC (Auto)   NEGATIVE  NEGATIVE  


 


Urine RBC   NONE   /HPF


 


Urine WBC   0-2   /HPF


 


Urine Squamous Epithelial


Cells 


 


 0-2 


  /HPF





 


Urine Crystals   NONE   /LPF


 


Urine Bacteria   TRACE   /HPF


 


Urine Casts   NONE   /LPF


 


Urine Mucus   MODERATE H  /LPF


 


Urine Culture Indicated


 


 


 CULTURE


PENDING  











My Orders





Orders - TODD DE LEON MD


Chest Pa/Lat (2 View) (19 10:18)


Albuterol/Ipra Inhalation Soln (Duoneb I (19 10:30)


Cbc With Automated Diff (19 10:18)


Comprehensive Metabolic Panel (19 10:18)


Blood Culture (19 10:18)


Sputum Culture (19 10:18)


Urinalysis (19 10:18)


Urine Culture (19 10:18)


Protime With Inr (19 10:18)


Partial Thromboplastin Time (19 10:18)


Ed Iv/Invasive Line Start (19 10:18)


Vital Signs Adult Sepsis Patie Q15M (19 10:18)


O2 (19 10:18)


Remove Rings In Anticipation O (19 10:18)


Lactic Acid Analyzer (19 10:18)


Svn Small Volume Nebulizer (19 10:18)


Ondansetron Injection (Zofran Injectio (19 11:30)


Lactated Ringers (Lr 1000 Ml Iv Solution (19 11:28)


Prednisone Tablet (Deltasone Tablet) (19 12:15)





Medications Given in ED





Current Medications








 Medications  Dose


 Ordered  Sig/Elena


 Route  Start Time


 Stop Time Status Last Admin


Dose Admin


 


 Albuterol/


 Ipratropium  3 ml  ONCE  ONCE


 INH  19 10:30


 19 10:31 DC 19 10:44


3 ML


 


 Ondansetron HCl  4 mg  ONCE  ONCE


 IVP  19 11:30


 19 11:31 DC 19 11:41


4 MG








Vital Signs/I&O











 19





 10:08 10:44 11:30


 


Temp 96.6  97.2


 


Pulse 99  93


 


Resp 19  18


 


B/P (MAP) 137/89 (105)  120/81


 


Pulse Ox  98 98


 


O2 Delivery Room Air Room Air Room Air





Capillary Refill : Less Than 3 Seconds








Blood Pressure Mean:                    105








Progress Note :  


Progress Note


Seen and evaluated. IV, labs, UA, chest x-ray, blood cultures and lactic acid 

ordered. We have initiated sepsis protocol. We will get two-view chest x-ray. 

Normal saline 1 L bolus ordered. IV via port access. Monitor patient. 1140: 

Patient had some nausea. LR 1 L bolus ordered. Zofran 4 mg IV ordered. 1210: 

Improved. Prednisone 40 mg by mouth. No acute indication of pneumonia currently.

Does have long-standing history of smoking in the seems to be more COPD related.

We will treat as such. Patient to complete her antibiotics previously 

prescribed. We will provide additional prescription for prednisone. Discharged 

home with return precautions. Patient verbalize understanding instructions and 

agreement with plan.





Diagnostic Imaging





   Diagonstic Imaging:  Xray


   Plain Films/CT/US/NM/MRI:  chest


Comments


                 [facility].


                                [place], [place]





NAME:   MARILIA JONES


MED REC#:   [id number]


ACCOUNT#:   [id number]


PT STATUS:   REG ER


:   1970


PHYSICIAN:   TODD DE LEON MD


ADMIT DATE:   19/ER


                                   ***Draft***


Date of Exam:19





CHEST PA/LAT (2 VIEW)








Indication:  Worsened pneumonia.





Findings:  No focal consolidations, subclavian line at the SVC,


sternal wires midline, no failure, effusion or pneumothorax.





Impression:  No acute-appearing abnormality.





  Dictated on workstation # [id number]








Dict:   19 1137


Trans:   19 1143


CV [id number]





Interpreted by:     DANELLE MARTINEZ


Electronically signed by:


   Reviewed:  Reviewed by Me





Departure


Impression





   Primary Impression:  


   Acute bronchitis


   Qualified Codes:  J20.9 - Acute bronchitis, unspecified


Disposition:  01 HOME, SELF-CARE


Condition:  Improved





Departure-Patient Inst.


Decision time for Depature:  12:12


Referrals:  


SURENDRA FRASER MD (PCP/Family)


Primary Care Physician


Patient Instructions:  Acute Bronchitis, Adult (DC)





Add. Discharge Instructions:  


All discharge instructions reviewed with patient and/or family. Voiced 

understanding.





Take medications as directed. Follow-up with your doctor this week for recheck 

and further evaluation. Return for worse pain, fever, vomiting, weakness, 

breathing problems or other concerns as needed. You may take over the counter 

acid reducer such as Pepcid/famotidine or omeprazole per package directions to 

reduce stomach upset with the steroids.


Scripts


Prednisone (Prednisone) 20 Mg Tab


40 MG PO DAILY, #10 TAB 0 Refills


   Prov: TODD DE LEON MD         19 


Ondansetron (Ondansetron Odt) 4 Mg Tab.rapdis


4 MG PO Q6H PRN for NAUSEA/VOMITING, #10 TAB 0 Refills


   Prov: TODD DE LEON MD         19


Work/School Note:  Work Release Form   Date Seen in the Emergency Department:  

2019


   Return to Work:  2019


   Restrictions:  No Restrictions





Copy


Copies To 1:   SURENDRA FRASER MD, TIMOTHY D MD          2019 10:31

## 2019-07-22 NOTE — XMS REPORT
[facility]

                             Created on: 2019



Dayami Rae

External Reference #: [id number]

: 1970

Sex: Female



Demographics







                          Address                   [address] [place]  [address]

 

                          Preferred Language        Unknown

 

                          Marital Status            Unknown

 

                          Orthodox Affiliation     Unknown

 

                          Race                      Unknown

 

                          Ethnic Group              Unknown





Author







                          Author                    SURENDRA FRASER

 

                          [facility]

 

                          Address                   [address] [place]  [address]



 

                          Phone                     [phone]







Care Team Providers







                    Care Team Member Name    Role                Phone

 

                    SURENDRA FRASER     Unavailable         [phone]







PROBLEMS







          Type      Condition    ICD9-CM Code    JYA46-YU Code    Onset Dates    Condition Status    SNOMED

 Code

 

          Problem    Neuropathy              G62.9               Active    033925593

 

          Problem    Anxiety disorder, unspecified              F41.9               Active    467877840

 

          Problem    Lumbago with sciatica, left side              M54.42              Active    802119418

 

                          Problem                   Coronary artery disease involving native coronary artery of native heart

 without angina pectoris                 I25.10                    Active       [id number]

 

          Problem    Cervical radiculopathy              M54.12              Active    91090357

 

          Problem    Chronic pain syndrome              G89.4               Active    601059676

 

          Problem    Nicotine dependence              F17.200              Active    26241047

 

          Problem    Fibromyalgia              M79.7               Active    013974212

 

          Problem    Lumbago with sciatica, right side              M54.41              Active    [id number]



 

          Problem    GERD with esophagitis              K21.0               Active    254137861

 

          Problem    Essential hypertension              I10                 Active    10698580

 

                Problem         Chronic obstructive pulmonary disease, unspecified COPD type                    J44.9 

                                        Active              01908221







ALLERGIES

No Information



ENCOUNTERS







                Encounter       Location        Date            Diagnosis

 

                          [place] [id number] [place] [address]

                          02 Aug, 2019               

 

                          [facility] [place]     [id number] [place] [address]

                                        Pneumonia of left lower lobe due to infectious organism J18.1

 

                          [facility] [place]     [address] [address]

                                        Anxiety disorder, unspecified F41.9

 

                          [place] [address] [place] [address]

                          10 Jul, 2019              Chronic pain syndrome G89.4 and Anxiety disorder, unspecified F41.9



 

                          [place] [address] [place] [id number] [place] [address]

                                         

 

                          [facility] [place]     [address] [address]

                                        Chronic pain syndrome G89.4 and Anxiety disorder, unspecified F41.9



 

                [facility]     [address] [place] [address]        Bronchitis 

J40

 

                          [facility] [place]     [id number] [place] [address]

                                        Anxiety disorder, unspecified F41.9 and Chronic pain syndrome G89.4



 

                          [facility] [place]     [address] [address]

                          11 Mar, 2019              Anxiety disorder, unspecified F41.9 and Chronic pain syndrome G89.4



 

                          [place] [address] [address]

                          05 Mar, 2019               

 

                          [facility] [place]     [address] [address]

                                        Bronchitis J40 and Nicotine dependence F17.200

 

                          [facility] [place]     [id number] [place] [address]

                          18 2019              Anxiety disorder, unspecified F41.9 and Chronic pain syndrome G89.4



 

                          [facility] [place]     [id number] [place] [address]

                                         

 

                          [facility] [place]     [id number] [place] [address]

                                         

 

                          [facility] [place]     [id number] [place] [address]

                                         

 

                          [facility] [place]     [id number] [place] [address]

                                        Anxiety disorder, unspecified F41.9 and Chronic pain syndrome G89.4



 

                          [facility] [place]     [id number] [place] [address]

                                        Cervical radiculopathy M54.12 ; Anxiety disorder, unspecified F41.9

 and Hypokalemia E87.6

 

                          [facility] [place]     [id number] [place] [address]

                                         

 

                          [facility] [place]     [id number] [place] [address]

                                         

 

                          [facility] [place]     [id number] [place] [address]

                                         

 

                          [facility] [place]     [id number] [place] [address]

                          27 Dec, 2018               

 

                          [facility] [place]     [id number] [place] [address]

                          20 Dec, 2018              Anxiety disorder, unspecified F41.9 and Chronic pain syndrome G89.4



 

                          [facility] [place]     [id number] [place] [address]

                          19 Dec, 2018               

 

                          [facility] [place]     [id number] [place] [address]

                          06 Dec, 2018              Neuropathy G62.9

 

                          [facility] [place]     [id number] [place] [address]

                                        Anxiety disorder, unspecified F41.9

 

                          [facility] [place]     [id number] [place] [address]

                                        Chronic pain syndrome G89.4 and Anxiety disorder, unspecified F41.9



 

                          [facility] [place]     [address] [place] [address]

                          15 Nov, 2018               

 

                          [facility] [place]     [id number] [place] [address]

                                         

 

                          [facility] [place]     [address] [place] [address]

                          29 Oct, 2018              Cervical radiculopathy M54.12 ; Chronic pain syndrome G89.4 and Anxiety

 disorder, unspecified F41.9

 

                          [facility] [place]     [id number] [place] [address]

                          24 Oct, 2018               

 

                          [place] [id number] [place] [address]

                          27 Sep, 2018              Fibromyalgia M79.7

 

                          [place] [address] [id number] [place] [address]

                          20 Sep, 2018               

 

                          [place] [address] [id number] [place] [address]

                          19 Sep, 2018               

 

                          [place] [address] [id number] [place] [address]

                          17 Sep, 2018              Pneumonia of right lower lobe due to infectious organism J18.1 and

 Chronic obstructive pulmonary disease, unspecified COPD type J44.9

 

                          [place] [id number] [place] [address]

                          14 Sep, 2018              Pneumonia of right lower lobe due to infectious organism J18.1 ; 

Chronic obstructive pulmonary disease, unspecified COPD type J44.9 ; Chronic 
nausea R11.0 and Cough R05

 

                          [place] [address] [id number] [place] [address]

                          07 Sep, 2018              Acute bronchitis, unspecified organism J20.9

 

                          [place] [address] [id number] [place] [address]

                          28 Aug, 2018              Fibromyalgia M79.7

 

                          [place] [address] [place] [id number] [place] [address]

                          20 Aug, 2018               

 

                          [place] [address] [id number] [place] [address]

                          16 Aug, 2018              Neuropathy G62.9

 

                          [place] [id number] [place] [address]

                                        Essential hypertension I10 ; Coronary artery disease involving native

 coronary artery of native heart without angina pectoris I25.10 ; Fibromyalgia 
M79.7 ; GERD with esophagitis K21.0 and Tobacco abuse counseling Z71.6

 

                          [place] [id number] [place] [address]

                                        Long term (current) use of opiate analgesic Z79.891

 

                          [facility] [place]     [id number] [place] [address]

                                         

 

                          [facility] [place]     [id number] [place] [address]

                                        Acute bronchitis, unspecified organism J20.9

 

                          [facility] [place]     [id number] [place] [address]

                                         

 

                          [facility] [place]     [id number] [place] [address]

                                         

 

                          [facility] [place]     [id number] [place] [address]

                                        Chronic pain syndrome G89.4

 

                          [facility] [place]     [id number] [place] [address]

                                         

 

                          [facility] [place]     [id number] [place] [address]

                          15 [place], 2018               

 

                          [facility] [place]     [id number] [place] [address]

                                        Acute bronchitis, unspecified organism J20.9

 

                          [facility] [place]     [id number] [place] [address]

                                        Chronic pain syndrome G89.4

 

                          [facility] [place]     [id number] [place] [address]

                          25 May, 2018               

 

                          [facility] [place]     [id number] [place] [address]

                          24 May, 2018              Acute midline low back pain with left-sided sciatica M54.42

 

                          [facility] [place]     [id number] [place] [address]

                          22 May, 2018               

 

                          [facility]    [id number] [place] [address]

                          21 May, 2018              Bronchitis J40

 

                          [facility] [place]     [id number] [place] [address]

                          07 May, 2018              Chronic pain syndrome G89.4 and Neuropathy G62.9

 

                          [facility] [place]     [id number] [place] [address]

                                        Acute midline low back pain with left-sided sciatica M54.42

 

                          [facility] [place]     [id number] [place] [address]

                                         

 

                          [facility] [place]     [id number] [place] [address]

                                        Anxiety disorder, unspecified F41.9

 

                          [place] [address] [address]

                                         

 

                          [place] [address] [address]

                                        Chronic pain syndrome G89.4 and Acute midline low back pain with 

left-sided sciatica M54.42

 

                          [facility] [place]     [id number] [place] [address]

                                        Chronic pain syndrome G89.4

 

                          [facility] [place]     [id number] [place] [address]

                                         

 

                          [facility] [place]     [id number] [place] [address]

                                         

 

                          [facility] [place]     [id number] [place] [address]

                          29 Mar, 2018              Injury of left knee, initial encounter S89.92XA and COPD suggested

 by initial evaluation J44.9

 

                          [facility] [place]     [address] [address]

                          28 Mar, 2018              Acute bronchitis, unspecified organism J20.9

 

                          [facility] [place]     [id number] [place] [address]

                          27 Mar, 2018              Acute bronchitis, unspecified organism J20.9

 

                          [facility] [place]     [id number] [place] [address]

                          21 Mar, 2018              Anxiety disorder, unspecified F41.9 and Acute bronchitis, unspecified

 organism J20.9

 

                          [facility] [place]     [id number] [place] [address]

                          08 Mar, 2018              Chronic pain syndrome G89.4

 

                          [facility] [place]     [id number] [place] [address]

                          05 Mar, 2018               

 

                          [facility] [place]     [id number] [place] [address]

                          05 Mar, 2018              Acute midline low back pain with left-sided sciatica M54.42

 

                          [place] [id number] [place] [address]

                                         

 

                          [facility] [place]     [id number] [place] [address]

                                        Chronic pain syndrome G89.4

 

                          [facility] [place]     [id number] [place] [address]

                                        Chronic pain syndrome G89.4

 

                          [facility] [place]     [id number] [place] [address]

                                         

 

                          [facility] [place]     [id number] [place] [address]

                                        Gastroenteritis K52.9

 

                          [facility] [place]     [id number] [place] [address]

                                         

 

                          [facility] [place]     [id number] [place] [address]

                          15 Jerardo, 2018              Acute bronchitis, unspecified organism J20.9

 

                          [facility] [place]     [id number] [place] [address]

                                        Anxiety disorder, unspecified F41.9 and Neuropathy G62.9

 

                          [facility] [place]     [id number] [place] [address]

                                        Chronic pain syndrome G89.4

 

                          [place] [address] [address]

                                        Bronchitis J40 and Laryngitis J04.0

 

                          [place] [id number] [place] [address]

                          29 Dec, 2017               

 

                          [place] [id number] [place] [address]

                          26 Dec, 2017              Bronchitis J40

 

                          [place] [id number] [place] [address]

                          18 Dec, 2017               

 

                          [place] [id number] [place] [address]

                          13 Dec, 2017              Fibromyalgia M79.7 and Chronic pain syndrome G89.4

 

                          [place] [id number] [place] [address]

                          04 Dec, 2017              Chronic pain syndrome G89.4 and Acute bronchitis, unspecified organism

 J20.9

 

                          [place] [id number] [place] [address]

                                        Neuropathy G62.9

 

                          [place] [id number] [place] [address]

                                        Chronic pain syndrome G89.4 ; Lumbago with sciatica, left side M54.42

 ; Lumbago with sciatica, right side M54.41 ; Screening cholesterol level 
Z13.220 ; Screening for diabetes mellitus Z13.1 ; Screening for thyroid disorder
Z13.29 ; Fibromyalgia M79.7 ; Anxiety disorder, unspecified F41.9 and Neuropathy
G62.9

 

                          [place] [address] [place] [address]

                                         

 

                          [place] [id number] [place] [address]

                          25 Oct, 2017               

 

                          [place] [id number] [place] [id number]

                          10 Oct, 2017              Fibromyalgia M79.7 ; Chronic pain syndrome G89.4 ; Anxiety disorder,

 unspecified F41.9 ; Neuropathy G62.9 and Acute bronchitis, unspecified organism
J20.9

 

                          [facility], [place], operated [facility]     [id number], [place] [address]

                          09 Oct, 2017               

 

                          [facility] [place]     [id number] [place] [phone]

                          25 Sep, 2017               

 

                          [facility] [place]     [id number] [place] [address]

                          19 Sep, 2017               

 

                          [facility] [place]     [id number] [place] [address]

                          08 Sep, 2017               

 

                          [facility] [place], [facility]     [id number] [place] [address]

                          23 Aug, 2017               

 

                          [facility] [place]     [id number] [place] [address]

                          22 Aug, 2017              Acute seasonal allergic rhinitis due to pollen J30.1

 

                          [facility] [place], [facility]     [id number] [place] [address]

                          21 Aug, 2017               

 

                          [facility] [place]     [id number] [place] [address]

                          09 Aug, 2017               

 

                          [facility] [place]     [id number] [place] [address]

                                         

 

                          [facility] [place]     [id number], [place] [address]

                                         

 

                          [facility] [place]     [id number] [address]

                          10 Jul, 2017               

 

                          [facility] [place]     [id number] [address]

                                         

 

                          [facility] [place]     [id number] [place] [address]

                                         

 

                          [facility] [place]     [id number] [address]

                                         

 

                          [facility] [place]     [id number] [address]

                                        Chronic pain syndrome G89.4 ; Fibromyalgia M79.7 ; Anxiety disorder,

 unspecified F41.9 ; Neuropathy G62.9 and Low back pain M54.5

 

                          [facility] [place]     [id number] [place] [address]

                          25 May, 2017              Low back pain M54.5

 

                          [facility] [place]     [id number] [place] [address]

                          24 May, 2017               

 

                          [facility] [place]     [id number] [place] [address]

                          22 May, 2017               

 

                          [facility] [place]     [id number] [place] [address]

                          16 May, 2017               

 

                          [facility] [place]     [id number] [place] [address]

                          16 May, 2017               

 

                          [facility] [place]     [id number] [place] [address]

                          12 May, 2017              Routine adult health maintenance Z00.00 ; Fibromyalgia M79.7 ; Chronic

 pain syndrome G89.4 ; Abnormal lung sounds R09.89 ; Coronary artery disease 
involving native coronary artery of native heart without angina pectoris I25.10 
and S/P CABG (coronary artery bypass graft) Z95.1

 

                          [facility] [place], [facility]     [id number] [place] [address]

                          09 May, 2017               







IMMUNIZATIONS

No Known Immunizations



SOCIAL HISTORY

Never Assessed



REASON FOR VISIT

controlled 3/18



PLAN OF CARE





VITAL SIGNS





MEDICATIONS







        Medication    Instructions    Dosage    Frequency    Start Date    End Date    Duration    Status



 

           Tramadol HCl 50 mg    Orally every 6 hrs    1 tablet as needed    6h               

                          28 days                   Active

 

        Xanax 0.25 MG    Orally Twice a day    1 tablet AM, 1/2 PM    12h                     28 days    Active









RESULTS

No Results



PROCEDURES

No Known procedures



INSTRUCTIONS





MEDICATIONS ADMINISTERED

No Known Medications



MEDICAL (GENERAL) HISTORY







                    Type                Description         Date

 

                    Medical History     fibromyalgia         

 

                          Medical History           MRI 2016- small central protrusion/herniation w/minimal impression

 on thecal anteriorly at C5-6, At C3-4 small bilat. uncinate spurs w/ mild right
neural foraminal narrowing               

 

                          Medical History           MRI 2016- mild degenerative changes. No spinal canal stenosis

 or foraminal narrowing of thoracic spine     

 

                    Medical History     hypertension         

 

                    Medical History     Anxiety disorder     

 

                    Medical History     depression           

 

                    Medical History     migraine headaches     

 

                    Medical History     Hx of C-Diff         

 

                    Medical History     Hx of Pneumonia      

 

                    Medical History     heart cath w/ stents placed 7/3/18     

 

                          Surgical History          Open Heart Surgery - [facility] -Dr. Lima  (Cardiologist-

 Dr Logan)                             

 

                    Surgical History    hysterectomy - Dr Luis     

 

                    Surgical History    Left Breast Lumpectomy (Bx - Benign)- Dr Luis     

 

                    Surgical History    Port - Dr Luis     

 

                    Surgical History    Left Knee Surgeries x3- Dr Carolina did 2 and Dr Gan did 1     

 

                    Surgical History    cath/stent          

 

                    Hospitalization History    C-Diff - [facility]     

 

                    Hospitalization History    Pneumonia - [facility]     

 

                    Hospitalization History    Open Heart Surgery - [facility]

## 2019-07-22 NOTE — XMS REPORT
Clinical Summary

                             Created on: 2019



Dayami Rae

External Reference #: [id number]

: 1970

Sex: Female



Demographics







                          Address                   [address]

[place]  [address]

 

                          Home Phone                [phone]

 

                          Preferred Language        English

 

                          Marital Status            Unknown

 

                          Sabianist Affiliation     BAP

 

                          Race                      White

 

                          Ethnic Group              Not  or 





Author







                          Author                    [facility]

 

                          Organization              [facility]

 

                          Address                   Unknown

 

                          Phone                     Unavailable







Support







                Name            Relationship    Address         Phone

 

                Savanah Colunga      ECON            Unknown         [phone]

 

                Savanah Colunga      ECON            Unknown         [phone]







Care Team Providers







                    Care Team Member Name    Role                Phone

 

                          PCP                       Unavailable







Source Comments

Some departments are not documenting in the electronic medical record.  If you d
o not see the information that you expected, contact Release of Information in [facility] Health Information Management department at [phone] for further assistan
ce in locating additional records.[facility]



Allergies

Not on File



Medications

Not on file



Active Problems





Not on file



Social History







                                        Date



                 Tobacco Use     Types           Packs/Day       Years Used 

 

                                         



                                         Never Assessed    









 



                           Sex Assigned at Birth     Date Recorded

 

 



                                         Not on file 









                                        Industry



                           Job Start Date            Occupation 

 

                                        Not on file



                           Not on file               Not on file 









                                        Travel End



                           Travel History            Travel Start 













                                         No recent travel history available.







Last Filed Vital Signs

Not on file



Plan of Treatment







   



                 Health Maintenance     Due Date        Last Done       Comments

 

   



                           PHYSICAL (COMPREHENSIVE)     1975  



                                         EXAM   

 

   



                           HIV SCREENING             1983  

 

   



                           DTAP/TDAP VACCINES (1 -     1986  



                                         Tdap)   

 

   



                           CERVICAL CANCER SCREENING     1998  

 

   



                           BREAST CANCER SCREENING     2008  

 

   



                           COLORECTAL CANCER         2018  



                                         SCREENING   

 

   



                           SHINGLES RECOMBINANT      2018  



                                         VACCINE (1 of 2)   

 

   



                           INFLUENZA VACCINE         10/01/2019  







Results

Not on filefrom Last 3 Months

## 2019-07-22 NOTE — DIAGNOSTIC IMAGING REPORT
Indication:  Worsened pneumonia.



Findings:  No focal consolidations, subclavian line at the SVC,

sternal wires midline, no failure, effusion or pneumothorax.



Impression:  No acute-appearing abnormality.



Dictated by: 



  Dictated on workstation # [id number]

## 2020-01-11 ENCOUNTER — HOSPITAL ENCOUNTER (EMERGENCY)
Dept: HOSPITAL 75 - ER | Age: 50
Discharge: HOME | End: 2020-01-11
Payer: SELF-PAY

## 2020-01-11 VITALS — SYSTOLIC BLOOD PRESSURE: 131 MMHG | DIASTOLIC BLOOD PRESSURE: 76 MMHG

## 2020-01-11 VITALS — BODY MASS INDEX: 20.69 KG/M2 | HEIGHT: 61.81 IN | WEIGHT: 112.44 LBS

## 2020-01-11 DIAGNOSIS — Z77.22: ICD-10-CM

## 2020-01-11 DIAGNOSIS — K21.9: ICD-10-CM

## 2020-01-11 DIAGNOSIS — Z79.02: ICD-10-CM

## 2020-01-11 DIAGNOSIS — Z90.710: ICD-10-CM

## 2020-01-11 DIAGNOSIS — F32.9: ICD-10-CM

## 2020-01-11 DIAGNOSIS — F41.9: ICD-10-CM

## 2020-01-11 DIAGNOSIS — J20.9: Primary | ICD-10-CM

## 2020-01-11 DIAGNOSIS — Z88.5: ICD-10-CM

## 2020-01-11 DIAGNOSIS — K58.9: ICD-10-CM

## 2020-01-11 DIAGNOSIS — Z95.1: ICD-10-CM

## 2020-01-11 DIAGNOSIS — I10: ICD-10-CM

## 2020-01-11 DIAGNOSIS — I25.10: ICD-10-CM

## 2020-01-11 DIAGNOSIS — Z79.52: ICD-10-CM

## 2020-01-11 DIAGNOSIS — Z82.49: ICD-10-CM

## 2020-01-11 DIAGNOSIS — G43.909: ICD-10-CM

## 2020-01-11 DIAGNOSIS — Z80.8: ICD-10-CM

## 2020-01-11 LAB
ALBUMIN SERPL-MCNC: 3.9 GM/DL (ref 3.2–4.5)
ALP SERPL-CCNC: 80 U/L (ref 40–136)
ALT SERPL-CCNC: 7 U/L (ref 0–55)
BASOPHILS # BLD AUTO: 0.1 10^3/UL (ref 0–0.1)
BASOPHILS NFR BLD AUTO: 1 % (ref 0–10)
BILIRUB SERPL-MCNC: 0.2 MG/DL (ref 0.1–1)
BUN/CREAT SERPL: 8
CALCIUM SERPL-MCNC: 8.2 MG/DL (ref 8.5–10.1)
CHLORIDE SERPL-SCNC: 107 MMOL/L (ref 98–107)
CO2 SERPL-SCNC: 20 MMOL/L (ref 21–32)
CREAT SERPL-MCNC: 0.85 MG/DL (ref 0.6–1.3)
EOSINOPHIL # BLD AUTO: 0.3 10^3/UL (ref 0–0.3)
EOSINOPHIL NFR BLD AUTO: 4 % (ref 0–10)
ERYTHROCYTE [DISTWIDTH] IN BLOOD BY AUTOMATED COUNT: 13.4 % (ref 10–14.5)
GFR SERPLBLD BASED ON 1.73 SQ M-ARVRAT: > 60 ML/MIN
GLUCOSE SERPL-MCNC: 110 MG/DL (ref 70–105)
HCT VFR BLD CALC: 38 % (ref 35–52)
HGB BLD-MCNC: 13.2 G/DL (ref 11.5–16)
LYMPHOCYTES # BLD AUTO: 2.8 X 10^3 (ref 1–4)
LYMPHOCYTES NFR BLD AUTO: 39 % (ref 12–44)
MANUAL DIFFERENTIAL PERFORMED BLD QL: NO
MCH RBC QN AUTO: 31 PG (ref 25–34)
MCHC RBC AUTO-ENTMCNC: 35 G/DL (ref 32–36)
MCV RBC AUTO: 88 FL (ref 80–99)
MONOCYTES # BLD AUTO: 0.5 X 10^3 (ref 0–1)
MONOCYTES NFR BLD AUTO: 7 % (ref 0–12)
NEUTROPHILS # BLD AUTO: 3.5 X 10^3 (ref 1.8–7.8)
NEUTROPHILS NFR BLD AUTO: 49 % (ref 42–75)
PLATELET # BLD: 258 10^3/UL (ref 130–400)
PMV BLD AUTO: 9.9 FL (ref 7.4–10.4)
POTASSIUM SERPL-SCNC: 2.8 MMOL/L (ref 3.6–5)
PROT SERPL-MCNC: 6.5 GM/DL (ref 6.4–8.2)
SODIUM SERPL-SCNC: 139 MMOL/L (ref 135–145)
WBC # BLD AUTO: 7.2 10^3/UL (ref 4.3–11)

## 2020-01-11 PROCEDURE — 87804 INFLUENZA ASSAY W/OPTIC: CPT

## 2020-01-11 PROCEDURE — 93005 ELECTROCARDIOGRAM TRACING: CPT

## 2020-01-11 PROCEDURE — 83880 ASSAY OF NATRIURETIC PEPTIDE: CPT

## 2020-01-11 PROCEDURE — 85025 COMPLETE CBC W/AUTO DIFF WBC: CPT

## 2020-01-11 PROCEDURE — 94640 AIRWAY INHALATION TREATMENT: CPT

## 2020-01-11 PROCEDURE — 80053 COMPREHEN METABOLIC PANEL: CPT

## 2020-01-11 PROCEDURE — 83605 ASSAY OF LACTIC ACID: CPT

## 2020-01-11 PROCEDURE — 71046 X-RAY EXAM CHEST 2 VIEWS: CPT

## 2020-01-11 PROCEDURE — 84484 ASSAY OF TROPONIN QUANT: CPT

## 2020-01-11 PROCEDURE — 36415 COLL VENOUS BLD VENIPUNCTURE: CPT

## 2020-01-11 PROCEDURE — 93041 RHYTHM ECG TRACING: CPT

## 2020-01-11 NOTE — DIAGNOSTIC IMAGING REPORT
INDICATION: Chest pain.



COMPARISON: 07/22/2019.



EXAMINATION: Frontal and lateral radiographic views of the chest

were obtained.



FINDINGS: Normal heart size and pulmonary vascularity. The lungs

are clear. There are no signs of infiltrate, pleural effusions or

pneumothoraces. The visualized osseous structures show no acute

abnormality. Sternotomy wires and left-sided subclavian

Port-A-Cath are noted.



IMPRESSION: No acute process. No signs of infiltrates, effusions

or pneumothoraces.



Dictated by: 



  Dictated on workstation # [id number]

## 2020-01-11 NOTE — ED CARDIAC GENERAL
History of Present Illness


General


Chief Complaint:  Chest Pain


Stated Complaint:  CHEST HEAVINESS,HEADACHE


Nursing Triage Note:  


Patient reports chest pain starting at 1700 with headache and pain down L arm. 


patient reports not feeling right


Source:  patient


Exam Limitations:  no limitations





History of Present Illness


Date Seen by Provider:  2020


Time Seen by Provider:  19:39


Initial Comments


49-year-old female presents with some chest tightness this started around 5 PM. 

She states reports that she's got a headache, some generalized malaise, that she

doesn't feel right. She had a little bit of pain down her left arm. Patient has 

a port because she was "a hard stick" when she got really sick one time. Patient

reports that she continues to smoke. Patient also has a history of anxiety. 

Patient denies any nausea, vomiting, shortness of breath.





Allergies and Home Medications


Allergies


Coded Allergies:  


     morphine (Verified  Allergy, Unknown, RASH - TAKES LORTAB AT HOME WITH NO 

ISSUES, 16)





Home Medications


Clopidogrel Bisulfate 75 Mg Tablet, 75 MG PO HS, (Reported)


Gabapentin 300 Mg Capsule, 300 MG PO HS, (Reported)


Ondansetron 4 Mg Tab.rapdis, 4 MG PO Q6H PRN for NAUSEA/VOMITING


   Prescribed by: TODD DE LEON on 19 1140


Prednisone 20 Mg Tab, 40 MG PO DAILY


   Prescribed by: TODD DE LEON on 19 1140





Patient Home Medication List


Home Medication List Reviewed:  Yes





Review of Systems


Review of Systems


Constitutional:  No chills, No fever; malaise


EENTM:  No Symptoms Reported


Respiratory:  See HPI


Cardiovascular:  See HPI


Musculoskeletal:  see HPI


Skin:  no symptoms reported


Psychiatric/Neurological:  Anxiety





Past Medical-Social-Family Hx


Past Med/Social Hx:  Reviewed Nursing Past Med/Soc Hx


Patient Social History


Alcohol Use:  Denies Use


Recreational Drug Use:  No


Type Used:  Cigarettes


2nd Hand Smoke Exposure:  Yes


Recent Foreign Travel:  No


Contact w/Someone Who Travel:  No


Recent Infectious Disease Expo:  No


Recent Hopitalizations:  No





Immunizations Up To Date


Tetanus Booster (TDap):  Unknown


PED Vaccines UTD:  No


Date of Pneumonia Vaccine:  2016


Date of Influenza Vaccine:  Dec 7, 2018





Seasonal Allergies


Seasonal Allergies:  No





Past Medical History


Surgeries:  Yes (left total knee 2015, cabgX1, port, STENTS X2)


CABG, Hysterectomy, Open Heart Surgery, Orthopedic


Respiratory:  Yes


Pneumonia, Chronic Bronchitis


Currently Using CPAP:  No


Currently Using BIPAP:  No


Cardiac:  Yes (CABG)


Coronary Artery Disease, Hypertension


Neurological:  Yes


Headaches /Migraines


Reproductive Disorders:  No


Female Reproductive Disorders:  Denies


GYN History:  Hysterectomy


Sexually Transmitted Disease:  No


HIV/AIDS:  No


Genitourinary:  No


Gastrointestinal:  Yes


Colitis, Gastroesophageal Reflux, Diverticulosis, Hemorrhoids, Chronic Diarrhea,

Ulcer, Irritable Bowel


Musculoskeletal:  Yes (FRACTURE LEFT WRIST)


Degenerate Disk Disease, Arthritis, Chronic Back Pain, Fractures


Endocrine:  No


HEENT:  No


Loss of Vision:  Denies


Hearing Impairment:  Denies


Cancer:  No


Psychosocial:  Yes


Sleep Difficulties, Anxiety, Depression


Integumentary:  No


Blood Disorders:  No


Adverse Reaction/Blood Tranf:  No





Family Medical History





Cancer (brain and back)


  G8 SISTER


  G8 SISTER


Cataracts


  19 MOTHER


Headache disorder


  G8 SISTER


Hypercholesterolemia


  19 FATHER


Hypertension


  19 FATHER


Myocardial infarction


  19 FATHER


  19 FATHER


Myocardial infarction ( from MI at 55


)


  19 FATHER


  19 FATHER


Neoplasm


  G8 SISTER


Psychosocial problem


  G8 SISTER


Visual disorder


  G8 SISTER





No Family History of:


  AIDS


  Abdominal aortic aneurysm


  [place]'s disease


  Alcoholism


  Alzheimer's disease


  [place]asia


  Arthritis


  Asthma


  Cancer of mouth


  Cardiovascular disease


  Colon cancer


  Completed stroke


  Congenital disease


  Congenital heart disease


  Coronary thrombosis


  Cystic fibrosis


  Deafness or hearing loss


  Dementia


  Diabetes mellitus


  Drug abuse


  Dysphasia


  Fibrocystic disease of breast


  Gastroenteritis


  Glaucoma


  Infertility


  Kidney disease


  Not obtainable due to adoption


  Osteoporosis


  Parkinson's disease


  Prostate cancer


  Respiratory disorder


  Seizure disorder


  Severe allergy


  Thyroid disease


  Tuberculosis


Cancer





Physical Exam


Vital Signs





Vital Signs - First Documented








 20





 19:36


 


Temp 36.8


 


Pulse 79


 


Resp 23


 


B/P (MAP) 130/78 (95)


 


Pulse Ox 99


 


O2 Delivery Room Air





Capillary Refill : Less Than 3 Seconds


Height, Weight, BMI


Height: 5'2.00"


Weight: 115lbs. 7.0oz. 52.004948ps; 20.00 BMI


Method:Stated


General Appearance:  No Apparent Distress, WD/WN


HEENT:  PERRL/EOMI


Neck:  Non Tender, Supple


Respiratory:  No Accessory Muscle Use, Wheezing (mild diffuse)


Cardiovascular:  Regular Rate, Rhythm, No Edema, Normal Peripheral Pulses


Gastrointestinal:  Non Tender, Soft


Extremity:  Normal Capillary Refill, Normal Inspection


Neurologic/Psychiatric:  Alert, Oriented x3, No Motor/Sensory Deficits, CNs II-

XII Norm as Tested, Other (anxious)


Skin:  Normal Color, Warm/Dry; No Diaphoresis





Focused Exam


Lactate Level


20 21:35: Lactic Acid Level 0.65





Lactic Acid Level





Laboratory Tests








Test


 20


21:35


 


Lactic Acid Level


 0.65 MMOL/L


(0.50-2.00)











Progress/Results/Core Measures


Results/Orders


Lab Results





Laboratory Tests








Test


 20


19:35 20


21:35 Range/Units


 


 


White Blood Count


 7.2 


 


 4.3-11.0


10^3/uL


 


Red Blood Count


 4.30 L


 


 4.35-5.85


10^6/uL


 


Hemoglobin 13.2   11.5-16.0  G/DL


 


Hematocrit 38   35-52  %


 


Mean Corpuscular Volume 88   80-99  FL


 


Mean Corpuscular Hemoglobin 31   25-34  PG


 


Mean Corpuscular Hemoglobin


Concent 35 


 


 32-36  G/DL





 


Red Cell Distribution Width 13.4   10.0-14.5  %


 


Platelet Count


 258 


 


 130-400


10^3/uL


 


Mean Platelet Volume 9.9   7.4-10.4  FL


 


Neutrophils (%) (Auto) 49   42-75  %


 


Lymphocytes (%) (Auto) 39   12-44  %


 


Monocytes (%) (Auto) 7   0-12  %


 


Eosinophils (%) (Auto) 4   0-10  %


 


Basophils (%) (Auto) 1   0-10  %


 


Neutrophils # (Auto) 3.5   1.8-7.8  X 10^3


 


Lymphocytes # (Auto) 2.8   1.0-4.0  X 10^3


 


Monocytes # (Auto) 0.5   0.0-1.0  X 10^3


 


Eosinophils # (Auto)


 0.3 


 


 0.0-0.3


10^3/uL


 


Basophils # (Auto)


 0.1 


 


 0.0-0.1


10^3/uL


 


Sodium Level 139   135-145  MMOL/L


 


Potassium Level 2.8 L  3.6-5.0  MMOL/L


 


Chloride Level 107     MMOL/L


 


Carbon Dioxide Level 20 L  21-32  MMOL/L


 


Anion Gap 12   5-14  MMOL/L


 


Blood Urea Nitrogen 7   7-18  MG/DL


 


Creatinine


 0.85 


 


 0.60-1.30


MG/DL


 


Estimat Glomerular Filtration


Rate > 60 


 


  





 


BUN/Creatinine Ratio 8    


 


Glucose Level 110 H    MG/DL


 


Calcium Level 8.2 L  8.5-10.1  MG/DL


 


Corrected Calcium 8.3 L  8.5-10.1  MG/DL


 


Total Bilirubin 0.2   0.1-1.0  MG/DL


 


Aspartate Amino Transf


(AST/SGOT) 10 


 


 5-34  U/L





 


Alanine Aminotransferase


(ALT/SGPT) 7 


 


 0-55  U/L





 


Alkaline Phosphatase 80     U/L


 


Troponin I < 0.028  < 0.028  <0.028  NG/ML


 


B-Type Natriuretic Peptide 31.8   <100.0  PG/ML


 


Total Protein 6.5   6.4-8.2  GM/DL


 


Albumin 3.9   3.2-4.5  GM/DL


 


Lactic Acid Level


 


 0.65 


 0.50-2.00


MMOL/L








Micro Results





Microbiology


20 Influenza Types A,B Antigen (ONELIA) - Final, Complete


          





My Orders





Orders - STEPHANIE PATTEN DO


BNP (20 19:42)


Cbc With Automated Diff (20 19:42)


Comprehensive Metabolic Panel (20 19:42)


Lactic Acid Analyzer (20 19:42)


Troponin I (20 19:42)


Influenza A And B Antigens (20 19:42)


Ekg Tracing (20 19:42)


Monitor-Rhythm Ecg Trace Only (20 19:42)


Chest Pa/Lat (2 View) (20 19:42)


Albuterol/Ipra Inhalation Soln (Duoneb I (20 19:45)


Svn Small Volume Nebulizer (20 19:42)


Potassium Chloride (Tablet) (K Dur Table (20 20:15)


Ondansetron Injection (Zofran Injectio (20 20:45)


Ketorolac Injection (Toradol Injection) (20 21:22)


Ekg Tracing (20 21:34)


Troponin I (20 21:34)





Medications Given in ED





Current Medications








 Medications  Dose


 Ordered  Sig/Elena


 Route  Start Time


 Stop Time Status Last Admin


Dose Admin


 


 Albuterol/


 Ipratropium  3 ml  ONCE  ONCE


 INH  20 19:45


 20 19:46 DC 20 20:30


3 ML


 


 Ondansetron HCl  4 mg  ONCE  ONCE


 IVP  20 20:45


 20 20:46 DC 20 20:50


4 MG


 


 Potassium Chloride  20 meq  ONCE  ONCE


 PO  20 20:15


 20 20:16 DC 20 21:29


20 MEQ








Vital Signs/I&O











 20





 19:36 19:36 20:30


 


Temp  36.8 


 


Pulse  79 


 


Resp  23 


 


B/P (MAP)  130/78 (95) 


 


Pulse Ox  99 97


 


O2 Delivery Room Air  Room Air














Blood Pressure Mean:                    95











Progress


Progress Note :  


   Time:  22:12


Progress Note


Patient's symptoms improved signally with a breathing treatment. Patient with 2 

negative EKGs and 2 negative troponins. Patient's symptoms much more consistent 

with a bronchitis. I will discharge her with a prescription for azithromycin and

steroids. She should follow-up in 2-3 days with her primary care provider for 

further evaluation. She should return to the ER as needed





Departure


Impression





   Primary Impression:  


   Acute bronchitis


   Qualified Codes:  J20.9 - Acute bronchitis, unspecified


Disposition:  01 HOME, SELF-CARE


Condition:  Stable





Departure-Patient Inst.


Referrals:  


SURENDRA FRASER MD (PCP/Family)


Primary Care Physician


Patient Instructions:  Acute Bronchitis, Adult (DC)





Add. Discharge Instructions:  


Emergency department focuses on treating and ruling out life-threatening 

diseases. Whenever possible, a diagnosis is given. However, most patients are 

given an impression based on their  history, physical exam, and workup during 

your brief time in the ER. Information about probable diagnosis and other 

educational material has been provided. Please take the time to read and 

understand this information.





It is very important that you follow up with a physician as discussed during the

visit today. Failure to adhere to your follow-up instructions may lead to severe

disability, injury, or death so please make sure to keep your appointments or 

obtain  one as requested. Please keep in mind the emergency department is not 

designed to your primary care or "family doctor" and nonurgent issues are best 

evaluated by an outpatient physician





All discharge instructions reviewed with patient and/or family. Voiced 

understanding.


Scripts


Prednisone (Prednisone) 20 Mg Tab


40 MG PO DAILY, #6 TAB 0 Refills


   Prov: STEPHANIE PATTEN DO         20 


Azithromycin (Azithromycin) 250 Mg Tablet


250 MG PO UD, #6 TAB


   TAKE 2 TABLETS ON DAY ONE THEN TAKE 1 TABLET DAILY FOR FOUR MORE DAYS


   Prov: STEPHANIE PATTEN DO         20











STEPHANIE PATTEN DO               2020 19:42

## 2020-09-29 ENCOUNTER — HOSPITAL ENCOUNTER (OUTPATIENT)
Dept: HOSPITAL 75 - RAD | Age: 50
End: 2020-09-29
Attending: PHYSICIAN ASSISTANT
Payer: COMMERCIAL

## 2020-09-29 DIAGNOSIS — M54.2: Primary | ICD-10-CM

## 2020-09-29 DIAGNOSIS — M54.5: ICD-10-CM

## 2020-09-29 DIAGNOSIS — R20.0: ICD-10-CM

## 2020-09-29 PROCEDURE — 72040 X-RAY EXAM NECK SPINE 2-3 VW: CPT

## 2020-09-29 PROCEDURE — 72100 X-RAY EXAM L-S SPINE 2/3 VWS: CPT

## 2020-09-29 NOTE — DIAGNOSTIC IMAGING REPORT
EXAMINATION:

Cervical spine at 12:23 PM. 



INDICATION: 

Neck pain.



TECHNIQUE: 

Three views were obtained.



FINDINGS:

The lateral view shows slight exaggeration of the normal lordosis

of the cervical spine. The vertebral body heights and alignment

are generally within normal limits and the intervertebral spaces

are fairly well-maintained. These findings are similar to the MRI

cervical spine exam of 04/04/2016.



There is no fracture or acute bony abnormality appreciated.



There is no sign of retropharyngeal edema. The lung apices are

clear.



IMPRESSION: 

1. There is no evidence for an acute bony abnormality.



2. The previous MRI exam showed mild degenerative disc disease

but failed to reveal any evidence for spinal stenosis or nerve

root encroachment; however, if clinical concern regarding spinal

stenosis or nerve root encroachment persists, then a repeat MRI

exam would be recommended for further study.



Dictated by: 



  Dictated on workstation # [id number]

## 2020-09-29 NOTE — DIAGNOSTIC IMAGING REPORT
EXAMINATION: Lumbar spine at 12:24 p.m.



INDICATION: Back pain and numbness.



FINDINGS: Three views were obtained.



The lateral view shows the vertebral body heights and alignment

to be within normal limits. The intervertebral disc spaces are

fairly well maintained. These findings are similar to the MRI

lumbar spine exam of 03/28/2017. The MRI exam failed to show any

sign of spinal stenosis or nerve root encroachment. There is no

fracture or acute bony abnormality identified. There is no sign

of a paraspinal mass. The sacroiliac joints are not well

visualized on this exam.



IMPRESSION:

1. There is no evidence for an acute bony abnormality.

2. If clinical concern regarding an underlying abnormality

persists and further imaging is desired, then MRI would be

recommended.



Dictated by: 



  Dictated on workstation # [id number]

## 2021-03-12 ENCOUNTER — HOSPITAL ENCOUNTER (OUTPATIENT)
Dept: HOSPITAL 75 - ER | Age: 51
Setting detail: OBSERVATION
LOS: 1 days | Discharge: HOME | End: 2021-03-13
Attending: FAMILY MEDICINE | Admitting: FAMILY MEDICINE
Payer: SELF-PAY

## 2021-03-12 VITALS — SYSTOLIC BLOOD PRESSURE: 160 MMHG | DIASTOLIC BLOOD PRESSURE: 88 MMHG

## 2021-03-12 VITALS — SYSTOLIC BLOOD PRESSURE: 145 MMHG | DIASTOLIC BLOOD PRESSURE: 76 MMHG

## 2021-03-12 VITALS — BODY MASS INDEX: 21.91 KG/M2 | WEIGHT: 119.05 LBS | HEIGHT: 61.81 IN

## 2021-03-12 VITALS — SYSTOLIC BLOOD PRESSURE: 118 MMHG | DIASTOLIC BLOOD PRESSURE: 67 MMHG

## 2021-03-12 DIAGNOSIS — K28.9: ICD-10-CM

## 2021-03-12 DIAGNOSIS — F17.210: ICD-10-CM

## 2021-03-12 DIAGNOSIS — Z80.8: ICD-10-CM

## 2021-03-12 DIAGNOSIS — Z87.81: ICD-10-CM

## 2021-03-12 DIAGNOSIS — Z88.5: ICD-10-CM

## 2021-03-12 DIAGNOSIS — M50.20: ICD-10-CM

## 2021-03-12 DIAGNOSIS — Z79.82: ICD-10-CM

## 2021-03-12 DIAGNOSIS — J44.9: ICD-10-CM

## 2021-03-12 DIAGNOSIS — M19.90: ICD-10-CM

## 2021-03-12 DIAGNOSIS — I10: ICD-10-CM

## 2021-03-12 DIAGNOSIS — J18.9: ICD-10-CM

## 2021-03-12 DIAGNOSIS — Z79.02: ICD-10-CM

## 2021-03-12 DIAGNOSIS — R07.2: Primary | ICD-10-CM

## 2021-03-12 DIAGNOSIS — K21.9: ICD-10-CM

## 2021-03-12 DIAGNOSIS — M54.9: ICD-10-CM

## 2021-03-12 DIAGNOSIS — Z90.710: ICD-10-CM

## 2021-03-12 DIAGNOSIS — K57.90: ICD-10-CM

## 2021-03-12 DIAGNOSIS — Z98.890: ICD-10-CM

## 2021-03-12 DIAGNOSIS — Z79.2: ICD-10-CM

## 2021-03-12 DIAGNOSIS — K58.0: ICD-10-CM

## 2021-03-12 DIAGNOSIS — Z95.1: ICD-10-CM

## 2021-03-12 DIAGNOSIS — G89.29: ICD-10-CM

## 2021-03-12 DIAGNOSIS — I25.10: ICD-10-CM

## 2021-03-12 LAB
ALBUMIN SERPL-MCNC: 3.8 GM/DL (ref 3.2–4.5)
ALP SERPL-CCNC: 68 U/L (ref 40–136)
ALT SERPL-CCNC: 8 U/L (ref 0–55)
APTT BLD: 32 SEC (ref 24–35)
BASOPHILS # BLD AUTO: 0.1 10^3/UL (ref 0–0.1)
BASOPHILS NFR BLD AUTO: 1 % (ref 0–10)
BILIRUB SERPL-MCNC: 0.2 MG/DL (ref 0.1–1)
BUN/CREAT SERPL: 11
CALCIUM SERPL-MCNC: 8.1 MG/DL (ref 8.5–10.1)
CHLORIDE SERPL-SCNC: 104 MMOL/L (ref 98–107)
CK MB SERPL-MCNC: 1.1 NG/ML (ref ?–6.6)
CO2 SERPL-SCNC: 25 MMOL/L (ref 21–32)
CREAT SERPL-MCNC: 0.97 MG/DL (ref 0.6–1.3)
EOSINOPHIL # BLD AUTO: 0.2 10^3/UL (ref 0–0.3)
EOSINOPHIL NFR BLD AUTO: 3 % (ref 0–10)
GFR SERPLBLD BASED ON 1.73 SQ M-ARVRAT: 60 ML/MIN
GLUCOSE SERPL-MCNC: 129 MG/DL (ref 70–105)
HCT VFR BLD CALC: 38 % (ref 35–52)
HGB BLD-MCNC: 12.7 G/DL (ref 11.5–16)
INR PPP: 1 (ref 0.8–1.4)
LYMPHOCYTES # BLD AUTO: 2.5 10^3/UL (ref 1–4)
LYMPHOCYTES NFR BLD AUTO: 37 % (ref 12–44)
MAGNESIUM SERPL-MCNC: 2.2 MG/DL (ref 1.6–2.4)
MANUAL DIFFERENTIAL PERFORMED BLD QL: NO
MCH RBC QN AUTO: 30 PG (ref 25–34)
MCHC RBC AUTO-ENTMCNC: 33 G/DL (ref 32–36)
MCV RBC AUTO: 90 FL (ref 80–99)
MONOCYTES # BLD AUTO: 0.5 10^3/UL (ref 0–1)
MONOCYTES NFR BLD AUTO: 8 % (ref 0–12)
NEUTROPHILS # BLD AUTO: 3.3 10^3/UL (ref 1.8–7.8)
NEUTROPHILS NFR BLD AUTO: 50 % (ref 42–75)
PLATELET # BLD: 206 10^3/UL (ref 130–400)
PMV BLD AUTO: 10.1 FL (ref 9–12.2)
POTASSIUM SERPL-SCNC: 3.3 MMOL/L (ref 3.6–5)
PROT SERPL-MCNC: 6.3 GM/DL (ref 6.4–8.2)
PROTHROMBIN TIME: 13.5 SEC (ref 12.2–14.7)
SODIUM SERPL-SCNC: 139 MMOL/L (ref 135–145)
WBC # BLD AUTO: 6.6 10^3/UL (ref 4.3–11)

## 2021-03-12 PROCEDURE — 83690 ASSAY OF LIPASE: CPT

## 2021-03-12 PROCEDURE — 82553 CREATINE MB FRACTION: CPT

## 2021-03-12 PROCEDURE — 36415 COLL VENOUS BLD VENIPUNCTURE: CPT

## 2021-03-12 PROCEDURE — 85610 PROTHROMBIN TIME: CPT

## 2021-03-12 PROCEDURE — 80061 LIPID PANEL: CPT

## 2021-03-12 PROCEDURE — 93041 RHYTHM ECG TRACING: CPT

## 2021-03-12 PROCEDURE — 93005 ELECTROCARDIOGRAM TRACING: CPT

## 2021-03-12 PROCEDURE — 83880 ASSAY OF NATRIURETIC PEPTIDE: CPT

## 2021-03-12 PROCEDURE — 83735 ASSAY OF MAGNESIUM: CPT

## 2021-03-12 PROCEDURE — 84484 ASSAY OF TROPONIN QUANT: CPT

## 2021-03-12 PROCEDURE — 71045 X-RAY EXAM CHEST 1 VIEW: CPT

## 2021-03-12 PROCEDURE — 85025 COMPLETE CBC W/AUTO DIFF WBC: CPT

## 2021-03-12 PROCEDURE — 99284 EMERGENCY DEPT VISIT MOD MDM: CPT

## 2021-03-12 PROCEDURE — 83874 ASSAY OF MYOGLOBIN: CPT

## 2021-03-12 PROCEDURE — 85730 THROMBOPLASTIN TIME PARTIAL: CPT

## 2021-03-12 PROCEDURE — 80053 COMPREHEN METABOLIC PANEL: CPT

## 2021-03-12 RX ADMIN — SODIUM CHLORIDE AND POTASSIUM CHLORIDE SCH MLS/HR: 9; 1.49 INJECTION, SOLUTION INTRAVENOUS at 23:27

## 2021-03-12 NOTE — DIAGNOSTIC IMAGING REPORT
INDICATION:  Chest pain.  



TECHNIQUE:  Single view chest 7:50 PM.



CORRELATION STUDY:  01/11/2020



FINDINGS: 

Poststernotomy changes. Left subclavian Woplcm-o-Xeop catheter

tip over the SVC, stable. Heart size, mediastinum, and

vasculature appear unchanged. Hyperinflated lung fields with

chronic change about the lung parenchyma, stable. No infiltrate.



IMPRESSION: 

1. Stable appearance of the portable chest. No acute abnormality.



Dictated by: 



  Dictated on workstation # [id number]

## 2021-03-12 NOTE — ED CHEST PAIN
General


Chief Complaint:  Chest Pain


Stated Complaint:  CHEST PAIN , L ARM NUMBNESS


Source:  patient


Exam Limitations:  no limitations





History of Present Illness


Date Seen by Provider:  Mar 12, 2021


Time Seen by Provider:  19:40


Initial Comments


This is a well-appearing 48-year-old female presents to the ER with complaints 

of midsternal chest pain that started approximate 1 hour ago. States she was 

sitting down eating her dinner when pain started.  Describes as heaviness in her

chest.  States she took a nitro tablets x1 and a baby aspirin.  Reported mild 

improvement after initial dose of nitro, however pain returned and she sought ED

evaluation due to persistent chest pain. Denies nausea, vomiting.  Does report 

some sweating associated with symptoms.  Additionally states that she has some 

numbness going down her left arm that has been a currently intermittently 

throughout the day.  States she does have a history of cervical disc bulging, 

But is concerned it could be also associated with her cardiac symptoms. 

Currently radiating discomfort 7/10, and is localized to her mid 

chest/epigastric region. Denies fevers, chills, cough, shortness of breath, 

nausea, vomiting, abdominal pain.





Allergies and Home Medications


Allergies


Coded Allergies:  


     morphine (Verified  Allergy, Unknown, RASH - TAKES LORTAB AT HOME WITH NO 

ISSUES, 16)





Home Medications


Azithromycin 250 Mg Tablet, 250 MG PO UD


   TAKE 2 TABLETS ON DAY ONE THEN TAKE 1 TABLET DAILY FOR FOUR MORE DAYS 


   Prescribed by: STEPHANIE PATTEN on 20


Clopidogrel Bisulfate 75 Mg Tablet, 75 MG PO HS, (Reported)


Gabapentin 300 Mg Capsule, 300 MG PO HS, (Reported)


Ondansetron 4 Mg Tab.rapdis, 4 MG PO Q6H PRN for NAUSEA/VOMITING


   Prescribed by: TODD DE LEON on 19 114


Prednisone 20 Mg Tab, 40 MG PO DAILY


   Prescribed by: TODD DE LEON on 19 1140


Prednisone 20 Mg Tab, 40 MG PO DAILY


   Prescribed by: STEPHANIE PATTEN on 20





Patient Home Medication List


Home Medication List Reviewed:  Yes





Review of Systems


Review of Systems


Constitutional:  no symptoms reported


EENTM:  No Symptoms Reported


Respiratory:  No Symptoms Reported


Cardiovascular:  See HPI


Gastrointestinal:  See HPI


Genitourinary:  No Symptoms Reported


Musculoskeletal:  no symptoms reported


Skin:  no symptoms reported


Psychiatric/Neurological:  No Symptoms Reported


Endocrine:  No Symptoms Reported


Hematologic/Lymphatic:  No Symptoms Reported





Past Medical-Social-Family Hx


Patient Social History


Type Used:  Cigarettes


2nd Hand Smoke Exposure:  Yes


Recent Hopitalizations:  No





Immunizations Up To Date


Tetanus Booster (TDap):  Unknown


PED Vaccines UTD:  No


Date of Pneumonia Vaccine:  2016


Date of Influenza Vaccine:  Dec 7, 2018





Seasonal Allergies


Seasonal Allergies:  No





Past Medical History


Surgeries:  Yes (left total knee 2015, cabgX1, port, STENTS X2)


CABG, Hysterectomy, Open Heart Surgery, Orthopedic


Respiratory:  Yes


Pneumonia, Chronic Bronchitis


Currently Using CPAP:  No


Currently Using BIPAP:  No


Cardiac:  Yes (CABG)


Coronary Artery Disease, Hypertension


Neurological:  Yes


Headaches /Migraines


Reproductive Disorders:  No


Female Reproductive Disorders:  Denies


GYN History:  Hysterectomy


Sexually Transmitted Disease:  No


HIV/AIDS:  No


Genitourinary:  No


Gastrointestinal:  Yes


Colitis, Gastroesophageal Reflux, Diverticulosis, Hemorrhoids, Chronic Diarrhea,

Ulcer, Irritable Bowel


Musculoskeletal:  Yes (FRACTURE LEFT WRIST)


Degenerate Disk Disease, Arthritis, Chronic Back Pain, Fractures


Endocrine:  No


HEENT:  No


Loss of Vision:  Denies


Hearing Impairment:  Denies


Cancer:  No


Psychosocial:  Yes


Sleep Difficulties, Anxiety, Depression


Integumentary:  No


Blood Disorders:  No


Adverse Reaction/Blood Tranf:  No





Family Medical History





Cancer (brain and back)


  G8 SISTER


  G8 SISTER


Cataracts


  19 MOTHER


Headache disorder


  G8 SISTER


Hypercholesterolemia


  19 FATHER


Hypertension


  19 FATHER


Myocardial infarction


  19 FATHER


  19 FATHER


Myocardial infarction ( from MI at 55


)


  19 FATHER


  19 FATHER


Neoplasm


  G8 SISTER


Psychosocial problem


  G8 SISTER


Visual disorder


  G8 SISTER





No Family History of:


  AIDS


  Abdominal aortic aneurysm


  Gilliam's disease


  Alcoholism


  Alzheimer's disease


  Aphasia


  Arthritis


  Asthma


  Cancer of mouth


  Cardiovascular disease


  Colon cancer


  Completed stroke


  Congenital disease


  Congenital heart disease


  Coronary thrombosis


  Cystic fibrosis


  Deafness or hearing loss


  Dementia


  Diabetes mellitus


  Drug abuse


  Dysphasia


  Fibrocystic disease of breast


  Gastroenteritis


  Glaucoma


  Infertility


  Kidney disease


  Not obtainable due to adoption


  Osteoporosis


  Parkinson's disease


  Prostate cancer


  Respiratory disorder


  Seizure disorder


  Severe allergy


  Thyroid disease


  Tuberculosis


Cancer





Physical Exam


Vital Signs





Vital Signs - First Documented




















Capillary Refill : Less Than 3 Seconds


Height, Weight, BMI


Height: 5'2.00"


Weight: 115lbs. 7.0oz. 52.171840ga; 20.00 BMI


Method:Stated


General Appearance:  No Apparent Distress, WD/WN


HEENT:  PERRL/EOMI, Normal ENT Inspection, Pharynx Normal, Moist Mucous 

Membranes


Neck:  Normal Inspection, Non Tender


Respiratory:  Lungs Clear, Normal Breath Sounds, No Accessory Muscle Use


Cardiovascular:  Regular Rate, Rhythm, No Edema, Normal Peripheral Pulses


Extremity:  Normal Capillary Refill, Normal Inspection, Normal Range of Motion, 

Non Tender


Neurologic/Psychiatric:  Alert, Oriented x3, No Motor/Sensory Deficits, Normal 

Mood/Affect


Skin:  Normal Color, Warm/Dry





Progress/Results/Core Measures


Results/Orders


Lab Results





Laboratory Tests








Test


 3/12/21


20:00 Range/Units


 


 


White Blood Count


 6.6 


 4.3-11.0


10^3/uL


 


Red Blood Count


 4.23 


 3.80-5.11


10^6/uL


 


Hemoglobin 12.7  11.5-16.0  g/dL


 


Hematocrit 38  35-52  %


 


Mean Corpuscular Volume 90  80-99  fL


 


Mean Corpuscular Hemoglobin 30  25-34  pg


 


Mean Corpuscular Hemoglobin


Concent 33 


 32-36  g/dL





 


Red Cell Distribution Width 13.5  10.0-14.5  %


 


Platelet Count


 206 


 130-400


10^3/uL


 


Mean Platelet Volume 10.1  9.0-12.2  fL


 


Immature Granulocyte % (Auto) 0   %


 


Neutrophils (%) (Auto) 50  42-75  %


 


Lymphocytes (%) (Auto) 37  12-44  %


 


Monocytes (%) (Auto) 8  0-12  %


 


Eosinophils (%) (Auto) 3  0-10  %


 


Basophils (%) (Auto) 1  0-10  %


 


Neutrophils # (Auto)


 3.3 


 1.8-7.8


10^3/uL


 


Lymphocytes # (Auto)


 2.5 


 1.0-4.0


10^3/uL


 


Monocytes # (Auto)


 0.5 


 0.0-1.0


10^3/uL


 


Eosinophils # (Auto)


 0.2 


 0.0-0.3


10^3/uL


 


Basophils # (Auto)


 0.1 


 0.0-0.1


10^3/uL


 


Immature Granulocyte # (Auto)


 0.0 


 0.0-0.1


10^3/uL


 


Prothrombin Time 13.5  12.2-14.7  SEC


 


INR Comment 1.0  0.8-1.4  


 


Activated Partial


Thromboplast Time 32 


 24-35  SEC





 


Sodium Level 139  135-145  MMOL/L


 


Potassium Level 3.3 L 3.6-5.0  MMOL/L


 


Chloride Level 104    MMOL/L


 


Carbon Dioxide Level 25  21-32  MMOL/L


 


Anion Gap 10  5-14  MMOL/L


 


Blood Urea Nitrogen 11  7-18  MG/DL


 


Creatinine


 0.97 


 0.60-1.30


MG/DL


 


Estimat Glomerular Filtration


Rate 60 


  





 


BUN/Creatinine Ratio 11   


 


Glucose Level 129 H   MG/DL


 


Calcium Level 8.1 L 8.5-10.1  MG/DL


 


Corrected Calcium 8.3 L 8.5-10.1  MG/DL


 


Magnesium Level 2.2  1.6-2.4  MG/DL


 


Total Bilirubin 0.2  0.1-1.0  MG/DL


 


Aspartate Amino Transf


(AST/SGOT) 11 


 5-34  U/L





 


Alanine Aminotransferase


(ALT/SGPT) 8 


 0-55  U/L





 


Alkaline Phosphatase 68    U/L


 


Creatine Kinase MB 1.1  <6.6  NG/ML


 


Myoglobin


 17.3 


 10.0-92.0


NG/ML


 


Troponin I < 0.028  <0.028  NG/ML


 


B-Type Natriuretic Peptide 42.8  <100.0  PG/ML


 


Total Protein 6.3 L 6.4-8.2  GM/DL


 


Albumin 3.8  3.2-4.5  GM/DL


 


Lipase 29  8-78  U/L








My Orders





Orders - JULITO ZARCO APRN


Cbc With Automated Diff (3/12/21 19:37)


Magnesium (3/12/21 19:37)


Chest 1 View, Ap/Pa Only (3/12/21 19:37)


Ekg Tracing (3/12/21 19:37)


Comprehensive Metabolic Panel (3/12/21 19:37)


Myoglobin Serum (3/12/21 19:37)


Protime With Inr (3/12/21 19:37)


Partial Thromboplastin Time (3/12/21 19:37)


O2 (3/12/21 19:37)


Monitor-Rhythm Ecg Trace Only (3/12/21 19:37)


Lipid Panel (3/13/21 06:00)


Ed Iv/Invasive Line Start (3/12/21 19:37)


BNP (3/12/21 19:37)


Troponin I (3/12/21 19:37)


Aspirin Chewable Tablet (Baby Aspirin Ch (3/12/21 19:45)


Nitroglycerin 0.4 Mg Btl 25's (Nitrostat (3/12/21 20:00)


Creatine Kinase Mb (3/12/21 19:50)


Enoxaparin Injection (Lovenox Injection) (3/12/21 21:00)


Nitroglycerin Ointment (Nitrobid Ointme (3/12/21 21:00)


Lipase (3/12/21 20:57)





Medications Given in ED





Current Medications








 Medications  Dose


 Ordered  Sig/Elena


 Route  Start Time


 Stop Time Status Last Admin


Dose Admin


 


 Aspirin  324 mg  ONCE  ONCE


 PO  3/12/21 19:45


 3/12/21 19:46 DC 3/12/21 19:43


324 MG


 


 Enoxaparin Sodium  50 mg  ONCE  ONCE


 SC  3/12/21 21:00


 3/12/21 21:01 DC 3/12/21 21:14


50 MG


 


 Nitroglycerin  1 BOTTLE  ONCE  ONCE


 SL  3/12/21 20:00


 3/12/21 20:01 DC 3/12/21 20:08


0.4 MG


 


 Nitroglycerin  1 inch  ONCE  ONCE


 TOP  3/12/21 21:00


 3/12/21 21:01 DC 3/12/21 21:14


1 INCH








Vital Signs/I&O











 3/12/21 3/12/21 3/12/21





 19:39 19:39 19:44


 


Temp 35.8  


 


Pulse 81  


 


Resp 18  


 


B/P (MAP) 160/88 (112)  


 


Pulse Ox 98 98 


 


O2 Delivery Room Air Room Air Room Air











Progress


Progress Note :  


Progress Note


Patient examined in no acute distress.  Vital signs stable.  Initial EKG shows 

no ST elevation. Orders initiated for cardiac work-up. Orders placed for aspirin

324 mg, and nitro 0.4 mg sublingual tablet.





Reported minimal improvement with nitro tablet, second tablet ordered. Chest x-

ray shows no acute diagnosis.  Labs reviewed and are unremarkable.  She is noted

to have slight hypokalemia with potassium 3.3.





Reports pain decreased to 2/10 after second nitro tablet.





Reviewed case with Dr. Leslie with cardiology, recommended observation admission 

and trend troponins.





Discussed case with Dr. Tai hospitalist, agreeable with observation admission 

and cardiology management. Will 





Reviewed recommendations with patient and family they are agreeable with plans 

at this time.  She will receive first dose of Lovenox 50mg subcut and Nitropaste

in ED. Noted to have missed lipase on initial orders, ordered lipase to rule out

any pancreatic involvement.


Initial ECG Impression Date:  Mar 12, 2021


Initial ECG Impression Time:  19:38


Initial ECG Rate:  81


Initial ECG Rhythm:  Normal Sinus





Diagnostic Imaging





   Diagonstic Imaging:  Xray


   Plain Films/CT/US/NM/MRI:  chest


Comments


NAME:   MARILIA JONES


MED REC#:   [id number]


ACCOUNT#:   [id number]


PT STATUS:   REG ER


:   1970


PHYSICIAN:   JULITO ZARCO


ADMIT DATE:   21/ER


***Signed***


Date of Exam:21





CHEST 1 VIEW, AP/PA ONLY








INDICATION:  Chest pain.  





TECHNIQUE:  Single view chest 7:50 PM.





CORRELATION STUDY:  2020





FINDINGS: 


Poststernotomy changes. Left subclavian Sgzewf-y-Futc catheter


tip over the SVC, stable. Heart size, mediastinum, and


vasculature appear unchanged. Hyperinflated lung fields with


chronic change about the lung parenchyma, stable. No infiltrate.





IMPRESSION: 


1. Stable appearance of the portable chest. No acute abnormality.





Dictated by: 





  Dictated on workstation # [id number]








Dict:   21


Trans:   21


CAITY [id number]





Interpreted by:     ADALBERTO WALTON DO


Electronically signed by: ADALBERTO WALTON DO 21


   Reviewed:  Reviewed by Me





Departure


Communication (Admissions)


Time/Spoke to Admitting Phy:  20:57


Case discussed with Dr. Tai, agreeable with observation admission.


Time/Spoke to Consulting Phy:  20:48


Case discussed with Dr. Leslie, requested observation admission for angina.  

Patient to received Lovenox 1 mg/kg twice daily, 325 mg of aspirin now, baby 

aspirin daily, lipid profile in a.m, and Nitro paste 1 inch.





Impression





   Primary Impression:  


   Chest pain


Disposition:   ADMITTED AS INPATIENT


Condition:  Stable





Admissions


Decision to Admit Reason:  Admit from ER (General)


Decision to Admit/Date:  Mar 12, 2021


Time/Decision to Admit Time:  20:30





Departure-Patient Inst.


Referrals:  


JOSE ZARCO (PCP/Family)


Primary Care Physician





Copy


Copies To 1:   DARREL CASTRO MD FACP FACC CCDS











JULITO ZARCO           Mar 12, 2021 19:51

## 2021-03-13 VITALS — DIASTOLIC BLOOD PRESSURE: 54 MMHG | SYSTOLIC BLOOD PRESSURE: 99 MMHG

## 2021-03-13 VITALS — DIASTOLIC BLOOD PRESSURE: 59 MMHG | SYSTOLIC BLOOD PRESSURE: 106 MMHG

## 2021-03-13 VITALS — SYSTOLIC BLOOD PRESSURE: 134 MMHG | DIASTOLIC BLOOD PRESSURE: 70 MMHG

## 2021-03-13 LAB
ALBUMIN SERPL-MCNC: 3.7 GM/DL (ref 3.2–4.5)
ALP SERPL-CCNC: 65 U/L (ref 40–136)
ALT SERPL-CCNC: 9 U/L (ref 0–55)
BASOPHILS # BLD AUTO: 0.1 10^3/UL (ref 0–0.1)
BASOPHILS NFR BLD AUTO: 1 % (ref 0–10)
BILIRUB SERPL-MCNC: 0.2 MG/DL (ref 0.1–1)
BUN/CREAT SERPL: 15
CALCIUM SERPL-MCNC: 8.4 MG/DL (ref 8.5–10.1)
CHLORIDE SERPL-SCNC: 107 MMOL/L (ref 98–107)
CHOLEST SERPL-MCNC: 128 MG/DL (ref ?–200)
CO2 SERPL-SCNC: 24 MMOL/L (ref 21–32)
CREAT SERPL-MCNC: 0.78 MG/DL (ref 0.6–1.3)
EOSINOPHIL # BLD AUTO: 0.1 10^3/UL (ref 0–0.3)
EOSINOPHIL NFR BLD AUTO: 2 % (ref 0–10)
GFR SERPLBLD BASED ON 1.73 SQ M-ARVRAT: > 60 ML/MIN
GLUCOSE SERPL-MCNC: 91 MG/DL (ref 70–105)
HCT VFR BLD CALC: 37 % (ref 35–52)
HDLC SERPL-MCNC: 37 MG/DL (ref 40–60)
HGB BLD-MCNC: 12.6 G/DL (ref 11.5–16)
LYMPHOCYTES # BLD AUTO: 2.5 10^3/UL (ref 1–4)
LYMPHOCYTES NFR BLD AUTO: 37 % (ref 12–44)
MANUAL DIFFERENTIAL PERFORMED BLD QL: NO
MCH RBC QN AUTO: 30 PG (ref 25–34)
MCHC RBC AUTO-ENTMCNC: 34 G/DL (ref 32–36)
MCV RBC AUTO: 90 FL (ref 80–99)
MONOCYTES # BLD AUTO: 0.5 10^3/UL (ref 0–1)
MONOCYTES NFR BLD AUTO: 8 % (ref 0–12)
NEUTROPHILS # BLD AUTO: 3.5 10^3/UL (ref 1.8–7.8)
NEUTROPHILS NFR BLD AUTO: 52 % (ref 42–75)
PLATELET # BLD: 196 10^3/UL (ref 130–400)
PMV BLD AUTO: 10.5 FL (ref 9–12.2)
POTASSIUM SERPL-SCNC: 3.8 MMOL/L (ref 3.6–5)
PROT SERPL-MCNC: 6 GM/DL (ref 6.4–8.2)
SODIUM SERPL-SCNC: 140 MMOL/L (ref 135–145)
TRIGL SERPL-MCNC: 98 MG/DL (ref ?–150)
VLDLC SERPL CALC-MCNC: 20 MG/DL (ref 5–40)
WBC # BLD AUTO: 6.6 10^3/UL (ref 4.3–11)

## 2021-03-13 RX ADMIN — GABAPENTIN SCH MG: 400 CAPSULE ORAL at 00:19

## 2021-03-13 RX ADMIN — GABAPENTIN SCH MG: 400 CAPSULE ORAL at 06:48

## 2021-03-13 RX ADMIN — GABAPENTIN SCH MG: 400 CAPSULE ORAL at 12:26

## 2021-03-13 RX ADMIN — NITROGLYCERIN SCH INCH: 20 OINTMENT TOPICAL at 00:55

## 2021-03-13 RX ADMIN — NITROGLYCERIN SCH INCH: 20 OINTMENT TOPICAL at 12:26

## 2021-03-13 RX ADMIN — NITROGLYCERIN SCH INCH: 20 OINTMENT TOPICAL at 06:48

## 2021-03-13 RX ADMIN — SODIUM CHLORIDE AND POTASSIUM CHLORIDE SCH MLS/HR: 9; 1.49 INJECTION, SOLUTION INTRAVENOUS at 11:53

## 2021-03-13 NOTE — DISCHARGE INST-SIMPLE/STANDARD
Discharge Inst-Standard


Discharge Medications


New, Converted or Re-Newed RX:  Transmitted to Pharmacy





Patient Instructions/Follow Up


Plan of Care/Instructions/FU:  


Please continue to take your medications as written. Please follow up with


your PA Amrik Holman and with Dr Logan in the next 1-2 weeks to follow


up this hospital stay. Continued smoking can increase your risk of further


heart disease and it is recommended that you quit smoking as soon as


possible.


Activity as Tolerated:  Yes


Discharge Diet:  Cardiac Diet


Return to The Hospital For:  


Chest pain, shortness of breath, fever, confusion, if you feel you are


getting worse.











PRITESH MOLINA MD              Mar 13, 2021 12:01

## 2021-03-13 NOTE — SHORT STAY SUMMARY-HOSPITALIST
History of Present Illness


HPI/Chief Complaint


Pt is a 50yoCF with a PMH of CAD s/p CABG and subsequent stenting who presented 

to the ER due to chest pain. She states it started at rest and described it as a

heaviness on her chest. She noticed numbness and tingling in her arms. The 

symptoms in her arms resolved with movement. She denied any nausea, diaphoresis,

or SOB with this. She states she has been under a lot of stress lately and 

believes her symptoms are due to that. She works as a CNA at a Nursing Home in 

[facility]. She was admitted for observation and serial troponins.


Source:  patient


Exam Limitations:  no limitations


Date Seen


3/13/21


Time Seen by a Provider:  11:59


Attending Physician


Pritesh Tai MD


PCP


Amrik Zarco


Referring Physician





Date of Admission


Mar 12, 2021 at 21:00





Home Medications & Allergies


Home Medications


Reviewed patient Home Medication Reconciliation performed by pharmacy medication

reconciliations technician and/or nursing.


Patients Allergies have been reviewed.





Allergies





Allergies


Coded Allergies


  morphine (Verified Allergy, Unknown, RASH - TAKES LORTAB AT HOME WITH NO 

ISSUES, 16)








Past Medical-Social-Family Hx


Past Med/Social Hx:  Reviewed Nursing Past Med/Soc Hx


Patient Social History


Employed/Student:  employed


Alcohol Use:  Denies Use


Recreational Drug Use:  No


Smoking Status:  Current Someday Smoker


Cigaretts per day:  7


Type Used:  Cigarettes


2nd Hand Smoke Exposure:  Yes


Recent Foreign Travel:  No


Contact w/other who traveled:  No


Recent Hopitalizations:  No


Recent Infectious Disease Expo:  No





Immunizations Up To Date


Tetanus Booster (TDap):  Unknown


Pediatric:  No


Date of Pneumonia Vaccine:  2016


Date of Influenza Vaccine:  2021





Seasonal Allergies


Seasonal Allergies:  No





Past Medical History


Surgeries:  CABG, Hysterectomy, Open Heart Surgery, Orthopedic


Respiratory:  Asthma, COPD


Currently Using CPAP:  No


Currently Using BIPAP:  No


Cardiac:  Coronary Artery Disease, Hypertension


Neurological:  Headaches /Migraines


Reproductive:  No


Sexually Transmitted Disease:  No


HIV/AIDS:  No


Female Reproductive Disorders:  Denies


Hysterectomy


Gastrointestinal:  Colitis, Gastroesophageal Reflux, Diverticulosis, 

Hemorrhoids, Chronic Diarrhea, Ulcer, Irritable Bowel


Musculoskeletal:  Degenerate Disk Disease, Arthritis, Chronic Back Pain, 

Fractures


Loss of Vision:  Denies


Hearing Impairment:  Denies


Psychosocial:  Sleep Difficulties, Anxiety, Depression


History of Blood Disorders:  No


Adverse Reaction to Blood Mcconnell:  No





Family History


Reviewed Nursing Family Hx





Cancer (brain and back)


  G8 SISTER


  G8 SISTER


Cataracts


  19 MOTHER


Headache disorder


  G8 SISTER


Hypercholesterolemia


  19 FATHER


Hypertension


  19 FATHER


Myocardial infarction


  19 FATHER


  19 FATHER


Myocardial infarction ( from MI at 55


)


  19 FATHER


  19 FATHER


Neoplasm


  G8 SISTER


Psychosocial problem


  G8 SISTER


Visual disorder


  G8 SISTER





No Family History of:


  AIDS


  Abdominal aortic aneurysm


  Friendship's disease


  Alcoholism


  Alzheimer's disease


  Aphasia


  Arthritis


  Asthma


  Cancer of mouth


  Cardiovascular disease


  Colon cancer


  Completed stroke


  Congenital disease


  Congenital heart disease


  Coronary thrombosis


  Cystic fibrosis


  Deafness or hearing loss


  Dementia


  Diabetes mellitus


  Drug abuse


  Dysphasia


  Fibrocystic disease of breast


  Gastroenteritis


  Glaucoma


  Infertility


  Kidney disease


  Not obtainable due to adoption


  Osteoporosis


  Parkinson's disease


  Prostate cancer


  Respiratory disorder


  Seizure disorder


  Severe allergy


  Thyroid disease


  Tuberculosis


Cancer





Review of Systems


Constitutional:  No chills, No fever, No malaise


EENTM:  no symptoms reported


Respiratory:  No cough, No dyspnea on exertion, No short of breath


Cardiovascular:  see HPI


Gastrointestinal:  No abdominal pain, No loss of appetite, No nausea, No 

vomiting


Genitourinary:  no symptoms reported


Musculoskeletal:  no symptoms reported


Skin:  no symptoms reported


Psychiatric/Neurological:  See HPI





Physical Exam


Physical Exam


Vital Signs





Vital Signs - First Documented








 3/12/21





 22:49


 


FiO2 21





Capillary Refill : Less Than 3 Seconds


Height, Weight, BMI


Height: 5'2.00"


Weight: 115lbs. 7.0oz. 52.007355gm; 21.90 BMI


Method:Stated


General Appearance:  No Apparent Distress, WD/WN


Eyes:  Bilateral Eye Normal Inspection, Bilateral Eye PERRL, Bilateral Eye EOMI


HEENT:  PERRL/EOMI, Moist Mucous Membranes; No Scleral Icterus (L), No Scleral 

Icterus (R)


Neck:  Normal Inspection, Supple


Respiratory:  Lungs Clear, No Accessory Muscle Use, No Respiratory Distress


Cardiovascular:  Regular Rate, Rhythm, No Murmur, Normal Peripheral Pulses


Gastrointestinal:  Normal Bowel Sounds, Non Tender, Soft


Extremity:  Normal Capillary Refill, Normal Inspection, Non Tender, No Pedal 

Edema


Neurologic/Psychiatric:  Alert, Oriented x3, Normal Mood/Affect


Skin:  Normal Color, Warm/Dry





Results


Results/Procedures


Labs


Laboratory Tests


3/12/21 20:00








3/13/21 02:15








Patient resulted labs reviewed.


Imaging:  Reviewed Imaging Report


Imaging


                 [facility], [place]





NAME:   MARILIA JONES


MED REC#:   [id number]


ACCOUNT#:   [id number]


PT STATUS:   REG ER


:   1970


PHYSICIAN:   JULITO ZARCO


ADMIT DATE:   21/ER


                                  ***Signed***


Date of Exam:21





CHEST 1 VIEW, AP/PA ONLY








INDICATION:  Chest pain.  





TECHNIQUE:  Single view chest 7:50 PM.





CORRELATION STUDY:  2020





FINDINGS: 


Poststernotomy changes. Left subclavian Zjswjq-x-Bfdf catheter


tip over the SVC, stable. Heart size, mediastinum, and


vasculature appear unchanged. Hyperinflated lung fields with


chronic change about the lung parenchyma, stable. No infiltrate.





IMPRESSION: 


1. Stable appearance of the portable chest. No acute abnormality.





Dictated by: 





  Dictated on workstation # [id number]








Dict:   21


Trans:   21


CAITY [id number]





Interpreted by:     ADALBERTO WALTON DO


Electronically signed by: ADALBERTO WALTON DO 21





Short Stay Diagnosis


Discharge Diagnosis-Short Stay


Admission Diagnosis


Chest pain


Final Discharge Diagnosis


Chest pain





Conclusion


Plan


Chest pain


CAD s/p CABG


Active tobacco use


   Serial troponins negative


   Cardiology consulted, appreciate recs


   Offered cath vs outpatient stress test and she requests DC home for 

outpatient work up


   Start on ASA


   Smoking cessation


   BP borderline, unable to start on betablocker


   Lipid Panel with LDL of 78- no indication for statin at this time


   DC home for outpatient stress





Diagnosis/Problems


Diagnosis/Problems





(1) Tobacco abuse


(2) CAD (coronary artery disease)


(3) Chest pain


Status:  Acute





Clinical Quality Measures


AMI/AHF:


ASA po Prior to arrival:  Yes (81mg asa 45min ago)











PRITESH TAI MD              Mar 13, 2021 11:58

## 2021-03-13 NOTE — CONSULTATION-CARDIOLOGY
HPI-Cardiology


Cardiology Consultation


Date of Consultation


3/13/21


Date of Admission





Time Seen by Provider:  10:20


Indication:  chest pain





HPI


50 years old lady with history of coronary artery disease, CABG 1, cardiac 

catheter in 2018 by Dr. Logan, started to have chest pain in the retrosternal 

area dull in nature, has been having neck pain and numbness in her arms.  Came 

to the emergency room and reported improvement with nitroglycerin, overnight she

has been feeling well, no further episodes of chest pain were reported.  No 

shortness of breath.  No palpitation, no syncope or near syncopal episodes





Home Medications & Allergies


Allergies:  


Coded Allergies:  


     morphine (Verified  Allergy, Unknown, RASH - TAKES LORTAB AT HOME WITH NO 

ISSUES, 16)


Home Medication List Reviewed:  Yes





PMH-Social-Family Hx


Patient Social History


Employed/Student:  employed


Recreational Drug Use:  No


Smoking Status:  Current Someday Smoker


Type Used:  Cigarettes


2nd Hand Smoke Exposure:  Yes


Recent Hopitalizations:  No


Have you traveled recently?:  No


Alcohol Use?:  No





Immunizations Up To Date


Tetanus Booster (TDap):  Unknown


Date of Pneumonia Vaccine:  2016


Date of Influenza Vaccine:  2021





Past Medical History


Discussed below





Family Medical History


Significant Family History:  Cancer


Family History:  


Cancer (brain and back)


  G8 SISTER


  G8 SISTER


Cataracts


  19 MOTHER


Headache disorder


  G8 SISTER


Hypercholesterolemia


  19 FATHER


Hypertension


  19 FATHER


Myocardial infarction


  19 FATHER


  19 FATHER


Myocardial infarction ( from MI at 55


)


  19 FATHER


  19 FATHER


Neoplasm


  G8 SISTER


Psychosocial problem


  G8 SISTER


Visual disorder


  G8 SISTER





No Family History of:


  AIDS


  Abdominal aortic aneurysm


  [place]'s disease


  Alcoholism


  Alzheimer's disease


  Aphasia


  Arthritis


  Asthma


  Cancer of mouth


  Cardiovascular disease


  Colon cancer


  Completed stroke


  Congenital disease


  Congenital heart disease


  Coronary thrombosis


  Cystic fibrosis


  Deafness or hearing loss


  Dementia


  Diabetes mellitus


  Drug abuse


  Dysphasia


  Fibrocystic disease of breast


  Gastroenteritis


  Glaucoma


  Infertility


  Kidney disease


  Not obtainable due to adoption


  Osteoporosis


  Parkinson's disease


  Prostate cancer


  Respiratory disorder


  Seizure disorder


  Severe allergy


  Thyroid disease


  Tuberculosis





Review of Systems-General


Review of Systems


Constitutional:  no symptoms reported


EENTM:  see HPI, no symptoms reported


Respiratory:  see HPI; No cough, No dyspnea on exertion, No hemoptysis, No 

orthopnea, No phlegm, No short of breath, No stridor, No wheezing, No other


Cardiovascular:  see HPI, chest pain; No edema, No Hx of Intervention, No 

palpitations, No syncope, No vascular heart diseas, No other


Gastrointestinal:  no symptoms reported, see HPI


Genitourinary:  no symptoms reported, see HPI


Musculoskeletal:  no symptoms reported, see HPI


Skin:  no symptoms reported, see HPI


Psychiatric/Neurological:  No Symptoms Reported, See HPI





Reviewed Test Results


Reviewed Test Results


Lab





Laboratory Tests








Test


 3/12/21


20:00 3/13/21


02:15 Range/Units


 


 


White Blood Count


 6.6 


 6.6 


 4.3-11.0


10^3/uL


 


Red Blood Count


 4.23 


 4.17 


 3.80-5.11


10^6/uL


 


Hemoglobin 12.7  12.6  11.5-16.0  g/dL


 


Hematocrit 38  37  35-52  %


 


Mean Corpuscular Volume 90  90  80-99  fL


 


Mean Corpuscular Hemoglobin 30  30  25-34  pg


 


Mean Corpuscular Hemoglobin


Concent 33 


 34 


 32-36  g/dL





 


Red Cell Distribution Width 13.5  13.5  10.0-14.5  %


 


Platelet Count


 206 


 196 


 130-400


10^3/uL


 


Mean Platelet Volume 10.1  10.5  9.0-12.2  fL


 


Immature Granulocyte % (Auto) 0  0   %


 


Neutrophils (%) (Auto) 50  52  42-75  %


 


Lymphocytes (%) (Auto) 37  37  12-44  %


 


Monocytes (%) (Auto) 8  8  0-12  %


 


Eosinophils (%) (Auto) 3  2  0-10  %


 


Basophils (%) (Auto) 1  1  0-10  %


 


Neutrophils # (Auto)


 3.3 


 3.5 


 1.8-7.8


10^3/uL


 


Lymphocytes # (Auto)


 2.5 


 2.5 


 1.0-4.0


10^3/uL


 


Monocytes # (Auto)


 0.5 


 0.5 


 0.0-1.0


10^3/uL


 


Eosinophils # (Auto)


 0.2 


 0.1 


 0.0-0.3


10^3/uL


 


Basophils # (Auto)


 0.1 


 0.1 


 0.0-0.1


10^3/uL


 


Immature Granulocyte # (Auto)


 0.0 


 0.0 


 0.0-0.1


10^3/uL


 


Prothrombin Time 13.5   12.2-14.7  SEC


 


INR Comment 1.0   0.8-1.4  


 


Activated Partial


Thromboplast Time 32 


 


 24-35  SEC





 


Sodium Level 139  140  135-145  MMOL/L


 


Potassium Level 3.3 L 3.8  3.6-5.0  MMOL/L


 


Chloride Level 104  107    MMOL/L


 


Carbon Dioxide Level 25  24  21-32  MMOL/L


 


Anion Gap 10  9  5-14  MMOL/L


 


Blood Urea Nitrogen 11  12  7-18  MG/DL


 


Creatinine


 0.97 


 0.78 


 0.60-1.30


MG/DL


 


Estimat Glomerular Filtration


Rate 60 


 > 60 


  





 


BUN/Creatinine Ratio 11  15   


 


Glucose Level 129 H 91    MG/DL


 


Calcium Level 8.1 L 8.4 L 8.5-10.1  MG/DL


 


Corrected Calcium 8.3 L 8.6  8.5-10.1  MG/DL


 


Magnesium Level 2.2   1.6-2.4  MG/DL


 


Total Bilirubin 0.2  0.2  0.1-1.0  MG/DL


 


Aspartate Amino Transf


(AST/SGOT) 11 


 12 


 5-34  U/L





 


Alanine Aminotransferase


(ALT/SGPT) 8 


 9 


 0-55  U/L





 


Alkaline Phosphatase 68  65    U/L


 


Creatine Kinase MB 1.1   <6.6  NG/ML


 


Myoglobin


 17.3 


 


 10.0-92.0


NG/ML


 


Troponin I < 0.028  < 0.028  <0.028  NG/ML


 


B-Type Natriuretic Peptide 42.8   <100.0  PG/ML


 


Total Protein 6.3 L 6.0 L 6.4-8.2  GM/DL


 


Albumin 3.8  3.7  3.2-4.5  GM/DL


 


Lipase 29   8-78  U/L


 


Triglycerides Level  98  <150  MG/DL


 


Cholesterol Level  128  < 200  MG/DL


 


LDL Cholesterol Direct  78  1-129  MG/DL


 


VLDL Cholesterol  20  5-40  MG/DL


 


HDL Cholesterol  37 L 40-60  MG/DL











Physical Exam


Physical Exam


Vital Signs





Vital Signs - First Documented








 3/12/21





 22:49


 


FiO2 21





Capillary Refill : Less Than 3 Seconds


Height, Weight, BMI


Height: 5'2.00"


Weight: 115lbs. 7.0oz. [id number]; 21.90 BMI


Method:Stated


General Appearance:  No Apparent Distress, WD/WN


Eyes:  Bilateral Eye Normal Inspection, Bilateral Eye PERRL, Bilateral Eye EOMI


HEENT:  PERRL/EOMI, Normal ENT Inspection, Pharynx Normal, Moist Mucous 

Membranes


Neck:  Normal Inspection, Non Tender


Respiratory:  Lungs Clear, Normal Breath Sounds, No Accessory Muscle Use


Cardiovascular:  Regular Rate, Rhythm, No Edema, Normal Peripheral Pulses


Gastrointestinal:  Normal Bowel Sounds, No Organomegaly, No Pulsatile Mass, Non 

Tender, Soft


Back:  Normal Inspection, No CVA Tenderness, No Vertebral Tenderness


Extremity:  Normal Capillary Refill, Normal Inspection, Normal Range of Motion, 

Non Tender


Neurologic/Psychiatric:  Alert, Oriented x3, No Motor/Sensory Deficits, Normal 

Mood/Affect


Skin:  Normal Color, Warm/Dry


Lymphatic:  No Adenopathy





A/P-Cardiology


Admission Diagnosis


Chest pain


Coronary artery disease


Tobaccoism


Neck pain





Assessment/Plan


Chest pain resembling angina, EKG and cardiac enzymes did not show any acute 

abnormality, discussed with the patient the management plan the possible to have

cardiac catheterization versus stress testing, patient preferred to have a 

stress test which can be scheduled as an outpatient.  Patient was instructed on 

starting aspirin 81 mg daily and continue as an outpatient





Coronary artery disease, history of CABG, cardiac catheterization done by Dr. Logan in  showing moderate stenosis in the LIMA to LAD, moderate stenosis 

in the native LAD and circumflex artery, FFR was more than 0.8.  Medical therapy

was recommended





Tobaccoism, educated on smoking cessation





Neck pain, numbness in the arms, managed by primary care physician





Clinical Quality Measures


AMI/AHF:


ASA po Prior to arrival:  Yes (81mg asa 45min ago)











OBED MADRID MD             Mar 13, 2021 10:23 am

## 2022-09-17 ENCOUNTER — HOSPITAL ENCOUNTER (EMERGENCY)
Dept: HOSPITAL 75 - ER | Age: 52
Discharge: HOME | End: 2022-09-17
Payer: SELF-PAY

## 2022-09-17 VITALS — SYSTOLIC BLOOD PRESSURE: 112 MMHG | DIASTOLIC BLOOD PRESSURE: 73 MMHG

## 2022-09-17 VITALS — BODY MASS INDEX: 23.53 KG/M2 | HEIGHT: 61.81 IN | WEIGHT: 127.87 LBS

## 2022-09-17 DIAGNOSIS — J18.9: Primary | ICD-10-CM

## 2022-09-17 DIAGNOSIS — Z20.822: ICD-10-CM

## 2022-09-17 LAB
ALBUMIN SERPL-MCNC: 3.9 GM/DL (ref 3.2–4.5)
ALP SERPL-CCNC: 81 U/L (ref 40–136)
ALT SERPL-CCNC: 12 U/L (ref 0–55)
BASOPHILS # BLD AUTO: 0 10^3/UL (ref 0–0.1)
BASOPHILS NFR BLD AUTO: 0 % (ref 0–10)
BILIRUB SERPL-MCNC: 0.4 MG/DL (ref 0.1–1)
BUN/CREAT SERPL: 12
CALCIUM SERPL-MCNC: 8.8 MG/DL (ref 8.5–10.1)
CHLORIDE SERPL-SCNC: 106 MMOL/L (ref 98–107)
CO2 SERPL-SCNC: 18 MMOL/L (ref 21–32)
CREAT SERPL-MCNC: 0.77 MG/DL (ref 0.6–1.3)
EOSINOPHIL # BLD AUTO: 0 10^3/UL (ref 0–0.3)
EOSINOPHIL NFR BLD AUTO: 0 % (ref 0–10)
GFR SERPLBLD BASED ON 1.73 SQ M-ARVRAT: 92 ML/MIN
GLUCOSE SERPL-MCNC: 100 MG/DL (ref 70–105)
HCT VFR BLD CALC: 38 % (ref 35–52)
HGB BLD-MCNC: 12.9 G/DL (ref 11.5–16)
LYMPHOCYTES # BLD AUTO: 1.8 X 10^3 (ref 1–4)
LYMPHOCYTES NFR BLD AUTO: 15 % (ref 12–44)
MANUAL DIFFERENTIAL PERFORMED BLD QL: NO
MCH RBC QN AUTO: 30 PG (ref 25–34)
MCHC RBC AUTO-ENTMCNC: 34 G/DL (ref 32–36)
MCV RBC AUTO: 87 FL (ref 80–99)
MONOCYTES # BLD AUTO: 1 X 10^3 (ref 0–1)
MONOCYTES NFR BLD AUTO: 8 % (ref 0–12)
NEUTROPHILS # BLD AUTO: 9 X 10^3 (ref 1.8–7.8)
NEUTROPHILS NFR BLD AUTO: 76 % (ref 42–75)
PLATELET # BLD: 175 10^3/UL (ref 130–400)
PMV BLD AUTO: 10.1 FL (ref 9–12.2)
POTASSIUM SERPL-SCNC: 3 MMOL/L (ref 3.6–5)
PROT SERPL-MCNC: 7.1 GM/DL (ref 6.4–8.2)
SODIUM SERPL-SCNC: 141 MMOL/L (ref 135–145)
WBC # BLD AUTO: 11.9 10^3/UL (ref 4.3–11)

## 2022-09-17 PROCEDURE — 84484 ASSAY OF TROPONIN QUANT: CPT

## 2022-09-17 PROCEDURE — 87636 SARSCOV2 & INF A&B AMP PRB: CPT

## 2022-09-17 PROCEDURE — 94640 AIRWAY INHALATION TREATMENT: CPT

## 2022-09-17 PROCEDURE — 71045 X-RAY EXAM CHEST 1 VIEW: CPT

## 2022-09-17 PROCEDURE — 85025 COMPLETE CBC W/AUTO DIFF WBC: CPT

## 2022-09-17 PROCEDURE — 36415 COLL VENOUS BLD VENIPUNCTURE: CPT

## 2022-09-17 PROCEDURE — 80053 COMPREHEN METABOLIC PANEL: CPT

## 2022-09-17 NOTE — DIAGNOSTIC IMAGING REPORT
EXAM: CHEST 1 VIEW, AP/PA ONLY



INDICATION: Shortness of breath.



COMPARISON: Chest radiograph 03/12/2021



FINDINGS: Sternotomy. Normal heart size and central pulmonary

vascularity. Left subclavian CVC tip low SVC. New airspace

opacity in the right lung base. No pleural effusion or

pneumothorax. No acute osseous findings.



IMPRESSION: New airspace opacity in the right lung base

suspicious for pneumonitis. Recommend followup to resolution.



Dictated by: 



  Dictated on workstation # [id number]

## 2022-09-17 NOTE — ED RESPIRATORY
General


Chief Complaint:  Respiratory Problems


Stated Complaint:  COUGH/CAN'T BREATH/STUFFY NOSE


Source:  patient


Exam Limitations:  no limitations


 (JENNY BOWER)





History of Present Illness


Date Seen by Provider:  Sep 17, 2022


Time Seen by Provider:  14:17


Initial Comments


Patient presents to the emergency department today for cough, congestion and 

shortness of breath. Reports that she thinks that she has pneumonia. Her PCP 

just sent out Levaquin for her and she took the first dose of that today. Denies

fever or recent sick contacts that she is aware of. Denies exposure to COVID. 

History of pneumonia in the past.


Timing/Duration:  getting worse


Prior Episodes/Possible Cause:  frequent episodes


Modifying Factors:  Worse With Activity; Improves With Albuterol Inhaler; Worse 

With Coughing; Improves With Rest


Associated Symptoms:  No chest pain/soreness; cough; No muscle aches; shortness 

of breath (JENNY BOWER)





Allergies and Home Medications


Allergies


Coded Allergies:  


     morphine (Verified  Allergy, Unknown, RASH - TAKES LORTAB AT HOME WITH NO I

SSUES, 16)





Patient Home Medication List


Home Medication List Reviewed:  Yes


 (JENNY BOWER)


Aspirin (Aspirin EC) 81 Mg Tablet.dr, 81 MG PO DAILY


   Prescribed by: PRITESH MOLINA on 3/13/21 1333


Benzonatate (Tessalon Perles) 100 Mg Capsule, 200 MG PO TID PRN for COUGH


   Prescribed by: Jenny Bower on 22 1656


Chlorzoxazone (Chlorzoxazone) 500 Mg Tablet, 500 PO TID, (Reported)


   Entered as Reported by: BERTIN SULLIVAN on 3/13/21 1325


Gabapentin (Gabapentin) 400 Mg Capsule, 400 MG PO Q6H


   Prescribed by: SANDIE MEDLEY on 3/12/21 2317


Ondansetron (Ondansetron Odt) 4 Mg Tab.rapdis, 4 MG PO Q6H PRN for 

NAUSEA/VOMITING


   Prescribed by: TODD DE LEON on 19 1140


Prednisone (Prednisone) 20 Mg Tab, 60 MG PO DAILY


   Prescribed by: Jenny Bower on 22 1656


Quetiapine Fumarate (Quetiapine Fumarate) 200 Mg Tablet, 200 PO HS, (Reported)


   Entered as Reported by: BERTIN SULLIVAN on 3/13/21 1323


Tramadol HCl (Tramadol HCl) 50 Mg Tablet, 50 MG PO Q6H PRN for PAIN-MODERATE (5-

7), (Reported)


   Entered as Reported by: BERTIN SULLIVAN on 3/13/21 1322





Review of Systems


Review of Systems


Constitutional:  No chills, No dizziness, No fever


Respiratory:  cough, dyspnea on exertion; No phlegm; short of breath; No 

wheezing


Cardiovascular:  No chest pain, No palpitations


Gastrointestinal:  No abdominal pain, No nausea, No vomiting


Musculoskeletal:  No muscle pain, No muscle weakness


Skin:  no symptoms reported (JENNY BOWER)





All Other Systems Reviewed


Negative Unless Noted:  Yes


 (JENNY BOWER)





Past Medical-Social-Family Hx


Immunizations Up To Date


Tetanus Booster (TDap):  Unknown


PED Vaccines UTD:  No


 (JENNY BOWER)





Seasonal Allergies


Seasonal Allergies:  No


 (JENNY BOWER)





Past Medical History


Surgeries:  Yes (left total knee 2015, cabgX1, port, STENTS X2)


CABG, Hysterectomy, Open Heart Surgery, Orthopedic


Respiratory:  Yes


Pneumonia, Chronic Bronchitis


Currently Using CPAP:  No


Currently Using BIPAP:  No


Cardiac:  Yes (CABG)


Coronary Artery Disease, Hypertension


Neurological:  Yes


Headaches /Migraines


Reproductive Disorders:  No


Female Reproductive Disorders:  Denies


GYN History:  Hysterectomy


Sexually Transmitted Disease:  No


HIV/AIDS:  No


Genitourinary:  No


Gastrointestinal:  Yes


Colitis, Gastroesophageal Reflux, Diverticulosis, Hemorrhoids, Chronic Diarrhea,

Ulcer, Irritable Bowel


Musculoskeletal:  Yes (FRACTURE LEFT WRIST)


Degenerate Disk Disease, Arthritis, Chronic Back Pain, Fractures


Endocrine:  No


HEENT:  No


Loss of Vision:  Denies


Hearing Impairment:  Denies


Cancer:  No


Psychosocial:  Yes


Sleep Difficulties, Anxiety, Depression


Integumentary:  No


Blood Disorders:  No


Adverse Reaction/Blood Tranf:  No


 (JENNY BOWER)





Family Medical History


Reviewed Nursing Family Hx


 (JENNY BOWER)





Cancer (brain and back)


  G8 SISTER


  G8 SISTER


Cataracts


  19 MOTHER


Headache disorder


  G8 SISTER


Hypercholesterolemia


  19 FATHER


Hypertension


  19 FATHER


Myocardial infarction


  19 FATHER


  19 FATHER


Myocardial infarction ( from MI at 55


)


  19 FATHER


  19 FATHER


Neoplasm


  G8 SISTER


Psychosocial problem


  G8 SISTER


Visual disorder


  G8 SISTER





No Family History of:


  AIDS


  Abdominal aortic aneurysm


  [place]'s disease


  Alcoholism


  Alzheimer's disease


  Aphasia


  Arthritis


  Asthma


  Cancer of mouth


  Cardiovascular disease


  Colon cancer


  Completed stroke


  Congenital disease


  Congenital heart disease


  Coronary thrombosis


  Cystic fibrosis


  Deafness or hearing loss


  Dementia


  Diabetes mellitus


  Drug abuse


  Dysphasia


  Fibrocystic disease of breast


  Gastroenteritis


  Glaucoma


  Infertility


  Kidney disease


  Not obtainable due to adoption


  Osteoporosis


  Parkinson's disease


  Prostate cancer


  Respiratory disorder


  Seizure disorder


  Severe allergy


  Thyroid disease


  Tuberculosis


Cancer


 (JENNY BOWER)





Physical Exam





Vital Signs - First Documented








 22





 13:55 16:41


 


Temp 36.0 


 


Pulse 101 


 


Resp 20 


 


B/P (MAP) 143/79 (100) 


 


Pulse Ox  98


 


O2 Delivery Room Air 








 (MADDISON MOROCHO MD)


Capillary Refill :  


 (JENNY BOWER)


Height: 5'2.00"


Weight: 115lbs. 7.0oz. 52.653421di; 21.90 BMI


Method:Stated


General Appearance:  WD/WN, no apparent distress


HEENT:  PERRL/EOMI, normal ENT inspection, TMs normal, pharynx normal


Neck:  non-tender, full range of motion, supple, normal inspection


Respiratory:  chest non-tender, no respiratory distress, no accessory muscle 

use, decreased breath sounds (right lower lobe otherwise clear); No wheezing, No

expiration, No inspiration


Cardiovascular:  regular rate, rhythm, no edema


Gastrointestinal:  normal bowel sounds, non tender, soft


Extremities:  normal range of motion, non-tender, normal inspection


Neurologic/Psychiatric:  alert, normal mood/affect, oriented x 3


Skin:  normal color, warm/dry (JENNY BOWER)





Progress/Results/Core Measures


Suspected Sepsis


SIRS


Temperature: 


Pulse:  


Respiratory Rate: 


 


Laboratory Tests


22 16:15: White Blood Count 11.9H


Blood Pressure  / 


Mean: 


 





Laboratory Tests


22 16:15: 


Creatinine 0.77, Platelet Count 175, Total Bilirubin 0.4


 (JENNY BOWER)





Results/Orders


Lab Results





Laboratory Tests








Test


 22


14:20 22


16:15 Range/Units


 


 


Influenza Type A (RT-PCR) Not Detected   Not Detecte  


 


Influenza Type B (RT-PCR) Not Detected   Not Detecte  


 


SARS-CoV-2 RNA (RT-PCR) Not Detected   Not Detecte  


 


White Blood Count


 


 11.9 H


 4.3-11.0


10^3/uL


 


Red Blood Count


 


 4.32 


 3.80-5.11


10^6/uL


 


Hemoglobin  12.9  11.5-16.0  g/dL


 


Hematocrit  38  35-52  %


 


Mean Corpuscular Volume  87  80-99  fL


 


Mean Corpuscular Hemoglobin  30  25-34  pg


 


Mean Corpuscular Hemoglobin


Concent 


 34 


 32-36  g/dL





 


Red Cell Distribution Width  13.2  10.0-14.5  %


 


Platelet Count


 


 175 


 130-400


10^3/uL


 


Mean Platelet Volume  10.1  9.0-12.2  fL


 


Immature Granulocyte % (Auto)  0   %


 


Neutrophils (%) (Auto)  76 H 42-75  %


 


Lymphocytes (%) (Auto)  15  12-44  %


 


Monocytes (%) (Auto)  8  0-12  %


 


Eosinophils (%) (Auto)  0  0-10  %


 


Basophils (%) (Auto)  0  0-10  %


 


Neutrophils # (Auto)  9.0 H 1.8-7.8  X 10^3


 


Lymphocytes # (Auto)  1.8  1.0-4.0  X 10^3


 


Monocytes # (Auto)  1.0  0.0-1.0  X 10^3


 


Eosinophils # (Auto)


 


 0.0 


 0.0-0.3


10^3/uL


 


Basophils # (Auto)


 


 0.0 


 0.0-0.1


10^3/uL


 


Immature Granulocyte # (Auto)


 


 0.0 


 0.0-0.1


10^3/uL


 


Sodium Level  141  135-145  MMOL/L


 


Potassium Level  3.0 L 3.6-5.0  MMOL/L


 


Chloride Level  106    MMOL/L


 


Carbon Dioxide Level  18 L 21-32  MMOL/L


 


Anion Gap  17 H 5-14  MMOL/L


 


Blood Urea Nitrogen  9  7-18  MG/DL


 


Creatinine


 


 0.77 


 0.60-1.30


MG/DL


 


Estimat Glomerular Filtration


Rate 


 92 


  





 


BUN/Creatinine Ratio  12   


 


Glucose Level  100    MG/DL


 


Calcium Level  8.8  8.5-10.1  MG/DL


 


Corrected Calcium  8.9  8.5-10.1  MG/DL


 


Total Bilirubin  0.4  0.1-1.0  MG/DL


 


Aspartate Amino Transf


(AST/SGOT) 


 12 


 5-34  U/L





 


Alanine Aminotransferase


(ALT/SGPT) 


 12 


 0-55  U/L





 


Alkaline Phosphatase  81    U/L


 


Troponin I  < 0.028  <0.028  NG/ML


 


Total Protein  7.1  6.4-8.2  GM/DL


 


Albumin  3.9  3.2-4.5  GM/DL





 (MADDISON MOROCHO MD)


Medications Given in ED





Current Medications








 Medications  Dose


 Ordered  Sig/Elena


 Route  Start Time


 Stop Time Status Last Admin


Dose Admin


 


 Albuterol/


 Ipratropium  3 ml  ONCE  ONCE


 INH  22 15:15


 22 15:16 DC 22 16:40


3 ML


 


 Ketorolac


 Tromethamine  30 mg  ONCE  ONCE


 IVP  22 16:30


 22 16:31 DC 22 16:33


30 MG





 (MADDISON MOROCHO MD)


Vital Signs/I&O











 22





 13:55 16:41


 


Temp 36.0 


 


Pulse 101 


 


Resp 20 


 


B/P (MAP) 143/79 (100) 


 


Pulse Ox  98


 


O2 Delivery Room Air Room Air








 (MADDISON MOROCHO MD)


Vital Signs/I&O


Capillary Refill :  


 (JENNY BOWER)


Progress Note :  


Progress Note


Patient with known history of pneumonia in the past. Was already prescribed 

Levaquin by PCP. Will check labs and CXR along with COVID.





1650: Patient reports that she is feeling a little better. Labs along with CXR 

reviewed with patient. Will send home with Benzonatate and steroids to start. 

Home discharge instructions reviewed with patient in addition along with reasons

to return to the ER. Her CXR was suspicious for pneumonitis. History of 

pneumonia in the past and already has been prescribed Levaquin. Instructed to 

continue taking that as directed.


 (JENNY BOWER)





Departure


Impression





   Primary Impression:  


   Pneumonia


   Qualified Codes:  J18.9 - Pneumonia, unspecified organism


Disposition:   HOME, SELF-CARE


Condition:  Stable





Departure-Patient Inst.


Decision time for Depature:  16:54


 (JENNY BOWER)


Referrals:  


JOSE ZARCO (PCP/Family)


Primary Care Physician


Patient Instructions:  Pneumonia, Adult (DC)





Add. Discharge Instructions:  


1. Home and rest.


2. Push fluids.


3. Continue Levaquin as previously prescribed.


4. Continue breathing treatments at home as directed.


5. Follow up with PCP as needed.


6. Start Prednisone tomorrow AM. Try and take this medication as early in the 

day as possible and with food to prevent stomach upset.


7. Benzonatate as directed as needed for cough.


8. Return here if worse or concerns.





All discharge instructions reviewed with patient and/or family. Voiced u

nderstanding.


Scripts


Prednisone (Prednisone) 20 Mg Tab


60 MG PO DAILY for 5 Days, #15 TAB 0 Refills


   Prov: JENNY BOWER         22 


Benzonatate (TESSALON PERLES) 100 Mg Capsule


200 MG PO TID PRN for COUGH, #30 CAP


   Prov: JENNY BOWER         22


Work/School Note:  Work Release Form   Date Seen in the Emergency Department:  

Sep 17, 2022


   Return to Work:  Sep 19, 2022











JENNY BOWER       Sep 17, 2022 14:18


MADDISON MOROCHO MD         Sep 17, 2022 17:18

## 2023-03-10 ENCOUNTER — HOSPITAL ENCOUNTER (EMERGENCY)
Dept: HOSPITAL 75 - ER | Age: 53
Discharge: HOME | End: 2023-03-10
Payer: SELF-PAY

## 2023-03-10 VITALS — DIASTOLIC BLOOD PRESSURE: 87 MMHG | SYSTOLIC BLOOD PRESSURE: 124 MMHG

## 2023-03-10 VITALS — BODY MASS INDEX: 23.73 KG/M2 | HEIGHT: 61.97 IN | WEIGHT: 128.97 LBS

## 2023-03-10 DIAGNOSIS — E87.6: ICD-10-CM

## 2023-03-10 DIAGNOSIS — R79.1: ICD-10-CM

## 2023-03-10 DIAGNOSIS — Z95.1: ICD-10-CM

## 2023-03-10 DIAGNOSIS — Z91.199: ICD-10-CM

## 2023-03-10 DIAGNOSIS — Z86.79: ICD-10-CM

## 2023-03-10 DIAGNOSIS — F17.210: ICD-10-CM

## 2023-03-10 DIAGNOSIS — R07.9: Primary | ICD-10-CM

## 2023-03-10 DIAGNOSIS — Z20.822: ICD-10-CM

## 2023-03-10 DIAGNOSIS — R12: ICD-10-CM

## 2023-03-10 LAB
ALBUMIN SERPL-MCNC: 3.8 GM/DL (ref 3.2–4.5)
ALP SERPL-CCNC: 84 U/L (ref 40–136)
ALT SERPL-CCNC: < 6 U/L (ref 0–55)
AMYLASE SERPL-CCNC: 47 U/L (ref 25–125)
APTT BLD: 23 SEC (ref 24–35)
BASOPHILS # BLD AUTO: 0 10^3/UL (ref 0–0.1)
BASOPHILS NFR BLD AUTO: 1 % (ref 0–10)
BILIRUB SERPL-MCNC: 0.4 MG/DL (ref 0.1–1)
BLD SMEAR INTERP: YES
BUN/CREAT SERPL: 11
CALCIUM SERPL-MCNC: 8.7 MG/DL (ref 8.5–10.1)
CHLORIDE SERPL-SCNC: 104 MMOL/L (ref 98–107)
CK MB SERPL-MCNC: 0.7 NG/ML (ref ?–6.6)
CK SERPL-CCNC: 31 U/L (ref 29–168)
CO2 SERPL-SCNC: 21 MMOL/L (ref 21–32)
CREAT SERPL-MCNC: 0.81 MG/DL (ref 0.6–1.3)
EOSINOPHIL # BLD AUTO: 0.2 10^3/UL (ref 0–0.3)
EOSINOPHIL NFR BLD AUTO: 4 % (ref 0–10)
GFR SERPLBLD BASED ON 1.73 SQ M-ARVRAT: 86 ML/MIN
GLUCOSE SERPL-MCNC: 102 MG/DL (ref 70–105)
HCT VFR BLD CALC: 39 % (ref 35–52)
HGB BLD-MCNC: 13.2 G/DL (ref 11.5–16)
INR PPP: 1 (ref 0.8–1.4)
LIPASE SERPL-CCNC: 17 U/L (ref 8–78)
LYMPHOCYTES # BLD AUTO: 2 10^3/UL (ref 1–4)
LYMPHOCYTES NFR BLD AUTO: 40 % (ref 12–44)
MAGNESIUM SERPL-MCNC: 2.2 MG/DL (ref 1.6–2.4)
MANUAL DIFFERENTIAL PERFORMED BLD QL: NO
MCH RBC QN AUTO: 30 PG (ref 25–34)
MCHC RBC AUTO-ENTMCNC: 34 G/DL (ref 32–36)
MCV RBC AUTO: 88 FL (ref 80–99)
MONOCYTES # BLD AUTO: 0.3 10^3/UL (ref 0–1)
MONOCYTES NFR BLD AUTO: 6 % (ref 0–12)
NEUTROPHILS # BLD AUTO: 2.4 10^3/UL (ref 1.8–7.8)
NEUTROPHILS NFR BLD AUTO: 49 % (ref 42–75)
PLATELET # BLD: 197 10^3/UL (ref 130–400)
PMV BLD AUTO: 10.4 FL (ref 9–12.2)
POTASSIUM SERPL-SCNC: 2.9 MMOL/L (ref 3.6–5)
PROT SERPL-MCNC: 6.7 GM/DL (ref 6.4–8.2)
PROTHROMBIN TIME: 14.2 SEC (ref 12.2–14.7)
SODIUM SERPL-SCNC: 139 MMOL/L (ref 135–145)
WBC # BLD AUTO: 4.9 10^3/UL (ref 4.3–11)

## 2023-03-10 PROCEDURE — 83874 ASSAY OF MYOGLOBIN: CPT

## 2023-03-10 PROCEDURE — 71045 X-RAY EXAM CHEST 1 VIEW: CPT

## 2023-03-10 PROCEDURE — 83690 ASSAY OF LIPASE: CPT

## 2023-03-10 PROCEDURE — 93041 RHYTHM ECG TRACING: CPT

## 2023-03-10 PROCEDURE — 82550 ASSAY OF CK (CPK): CPT

## 2023-03-10 PROCEDURE — 82150 ASSAY OF AMYLASE: CPT

## 2023-03-10 PROCEDURE — 36415 COLL VENOUS BLD VENIPUNCTURE: CPT

## 2023-03-10 PROCEDURE — 80053 COMPREHEN METABOLIC PANEL: CPT

## 2023-03-10 PROCEDURE — 83880 ASSAY OF NATRIURETIC PEPTIDE: CPT

## 2023-03-10 PROCEDURE — 85610 PROTHROMBIN TIME: CPT

## 2023-03-10 PROCEDURE — 87636 SARSCOV2 & INF A&B AMP PRB: CPT

## 2023-03-10 PROCEDURE — 85025 COMPLETE CBC W/AUTO DIFF WBC: CPT

## 2023-03-10 PROCEDURE — 85730 THROMBOPLASTIN TIME PARTIAL: CPT

## 2023-03-10 PROCEDURE — 83735 ASSAY OF MAGNESIUM: CPT

## 2023-03-10 PROCEDURE — 84484 ASSAY OF TROPONIN QUANT: CPT

## 2023-03-10 PROCEDURE — 93005 ELECTROCARDIOGRAM TRACING: CPT

## 2023-03-10 PROCEDURE — 71275 CT ANGIOGRAPHY CHEST: CPT

## 2023-03-10 PROCEDURE — 82553 CREATINE MB FRACTION: CPT

## 2023-03-10 PROCEDURE — 85379 FIBRIN DEGRADATION QUANT: CPT

## 2023-03-10 NOTE — ED CHEST PAIN
General


Chief Complaint:  Chest Pain


Stated Complaint:  CHEST PAIN


Source:  patient, EMS, old records





History of Present Illness


Date Seen by Provider:  Mar 10, 2023


Time Seen by Provider:  19:27


Initial Comments


PT ARRIVES VIA EMS FROM HOME


PT STATES SHE HAS BEEN HAVING CHEST PAIN OFF AND ON ALL DAY


PAIN IS ALL ACROSS CHEST, AND DOWN LEFT ARM


PT STATES IT GOT WORSE THIS EVENING AND STATES SHE "FREAKED OUT" AND TOOK 1 NTG 

SL


PAIN COMPLETELY RESOLVED WITH THE ONE NTG. PT CONTINUES TO BE PAIN-FREE AT THIS 

TIME. 


PT DID HAVE SOME DIZZINESS AND GOT SWEATY AFTER THE NTG,THIS HAS RESOLVED


PT ALSO HAD SENSATION OF HER HEART POUNDING AFTER SHE TOOK THE NTG. THIS HAS 

RESOLVED. 


SHE STATES NOTHING WORSENED THE PAIN 


NO NAUSEA/VOMITING


NO SYNCOPE


+ SHORTNESS OF BREATH WITH THE PAIN. 


NO SWELLING IN LEGS/FEET OR PAIN IN CALVES. 





EMS GAVE PT 4 BABY ASPIRIN. 





PT HAD COLD SYMPTOMS ALL LAST WEEK--WITH COUGH AND CONGESTION AND RUNNY NOSE. NO

FEVER


SHE STATES SHE IS BETTER, BUT STILL HAS A COUGH


SHE DID NOT SEEK CARE OR TAKE ANYTHING FOR SYMPTOMS





SHE HAS NOT EATEN ALL DAY--STATES SHE JUST DIDN'T FEEL GOOD AND DID NOT HAVE AN 

APPETITE.


SHE HAS HAD FLUIDS TODAY AND IS URINATING A NORMAL AMOUNT. 





SHE TOOK HER MORNING SEROQUEL AND TRAMADOL. SHE HAS NOT TAKEN ANY GABAPENTIN 

TODAY ( NORMALLY TAKES 4 X / DAY) 





SHE HAS HISTORY OF CAD WITH CABG X 1 VESSEL IN 


SHE HAD A CARDIAC CATH HERE IN 2018--NO INTERVENTION, BUT DID  HAVE MODERATE 

DISEASE AT THAT TIME


SHE IS NOT ON ASPIRIN, BLOOD THINNERS OR ANY CARDIAC OR BLOOD PRESSURE 

MEDICATIONS


SHE CONTINUES TO SMOKE AT LEAST 1-2 PPD. SHE DENIES ALCOHOL OR DRUG USE


SHE HAS NOT SEEN DR. CASTRO OR ANY CARDIOLOGIST IN OVER A YEAR, POSSIBLY LONGER.







SHE HAD A ROUTINE EXAM/MEDICATION REFILLS OVER A MONTH AGO, BY NEVILLE ZARCO





PT HAD COVID VACCINE X 2, NO FLU VACCINE FOR THIS SEASON





PCP: NEVILLE ZARCO IN [facility]


CARDIOLOGIST: DR. CASTRO





Allergies and Home Medications


Allergies


Coded Allergies:  


     morphine (Verified  Allergy, Unknown, RASH - TAKES LORTAB AT HOME WITH NO 

ISSUES, 16)





Patient Home Medication List


Home Medication List Reviewed:  Yes


Aspirin (Aspirin EC) 81 Mg Tablet., 81 MG PO DAILY


   Prescribed by: PRITESH MOLINA on 3/13/21 1333


Benzonatate (Tessalon Perles) 100 Mg Capsule, 200 MG PO TID PRN for COUGH


   Prescribed by: Jenny Ruvalcaba on 22 1656


Chlorzoxazone (Chlorzoxazone) 500 Mg Tablet, 500 PO TID, (Reported)


   Entered as Reported by: BERTIN SULLIVAN on 3/13/21 1325


Gabapentin (Gabapentin) 400 Mg Capsule, 400 MG PO Q6H


   Prescribed by: SANDIE MEDLEY on 3/12/21 2317


Ondansetron (Ondansetron Odt) 4 Mg Tab.rapdis, 4 MG PO Q6H PRN for 

NAUSEA/VOMITING


   Prescribed by: TODD DE LEON on 19 1140


Prednisone (Prednisone) 20 Mg Tab, 60 MG PO DAILY


   Prescribed by: Jenny Ruvalcaba on 22 1656


Quetiapine Fumarate (Quetiapine Fumarate) 200 Mg Tablet, 200 PO HS, (Reported)


   Entered as Reported by: BERTIN SULLIVAN on 3/13/21 1323


Tramadol HCl (Tramadol HCl) 50 Mg Tablet, 50 MG PO Q6H PRN for PAIN-MODERATE (5-

7), (Reported)


   Entered as Reported by: BERTIN SULLIVAN on 3/13/21 1322





Review of Systems


Review of Systems


Constitutional:  see HPI


EENTM:  No Symptoms Reported


Respiratory:  See HPI


Cardiovascular:  See HPI


Gastrointestinal:  No Symptoms Reported; Denies Abdominal Pain, Denies Nausea, 

Denies Vomiting


Genitourinary:  No Symptoms Reported


Musculoskeletal:  no symptoms reported


Skin:  no symptoms reported


Psychiatric/Neurological:  See HPI, Anxiety


Endocrine:  No Symptoms Reported


Hematologic/Lymphatic:  No Symptoms Reported





Past Medical-Social-Family Hx


Patient Social History


Tobacco Use?:  Yes


Tobacco type used:  Cigarettes


Smoking Status:  Current Everyday Smoker


Substance use?:  No


Alcohol Use?:  No





Immunizations Up To Date


Tetanus Booster (TDap):  Unknown


PED Vaccines UTD:  No


First/Initial COVID19 Vaccinat:  


Second COVID19 Vaccination Rahul:  


Third COVID19 Vaccination Date:  





Seasonal Allergies


Seasonal Allergies:  No





Past Medical History


Surgery/Hospitalization HX:  


PMH;NECK AND  BACK BULGING DISC.





SUERGERY;OPEN HEART SURGERY , HYST.


Surgeries:  Yes (left total knee 2015, cabgX1, port,)


Cardiac, CABG, Hysterectomy, Open Heart Surgery, Orthopedic


Respiratory:  Yes


Pneumonia, Chronic Bronchitis


Currently Using CPAP:  No


Currently Using BIPAP:  No


Cardiac:  Yes (CABG X 1 VESSEL)


Coronary Artery Disease, High Cholesterol, Hypertension


Neurological:  Yes


Headaches /Migraines


Reproductive Disorders:  Yes


Female Reproductive Disorders:  Menstrual Problems


GYN History:  Hysterectomy, Menopausal


Sexually Transmitted Disease:  No


HIV/AIDS:  No


Genitourinary:  No


Gastrointestinal:  Yes


Colitis, Gastroesophageal Reflux, Diverticulosis, Hemorrhoids, Chronic Diarrhea,

Ulcer, Irritable Bowel


Musculoskeletal:  Yes (FRACTURE LEFT WRIST)


Degenerate Disk Disease, Arthritis, Chronic Back Pain, Fractures


Endocrine:  No


HEENT:  No


Loss of Vision:  Denies


Hearing Impairment:  Denies


Cancer:  No


Psychosocial:  Yes


Sleep Difficulties, Anxiety, Depression


Integumentary:  No


Blood Disorders:  No


Adverse Reaction/Blood Tranf:  No





Family Medical History





Cancer (brain and back)


  G8 SISTER


  G8 SISTER


Cataracts


  19 MOTHER


Headache disorder


  G8 SISTER


Hypercholesterolemia


  19 FATHER


Hypertension


  19 FATHER


Myocardial infarction


  19 FATHER


  19 FATHER


Myocardial infarction ( from MI at 55


)


  19 FATHER


  19 FATHER


Neoplasm


  G8 SISTER


Psychosocial problem


  G8 SISTER


Visual disorder


  G8 SISTER





No Family History of:


  AIDS


  Abdominal aortic aneurysm


  Han's disease


  Alcoholism


  Alzheimer's disease


  Aphasia


  Arthritis


  Asthma


  Cancer of mouth


  Cardiovascular disease


  Colon cancer


  Completed stroke


  Congenital disease


  Congenital heart disease


  Coronary thrombosis


  Cystic fibrosis


  Deafness or hearing loss


  Dementia


  Diabetes mellitus


  Drug abuse


  Dysphasia


  Fibrocystic disease of breast


  Gastroenteritis


  Glaucoma


  Infertility


  Kidney disease


  Not obtainable due to adoption


  Osteoporosis


  Parkinson's disease


  Prostate cancer


  Respiratory disorder


  Seizure disorder


  Severe allergy


  Thyroid disease


  Tuberculosis


Cancer








SOCIAL HISTORY:


-SMOKES 1-2 PPD


-DENIES ETOH USE


-DENIES DRUG USE





PAST SURGICAL HISTORY:


-CABG X 1 VESSEL 


-CARDIAC CATH  --NO INTERVENTION. BY DR. CASTRO


-LEFT KNEE REPLACEMENT


-PORT LEFT CHEST





Physical Exam


Vital Signs





Vital Signs - First Documented








 3/10/23





 19:30


 


Pulse 77


 


B/P (MAP) 154/94 (114)


 


Pulse Ox 98


 


O2 Delivery Room Air





Capillary Refill :


Height, Weight, BMI


Height: 5'2.00"


Weight: 115lbs. 7.0oz. [id number]; 23.00 BMI


Method:Stated


General Appearance:  No Apparent Distress, WD/WN, Other (REEKS OF CIGARETTES. 

DOES NOT APPEAR ILL OR TO BE IN ANY DISCOMFORT OR DISTRESS)


HEENT:  PERRL/EOMI


Neck:  Normal Inspection; No Carotid Bruit, No JVD


Respiratory:  Chest Non Tender, Normal Breath Sounds, No Accessory Muscle Use, 

No Respiratory Distress


Cardiovascular:  Regular Rate, Rhythm, No Edema, No JVD, No Murmur, Normal 

Peripheral Pulses


Gastrointestinal:  Normal Bowel Sounds, No Organomegaly, No Pulsatile Mass, Non 

Tender, Soft


Extremity:  Normal Capillary Refill, Normal Inspection, Normal Range of Motion, 

Non Tender, No Calf Tenderness, No Pedal Edema


Neurologic/Psychiatric:  Alert, Oriented x3, No Motor/Sensory Deficits, Normal 

Mood/Affect, CNs II-XII Norm as Tested


Skin:  Normal Color, Warm/Dry; No Rash





Progress/Results/Core Measures


Results/Orders


Lab Results





Laboratory Tests








Test


 3/10/23


19:30 3/10/23


19:32 3/10/23


22:08 Range/Units


 


 


White Blood Count


 4.9 


 


 


 4.3-11.0


10^3/uL


 


Red Blood Count


 4.38 


 


 


 3.80-5.11


10^6/uL


 


Hemoglobin 13.2    11.5-16.0  g/dL


 


Hematocrit 39    35-52  %


 


Mean Corpuscular Volume 88    80-99  fL


 


Mean Corpuscular Hemoglobin 30    25-34  pg


 


Mean Corpuscular Hemoglobin


Concent 34 


 


 


 32-36  g/dL





 


Red Cell Distribution Width 13.0    10.0-14.5  %


 


Platelet Count


 197 


 


 


 130-400


10^3/uL


 


Mean Platelet Volume 10.4    9.0-12.2  fL


 


Immature Granulocyte % (Auto) 0     %


 


Neutrophils (%) (Auto) 49    42-75  %


 


Lymphocytes (%) (Auto) 40    12-44  %


 


Monocytes (%) (Auto) 6    0-12  %


 


Eosinophils (%) (Auto) 4    0-10  %


 


Basophils (%) (Auto) 1    0-10  %


 


Neutrophils # (Auto)


 2.4 


 


 


 1.8-7.8


10^3/uL


 


Lymphocytes # (Auto)


 2.0 


 


 


 1.0-4.0


10^3/uL


 


Monocytes # (Auto)


 0.3 


 


 


 0.0-1.0


10^3/uL


 


Eosinophils # (Auto)


 0.2 


 


 


 0.0-0.3


10^3/uL


 


Basophils # (Auto)


 0.0 


 


 


 0.0-0.1


10^3/uL


 


Immature Granulocyte # (Auto)


 0.0 


 


 


 0.0-0.1


10^3/uL


 


Percent Immature Platelet


Fraction 4.0 


 


 


 0.0-7.6  %





 


Prothrombin Time 14.2    12.2-14.7  SEC


 


INR Comment 1.0    0.8-1.4  


 


Activated Partial


Thromboplast Time 23 L


 


 


 24-35  SEC





 


D-Dimer


 5.56 H


 


 


 0.00-0.49


UG/ML


 


Sodium Level 139    135-145  MMOL/L


 


Potassium Level 2.9 L   3.6-5.0  MMOL/L


 


Chloride Level 104      MMOL/L


 


Carbon Dioxide Level 21    21-32  MMOL/L


 


Anion Gap 14    5-14  MMOL/L


 


Blood Urea Nitrogen 9    7-18  MG/DL


 


Creatinine


 0.81 


 


 


 0.60-1.30


MG/DL


 


Estimat Glomerular Filtration


Rate 86 


 


 


  





 


BUN/Creatinine Ratio 11     


 


Glucose Level 102      MG/DL


 


Calcium Level 8.7    8.5-10.1  MG/DL


 


Corrected Calcium 8.9    8.5-10.1  MG/DL


 


Magnesium Level 2.2    1.6-2.4  MG/DL


 


Total Bilirubin 0.4    0.1-1.0  MG/DL


 


Aspartate Amino Transf


(AST/SGOT) 10 


 


 


 5-34  U/L





 


Alanine Aminotransferase


(ALT/SGPT) < 6 


 


 


 0-55  U/L





 


Alkaline Phosphatase 84      U/L


 


Total Creatine Kinase 31      U/L


 


Creatine Kinase MB 0.7    <6.6  NG/ML


 


Myoglobin


 20.8 


 


 


 10.0-92.0


NG/ML


 


Troponin I < 0.028   < 0.028  <0.028  NG/ML


 


B-Type Natriuretic Peptide 28.3    <100.0  PG/ML


 


Total Protein 6.7    6.4-8.2  GM/DL


 


Albumin 3.8    3.2-4.5  GM/DL


 


Amylase Level 47      U/L


 


Lipase 17    8-78  U/L


 


Smear Scan YES     


 


Influenza Type A (RT-PCR)  Not Detected   Not Detecte  


 


Influenza Type B (RT-PCR)  Not Detected   Not Detecte  


 


SARS-CoV-2 RNA (RT-PCR)  Not Detected   Not Detecte  








My Orders





Orders - KERLINE SHAH DO


Cbc With Automated Diff (3/10/23 19:27)


Magnesium (3/10/23 19:27)


Chest 1 View, Ap/Pa Only (3/10/23 19:27)


Ekg Tracing (3/10/23 19:27)


Comprehensive Metabolic Panel (3/10/23 19:)


Myoglobin Serum (3/10/23 19:)


Protime With Inr (3/10/23 19:)


Partial Thromboplastin Time (3/10/23 19:)


O2 (3/10/23 19:)


Monitor-Rhythm Ecg Trace Only (3/10/23 19:)


Ed Iv/Invasive Line Start (3/10/23 19:)


Creatine Kinase (3/10/23 19:)


Creatine Kinase Mb (3/10/23 19:)


Lipase (3/10/23 19:)


Amylase (3/10/23 19:)


Bnp [place] (3/10/23 19:)


Fibrin Degradation Products (3/10/23 19:)


Troponin I Judit (3/10/23 19:)


Covid 19 Inhouse Test (3/10/23 19:)


Influenza A And B By Pcr (3/10/23 19:)


Isolation Central Supply Req (3/10/23 19:)


Ct Angio Chest W (R/O Pe) (3/10/23 20:35)


Iohexol Injection (Omnipaque 350 Mg/Ml 1 (3/10/23 20:45)


Received Contrast (Hold Metformin- Contr (3/10/23 20:45)


Ns (Ivpb) (Sodium Chloride 0.9% Ivpb Bag (3/10/23 20:45)


Potassium Chloride (Tablet) (Klor Con Ta (3/10/23 22:00)


Ekg Tracing (3/10/23 21:59)


Troponin I Judit (3/10/23 21:59)


Pantoprazole Injection (Protonix Injecti (3/10/23 22:15)





Medications Given in ED





Current Medications








 Medications  Dose


 Ordered  Sig/Elena


 Route  Start Time


 Stop Time Status Last Admin


Dose Admin


 


 Iohexol  100 ml  ONCE  ONCE


 IV  3/10/23 20:45


 3/10/23 20:46 DC 3/10/23 21:17


80 ML


 


 Pantoprazole  40 mg  ONCE  ONCE


 IV  3/10/23 22:15


 3/10/23 22:16 DC 3/10/23 22:12


40 MG


 


 Potassium Chloride  20 meq  ONCE  ONCE


 PO  3/10/23 22:00


 3/10/23 22:01 DC 3/10/23 22:08


20 MEQ


 


 Sodium Chloride  100 ml  ONCE  ONCE


 IV  3/10/23 20:45


 3/10/23 20:46 DC 3/10/23 21:17


66 ML








Vital Signs/I&O











 3/10/23 3/10/23





 19:30 23:03


 


Pulse 77 76


 


B/P (MAP) 154/94 (114) 124/87


 


Pulse Ox 98 97


 


O2 Delivery Room Air Room Air











Progress


Progress Note :  


Progress Note





PT IS PAIN FREE AND SYMPTOM-FREE ON ARRIVAL


VITALS ARE STABLE





UNEVENTFUL ER STAY


NO DETERIORATION IN PT'S CONDITION DURING ER STAY





0--PT C/O HEARTBURN. PROTONIX ORDERED, KCL ORDERED. REPEAT EKG AND TROPONIN 

ORDERED.


REPEAT EKG IS UNCHANGED, AND TROPONIN REMAINS NEGATIVE


D-DIMER IS ELEVATED, BUT CT CHEST ANGIOGRAM IS NEGATIVE. 





REVIEWED PRIOR VISITS, INCLUDING ER VISITS, ADMITS, H&P'S, CONSULTS, 

TESTS/PROCEDURES, AND DISCHARGE SUMMARIES





DISCUSSED TEST RESULTS, ANTICIPATED COURSE, NEED FOR DAILY ASPIRIN, NEED FOR 

FOLLOW UP WITH DR. CASTRO, AND RETURN PRECAUTIONS


Initial ECG Impression Date:  Mar 10, 2023


Initial ECG Impression Time:  19:31


Initial ECG Rate:  79


Initial ECG Rhythm:  Normal Sinus


Initial ECG Impression:  Nonspecific Changes (MILD ST DEPRESSION ANTERIOR-

LATERAL LEADS)


Initial ECG Comparisson:  Changed (SLIGHTLY CHANGED, WITH SLIGHT ST DEPRESSION 

NOTED TODAY)


Comment


INTERPRETED BY ME


EKG :  


   EKG Time:  22:24


   Rate:  62


   Rhythm:  Normal Sinus


   ECG Comparisson:  Unchanged


Comment


INTERPRETED BY ME





Diagnostic Imaging





Comments





CXR--PER RADIOLOGIST REPORT AT 





FINDINGS: [id number] is again seen overlying the left chest in


stable good position.





Lungs/pleura: Lungs are clear. There is no pneumothorax. There is


no pleural effusion.





Mediastinum: Unremarkable. 





Pulmonary vasculature: Unremarkable.





Heart: Heart size within normal limits. Again seen postop changes


to the chest with sternotomy wires and mediastinal clips.





Bones/extrathoracic soft tissue: Unremarkable.





IMPRESSION: There is no radiographic evidence of acute


cardiopulmonary process. 





CT CHEST ANGIOGRAM--PER RADIOLOGIST REPORT AT 





FINDINGS: No pulmonary embolism.





There is no edema or pneumonia. No pleural effusion. No


pneumothorax. No suspicious nodule.





There is no axillary or supraclavicular lymphadenopathy. There is


no mediastinal lymphadenopathy. A left-sided portacatheter is


present. There has been coronary artery bypass grafting. Heart


size is normal. There are severe coronary artery calcifications.


No pericardial effusion. Aorta is normal in caliber.





Limited views of the upper abdomen are unremarkable.





There is no suspicious osseus lesion.





IMPRESSION: No pulmonary embolism.


   Reviewed:  Reviewed by Me





Departure


Communication (Admissions)


3167--SPOKE WITH DR. MORLEY, CARDIOLOGIST ON CALL. HE ADVISES THAT PT MAY GO 

HOME, AND FOLLOW UP WITH DR. CASTRO NEXT WEEK.





Impression





   Primary Impression:  


   Chest pain


   Additional Impressions:  


   HX OF CAD WITH CABG


   Hypokalemia


   ACTIVE SMOKER


   Non-compliance


Disposition:   HOME, SELF-CARE


Condition:  Stable





Departure-Patient Inst.


Decision time for Depature:  22:52


Referrals:  


JOSE ZARCO (PCP/Family)


Primary Care Physician


Patient Instructions:  Chest Pain (DC)





Add. Discharge Instructions:  


HOME, REST





TAKE 81 MG ASPIRIN EVERY DAY





TAKE YOUR HOME NITROGLYCERINE--1 TABLET UNDER THE TONGUE EVERY 5 MINUTES X 3, AS

NEEDED FOR CHEST PAIN 





RETURN TO ER IF YOUR SYMPTOMS ARE NOT RELIEVED AFTER 3 NITROGLYCERINE





FOLLOW UP WITH DR. CASTRO NEXT WEEK--CALL HIS OFFICE MONDAY MORNING TO SCHEDULE 

APPOINTMENT





All discharge instructions reviewed with patient and/or family. Voiced 

understanding.











KERLINE SHAH DO                 Mar 10, 2023 19:37

## 2023-03-10 NOTE — DIAGNOSTIC IMAGING REPORT
CLINICAL INDICATION: Patient with chest pain that goes down left

arm.



EXAM: Portable chest x-ray upright view.



COMPARISON: Chest x-ray dated 09/17/2022.



FINDINGS: [id number] is again seen overlying the left chest in

stable good position.



Lungs/pleura: Lungs are clear. There is no pneumothorax. There is

no pleural effusion.



Mediastinum: Unremarkable. 



Pulmonary vasculature: Unremarkable.



Heart: Heart size within normal limits. Again seen postop changes

to the chest with sternotomy wires and mediastinal clips.



Bones/extrathoracic soft tissue: Unremarkable.



IMPRESSION: There is no radiographic evidence of acute

cardiopulmonary process. 



Dictated by: 



  Dictated on workstation # [id number]

## 2023-03-10 NOTE — DIAGNOSTIC IMAGING REPORT
EXAMINATION: CT angiography of the chest.



TECHNIQUE: Contrast enhanced thin section helical images were

obtained through the chest with intravenous contrast timed for

the optimal opacification of the arterial structures per CTA

protocol. Post-processing, reconstructions and interpretation of

angiographic images of the vessels was performed. 3D MIP

reconstructions were performed and reviewed. All CT scans use one

or more of the following dose optimizing techniques: automated

exposure control, MA and/or KvP adjustment based on patient size

and exam type or iterative reconstruction. 



HISTORY: Chest pain.



COMPARISON: None available.



FINDINGS: No pulmonary embolism.



There is no edema or pneumonia. No pleural effusion. No

pneumothorax. No suspicious nodule.



There is no axillary or supraclavicular lymphadenopathy. There is

no mediastinal lymphadenopathy. A left-sided portacatheter is

present. There has been coronary artery bypass grafting. Heart

size is normal. There are severe coronary artery calcifications.

No pericardial effusion. Aorta is normal in caliber.



Limited views of the upper abdomen are unremarkable.



There is no suspicious osseus lesion.



IMPRESSION: No pulmonary embolism. 



Dictated by: 



  Dictated on workstation # [id number]

## 2023-07-29 ENCOUNTER — HOSPITAL ENCOUNTER (OUTPATIENT)
Dept: HOSPITAL 75 - ER | Age: 53
Setting detail: OBSERVATION
LOS: 2 days | Discharge: HOME | End: 2023-07-31
Attending: FAMILY MEDICINE | Admitting: FAMILY MEDICINE
Payer: SELF-PAY

## 2023-07-29 VITALS — HEIGHT: 62.05 IN | WEIGHT: 128.75 LBS | BODY MASS INDEX: 23.39 KG/M2

## 2023-07-29 VITALS — DIASTOLIC BLOOD PRESSURE: 80 MMHG | SYSTOLIC BLOOD PRESSURE: 136 MMHG

## 2023-07-29 DIAGNOSIS — R42: ICD-10-CM

## 2023-07-29 DIAGNOSIS — I25.10: ICD-10-CM

## 2023-07-29 DIAGNOSIS — Z95.1: ICD-10-CM

## 2023-07-29 DIAGNOSIS — I10: ICD-10-CM

## 2023-07-29 DIAGNOSIS — M19.90: ICD-10-CM

## 2023-07-29 DIAGNOSIS — Z79.899: ICD-10-CM

## 2023-07-29 DIAGNOSIS — F17.210: ICD-10-CM

## 2023-07-29 DIAGNOSIS — G89.29: ICD-10-CM

## 2023-07-29 DIAGNOSIS — R07.2: Primary | ICD-10-CM

## 2023-07-29 LAB
ALBUMIN SERPL-MCNC: 4 GM/DL (ref 3.2–4.5)
ALP SERPL-CCNC: 95 U/L (ref 40–136)
ALT SERPL-CCNC: 10 U/L (ref 0–55)
AMYLASE SERPL-CCNC: 74 U/L (ref 25–125)
APTT BLD: 29 SEC (ref 24–35)
BASOPHILS # BLD AUTO: 0.1 10^3/UL (ref 0–0.1)
BASOPHILS NFR BLD AUTO: 1 % (ref 0–10)
BILIRUB SERPL-MCNC: 0.3 MG/DL (ref 0.1–1)
BUN/CREAT SERPL: 16
CALCIUM SERPL-MCNC: 8.9 MG/DL (ref 8.5–10.1)
CHLORIDE SERPL-SCNC: 100 MMOL/L (ref 98–107)
CK MB SERPL-MCNC: 0.6 NG/ML (ref ?–6.6)
CK SERPL-CCNC: 32 U/L (ref 29–168)
CO2 SERPL-SCNC: 25 MMOL/L (ref 21–32)
CREAT SERPL-MCNC: 0.89 MG/DL (ref 0.6–1.3)
D DIMER PPP FEU-MCNC: 0.45 UG/ML (ref 0–0.49)
EOSINOPHIL # BLD AUTO: 0.2 10^3/UL (ref 0–0.3)
EOSINOPHIL NFR BLD AUTO: 3 % (ref 0–10)
GFR SERPLBLD BASED ON 1.73 SQ M-ARVRAT: 77 ML/MIN
GLUCOSE SERPL-MCNC: 105 MG/DL (ref 70–105)
HCT VFR BLD CALC: 42 % (ref 35–52)
HGB BLD-MCNC: 13.7 G/DL (ref 11.5–16)
INR PPP: 1 (ref 0.8–1.4)
LIPASE SERPL-CCNC: 17 U/L (ref 8–78)
LYMPHOCYTES # BLD AUTO: 3 10^3/UL (ref 1–4)
LYMPHOCYTES NFR BLD AUTO: 51 % (ref 12–44)
MAGNESIUM SERPL-MCNC: 2.1 MG/DL (ref 1.6–2.4)
MANUAL DIFFERENTIAL PERFORMED BLD QL: NO
MCH RBC QN AUTO: 29 PG (ref 25–34)
MCHC RBC AUTO-ENTMCNC: 33 G/DL (ref 32–36)
MCV RBC AUTO: 90 FL (ref 80–99)
MONOCYTES # BLD AUTO: 0.5 10^3/UL (ref 0–1)
MONOCYTES NFR BLD AUTO: 8 % (ref 0–12)
NEUTROPHILS # BLD AUTO: 2.2 10^3/UL (ref 1.8–7.8)
NEUTROPHILS NFR BLD AUTO: 37 % (ref 42–75)
PLATELET # BLD: 207 10^3/UL (ref 130–400)
PMV BLD AUTO: 9.8 FL (ref 9–12.2)
POTASSIUM SERPL-SCNC: 3.9 MMOL/L (ref 3.6–5)
PROT SERPL-MCNC: 6.6 GM/DL (ref 6.4–8.2)
PROTHROMBIN TIME: 13.2 SEC (ref 12.2–14.7)
SODIUM SERPL-SCNC: 136 MMOL/L (ref 135–145)
WBC # BLD AUTO: 5.8 10^3/UL (ref 4.3–11)

## 2023-07-29 PROCEDURE — 83690 ASSAY OF LIPASE: CPT

## 2023-07-29 PROCEDURE — 82550 ASSAY OF CK (CPK): CPT

## 2023-07-29 PROCEDURE — 83735 ASSAY OF MAGNESIUM: CPT

## 2023-07-29 PROCEDURE — 36415 COLL VENOUS BLD VENIPUNCTURE: CPT

## 2023-07-29 PROCEDURE — 85379 FIBRIN DEGRADATION QUANT: CPT

## 2023-07-29 PROCEDURE — 85027 COMPLETE CBC AUTOMATED: CPT

## 2023-07-29 PROCEDURE — 80048 BASIC METABOLIC PNL TOTAL CA: CPT

## 2023-07-29 PROCEDURE — 80061 LIPID PANEL: CPT

## 2023-07-29 PROCEDURE — 83880 ASSAY OF NATRIURETIC PEPTIDE: CPT

## 2023-07-29 PROCEDURE — 85025 COMPLETE CBC W/AUTO DIFF WBC: CPT

## 2023-07-29 PROCEDURE — 93041 RHYTHM ECG TRACING: CPT

## 2023-07-29 PROCEDURE — 82553 CREATINE MB FRACTION: CPT

## 2023-07-29 PROCEDURE — 93017 CV STRESS TEST TRACING ONLY: CPT

## 2023-07-29 PROCEDURE — 84484 ASSAY OF TROPONIN QUANT: CPT

## 2023-07-29 PROCEDURE — 93306 TTE W/DOPPLER COMPLETE: CPT

## 2023-07-29 PROCEDURE — 85610 PROTHROMBIN TIME: CPT

## 2023-07-29 PROCEDURE — 93005 ELECTROCARDIOGRAM TRACING: CPT

## 2023-07-29 PROCEDURE — 82150 ASSAY OF AMYLASE: CPT

## 2023-07-29 PROCEDURE — 80053 COMPREHEN METABOLIC PANEL: CPT

## 2023-07-29 PROCEDURE — 99284 EMERGENCY DEPT VISIT MOD MDM: CPT

## 2023-07-29 PROCEDURE — 78452 HT MUSCLE IMAGE SPECT MULT: CPT

## 2023-07-29 PROCEDURE — 85730 THROMBOPLASTIN TIME PARTIAL: CPT

## 2023-07-29 PROCEDURE — 83874 ASSAY OF MYOGLOBIN: CPT

## 2023-07-29 PROCEDURE — 71045 X-RAY EXAM CHEST 1 VIEW: CPT

## 2023-07-29 RX ADMIN — ENOXAPARIN SODIUM ONE MG: 100 INJECTION SUBCUTANEOUS at 21:26

## 2023-07-29 RX ADMIN — DEXTROSE MONOHYDRATE, SODIUM CHLORIDE, AND POTASSIUM CHLORIDE SCH MLS/HR: 50; 4.5; 1.49 INJECTION, SOLUTION INTRAVENOUS at 23:19

## 2023-07-29 RX ADMIN — ENOXAPARIN SODIUM ONE MG: 100 INJECTION SUBCUTANEOUS at 21:32

## 2023-07-29 NOTE — ED CHEST PAIN
General


Chief Complaint:  Chest Pain


Stated Complaint:  CHEST  PAIN


Source:  patient, old records





History of Present Illness


Date Seen by Provider:  2023


Time Seen by Provider:  20:12


Initial Comments


PT ARRIVES VIA POV FROM HOME


C/O CHEST PAIN 


PAIN BEGAN ABOUT AN HOUR AGO, WHILE LAYING ON THE COUCH


PAIN IS IN CENTER OF CHEST, DOES NOT RADIATE


RATES PAIN 8/10


NOTHING WORSENS OR IMPROVES PAIN BUT HAS NOT TAKEN ANYTHING FOR PAIN--SHE TOOK 

HER USUAL TRAMADOL EARLIER IN THE DAY FOR HER CHRONIC GENERALIZED PAIN 


+ NAUSEA, NO VOMITING


SOME SHORTNESS OF BREATH


NO SWEATS


NO SWELLING IN LEGS/FEET OR PAIN IN CALVES. 


SHE HAS BEEN A LITTLE DIZZY WITH THE PAIN 


NO PALPITATIONS


NO SYNCOPE


2 DAYS AGO, HER LEFT ARM STARTED FEELING A LITTLE HEAVY AND NUMB, BUT NOT NOW





SHE HAS HAD PRIOR CABG X 1 VESSEL IN 


LAST CATH 2018 BY DR. CASTRO--NO INTERVENTION


SHE TAKES 81 MG ASPIRIN DAILY, NO BLOOD THINNERS OR ANY CARDIAC MEDICATIONS OR 

BLOOD PRESSURE MEDICATIONS. 


SHE HAS NOT SEEN DR. CASTRO RECENTLY, OR ANY CARDIOLOGIST--HAS BEEN OVER A YEAR 

SINCE HER LAST VISIT. 





SHE CONTINUES TO SMOKE 1-2 PPD. 


SHE DENIES ALCOHOL OR DRUG USE





SHE HAS HAD COVID VACCINE X 2 OR 3, NO FLU VACCINE. 





PT WITH MULTIPLE VISITS, MANY FOR CHEST PAIN





PCP: NEVILLE ZARCO IN [facility]


CARDIOLOGIST: DR. CASTRO





Allergies and Home Medications


Allergies


Coded Allergies:  


     morphine (Verified  Allergy, Unknown, RASH - TAKES LORTAB AT HOME WITH NO 

ISSUES, 16)





Patient Home Medication List


Home Medication List Reviewed:  Yes


Aspirin (Aspirin EC) 81 Mg Tablet.dr 81 MG PO DAILY


   Prescribed by: PRITESH MOLINA on 3/13/21 1333


Benzonatate (Tessalon Perles) 100 Mg Capsule, 200 MG PO TID PRN for COUGH


   Prescribed by: Jenny Ruvalcaba on 22 1656


Chlorzoxazone (Chlorzoxazone) 500 Mg Tablet, 500 PO TID, (Reported)


   Entered as Reported by: BERTIN SULLIVAN on 3/13/21 1325


Gabapentin (Gabapentin) 400 Mg Capsule, 400 MG PO Q6H


   Prescribed by: SANDIE MEDLEY on 3/12/21 2317


Ondansetron (Ondansetron Odt) 4 Mg Tab.rapdis, 4 MG PO Q6H PRN for NAUSEA/VOMI

TING


   Prescribed by: TODD DE LEON on 19 1140


Prednisone (Prednisone) 20 Mg Tab, 60 MG PO DAILY


   Prescribed by: Jenny Ruvalcaba on 22 1656


Quetiapine Fumarate (Quetiapine Fumarate) 200 Mg Tablet, 200 PO HS, (Reported)


   Entered as Reported by: BERTIN SULLIVAN on 3/13/21 1323


Tramadol HCl (Tramadol HCl) 50 Mg Tablet, 50 MG PO Q6H PRN for PAIN-MODERATE (5-

7), (Reported)


   Entered as Reported by: BERTIN SULLIVAN on 3/13/21 1322





Review of Systems


Review of Systems


Constitutional:  see HPI; No diaphoresis; dizziness


EENTM:  No Symptoms Reported


Respiratory:  See HPI; Denies Cough; Shortness of Air


Cardiovascular:  See HPI, Chest Pain; Denies Edema; Lightheadedness; Denies 

Palpitations, Denies Syncope


Gastrointestinal:  See HPI; Denies Abdominal Pain; Nausea; Denies Vomiting


Genitourinary:  No Symptoms Reported


Musculoskeletal:  no symptoms reported


Skin:  no symptoms reported


Psychiatric/Neurological:  No Symptoms Reported


Endocrine:  No Symptoms Reported


Hematologic/Lymphatic:  No Symptoms Reported





Past Medical-Social-Family Hx


Patient Social History


Tobacco Use?:  Yes


Tobacco type used:  Cigarettes


Smoking Status:  Current Everyday Smoker


Substance use?:  No


Alcohol Use?:  No





Immunizations Up To Date


Tetanus Booster (TDap):  Unknown


PED Vaccines UTD:  No


First/Initial COVID19 Vaccinat:  


Second COVID19 Vaccination Rahul:  


Third COVID19 Vaccination Date:  





Seasonal Allergies


Seasonal Allergies:  No





Past Medical History


Surgery/Hospitalization HX:  


PMH;NECK AND  BACK BULGING DISC.


SUERGERY;OPEN HEART SURGERY , HYST.


Surgeries:  Yes (left total knee 2015, cabgX1, port,)


Cardiac, CABG, Hysterectomy, Open Heart Surgery, Orthopedic


Respiratory:  Yes


Pneumonia, Chronic Bronchitis


Currently Using CPAP:  No


Currently Using BIPAP:  No


Cardiac:  Yes (CABG X 1 VESSEL )


Coronary Artery Disease, High Cholesterol, Hypertension


Neurological:  Yes


Headaches /Migraines


Reproductive Disorders:  Yes


Female Reproductive Disorders:  Menstrual Problems


GYN History:  Hysterectomy, Menopausal


Sexually Transmitted Disease:  No


HIV/AIDS:  No


Genitourinary:  No


Gastrointestinal:  Yes


Colitis, Gastroesophageal Reflux, Diverticulosis, Hemorrhoids, Chronic Diarrhea,

Ulcer, Irritable Bowel


Musculoskeletal:  Yes (FRACTURE LEFT WRIST)


Degenerate Disk Disease, Arthritis, Chronic Back Pain, Fractures


Endocrine:  No


HEENT:  No


Loss of Vision:  Denies


Hearing Impairment:  Denies


Cancer:  No


Psychosocial:  Yes


Sleep Difficulties, Anxiety, Depression


Integumentary:  No


Blood Disorders:  No


Adverse Reaction/Blood Tranf:  No





Family Medical History





Cancer (brain and back)


  G8 SISTER


  G8 SISTER


Cataracts


  19 MOTHER


Headache disorder


  G8 SISTER


Hypercholesterolemia


  19 FATHER


Hypertension


  19 FATHER


Myocardial infarction


  19 FATHER


  19 FATHER


Myocardial infarction ( from MI at 55


)


  19 FATHER


  19 FATHER


Neoplasm


  G8 SISTER


Psychosocial problem


  G8 SISTER


Visual disorder


  G8 SISTER





No Family History of:


  AIDS


  Abdominal aortic aneurysm


  [place]'s disease


  Alcoholism


  Alzheimer's disease


  Aphasia


  Arthritis


  Asthma


  Cancer of mouth


  Cardiovascular disease


  Colon cancer


  Completed stroke


  Congenital disease


  Congenital heart disease


  Coronary thrombosis


  Cystic fibrosis


  Deafness or hearing loss


  Dementia


  Diabetes mellitus


  Drug abuse


  Dysphasia


  Fibrocystic disease of breast


  Gastroenteritis


  Glaucoma


  Infertility


  Kidney disease


  Not obtainable due to adoption


  Osteoporosis


  Parkinson's disease


  Prostate cancer


  Respiratory disorder


  Seizure disorder


  Severe allergy


  Thyroid disease


  Tuberculosis


Cancer








SOCIAL HISTORY:


-SMOKES 1-2 PPD


-DENIES ETOH USE


-DENIES DRUG USE





PAST SURGICAL HISTORY:


-CABG X 1 VESSEL 


-CARDIAC CATH  --NO INTERVENTION. BY DR. CASTRO


-LEFT KNEE REPLACEMENT


-PORT LEFT CHEST





Physical Exam


Vital Signs





Vital Signs - First Documented








 23





 20:12


 


Temp 36.1


 


Pulse 98


 


Resp 22


 


B/P (MAP) 137/80 (99)


 


Pulse Ox 99





Capillary Refill :


Height, Weight, BMI


Height: 5'2.00"


Weight: 115lbs. 7.0oz. 52.939931ro; 23.00 BMI


Method:Stated


General Appearance:  No Apparent Distress, WD/WN, Other (REEKS OF CIGARETTES)


HEENT:  PERRL/EOMI


Neck:  Normal Inspection


Respiratory:  Chest Non Tender, Normal Breath Sounds, No Accessory Muscle Use, 

No Respiratory Distress


Cardiovascular:  Regular Rate, Rhythm, No Edema, No JVD, No Murmur, Normal 

Peripheral Pulses


Gastrointestinal:  Normal Bowel Sounds, No Organomegaly, No Pulsatile Mass, Non 

Tender, Soft


Extremity:  Normal Capillary Refill, Normal Inspection, Normal Range of Motion, 

Non Tender, No Calf Tenderness, No Pedal Edema


Neurologic/Psychiatric:  Alert, Oriented x3, No Motor/Sensory Deficits, Normal 

Mood/Affect, CNs II-XII Norm as Tested


Skin:  Normal Color, Warm/Dry





Progress/Results/Core Measures


Results/Orders


Lab Results





Laboratory Tests








Test


 23


20:24 Range/Units


 


 


White Blood Count


 5.8 


 4.3-11.0


10^3/uL


 


Red Blood Count


 4.66 


 3.80-5.11


10^6/uL


 


Hemoglobin 13.7  11.5-16.0  g/dL


 


Hematocrit 42  35-52  %


 


Mean Corpuscular Volume 90  80-99  fL


 


Mean Corpuscular Hemoglobin 29  25-34  pg


 


Mean Corpuscular Hemoglobin


Concent 33 


 32-36  g/dL





 


Red Cell Distribution Width 13.7  10.0-14.5  %


 


Platelet Count


 207 


 130-400


10^3/uL


 


Mean Platelet Volume 9.8  9.0-12.2  fL


 


Immature Granulocyte % (Auto) 0   %


 


Neutrophils (%) (Auto) 37 L 42-75  %


 


Lymphocytes (%) (Auto) 51 H 12-44  %


 


Monocytes (%) (Auto) 8  0-12  %


 


Eosinophils (%) (Auto) 3  0-10  %


 


Basophils (%) (Auto) 1  0-10  %


 


Neutrophils # (Auto)


 2.2 


 1.8-7.8


10^3/uL


 


Lymphocytes # (Auto)


 3.0 


 1.0-4.0


10^3/uL


 


Monocytes # (Auto)


 0.5 


 0.0-1.0


10^3/uL


 


Eosinophils # (Auto)


 0.2 


 0.0-0.3


10^3/uL


 


Basophils # (Auto)


 0.1 


 0.0-0.1


10^3/uL


 


Immature Granulocyte # (Auto)


 0.0 


 0.0-0.1


10^3/uL


 


Prothrombin Time 13.2  12.2-14.7  SEC


 


INR Comment 1.0  0.8-1.4  


 


Activated Partial


Thromboplast Time 29 


 24-35  SEC





 


D-Dimer


 0.45 


 0.00-0.49


UG/ML


 


Sodium Level 136  135-145  MMOL/L


 


Potassium Level 3.9  3.6-5.0  MMOL/L


 


Chloride Level 100    MMOL/L


 


Carbon Dioxide Level 25  21-32  MMOL/L


 


Anion Gap 11  5-14  MMOL/L


 


Blood Urea Nitrogen 14  7-18  MG/DL


 


Creatinine


 0.89 


 0.60-1.30


MG/DL


 


Estimat Glomerular Filtration


Rate 77 


  





 


BUN/Creatinine Ratio 16   


 


Glucose Level 105    MG/DL


 


Calcium Level 8.9  8.5-10.1  MG/DL


 


Corrected Calcium 8.9  8.5-10.1  MG/DL


 


Magnesium Level 2.1  1.6-2.4  MG/DL


 


Total Bilirubin 0.3  0.1-1.0  MG/DL


 


Aspartate Amino Transf


(AST/SGOT) 13 


 5-34  U/L





 


Alanine Aminotransferase


(ALT/SGPT) 10 


 0-55  U/L





 


Alkaline Phosphatase 95    U/L


 


Total Creatine Kinase 32    U/L


 


Creatine Kinase MB 0.6  <6.6  NG/ML


 


Myoglobin


 13.3 


 10.0-92.0


NG/ML


 


Troponin I < 0.028  <0.028  NG/ML


 


B-Type Natriuretic Peptide 40.1  <100.0  PG/ML


 


Total Protein 6.6  6.4-8.2  GM/DL


 


Albumin 4.0  3.2-4.5  GM/DL


 


Amylase Level 74    U/L


 


Lipase 17  8-78  U/L








My Orders





Orders - KERLINE SHAH DO


Cbc With Automated Diff (23 20:11)


Magnesium (23 20:11)


Chest 1 View, Ap/Pa Only (23 20:11)


Ekg Tracing (23 20:11)


Comprehensive Metabolic Panel (23 20:11)


Myoglobin Serum (23 20:11)


Protime With Inr (23 20:11)


Partial Thromboplastin Time (23 20:11)


O2 (23 20:11)


Monitor-Rhythm Ecg Trace Only (23 20:11)


Lipid Panel (23 06:00)


Ed Iv/Invasive Line Start (23 20:11)


Creatine Kinase (23 20:11)


Creatine Kinase Mb (23 20:11)


Lipase (23 20:11)


Amylase (23 20:11)


Bnp Judit (23 20:11)


Fibrin Degradation Products (23 20:11)


Troponin I Judit (23 20:11)


Nitroglycerin 0.4 Mg Btl 25's (Nitrostat (23 20:15)


Aspirin Chewable Tablet (Aspirin Chewabl (23 20:15)


Ed Iv/Invasive Line Start (23 20:32)


Ns Iv 1000 Ml (Sodium Chloride 0.9%) (23 20:45)


Fentanyl  Inj (Sublimaze Injection) (23 20:45)


Ondansetron Injection (Zofran Injectio (23 20:45)


Enoxaparin Injection (Lovenox Injection) (23 21:30)





Medications Given in ED





Current Medications








 Medications  Dose


 Ordered  Sig/Elena


 Route  Start Time


 Stop Time Status Last Admin


Dose Admin


 


 Aspirin  324 mg  ONCE  ONCE


 PO  23 20:15


 23 20:16 DC 23 20:21


324 MG


 


 Enoxaparin Sodium  60 mg  ONCE  ONCE


 SC  23 21:30


 23 21:31  23 21:26


60 MG


 


 Fentanyl Citrate  50 mcg  ONCE  ONCE


 IVP  23 20:45


 23 20:46 DC 23 20:38


50 MCG


 


 Nitroglycerin  0.4 mg  UD  PRN


 SL  23 20:15


    23 20:21


0.4 MG


 


 Ondansetron HCl  4 mg  ONCE  ONCE


 IVP  23 20:45


 23 20:46 DC 23 20:49


4 MG








Vital Signs/I&O











 23





 20:12


 


Temp 36.1


 


Pulse 98


 


Resp 22


 


B/P (MAP) 137/80 (99)


 


Pulse Ox 99











Progress


Progress Note :  


Progress Note





VITALS ON ARRIVAL: TEMP 36.1, HR98, RR 22, /80, O2 SAT 99% ON ROOM AIR





CHEST PAIN PROTOCOL INITIATED





GIVEN: 


-ASPIRIN


-NTG X 1--PAIN DOWN TO 6/10, BUT BP DROPPED TO 90'S. GIVEN IV FLUIDS AND BP BACK

UP TO > 100 SYSTOLIC


-FENTANYL--PAIN IS MUCH IMPROVED





NO DETERIORATION IN PT'S CONDITION DURING ER STAY





ALL LABS, EKG AND CXR ARE UNREMARKABLE





DISCUSSED TEST RESULTS, NEED FOR ADMIT AND PT IS AGREEABLE TO PLAN





REVIEWED PRIOR RECORDS INCLUDING ER VISITS, ADMITS/H&P'S/CONSULTS/DISCHARGE 

SUMMARIES, TESTS/PROCEDURES.


Initial ECG Impression Date:  2023


Initial ECG Impression Time:  20:17


Initial ECG Rate:  90


Initial ECG Rhythm:  Normal Sinus


Initial ECG Intervals:  Normal


Initial ECG Impression:  Nonspecific Changes


Initial ECG Comparisson:  Unchanged


Comment


INTERPRETED BY ME





Diagnostic Imaging





Comments





CXR--PER RADIOLOGIST REPORT AT 





FINDINGS:


The lung volumes are large. No focal consolidation is seen. No


large pleural effusion or pneumothorax is seen. The


cardiomediastinal silhouette is normal in size and contour. No


acute osseous abnormality is seen.  The left Port-A-Cath projects


over the low SVC. Sternotomy wires and post-CABG changes are


seen.





IMPRESSION:


No acute pulmonary abnormality seen.


   Reviewed:  Reviewed by Me





Departure


Communication (Admissions)


--SPOKE WITH DR. MADRID, CARDIOLOGIST, WILL SEE PT IN CONSULT


--SPOKE WITH DR. MOLINA, HOSPITALIST. ACCEPTS PT FOR ADMIT.





Impression





   Primary Impression:  


   Chest pain


   Additional Impressions:  


   CAD (coronary artery disease)


   Heavy smoker


Disposition:   ADMITTED AS INPATIENT


Condition:  Improved





Admissions


Decision to Admit Reason:  Admit from ER (General)


Decision to Admit/Date:  2023


Time/Decision to Admit Time:  21:15





Departure-Patient Inst.


Referrals:  


JOSE ZARCO (PCP/Family)


Primary Care Physician











KERLINE SHAH DO                 2023 20:26

## 2023-07-29 NOTE — DIAGNOSTIC IMAGING REPORT
PATIENT HISTORY: 

Chest pain. 



TECHNIQUE: 

Single frontal view of the chest.



COMPARISON: 

03/10/2023.



FINDINGS:

The lung volumes are large. No focal consolidation is seen. No

large pleural effusion or pneumothorax is seen. The

cardiomediastinal silhouette is normal in size and contour. No

acute osseous abnormality is seen.  The left Port-A-Cath projects

over the low SVC. Sternotomy wires and post-CABG changes are

seen.



IMPRESSION:

No acute pulmonary abnormality seen.



Dictated by: 



  Dictated on workstation # [id number]

## 2023-07-30 VITALS — SYSTOLIC BLOOD PRESSURE: 131 MMHG | DIASTOLIC BLOOD PRESSURE: 77 MMHG

## 2023-07-30 VITALS — SYSTOLIC BLOOD PRESSURE: 111 MMHG | DIASTOLIC BLOOD PRESSURE: 45 MMHG

## 2023-07-30 VITALS — DIASTOLIC BLOOD PRESSURE: 80 MMHG | SYSTOLIC BLOOD PRESSURE: 128 MMHG

## 2023-07-30 VITALS — DIASTOLIC BLOOD PRESSURE: 81 MMHG | SYSTOLIC BLOOD PRESSURE: 145 MMHG

## 2023-07-30 VITALS — DIASTOLIC BLOOD PRESSURE: 79 MMHG | SYSTOLIC BLOOD PRESSURE: 137 MMHG

## 2023-07-30 VITALS — DIASTOLIC BLOOD PRESSURE: 81 MMHG | SYSTOLIC BLOOD PRESSURE: 132 MMHG

## 2023-07-30 LAB
CHOLEST SERPL-MCNC: 144 MG/DL (ref ?–200)
HDLC SERPL-MCNC: 32 MG/DL (ref 40–60)
TRIGL SERPL-MCNC: 119 MG/DL (ref ?–150)
VLDLC SERPL CALC-MCNC: 24 MG/DL (ref 5–40)

## 2023-07-30 RX ADMIN — PANTOPRAZOLE SODIUM SCH MG: 40 TABLET, DELAYED RELEASE ORAL at 10:08

## 2023-07-30 RX ADMIN — GABAPENTIN SCH MG: 400 CAPSULE ORAL at 17:32

## 2023-07-30 RX ADMIN — GABAPENTIN SCH MG: 400 CAPSULE ORAL at 21:07

## 2023-07-30 RX ADMIN — DEXTROSE MONOHYDRATE, SODIUM CHLORIDE, AND POTASSIUM CHLORIDE SCH MLS/HR: 50; 4.5; 1.49 INJECTION, SOLUTION INTRAVENOUS at 18:54

## 2023-07-30 RX ADMIN — GABAPENTIN SCH MG: 400 CAPSULE ORAL at 11:09

## 2023-07-30 RX ADMIN — ASPIRIN SCH MG: 81 TABLET ORAL at 10:08

## 2023-07-30 NOTE — HISTORY & PHYSICAL-HOSPITALIST
History of Present Illness


HPI/Chief Complaint


A 53-year-old  female with past medical history of coronary artery 

disease status post CABG in , hypertension, tobacco abuse who presented to 

the emergency department due to chest pain.  She reports that started at rest 

roughly 1 hour before presenting here.  She reported it was an 8 out of 10 and 

was associated with nausea and shortness of breath.  She did not become 

diaphoretic or vomit.  She did not have any radiation of her pain but 2 days ago

she did have some left arm heaviness.  Her troponin was negative on arrival but 

given her history she was admitted to observation for chest pain rule out.  This

morning she reports her pain has completely resolved.


Source:  patient


Exam Limitations:  no limitations


Date Seen


23


Time Seen by a Provider:  08:30


Attending Physician


Amrik Holman


PCP


Admitting Physician:


Pritesh Tai MD 








Attending Physician:


Pritesh Tai MD


Referring Physician





Date of Admission


2023 at 22:48





Home Medications & Allergies


Home Medications


Reviewed patient Home Medication Reconciliation performed by pharmacy medication

reconciliations technician and/or nursing.


Patients Allergies have been reviewed.





Allergies





Allergies


Coded Allergies


  morphine (Verified Allergy, Unknown, RASH - TAKES LORTAB AT HOME WITH NO 

ISSUES, 16)








Past Medical-Social-Family Hx


Patient Social History


Marrital Status:  


Employed/Student:  employed


Tobacco Use?:  Yes


Tobacco type used:  Cigarettes


Smoking Status:  Light Tobacco Smoker


Use of E-Cig and/or Vaping dev:  No


Substance use?:  No


Alcohol Use?:  No


Pt feels they are or have been:  No





Immunizations Up To Date


Date of Influenza Vaccine:  2021


First/Initial COVID19 Vaccinat:  


Second COVID19 Vaccination Rahul:  


Tetanus Booster (TDap):  Unknown


Hepatitis A:  Yes


Hepatitis B:  Yes


PED Vaccines UTD:  No


Date of Pneumonia Vaccine:  2016





Seasonal Allergies


Seasonal Allergies:  No





Current Status


Advance Directives:  No


Communicates:  Verbally


Primary Language:  English


Preferred Spoken Language:  English


Is interpretation needed?:  No


Implanted or Applied Medical D:  Stents





Past Medical History


Surgeries:  Cardiac, CABG, Hysterectomy, Open Heart Surgery, Orthopedic


Pneumonia, Chronic Bronchitis


Currently Using CPAP:  No


Currently Using BIPAP:  No


Coronary Artery Disease, High Cholesterol, Hypertension


Headaches /Migraines


GYN History:  Hysterectomy, Menopausal


Sexually Transmitted Disease:  No


HIV/AIDS:  No


Colitis, Gastroesophageal Reflux, Diverticulosis, Hemorrhoids, Chronic Diarrhea,

Ulcer, Irritable Bowel


Degenerate Disk Disease, Arthritis, Chronic Back Pain, Fractures


Loss of Vision:  Denies


Hearing Impairment:  Denies


Sleep Difficulties, Anxiety, Depression


Blood Disorders:  No


Adverse Reaction/Blood Tranf:  No





Family Medical History





Cancer (brain and back)


  G8 SISTER


  G8 SISTER


Cataracts


  19 MOTHER


Headache disorder


  G8 SISTER


Hypercholesterolemia


  19 FATHER


Hypertension


  19 FATHER


Myocardial infarction


  19 FATHER


  19 FATHER


Myocardial infarction ( from MI at 55


)


  19 FATHER


  19 FATHER


Neoplasm


  G8 SISTER


Psychosocial problem


  G8 SISTER


Visual disorder


  G8 SISTER





No Family History of:


  AIDS


  Abdominal aortic aneurysm


  [place]'s disease


  Alcoholism


  Alzheimer's disease


  Aphasia


  Arthritis


  Asthma


  Cancer of mouth


  Cardiovascular disease


  Colon cancer


  Completed stroke


  Congenital disease


  Congenital heart disease


  Coronary thrombosis


  Cystic fibrosis


  Deafness or hearing loss


  Dementia


  Diabetes mellitus


  Drug abuse


  Dysphasia


  Fibrocystic disease of breast


  Gastroenteritis


  Glaucoma


  Infertility


  Kidney disease


  Not obtainable due to adoption


  Osteoporosis


  Parkinson's disease


  Prostate cancer


  Respiratory disorder


  Seizure disorder


  Severe allergy


  Thyroid disease


  Tuberculosis


Cancer








SOCIAL HISTORY:


-SMOKES 1/3 PPD


-DENIES ETOH USE


-DENIES DRUG USE





PAST SURGICAL HISTORY:


-CABG X 1 VESSEL 


-CARDIAC CATH  --NO INTERVENTION. BY DR. CASTRO


-LEFT KNEE REPLACEMENT


-PORT LEFT CHEST





Review of Systems


Constitutional:  see HPI





Physical Exam


Physical Exam


Vital Signs





Vital Signs - First Documented








 23





 20:12 23:09


 


Temp 36.1 


 


Pulse 98 


 


Resp 22 


 


B/P (MAP) 137/80 (99) 


 


Pulse Ox 99 


 


O2 Delivery  Room Air





Capillary Refill :


Height, Weight, BMI


Height: 5'2.00"


Weight: 115lbs. 7.0oz. 52.049525bx; 23.59 BMI


Method:Stated


General Appearance:  No Apparent Distress, WD/WN


Respiratory:  Lungs Clear, No Respiratory Distress


Cardiovascular:  Regular Rate, Rhythm, No Murmur


Gastrointestinal:  Normal Bowel Sounds, Soft


Extremity:  No Calf Tenderness, No Pedal Edema


Neurologic/Psychiatric:  Alert, Oriented x3, Normal Mood/Affect





Results


Results/Procedures


Labs


Laboratory Tests


23 20:24








Patient resulted labs reviewed.


Imaging:  Reviewed Imaging Report


Imaging


                 [facility] [place], [place]





NAME:   MARILIA JONES


MED REC#:   [id number]


ACCOUNT#:   [id number]


PT STATUS:   REG ER


:   1970


PHYSICIAN:   KERLINE SHAH DO


ADMIT DATE:   23/ER


                                  ***Signed***


Date of Exam:23





CHEST 1 VIEW, AP/PA ONLY








PATIENT HISTORY: 


Chest pain. 





TECHNIQUE: 


Single frontal view of the chest.





COMPARISON: 


03/10/2023.





FINDINGS:


The lung volumes are large. No focal consolidation is seen. No


large pleural effusion or pneumothorax is seen. The


cardiomediastinal silhouette is normal in size and contour. No


acute osseous abnormality is seen.  The left Port-A-Cath projects


over the low SVC. Sternotomy wires and post-CABG changes are


seen.





IMPRESSION:


No acute pulmonary abnormality seen.





Dictated by: 





  Dictated on workstation # [id number]








Dict:   23


Trans:   23


 [id number]





Interpreted by:     STEPHANIE PAGAN MD


Electronically signed by: STEPHANIE PAGAN MD 23





Assessment/Plan


Admission Diagnosis


Chest pain


Admission Status:  Observation





Assessment and Plan


Chest pain


CAD s/o CABG


HTN


Tobacco abuse


   trop negative x2


   Monitor on telemetry


   Cardiology consulted, appreciate recs


   Planning stress test tomorrow


   ASA, nitro prn chest pain


   Encouraged smoking cessation (down to 1/3 pack /day from 2ppd)





Chronic pain


DJD


   Continue home meds





DVT ppx: SCDs and ambulation





Clinical Quality Measures


AMI/AHF:


ASA po Prior to arrival:  No











PRITESH TAI MD              2023 10:28

## 2023-07-30 NOTE — CONSULTATION-CARDIOLOGY
HPI-Cardiology


Cardiology Consultation


Date of Consultation


23


Date of Admission





Time Seen by Provider:  09:36


Indication:  Chest pain





HPI


53-year-old lady with history of coronary artery disease, tobaccoism.  Patient 

was laying down and watching TV when she started to have chest pain described as

dull in nature in the retrosternal area associated with mild lightheadedness, no

shortness of breath or palpitation lasted for about 2 and resolved 

spontaneously.  Came into the emergency room, so far work-up has been negative. 

Currently laying down in bed no active chest pain.





Home Medications & Allergies


Allergies:  


Coded Allergies:  


     morphine (Verified  Allergy, Unknown, RASH - TAKES LORTAB AT HOME WITH NO 

ISSUES, 16)


Home Medication List Reviewed:  Yes





PMH-Social-Family Hx


Patient Social History


Marital Status:  


Employed/Student:  employed


Smoking Status:  Light Tobacco Smoker


Type Used:  Cigarettes


2nd Hand Smoke Exposure:  Yes


Recent Hopitalizations:  No


Alcohol Use?:  No





Immunizations Up To Date


Tetanus Booster (TDap):  Unknown


Date of Pneumonia Vaccine:  2016


Date of Influenza Vaccine:  2021





Past Medical History


Discussed below





Family Medical History


Significant Family History:  Cancer


Family History:  


Cancer (brain and back)


  G8 SISTER


  G8 SISTER


Cataracts


  19 MOTHER


Headache disorder


  G8 SISTER


Hypercholesterolemia


  19 FATHER


Hypertension


  19 FATHER


Myocardial infarction


  19 FATHER


  19 FATHER


Myocardial infarction ( from MI at 55


)


  19 FATHER


  19 FATHER


Neoplasm


  G8 SISTER


Psychosocial problem


  G8 SISTER


Visual disorder


  G8 SISTER





No Family History of:


  AIDS


  Abdominal aortic aneurysm


  Han's disease


  Alcoholism


  Alzheimer's disease


  Aphasia


  Arthritis


  Asthma


  Cancer of mouth


  Cardiovascular disease


  Colon cancer


  Completed stroke


  Congenital disease


  Congenital heart disease


  Coronary thrombosis


  Cystic fibrosis


  Deafness or hearing loss


  Dementia


  Diabetes mellitus


  Drug abuse


  Dysphasia


  Fibrocystic disease of breast


  Gastroenteritis


  Glaucoma


  Infertility


  Kidney disease


  Not obtainable due to adoption


  Osteoporosis


  Parkinson's disease


  Prostate cancer


  Respiratory disorder


  Seizure disorder


  Severe allergy


  Thyroid disease


  Tuberculosis





Review of Systems-General


Review of Systems


Constitutional:  see HPI; No diaphoresis; dizziness


EENTM:  see HPI, no symptoms reported


Respiratory:  no symptoms reported, see HPI


Cardiovascular:  see HPI, chest pain; No edema, No Hx of Intervention, No 

palpitations, No syncope, No vascular heart diseas, No other


Gastrointestinal:  no symptoms reported, see HPI


Genitourinary:  no symptoms reported, see HPI


Musculoskeletal:  no symptoms reported


Skin:  no symptoms reported


Psychiatric/Neurological:  No Symptoms Reported





Reviewed Test Results


Reviewed Test Results


Lab





Laboratory Tests








Test


 23


20:24 23


04:01 Range/Units


 


 


White Blood Count


 5.8 


 


 4.3-11.0


10^3/uL


 


Red Blood Count


 4.66 


 


 3.80-5.11


10^6/uL


 


Hemoglobin 13.7   11.5-16.0  g/dL


 


Hematocrit 42   35-52  %


 


Mean Corpuscular Volume 90   80-99  fL


 


Mean Corpuscular Hemoglobin 29   25-34  pg


 


Mean Corpuscular Hemoglobin


Concent 33 


 


 32-36  g/dL





 


Red Cell Distribution Width 13.7   10.0-14.5  %


 


Platelet Count


 207 


 


 130-400


10^3/uL


 


Mean Platelet Volume 9.8   9.0-12.2  fL


 


Immature Granulocyte % (Auto) 0    %


 


Neutrophils (%) (Auto) 37 L  42-75  %


 


Lymphocytes (%) (Auto) 51 H  12-44  %


 


Monocytes (%) (Auto) 8   0-12  %


 


Eosinophils (%) (Auto) 3   0-10  %


 


Basophils (%) (Auto) 1   0-10  %


 


Neutrophils # (Auto)


 2.2 


 


 1.8-7.8


10^3/uL


 


Lymphocytes # (Auto)


 3.0 


 


 1.0-4.0


10^3/uL


 


Monocytes # (Auto)


 0.5 


 


 0.0-1.0


10^3/uL


 


Eosinophils # (Auto)


 0.2 


 


 0.0-0.3


10^3/uL


 


Basophils # (Auto)


 0.1 


 


 0.0-0.1


10^3/uL


 


Immature Granulocyte # (Auto)


 0.0 


 


 0.0-0.1


10^3/uL


 


Prothrombin Time 13.2   12.2-14.7  SEC


 


INR Comment 1.0   0.8-1.4  


 


Activated Partial


Thromboplast Time 29 


 


 24-35  SEC





 


D-Dimer


 0.45 


 


 0.00-0.49


UG/ML


 


Sodium Level 136   135-145  MMOL/L


 


Potassium Level 3.9   3.6-5.0  MMOL/L


 


Chloride Level 100     MMOL/L


 


Carbon Dioxide Level 25   21-32  MMOL/L


 


Anion Gap 11   5-14  MMOL/L


 


Blood Urea Nitrogen 14   7-18  MG/DL


 


Creatinine


 0.89 


 


 0.60-1.30


MG/DL


 


Estimat Glomerular Filtration


Rate 77 


 


  





 


BUN/Creatinine Ratio 16    


 


Glucose Level 105     MG/DL


 


Calcium Level 8.9   8.5-10.1  MG/DL


 


Corrected Calcium 8.9   8.5-10.1  MG/DL


 


Magnesium Level 2.1   1.6-2.4  MG/DL


 


Total Bilirubin 0.3   0.1-1.0  MG/DL


 


Aspartate Amino Transf


(AST/SGOT) 13 


 


 5-34  U/L





 


Alanine Aminotransferase


(ALT/SGPT) 10 


 


 0-55  U/L





 


Alkaline Phosphatase 95     U/L


 


Total Creatine Kinase 32     U/L


 


Creatine Kinase MB 0.6   <6.6  NG/ML


 


Myoglobin


 13.3 


 


 10.0-92.0


NG/ML


 


Troponin I < 0.028  < 0.028  <0.028  NG/ML


 


B-Type Natriuretic Peptide 40.1   <100.0  PG/ML


 


Total Protein 6.6   6.4-8.2  GM/DL


 


Albumin 4.0   3.2-4.5  GM/DL


 


Amylase Level 74     U/L


 


Lipase 17   8-78  U/L


 


Triglycerides Level  119  <150  MG/DL


 


Cholesterol Level  144  < 200  MG/DL


 


LDL Cholesterol Direct  98  1-129  MG/DL


 


VLDL Cholesterol  24  5-40  MG/DL


 


HDL Cholesterol  32 L 40-60  MG/DL











Physical Exam


Physical Exam


Vital Signs





Vital Signs - First Documented








 23





 20:12 23:09


 


Temp 36.1 


 


Pulse 98 


 


Resp 22 


 


B/P (MAP) 137/80 (99) 


 


Pulse Ox 99 


 


O2 Delivery  Room Air





Capillary Refill :


Height, Weight, BMI


Height: 5'2.00"


Weight: 115lbs. 7.0oz. [id number]; 23.59 BMI


Method:Stated


General Appearance:  No Apparent Distress, WD/WN, Other (REEKS OF CIGARETTES)


Eyes:  Bilateral Eye Normal Inspection, Bilateral Eye PERRL, Bilateral Eye EOMI


HEENT:  PERRL/EOMI


Neck:  Normal Inspection


Respiratory:  Chest Non Tender, Normal Breath Sounds, No Accessory Muscle Use, 

No Respiratory Distress


Cardiovascular:  Regular Rate, Rhythm, No Edema, No JVD, No Murmur, Normal 

Peripheral Pulses


Gastrointestinal:  Normal Bowel Sounds, No Organomegaly, No Pulsatile Mass, Non 

Tender, Soft


Back:  Normal Inspection, No CVA Tenderness, No Vertebral Tenderness


Extremity:  Normal Capillary Refill, Normal Inspection, Normal Range of Motion, 

Non Tender, No Calf Tenderness, No Pedal Edema


Neurologic/Psychiatric:  Alert, Oriented x3, No Motor/Sensory Deficits, Normal 

Mood/Affect, CNs II-XII Norm as Tested


Skin:  Normal Color, Warm/Dry


Lymphatic:  No Adenopathy





A/P-Cardiology


Admission Diagnosis


Chest pain


Coronary artery disease


Tobaccoism


Neck pain





Assessment/Plan


Chest pain, nonspecific etiology, resting chest pain associated with dizziness


Cardiac enzymes and EKG did not show any acute abnormality


Discussed the management plan recommended stress test in the morning





Coronary artery disease,


Following with Dr. Melo


History of CABG, cardiac catheterization done by Dr. Logan in 2018 showing 

moderate stenosis in the LIMA to LAD, moderate stenosis in the native LAD and 

circumflex artery, FFR was more than 0.8.  Medical therapy was recommended





Tobaccoism, educated on smoking cessation





Neck pain, numbness in the arms, managed by primary care physician





Clinical Quality Measures


AMI/AHF:


ASA po Prior to arrival:  OBED Tijerina MD              2023 09:39

## 2023-07-31 VITALS — DIASTOLIC BLOOD PRESSURE: 87 MMHG | SYSTOLIC BLOOD PRESSURE: 137 MMHG

## 2023-07-31 VITALS — DIASTOLIC BLOOD PRESSURE: 77 MMHG | SYSTOLIC BLOOD PRESSURE: 128 MMHG

## 2023-07-31 VITALS — SYSTOLIC BLOOD PRESSURE: 126 MMHG | DIASTOLIC BLOOD PRESSURE: 77 MMHG

## 2023-07-31 VITALS — SYSTOLIC BLOOD PRESSURE: 137 MMHG | DIASTOLIC BLOOD PRESSURE: 87 MMHG

## 2023-07-31 VITALS — DIASTOLIC BLOOD PRESSURE: 82 MMHG | SYSTOLIC BLOOD PRESSURE: 125 MMHG

## 2023-07-31 VITALS — SYSTOLIC BLOOD PRESSURE: 131 MMHG | DIASTOLIC BLOOD PRESSURE: 87 MMHG

## 2023-07-31 LAB
BUN/CREAT SERPL: 12
CALCIUM SERPL-MCNC: 8.8 MG/DL (ref 8.5–10.1)
CHLORIDE SERPL-SCNC: 106 MMOL/L (ref 98–107)
CO2 SERPL-SCNC: 25 MMOL/L (ref 21–32)
CREAT SERPL-MCNC: 0.82 MG/DL (ref 0.6–1.3)
GFR SERPLBLD BASED ON 1.73 SQ M-ARVRAT: 84 ML/MIN
GLUCOSE SERPL-MCNC: 103 MG/DL (ref 70–105)
HCT VFR BLD CALC: 41 % (ref 35–52)
HGB BLD-MCNC: 13.7 G/DL (ref 11.5–16)
MCH RBC QN AUTO: 30 PG (ref 25–34)
MCHC RBC AUTO-ENTMCNC: 33 G/DL (ref 32–36)
MCV RBC AUTO: 89 FL (ref 80–99)
PLATELET # BLD: 219 10^3/UL (ref 130–400)
PMV BLD AUTO: 10 FL (ref 9–12.2)
POTASSIUM SERPL-SCNC: 4.3 MMOL/L (ref 3.6–5)
SODIUM SERPL-SCNC: 140 MMOL/L (ref 135–145)
WBC # BLD AUTO: 5 10^3/UL (ref 4.3–11)

## 2023-07-31 RX ADMIN — PANTOPRAZOLE SODIUM SCH MG: 40 TABLET, DELAYED RELEASE ORAL at 11:19

## 2023-07-31 RX ADMIN — Medication PRN ML: at 07:42

## 2023-07-31 RX ADMIN — GABAPENTIN SCH MG: 400 CAPSULE ORAL at 05:38

## 2023-07-31 RX ADMIN — GABAPENTIN SCH MG: 400 CAPSULE ORAL at 11:22

## 2023-07-31 RX ADMIN — ASPIRIN SCH MG: 81 TABLET ORAL at 11:19

## 2023-07-31 RX ADMIN — Medication PRN ML: at 09:45

## 2023-07-31 NOTE — CARDIOLOGY STRESS TEST REPORT
Stress Test Report


Date of Procedure/Referring:


Date of Procedure:  2023


PCP


Amrik Holman


Admitting Physician


Admitting Physician:


Hazel Tai MD 








Attending Physician:


Hazel Tai MD





Indications:


CP





Baseline Heart Rate:


66





Baseline Blood Pressure:


Blood Pressure Systolic:  137


Blood Pressure Diastolic:  87


Baseline Vitals





Vital Signs








  Date Time  Temp Pulse Resp B/P (MAP) Pulse Ox O2 Delivery O2 Flow Rate FiO2


 


23 20:12 36.1 98 22 137/80 (99) 99   


 


23 23:09      Room Air  











Baseline EKG:


Baseline EK





Summary


After explaining the procedure to the patient, she  signed a consent and then 

brought to the stress nuclear laboratory.


Patient received 0.4 mg Lexiscan for stress test, ECG, heart rate and blood 

pressure were monitored continuously.  Resting and stress dose of radio tracer 

were injected, imaging was acquired and reviewed in short axis, horizontal long 

axis and vertical long axis views.


TID:  1.15


SSS:  4


SDS:  3


EF:  56


Patient tolerated Lexiscan well


Breast attenuation with mild decrease uptake at the mid to apical anterolateral 

wall with mild reversibility, overall no significant ischemia or infarction on 

SPECT images


Normal left ventricular size, ejection fraction 56%











OBED MADRID MD              2023 11:23

## 2023-07-31 NOTE — DISCHARGE SUMMARY
Diagnosis/Chief Complaint


Date of Admission


Jul 29, 2023 at 22:48


Date of Discharge





Admission Diagnosis


Chest pain


Primary Care


Amrik Holman





Discharge Summary


Discharge Physical Exam


Allergies:  


Coded Allergies:  


     morphine (Verified  Allergy, Unknown, RASH - TAKES LORTAB AT HOME WITH NO 

ISSUES, 4/27/16)


Vitals & I&Os





Vital Signs








  Date Time  Temp Pulse Resp B/P (MAP) Pulse Ox O2 Delivery O2 Flow Rate FiO2


 


7/31/23 11:56 36.0 68 18 137/87 (104) 98 Room Air  








General Appearance:  No Apparent Distress, Thin


Respiratory:  Lungs Clear


Cardiovascular:  Regular Rate, Rhythm, No Murmur


Gastrointestinal:  Normal Bowel Sounds


Neurologic/Psychiatric:  Alert, Oriented x3





Hospital Course


Fall secondary to chest pain.  She has a significant history for coronary artery

disease status post CABG and then subsequent stenting to the grafts.  She repo

rts her last cath was a few years ago and is reported as clean.  She has not 

seen Dr. Melo for couple of years though.  She reported compliance with her 

medications.  With aspirin and nitro her symptoms resolved.  She underwent 

stress test with Dr. Leslie which was reported as negative.  She was discharged 

home in stable improved addition with no recurrence of her chest pain to follow-

up with Dr. Melo and with her primary care PA Amrik Holman.


Labs (last 24 hrs)


Laboratory Tests


7/31/23 02:00: 


White Blood Count 5.0, Red Blood Count 4.65, Hemoglobin 13.7, Hematocrit 41, 

Mean Corpuscular Volume 89, Mean Corpuscular Hemoglobin 30, Mean Corpuscular 

Hemoglobin Concent 33, Red Cell Distribution Width 13.7, Platelet Count 219, 

Mean Platelet Volume 10.0, Sodium Level 140, Potassium Level 4.3, Chloride Level

106, Carbon Dioxide Level 25, Anion Gap 9, Blood Urea Nitrogen 10, Creatinine 

0.82, Estimat Glomerular Filtration Rate 84, BUN/Creatinine Ratio 12, Glucose 

Level 103, Calcium Level 8.8


Patient resulted labs reviewed.


Imaging:  Reviewed Imaging Report





Discussion & Recommendations


Discharge Planning:  >30 minutes discharge planning





Discharge


Home Medications:





Active Scripts


Active


Aspirin EC (Aspirin) 81 Mg Tablet. 81 Mg PO DAILY


Gabapentin 400 Mg Capsule 400 Mg PO Q6H


Reported


Seroquel (Quetiapine Fumarate) 300 Mg Tablet 300 Mg PO HS


Chlorzoxazone 500 Mg Tablet 500 PO TID


Tramadol HCl 50 Mg Tablet 50 Mg PO Q6H PRN





Instructions to patient/family


Please see electronic discharge instructions given to patient.





Clinical Quality Measures


AMI/AHF:


ASA po Prior to arrival:  PRITESH Mendez MD              Jul 31, 2023 11:40

## 2023-07-31 NOTE — DISCHARGE INST-SIMPLE/STANDARD
Discharge Inst-Standard


Patient Instructions/Follow Up


Plan of Care/Instructions/FU:  


Please continue to take your medications as written. Please follow up with


your primary care doctor to follow up this hospital stay.


Activity as Tolerated:  Yes


Discharge Diet:  Cardiac Diet


Return to The Hospital For:  


Chest pain, shortness of breath, fever, weakness, if you feel you are


getting worse.











PRITESH MOLINA MD              Jul 31, 2023 11:43

## 2023-08-02 ENCOUNTER — HOSPITAL ENCOUNTER (EMERGENCY)
Dept: HOSPITAL 75 - ER | Age: 53
Discharge: HOME | End: 2023-08-02
Payer: SELF-PAY

## 2023-08-02 VITALS — DIASTOLIC BLOOD PRESSURE: 82 MMHG | SYSTOLIC BLOOD PRESSURE: 138 MMHG

## 2023-08-02 VITALS — WEIGHT: 293 LBS | HEIGHT: 62.01 IN | BODY MASS INDEX: 53.24 KG/M2

## 2023-08-02 DIAGNOSIS — Z28.310: ICD-10-CM

## 2023-08-02 DIAGNOSIS — I49.3: ICD-10-CM

## 2023-08-02 DIAGNOSIS — R07.2: Primary | ICD-10-CM

## 2023-08-02 DIAGNOSIS — Z95.1: ICD-10-CM

## 2023-08-02 DIAGNOSIS — M54.9: ICD-10-CM

## 2023-08-02 DIAGNOSIS — F17.210: ICD-10-CM

## 2023-08-02 DIAGNOSIS — Z79.82: ICD-10-CM

## 2023-08-02 DIAGNOSIS — I11.9: ICD-10-CM

## 2023-08-02 DIAGNOSIS — I25.10: ICD-10-CM

## 2023-08-02 LAB
ALBUMIN SERPL-MCNC: 4.3 GM/DL (ref 3.2–4.5)
ALP SERPL-CCNC: 88 U/L (ref 40–136)
ALT SERPL-CCNC: 13 U/L (ref 0–55)
APTT BLD: 30 SEC (ref 24–35)
BASOPHILS # BLD AUTO: 0 10^3/UL (ref 0–0.1)
BASOPHILS NFR BLD AUTO: 0 % (ref 0–10)
BILIRUB SERPL-MCNC: 0.3 MG/DL (ref 0.1–1)
BUN/CREAT SERPL: 13
CALCIUM SERPL-MCNC: 9.3 MG/DL (ref 8.5–10.1)
CHLORIDE SERPL-SCNC: 105 MMOL/L (ref 98–107)
CO2 SERPL-SCNC: 25 MMOL/L (ref 21–32)
CREAT SERPL-MCNC: 0.85 MG/DL (ref 0.6–1.3)
EOSINOPHIL # BLD AUTO: 0 10^3/UL (ref 0–0.3)
EOSINOPHIL NFR BLD AUTO: 0 % (ref 0–10)
GFR SERPLBLD BASED ON 1.73 SQ M-ARVRAT: 81 ML/MIN
GLUCOSE SERPL-MCNC: 128 MG/DL (ref 70–105)
HCT VFR BLD CALC: 41 % (ref 35–52)
HGB BLD-MCNC: 13.4 G/DL (ref 11.5–16)
INR PPP: 1 (ref 0.8–1.4)
LIPASE SERPL-CCNC: 14 U/L (ref 8–78)
LYMPHOCYTES # BLD AUTO: 0.6 10^3/UL (ref 1–4)
LYMPHOCYTES NFR BLD AUTO: 13 % (ref 12–44)
MAGNESIUM SERPL-MCNC: 2.2 MG/DL (ref 1.6–2.4)
MANUAL DIFFERENTIAL PERFORMED BLD QL: YES
MCH RBC QN AUTO: 29 PG (ref 25–34)
MCHC RBC AUTO-ENTMCNC: 33 G/DL (ref 32–36)
MCV RBC AUTO: 89 FL (ref 80–99)
MONOCYTES # BLD AUTO: 0 10^3/UL (ref 0–1)
MONOCYTES NFR BLD AUTO: 0 % (ref 0–12)
NEUTROPHILS # BLD AUTO: 4.5 10^3/UL (ref 1.8–7.8)
NEUTROPHILS NFR BLD AUTO: 87 % (ref 42–75)
NEUTS BAND NFR BLD MANUAL: 85 %
PLATELET # BLD EST: ADEQUATE 10*3/UL
PLATELET # BLD: 216 10^3/UL (ref 130–400)
PMV BLD AUTO: 10.4 FL (ref 9–12.2)
POTASSIUM SERPL-SCNC: 3.7 MMOL/L (ref 3.6–5)
PROT SERPL-MCNC: 7.3 GM/DL (ref 6.4–8.2)
PROTHROMBIN TIME: 13.5 SEC (ref 12.2–14.7)
RBC MORPH BLD: NORMAL
SODIUM SERPL-SCNC: 140 MMOL/L (ref 135–145)
VARIANT LYMPHS NFR BLD MANUAL: 15 %
WBC # BLD AUTO: 5.1 10^3/UL (ref 4.3–11)

## 2023-08-02 PROCEDURE — 85610 PROTHROMBIN TIME: CPT

## 2023-08-02 PROCEDURE — 83880 ASSAY OF NATRIURETIC PEPTIDE: CPT

## 2023-08-02 PROCEDURE — 71045 X-RAY EXAM CHEST 1 VIEW: CPT

## 2023-08-02 PROCEDURE — 80053 COMPREHEN METABOLIC PANEL: CPT

## 2023-08-02 PROCEDURE — 96361 HYDRATE IV INFUSION ADD-ON: CPT

## 2023-08-02 PROCEDURE — 93041 RHYTHM ECG TRACING: CPT

## 2023-08-02 PROCEDURE — 83735 ASSAY OF MAGNESIUM: CPT

## 2023-08-02 PROCEDURE — 36415 COLL VENOUS BLD VENIPUNCTURE: CPT

## 2023-08-02 PROCEDURE — 83690 ASSAY OF LIPASE: CPT

## 2023-08-02 PROCEDURE — 83874 ASSAY OF MYOGLOBIN: CPT

## 2023-08-02 PROCEDURE — 93005 ELECTROCARDIOGRAM TRACING: CPT

## 2023-08-02 PROCEDURE — 85730 THROMBOPLASTIN TIME PARTIAL: CPT

## 2023-08-02 PROCEDURE — 85027 COMPLETE CBC AUTOMATED: CPT

## 2023-08-02 PROCEDURE — 85007 BL SMEAR W/DIFF WBC COUNT: CPT

## 2023-08-02 PROCEDURE — 84484 ASSAY OF TROPONIN QUANT: CPT

## 2023-08-02 PROCEDURE — 96374 THER/PROPH/DIAG INJ IV PUSH: CPT

## 2023-08-02 NOTE — DIAGNOSTIC IMAGING REPORT
HISTORY: Chest pain



COMPARISON: 07/29/2023



TECHNIQUE: Frontal view of the chest



FINDINGS:



Lung volumes are large. No consolidation is seen. There is no

pleural effusion or pneumothorax. There is mild cardiomegaly.

Sternotomy wires are noted. There are post CABG changes.

Left-sided catheter tip projects over the low SVC.



IMPRESSION:

1. Large lung volumes with mild cardiomegaly with no acute

pulmonary abnormality seen.



Dictated by: 



  Dictated on workstation # [id number]

## 2023-08-02 NOTE — ED CHEST PAIN
General


Stated Complaint:  CHEST PAIN


Source:  patient


Exam Limitations:  no limitations





History of Present Illness


Date Seen by Provider:  Aug 2, 2023


Time Seen by Provider:  17:19


Initial Comments


Patient is a 53-year-old female who presents ED for chest pain.  Chest pain 

started 2:30 PM while sitting at work.  Pain is located substernal.  She 

describes the pain as pressure with no radiation.  Patient denies of any 

shortness of breath, cough, nausea or vomiting.  Denies taking medication.  She 

did not eat before the pain started.  She states she was recently admitted to 

the hospital  had a cardiac work-up performed with a stress test which 

she states was unremarkable.  She was scheduled follow-up with Dr. Melo her 

cardiologist today but did not make the appointment.  Typically she takes nitro 

but did not take the medication today secondary to a history of low blood 

pressure while taking the medication.  Patient refused anything for pain.  Took 

a baby aspirin this morning.  History of CABG, coronary artery disease.  History

of smoking.  Denies COPD or asthma.  Rates pain 3 out of 10 at this time.  She 

denies fever, chills, headache, abdominal pain, vomitting, diarrhea, headache, 

radiating chest pain,  dizziness, unilateral muscle weakness or sensory changes.

 She did receive a dose of prednisone and Toradol today from her primary care 

physician for back pain.  Patient is not on anticoagulant





Allergies and Home Medications


Allergies


Coded Allergies:  


     morphine (Verified  Allergy, Unknown, RASH - TAKES LORTAB AT HOME WITH NO 

ISSUES, 16)





Patient Home Medication List


Home Medication List Reviewed:  Yes


Aspirin (Aspirin EC) 81 Mg Tablet.dr 81 MG PO DAILY


   Prescribed by: PRITESH MOLINA on 3/13/21 1333


Chlorzoxazone (Chlorzoxazone) 500 Mg Tablet, 500 PO TID, (Reported)


   Entered as Reported by: BERTIN SULLIVAN on 3/13/21 1325


Gabapentin (Gabapentin) 400 Mg Capsule, 400 MG PO Q6H


   Prescribed by: SANDIE MEDLEY on 3/12/21 2317


Quetiapine Fumarate (Seroquel) 300 Mg Tablet, 300 MG PO HS, (Reported)


   Entered as Reported by: NAVA HUI on 23 1007


Tramadol HCl (Tramadol HCl) 50 Mg Tablet, 50 MG PO Q6H PRN for PAIN-MODERATE (5-

7), (Reported)


   Entered as Reported by: BERTIN SULLIVAN on 3/13/21 1322


Discontinued Medications


Benzonatate (Tessalon Perles) 100 Mg Capsule, 200 MG PO TID PRN for COUGH


   Discontinued Reason: No Longer Taking


   Prescribed by: Jenny Ruvalcaba on 22 1656


Ondansetron (Ondansetron Odt) 4 Mg Tab.rapdis, 4 MG PO Q6H PRN for 

NAUSEA/VOMITING


   Discontinued Reason: No Longer Taking


   Prescribed by: TODD DE LEON on 19 1140


Prednisone (Prednisone) 20 Mg Tab, 60 MG PO DAILY


   Discontinued Reason: No Longer Taking


   Prescribed by: Jenny Ruvalcaba on 22 1656


Quetiapine Fumarate (Quetiapine Fumarate) 200 Mg Tablet, 200 PO HS, (Reported)


   Discontinued Reason: No Longer Taking


   Entered as Reported by: BERTIN SULLIVAN on 3/13/21 1323





Review of Systems


Review of Systems


Constitutional:  No chills, No diaphoresis, No malaise, No weakness


EENTM:  No Blurred Vision, No Double Vision, No Eye Pain


Respiratory:  Denies Cough, Denies Orthopnea


Cardiovascular:  Chest Pain


Gastrointestinal:  Denies Abdominal Pain, Denies Diarrhea, Denies Nausea, Denies

Vomiting


Genitourinary:  Denies Burning, Denies Discharge


Musculoskeletal:  No back pain, No joint pain


Skin:  No change in color, No change in hair/nails





All Other Systems Reviewed


Negative Unless Noted:  Yes





Past Medical-Social-Family Hx


Immunizations Up To Date


Tetanus Booster (TDap):  Unknown


PED Vaccines UTD:  No


First/Initial COVID19 Vaccinat:  


Second COVID19 Vaccination Rahul:  


Third COVID19 Vaccination Date:  





Seasonal Allergies


Seasonal Allergies:  No





Past Medical History


Surgery/Hospitalization HX:  


PMH;NECK AND  BACK BULGING DISC.


SUERGERY;OPEN HEART SURGERY , HYST.


Surgeries:  Yes (left total knee 2015, cabgX1, port,)


Cardiac, CABG, Hysterectomy, Open Heart Surgery, Orthopedic


Respiratory:  Yes


Pneumonia, Chronic Bronchitis


Currently Using CPAP:  No


Currently Using BIPAP:  No


Cardiac:  Yes (CABG X 1 VESSEL )


Coronary Artery Disease, High Cholesterol, Hypertension


Neurological:  Yes


Headaches /Migraines


Reproductive Disorders:  Yes


Female Reproductive Disorders:  Menstrual Problems


GYN History:  Hysterectomy, Menopausal


Sexually Transmitted Disease:  No


HIV/AIDS:  No


Genitourinary:  No


Gastrointestinal:  Yes


Colitis, Gastroesophageal Reflux, Diverticulosis, Hemorrhoids, Chronic Diarrhea,

Ulcer, Irritable Bowel


Musculoskeletal:  Yes (FRACTURE LEFT WRIST)


Degenerate Disk Disease, Arthritis, Chronic Back Pain, Fractures


Endocrine:  No


HEENT:  No


Loss of Vision:  Denies


Hearing Impairment:  Denies


Cancer:  No


Psychosocial:  Yes


Sleep Difficulties, Anxiety, Depression


Integumentary:  No


Blood Disorders:  No


Adverse Reaction/Blood Tranf:  No





Family Medical History





Cancer (brain and back)


  G8 SISTER


  G8 SISTER


Cataracts


  19 MOTHER


Headache disorder


  G8 SISTER


Hypercholesterolemia


  19 FATHER


Hypertension


  19 FATHER


Myocardial infarction


  19 FATHER


  19 FATHER


Myocardial infarction ( from MI at 55


)


  19 FATHER


  19 FATHER


Neoplasm


  G8 SISTER


Psychosocial problem


  G8 SISTER


Visual disorder


  G8 SISTER





No Family History of:


  AIDS


  Abdominal aortic aneurysm


  Woodson's disease


  Alcoholism


  Alzheimer's disease


  Aphasia


  Arthritis


  Asthma


  Cancer of mouth


  Cardiovascular disease


  Colon cancer


  Completed stroke


  Congenital disease


  Congenital heart disease


  Coronary thrombosis


  Cystic fibrosis


  Deafness or hearing loss


  Dementia


  Diabetes mellitus


  Drug abuse


  Dysphasia


  Fibrocystic disease of breast


  Gastroenteritis


  Glaucoma


  Infertility


  Kidney disease


  Not obtainable due to adoption


  Osteoporosis


  Parkinson's disease


  Prostate cancer


  Respiratory disorder


  Seizure disorder


  Severe allergy


  Thyroid disease


  Tuberculosis


Cancer








SOCIAL HISTORY:


-SMOKES 1/3 PPD


-DENIES ETOH USE


-DENIES DRUG USE





PAST SURGICAL HISTORY:


-CABG X 1 VESSEL 


-CARDIAC CATH  --NO INTERVENTION. BY DR. LOGAN


-LEFT KNEE REPLACEMENT


-PORT LEFT CHEST





Physical Exam


Vital Signs





Vital Signs - First Documented








 23





 17:10


 


Temp 37.2


 


Pulse 101


 


Resp 18


 


B/P (MAP) 174/99 (124)


 


Pulse Ox 98


 


O2 Delivery Room Air





Capillary Refill :


Height, Weight, BMI


Height: 5'2.00"


Weight: 115lbs. 7.0oz. [id number]; 23.59 BMI


Method:Stated


General Appearance:  No Apparent Distress, WD/WN


HEENT:  PERRL/EOMI, TMs Normal, Normal ENT Inspection, Pharynx Normal


Neck:  Full Range of Motion, Normal Inspection, Non Tender, Supple


Respiratory:  Chest Non Tender, Lungs Clear, Normal Breath Sounds, No Accessory 

Muscle Use, No Respiratory Distress


Cardiovascular:  Regular Rate, Rhythm, No Edema, No Gallop, No JVD


Gastrointestinal:  Normal Bowel Sounds, No Organomegaly, No Pulsatile Mass


Extremity:  Normal Capillary Refill, Normal Inspection, Normal Range of Motion, 

Non Tender


Neurologic/Psychiatric:  Alert, Oriented x3, No Motor/Sensory Deficits, Normal 

Mood/Affect


Skin:  Normal Color, Warm/Dry





Progress/Results/Core Measures


Results/Orders


Lab Results





Laboratory Tests








Test


 23


17:33 23


19:44 Range/Units


 


 


White Blood Count


 5.1 


 


 4.3-11.0


10^3/uL


 


Red Blood Count


 4.61 


 


 3.80-5.11


10^6/uL


 


Hemoglobin 13.4   11.5-16.0  g/dL


 


Hematocrit 41   35-52  %


 


Mean Corpuscular Volume 89   80-99  fL


 


Mean Corpuscular Hemoglobin 29   25-34  pg


 


Mean Corpuscular Hemoglobin


Concent 33 


 


 32-36  g/dL





 


Red Cell Distribution Width 13.8   10.0-14.5  %


 


Platelet Count


 216 


 


 130-400


10^3/uL


 


Mean Platelet Volume 10.4   9.0-12.2  fL


 


Immature Granulocyte % (Auto) 0    %


 


Neutrophils (%) (Auto) 87 H  42-75  %


 


Lymphocytes (%) (Auto) 13   12-44  %


 


Monocytes (%) (Auto) 0   0-12  %


 


Eosinophils (%) (Auto) 0   0-10  %


 


Basophils (%) (Auto) 0   0-10  %


 


Neutrophils # (Auto)


 4.5 


 


 1.8-7.8


10^3/uL


 


Lymphocytes # (Auto)


 0.6 L


 


 1.0-4.0


10^3/uL


 


Monocytes # (Auto)


 0.0 


 


 0.0-1.0


10^3/uL


 


Eosinophils # (Auto)


 0.0 


 


 0.0-0.3


10^3/uL


 


Basophils # (Auto)


 0.0 


 


 0.0-0.1


10^3/uL


 


Immature Granulocyte # (Auto)


 0.0 


 


 0.0-0.1


10^3/uL


 


Neutrophils % (Manual) 85    %


 


Lymphocytes % (Manual) 15    %


 


Platelet Estimate ADEQUATE    


 


Blood Morphology Comment NORMAL    


 


Prothrombin Time 13.5   12.2-14.7  SEC


 


INR Comment 1.0   0.8-1.4  


 


Activated Partial


Thromboplast Time 30 


 


 24-35  SEC





 


Sodium Level 140   135-145  MMOL/L


 


Potassium Level 3.7   3.6-5.0  MMOL/L


 


Chloride Level 105     MMOL/L


 


Carbon Dioxide Level 25   21-32  MMOL/L


 


Anion Gap 10   5-14  MMOL/L


 


Blood Urea Nitrogen 11   7-18  MG/DL


 


Creatinine


 0.85 


 


 0.60-1.30


MG/DL


 


Estimat Glomerular Filtration


Rate 81 


 


  





 


BUN/Creatinine Ratio 13    


 


Glucose Level 128 H    MG/DL


 


Calcium Level 9.3   8.5-10.1  MG/DL


 


Corrected Calcium 9.1   8.5-10.1  MG/DL


 


Magnesium Level 2.2   1.6-2.4  MG/DL


 


Total Bilirubin 0.3   0.1-1.0  MG/DL


 


Aspartate Amino Transf


(AST/SGOT) 14 


 


 5-34  U/L





 


Alanine Aminotransferase


(ALT/SGPT) 13 


 


 0-55  U/L





 


Alkaline Phosphatase 88     U/L


 


Myoglobin


 25.4 


 


 10.0-92.0


NG/ML


 


Troponin I < 0.028  < 0.028  <0.028  NG/ML


 


B-Type Natriuretic Peptide 97.4   <100.0  PG/ML


 


Total Protein 7.3   6.4-8.2  GM/DL


 


Albumin 4.3   3.2-4.5  GM/DL


 


Lipase 14   8-78  U/L








My Orders





Orders - ANDREA SANCHEZ PA


Ekg Tracing (23 17:11)


Cbc With Automated Diff (23 17:17)


Magnesium (23 17:17)


Chest 1 View, Ap/Pa Only (23 17:17)


Comprehensive Metabolic Panel (23 17:17)


Myoglobin Serum (23 17:17)


Protime With Inr (23 17:17)


Partial Thromboplastin Time (23 17:17)


O2 (23 17:17)


Monitor-Rhythm Ecg Trace Only (23 17:17)


Ed Iv/Invasive Line Start (23 17:17)


Lipase (23 17:17)


Bnp [place] (23 17:17)


Troponin I Judit (23 17:17)


Aspirin Chewable Tablet (Aspirin Chewabl (23 17:30)


Manual Differential (23 17:33)


Fentanyl  Inj (Sublimaze Injection) (23 18:00)


Ns Iv 1000 Ml (Sodium Chloride 0.9%) (23 18:44)


Troponin I [place] (23 19:33)





Medications Given in ED





Vital Signs/I&O











 23





 17:10 17:10 20:38


 


Temp 37.2  


 


Pulse 101  101


 


Resp 18  18


 


B/P (MAP) 174/99 (124)  138/82


 


Pulse Ox  98 98


 


O2 Delivery Room Air Room Air Room Air








Comment


Sinus rhythm with frequent ventricular premature complexes, left atrial 

enlargement 96 bpm, QRS duration 91 MS, QTc 376 MS.





Departure


Communication (PCP)


Reviewed previous ER visits, hospitalist H&P, cardiology H&P.  Patient was 

admitted this past weekend for similar type chest pain.  She has a significant 

history of coronary artery disease status post CABG with stenting to the grafts.

 She follows Dr. Logan.  Patient Was not able to follow-up today for her recent

admission.  She started having substernal chest pain started around 2:30 PM 

while sitting at work.  Chest pressure has improved but rates the pressure 3 out

of 10.  No associated cough, shortness of breath, fever, vomiting or diarrhea.  

She does take baby aspirin daily and in the morning.  Due to her current 

complaint cardiac work-up was initiated.  She did have a stress test performed 

this past Monday by Dr. Leslie which was reported negative and did not note any 

significant ischemic changes.  EKG did not show any evidence of ST elevation or 

depression on arrival but noted PVC.  She was hypertensive.  She refused nitro 

initially because it causes low blood pressure in the past making her sick.  

CBC, CMP grossly unremarkable. Initial  troponin negative.  BNP negative.  Heart

rate improved to 78 bpm as well as her blood pressure 138/82. She did request a 

liter of fluid.  Chest x-ray was negative for pneumonia, pneumothorax, pleural 

effusion.  Mild cardiomegaly.  Every day smoker.  Due to her recent cardiac 

work-up and reassuring stress test. I drawed a delta troponin which returned 

back negative.  She did request something different for pain and agreed to 

fentanyl which was given with improvement of pain. She can not tolerate 

morphine.  She states the pain is not associated with eating.  She has no 

abdominal tenderness.  Normal lipase.  Discussed other potential etiologies.  

Due to not following up with her cardiologist today, I recommend calling 

tomorrow to set up appointment.  She does not appear in acute distress.  Vital 

signs remained stable.  If any worsening symptoms return back to ED.





Impression





   Primary Impression:  


   Chest pain


Disposition:  01 HOME, SELF-CARE


Condition:  Stable





Departure-Patient Inst.


Decision time for Depature:  20:31


Referrals:  


JOSE ZARCO (PCP)


Primary Care Physician








DARREL LOGAN MD FACP FACC CCDS


Patient Instructions:  Chest Pain





Add. Discharge Instructions:  


Recommend contacting Dr. logan's office tomorrow and schedule follow-up.  If 

any worsening symptoms return back to ED











ANDREA SANCHEZ           Aug 2, 2023 17:22

## 2023-09-04 ENCOUNTER — HOSPITAL ENCOUNTER (EMERGENCY)
Dept: HOSPITAL 75 - ER | Age: 53
Discharge: HOME | End: 2023-09-04
Payer: SELF-PAY

## 2023-09-04 VITALS — DIASTOLIC BLOOD PRESSURE: 87 MMHG | SYSTOLIC BLOOD PRESSURE: 117 MMHG

## 2023-09-04 VITALS — BODY MASS INDEX: 22.03 KG/M2 | WEIGHT: 121.25 LBS | HEIGHT: 62.01 IN

## 2023-09-04 DIAGNOSIS — F17.210: ICD-10-CM

## 2023-09-04 DIAGNOSIS — G43.909: Primary | ICD-10-CM

## 2023-09-04 LAB
ALBUMIN SERPL-MCNC: 3.7 GM/DL (ref 3.2–4.5)
ALP SERPL-CCNC: 83 U/L (ref 40–136)
ALT SERPL-CCNC: 6 U/L (ref 0–55)
APTT BLD: 30 SEC (ref 24–35)
APTT PPP: YELLOW S
BACTERIA #/AREA URNS HPF: (no result) /HPF
BASOPHILS # BLD AUTO: 0 10^3/UL (ref 0–0.1)
BASOPHILS NFR BLD AUTO: 1 % (ref 0–10)
BILIRUB SERPL-MCNC: 0.2 MG/DL (ref 0.1–1)
BILIRUB UR QL STRIP: NEGATIVE
BUN/CREAT SERPL: 10
CALCIUM SERPL-MCNC: 8.6 MG/DL (ref 8.5–10.1)
CHLORIDE SERPL-SCNC: 105 MMOL/L (ref 98–107)
CO2 SERPL-SCNC: 26 MMOL/L (ref 21–32)
CREAT SERPL-MCNC: 0.8 MG/DL (ref 0.6–1.3)
EOSINOPHIL # BLD AUTO: 0.2 10^3/UL (ref 0–0.3)
EOSINOPHIL NFR BLD AUTO: 3 % (ref 0–10)
FIBRINOGEN PPP-MCNC: CLEAR MG/DL
GFR SERPLBLD BASED ON 1.73 SQ M-ARVRAT: 87 ML/MIN
GLUCOSE SERPL-MCNC: 85 MG/DL (ref 70–105)
GLUCOSE UR STRIP-MCNC: NEGATIVE MG/DL
HCT VFR BLD CALC: 37 % (ref 35–52)
HGB BLD-MCNC: 12.3 G/DL (ref 11.5–16)
INR PPP: 1 (ref 0.8–1.4)
KETONES UR QL STRIP: NEGATIVE
LEUKOCYTE ESTERASE UR QL STRIP: NEGATIVE
LYMPHOCYTES # BLD AUTO: 2 10^3/UL (ref 1–4)
LYMPHOCYTES NFR BLD AUTO: 34 % (ref 12–44)
MANUAL DIFFERENTIAL PERFORMED BLD QL: NO
MCH RBC QN AUTO: 30 PG (ref 25–34)
MCHC RBC AUTO-ENTMCNC: 33 G/DL (ref 32–36)
MCV RBC AUTO: 91 FL (ref 80–99)
MONOCYTES # BLD AUTO: 0.4 10^3/UL (ref 0–1)
MONOCYTES NFR BLD AUTO: 7 % (ref 0–12)
NEUTROPHILS # BLD AUTO: 3.2 10^3/UL (ref 1.8–7.8)
NEUTROPHILS NFR BLD AUTO: 55 % (ref 42–75)
NITRITE UR QL STRIP: NEGATIVE
PH UR STRIP: 7 [PH] (ref 5–9)
PLATELET # BLD: 209 10^3/UL (ref 130–400)
PMV BLD AUTO: 10.4 FL (ref 9–12.2)
POTASSIUM SERPL-SCNC: 3.9 MMOL/L (ref 3.6–5)
PROT SERPL-MCNC: 6.3 GM/DL (ref 6.4–8.2)
PROT UR QL STRIP: NEGATIVE
PROTHROMBIN TIME: 13.6 SEC (ref 12.2–14.7)
RBC #/AREA URNS HPF: (no result) /HPF
SODIUM SERPL-SCNC: 140 MMOL/L (ref 135–145)
SP GR UR STRIP: 1.01 (ref 1.02–1.02)
SQUAMOUS #/AREA URNS HPF: (no result) /HPF
WBC # BLD AUTO: 5.9 10^3/UL (ref 4.3–11)
WBC #/AREA URNS HPF: (no result) /HPF

## 2023-09-04 PROCEDURE — 93041 RHYTHM ECG TRACING: CPT

## 2023-09-04 PROCEDURE — 87088 URINE BACTERIA CULTURE: CPT

## 2023-09-04 PROCEDURE — 36415 COLL VENOUS BLD VENIPUNCTURE: CPT

## 2023-09-04 PROCEDURE — 84484 ASSAY OF TROPONIN QUANT: CPT

## 2023-09-04 PROCEDURE — 85610 PROTHROMBIN TIME: CPT

## 2023-09-04 PROCEDURE — 81000 URINALYSIS NONAUTO W/SCOPE: CPT

## 2023-09-04 PROCEDURE — 93005 ELECTROCARDIOGRAM TRACING: CPT

## 2023-09-04 PROCEDURE — 85730 THROMBOPLASTIN TIME PARTIAL: CPT

## 2023-09-04 PROCEDURE — 70496 CT ANGIOGRAPHY HEAD: CPT

## 2023-09-04 PROCEDURE — 70498 CT ANGIOGRAPHY NECK: CPT

## 2023-09-04 PROCEDURE — 71045 X-RAY EXAM CHEST 1 VIEW: CPT

## 2023-09-04 PROCEDURE — 85025 COMPLETE CBC W/AUTO DIFF WBC: CPT

## 2023-09-04 PROCEDURE — 80053 COMPREHEN METABOLIC PANEL: CPT

## 2023-09-04 PROCEDURE — 70450 CT HEAD/BRAIN W/O DYE: CPT

## 2023-09-04 NOTE — DIAGNOSTIC IMAGING REPORT
PROCEDURE: 

CT head without r/o stroke.



TECHNIQUE: 

Multiple contiguous axial images were obtained through the brain

without the use of intravenous contrast. Auto Exposure Controls

were utilized during the CT exam to meet ALARA standards for

radiation dose reduction. 



INDICATION: 

Headache as well as visual changes, right-sided weakness, and

right eye issues.



COMPARISON:   

Prior CT from 09/04/2023.



FINDINGS:

The ventricles and sulci are within normal limits. No sulcal

effacement or midline shift is identified. No acute intra-axial

or extra-axial hemorrhage is detected. The cisterns are patent.

The visualized paranasal sinuses are clear apart from

opacification of several right-sided ethmoid air cells.



IMPRESSION: 

No acute intracranial process is detected.



Dictated by: 



  Dictated on workstation # [id number]

## 2023-09-04 NOTE — DIAGNOSTIC IMAGING REPORT
INDICATION: 

Stroke.



TIME OF EXAM: 

1:59 PM.



COMPARISON: 

Correlation is made with the prior chest from 08/02/2023.



FINDINGS:

Changes of median sternotomy are noted. The left chest wall port

has its tip overlying the SVC. The lungs are clear. No

infiltrates are detected. There is no effusion or pneumothorax.



IMPRESSION: 

No acute cardiopulmonary process is detected.



Dictated by: 



  Dictated on workstation # [id number]

## 2023-09-04 NOTE — ED HEADACHE
General


Chief Complaint:  Head/Cervical Problems


Stated Complaint:  HEADACHE - VISION CHANGES


Source:  patient


Exam Limitations:  no limitations





History of Present Illness


Date Seen by Provider:  Sep 4, 2023


Time Seen by Provider:  13:58


Initial Comments


Patient is a 53-year-old female with a history of coronary artery disease, 

smoking who presents to the ED for right-sided frontal head pain.  Head pain 

started 3 days ago.  She rates pain 10 out of 10.  Pain has stayed the same.  

She states she has been taking ibuprofen and Excedrin Migraine without much 

improvement.  Pain is described as pressure.  Patient states she has a history 

of migraines but has not had a migraine for several years.  Typical migrainous 

located to her frontal head.  She states today she was at work and about 2 hours

ago she felt like she was leaning to the right while walking.  She also reported

weakness to the right side but that resolved right before arrival.  she noted 

some blurry vision out of the right eye 2 hours ago as well but that has not 

improved.  She denies history of stroke.  She denies of any vomiting, sore 

throat, cough, chest pain, nausea, vomiting, shortness of breath, back pain, 

dizziness, lightheadedness, visual loss.  She denies any photophobia or 

phonophobia.  Patient denies blood thinner use





Allergies and Home Medications


Allergies


Coded Allergies:  


     morphine (Verified  Allergy, Unknown, RASH - TAKES LORTAB AT HOME WITH NO 

ISSUES, 16)





Patient Home Medication List


Home Medication List Reviewed:  Yes


Aspirin (Aspirin EC) 81 Mg Tablet.dr, 81 MG PO DAILY


   Prescribed by: PRITESH MOLINA on 3/13/21 1333


Chlorzoxazone (Chlorzoxazone) 500 Mg Tablet, 500 PO TID, (Reported)


   Entered as Reported by: BERTIN SULLIVAN on 3/13/21 1325


Gabapentin (Gabapentin) 400 Mg Capsule, 400 MG PO Q6H


   Prescribed by: SANDIE MEDLEY on 3/12/21 2317


Quetiapine Fumarate (Seroquel) 300 Mg Tablet, 300 MG PO HS, (Reported)


   Entered as Reported by: NAVA HUI on 23 1007


Tramadol HCl (Tramadol HCl) 50 Mg Tablet, 50 MG PO Q6H PRN for PAIN-MODERATE (5-

7), (Reported)


   Entered as Reported by: BERTIN SULLIVAN on 3/13/21 1322





Review of Systems


Review of Systems


Constitutional:  No chills, No diaphoresis, No malaise, No weakness


Eyes:  Blurred Vision (right eye)


Ears, Nose, Mouth, Throat:  denies ear pain, denies ear discharge


Respiratory:  No cough, No dyspnea on exertion


Cardiovascular:  No chest pain, No edema


Gastrointestinal:  No abdominal pain, No diarrhea, No nausea, No vomiting


Genitourinary:  No decreased output, No discharge


Musculoskeletal:  No back pain, No joint pain


Skin:  No change in color, No change in hair/nails





All Other Systems Reviewed


Negative Unless Noted:  Yes





Past Medical-Social-Family Hx


Immunizations Up To Date


Tetanus Booster (TDap):  Unknown


PED Vaccines UTD:  No


First/Initial COVID19 Vaccinat:  


Second COVID19 Vaccination Rahul:  


Third COVID19 Vaccination Date:  





Seasonal Allergies


Seasonal Allergies:  No





Past Medical History


Surgery/Hospitalization HX:  


PMH;NECK AND  BACK BULGING DISC.


SUERGERY;OPEN HEART SURGERY , HYST.


Surgeries:  Yes (left total knee 2015, cabgX1, port,)


Cardiac, CABG, Hysterectomy, Open Heart Surgery, Orthopedic


Respiratory:  Yes


Pneumonia, Chronic Bronchitis


Currently Using CPAP:  No


Currently Using BIPAP:  No


Cardiac:  Yes (CABG X 1 VESSEL )


Coronary Artery Disease, High Cholesterol, Hypertension


Neurological:  Yes


Headaches /Migraines


Reproductive Disorders:  Yes


Female Reproductive Disorders:  Menstrual Problems


GYN History:  Hysterectomy, Menopausal


Sexually Transmitted Disease:  No


HIV/AIDS:  No


Genitourinary:  No


Gastrointestinal:  Yes


Colitis, Gastroesophageal Reflux, Diverticulosis, Hemorrhoids, Chronic Diarrhea,

Ulcer, Irritable Bowel


Musculoskeletal:  Yes (FRACTURE LEFT WRIST)


Degenerate Disk Disease, Arthritis, Chronic Back Pain, Fractures


Endocrine:  No


HEENT:  No


Loss of Vision:  Denies


Hearing Impairment:  Denies


Cancer:  No


Psychosocial:  Yes


Sleep Difficulties, Anxiety, Depression


Integumentary:  No


Blood Disorders:  No


Adverse Reaction/Blood Tranf:  No





Family Medical History





Cancer (brain and back)


  G8 SISTER


  G8 SISTER


Cataracts


  19 MOTHER


Headache disorder


  G8 SISTER


Hypercholesterolemia


  19 FATHER


Hypertension


  19 FATHER


Myocardial infarction


  19 FATHER


  19 FATHER


Myocardial infarction ( from MI at 55


)


  19 FATHER


  19 FATHER


Neoplasm


  G8 SISTER


Psychosocial problem


  G8 SISTER


Visual disorder


  G8 SISTER





No Family History of:


  AIDS


  Abdominal aortic aneurysm


  [place]'s disease


  Alcoholism


  Alzheimer's disease


  Aphasia


  Arthritis


  Asthma


  Cancer of mouth


  Cardiovascular disease


  Colon cancer


  Completed stroke


  Congenital disease


  Congenital heart disease


  Coronary thrombosis


  Cystic fibrosis


  Deafness or hearing loss


  Dementia


  Diabetes mellitus


  Drug abuse


  Dysphasia


  Fibrocystic disease of breast


  Gastroenteritis


  Glaucoma


  Infertility


  Kidney disease


  Not obtainable due to adoption


  Osteoporosis


  Parkinson's disease


  Prostate cancer


  Respiratory disorder


  Seizure disorder


  Severe allergy


  Thyroid disease


  Tuberculosis


Cancer








SOCIAL HISTORY:


-SMOKES 1/3 PPD


-DENIES ETOH USE


-DENIES DRUG USE





PAST SURGICAL HISTORY:


-CABG X 1 VESSEL 


-CARDIAC CATH  --NO INTERVENTION. BY DR. CASTRO


-LEFT KNEE REPLACEMENT


-PORT LEFT CHEST





Physical Exam


Vital Signs





Vital Signs - First Documented








 23





 13:32


 


Temp 37.0


 


Pulse 82


 


Resp 16


 


B/P (MAP) 133/83 (100)


 


Pulse Ox 97


 


O2 Delivery Room Air





Capillary Refill :


Height, Weight, BMI


Height: 5'2.00"


Weight: 115lbs. 7.0oz. [id number]; 234.00 BMI


Method:Stated


General Appearance:  WD/WN, no apparent distress


HEENT:  PERRL/EOMI, normal ENT inspection, TMs normal, pharynx normal


Neck:  non-tender, full range of motion, supple


Cardiovascular:  regular rate, rhythm, no edema, no gallop, no JVD


Respiratory:  chest non-tender, lungs clear, normal breath sounds, no 

respiratory distress, no accessory muscle use


Gastrointestinal:  normal bowel sounds, non tender, soft, no organomegaly


Back:  normal inspection, no CVA tenderness


Extremities:  normal range of motion, non-tender, normal inspection


Crainal Nerves:  normal hearing, normal speech, PERRL


Coordination/Gait:  normal finger to nose, normal gait


Motor/Sensory:  no motor deficit, no sensory deficit, no pronator drift


Skin:  normal color, warm/dry


Lymphatic:  no adenopathy





Progress/Results/Core Measures


Results/Orders


Lab Results





Laboratory Tests








Test


 23


14:05 23


14:30 Range/Units


 


 


Urine Color YELLOW    


 


Urine Clarity CLEAR    


 


Urine pH 7.0   5-9  


 


Urine Specific Gravity 1.015 L  1.016-1.022  


 


Urine Protein NEGATIVE   NEGATIVE  


 


Urine Glucose (UA) NEGATIVE   NEGATIVE  


 


Urine Ketones NEGATIVE   NEGATIVE  


 


Urine Nitrite NEGATIVE   NEGATIVE  


 


Urine Bilirubin NEGATIVE   NEGATIVE  


 


Urine Urobilinogen 0.2   < = 1.0  MG/DL


 


Urine Leukocyte Esterase NEGATIVE   NEGATIVE  


 


Urine RBC (Auto) NEGATIVE   NEGATIVE  


 


Urine RBC NONE    /HPF


 


Urine WBC 2-5    /HPF


 


Urine Squamous Epithelial


Cells 0-2 


 


  /HPF





 


Urine Crystals NONE    /LPF


 


Urine Bacteria TRACE    /HPF


 


Urine Casts NONE    /LPF


 


Urine Mucus NEGATIVE    /LPF


 


Urine Culture Indicated YES    


 


White Blood Count


 


 5.9 


 4.3-11.0


10^3/uL


 


Red Blood Count


 


 4.13 


 3.80-5.11


10^6/uL


 


Hemoglobin  12.3  11.5-16.0  g/dL


 


Hematocrit  37  35-52  %


 


Mean Corpuscular Volume  91  80-99  fL


 


Mean Corpuscular Hemoglobin  30  25-34  pg


 


Mean Corpuscular Hemoglobin


Concent 


 33 


 32-36  g/dL





 


Red Cell Distribution Width  14.2  10.0-14.5  %


 


Platelet Count


 


 209 


 130-400


10^3/uL


 


Mean Platelet Volume  10.4  9.0-12.2  fL


 


Immature Granulocyte % (Auto)  0   %


 


Neutrophils (%) (Auto)  55  42-75  %


 


Lymphocytes (%) (Auto)  34  12-44  %


 


Monocytes (%) (Auto)  7  0-12  %


 


Eosinophils (%) (Auto)  3  0-10  %


 


Basophils (%) (Auto)  1  0-10  %


 


Neutrophils # (Auto)


 


 3.2 


 1.8-7.8


10^3/uL


 


Lymphocytes # (Auto)


 


 2.0 


 1.0-4.0


10^3/uL


 


Monocytes # (Auto)


 


 0.4 


 0.0-1.0


10^3/uL


 


Eosinophils # (Auto)


 


 0.2 


 0.0-0.3


10^3/uL


 


Basophils # (Auto)


 


 0.0 


 0.0-0.1


10^3/uL


 


Immature Granulocyte # (Auto)


 


 0.0 


 0.0-0.1


10^3/uL


 


Prothrombin Time  13.6  12.2-14.7  SEC


 


INR Comment  1.0  0.8-1.4  


 


Activated Partial


Thromboplast Time 


 30 


 24-35  SEC





 


Sodium Level  140  135-145  MMOL/L


 


Potassium Level  3.9  3.6-5.0  MMOL/L


 


Chloride Level  105    MMOL/L


 


Carbon Dioxide Level  26  21-32  MMOL/L


 


Anion Gap  9  5-14  MMOL/L


 


Blood Urea Nitrogen  8  7-18  MG/DL


 


Creatinine


 


 0.80 


 0.60-1.30


MG/DL


 


Estimat Glomerular Filtration


Rate 


 87 


  





 


BUN/Creatinine Ratio  10   


 


Glucose Level  85    MG/DL


 


Calcium Level  8.6  8.5-10.1  MG/DL


 


Corrected Calcium  8.8  8.5-10.1  MG/DL


 


Total Bilirubin  0.2  0.1-1.0  MG/DL


 


Aspartate Amino Transf


(AST/SGOT) 


 9 


 5-34  U/L





 


Alanine Aminotransferase


(ALT/SGPT) 


 6 


 0-55  U/L





 


Alkaline Phosphatase  83    U/L


 


Troponin I  < 0.028  <0.028  NG/ML


 


Total Protein  6.3 L 6.4-8.2  GM/DL


 


Albumin  3.7  3.2-4.5  GM/DL








My Orders





Orders - ANDREA SANCHEZ


Cbc With Automated Diff (23 13:56)


Protime With Inr (23 13:56)


Partial Thromboplastin Time (23 13:56)


Comprehensive Metabolic Panel (23 13:56)


Troponin I [place] (23 13:56)


Ua Culture If Indicated (23 13:56)


Chest 1 View, Ap/Pa Only (23 13:56)


Ekg Tracing (23 13:56)


Ed Iv/Invasive Line Start (23 13:56)


Vital Signs Stroke Patient Q15M (23 13:56)


Ct Head Wo-R/O Stroke (23 13:56)


Monitor-Rhythm Ecg Trace Only (23 13:56)


Dysphagia Screening Tool Q10MX1 (23 13:56)


Urine Culture (23 14:05)


Ketorolac Injection (Ketorolac Injection (23 15:15)


Diphenhydramine Injection (Diphenhydram (23 15:15)


Prochlorperazine Injection (Prochlorpera (23 15:15)


Ct Angio Head/Neck (23 15:06)


Iohexol Injection (Omnipaque 350 Mg/Ml 1 (23 15:30)


Received Contrast (Hold Metformin- Contr (23 15:30)


Ns (Ivpb) 100 Ml (Sodium Chloride 0.9% 1 (23 15:30)





Medications Given in ED





Current Medications








 Medications  Dose


 Ordered  Sig/Elena


 Route  Start Time


 Stop Time Status Last Admin


Dose Admin


 


 Diphenhydramine


 HCl  25 mg  ONCE  ONCE


 IVP  23 15:15


 23 15:16 DC 23 15:20


25 MG


 


 Iohexol  75 ml  ONCE  ONCE


 IV  23 15:30


 23 15:31 DC 23 16:00


75 ML


 


 Ketorolac


 Tromethamine  30 mg  ONCE  ONCE


 IVP  23 15:15


 23 15:16 DC 23 15:20


30 MG


 


 Prochlorperazine


 Edisylate  10 mg  ONCE  ONCE


 IV  23 15:15


 23 15:16 DC 23 15:19


10 MG


 


 Sodium Chloride  100 ml  ONCE  ONCE


 IV  23 15:30


 23 15:31 DC 23 16:00


80 ML








Vital Signs/I&O











 23





 13:32


 


Temp 37.0


 


Pulse 82


 


Resp 16


 


B/P (MAP) 133/83 (100)


 


Pulse Ox 97


 


O2 Delivery Room Air








Comment


Sinus rhythm, possible left atrial lodgment, nonspecific ST and T wave 

normality, 79 bpm, QRS duration 89 MS, QTc 421 MS





Departure


Impression





   Primary Impression:  


   Migraine headache


Disposition:  01 HOME, SELF-CARE


Condition:  Stable





Departure-Patient Inst.


Decision time for Depature:  17:05


Referrals:  


JOSE ZARCO (PCP/Family)


Primary Care Physician


Patient Instructions:  Migraines (DC)





Add. Discharge Instructions:  


Recommend taken Excedrin Migraine for continued pain.  Any worsening symptoms to

return back to ED.  Recommend contacting your primary care physician tomorrow to

discuss further evaluation with potential MRI.  If any worsening symptoms such 

as weakness on one side, facial droop, slurred speech to return back to ED





All discharge instructions reviewed with patient and/or family. Voiced 

understanding.


Work/School Note:  Work Release Form   Date Seen in the Emergency Department:  

Sep 4, 2023


   Return to Work:  Sep 6, 2023











ANDREA SANCHEZ           Sep 4, 2023 14:02

## 2025-02-11 NOTE — XMS REPORT
[facility]

 Created on: 2018



Dayami Rae

External Reference #: [id number]

: 1970

Sex: Female



Demographics







 Address  [address] [place]  [address]

 

 Preferred Language  Unknown

 

 Marital Status  Unknown

 

 Oriental orthodox Affiliation  Unknown

 

 Race  Unknown

 

 Ethnic Group  Unknown





Author







 Author  KIM FUCHS

 

 Organization  [facility]

 

 Address  [address] [place]  [address]



 

 Phone  [phone]







Care Team Providers







 Care Team Member Name  Role  Phone

 

 FUCHSKIM JACKSON  Unavailable  [phone]







PROBLEMS







 Type  Condition  ICD9-CM Code  TPP90-GS Code  Onset Dates  Condition Status  
SNOMED Code

 

 Problem  Neuropathy     G62.9     Active  617042402

 

 Problem  Lumbago with sciatica, left side     M54.42     Active  971655701

 

 Problem  Anxiety disorder, unspecified     F41.9     Active  533567792

 

 Problem  Chronic pain syndrome     G89.4     Active  829202820

 

 Problem  Fibromyalgia     M79.7     Active  517936761

 

 Problem  Coronary artery disease involving native coronary artery of native 
heart without angina pectoris     I25.10     Active  2949305749251

 

 Problem  Essential hypertension     I10     Active  65612609

 

 Problem  GERD with esophagitis     K21.0     Active  252660701

 

 Problem  Other chronic pain     G89.29     Active  53177137

 

 Problem  Lumbago with sciatica, right side     M54.41     Active  
[id number]

 

 Problem  COPD suggested by initial evaluation     J44.9     Active  77065888

 

 Problem  Acute midline low back pain with left-sided sciatica     M54.42     
Active  637600476







ALLERGIES

No Information



ENCOUNTERS







 Encounter  Location  Date  Diagnosis

 

 [facility]  [address], [place] [address]-
6185  07 Sep, 2018  Acute bronchitis, unspecified organism J20.9

 

 [facility]  [id number] [address]15  28 Aug, 2018  Fibromyalgia M79.7

 

 [facility]  [address] [place] [address]-
9199  20 Aug, 2018   

 

 [facility]  [address] [place] [address]653-
0905  16 Aug, 2018  Neuropathy G62.9

 

 [facility]  [address] [place] [address] 48441-
3395    Essential hypertension I10 ; Coronary artery disease 
involving native coronary artery of native heart without angina pectoris I25.10 
; Fibromyalgia M79.7 ; GERD with esophagitis K21.0 and Tobacco abuse counseling 
Z71.6

 

 [facility]  [id number] [place] [address]-
4946    Long term (current) use of opiate analgesic Z79.891

 

 [facility]  [id number] [place] [address] 49132-
8350     

 

 [facility]  [id number] [place] [address] 15562-
1273    Acute bronchitis, unspecified organism J20.9

 

 [facility]  [id number] [place] [address] 55677-
2412     

 

 [facility]  [id number] [place] [address] 36850-
4109     

 

 [facility]  [id number] [place] [address] 71662-
6166    Chronic pain syndrome G89.4

 

 [facility]  [id number] [place] [address] 52799-
9209     

 

 [facility]  [id number] [place] [address] 55227-
9868  15 Marko, 2018   

 

 [facility]  [id number] [place] [address] 77486-
7947    Acute bronchitis, unspecified organism J20.9

 

 [facility]  [id number] [place] [address] 01349-
4379    Chronic pain syndrome G89.4

 

 [facility]  [id number] [place] [address] 29886-
2792  25 May, 2018   

 

 [facility]  [id number] [place] [address] 63125-
4502  24 May, 2018  Acute midline low back pain with left-sided sciatica M54.42

 

 [facility]  [id number] [place] [address] 50095-
9183  22 May, 2018   

 

 [facility]  [address] [address]
-[phone]  21 May, 2018  Bronchitis J40

 

 [facility]  [id number] [place] [address]61-
8820  07 May, 2018  Chronic pain syndrome G89.4 and Neuropathy G62.9

 

 [facility]  [address] [address]-
1689    Acute midline low back pain with left-sided sciatica M54.42

 

 [facility]  [address] [address]-
5312     

 

 [facility]  [address] [address]-
4218    Anxiety disorder, unspecified F41.9

 

 [facility]  [id number] [place] [address] 32835-
1203     

 

 [facility]  [address] [address]-
5176    Chronic pain syndrome G89.4 and Acute midline low back pain 
with left-sided sciatica M54.42

 

 [facility]  [id number] [place] [address] 38727-
5890    Chronic pain syndrome G89.4

 

 [facility]  [id number] [place] [address] 68739-
1933     

 

 [facility]  [id number] [place] [address] 56501-
9502     

 

 [facility]  [id number] [place] [address] 08590-
7594  29 Mar, 2018  Injury of left knee, initial encounter S89.92XA and COPD 
suggested by initial evaluation J44.9

 

 [facility]  [id number] [place] [address] 61906-
0480  28 Mar, 2018  Acute bronchitis, unspecified organism J20.9

 

 [facility]  [id number] [place] [address] 54439-
4277  27 Mar, 2018  Acute bronchitis, unspecified organism J20.9

 

 [facility]  [id number] [place] [address] 91269-
3911  21 Mar, 2018  Anxiety disorder, unspecified F41.9 and Acute bronchitis, 
unspecified organism J20.9

 

 [facility]  [id number] [place] [address] 11770-
4776  08 Mar, 2018  Chronic pain syndrome G89.4

 

 [facility]  [id number] [place] [address] 41480-
4920  05 Mar, 2018   

 

 [facility]  [id number] [place] [address] 99549-
8130  05 Mar, 2018  Acute midline low back pain with left-sided sciatica M54.42

 

 [facility]  [id number] [place] [address] 51382-
7458     

 

 [facility]  [id number] [place] [address] 72563-
7296    Chronic pain syndrome G89.4

 

 [facility]  [id number] [place] [address] 40668-
9203    Chronic pain syndrome G89.4

 

 [facility]  [address] [place] [address] 86039-
5110     

 

 [facility]  [id number] [place] [address] 37021-
8822    Gastroenteritis K52.9

 

 [facility]  [id number] [place] [address] 52649-
2903     

 

 [facility]  [id number] [place] [address] 68126-
3959  15 Jerardo, 2018  Acute bronchitis, unspecified organism J20.9

 

 [facility] [id number] [place] [address]-
1975    Anxiety disorder, unspecified F41.9 and Neuropathy G62.9

 

 [address] [address]-
3864    Chronic pain syndrome G89.4

 

 [address] [address]-
5766    Bronchitis J40 and Laryngitis J04.0

 

 [address] [address]-
5203  29 Dec, 2017   

 

 [address] [address]-
8607  26 Dec, 2017  Bronchitis J40

 

 [address] [address]-
0623  18 Dec, 2017   

 

 [address] [address]-
2224  13 Dec, 2017  Fibromyalgia M79.7 and Chronic pain syndrome G89.4

 

 [address] 0[address]-
6845  04 Dec, 2017  Chronic pain syndrome G89.4 and Acute bronchitis, 
unspecified organism J20.9

 

 [id number] [place] [address] 35774-
9276    Neuropathy G62.9

 

 [address] [address]-
2269    Chronic pain syndrome G89.4 ; Lumbago with sciatica, left 
side M54.42 ; Lumbago with sciatica, right side M54.41 ; Screening cholesterol 
level Z13.220 ; Screening for diabetes mellitus Z13.1 ; Screening for thyroid 
disorder Z13.29 ; Fibromyalgia M79.7 ; Anxiety disorder, unspecified F41.9 and 
Neuropathy G62.9

 

 [id number] [place] [address] 41839-
8554     

 

 [facility]  [address], [place] [address]-
5918  25 Oct, 2017   

 

 [facility]  [id number] [place] [address] 09079-
5069  10 Oct, 2017  Fibromyalgia M79.7 ; Chronic pain syndrome G89.4 ; Anxiety 
disorder, unspecified F41.9 ; Neuropathy G62.9 and Acute bronchitis, 
unspecified organism J20.9

 

 [facility]  [id number] [place] [address] 01039-
7231  09 Oct, 2017   

 

 [facility]  [id number] [place] [address] 59566-
1749  25 Sep, 2017   

 

 [facility]  [id number] [place] [address] 11019-
4788  19 Sep, 2017   

 

 [facility]  [id number] [place] [address] 43489-
5047  08 Sep, 2017   

 

 [facility]  [id number] [place] [address] 98486-
7249  23 Aug, 2017   

 

 [facility]  [id number] [address]-
6425  22 Aug, 2017  Acute seasonal allergic rhinitis due to pollen J30.1

 

 [facility]  [address], [place] [address]-
7599  21 Aug, 2017   

 

 [facility]  [address], [place] [address]-
8275  09 Aug, 2017   

 

 [facility]  [address] [address]-
2366     

 

 [facility]  [address], [place] [address]-
0695     

 

 [facility]  [address] [address]-
1811  10 Jul, 2017   

 

 [facility]  [address] [address]-
4031     

 

 [facility]  [address], [place] [address]-
6444     

 

 [facility]  [id number] [place] [address] 98604-
0284     

 

 [facility]  [id number] [place] [address] 10877-
4622    Chronic pain syndrome G89.4 ; Fibromyalgia M79.7 ; Anxiety 
disorder, unspecified F41.9 ; Neuropathy G62.9 and Low back pain M54.5

 

 [facility]  [address], [place] [address]-
3788  25 May, 2017  Low back pain M54.5

 

 [facility]  [id number] [place] [address] 97143-
4281  24 May, 2017   

 

 [facility]  [id number] [place] [address] 36093-
7669  22 May, 2017   

 

 [facility]  [id number] [place] [address] 59759-
5240  16 May, 2017   

 

 [facility]  [id number] [place] [address] 21222-
9168  16 May, 2017   

 

 [facility]  [id number] [place] [address] 91501-
3054  12 May, 2017  Routine adult health maintenance Z00.00 ; Fibromyalgia 
M79.7 ; Chronic pain syndrome G89.4 ; Abnormal lung sounds R09.89 ; Coronary 
artery disease involving native coronary artery of native heart without angina 
pectoris I25.10 and S/P CABG (coronary artery bypass graft) Z95.1

 

 [facility]  [address], [place] [address]-
5166  09 May, 2017   







IMMUNIZATIONS

No Known Immunizations



SOCIAL HISTORY

Never Assessed



REASON FOR VISIT

triage



PLAN OF CARE





VITAL SIGNS





MEDICATIONS

No Known Medications



RESULTS

No Results



PROCEDURES

No Known procedures



INSTRUCTIONS





MEDICATIONS ADMINISTERED

No Known Medications



MEDICAL (GENERAL) HISTORY







 Type  Description  Date

 

 Medical History  fibromyalgia   

 

 Medical History  MRI 2016- small central protrusion/herniation w/minimal 
impression on thecal anteriorly at C5-6, At C3-4 small bilat. uncinate spurs w/ 
mild right neural foraminal narrowing   

 

 Medical History  MRI 2016- mild degenerative changes. No spinal canal 
stenosis or foraminal narrowing of thoracic spine   

 

 Medical History  hypertension   

 

 Medical History  Anxiety disorder   

 

 Medical History  depression   

 

 Medical History  migraine headaches   

 

 Medical History  Hx of C-Diff   

 

 Medical History  Hx of Pneumonia   

 

 Medical History  heart cath w/ stents placed 7/3/18   

 

 Surgical History  Open Heart Surgery - [facility] -Dr. Lima  (
Cardiologist- Dr Logan)  

 

 Surgical History  hysterectomy - Dr Luis   

 

 Surgical History  Left Breast Lumpectomy (Bx - Benign)- Dr Luis   

 

 Surgical History  Port - Dr Luis   

 

 Surgical History  Left Knee Surgeries x3- Dr Carolina did 2 and Dr Gan did 1   

 

 Surgical History  cath/stent  

 

 Hospitalization History  C-Diff - [facility]   

 

 Hospitalization History  Pneumonia - [facility]   

 

 Hospitalization History  Open Heart Surgery - [facility] Thank you for letting us care for you in your recent visit to our urgent care center. We would love to hear about your experience with us. Was this the first time you have visited our location?    We’re always looking for ways to make our patients’ experiences even better. Do you have any recommendations on ways we may improve?     I appreciate you taking the time to respond. Please be on the lookout for a survey about your recent visit from [employer] via text or email. We would greatly appreciate if you could fill that out and turn it back in. We want your voice to be heard and we value your feedback.   Thank you for choosing [facility] for your healthcare needs.     Arianna Practice Manager